# Patient Record
Sex: MALE | Race: WHITE | NOT HISPANIC OR LATINO | Employment: OTHER | URBAN - METROPOLITAN AREA
[De-identification: names, ages, dates, MRNs, and addresses within clinical notes are randomized per-mention and may not be internally consistent; named-entity substitution may affect disease eponyms.]

---

## 2017-02-15 ENCOUNTER — HISTORICAL (OUTPATIENT)
Dept: ADMINISTRATIVE | Facility: HOSPITAL | Age: 47
End: 2017-02-15

## 2017-02-15 LAB
ALBUMIN SERPL-MCNC: 4.1 G/DL (ref 3.1–4.7)
ALP SERPL-CCNC: 118 IU/L (ref 40–104)
ALT (SGPT): 42 IU/L (ref 3–33)
AST SERPL-CCNC: 28 IU/L (ref 10–40)
BASOPHILS NFR BLD: 0.1 K/UL (ref 0–0.2)
BASOPHILS NFR BLD: 0.5 %
BILIRUB SERPL-MCNC: 0.8 MG/DL (ref 0.3–1)
BUN SERPL-MCNC: 13 MG/DL (ref 8–20)
CALCIUM SERPL-MCNC: 9.1 MG/DL (ref 7.7–10.4)
CHLORIDE: 103 MMOL/L (ref 98–110)
CO2 SERPL-SCNC: 26.1 MMOL/L (ref 22.8–31.6)
CREATININE: 1.11 MG/DL (ref 0.6–1.4)
EOSINOPHIL NFR BLD: 0.2 K/UL (ref 0–0.7)
EOSINOPHIL NFR BLD: 1.3 %
ERYTHROCYTE [DISTWIDTH] IN BLOOD BY AUTOMATED COUNT: 13.4 % (ref 11.7–14.9)
GLUCOSE: 115 MG/DL (ref 70–99)
GRAN #: 8.1 K/UL (ref 1.4–6.5)
GRAN%: 71.1 %
HCT VFR BLD AUTO: 46 % (ref 39–55)
HGB BLD-MCNC: 15.9 G/DL (ref 14–16)
HIV 1/2 EIA AB SCREEN: NEGATIVE
IMMATURE GRANS (ABS): 0.1 K/UL (ref 0–1)
IMMATURE GRANULOCYTES: 0.4 %
LYMPH #: 2.4 K/UL (ref 1.2–3.4)
LYMPH%: 20.6 %
MCH RBC QN AUTO: 28.3 PG (ref 25–35)
MCHC RBC AUTO-ENTMCNC: 34.6 G/DL (ref 31–36)
MCV RBC AUTO: 81.9 FL (ref 80–100)
MONO #: 0.7 K/UL (ref 0.1–0.6)
MONO%: 6.1 %
NUCLEATED RBCS: 0 %
PLATELET # BLD AUTO: 246 K/UL (ref 140–440)
PMV BLD AUTO: 12.7 FL (ref 8.8–12.7)
POTASSIUM SERPL-SCNC: 3.1 MMOL/L (ref 3.5–5)
PROT SERPL-MCNC: 7.4 G/DL (ref 6–8.2)
RBC # BLD AUTO: 5.62 M/UL (ref 4.3–5.9)
SODIUM: 139 MMOL/L (ref 134–144)
WBC # BLD AUTO: 11.4 K/UL (ref 5–10)

## 2017-02-17 LAB
ANA SER-ACNC: NEGATIVE
RHEUMATOID FACT SERPL-ACNC: 9.2 IU/ML (ref 0–13.9)

## 2017-07-18 RX ORDER — CARBAMAZEPINE 200 MG/1
200 TABLET ORAL DAILY
COMMUNITY
End: 2019-03-07 | Stop reason: ALTCHOICE

## 2017-07-18 RX ORDER — OMEPRAZOLE 20 MG/1
20 CAPSULE, DELAYED RELEASE ORAL 2 TIMES DAILY
COMMUNITY

## 2017-07-18 RX ORDER — FUROSEMIDE 20 MG/1
TABLET ORAL
COMMUNITY
End: 2017-10-24 | Stop reason: DRUGHIGH

## 2017-07-18 RX ORDER — ATORVASTATIN CALCIUM 20 MG/1
20 TABLET, FILM COATED ORAL DAILY
COMMUNITY
End: 2018-05-31 | Stop reason: DRUGHIGH

## 2017-07-18 RX ORDER — METOPROLOL TARTRATE 50 MG/1
TABLET ORAL
Status: ON HOLD | COMMUNITY
End: 2017-11-03

## 2017-07-18 RX ORDER — CETIRIZINE HYDROCHLORIDE 10 MG/1
10 TABLET ORAL DAILY
COMMUNITY

## 2017-07-18 RX ORDER — LOSARTAN POTASSIUM 25 MG/1
25 TABLET ORAL DAILY
Status: ON HOLD | COMMUNITY
End: 2017-11-04 | Stop reason: HOSPADM

## 2017-07-18 RX ORDER — CITALOPRAM 40 MG/1
40 TABLET, FILM COATED ORAL DAILY
COMMUNITY
End: 2019-03-27

## 2017-07-19 ENCOUNTER — OFFICE VISIT (OUTPATIENT)
Dept: PULMONOLOGY | Facility: CLINIC | Age: 47
End: 2017-07-19
Payer: MEDICAID

## 2017-07-19 VITALS
SYSTOLIC BLOOD PRESSURE: 118 MMHG | HEART RATE: 98 BPM | WEIGHT: 315 LBS | HEIGHT: 68 IN | BODY MASS INDEX: 47.74 KG/M2 | OXYGEN SATURATION: 90 % | DIASTOLIC BLOOD PRESSURE: 68 MMHG

## 2017-07-19 DIAGNOSIS — J96.11 CHRONIC RESPIRATORY FAILURE WITH HYPOXIA: ICD-10-CM

## 2017-07-19 DIAGNOSIS — I27.82 OTHER CHRONIC PULMONARY EMBOLISM WITHOUT ACUTE COR PULMONALE: Primary | ICD-10-CM

## 2017-07-19 DIAGNOSIS — E66.2 OBESITY HYPOVENTILATION SYNDROME: ICD-10-CM

## 2017-07-19 PROBLEM — F31.10 BIPOLAR AFFECTIVE DISORDER, CURRENT EPISODE MANIC: Status: ACTIVE | Noted: 2017-07-19

## 2017-07-19 PROBLEM — I27.20 PULMONARY HYPERTENSION: Status: ACTIVE | Noted: 2017-07-19

## 2017-07-19 PROBLEM — H40.9 GLAUCOMA: Status: ACTIVE | Noted: 2017-07-19

## 2017-07-19 PROBLEM — I26.99 PULMONARY EMBOLISM: Status: ACTIVE | Noted: 2017-07-19

## 2017-07-19 PROBLEM — E78.5 HYPERLIPIDEMIA: Status: ACTIVE | Noted: 2017-07-19

## 2017-07-19 PROBLEM — I10 BENIGN ESSENTIAL HYPERTENSION: Status: ACTIVE | Noted: 2017-07-19

## 2017-07-19 PROBLEM — F41.9 ANXIETY: Status: ACTIVE | Noted: 2017-07-19

## 2017-07-19 PROBLEM — Z95.5 PRESENCE OF STENT IN CORONARY ARTERY: Status: ACTIVE | Noted: 2017-07-19

## 2017-07-19 PROBLEM — E66.01 MORBID OBESITY: Status: ACTIVE | Noted: 2017-07-19

## 2017-07-19 PROCEDURE — 99214 OFFICE O/P EST MOD 30 MIN: CPT | Mod: ,,, | Performed by: INTERNAL MEDICINE

## 2017-07-19 RX ORDER — ALBUTEROL SULFATE 90 UG/1
AEROSOL, METERED RESPIRATORY (INHALATION)
Refills: 6 | COMMUNITY
Start: 2017-06-05

## 2017-07-19 NOTE — PROGRESS NOTES
HPI:    Here for follow up, doing better overall, still with GOMEZ.  Here to requalify for O2 (O2 sat at rest 91 % on room air, with walking 88 %, increased to 94% on O2).  Had sleep study (+ mild CLAUDIA - RDI - 7, but didn't have much/any deep sleep).  No new problems.    Home Oxygen    Qualifier - O2 sat 88% on room air      Date - 7/18/2017                   + Exertion      O2 sat 94 on 2 LPM via nasal cannuli    DX - pulmonary embolism, obesity hypoventilation    Face to face visit with pt concerning the need for O2 therapy, all questions answered.  I stressed the need for compliance.  Pt to call with any questions.    NIPPV for Obesity Hypoventilation    Ordering nocturnal volume ventilator PRN use to reduce the risk of exacerbation.  Home BiPAP in sufficient due to the severity of the condition.  Obesity hypoventilation is the cause of the chronic respiratory failure.            Medications    Current Outpatient Prescriptions:     apixaban (ELIQUIS) 5 mg Tab, Take by mouth., Disp: , Rfl:     atorvastatin (LIPITOR) 20 MG tablet, Take by mouth., Disp: , Rfl:     carbamazepine (TEGRETOL) 200 mg tablet, Take by mouth., Disp: , Rfl:     cetirizine (ZYRTEC) 10 MG tablet, Take by mouth., Disp: , Rfl:     citalopram (CELEXA) 40 MG tablet, Take by mouth., Disp: , Rfl:     furosemide (LASIX) 20 MG tablet, Take by mouth., Disp: , Rfl:     losartan (COZAAR) 25 MG tablet, Take by mouth., Disp: , Rfl:     metoprolol tartrate (LOPRESSOR) 50 MG tablet, Take by mouth., Disp: , Rfl:     omeprazole (PRILOSEC) 20 MG capsule, Take by mouth., Disp: , Rfl:     VENTOLIN HFA 90 mcg/actuation inhaler, TAKE 1 PUFF EVERY 6 HOURS AS NEEDED, Disp: , Rfl: 6    Allergies  Review of patient's allergies indicates:   Allergen Reactions    Clopidogrel        Social History    History   Smoking Status    Never Smoker   Smokeless Tobacco    Never Used     ETOH - 0 drinks per week.  History   Drug Use No     Occupation - not working, worked  "at [employer]    Family History  No family history on file.    ROS  Review of Systems   Constitutional: Positive for malaise/fatigue. Negative for chills, diaphoresis, fever and weight loss.   HENT: Negative for congestion.    Eyes: Negative for pain.   Respiratory: Positive for shortness of breath. Negative for cough, hemoptysis, sputum production, wheezing and stridor.    Cardiovascular: Positive for leg swelling. Negative for chest pain, palpitations, orthopnea, claudication and PND.   Gastrointestinal: Negative for abdominal pain, constipation, diarrhea, heartburn, nausea and vomiting.   Genitourinary: Negative for dysuria, frequency and urgency.   Musculoskeletal: Negative for falls and myalgias.   Neurological: Negative for sensory change, focal weakness and weakness.   Psychiatric/Behavioral: Negative for depression. The patient is not nervous/anxious.        Physical Exam    Vitals:    07/19/17 1032   BP: 118/68   Pulse: 98   SpO2: (!) 90%   Weight: (!) 147.4 kg (325 lb)   Height: 5' 7.5" (1.715 m)       Physical Exam   Constitutional: He is oriented to person, place, and time. He appears well-developed and well-nourished. No distress.   HENT:   Head: Normocephalic and atraumatic.   Right Ear: External ear normal.   Left Ear: External ear normal.   Nose: Nose normal.   Mouth/Throat: Oropharynx is clear and moist.   Eyes: EOM are normal. Pupils are equal, round, and reactive to light.   Neck: Normal range of motion. Neck supple. No JVD present. No tracheal deviation present. No thyromegaly present.   Cardiovascular: Normal rate, regular rhythm, normal heart sounds and intact distal pulses.  Exam reveals no gallop and no friction rub.    No murmur heard.  Pulmonary/Chest: Effort normal and breath sounds normal. No stridor. No respiratory distress. He has no wheezes. He has no rales. He exhibits no tenderness.   Abdominal: Soft. Bowel sounds are normal. He exhibits no distension.   obese   Musculoskeletal: Normal " range of motion. He exhibits edema. He exhibits no tenderness.   Lymphadenopathy:     He has no cervical adenopathy.   Neurological: He is alert and oriented to person, place, and time. He has normal reflexes. No cranial nerve deficit.   Skin: He is not diaphoretic.   Psychiatric: He has a normal mood and affect. His behavior is normal.   Nursing note and vitals reviewed.      Lab        Xray  Imaging Results    None         Impression/Plan  Problem List Items Addressed This Visit        Pulmonary    Pulmonary embolism - Primary  - will check VQ to evaluate for CTEPH  - will call with results  - RTC 3 months    Chronic respiratory failure with hypoxia  - requalified for O2         Fluids/Electrolytes/Nutrition/GI    Obesity hypoventilation syndrome  - will check into qualifying for NIPPV  - may need additional studies      Other Visit Diagnoses    None.

## 2017-07-25 ENCOUNTER — TELEPHONE (OUTPATIENT)
Dept: PULMONOLOGY | Facility: CLINIC | Age: 47
End: 2017-07-25

## 2017-07-25 DIAGNOSIS — I27.82 OTHER CHRONIC PULMONARY EMBOLISM WITHOUT ACUTE COR PULMONALE: Primary | ICD-10-CM

## 2017-07-25 NOTE — TELEPHONE ENCOUNTER
Reviewed VQ - + persistent defects.    Tell pt to continue with his anticoagulation, the findings explain his symptoms and are consistent with unresolved clot as we discussed.  Will order CTA to evaluate.

## 2017-08-08 ENCOUNTER — TELEPHONE (OUTPATIENT)
Dept: PULMONOLOGY | Facility: CLINIC | Age: 47
End: 2017-08-08

## 2017-08-17 ENCOUNTER — OFFICE VISIT (OUTPATIENT)
Dept: PULMONOLOGY | Facility: CLINIC | Age: 47
End: 2017-08-17
Payer: MEDICAID

## 2017-08-17 VITALS
HEIGHT: 68 IN | BODY MASS INDEX: 47.74 KG/M2 | DIASTOLIC BLOOD PRESSURE: 80 MMHG | HEART RATE: 102 BPM | SYSTOLIC BLOOD PRESSURE: 122 MMHG | WEIGHT: 315 LBS | OXYGEN SATURATION: 90 %

## 2017-08-17 DIAGNOSIS — J96.11 CHRONIC RESPIRATORY FAILURE WITH HYPOXIA: ICD-10-CM

## 2017-08-17 DIAGNOSIS — I27.82 OTHER CHRONIC PULMONARY EMBOLISM WITHOUT ACUTE COR PULMONALE: ICD-10-CM

## 2017-08-17 DIAGNOSIS — E66.2 OBESITY HYPOVENTILATION SYNDROME: ICD-10-CM

## 2017-08-17 DIAGNOSIS — I27.20 PULMONARY HYPERTENSION: Primary | ICD-10-CM

## 2017-08-17 PROCEDURE — 99214 OFFICE O/P EST MOD 30 MIN: CPT | Mod: ,,, | Performed by: INTERNAL MEDICINE

## 2017-08-17 PROCEDURE — 3008F BODY MASS INDEX DOCD: CPT | Mod: ,,, | Performed by: INTERNAL MEDICINE

## 2017-08-17 NOTE — PROGRESS NOTES
HPI:    Here for follow up, SOB, GOMEZ seem to be a little worse, no new issues .  Had VQ and CT scan which both are consistent with unresolved clots.  He is to get NIPPV delivered today.  eviewed CT scan.  We will need to look into options for referral for evaluation for CTEPH and possible intervention.  D/W pt and sister.    Home Oxygen    Qualifier - O2 sat 88% on room air      Date - 7/18/2017                   + Exertion      O2 sat 94 on 2 LPM via nasal cannuli    DX - pulmonary embolism, obesity hypoventilation    Face to face visit with pt concerning the need for O2 therapy, all questions answered.  I stressed the need for compliance.  Pt to call with any questions.    NIPPV for Obesity Hypoventilation    Ordering nocturnal volume ventilator PRN use to reduce the risk of exacerbation.  Home BiPAP in sufficient due to the severity of the condition.  Obesity hypoventilation is the cause of the chronic respiratory failure.            Medications    Current Outpatient Prescriptions:     apixaban (ELIQUIS) 5 mg Tab, Take by mouth., Disp: , Rfl:     atorvastatin (LIPITOR) 20 MG tablet, Take by mouth., Disp: , Rfl:     carbamazepine (TEGRETOL) 200 mg tablet, Take by mouth., Disp: , Rfl:     cetirizine (ZYRTEC) 10 MG tablet, Take by mouth., Disp: , Rfl:     citalopram (CELEXA) 40 MG tablet, Take by mouth., Disp: , Rfl:     furosemide (LASIX) 20 MG tablet, Take by mouth., Disp: , Rfl:     losartan (COZAAR) 25 MG tablet, Take by mouth., Disp: , Rfl:     metoprolol tartrate (LOPRESSOR) 50 MG tablet, Take by mouth., Disp: , Rfl:     omeprazole (PRILOSEC) 20 MG capsule, Take by mouth., Disp: , Rfl:     VENTOLIN HFA 90 mcg/actuation inhaler, TAKE 1 PUFF EVERY 6 HOURS AS NEEDED, Disp: , Rfl: 6    Allergies  Review of patient's allergies indicates:   Allergen Reactions    Clopidogrel        Social History    History   Smoking Status    Never Smoker   Smokeless Tobacco    Never Used     ETOH - 0 drinks per  "week.  History   Drug Use No     Occupation - not working, worked at [employer]    Family History  No family history on file.    ROS  Review of Systems   Constitutional: Positive for malaise/fatigue. Negative for chills, diaphoresis, fever and weight loss.   HENT: Negative for congestion.    Eyes: Negative for pain.   Respiratory: Positive for shortness of breath. Negative for cough, hemoptysis, sputum production, wheezing and stridor.    Cardiovascular: Positive for leg swelling. Negative for chest pain, palpitations, orthopnea, claudication and PND.   Gastrointestinal: Negative for abdominal pain, constipation, diarrhea, heartburn, nausea and vomiting.   Genitourinary: Negative for dysuria, frequency and urgency.   Musculoskeletal: Negative for falls and myalgias.   Neurological: Negative for sensory change, focal weakness and weakness.   Psychiatric/Behavioral: Negative for depression. The patient is not nervous/anxious.        Physical Exam    Vitals:    08/17/17 1026   BP: 122/80   Pulse: 102   SpO2: (!) 90%   Weight: (!) 152.4 kg (336 lb)   Height: 5' 7.5" (1.715 m)       Physical Exam   Constitutional: He is oriented to person, place, and time. He appears well-developed and well-nourished. No distress.   HENT:   Head: Normocephalic and atraumatic.   Right Ear: External ear normal.   Left Ear: External ear normal.   Nose: Nose normal.   Mouth/Throat: Oropharynx is clear and moist.   Eyes: EOM are normal. Pupils are equal, round, and reactive to light.   Neck: Normal range of motion. Neck supple. No JVD present. No tracheal deviation present. No thyromegaly present.   Cardiovascular: Normal rate, regular rhythm, normal heart sounds and intact distal pulses.  Exam reveals no gallop and no friction rub.    No murmur heard.  Pulmonary/Chest: Effort normal and breath sounds normal. No stridor. No respiratory distress. He has no wheezes. He has no rales. He exhibits no tenderness.   Abdominal: Soft. Bowel sounds are " normal. He exhibits no distension.   obese   Musculoskeletal: Normal range of motion. He exhibits edema. He exhibits no tenderness.   Lymphadenopathy:     He has no cervical adenopathy.   Neurological: He is alert and oriented to person, place, and time. He has normal reflexes. No cranial nerve deficit.   Skin: He is not diaphoretic.   Psychiatric: He has a normal mood and affect. His behavior is normal.   Nursing note and vitals reviewed.      Lab        Xray  Imaging Results    None         Impression/Plan  Problem List Items Addressed This Visit        Pulmonary    Pulmonary embolism - Primary  - findings consistent with unresolved clots and probable CTEPH  - will look into referral for possible intervention  - will look into starting therapy for this (letairis or combination therapy)  - RTC 3 months    Chronic respiratory failure with hypoxia  - requalified for O2         Fluids/Electrolytes/Nutrition/GI    Obesity hypoventilation syndrome  - to get NIPPV        Other Visit Diagnoses    None.

## 2017-09-26 ENCOUNTER — TELEPHONE (OUTPATIENT)
Dept: PULMONOLOGY | Facility: CLINIC | Age: 47
End: 2017-09-26

## 2017-09-26 NOTE — TELEPHONE ENCOUNTER
Patient called prior to his follow up here 10/24/17, asking if  had been in touch with anyone re surgery/meds/etc.?

## 2017-10-11 DIAGNOSIS — I26.99 PULMONARY EMBOLISM, BILATERAL: Primary | ICD-10-CM

## 2017-10-24 ENCOUNTER — OFFICE VISIT (OUTPATIENT)
Dept: PULMONOLOGY | Facility: CLINIC | Age: 47
End: 2017-10-24
Payer: MEDICAID

## 2017-10-24 VITALS — HEART RATE: 106 BPM | SYSTOLIC BLOOD PRESSURE: 130 MMHG | DIASTOLIC BLOOD PRESSURE: 80 MMHG | OXYGEN SATURATION: 88 %

## 2017-10-24 DIAGNOSIS — I27.82 OTHER CHRONIC PULMONARY EMBOLISM WITHOUT ACUTE COR PULMONALE: ICD-10-CM

## 2017-10-24 DIAGNOSIS — E66.2 OBESITY HYPOVENTILATION SYNDROME: ICD-10-CM

## 2017-10-24 DIAGNOSIS — I27.20 PULMONARY HYPERTENSION: ICD-10-CM

## 2017-10-24 DIAGNOSIS — J96.11 CHRONIC RESPIRATORY FAILURE WITH HYPOXIA: Primary | ICD-10-CM

## 2017-10-24 PROCEDURE — 99214 OFFICE O/P EST MOD 30 MIN: CPT | Mod: ,,, | Performed by: INTERNAL MEDICINE

## 2017-10-24 RX ORDER — METOLAZONE 5 MG/1
5 TABLET ORAL DAILY
Qty: 30 TABLET | Refills: 11 | Status: ON HOLD | OUTPATIENT
Start: 2017-10-24 | End: 2017-11-04 | Stop reason: HOSPADM

## 2017-10-24 RX ORDER — FUROSEMIDE 80 MG/1
80 TABLET ORAL DAILY
Refills: 11 | Status: ON HOLD | COMMUNITY
Start: 2017-10-11 | End: 2017-11-04 | Stop reason: HOSPADM

## 2017-10-24 NOTE — PROGRESS NOTES
HPI:    Here for follow up, SOB, GOMEZ seem to be a little worse has required increased OI2 (5 LPM with exertion).  Has also noted increased edema which has not responded to diuretics.  He has appointment with Dr Jennings (Thoracic surgery at [facility]) for evaluation.  D/W pt and sister.  Will try to add zaroxylyn to see if we get a better diuresis.      Home Oxygen    Qualifier - O2 sat 88% on room air      Date - 7/18/2017                   + Exertion      O2 sat 94 on 2 LPM via nasal cannuli    DX - pulmonary embolism, obesity hypoventilation    Face to face visit with pt concerning the need for O2 therapy, all questions answered.  I stressed the need for compliance.  Pt to call with any questions.    NIPPV for Obesity Hypoventilation    Ordering nocturnal volume ventilator PRN use to reduce the risk of exacerbation.  Home BiPAP in sufficient due to the severity of the condition.  Obesity hypoventilation is the cause of the chronic respiratory failure.            Medications    Current Outpatient Prescriptions:     apixaban (ELIQUIS) 5 mg Tab, Take by mouth., Disp: , Rfl:     atorvastatin (LIPITOR) 20 MG tablet, Take by mouth., Disp: , Rfl:     carbamazepine (TEGRETOL) 200 mg tablet, Take by mouth., Disp: , Rfl:     cetirizine (ZYRTEC) 10 MG tablet, Take by mouth., Disp: , Rfl:     citalopram (CELEXA) 40 MG tablet, Take by mouth., Disp: , Rfl:     furosemide (LASIX) 80 MG tablet, Take 80 mg by mouth 2 (two) times daily., Disp: , Rfl: 11    losartan (COZAAR) 25 MG tablet, Take by mouth., Disp: , Rfl:     metoprolol tartrate (LOPRESSOR) 50 MG tablet, Take by mouth., Disp: , Rfl:     omeprazole (PRILOSEC) 20 MG capsule, Take by mouth., Disp: , Rfl:     VENTOLIN HFA 90 mcg/actuation inhaler, TAKE 1 PUFF EVERY 6 HOURS AS NEEDED, Disp: , Rfl: 6    Allergies  Review of patient's allergies indicates:   Allergen Reactions    Clopidogrel        Social History    History   Smoking Status    Never Smoker   Smokeless  Tobacco    Never Used     ETOH - 0 drinks per week.  History   Drug Use No     Occupation - not working, worked at [employer]    Family History  History reviewed. No pertinent family history.    ROS  Review of Systems   Constitutional: Positive for malaise/fatigue. Negative for chills, diaphoresis, fever and weight loss.   HENT: Negative for congestion.    Eyes: Negative for pain.   Respiratory: Positive for shortness of breath. Negative for cough, hemoptysis, sputum production, wheezing and stridor.    Cardiovascular: Positive for leg swelling. Negative for chest pain, palpitations, orthopnea, claudication and PND.   Gastrointestinal: Negative for abdominal pain, constipation, diarrhea, heartburn, nausea and vomiting.   Genitourinary: Negative for dysuria, frequency and urgency.   Musculoskeletal: Negative for falls and myalgias.   Neurological: Negative for sensory change, focal weakness and weakness.   Psychiatric/Behavioral: Negative for depression. The patient is not nervous/anxious.        Physical Exam    Vitals:    10/24/17 1320   BP: 130/80   Pulse: 106   SpO2: (!) 88%       Physical Exam   Constitutional: He is oriented to person, place, and time. He appears well-developed and well-nourished. No distress.   HENT:   Head: Normocephalic and atraumatic.   Nose: Nose normal.   Mouth/Throat: Oropharynx is clear and moist.   Eyes: EOM are normal. Pupils are equal, round, and reactive to light.   Neck: Normal range of motion. Neck supple. No JVD present. No tracheal deviation present. No thyromegaly present.   Cardiovascular: Normal rate, regular rhythm, normal heart sounds and intact distal pulses.  Exam reveals no gallop and no friction rub.    No murmur heard.  Pulmonary/Chest: Effort normal and breath sounds normal. No stridor. No respiratory distress. He has no wheezes. He has no rales. He exhibits no tenderness.   Abdominal: Soft. Bowel sounds are normal. He exhibits no distension.   obese   Musculoskeletal:  Normal range of motion. He exhibits edema. He exhibits no tenderness.   Lymphadenopathy:     He has no cervical adenopathy.   Neurological: He is alert and oriented to person, place, and time. He has normal reflexes. No cranial nerve deficit.   Skin: He is not diaphoretic.   Psychiatric: He has a normal mood and affect. His behavior is normal.   Nursing note and vitals reviewed.      Lab        Xray  Imaging Results    None         Impression/Plan  Problem List Items Addressed This Visit        Pulmonary    Pulmonary embolism - Primary  - findings consistent with unresolved clots and probable CTEPH  - to see Dr Jennings  - if surgery is not an option will look into [facility]    Chronic respiratory failure with hypoxia  - requalified for O2         Fluids/Electrolytes/Nutrition/GI    Obesity hypoventilation syndrome  - has NIPPV, having some trouble with tolerance and not wearing it regularly        Other Visit Diagnoses    None.

## 2017-11-01 ENCOUNTER — HOSPITAL ENCOUNTER (INPATIENT)
Facility: HOSPITAL | Age: 47
LOS: 3 days | Discharge: HOME | DRG: 641 | End: 2017-11-04
Attending: EMERGENCY MEDICINE | Admitting: EMERGENCY MEDICINE
Payer: MEDICAID

## 2017-11-01 ENCOUNTER — OFFICE VISIT (OUTPATIENT)
Dept: CARDIOTHORACIC SURGERY | Facility: CLINIC | Age: 47
DRG: 641 | End: 2017-11-01
Payer: MEDICAID

## 2017-11-01 ENCOUNTER — HOSPITAL ENCOUNTER (OUTPATIENT)
Dept: PULMONOLOGY | Facility: CLINIC | Age: 47
Discharge: HOME | DRG: 641 | End: 2017-11-01
Payer: MEDICAID

## 2017-11-01 VITALS
WEIGHT: 315 LBS | OXYGEN SATURATION: 84 % | DIASTOLIC BLOOD PRESSURE: 72 MMHG | SYSTOLIC BLOOD PRESSURE: 140 MMHG | HEART RATE: 72 BPM | BODY MASS INDEX: 47.74 KG/M2 | TEMPERATURE: 98 F | HEIGHT: 68 IN

## 2017-11-01 DIAGNOSIS — R55 SYNCOPE, UNSPECIFIED SYNCOPE TYPE: ICD-10-CM

## 2017-11-01 DIAGNOSIS — I27.82 OTHER CHRONIC PULMONARY EMBOLISM WITH ACUTE COR PULMONALE: Primary | ICD-10-CM

## 2017-11-01 DIAGNOSIS — I27.21 PULMONARY ARTERIAL HYPERTENSION: ICD-10-CM

## 2017-11-01 DIAGNOSIS — I36.9 NONRHEUMATIC TRICUSPID VALVE DISORDER: ICD-10-CM

## 2017-11-01 DIAGNOSIS — R06.02 SHORTNESS OF BREATH: Primary | ICD-10-CM

## 2017-11-01 DIAGNOSIS — R06.02 SHORTNESS OF BREATH: ICD-10-CM

## 2017-11-01 DIAGNOSIS — I26.09 OTHER CHRONIC PULMONARY EMBOLISM WITH ACUTE COR PULMONALE: Primary | ICD-10-CM

## 2017-11-01 DIAGNOSIS — E87.6 HYPOKALEMIA: Primary | ICD-10-CM

## 2017-11-01 LAB
ALBUMIN SERPL BCP-MCNC: 3.6 G/DL
ALLENS TEST: ABNORMAL
ALP SERPL-CCNC: 159 U/L
ALT SERPL W/O P-5'-P-CCNC: 23 U/L
ANION GAP SERPL CALC-SCNC: 13 MMOL/L
ANION GAP SERPL CALC-SCNC: 17 MMOL/L
AST SERPL-CCNC: 27 U/L
BASOPHILS # BLD AUTO: 0.07 K/UL
BASOPHILS NFR BLD: 0.5 %
BILIRUB SERPL-MCNC: 1.5 MG/DL
BNP SERPL-MCNC: 46 PG/ML
BUN SERPL-MCNC: 29 MG/DL
BUN SERPL-MCNC: 29 MG/DL
BUN SERPL-MCNC: 29 MG/DL (ref 6–30)
CALCIUM SERPL-MCNC: 9.4 MG/DL
CALCIUM SERPL-MCNC: 9.5 MG/DL
CHLORIDE SERPL-SCNC: 71 MMOL/L
CHLORIDE SERPL-SCNC: 71 MMOL/L
CHLORIDE SERPL-SCNC: 71 MMOL/L (ref 95–110)
CO2 SERPL-SCNC: 41 MMOL/L
CO2 SERPL-SCNC: 43 MMOL/L
CREAT SERPL-MCNC: 1.1 MG/DL (ref 0.5–1.4)
CREAT SERPL-MCNC: 1.2 MG/DL
CREAT SERPL-MCNC: 1.3 MG/DL
DIFFERENTIAL METHOD: ABNORMAL
EOSINOPHIL # BLD AUTO: 0.2 K/UL
EOSINOPHIL NFR BLD: 1 %
ERYTHROCYTE [DISTWIDTH] IN BLOOD BY AUTOMATED COUNT: 13.1 %
EST. GFR  (AFRICAN AMERICAN): >60 ML/MIN/1.73 M^2
EST. GFR  (AFRICAN AMERICAN): >60 ML/MIN/1.73 M^2
EST. GFR  (NON AFRICAN AMERICAN): >60 ML/MIN/1.73 M^2
EST. GFR  (NON AFRICAN AMERICAN): >60 ML/MIN/1.73 M^2
GLUCOSE SERPL-MCNC: 121 MG/DL
GLUCOSE SERPL-MCNC: 124 MG/DL (ref 70–110)
GLUCOSE SERPL-MCNC: 223 MG/DL
HCO3 UR-SCNC: 48.8 MMOL/L (ref 24–28)
HCT VFR BLD AUTO: 45.8 %
HCT VFR BLD CALC: 48 %PCV (ref 36–54)
HGB BLD-MCNC: 16.3 G/DL
IMM GRANULOCYTES # BLD AUTO: 0.07 K/UL
IMM GRANULOCYTES NFR BLD AUTO: 0.5 %
LYMPHOCYTES # BLD AUTO: 3 K/UL
LYMPHOCYTES NFR BLD: 20.1 %
MAGNESIUM SERPL-MCNC: 1.9 MG/DL
MCH RBC QN AUTO: 27.9 PG
MCHC RBC AUTO-ENTMCNC: 35.6 G/DL
MCV RBC AUTO: 78 FL
MONOCYTES # BLD AUTO: 1.4 K/UL
MONOCYTES NFR BLD: 9.6 %
NEUTROPHILS # BLD AUTO: 10.2 K/UL
NEUTROPHILS NFR BLD: 68.3 %
NRBC BLD-RTO: 0 /100 WBC
PCO2 BLDA: 50.7 MMHG (ref 35–45)
PH SMN: 7.59 [PH] (ref 7.35–7.45)
PHOSPHATE SERPL-MCNC: 3.5 MG/DL
PLATELET # BLD AUTO: 280 K/UL
PMV BLD AUTO: 11.5 FL
PO2 BLDA: 54 MMHG (ref 40–60)
POC BE: 27 MMOL/L
POC IONIZED CALCIUM: 0.93 MMOL/L (ref 1.06–1.42)
POC SATURATED O2: 91 % (ref 95–100)
POC TCO2 (MEASURED): 48 MMOL/L (ref 23–29)
POC TCO2: >50 MMOL/L (ref 24–29)
POTASSIUM BLD-SCNC: <2 MMOL/L (ref 3.5–5.1)
POTASSIUM SERPL-SCNC: 2.1 MMOL/L
POTASSIUM SERPL-SCNC: 2.1 MMOL/L
PRE FEV1 FVC: 66
PRE FEV1: 2.12
PRE FVC: 3.21
PREDICTED FEV1 FVC: 82
PREDICTED FEV1: 3.55
PREDICTED FVC: 4.33
PROT SERPL-MCNC: 7.7 G/DL
RBC # BLD AUTO: 5.85 M/UL
SAMPLE: ABNORMAL
SAMPLE: ABNORMAL
SITE: ABNORMAL
SODIUM BLD-SCNC: 126 MMOL/L (ref 136–145)
SODIUM SERPL-SCNC: 127 MMOL/L
SODIUM SERPL-SCNC: 129 MMOL/L
TROPONIN I SERPL DL<=0.01 NG/ML-MCNC: 0.01 NG/ML
TROPONIN I SERPL DL<=0.01 NG/ML-MCNC: 0.04 NG/ML
TSH SERPL DL<=0.005 MIU/L-ACNC: 3 UIU/ML
WBC # BLD AUTO: 14.85 K/UL

## 2017-11-01 PROCEDURE — 25000003 PHARM REV CODE 250: Performed by: STUDENT IN AN ORGANIZED HEALTH CARE EDUCATION/TRAINING PROGRAM

## 2017-11-01 PROCEDURE — 80048 BASIC METABOLIC PNL TOTAL CA: CPT

## 2017-11-01 PROCEDURE — 94010 BREATHING CAPACITY TEST: CPT | Mod: PBBFAC | Performed by: INTERNAL MEDICINE

## 2017-11-01 PROCEDURE — 82803 BLOOD GASES ANY COMBINATION: CPT | Mod: PBBFAC | Performed by: INTERNAL MEDICINE

## 2017-11-01 PROCEDURE — 96365 THER/PROPH/DIAG IV INF INIT: CPT

## 2017-11-01 PROCEDURE — 83735 ASSAY OF MAGNESIUM: CPT

## 2017-11-01 PROCEDURE — 36600 WITHDRAWAL OF ARTERIAL BLOOD: CPT | Mod: S$PBB,,, | Performed by: INTERNAL MEDICINE

## 2017-11-01 PROCEDURE — 63600175 PHARM REV CODE 636 W HCPCS: Performed by: PHYSICIAN ASSISTANT

## 2017-11-01 PROCEDURE — 94010 BREATHING CAPACITY TEST: CPT | Mod: 26,S$PBB,, | Performed by: INTERNAL MEDICINE

## 2017-11-01 PROCEDURE — 11000001 HC ACUTE MED/SURG PRIVATE ROOM

## 2017-11-01 PROCEDURE — 80053 COMPREHEN METABOLIC PANEL: CPT

## 2017-11-01 PROCEDURE — 25000003 PHARM REV CODE 250: Performed by: PHYSICIAN ASSISTANT

## 2017-11-01 PROCEDURE — 85025 COMPLETE CBC W/AUTO DIFF WBC: CPT

## 2017-11-01 PROCEDURE — 84443 ASSAY THYROID STIM HORMONE: CPT

## 2017-11-01 PROCEDURE — 36415 COLL VENOUS BLD VENIPUNCTURE: CPT

## 2017-11-01 PROCEDURE — 99223 1ST HOSP IP/OBS HIGH 75: CPT | Mod: ,,, | Performed by: HOSPITALIST

## 2017-11-01 PROCEDURE — 99285 EMERGENCY DEPT VISIT HI MDM: CPT | Mod: 25,27

## 2017-11-01 PROCEDURE — 84100 ASSAY OF PHOSPHORUS: CPT

## 2017-11-01 PROCEDURE — 83880 ASSAY OF NATRIURETIC PEPTIDE: CPT

## 2017-11-01 PROCEDURE — 96366 THER/PROPH/DIAG IV INF ADDON: CPT

## 2017-11-01 PROCEDURE — 93005 ELECTROCARDIOGRAM TRACING: CPT

## 2017-11-01 PROCEDURE — 99285 EMERGENCY DEPT VISIT HI MDM: CPT | Mod: ,,, | Performed by: PHYSICIAN ASSISTANT

## 2017-11-01 PROCEDURE — 99999 PR PBB SHADOW E&M-EST. PATIENT-LVL III: CPT | Mod: PBBFAC,,, | Performed by: THORACIC SURGERY (CARDIOTHORACIC VASCULAR SURGERY)

## 2017-11-01 PROCEDURE — 93010 ELECTROCARDIOGRAM REPORT: CPT | Mod: ,,, | Performed by: INTERNAL MEDICINE

## 2017-11-01 PROCEDURE — 99205 OFFICE O/P NEW HI 60 MIN: CPT | Mod: S$PBB,,, | Performed by: THORACIC SURGERY (CARDIOTHORACIC VASCULAR SURGERY)

## 2017-11-01 PROCEDURE — 99213 OFFICE O/P EST LOW 20 MIN: CPT | Mod: PBBFAC,25 | Performed by: THORACIC SURGERY (CARDIOTHORACIC VASCULAR SURGERY)

## 2017-11-01 PROCEDURE — 84484 ASSAY OF TROPONIN QUANT: CPT | Mod: 91

## 2017-11-01 PROCEDURE — 36600 WITHDRAWAL OF ARTERIAL BLOOD: CPT | Mod: PBBFAC | Performed by: INTERNAL MEDICINE

## 2017-11-01 PROCEDURE — 82803 BLOOD GASES ANY COMBINATION: CPT

## 2017-11-01 RX ORDER — GLUCAGON 1 MG
1 KIT INJECTION
Status: DISCONTINUED | OUTPATIENT
Start: 2017-11-01 | End: 2017-11-04 | Stop reason: HOSPADM

## 2017-11-01 RX ORDER — CITALOPRAM 40 MG/1
40 TABLET, FILM COATED ORAL DAILY
Status: DISCONTINUED | OUTPATIENT
Start: 2017-11-02 | End: 2017-11-04 | Stop reason: HOSPADM

## 2017-11-01 RX ORDER — POTASSIUM CHLORIDE 20 MEQ/1
60 TABLET, EXTENDED RELEASE ORAL ONCE
Status: COMPLETED | OUTPATIENT
Start: 2017-11-01 | End: 2017-11-01

## 2017-11-01 RX ORDER — ONDANSETRON 2 MG/ML
INJECTION INTRAMUSCULAR; INTRAVENOUS
Status: DISCONTINUED
Start: 2017-11-01 | End: 2017-11-01 | Stop reason: WASHOUT

## 2017-11-01 RX ORDER — IBUPROFEN 200 MG
16 TABLET ORAL
Status: DISCONTINUED | OUTPATIENT
Start: 2017-11-01 | End: 2017-11-04 | Stop reason: HOSPADM

## 2017-11-01 RX ORDER — POTASSIUM CHLORIDE 7.45 MG/ML
10 INJECTION INTRAVENOUS
Status: COMPLETED | OUTPATIENT
Start: 2017-11-01 | End: 2017-11-01

## 2017-11-01 RX ORDER — CARBAMAZEPINE 200 MG/1
200 TABLET ORAL DAILY
Status: DISCONTINUED | OUTPATIENT
Start: 2017-11-02 | End: 2017-11-04 | Stop reason: HOSPADM

## 2017-11-01 RX ORDER — METOPROLOL TARTRATE 50 MG/1
50 TABLET ORAL DAILY
Status: DISCONTINUED | OUTPATIENT
Start: 2017-11-02 | End: 2017-11-03

## 2017-11-01 RX ORDER — POTASSIUM CHLORIDE 750 MG/1
10 TABLET, EXTENDED RELEASE ORAL ONCE
Status: ON HOLD | COMMUNITY
End: 2017-11-03 | Stop reason: SDUPTHER

## 2017-11-01 RX ORDER — POTASSIUM CHLORIDE 750 MG/1
30 CAPSULE, EXTENDED RELEASE ORAL
Status: COMPLETED | OUTPATIENT
Start: 2017-11-01 | End: 2017-11-02

## 2017-11-01 RX ORDER — MORPHINE SULFATE 2 MG/ML
INJECTION, SOLUTION INTRAMUSCULAR; INTRAVENOUS
Status: DISPENSED
Start: 2017-11-01 | End: 2017-11-02

## 2017-11-01 RX ORDER — CLARITHROMYCIN 500 MG/1
500 TABLET, FILM COATED ORAL
Status: ON HOLD | COMMUNITY
End: 2017-11-02

## 2017-11-01 RX ORDER — IBUPROFEN 200 MG
24 TABLET ORAL
Status: DISCONTINUED | OUTPATIENT
Start: 2017-11-01 | End: 2017-11-04 | Stop reason: HOSPADM

## 2017-11-01 RX ADMIN — POTASSIUM CHLORIDE 30 MEQ: 750 CAPSULE, EXTENDED RELEASE ORAL at 05:11

## 2017-11-01 RX ADMIN — POTASSIUM CHLORIDE 30 MEQ: 750 CAPSULE, EXTENDED RELEASE ORAL at 10:11

## 2017-11-01 RX ADMIN — POTASSIUM BICARBONATE 50 MEQ: 25 TABLET, EFFERVESCENT ORAL at 02:11

## 2017-11-01 RX ADMIN — POTASSIUM CHLORIDE 10 MEQ: 10 INJECTION, SOLUTION INTRAVENOUS at 02:11

## 2017-11-01 RX ADMIN — POTASSIUM CHLORIDE 30 MEQ: 750 CAPSULE, EXTENDED RELEASE ORAL at 07:11

## 2017-11-01 RX ADMIN — POTASSIUM CHLORIDE 60 MEQ: 1500 TABLET, EXTENDED RELEASE ORAL at 09:11

## 2017-11-01 RX ADMIN — APIXABAN 5 MG: 5 TABLET, FILM COATED ORAL at 07:11

## 2017-11-01 NOTE — ED PROVIDER NOTES
"Encounter Date: 11/1/2017    SCRIBE #1 NOTE: I, Aminta Tran, am scribing for, and in the presence of,  Dr. Otero. I have scribed the following portions of the note - the APC attestation.       History     Chief Complaint   Patient presents with    Loss of Consciousness     states lost consciousness while getting a PFT 20-30 minute PTA to ER, denies injury- states "passed out" in wheelchair     48 y/o WM with PMHx of chronic respiratory failure on 5L oxygen at home, pulmonary HTN, HTN, PE on eliquis, MI with multiple cardiac stents presents to the ED c/o syncopal episode. He was seen in the CTS clinic earlier today and became lightheaded when transferring from the wheelchair to the [facility] in clinic. He went to have PFTs performed after his office visit and while forcibly exhaling, had a syncopal episode x 2. He was sent to the ED for further evaluation. He has been followed by pulmonology at [facility] and cardiology at the [facility] and is new to the [facility] system. He was supposed to have an Echo and VQ scan today but was sent to the ED. He is currently on lasix and metolazone. He reports fatigue, generalized weakness, and SOB at baseline. He denies fever, chills, chest pain, n/v, d/c, dysuria, LE edema. He denies tobacco, alcohol, or drug use.      The history is provided by the patient.     Review of patient's allergies indicates:   Allergen Reactions    Clopidogrel      Past Medical History:   Diagnosis Date    Acute pulmonary embolism     Chronic respiratory failure with hypoxia     Essential hypertension, malignant     Pulmonary artery hypertension      Past Surgical History:   Procedure Laterality Date    CARDIAC CATHETERIZATION      CATARACT EXTRACTION      MASS EXCISION      inner thigh    TONSILLECTOMY, ADENOIDECTOMY       No family history on file.  Social History   Substance Use Topics    Smoking status: Never Smoker    Smokeless tobacco: Never Used    Alcohol use " "No     Review of Systems   Constitutional: Positive for fatigue. Negative for chills, diaphoresis and fever.   HENT: Positive for congestion, rhinorrhea and sore throat.    Eyes: Negative for photophobia and visual disturbance.   Respiratory: Positive for shortness of breath (at baseline).    Cardiovascular: Negative for chest pain and leg swelling.   Gastrointestinal: Positive for abdominal pain ("upset"). Negative for anal bleeding, blood in stool, constipation, diarrhea, nausea and vomiting.   Genitourinary: Negative for dysuria and hematuria.   Musculoskeletal: Negative for back pain, neck pain and neck stiffness.   Skin: Negative for rash and wound.   Neurological: Positive for light-headedness. Negative for dizziness, syncope, weakness, numbness and headaches.   Psychiatric/Behavioral: Negative for confusion.       Physical Exam     Initial Vitals [11/01/17 1131]   BP Pulse Resp Temp SpO2   126/60 78 (!) 30 98.6 °F (37 °C) (!) 87 %      MAP       82         Physical Exam    Nursing note and vitals reviewed.  Constitutional: He appears well-developed and well-nourished. He is not diaphoretic. No distress.   HENT:   Head: Normocephalic and atraumatic.   Neck: Normal range of motion. Neck supple.   Cardiovascular: Normal rate, regular rhythm and normal heart sounds. Exam reveals no gallop and no friction rub.    No murmur heard.  No LE edema   Pulmonary/Chest: Breath sounds normal. He has no wheezes. He has no rhonchi. He has no rales.   Abdominal: Soft. Bowel sounds are normal. There is no tenderness. There is no rebound and no guarding.   Musculoskeletal: Normal range of motion.   Neurological: He is alert and oriented to person, place, and time.   Skin: Skin is warm and dry. No rash noted. No erythema.   Psychiatric: He has a normal mood and affect.         ED Course   Procedures  Labs Reviewed   CBC W/ AUTO DIFFERENTIAL - Abnormal; Notable for the following:        Result Value    WBC 14.85 (*)     MCV 78 (*) "     Gran # 10.2 (*)     Immature Grans (Abs) 0.07 (*)     Mono # 1.4 (*)     All other components within normal limits   TROPONIN I - Abnormal; Notable for the following:     Troponin I 0.039 (*)     All other components within normal limits   TSH   COMPREHENSIVE METABOLIC PANEL   B-TYPE NATRIURETIC PEPTIDE        Imaging Results          X-Ray Chest PA And Lateral (Final result)  Result time 11/01/17 12:45:20    Final result by Giovanni Pineda MD (11/01/17 12:45:20)                 Impression:      No definite acute abnormality. Question of cardiomegaly and mild edema, though appearance is at least in part artifactual related to body habitus and portable technique.      Electronically signed by: GIOVANNI PINEDA  Date:     11/01/17  Time:    12:45              Narrative:    HISTORY:  shortness of breath    TECHNIQUE: PA and lateral chest radiograph     COMPARISON: N/A      FINDINGS:  Support devices: Retained transvenous pacemaker tip overlies the region of the right ventricle.    Chest: Cardiac silhouette is mildly prominent though exaggerated by body habitus and postable technique.  Hazy opacification over the lung bases is likely artifactual related to body habitus and soft tissue attenuation of x-ray beam. Lungs are otherwise well aerated and clear.  No pleural effusion or pneumothorax.    Upper Abdomen: Normal.    Other: N/A.                                 Medical Decision Making:   History:   Old Medical Records: I decided to obtain old medical records.  Clinical Tests:   Lab Tests: Ordered and Reviewed  Radiological Study: Ordered and Reviewed  Medical Tests: Ordered and Reviewed       APC / Resident Notes:   48 y/o WM with PMHx of chronic respiratory failure on 5L oxygen at home, pulmonary HTN, HTN, PE on eliquis, MI with multiple cardiac stents presents to the ED c/o syncopal episode.  VSS. RRR. Lungs CTA. Abdomen soft and nontender. No LE edema. Currently on 5L oxygen via nasal cannula and O2 95%. Patient resting  comfortably and not in any respiratory distress. DDx includes but is not limited to vasovagal syncope, ACS, pneumonia, electrolyte abnormality. Will get labs and CXR.     CBC shows leukocytosis with WBC 14.85. CMP shows hypokalemia, with K 2.1, Cl 71, CO2 41. VBG ordered. Troponin elevated at 0.039. TSH WNL.     CXR with no definite acute abnormality. ?cardiomegaly and mild edema.     EKG shows prolong QT.    1:19 PM  Discussed with cardiology on call. They do not feel that they need to see the patient in the ED. No indication for bedside Echo. Recommend admission to internal medicine. Can consult if needed.     Given 10mEq IV potassium and 50mEq po potassium. Mag and Phos pending.       Scribe Attestation:   Scribe #1: I performed the above scribed service and the documentation accurately describes the services I performed. I attest to the accuracy of the note.    Attending Attestation:     Physician Attestation Statement for NP/PA:   I discussed this assessment and plan of this patient with the NP/PA, but I did not personally examine the patient. The face to face encounter was performed by the NP/PA.                  ED Course      Clinical Impression:   The primary encounter diagnosis was Hypokalemia. Diagnoses of Syncope, unspecified syncope type and Shortness of breath were also pertinent to this visit.    Disposition:   Disposition: Admitted  Condition: Jcarlos Rodriguez PA-C  11/01/17 6190

## 2017-11-01 NOTE — PROGRESS NOTES
Subjective:      Patient ID: Jaquan Hines Jr. is a 47 y.o. male.    Chief Complaint: pulmonary embolism      HPI:  Jaquan Hines Jr. is a 47 y.o. male who present with a known pulmonary embolism. He has chronic respiratory failure with hypoxia on 3L home O2 with obesity hypoventilation syndrome.  He arrives to clinic today via wheel chair on O2 via nasal cannula. He endorses SOB, lightheadedness and dizziness. He had a near syncopal episode moving from the wheel chair to the clinic room.     Review of patient's allergies indicates:   Allergen Reactions    Clopidogrel      Past Medical History:   Diagnosis Date    Acute pulmonary embolism     Chronic respiratory failure with hypoxia     Essential hypertension, malignant     Pulmonary artery hypertension      Past Surgical History:   Procedure Laterality Date    CARDIAC CATHETERIZATION      CATARACT EXTRACTION      MASS EXCISION      inner thigh    TONSILLECTOMY, ADENOIDECTOMY       Family History     None        Social History     Social History    Marital status: Single     Spouse name: N/A    Number of children: N/A    Years of education: N/A     Occupational History    Not on file.     Social History Main Topics    Smoking status: Never Smoker    Smokeless tobacco: Never Used    Alcohol use No    Drug use: No    Sexual activity: Not on file     Other Topics Concern    Not on file     Social History Narrative    No narrative on file       Current medications Reviewed    Review of Systems   Constitutional: Positive for activity change and fatigue. Negative for appetite change and fever.   HENT: Negative for congestion, dental problem, sneezing and sore throat.    Eyes: Negative for pain, discharge and visual disturbance.   Respiratory: Positive for shortness of breath. Negative for cough and wheezing.    Cardiovascular: Positive for leg swelling. Negative for chest pain and palpitations.   Gastrointestinal: Negative for abdominal distention,  abdominal pain, constipation, diarrhea, nausea and vomiting.   Endocrine: Negative for polydipsia, polyphagia and polyuria.   Genitourinary: Negative for dysuria, frequency and urgency.   Musculoskeletal: Negative for back pain and gait problem.   Skin: Negative for rash and wound.   Neurological: Negative for dizziness, seizures, syncope and headaches.   Hematological: Does not bruise/bleed easily.   Psychiatric/Behavioral: Negative for agitation and confusion. The patient is not nervous/anxious.      Objective:   Physical Exam   Constitutional: He is oriented to person, place, and time. He appears well-developed and well-nourished.   HENT:   Head: Normocephalic and atraumatic.   Eyes: Pupils are equal, round, and reactive to light.   Neck: Normal range of motion. Neck supple.   Cardiovascular: Normal rate, regular rhythm and normal heart sounds.    Pulmonary/Chest: Effort normal and breath sounds normal.   Abdominal: Soft. Bowel sounds are normal.   Musculoskeletal: Normal range of motion. He exhibits edema.   Neurological: He is alert and oriented to person, place, and time.   Skin: Skin is warm and dry.   Psychiatric: He has a normal mood and affect. His behavior is normal.     Assessment:   Jaquan Hines JrPhoebe is a 47 y.o. male who present with a known pulmonary embolism.   Plan:   VQ scan, PFTs, ECHO, LHC/RHC/PULM angiogram

## 2017-11-01 NOTE — MEDICAL/APP STUDENT
[facility]  History & Physical    Subjective:      Chief Complaint/Reason for Admission: Loss of consciousness    Jaquan Hines Jr. is a 47 y.o. male with history of chronic respiratory failure, PE/PAH on apixaban, and MI with 2 cardiac stents (2012) who was brought to ED after losing consciousness while undergoing PFTs at [facility] today. His sister states that the patient passed out while forcibly exhaling twice.  Each time he lost consciousness for 30-45s with no pre/postsyncopal symptoms. He also nearly passed out this morning when trying to stand up. He reports no prior history of syncopal episodes.    The patient reports increased LE/abdominal swelling and was seen by his doctor 1wk ago who started him on metolazone in addition to furosemide he was already taking. Swelling started to improve but the patient reports increased muscle fatigue/aches as well as an upset stomach with reduced BM frequency.  He also has a 3-4d history of ST/wet cough and is being treated with antibiotics for a respiratory infection. He currently does not report any SOB, chest pain or palpitations but does still have some abdominal discomfort and feels weak and fatigued.  He denies any visuosensory changes.    Patient denies any family history of coagulopathies or heart disease.  He has never smoked, does not drink, or use drugs.  He currently lives with his sister who helps care for him and states that he's usually very mobile.     Past Medical History:   Diagnosis Date    Acute pulmonary embolism     Chronic respiratory failure with hypoxia     Essential hypertension, malignant     Pulmonary artery hypertension      Past Surgical History:   Procedure Laterality Date    CARDIAC CATHETERIZATION      CATARACT EXTRACTION      MASS EXCISION      inner thigh    TONSILLECTOMY, ADENOIDECTOMY       No family history on file.  Social History   Substance Use Topics    Smoking status: Never Smoker    Smokeless  tobacco: Never Used    Alcohol use No         (Not in a hospital admission)  Review of patient's allergies indicates:   Allergen Reactions    Clopidogrel         Review of Systems   Constitutional: Positive for malaise/fatigue. Negative for chills, fever and weight loss.   HENT: Positive for congestion and sore throat.    Eyes: Positive for blurred vision. Negative for double vision.        R cataracts   Respiratory: Positive for cough. Negative for shortness of breath and wheezing.    Cardiovascular: Positive for leg swelling. Negative for chest pain and palpitations.   Gastrointestinal: Negative for abdominal pain, constipation, diarrhea, nausea and vomiting.   Genitourinary: Negative.    Musculoskeletal: Positive for myalgias.        Generalized muscle ache   Neurological: Positive for dizziness, loss of consciousness, weakness and headaches. Negative for sensory change and focal weakness.       Objective:      Vital Signs (Most Recent)  Temp: 97.7 °F (36.5 °C) (11/01/17 1447)  Pulse: 72 (11/01/17 1400)  Resp: (!) 9 (11/01/17 1400)  BP: (!) 128/57 (11/01/17 1352)  SpO2: 97 % (11/01/17 1400)    Vital Signs Range (Last 24H):  Temp:  [97.7 °F (36.5 °C)-98.7 °F (37.1 °C)]   Pulse:  [70-78]   Resp:  [9-30]   BP: (123-140)/(57-72)   SpO2:  [84 %-100 %]     Physical Exam   Constitutional: He is oriented to person, place, and time. He appears well-developed and well-nourished. No distress.   HENT:   Head: Normocephalic and atraumatic.   Eyes: EOM are normal. Pupils are equal, round, and reactive to light.   Neck: Normal range of motion.   Cardiovascular: Normal rate, regular rhythm, normal heart sounds and intact distal pulses.    Pulmonary/Chest: Effort normal. No respiratory distress. He has decreased breath sounds. He has no wheezes. He has no rales.   Abdominal: Soft. Bowel sounds are normal. He exhibits no distension and no mass. There is no tenderness. There is no guarding.   Musculoskeletal: Normal range of  "motion. He exhibits edema. He exhibits no tenderness.   1+ bilaterally below knees   Neurological: He is alert and oriented to person, place, and time. He displays normal reflexes. No cranial nerve deficit or sensory deficit. He exhibits normal muscle tone.   Skin: Skin is warm and dry. Capillary refill takes less than 2 seconds. He is not diaphoretic.   punctate excoriations on arms/legs, linear excoriation around umbilicus   Psychiatric: He has a normal mood and affect. His behavior is normal. Judgment and thought content normal.       Data Review:    CBC:   Lab Results   Component Value Date    WBC 14.85 (H) 11/01/2017    RBC 5.85 11/01/2017    HGB 16.3 11/01/2017    HCT 48 11/01/2017     11/01/2017     BMP:   Lab Results   Component Value Date     (H) 11/01/2017     (H) 02/15/2017     (L) 11/01/2017     02/15/2017    K 2.1 (LL) 11/01/2017    CL 71 (LL) 11/01/2017     02/15/2017    CO2 41 (HH) 11/01/2017    BUN 29 (H) 11/01/2017    CREATININE 1.3 11/01/2017    CREATININE 1.11 02/15/2017    CALCIUM 9.5 11/01/2017      ABGs:   Lab Results   Component Value Date    PH 7.591 (H) 11/01/2017    PO2 54 11/01/2017    PCO2 50.7 (H) 11/01/2017     Radiology review:    CXR: "No definite acute abnormality. Question of cardiomegaly and mild edema, though appearance is at least in part artifactual related to body habitus and portable technique."    ECG: "Sinus rhythm with occasional Premature ventricular complexes  Possible Left atrial enlargement  Prolonged QT  Abnormal ECG  No previous ECGs available"    Assessment:      Patient is a 48yo male patient, presenting due to LOC, found to be hypokalemic likely secondary to starting new diuretic within the past week.     Active Hospital Problems    Diagnosis  POA    Hypokalemia [E87.6]  Yes      Resolved Hospital Problems    Diagnosis Date Resolved POA   No resolved problems to display.       Plan:      Hypokalemia  - likely secondary to " starting second diuretic  - start replacing K  - monitor BMP for improvement    Syncopal episode  - causes: vasovagal, electrolyte disturbance  - get orthostatic BP  - correct electrolyte abnormalities and recheck BMP    HTN  - continue home losartan    Pulmonary embolism/PAH  - diagnosed Nov/Dec 2016  - continue apixaban    Chronic respiratory failure  - maintain O2 sats, continue 5L O2    Hyperlipidemia  - continue atorvastatin    Bipolar disorder  - continue carbamazepine and citalopram

## 2017-11-01 NOTE — LETTER
November 7, 2017      George Montaño MD  [address] [facility]  [id number]  [facility] [place] [address]           [facility]  [address]  [facility] [address]  Phone: [phone]          Patient: Jaquan Hines Jr.   MR Number: [id number]   YOB: 1970   Date of Visit: 11/1/2017       Dear Dr. George Montaño:    Thank you for referring Jaquan Hines to me for evaluation. Attached you will find relevant portions of my assessment and plan of care.    If you have questions, please do not hesitate to call me. I look forward to following Jaquan Hines along with you.    Sincerely,    Gabriel Jennings MD    Enclosure  CC:  No Recipients    If you would like to receive this communication electronically, please contact [email or web address] or [phone] to request more information on [facility] access.    For providers and/or their staff who would like to refer a patient to [facility], please contact us through our one-stop-shop provider referral line, [facility] , at [phone].    If you feel you have received this communication in error or would no longer like to receive these types of communications, please e-mail [email or web address]

## 2017-11-02 ENCOUNTER — INITIAL CONSULT (OUTPATIENT)
Dept: CARDIOLOGY | Facility: CLINIC | Age: 47
DRG: 641 | End: 2017-11-02
Payer: MEDICAID

## 2017-11-02 VITALS
TEMPERATURE: 99 F | RESPIRATION RATE: 20 BRPM | BODY MASS INDEX: 47.74 KG/M2 | SYSTOLIC BLOOD PRESSURE: 137 MMHG | HEART RATE: 95 BPM | HEIGHT: 68 IN | WEIGHT: 315 LBS | DIASTOLIC BLOOD PRESSURE: 67 MMHG

## 2017-11-02 DIAGNOSIS — J96.11 CHRONIC RESPIRATORY FAILURE WITH HYPOXIA: ICD-10-CM

## 2017-11-02 DIAGNOSIS — I27.20 PULMONARY HYPERTENSION: Primary | ICD-10-CM

## 2017-11-02 DIAGNOSIS — I25.118 CORONARY ARTERY DISEASE OF NATIVE ARTERY OF NATIVE HEART WITH STABLE ANGINA PECTORIS: ICD-10-CM

## 2017-11-02 DIAGNOSIS — I10 BENIGN ESSENTIAL HYPERTENSION: ICD-10-CM

## 2017-11-02 DIAGNOSIS — E78.5 HYPERLIPIDEMIA, UNSPECIFIED HYPERLIPIDEMIA TYPE: ICD-10-CM

## 2017-11-02 PROBLEM — E87.1 HYPONATREMIA: Status: ACTIVE | Noted: 2017-11-02

## 2017-11-02 PROBLEM — F31.9 BIPOLAR DISORDER: Status: ACTIVE | Noted: 2017-07-19

## 2017-11-02 LAB
ALBUMIN SERPL BCP-MCNC: 3.1 G/DL
ALP SERPL-CCNC: 149 U/L
ALT SERPL W/O P-5'-P-CCNC: 21 U/L
ANION GAP SERPL CALC-SCNC: 10 MMOL/L
ANION GAP SERPL CALC-SCNC: 12 MMOL/L
ANION GAP SERPL CALC-SCNC: 13 MMOL/L
ANION GAP SERPL CALC-SCNC: 15 MMOL/L
ANION GAP SERPL CALC-SCNC: 15 MMOL/L
AST SERPL-CCNC: 26 U/L
BASOPHILS # BLD AUTO: 0.05 K/UL
BASOPHILS NFR BLD: 0.5 %
BILIRUB SERPL-MCNC: 0.8 MG/DL
BUN SERPL-MCNC: 24 MG/DL
BUN SERPL-MCNC: 24 MG/DL
BUN SERPL-MCNC: 25 MG/DL
BUN SERPL-MCNC: 29 MG/DL
BUN SERPL-MCNC: 30 MG/DL
CALCIUM SERPL-MCNC: 9 MG/DL
CALCIUM SERPL-MCNC: 9.3 MG/DL
CALCIUM SERPL-MCNC: 9.3 MG/DL
CALCIUM SERPL-MCNC: 9.4 MG/DL
CALCIUM SERPL-MCNC: 9.5 MG/DL
CHLORIDE SERPL-SCNC: 73 MMOL/L
CHLORIDE SERPL-SCNC: 77 MMOL/L
CHLORIDE SERPL-SCNC: 80 MMOL/L
CHLORIDE SERPL-SCNC: 82 MMOL/L
CHLORIDE SERPL-SCNC: 83 MMOL/L
CO2 SERPL-SCNC: 31 MMOL/L
CO2 SERPL-SCNC: 36 MMOL/L
CO2 SERPL-SCNC: 37 MMOL/L
CO2 SERPL-SCNC: 38 MMOL/L
CO2 SERPL-SCNC: 40 MMOL/L
CREAT SERPL-MCNC: 1 MG/DL
CREAT SERPL-MCNC: 1.1 MG/DL
CREAT SERPL-MCNC: 1.2 MG/DL
DIFFERENTIAL METHOD: ABNORMAL
EOSINOPHIL # BLD AUTO: 0.3 K/UL
EOSINOPHIL NFR BLD: 2.4 %
ERYTHROCYTE [DISTWIDTH] IN BLOOD BY AUTOMATED COUNT: 13.1 %
EST. GFR  (AFRICAN AMERICAN): >60 ML/MIN/1.73 M^2
EST. GFR  (NON AFRICAN AMERICAN): >60 ML/MIN/1.73 M^2
GLUCOSE SERPL-MCNC: 124 MG/DL
GLUCOSE SERPL-MCNC: 130 MG/DL
GLUCOSE SERPL-MCNC: 135 MG/DL
GLUCOSE SERPL-MCNC: 150 MG/DL
GLUCOSE SERPL-MCNC: 151 MG/DL
HCT VFR BLD AUTO: 42 %
HGB BLD-MCNC: 14.9 G/DL
IMM GRANULOCYTES # BLD AUTO: 0.06 K/UL
IMM GRANULOCYTES NFR BLD AUTO: 0.5 %
LYMPHOCYTES # BLD AUTO: 2.4 K/UL
LYMPHOCYTES NFR BLD: 22.1 %
MAGNESIUM SERPL-MCNC: 2.1 MG/DL
MCH RBC QN AUTO: 27.5 PG
MCHC RBC AUTO-ENTMCNC: 35.5 G/DL
MCV RBC AUTO: 78 FL
MONOCYTES # BLD AUTO: 1.2 K/UL
MONOCYTES NFR BLD: 11.3 %
NEUTROPHILS # BLD AUTO: 6.9 K/UL
NEUTROPHILS NFR BLD: 63.2 %
NRBC BLD-RTO: 0 /100 WBC
PLATELET # BLD AUTO: 256 K/UL
PMV BLD AUTO: 11.7 FL
POTASSIUM SERPL-SCNC: 2.2 MMOL/L
POTASSIUM SERPL-SCNC: 2.4 MMOL/L
POTASSIUM SERPL-SCNC: 2.4 MMOL/L
POTASSIUM SERPL-SCNC: 2.5 MMOL/L
POTASSIUM SERPL-SCNC: 3 MMOL/L
PROT SERPL-MCNC: 6.7 G/DL
RBC # BLD AUTO: 5.42 M/UL
SODIUM SERPL-SCNC: 127 MMOL/L
SODIUM SERPL-SCNC: 128 MMOL/L
SODIUM SERPL-SCNC: 128 MMOL/L
SODIUM SERPL-SCNC: 129 MMOL/L
SODIUM SERPL-SCNC: 130 MMOL/L
WBC # BLD AUTO: 10.92 K/UL

## 2017-11-02 PROCEDURE — 63600175 PHARM REV CODE 636 W HCPCS: Performed by: STUDENT IN AN ORGANIZED HEALTH CARE EDUCATION/TRAINING PROGRAM

## 2017-11-02 PROCEDURE — 80048 BASIC METABOLIC PNL TOTAL CA: CPT | Mod: 91

## 2017-11-02 PROCEDURE — 25000003 PHARM REV CODE 250: Performed by: STUDENT IN AN ORGANIZED HEALTH CARE EDUCATION/TRAINING PROGRAM

## 2017-11-02 PROCEDURE — 99205 OFFICE O/P NEW HI 60 MIN: CPT | Mod: S$PBB,,, | Performed by: INTERNAL MEDICINE

## 2017-11-02 PROCEDURE — 99233 SBSQ HOSP IP/OBS HIGH 50: CPT | Mod: ,,, | Performed by: HOSPITALIST

## 2017-11-02 PROCEDURE — 99213 OFFICE O/P EST LOW 20 MIN: CPT | Mod: PBBFAC | Performed by: INTERNAL MEDICINE

## 2017-11-02 PROCEDURE — 99999 PR PBB SHADOW E&M-EST. PATIENT-LVL III: CPT | Mod: PBBFAC,,, | Performed by: INTERNAL MEDICINE

## 2017-11-02 PROCEDURE — 80048 BASIC METABOLIC PNL TOTAL CA: CPT

## 2017-11-02 PROCEDURE — 83735 ASSAY OF MAGNESIUM: CPT

## 2017-11-02 PROCEDURE — 11000001 HC ACUTE MED/SURG PRIVATE ROOM

## 2017-11-02 PROCEDURE — 80053 COMPREHEN METABOLIC PANEL: CPT

## 2017-11-02 PROCEDURE — 36415 COLL VENOUS BLD VENIPUNCTURE: CPT

## 2017-11-02 PROCEDURE — 25000242 PHARM REV CODE 250 ALT 637 W/ HCPCS: Performed by: STUDENT IN AN ORGANIZED HEALTH CARE EDUCATION/TRAINING PROGRAM

## 2017-11-02 PROCEDURE — 63600175 PHARM REV CODE 636 W HCPCS: Performed by: SURGERY

## 2017-11-02 PROCEDURE — 85025 COMPLETE CBC W/AUTO DIFF WBC: CPT

## 2017-11-02 RX ORDER — POTASSIUM CHLORIDE 750 MG/1
30 CAPSULE, EXTENDED RELEASE ORAL
Status: COMPLETED | OUTPATIENT
Start: 2017-11-02 | End: 2017-11-02

## 2017-11-02 RX ORDER — DIPHENHYDRAMINE HCL 25 MG
25 CAPSULE ORAL ONCE
Status: COMPLETED | OUTPATIENT
Start: 2017-11-02 | End: 2017-11-02

## 2017-11-02 RX ORDER — POTASSIUM CHLORIDE 20 MEQ/1
60 TABLET, EXTENDED RELEASE ORAL ONCE
Status: COMPLETED | OUTPATIENT
Start: 2017-11-02 | End: 2017-11-02

## 2017-11-02 RX ORDER — POTASSIUM CHLORIDE 7.45 MG/ML
10 INJECTION INTRAVENOUS
Status: COMPLETED | OUTPATIENT
Start: 2017-11-02 | End: 2017-11-02

## 2017-11-02 RX ORDER — POTASSIUM CHLORIDE 7.45 MG/ML
10 INJECTION INTRAVENOUS
Status: COMPLETED | OUTPATIENT
Start: 2017-11-02 | End: 2017-11-03

## 2017-11-02 RX ORDER — PANTOPRAZOLE SODIUM 40 MG/1
40 TABLET, DELAYED RELEASE ORAL DAILY
Status: DISCONTINUED | OUTPATIENT
Start: 2017-11-02 | End: 2017-11-04 | Stop reason: HOSPADM

## 2017-11-02 RX ORDER — FLUTICASONE PROPIONATE 50 MCG
2 SPRAY, SUSPENSION (ML) NASAL DAILY
Status: DISCONTINUED | OUTPATIENT
Start: 2017-11-02 | End: 2017-11-04 | Stop reason: HOSPADM

## 2017-11-02 RX ORDER — ATORVASTATIN CALCIUM 20 MG/1
20 TABLET, FILM COATED ORAL DAILY
Status: DISCONTINUED | OUTPATIENT
Start: 2017-11-02 | End: 2017-11-04 | Stop reason: HOSPADM

## 2017-11-02 RX ORDER — ALBUTEROL SULFATE 90 UG/1
1 AEROSOL, METERED RESPIRATORY (INHALATION) EVERY 6 HOURS PRN
Status: DISCONTINUED | OUTPATIENT
Start: 2017-11-02 | End: 2017-11-04 | Stop reason: HOSPADM

## 2017-11-02 RX ADMIN — POTASSIUM CHLORIDE 10 MEQ: 10 INJECTION, SOLUTION INTRAVENOUS at 05:11

## 2017-11-02 RX ADMIN — POTASSIUM CHLORIDE 30 MEQ: 750 CAPSULE, EXTENDED RELEASE ORAL at 08:11

## 2017-11-02 RX ADMIN — PANTOPRAZOLE SODIUM 40 MG: 40 TABLET, DELAYED RELEASE ORAL at 03:11

## 2017-11-02 RX ADMIN — ATORVASTATIN CALCIUM 20 MG: 20 TABLET, FILM COATED ORAL at 03:11

## 2017-11-02 RX ADMIN — POTASSIUM CHLORIDE 10 MEQ: 10 INJECTION, SOLUTION INTRAVENOUS at 11:11

## 2017-11-02 RX ADMIN — POTASSIUM CHLORIDE 10 MEQ: 10 INJECTION, SOLUTION INTRAVENOUS at 08:11

## 2017-11-02 RX ADMIN — APIXABAN 5 MG: 5 TABLET, FILM COATED ORAL at 08:11

## 2017-11-02 RX ADMIN — METOPROLOL TARTRATE 50 MG: 50 TABLET ORAL at 08:11

## 2017-11-02 RX ADMIN — FLUTICASONE PROPIONATE 2 SPRAY: 50 SPRAY, METERED NASAL at 11:11

## 2017-11-02 RX ADMIN — POTASSIUM CHLORIDE 10 MEQ: 10 INJECTION, SOLUTION INTRAVENOUS at 10:11

## 2017-11-02 RX ADMIN — POTASSIUM CHLORIDE 30 MEQ: 750 CAPSULE, EXTENDED RELEASE ORAL at 12:11

## 2017-11-02 RX ADMIN — POTASSIUM CHLORIDE: 2 INJECTION, SOLUTION, CONCENTRATE INTRAVENOUS at 11:11

## 2017-11-02 RX ADMIN — POTASSIUM CHLORIDE 60 MEQ: 1500 TABLET, EXTENDED RELEASE ORAL at 05:11

## 2017-11-02 RX ADMIN — POTASSIUM CHLORIDE 30 MEQ: 750 CAPSULE, EXTENDED RELEASE ORAL at 11:11

## 2017-11-02 RX ADMIN — POTASSIUM CHLORIDE 30 MEQ: 750 CAPSULE, EXTENDED RELEASE ORAL at 10:11

## 2017-11-02 RX ADMIN — POTASSIUM CHLORIDE 30 MEQ: 750 CAPSULE, EXTENDED RELEASE ORAL at 06:11

## 2017-11-02 RX ADMIN — CITALOPRAM HYDROBROMIDE 40 MG: 40 TABLET ORAL at 08:11

## 2017-11-02 RX ADMIN — DIPHENHYDRAMINE HYDROCHLORIDE 25 MG: 25 CAPSULE ORAL at 06:11

## 2017-11-02 RX ADMIN — CARBAMAZEPINE 200 MG: 200 TABLET ORAL at 08:11

## 2017-11-02 RX ADMIN — POTASSIUM CHLORIDE 30 MEQ: 750 CAPSULE, EXTENDED RELEASE ORAL at 01:11

## 2017-11-02 RX ADMIN — POTASSIUM CHLORIDE 10 MEQ: 10 INJECTION, SOLUTION INTRAVENOUS at 06:11

## 2017-11-02 RX ADMIN — POTASSIUM CHLORIDE 10 MEQ: 10 INJECTION, SOLUTION INTRAVENOUS at 03:11

## 2017-11-02 NOTE — ASSESSMENT & PLAN NOTE
Given history of PCI and need for cardiac surgery will proceed with diagnostic coronary angiogram.

## 2017-11-02 NOTE — ASSESSMENT & PLAN NOTE
Previously on Lasix 80 mg daily added metolazone in the last week, profuse diuresis likely causing hypokalemia, cramps.  - EKG shows some PVCs, QTC prolongation, pt c/o cramps, on telemetry  - Admission K+ 2.1, repleting IV + PO, will continue to monitor via telemetry and CMP  -- 11/02 1340: Pt has received 280 mEq total since admit, will reassess at 1400 BMP and adjust repletion.

## 2017-11-02 NOTE — PROGRESS NOTES
MD notified of patient's potassium 3.0.  Patient is asymptomatic and has no complaints at this time.  Patient also instructed to use urinal for accurate output collection.  No new orders given at this time.  Will continue to monitor.

## 2017-11-02 NOTE — ASSESSMENT & PLAN NOTE
Likely vasovagal given pt's syncope occurring with PFTs  - No CP, troponin without significant elevation  - No vagal symptoms (diaphoresis, nausea, sensation of warmth / other presyncopal symptoms)  - Will replete K+ and continue to rule out other causes of syncope; however pt denies any palpitations and EKG on admit was normal; tele continues to be normal.

## 2017-11-02 NOTE — H&P
"[facility]  History & Physical    Patient Name: Jaquan Hines Jr.  MRN: [id number]  Admission Date: 11/1/2017  Attending Physician: Yamileth Sandy MD   Primary Care Provider: Justin Vale MD    [facility] Medicine Team: [facility] HOSP MED 5 Olamide Sweeney MD     Patient information was obtained from patient, relative(s), past medical records and ER records.     Subjective:     Principal Problem:Syncope    Chief Complaint:   Chief Complaint   Patient presents with    Loss of Consciousness     states lost consciousness while getting a PFT 20-30 minute PTA to ER, denies injury- states "passed out" in wheelchair        HPI: 47M with hx MI 2012 s/p 2x FLORENTINO, 10/2016 dx of pHTN, 11/2016 PE (now on apixaban) presents to [facility] due to syncope during PFT (x2). Pt states that he was undergoing PFTs earlier today when he lost consciousness. States that he felt weak and "blacked out" for approx 30s or so. He says that he was asked to again exhale forcefully and again "blacked out" and lost consciousness for 30s. Denies palpitations, CP, n/v, diaphroesis, feeling warm, or vertigo. Pt says that he did not eat this AM but also says that he has previously gone to many tests NPO and never had a syncopal episode in the past.    Pt states that last week he had metolazone added to his home 80mg PO lasix which caused him to urinate profusely. Shortly afterward he began to experience cramps and weakness. He says he then developed a brief URI whose diagnosis he is unsure of but he was prescribed biaxin, which gave him some GI upset (pt describes it as discomfort but denies pain, denies diarrhea/constipation/blood in stool).    At the time of interview pt feels much better. Continues to deny CP/SOB/presyncopal symptoms    Review of Systems   Constitutional: Positive for fatigue. Negative for activity change, appetite change, chills, diaphoresis, fever and unexpected weight change.        +cramps "   Respiratory: Negative for apnea, cough, choking, chest tightness, shortness of breath, wheezing and stridor.    Cardiovascular: Negative for chest pain, palpitations and leg swelling.   Musculoskeletal: Positive for myalgias. Negative for arthralgias, back pain, gait problem, joint swelling, neck pain and neck stiffness.   Neurological: Positive for syncope and light-headedness. Negative for dizziness, tremors, seizures, facial asymmetry, speech difficulty, weakness, numbness and headaches.     Objective:     Vital Signs (Most Recent):  Temp: 98.3 °F (36.8 °C) (11/02/17 1150)  Pulse: 73 (11/02/17 1150)  Resp: 19 (11/02/17 1150)  BP: 127/65 (11/02/17 1150)  SpO2: (!) 94 % (11/02/17 1150) Vital Signs (24h Range):  Temp:  [96.7 °F (35.9 °C)-98.5 °F (36.9 °C)] 98.3 °F (36.8 °C)  Pulse:  [58-95] 73  Resp:  [9-20] 19  SpO2:  [88 %-98 %] 94 %  BP: (105-137)/(56-67) 127/65     Weight: (!) 148.6 kg (327 lb 8 oz)  Body mass index is 50.54 kg/m².    Physical Exam   Constitutional: He appears well-developed.   HENT:   Head: Normocephalic and atraumatic.   Right Ear: External ear normal.   Left Ear: External ear normal.   Nose: Nose normal.   Eyes: EOM are normal. Pupils are equal, round, and reactive to light.   Neck: No tracheal deviation present. No thyromegaly present.   Cardiovascular: Normal rate, S1 normal, S2 normal and normal heart sounds.    No murmur heard.  Pulmonary/Chest: Effort normal and breath sounds normal.   Abdominal: Soft. There is no tenderness. No hernia.   Musculoskeletal: He exhibits no edema.   Neurological: He displays normal reflexes. No cranial nerve deficit.   Skin: No rash noted.   Psychiatric: He has a normal mood and affect. Judgment normal.   Vitals reviewed.       Significant Labs:   CBC:     Recent Labs  Lab 11/01/17  1215 11/01/17  1408 11/02/17  0413   WBC 14.85*  --  10.92   HGB 16.3  --  14.9   HCT 45.8 48 42.0     --  256     CMP:   Recent Labs  Lab 11/01/17  1215  11/02/17  0022  11/02/17  0413 11/02/17  0844   *  < > 128* 129* 128*   K 2.1*  < > 2.2* 2.4* 2.4*   CL 71*  < > 73* 77* 80*   CO2 41*  < > 40* 37* 38*   *  < > 151* 150* 135*   BUN 29*  < > 30* 29* 25*   CREATININE 1.3  < > 1.2 1.1 1.0   CALCIUM 9.5  < > 9.4 9.3 9.3   PROT 7.7  --   --  6.7  --    ALBUMIN 3.6  --   --  3.1*  --    BILITOT 1.5*  --   --  0.8  --    ALKPHOS 159*  --   --  149*  --    AST 27  --   --  26  --    ALT 23  --   --  21  --    ANIONGAP 17*  < > 15 15 10   EGFRNONAA >60.0  < > >60.0 >60.0 >60.0   < > = values in this interval not displayed.  Troponin:     Recent Labs  Lab 11/01/17  1215 11/01/17  1713   TROPONINI 0.039* 0.015     Assessment/Plan:     * Syncope    Likely vasovagal given pt's syncope occurring with PFTs  - No CP, troponin without significant elevation  - No vagal symptoms (diaphoresis, nausea, sensation of warmth / other presyncopal symptoms)  - Will replete K+ and continue to rule out other causes of syncope; however pt denies any palpitations and EKG on admit was normal; tele continues to be normal.        Hypokalemia    Previously on Lasix 80 mg daily added metolazone in the last week, profuse diuresis likely causing hypokalemia, cramps.  - EKG shows some PVCs, QTC prolongation, pt c/o cramps  Admission K+ 2.1, repleting PO, will continue to monitor via telemetry and CMP          Pulmonary embolism    Continuing home apixaban 5 BID        Obesity hypoventilation syndrome    Discussed importance of compliance with BiPAP at home, pt states he doesn't wear it due to cumbersome nature of switching from NC to BiPAP. Advised that pHTN / CHF would worsen without compliance          VTE Risk Mitigation         Ordered     apixaban tablet 5 mg  2 times daily     Route:  Oral        11/01/17 1654     High Risk of VTE  Once      11/01/17 1514     Place DARSHAN hose  Until discontinued      11/01/17 1514     Place sequential compression device  Until discontinued      11/01/17 1514              Olamide Sweeney MD  Department of Hospital Medicine   [facility]

## 2017-11-02 NOTE — PROGRESS NOTES
This note is for encounter done yesterday 11.1.17 at the CT surgery clinic.     INTERVENTIONAL CARDIOLOGY CLINIC    REFERRING PHYSICIAN: Gabriel Jennings    CHIEF COMPLIANT:  Dyspnea    HISTORY OF PRESENT ILLNESS  Jaquan Hines Jr. is a 47 y.o. male referred by Dr. Jennings for evaluation of pulmonary hypertension and possible chronic thromboemboilic pulmonary hypertension.    The patient has a history of coronary artery disease with MI in 2012 s/p 2x FLORENTINO. Last October 10/2016 dx of pHTN, 11/2016 PE (now on apixaban) [place] [facility] [place] Jennings for possible pulmonary embolectomy. He reports NYHA class IV symptoms and is supplemental oxygen dependent with 3-5L/min.    He reports having his last coronary angiogram last November when he had stents placed. He denies any current chest pain.    PAST MEDICAL HISTORY  Past Medical History:   Diagnosis Date    Acute pulmonary embolism     Chronic respiratory failure with hypoxia     Essential hypertension, malignant     Pulmonary artery hypertension         PAST SURGICAL HISTORY  Past Surgical History:   Procedure Laterality Date    CARDIAC CATHETERIZATION      CATARACT EXTRACTION      MASS EXCISION      inner thigh    TONSILLECTOMY, ADENOIDECTOMY         MEDICATIONS  Current Facility-Administered Medications on File Prior to Visit   Medication Dose Route Frequency Provider Last Rate Last Dose    apixaban tablet 5 mg  5 mg Oral BID Olamide Sweeney MD   5 mg at 11/02/17 0857    carBAMazepine tablet 200 mg  200 mg Oral Daily Olamide Sweeney MD   200 mg at 11/02/17 0857    citalopram tablet 40 mg  40 mg Oral Daily Olamide Sweeney MD   40 mg at 11/02/17 0858    dextrose 50% injection 12.5 g  12.5 g Intravenous PRN Isabela Henriquez MD        dextrose 50% injection 25 g  25 g Intravenous PRN Isabela Henriquez MD        fluticasone 50 mcg/actuation nasal spray 2 spray  2 spray Each Nare Daily Olamide Sweeney MD   2 spray at 11/02/17 1104    glucagon (human recombinant)  injection 1 mg  1 mg Intramuscular PRN Isabela Henriquez MD        glucose chewable tablet 16 g  16 g Oral PRN Isabela Henriquez MD        glucose chewable tablet 24 g  24 g Oral PRN Isabela Henriquez MD        metoprolol tartrate (LOPRESSOR) tablet 50 mg  50 mg Oral Daily Olamide Sweeney MD   50 mg at 17 0858    [] morphine 2 mg/mL injection             [COMPLETED] potassium bicarbonate disintegrating tablet 50 mEq  50 mEq Oral Once Kristin Rodriguez PA-C   50 mEq at 17 1427    [COMPLETED] potassium chloride 10 mEq in 100 mL IVPB  10 mEq Intravenous ED 1 Time Kristin Rodriguez PA-C   Stopped at 17 1715    [COMPLETED] potassium chloride 10 mEq in 100 mL IVPB  10 mEq Intravenous Q1H Gabriel Bose  mL/hr at 17 0527 10 mEq at 17 0527    [COMPLETED] potassium chloride 10 mEq in 100 mL IVPB  10 mEq Intravenous Q1H Olamide Sweeney  mL/hr at 17 0858 10 mEq at 17 0858    [COMPLETED] potassium chloride CR capsule 30 mEq  30 mEq Oral Q2H Olamide Sweeney MD   30 mEq at 17 0051    potassium chloride CR capsule 30 mEq  30 mEq Oral Q2H Olamide Sweeney MD   30 mEq at 17 1104    [COMPLETED] potassium chloride SA CR tablet 60 mEq  60 mEq Oral Once Isabela Henriquez MD   60 mEq at 17 2150    sodium chloride 0.45% 1,000 mL with potassium chloride 30 mEq infusion   Intravenous Continuous Olamide Sweeney  mL/hr at 17 1104      [DISCONTINUED] ondansetron 4 mg/2 mL injection              Current Outpatient Prescriptions on File Prior to Visit   Medication Sig Dispense Refill    apixaban (ELIQUIS) 5 mg Tab Take by mouth.      atorvastatin (LIPITOR) 20 MG tablet Take by mouth.      carbamazepine (TEGRETOL) 200 mg tablet Take by mouth.      cetirizine (ZYRTEC) 10 MG tablet Take by mouth.      citalopram (CELEXA) 40 MG tablet Take by mouth.      clarithromycin (BIAXIN) 500 MG tablet Take 500 mg by mouth every 12 (twelve) hours.       furosemide (LASIX) 80 MG tablet Take 80 mg by mouth 2 (two) times daily.  11    losartan (COZAAR) 25 MG tablet Take by mouth.      metOLazone (ZAROXOLYN) 5 MG tablet Take 1 tablet (5 mg total) by mouth once daily. 30 tablet 11    metoprolol tartrate (LOPRESSOR) 50 MG tablet Take by mouth.      omeprazole (PRILOSEC) 20 MG capsule Take by mouth.      potassium chloride (KLOR-CON) 10 MEQ TbSR Take 10 mEq by mouth once.      VENTOLIN HFA 90 mcg/actuation inhaler TAKE 1 PUFF EVERY 6 HOURS AS NEEDED  6        SOCIAL HISTORY  TOBACCO: Denies  ETOH: Denies  ILLEGAL DRUGS: Denies      HPI    Review of Systems   Constitution: Negative for fever and weakness.   HENT: Negative for congestion and hoarse voice.    Eyes: Negative for blurred vision and double vision.   Cardiovascular: Positive for dyspnea on exertion. Negative for chest pain, claudication, cyanosis, irregular heartbeat, leg swelling, near-syncope, orthopnea, palpitations and paroxysmal nocturnal dyspnea.   Respiratory: Positive for shortness of breath. Negative for cough and hemoptysis.    Endocrine: Negative for cold intolerance and heat intolerance.   Hematologic/Lymphatic: Negative for bleeding problem. Does not bruise/bleed easily.   Skin: Negative for dry skin and nail changes.   Musculoskeletal: Negative for back pain and falls.   Gastrointestinal: Negative for abdominal pain and anorexia.   Neurological: Negative for brief paralysis and dizziness.        Objective:    Physical Exam   Constitutional: He is oriented to person, place, and time.   Eyes: Pupils are equal, round, and reactive to light.   Neck: No JVD present. No thyromegaly present.   Cardiovascular: Normal rate, regular rhythm, normal heart sounds and intact distal pulses.    Pulses:       Carotid pulses are 2+ on the right side, and 2+ on the left side.       Radial pulses are 2+ on the right side, and 2+ on the left side.        Femoral pulses are 2+ on the right side, and 2+ on the left  side.       Dorsalis pedis pulses are 2+ on the right side, and 2+ on the left side.        Posterior tibial pulses are 2+ on the right side, and 2+ on the left side.   Pulmonary/Chest: He is in respiratory distress. He has no wheezes. He exhibits no tenderness.   Abdominal: He exhibits no distension. There is no tenderness. There is no rebound.   Musculoskeletal: He exhibits no edema or tenderness.   Neurological: He is alert and oriented to person, place, and time.   Skin: Skin is warm and dry.   Psychiatric: He has a normal mood and affect.         Assessment:       1. Pulmonary hypertension    2. Chronic respiratory failure with hypoxia    3. Coronary artery disease of native artery of native heart with stable angina pectoris    4. Hyperlipidemia, unspecified hyperlipidemia type    5. Benign essential hypertension         Plan:           Coronary artery disease of native artery of native heart with stable angina pectoris  Given history of PCI and need for cardiac surgery will proceed with diagnostic coronary angiogram.    Pulmonary hypertension  Given history of PEs there is concern for chronic thromboembolic pulmonary hypertension. Will proceed with right heart cath and pulmonary angiogram to evaluate thrombus burden.     1. Left and right cardiac catheterization   2. Antiplatelets: None  3. Access: RCFA, RCFV  4. Catheters: Pigtail, JL4, JR4  5. The risks, benefits, and alternatives of vascular angiography were discussed with the patient. All questions were answered and informed consent was obtained. I had a detailed discussion with the patient regarding risk of stroke, MI, bleeding access site complications including limb loss, allergy, kidney failure including dialysis and death.  6. The patient understands the risks and benefits and wishes to go ahead with the procedure.  7. All patient's questions were answered    Hyperlipidemia  On lipitor    Benign essential hypertension  Controlled today          Tomy RUBY  MD Asher  Interventional Cardiology  Structural/Valvular heart disease  [phone]

## 2017-11-02 NOTE — PLAN OF CARE
Extended Emergency Contact Information  Primary Emergency Contact: Quyen Hines  Address: [address]           [address] [place]  Home Phone: [phone]  Mobile Phone: [phone]  Relation: Sister    Justin Vale MD  [address] / [facility] [address]    No future appointments.    Payor: MEDICAID / [facility]: [facility] / Product Type: Managed Medicaid /       [id number] [place], [place] - [address] [facility]  [address] [place] [address]  Phone: [phone] Fax: [phone]       11/02/17 1326   Discharge Assessment   Assessment Type Discharge Planning Assessment   Confirmed/corrected address and phone number on facesheet? Yes   Assessment information obtained from? Patient;Medical Record   Expected Length of Stay (days) 2   Communicated expected length of stay with patient/caregiver yes   Prior to hospitilization cognitive status: Alert/Oriented   Prior to hospitalization functional status: Independent   Current cognitive status: Alert/Oriented   Current Functional Status: Independent   Lives With sibling(s)   Able to Return to Prior Arrangements yes   Is patient able to care for self after discharge? Yes   Patient's perception of discharge disposition home or selfcare   Readmission Within The Last 30 Days no previous admission in last 30 days   Patient currently being followed by outpatient case management? No   Patient currently receives any other outside agency services? No   Equipment Currently Used at Home oxygen;BIPAP   Do you have any problems affording any of your prescribed medications? No   Is the patient taking medications as prescribed? yes   Does the patient have transportation home? Yes   Transportation Available family or friend will provide   Does the patient receive services at the Coumadin Clinic? No   Discharge Plan A Home   Discharge Plan B Home   Patient/Family In Agreement With Plan yes

## 2017-11-02 NOTE — PLAN OF CARE
Problem: Patient Care Overview  Goal: Plan of Care Review  Outcome: Ongoing (interventions implemented as appropriate)  Plan of care discussed with patient AAOX4 Vital signs stable afebrile on tele sr denies chest pain etc. Free from fall no syncope episodes. Critical labs values throughout the night k 2.1 chloride 73 co2 40 MD on call  Made aware potassium given all night not absorbing given ivpb this am patient aysmptomatic no chest pain this am will cont to monitor.needs echo and vq scan

## 2017-11-02 NOTE — ASSESSMENT & PLAN NOTE
Given history of PEs there is concern for chronic thromboembolic pulmonary hypertension. Will proceed with right heart cath and pulmonary angiogram to evaluate thrombus burden.     1. Left and right cardiac catheterization   2. Antiplatelets: None  3. Access: RCFA, RCFV  4. Catheters: Pigtail, JL4, JR4  5. The risks, benefits, and alternatives of vascular angiography were discussed with the patient. All questions were answered and informed consent was obtained. I had a detailed discussion with the patient regarding risk of stroke, MI, bleeding access site complications including limb loss, allergy, kidney failure including dialysis and death.  6. The patient understands the risks and benefits and wishes to go ahead with the procedure.  7. All patient's questions were answered

## 2017-11-02 NOTE — PROGRESS NOTES
"[facility] Hospital Medicine  Progress Note    Patient Name: Jaquan Hines Jr.  MRN: [id number]  Patient Class: IP- Inpatient   Admission Date: 11/1/2017  Length of Stay: 1 days  Attending Physician: Yamileth Sandy MD  Primary Care Provider: Justin Vale MD    [facility] Team: [facility] 5 Olamide Sweeney MD    Subjective:     Principal Problem:Syncope    HPI:  47M with hx MI 2012 s/p 2x FLORENTINO, 10/2016 dx of pHTN, 11/2016 PE (now on apixaban) [place] [facility] [place] during PFT (x2). Pt states that he was undergoing PFTs earlier today when he lost consciousness. States that he felt weak and "blacked out" for approx 30s or so. He says that he was asked to again exhale forcefully and again "blacked out" and lost consciousness for 30s. Denies palpitations, CP, n/v, diaphroesis, feeling warm, or vertigo. Pt says that he did not eat this AM but also says that he has previously gone to many tests NPO and never had a syncopal episode in the past.    Pt states that last week he had metolazone added to his home 80mg PO lasix which caused him to urinate profusely. Shortly afterward he began to experience cramps and weakness. He says he then developed a brief URI whose diagnosis he is unsure of but he was prescribed biaxin, which gave him some GI upset (pt describes it as discomfort but denies pain, denies diarrhea/constipation/blood in stool).    At the time of interview pt feels much better. Continues to deny CP/SOB/presyncopal symptoms    Hospital Course:  11/02: NAEON, feeling well, no recurrence of syncope. No palpitations, no n/v, no d/c/f/c. Continuing K+ repletion    Review of Systems   Constitutional: Positive for fatigue. Negative for activity change, appetite change, chills, diaphoresis, fever and unexpected weight change.        +cramps   Respiratory: Negative for apnea, cough, choking, chest tightness, shortness of breath, wheezing and stridor.    Cardiovascular: Negative " for chest pain, palpitations and leg swelling.   Musculoskeletal: Positive for myalgias. Negative for arthralgias, back pain, gait problem, joint swelling, neck pain and neck stiffness.   Neurological: Positive for syncope and light-headedness. Negative for dizziness, tremors, seizures, facial asymmetry, speech difficulty, weakness, numbness and headaches.     Objective:     Vital Signs (Most Recent):  Temp: 98.3 °F (36.8 °C) (11/02/17 1150)  Pulse: 73 (11/02/17 1150)  Resp: 19 (11/02/17 1150)  BP: 127/65 (11/02/17 1150)  SpO2: (!) 94 % (11/02/17 1150) Vital Signs (24h Range):  Temp:  [96.7 °F (35.9 °C)-98.5 °F (36.9 °C)] 98.3 °F (36.8 °C)  Pulse:  [58-95] 73  Resp:  [9-20] 19  SpO2:  [88 %-98 %] 94 %  BP: (105-137)/(56-67) 127/65     Weight: (!) 148.6 kg (327 lb 8 oz)  Body mass index is 50.54 kg/m².    Physical Exam   Constitutional: He appears well-developed.   HENT:   Head: Normocephalic and atraumatic.   Right Ear: External ear normal.   Left Ear: External ear normal.   Nose: Nose normal.   Eyes: EOM are normal. Pupils are equal, round, and reactive to light.   Neck: No tracheal deviation present. No thyromegaly present.   Cardiovascular: Normal rate, S1 normal, S2 normal and normal heart sounds.    No murmur heard.  Pulmonary/Chest: Effort normal and breath sounds normal.   Abdominal: Soft. There is no tenderness. No hernia.   Musculoskeletal: He exhibits no edema.   Neurological: He displays normal reflexes. No cranial nerve deficit.   Skin: No rash noted.   Psychiatric: He has a normal mood and affect. Judgment normal.   Vitals reviewed.       Significant Labs:   CBC:     Recent Labs  Lab 11/01/17  1215 11/01/17  1408 11/02/17  0413   WBC 14.85*  --  10.92   HGB 16.3  --  14.9   HCT 45.8 48 42.0     --  256     CMP:   Recent Labs  Lab 11/01/17  1215  11/02/17  0022 11/02/17  0413 11/02/17  0844   *  < > 128* 129* 128*   K 2.1*  < > 2.2* 2.4* 2.4*   CL 71*  < > 73* 77* 80*   CO2 41*  < > 40* 37*  38*   *  < > 151* 150* 135*   BUN 29*  < > 30* 29* 25*   CREATININE 1.3  < > 1.2 1.1 1.0   CALCIUM 9.5  < > 9.4 9.3 9.3   PROT 7.7  --   --  6.7  --    ALBUMIN 3.6  --   --  3.1*  --    BILITOT 1.5*  --   --  0.8  --    ALKPHOS 159*  --   --  149*  --    AST 27  --   --  26  --    ALT 23  --   --  21  --    ANIONGAP 17*  < > 15 15 10   EGFRNONAA >60.0  < > >60.0 >60.0 >60.0   < > = values in this interval not displayed.  Troponin:     Recent Labs  Lab 11/01/17  1215 11/01/17  1713   TROPONINI 0.039* 0.015     Assessment/Plan:      * Syncope    Likely vasovagal given pt's syncope occurring with PFTs  - No CP, troponin without significant elevation  - No vagal symptoms (diaphoresis, nausea, sensation of warmth / other presyncopal symptoms)  - Will replete K+ and continue to rule out other causes of syncope; however pt denies any palpitations and EKG on admit was normal; tele continues to be normal.        Hypokalemia    Previously on Lasix 80 mg daily added metolazone in the last week, profuse diuresis likely causing hypokalemia, cramps.  - EKG shows some PVCs, QTC prolongation, pt c/o cramps, on telemetry  - Admission K+ 2.1, repleting IV + PO, will continue to monitor via telemetry and CMP  -- 11/02 1340: Pt has received 280 mEq total since admit, will reassess at 1400 BMP and adjust repletion.          Pulmonary embolism    Continuing home apixaban 5 BID  - Followed by Structural/Valvular Heart Disease/Interventional Cardiology, who, according to notes, plan on RHC/LHC to investigate possibility of chronic PE causing pHTN as well as status of CAD given MI in 2012.        Obesity hypoventilation syndrome    Discussed importance of compliance with BiPAP at home, pt states he doesn't wear it due to cumbersome nature of switching from NC to BiPAP. Advised that pHTN / CHF would worsen without compliance        Benign essential hypertension    Continuing home lopressor 50, holding ARB in setting of volume  depletion / normotension        Pulmonary hypertension    Will have RHC / pulmonary angiography to investigate for presence of multiple chronic PEs as cause of pHTN  Pt says he has a CPAP or BiPAP (pt not sure which) machine which he doesn't use        Hyperlipidemia    Continuing home atorvastatin 20        CAD (coronary artery disease), native coronary artery    Continuing home atorvastatin 20, lopressor 50,           VTE Risk Mitigation         Ordered     apixaban tablet 5 mg  2 times daily     Route:  Oral        11/01/17 1655     High Risk of VTE  Once      11/01/17 1514     Place DARSHAN hose  Until discontinued      11/01/17 1514     Place sequential compression device  Until discontinued      11/01/17 1514              Olamide Sweeney MD  Department of Hospital Medicine   [facility]

## 2017-11-02 NOTE — ASSESSMENT & PLAN NOTE
Likely secondary to hypokalemia  - No CP, troponin without significant elevation  - No vagal symptoms (diaphoresis, nausea, sensation of warmth / other presyncopal symptoms)  - Will replete K+ and continue to rule out other causes of syncope

## 2017-11-02 NOTE — ASSESSMENT & PLAN NOTE
Will have RHC / pulmonary angiography to investigate for presence of multiple chronic PEs as cause of pHTN  Pt says he has a CPAP or BiPAP (pt not sure which) machine which he doesn't use

## 2017-11-02 NOTE — SUBJECTIVE & OBJECTIVE
Past Medical History:   Diagnosis Date    Acute pulmonary embolism     Chronic respiratory failure with hypoxia     Essential hypertension, malignant     Pulmonary artery hypertension        Past Surgical History:   Procedure Laterality Date    CARDIAC CATHETERIZATION      CATARACT EXTRACTION      MASS EXCISION      inner thigh    TONSILLECTOMY, ADENOIDECTOMY         Review of patient's allergies indicates:   Allergen Reactions    Clopidogrel        No current facility-administered medications on file prior to encounter.      Current Outpatient Prescriptions on File Prior to Encounter   Medication Sig    apixaban (ELIQUIS) 5 mg Tab Take by mouth.    atorvastatin (LIPITOR) 20 MG tablet Take by mouth.    carbamazepine (TEGRETOL) 200 mg tablet Take by mouth.    cetirizine (ZYRTEC) 10 MG tablet Take by mouth.    citalopram (CELEXA) 40 MG tablet Take by mouth.    furosemide (LASIX) 80 MG tablet Take 80 mg by mouth 2 (two) times daily.    losartan (COZAAR) 25 MG tablet Take by mouth.    metOLazone (ZAROXOLYN) 5 MG tablet Take 1 tablet (5 mg total) by mouth once daily.    metoprolol tartrate (LOPRESSOR) 50 MG tablet Take by mouth.    omeprazole (PRILOSEC) 20 MG capsule Take by mouth.    potassium chloride (KLOR-CON) 10 MEQ TbSR Take 10 mEq by mouth once.    VENTOLIN HFA 90 mcg/actuation inhaler TAKE 1 PUFF EVERY 6 HOURS AS NEEDED     Family History     None        Social History Main Topics    Smoking status: Never Smoker    Smokeless tobacco: Never Used    Alcohol use No    Drug use: No    Sexual activity: Not on file     Review of Systems   Constitutional: Positive for fatigue. Negative for activity change, appetite change, chills, diaphoresis, fever and unexpected weight change.        +cramps   Respiratory: Negative for apnea, cough, choking, chest tightness, shortness of breath, wheezing and stridor.    Cardiovascular: Negative for chest pain, palpitations and leg swelling.   Musculoskeletal:  Positive for myalgias. Negative for arthralgias, back pain, gait problem, joint swelling, neck pain and neck stiffness.   Neurological: Positive for syncope and light-headedness. Negative for dizziness, tremors, seizures, facial asymmetry, speech difficulty, weakness, numbness and headaches.     Objective:     Vital Signs (Most Recent):  Temp: 97.6 °F (36.4 °C) (11/01/17 1951)  Pulse: 93 (11/01/17 1951)  Resp: 18 (11/01/17 1951)  BP: 127/63 (11/01/17 1951)  SpO2: (!) 93 % (11/01/17 1951) Vital Signs (24h Range):  Temp:  [96.7 °F (35.9 °C)-98.7 °F (37.1 °C)] 97.6 °F (36.4 °C)  Pulse:  [58-93] 93  Resp:  [9-30] 18  SpO2:  [84 %-100 %] 93 %  BP: (111-140)/(57-72) 127/63     Weight: (!) 148.6 kg (327 lb 8 oz)  Body mass index is 50.54 kg/m².    Physical Exam   Constitutional: He appears well-developed.   HENT:   Head: Normocephalic and atraumatic.   Right Ear: External ear normal.   Left Ear: External ear normal.   Nose: Nose normal.   Eyes: EOM are normal. Pupils are equal, round, and reactive to light.   Neck: No tracheal deviation present. No thyromegaly present.   Cardiovascular: Normal rate, S1 normal, S2 normal and normal heart sounds.    No murmur heard.  Pulmonary/Chest: Effort normal and breath sounds normal.   Abdominal: Soft. There is no tenderness. No hernia.   Musculoskeletal: He exhibits no edema.   Neurological: He displays normal reflexes. No cranial nerve deficit.   Skin: No rash noted.   Psychiatric: He has a normal mood and affect. Judgment normal.   Vitals reviewed.       Significant Labs:   CBC:   Recent Labs  Lab 11/01/17  1215 11/01/17  1408   WBC 14.85*  --    HGB 16.3  --    HCT 45.8 48     --      CMP:   Recent Labs  Lab 11/01/17  1215 11/01/17  1942   * 127*   K 2.1* 2.1*   CL 71* 71*   CO2 41* 43*   * 223*   BUN 29* 29*   CREATININE 1.3 1.2   CALCIUM 9.5 9.4   PROT 7.7  --    ALBUMIN 3.6  --    BILITOT 1.5*  --    ALKPHOS 159*  --    AST 27  --    ALT 23  --    ANIONGAP  17* 13   EGFRNONAA >60.0 >60.0     Troponin:   Recent Labs  Lab 11/01/17  1215 11/01/17  1713   TROPONINI 0.039* 0.015

## 2017-11-02 NOTE — HOSPITAL COURSE
11/02: NAEON, feeling well, no recurrence of syncope. No palpitations, no n/v, no d/c/f/c. Continuing K+ repletion  11/03: NAEON, continues to feel well, no recurrence of syncope, syncopal symptoms. Some continuation of post-nasal drip 2' to seasonal allergies, continuing fluticasone. Otherwise continuing K+ repletion.  11/04: K improved, stable for d/c with cardiology and PCC f/u

## 2017-11-02 NOTE — PROGRESS NOTES
Patient transferred back to room from nuclear medicine.  Patient's skin appears red, flush, but not warm to touch.  Patient's VSS and has no complaints at this time.  MD notified, new orders were for Benadryl 25mg PO x1 now, and continue to monitor.  Will continue to monitor patient.

## 2017-11-02 NOTE — ASSESSMENT & PLAN NOTE
Previously on Lasix 80 mg daily added metolazone in the last week, profuse diuresis likely causing hypokalemia, cramps.  - EKG shows some PVCs, QTC prolongation, pt c/o cramps  Admission K+ 2.1, repleting PO, will continue to monitor via telemetry and CMP

## 2017-11-02 NOTE — PLAN OF CARE
Problem: Patient Care Overview  Goal: Plan of Care Review  Outcome: Ongoing (interventions implemented as appropriate)  POC reviewed with pt and family.  Pt verbalized understanding.  Questions and concerns addressed, and no acute events today.  Patient receiving oral potassium replacements throughout the day, and has been without syncopal episode during the shift.  Pt progressing toward goals, will continue to monitor.  See flowsheets for full assessment and VS info.

## 2017-11-02 NOTE — HPI
"47M with hx MI 2012 s/p 2x FLORENTINO, 10/2016 dx of pHTN, 11/2016 PE (now on apixaban) presents to [facility] due to syncope during PFT (x2). Pt states that he was undergoing PFTs earlier today when he lost consciousness. States that he felt weak and "blacked out" for approx 30s or so. He says that he was asked to again exhale forcefully and again "blacked out" and lost consciousness for 30s. Denies palpitations, CP, n/v, diaphroesis, feeling warm, or vertigo. Pt says that he did not eat this AM but also says that he has previously gone to many tests NPO and never had a syncopal episode in the past.    Pt states that last week he had metolazone added to his home 80mg PO lasix which caused him to urinate profusely. Shortly afterward he began to experience cramps and weakness. He says he then developed a brief URI whose diagnosis he is unsure of but he was prescribed biaxin, which gave him some GI upset (pt describes it as discomfort but denies pain, denies diarrhea/constipation/blood in stool).    At the time of interview pt feels much better. Continues to deny CP/SOB/presyncopal symptoms  "

## 2017-11-02 NOTE — SUBJECTIVE & OBJECTIVE
Review of Systems   Constitutional: Positive for fatigue. Negative for activity change, appetite change, chills, diaphoresis, fever and unexpected weight change.        +cramps   Respiratory: Negative for apnea, cough, choking, chest tightness, shortness of breath, wheezing and stridor.    Cardiovascular: Negative for chest pain, palpitations and leg swelling.   Musculoskeletal: Positive for myalgias. Negative for arthralgias, back pain, gait problem, joint swelling, neck pain and neck stiffness.   Neurological: Positive for syncope and light-headedness. Negative for dizziness, tremors, seizures, facial asymmetry, speech difficulty, weakness, numbness and headaches.     Objective:     Vital Signs (Most Recent):  Temp: 98.3 °F (36.8 °C) (11/02/17 1150)  Pulse: 73 (11/02/17 1150)  Resp: 19 (11/02/17 1150)  BP: 127/65 (11/02/17 1150)  SpO2: (!) 94 % (11/02/17 1150) Vital Signs (24h Range):  Temp:  [96.7 °F (35.9 °C)-98.5 °F (36.9 °C)] 98.3 °F (36.8 °C)  Pulse:  [58-95] 73  Resp:  [9-20] 19  SpO2:  [88 %-98 %] 94 %  BP: (105-137)/(56-67) 127/65     Weight: (!) 148.6 kg (327 lb 8 oz)  Body mass index is 50.54 kg/m².    Physical Exam   Constitutional: He appears well-developed.   HENT:   Head: Normocephalic and atraumatic.   Right Ear: External ear normal.   Left Ear: External ear normal.   Nose: Nose normal.   Eyes: EOM are normal. Pupils are equal, round, and reactive to light.   Neck: No tracheal deviation present. No thyromegaly present.   Cardiovascular: Normal rate, S1 normal, S2 normal and normal heart sounds.    No murmur heard.  Pulmonary/Chest: Effort normal and breath sounds normal.   Abdominal: Soft. There is no tenderness. No hernia.   Musculoskeletal: He exhibits no edema.   Neurological: He displays normal reflexes. No cranial nerve deficit.   Skin: No rash noted.   Psychiatric: He has a normal mood and affect. Judgment normal.   Vitals reviewed.       Significant Labs:   CBC:     Recent Labs  Lab  11/01/17  1215 11/01/17  1408 11/02/17  0413   WBC 14.85*  --  10.92   HGB 16.3  --  14.9   HCT 45.8 48 42.0     --  256     CMP:   Recent Labs  Lab 11/01/17  1215  11/02/17  0022 11/02/17  0413 11/02/17  0844   *  < > 128* 129* 128*   K 2.1*  < > 2.2* 2.4* 2.4*   CL 71*  < > 73* 77* 80*   CO2 41*  < > 40* 37* 38*   *  < > 151* 150* 135*   BUN 29*  < > 30* 29* 25*   CREATININE 1.3  < > 1.2 1.1 1.0   CALCIUM 9.5  < > 9.4 9.3 9.3   PROT 7.7  --   --  6.7  --    ALBUMIN 3.6  --   --  3.1*  --    BILITOT 1.5*  --   --  0.8  --    ALKPHOS 159*  --   --  149*  --    AST 27  --   --  26  --    ALT 23  --   --  21  --    ANIONGAP 17*  < > 15 15 10   EGFRNONAA >60.0  < > >60.0 >60.0 >60.0   < > = values in this interval not displayed.  Troponin:     Recent Labs  Lab 11/01/17 1215 11/01/17  1713   TROPONINI 0.039* 0.015

## 2017-11-02 NOTE — ASSESSMENT & PLAN NOTE
Continuing home apixaban 5 BID  - Followed by [facility]/Interventional Cardiology, who, according to notes, plan on RHC/LHC to investigate possibility of chronic PE causing pHTN as well as status of CAD given MI in 2012.

## 2017-11-02 NOTE — LETTER
November 2, 2017      Gabriel Jennings MD  [address]  [facility] [address]           [facility]  [address]  [facility] [address]  Phone: [phone]          Patient: Jaquan Hines Jr.   MR Number: [id number]   YOB: 1970   Date of Visit: 11/2/2017       Dear Dr. Gabriel Jennings:    Thank you for referring Jaquan Hines to me for evaluation. Attached you will find relevant portions of my assessment and plan of care.    If you have questions, please do not hesitate to call me. I look forward to following Jaquan Hines along with you.    Sincerely,    Tomy Tomas MD    Enclosure  CC:  No Recipients    If you would like to receive this communication electronically, please contact [email or web address] or [phone] to request more information on [facility] access.    For providers and/or their staff who would like to refer a patient to [facility], please contact us through our one-stop-shop provider referral line, [facility], at [phone].    If you feel you have received this communication in error or would no longer like to receive these types of communications, please e-mail [email or web address]

## 2017-11-02 NOTE — ASSESSMENT & PLAN NOTE
Discussed importance of compliance with BiPAP at home, pt states he doesn't wear it due to cumbersome nature of switching from NC to BiPAP. Advised that pHTN / CHF would worsen without compliance

## 2017-11-02 NOTE — MEDICAL/APP STUDENT
Jaquan Hines Jr. is a 47 y.o. male patient with history of chronic respiratory failure, PE/PAH on apixaban, and MI with 2 cardiac stents (2012) who was brought to ED after losing consciousness while undergoing PFTs at [facility] today. His sister states that the patient passed out while forcibly exhaling twice.  Each time he lost consciousness for 30-45s with no pre/postsyncopal symptoms. He also nearly passed out this morning when trying to stand up. He reports no prior history of syncopal episodes.     The patient reports increased LE/abdominal swelling and was seen by his doctor 1wk ago who started him on metolazone in addition to furosemide he was already taking. Swelling started to improve but the patient reports increased muscle fatigue/aches as well as an upset stomach with reduced BM frequency.  He also has a 3-4d history of ST/wet cough and is being treated with antibiotics for a respiratory infection. He currently does not report any SOB, chest pain or palpitations but does still have some abdominal discomfort and feels weak and fatigued.  He denies any visuosensory changes.     Patient denies any family history of coagulopathies or heart disease.  He has never smoked, does not drink, or use drugs.  He currently lives with his sister who helps care for him and states that he's usually very mobile.     Interval history:  NAEON, patient did well overnight, no worsening of symptoms. Denies chest pain, palpitations, additional syncopal episodes, visual changes, HA, dizziness, or weakness.  He was able to get out of bed himself to go to the bathroom several times overnight. Still feels dull generalized muscle aches/cramps.     Hospital course:  11/1- Potassium 2.2, IV potassium chloride started, home diuretics held  11/2- Potassium improving (2.4 from 2.2), continue correcting    Review of Systems   Constitutional: Positive for malaise/fatigue. Negative for chills and fever.   HENT: Positive for congestion and  "sore throat.    Eyes: Positive for blurred vision. Negative for double vision.        R cataracts   Respiratory: Positive for cough. Negative for hemoptysis, sputum production and shortness of breath.    Cardiovascular: Negative for chest pain, palpitations and leg swelling.   Gastrointestinal: Positive for abdominal pain. Negative for constipation, diarrhea, nausea and vomiting.        Vague dull GI discomfort   Genitourinary: Negative.    Musculoskeletal: Positive for myalgias.        Generalized muscle aches   Neurological: Negative for dizziness, tingling, sensory change, focal weakness, loss of consciousness and headaches.       Active Hospital Problems    Diagnosis  POA    *Syncope [R55]  Yes    Hypokalemia [E87.6]  Yes    Pulmonary embolism [I26.99]  Yes     on Eliquis      Obesity hypoventilation syndrome [E66.2]  Yes     d/w pt about the need to lose weightd/w pt about diet choices        Resolved Hospital Problems    Diagnosis Date Resolved POA   No resolved problems to display.     Temp: 98.2 °F (36.8 °C) (11/02/17 0701)  Pulse: 79 (11/02/17 0701)  Resp: 18 (11/02/17 0701)  BP: 137/67 (11/02/17 0701)  SpO2: 95 % (11/02/17 0701)  Weight: (!) 148.6 kg (327 lb 8 oz) (11/01/17 1753)  Height: 5' 7.5" (171.5 cm) (11/01/17 1753)    Physical Exam   Constitutional: He is oriented to person, place, and time. He appears well-developed and well-nourished. No distress.   HENT:   Head: Normocephalic and atraumatic.   Eyes: EOM are normal. Pupils are equal, round, and reactive to light.   Neck: Normal range of motion.   Cardiovascular: Normal rate, regular rhythm, normal heart sounds and intact distal pulses.    Pulmonary/Chest: Effort normal. No respiratory distress. He has decreased breath sounds. He has no wheezes. He has no rales.   Abdominal: Soft. Bowel sounds are normal. He exhibits no distension and no mass. There is no tenderness. There is no guarding.   Musculoskeletal: Normal range of motion. He exhibits " edema.   1+ below knees bilaterally   Neurological: He is alert and oriented to person, place, and time. No cranial nerve deficit or sensory deficit.   Skin: Skin is warm and dry. Capillary refill takes less than 2 seconds. He is not diaphoretic.   punctate excoriations on arms/legs, linear excoriation around umbilicus    Psychiatric: He has a normal mood and affect. His behavior is normal. Judgment and thought content normal.     Assessment:       Patient is a 48yo male patient, presenting due to LOC, found to be hypokalemic likely secondary to starting new diuretic within the past week.     Plan:       Hypokalemia  - likely secondary to starting second diuretic  - continue replacing K  - K increased (2.4 from 2.2)  - monitor BMP for improvement  - hold home diuretics for now     Syncopal episode  - possible causes: vasovagal, electrolyte disturbance  - get orthostatic BP  - correct electrolyte abnormalities and recheck BMP     HTN  - continue home losartan     Pulmonary embolism/PAH  - diagnosed Nov/Dec 2016  - continue apixaban     Chronic respiratory failure  - maintain O2 sats, continue 5L O2     Hyperlipidemia  - continue atorvastatin     Bipolar disorder  - continue carbamazepine and citalopram    Cordelia Lama  11/2/2017

## 2017-11-03 ENCOUNTER — DOCUMENTATION ONLY (OUTPATIENT)
Dept: CARDIOLOGY | Facility: CLINIC | Age: 47
End: 2017-11-03

## 2017-11-03 LAB
ANION GAP SERPL CALC-SCNC: 11 MMOL/L
ANION GAP SERPL CALC-SCNC: 11 MMOL/L
ANION GAP SERPL CALC-SCNC: 13 MMOL/L
ANION GAP SERPL CALC-SCNC: 8 MMOL/L
BASOPHILS # BLD AUTO: 0.09 K/UL
BASOPHILS NFR BLD: 0.9 %
BUN SERPL-MCNC: 17 MG/DL
BUN SERPL-MCNC: 17 MG/DL
BUN SERPL-MCNC: 19 MG/DL
BUN SERPL-MCNC: 22 MG/DL
CALCIUM SERPL-MCNC: 9 MG/DL
CALCIUM SERPL-MCNC: 9.1 MG/DL
CALCIUM SERPL-MCNC: 9.2 MG/DL
CALCIUM SERPL-MCNC: 9.3 MG/DL
CHLORIDE SERPL-SCNC: 86 MMOL/L
CHLORIDE SERPL-SCNC: 89 MMOL/L
CHLORIDE SERPL-SCNC: 89 MMOL/L
CHLORIDE SERPL-SCNC: 92 MMOL/L
CO2 SERPL-SCNC: 31 MMOL/L
CO2 SERPL-SCNC: 34 MMOL/L
CREAT SERPL-MCNC: 0.9 MG/DL
CREAT SERPL-MCNC: 0.9 MG/DL
CREAT SERPL-MCNC: 1 MG/DL
CREAT SERPL-MCNC: 1 MG/DL
DIASTOLIC DYSFUNCTION: YES
DIFFERENTIAL METHOD: ABNORMAL
EOSINOPHIL # BLD AUTO: 0.4 K/UL
EOSINOPHIL NFR BLD: 4.1 %
ERYTHROCYTE [DISTWIDTH] IN BLOOD BY AUTOMATED COUNT: 13.2 %
EST. GFR  (AFRICAN AMERICAN): >60 ML/MIN/1.73 M^2
EST. GFR  (NON AFRICAN AMERICAN): >60 ML/MIN/1.73 M^2
ESTIMATED PA SYSTOLIC PRESSURE: 78.9
GLOBAL PERICARDIAL EFFUSION: ABNORMAL
GLUCOSE SERPL-MCNC: 117 MG/DL
GLUCOSE SERPL-MCNC: 123 MG/DL
GLUCOSE SERPL-MCNC: 130 MG/DL
GLUCOSE SERPL-MCNC: 155 MG/DL
HCT VFR BLD AUTO: 42.9 %
HGB BLD-MCNC: 14.9 G/DL
IMM GRANULOCYTES # BLD AUTO: 0.07 K/UL
IMM GRANULOCYTES NFR BLD AUTO: 0.7 %
LYMPHOCYTES # BLD AUTO: 2.7 K/UL
LYMPHOCYTES NFR BLD: 26 %
MAGNESIUM SERPL-MCNC: 2.2 MG/DL
MCH RBC QN AUTO: 27.9 PG
MCHC RBC AUTO-ENTMCNC: 34.7 G/DL
MCV RBC AUTO: 80 FL
MONOCYTES # BLD AUTO: 0.9 K/UL
MONOCYTES NFR BLD: 9 %
NEUTROPHILS # BLD AUTO: 6.1 K/UL
NEUTROPHILS NFR BLD: 59.3 %
NRBC BLD-RTO: 0 /100 WBC
PLATELET # BLD AUTO: 250 K/UL
PMV BLD AUTO: 12 FL
POTASSIUM SERPL-SCNC: 2.9 MMOL/L
POTASSIUM SERPL-SCNC: 3.2 MMOL/L
POTASSIUM SERPL-SCNC: 3.2 MMOL/L
POTASSIUM SERPL-SCNC: 3.3 MMOL/L
RBC # BLD AUTO: 5.34 M/UL
RETIRED EF AND QEF - SEE NOTES: 65 (ref 55–65)
SODIUM SERPL-SCNC: 128 MMOL/L
SODIUM SERPL-SCNC: 130 MMOL/L
SODIUM SERPL-SCNC: 134 MMOL/L
SODIUM SERPL-SCNC: 134 MMOL/L
TRICUSPID VALVE REGURGITATION: ABNORMAL
WBC # BLD AUTO: 10.32 K/UL

## 2017-11-03 PROCEDURE — 25000003 PHARM REV CODE 250: Performed by: STUDENT IN AN ORGANIZED HEALTH CARE EDUCATION/TRAINING PROGRAM

## 2017-11-03 PROCEDURE — 11000001 HC ACUTE MED/SURG PRIVATE ROOM

## 2017-11-03 PROCEDURE — C8929 TTE W OR WO FOL WCON,DOPPLER: HCPCS

## 2017-11-03 PROCEDURE — 36415 COLL VENOUS BLD VENIPUNCTURE: CPT

## 2017-11-03 PROCEDURE — 85025 COMPLETE CBC W/AUTO DIFF WBC: CPT

## 2017-11-03 PROCEDURE — 80048 BASIC METABOLIC PNL TOTAL CA: CPT | Mod: 91

## 2017-11-03 PROCEDURE — 93306 TTE W/DOPPLER COMPLETE: CPT | Mod: 26,,, | Performed by: INTERNAL MEDICINE

## 2017-11-03 PROCEDURE — 83735 ASSAY OF MAGNESIUM: CPT

## 2017-11-03 PROCEDURE — 99233 SBSQ HOSP IP/OBS HIGH 50: CPT | Mod: ,,, | Performed by: HOSPITALIST

## 2017-11-03 PROCEDURE — 63600175 PHARM REV CODE 636 W HCPCS: Performed by: STUDENT IN AN ORGANIZED HEALTH CARE EDUCATION/TRAINING PROGRAM

## 2017-11-03 RX ORDER — POTASSIUM CHLORIDE 7.45 MG/ML
10 INJECTION INTRAVENOUS
Status: COMPLETED | OUTPATIENT
Start: 2017-11-03 | End: 2017-11-03

## 2017-11-03 RX ORDER — POTASSIUM CHLORIDE 750 MG/1
30 CAPSULE, EXTENDED RELEASE ORAL
Status: COMPLETED | OUTPATIENT
Start: 2017-11-03 | End: 2017-11-03

## 2017-11-03 RX ORDER — POTASSIUM CHLORIDE 7.45 MG/ML
10 INJECTION INTRAVENOUS
Status: COMPLETED | OUTPATIENT
Start: 2017-11-03 | End: 2017-11-04

## 2017-11-03 RX ORDER — POTASSIUM CHLORIDE 20 MEQ/15ML
60 SOLUTION ORAL ONCE
Status: COMPLETED | OUTPATIENT
Start: 2017-11-03 | End: 2017-11-03

## 2017-11-03 RX ORDER — CLARITHROMYCIN 500 MG/1
500 TABLET, FILM COATED ORAL 2 TIMES DAILY
Status: ON HOLD | COMMUNITY
End: 2017-11-04 | Stop reason: HOSPADM

## 2017-11-03 RX ORDER — METOPROLOL SUCCINATE 50 MG/1
50 TABLET, EXTENDED RELEASE ORAL DAILY
Status: DISCONTINUED | OUTPATIENT
Start: 2017-11-03 | End: 2017-11-04 | Stop reason: HOSPADM

## 2017-11-03 RX ORDER — POTASSIUM CHLORIDE 750 MG/1
30 CAPSULE, EXTENDED RELEASE ORAL
Status: COMPLETED | OUTPATIENT
Start: 2017-11-04 | End: 2017-11-04

## 2017-11-03 RX ORDER — METOPROLOL SUCCINATE 50 MG/1
50 TABLET, EXTENDED RELEASE ORAL DAILY
Status: ON HOLD | COMMUNITY
End: 2018-03-19 | Stop reason: HOSPADM

## 2017-11-03 RX ADMIN — APIXABAN 5 MG: 5 TABLET, FILM COATED ORAL at 08:11

## 2017-11-03 RX ADMIN — POTASSIUM CHLORIDE 10 MEQ: 10 INJECTION, SOLUTION INTRAVENOUS at 11:11

## 2017-11-03 RX ADMIN — CITALOPRAM HYDROBROMIDE 40 MG: 40 TABLET ORAL at 10:11

## 2017-11-03 RX ADMIN — POTASSIUM CHLORIDE 10 MEQ: 10 INJECTION, SOLUTION INTRAVENOUS at 02:11

## 2017-11-03 RX ADMIN — METOPROLOL SUCCINATE 50 MG: 50 TABLET, EXTENDED RELEASE ORAL at 04:11

## 2017-11-03 RX ADMIN — POTASSIUM CHLORIDE 10 MEQ: 10 INJECTION, SOLUTION INTRAVENOUS at 12:11

## 2017-11-03 RX ADMIN — ATORVASTATIN CALCIUM 20 MG: 20 TABLET, FILM COATED ORAL at 10:11

## 2017-11-03 RX ADMIN — POTASSIUM CHLORIDE 10 MEQ: 10 INJECTION, SOLUTION INTRAVENOUS at 10:11

## 2017-11-03 RX ADMIN — POTASSIUM CHLORIDE 30 MEQ: 750 CAPSULE, EXTENDED RELEASE ORAL at 11:11

## 2017-11-03 RX ADMIN — POTASSIUM CHLORIDE 30 MEQ: 750 CAPSULE, EXTENDED RELEASE ORAL at 02:11

## 2017-11-03 RX ADMIN — PANTOPRAZOLE SODIUM 40 MG: 40 TABLET, DELAYED RELEASE ORAL at 10:11

## 2017-11-03 RX ADMIN — CARBAMAZEPINE 200 MG: 200 TABLET ORAL at 10:11

## 2017-11-03 RX ADMIN — POTASSIUM CHLORIDE 10 MEQ: 7.46 INJECTION, SOLUTION INTRAVENOUS at 03:11

## 2017-11-03 RX ADMIN — POTASSIUM CHLORIDE 30 MEQ: 750 CAPSULE, EXTENDED RELEASE ORAL at 04:11

## 2017-11-03 RX ADMIN — POTASSIUM CHLORIDE 30 MEQ: 750 CAPSULE, EXTENDED RELEASE ORAL at 05:11

## 2017-11-03 RX ADMIN — METOPROLOL TARTRATE 50 MG: 50 TABLET ORAL at 10:11

## 2017-11-03 RX ADMIN — APIXABAN 5 MG: 5 TABLET, FILM COATED ORAL at 10:11

## 2017-11-03 RX ADMIN — POTASSIUM CHLORIDE: 2 INJECTION, SOLUTION, CONCENTRATE INTRAVENOUS at 12:11

## 2017-11-03 RX ADMIN — POTASSIUM CHLORIDE 60 MEQ: 20 SOLUTION ORAL at 10:11

## 2017-11-03 RX ADMIN — FLUTICASONE PROPIONATE 2 SPRAY: 50 SPRAY, METERED NASAL at 10:11

## 2017-11-03 NOTE — PLAN OF CARE
CM in to see pt AAO alone in room on oxygen via NC @ 5lpm in no apparent distress stating he uses oxygen at home on 5lpm as well as BiPAP.  Pt states he is presently staying with his sister and she will pick him up at time of discharge.  Pt presently on IV Potassium with fluids and K+ riders, monitored.  Pt aware of possible late discharge today pending normal potassium on afternoon lab draw.

## 2017-11-03 NOTE — PHYSICIAN QUERY
"PT Name: Jaquan Hines Jr.  MR #: [id number]    Physician Query Form - Heart  Condition Clarification     CDS/: Susanna Hubbard               Contact information:[email or web address]  This form is a permanent document in the medical record.     Query Date: November 3, 2017    By submitting this query, we are merely seeking further clarification of documentation. Please utilize your independent clinical judgment when addressing the question(s) below.    The medical record contains the following   Indicators     Supporting Clinical Findings Location in Medical Record   x BNP 46 Lab 11/1   x EF 60-65% 2D echo 11/3    Radiology findings     x Echo Results CONCLUSIONS     1 - Normal left ventricular systolic function (EF 60-65%).     2 - Impaired LV relaxation, elevated LAP (grade 2 diastolic dysfunction).     3 - Biatrial enlargement.     4 - Right ventricular enlargement with hypertrophy, with mildly depressed systolic function.     5 - Pulmonary hypertension. The estimated PA systolic pressure is 79 mmHg.     6 - Moderate tricuspid regurgitation.     7 - Small pericardial effusion.  2D echo 11/3    "Ascites" documented      "SOB" or "GOMEZ" documented     x "Hypoxia" documented Chronic respiratory failure with hypoxia  H&P/PN 11/1-2   x Heart Failure documented Obesity hypoventilation syndrome     Discussed importance of compliance with BiPAP at home, pt states he doesn't wear it due to cumbersome nature of switching from NC to BiPAP. Advised that pHTN / CHF would worsen without compliance      H&P/PN 11/1-2    "Edema" documented      Diuretics/Meds      Treatment:      Other:          Provider, please specify diagnosis or diagnoses associated with above clinical findings.                               [  ] Chronic Systolic Heart Failure (EF < 40)*  [X] Chronic Diastolic Heart Failure (EF > 40)*  [  ] Chronic Combined Systolic and Diastolic Heart Failure  [  ] Other Type of Heart Failure (please specify type): " _________________________  [  ] Heart Failure Ruled Out  [  ] Other (please specify): ___________________________________  [  ] Clinically Undetermined            *American Heart Association                                                                                                          Please document in your progress notes daily for the duration of treatment until resolved and include in your discharge summary.

## 2017-11-03 NOTE — PHARMACY MED REC
"Admission Medication Reconciliation - Pharmacy Consult Note    The home medication history was taken by Bronwyn Rocha, Pharmacy Tech.  Based on information gathered and subsequent review by the clinical pharmacist, the items below may need attention.     You may go to "Admission" then "Reconcile Home Medications" tabs to review and/or act upon these items. Based on information gathered and subsequent review by the clinical pharmacist, the items below may need attention.    Potentially problematic discrepancies with current MAR  o Patient is taking a DIFFERENT DRUG than that ordered upon admit  o Toprol XL 50 mg PO daily NOT Lopressor    Please address this information as you see fit.  Feel free to contact us if you have any questions or require assistance.    Maria Guadalupe Mandujano, PharmD  [id number]      Patient's prior to admission medication regimen was as follows:  Medication Sig    apixaban (ELIQUIS) 5 mg Tab Take 5 mg by mouth 2 (two) times daily.     atorvastatin (LIPITOR) 20 MG tablet Take 20 mg by mouth once daily.     carbamazepine (TEGRETOL) 200 mg tablet Take 200 mg by mouth once daily.     cetirizine (ZYRTEC) 10 MG tablet Take 10 mg by mouth once daily.     citalopram (CELEXA) 40 MG tablet Take 40 mg by mouth once daily.     clarithromycin (BIAXIN) 500 MG tablet Take 500 mg by mouth 2 (two) times daily. For 10 days    furosemide (LASIX) 80 MG tablet Take 80 mg by mouth once daily.     losartan (COZAAR) 25 MG tablet Take 25 mg by mouth once daily.     metOLazone (ZAROXOLYN) 5 MG tablet Take 1 tablet (5 mg total) by mouth once daily.    metoprolol succinate (TOPROL-XL) 50 MG 24 hr tablet Take 50 mg by mouth once daily.    omeprazole (PRILOSEC) 20 MG capsule Take 20 mg by mouth once daily.     POTASSIUM ORAL Take 2 tablets by mouth once daily    VENTOLIN HFA 90 mcg/actuation inhaler TAKE 1 PUFF EVERY 6 HOURS AS NEEDED FOR WHEEZING/SOB    [DISCONTINUED] clarithromycin (BIAXIN) 500 MG tablet Take 500 mg by " mouth every 12 (twelve) hours.         Please add appropriate    SmartPhrase below:

## 2017-11-03 NOTE — PLAN OF CARE
Problem: Patient Care Overview  Goal: Plan of Care Review  Outcome: Ongoing (interventions implemented as appropriate)  Reviewed plan of care with patient. Patient taken off IV K+ replacement and started on oral potassium replacement only, patient tolerating well. Has been well hydrated, stable on 5L O2 via Nc. Plan to check labs again this evening to evaluate oral potassium replacement. Patient understands and agrees to plan of care and has no questions or concerns at this time.

## 2017-11-03 NOTE — PROGRESS NOTES
Patient identified by 2 identifiers. Denies previous reactions to blood transfusions and allergies reviewed.  Procedure explained & consent obtained.  20 g IV in place to Lt AC, flushed w/ 10cc NS pre & post contrast administration.  3cc Optison administered, echo images obtained.  Pt tolerated procedure well.

## 2017-11-04 VITALS
HEART RATE: 82 BPM | WEIGHT: 315 LBS | RESPIRATION RATE: 18 BRPM | HEIGHT: 67 IN | TEMPERATURE: 98 F | OXYGEN SATURATION: 94 % | SYSTOLIC BLOOD PRESSURE: 105 MMHG | DIASTOLIC BLOOD PRESSURE: 59 MMHG | BODY MASS INDEX: 49.44 KG/M2

## 2017-11-04 LAB
ANION GAP SERPL CALC-SCNC: 10 MMOL/L
ANION GAP SERPL CALC-SCNC: 11 MMOL/L
ANION GAP SERPL CALC-SCNC: 7 MMOL/L
ANION GAP SERPL CALC-SCNC: 9 MMOL/L
BASOPHILS # BLD AUTO: 0.1 K/UL
BASOPHILS NFR BLD: 1.1 %
BUN SERPL-MCNC: 15 MG/DL
BUN SERPL-MCNC: 15 MG/DL
BUN SERPL-MCNC: 16 MG/DL
BUN SERPL-MCNC: 18 MG/DL
CALCIUM SERPL-MCNC: 8.4 MG/DL
CALCIUM SERPL-MCNC: 8.9 MG/DL
CALCIUM SERPL-MCNC: 9.2 MG/DL
CALCIUM SERPL-MCNC: 9.6 MG/DL
CHLORIDE SERPL-SCNC: 101 MMOL/L
CHLORIDE SERPL-SCNC: 94 MMOL/L
CHLORIDE SERPL-SCNC: 94 MMOL/L
CHLORIDE SERPL-SCNC: 99 MMOL/L
CO2 SERPL-SCNC: 25 MMOL/L
CO2 SERPL-SCNC: 25 MMOL/L
CO2 SERPL-SCNC: 26 MMOL/L
CO2 SERPL-SCNC: 26 MMOL/L
CREAT SERPL-MCNC: 1 MG/DL
CREAT SERPL-MCNC: 1.1 MG/DL
CREAT SERPL-MCNC: 1.1 MG/DL
CREAT SERPL-MCNC: 1.3 MG/DL
DIFFERENTIAL METHOD: ABNORMAL
EOSINOPHIL # BLD AUTO: 0.4 K/UL
EOSINOPHIL NFR BLD: 3.7 %
ERYTHROCYTE [DISTWIDTH] IN BLOOD BY AUTOMATED COUNT: 13.2 %
EST. GFR  (AFRICAN AMERICAN): >60 ML/MIN/1.73 M^2
EST. GFR  (NON AFRICAN AMERICAN): >60 ML/MIN/1.73 M^2
GLUCOSE SERPL-MCNC: 103 MG/DL
GLUCOSE SERPL-MCNC: 115 MG/DL
GLUCOSE SERPL-MCNC: 124 MG/DL
GLUCOSE SERPL-MCNC: 127 MG/DL
HCT VFR BLD AUTO: 39.8 %
HGB BLD-MCNC: 13.5 G/DL
IMM GRANULOCYTES # BLD AUTO: 0.09 K/UL
IMM GRANULOCYTES NFR BLD AUTO: 1 %
LYMPHOCYTES # BLD AUTO: 2.4 K/UL
LYMPHOCYTES NFR BLD: 25.5 %
MAGNESIUM SERPL-MCNC: 1.7 MG/DL
MCH RBC QN AUTO: 27.8 PG
MCHC RBC AUTO-ENTMCNC: 33.9 G/DL
MCV RBC AUTO: 82 FL
MONOCYTES # BLD AUTO: 0.8 K/UL
MONOCYTES NFR BLD: 8.1 %
NEUTROPHILS # BLD AUTO: 5.7 K/UL
NEUTROPHILS NFR BLD: 60.6 %
NRBC BLD-RTO: 0 /100 WBC
PLATELET # BLD AUTO: 232 K/UL
PMV BLD AUTO: 12.5 FL
POTASSIUM SERPL-SCNC: 3.6 MMOL/L
POTASSIUM SERPL-SCNC: 3.8 MMOL/L
POTASSIUM SERPL-SCNC: 4.2 MMOL/L
POTASSIUM SERPL-SCNC: 4.3 MMOL/L
RBC # BLD AUTO: 4.86 M/UL
SODIUM SERPL-SCNC: 129 MMOL/L
SODIUM SERPL-SCNC: 131 MMOL/L
SODIUM SERPL-SCNC: 133 MMOL/L
SODIUM SERPL-SCNC: 134 MMOL/L
WBC # BLD AUTO: 9.42 K/UL

## 2017-11-04 PROCEDURE — 80048 BASIC METABOLIC PNL TOTAL CA: CPT | Mod: 91

## 2017-11-04 PROCEDURE — 83735 ASSAY OF MAGNESIUM: CPT

## 2017-11-04 PROCEDURE — 63600175 PHARM REV CODE 636 W HCPCS: Performed by: STUDENT IN AN ORGANIZED HEALTH CARE EDUCATION/TRAINING PROGRAM

## 2017-11-04 PROCEDURE — 90670 PCV13 VACCINE IM: CPT | Performed by: HOSPITALIST

## 2017-11-04 PROCEDURE — 27000221 HC OXYGEN, UP TO 24 HOURS

## 2017-11-04 PROCEDURE — 36415 COLL VENOUS BLD VENIPUNCTURE: CPT

## 2017-11-04 PROCEDURE — 90686 IIV4 VACC NO PRSV 0.5 ML IM: CPT | Performed by: HOSPITALIST

## 2017-11-04 PROCEDURE — 25000003 PHARM REV CODE 250: Performed by: STUDENT IN AN ORGANIZED HEALTH CARE EDUCATION/TRAINING PROGRAM

## 2017-11-04 PROCEDURE — 3E0234Z INTRODUCTION OF SERUM, TOXOID AND VACCINE INTO MUSCLE, PERCUTANEOUS APPROACH: ICD-10-PCS | Performed by: HOSPITALIST

## 2017-11-04 PROCEDURE — 85025 COMPLETE CBC W/AUTO DIFF WBC: CPT

## 2017-11-04 PROCEDURE — 99239 HOSP IP/OBS DSCHRG MGMT >30: CPT | Mod: ,,, | Performed by: HOSPITALIST

## 2017-11-04 PROCEDURE — 90471 IMMUNIZATION ADMIN: CPT | Performed by: HOSPITALIST

## 2017-11-04 PROCEDURE — 90472 IMMUNIZATION ADMIN EACH ADD: CPT | Performed by: HOSPITALIST

## 2017-11-04 PROCEDURE — 63600175 PHARM REV CODE 636 W HCPCS: Performed by: HOSPITALIST

## 2017-11-04 RX ADMIN — FLUTICASONE PROPIONATE 2 SPRAY: 50 SPRAY, METERED NASAL at 08:11

## 2017-11-04 RX ADMIN — POTASSIUM CHLORIDE 10 MEQ: 10 INJECTION, SOLUTION INTRAVENOUS at 02:11

## 2017-11-04 RX ADMIN — POTASSIUM CHLORIDE 30 MEQ: 750 CAPSULE, EXTENDED RELEASE ORAL at 08:11

## 2017-11-04 RX ADMIN — INFLUENZA A VIRUS A/MICHIGAN/45/2015 X-275 (H1N1) ANTIGEN (FORMALDEHYDE INACTIVATED), INFLUENZA A VIRUS A/HONG KONG/4801/2014 X-263B (H3N2) ANTIGEN (FORMALDEHYDE INACTIVATED), INFLUENZA B VIRUS B/PHUKET/3073/2013 ANTIGEN (FORMALDEHYDE INACTIVATED), AND INFLUENZA B VIRUS B/BRISBANE/60/2008 ANTIGEN (FORMALDEHYDE INACTIVATED) 0.5 ML: 15; 15; 15; 15 INJECTION, SUSPENSION INTRAMUSCULAR at 05:11

## 2017-11-04 RX ADMIN — PANTOPRAZOLE SODIUM 40 MG: 40 TABLET, DELAYED RELEASE ORAL at 08:11

## 2017-11-04 RX ADMIN — PNEUMOCOCCAL 13-VALENT CONJUGATE VACCINE 0.5 ML: 2.2; 2.2; 2.2; 2.2; 2.2; 4.4; 2.2; 2.2; 2.2; 2.2; 2.2; 2.2; 2.2 INJECTION, SUSPENSION INTRAMUSCULAR at 05:11

## 2017-11-04 RX ADMIN — POTASSIUM CHLORIDE 10 MEQ: 10 INJECTION, SOLUTION INTRAVENOUS at 03:11

## 2017-11-04 RX ADMIN — POTASSIUM CHLORIDE 10 MEQ: 10 INJECTION, SOLUTION INTRAVENOUS at 12:11

## 2017-11-04 RX ADMIN — POTASSIUM CHLORIDE 30 MEQ: 750 CAPSULE, EXTENDED RELEASE ORAL at 05:11

## 2017-11-04 RX ADMIN — METOPROLOL SUCCINATE 50 MG: 50 TABLET, EXTENDED RELEASE ORAL at 08:11

## 2017-11-04 RX ADMIN — APIXABAN 5 MG: 5 TABLET, FILM COATED ORAL at 08:11

## 2017-11-04 RX ADMIN — POTASSIUM CHLORIDE 30 MEQ: 750 CAPSULE, EXTENDED RELEASE ORAL at 10:11

## 2017-11-04 RX ADMIN — CARBAMAZEPINE 200 MG: 200 TABLET ORAL at 08:11

## 2017-11-04 RX ADMIN — ATORVASTATIN CALCIUM 20 MG: 20 TABLET, FILM COATED ORAL at 08:11

## 2017-11-04 RX ADMIN — POTASSIUM CHLORIDE 30 MEQ: 750 CAPSULE, EXTENDED RELEASE ORAL at 12:11

## 2017-11-04 RX ADMIN — CITALOPRAM HYDROBROMIDE 40 MG: 40 TABLET ORAL at 08:11

## 2017-11-04 NOTE — PROGRESS NOTES
"[facility] Hospital Medicine  Progress Note    Patient Name: Jaquan Hines Jr.  MRN: [id number]  Patient Class: IP- Inpatient   Admission Date: 11/1/2017  Length of Stay: 2 days  Attending Physician: Yamileth Sandy MD  Primary Care Provider: Justin Vale MD    [facility] Medicine Team: [facility] 5 Olamide Sweeney MD    Subjective:     Principal Problem:Syncope    HPI:  47M with hx MI 2012 s/p 2x FLORENTINO, 10/2016 dx of pHTN, 11/2016 PE (now on apixaban) [place] [facility] [place] during [place] (x2). Pt states that he was undergoing PFTs earlier today when he lost consciousness. States that he felt weak and "blacked out" for approx 30s or so. He says that he was asked to again exhale forcefully and again "blacked out" and lost consciousness for 30s. Denies palpitations, CP, n/v, diaphroesis, feeling warm, or vertigo. Pt says that he did not eat this AM but also says that he has previously gone to many tests NPO and never had a syncopal episode in the past.    Pt states that last week he had metolazone added to his home 80mg PO lasix which caused him to urinate profusely. Shortly afterward he began to experience cramps and weakness. He says he then developed a brief URI whose diagnosis he is unsure of but he was prescribed biaxin, which gave him some GI upset (pt describes it as discomfort but denies pain, denies diarrhea/constipation/blood in stool).    At the time of interview pt feels much better. Continues to deny CP/SOB/presyncopal symptoms    Hospital Course:  11/02: NAEON, feeling well, no recurrence of syncope. No palpitations, no n/v, no d/c/f/c. Continuing K+ repletion  11/03: NAEON, continues to feel well, no recurrence of syncope, syncopal symptoms. Some continuation of post-nasal drip 2' to seasonal allergies, continuing fluticasone. Otherwise continuing K+ repletion.    Review of Systems   Constitutional: Positive for fatigue. Negative for activity change, appetite change, " chills, diaphoresis, fever and unexpected weight change.        +cramps   Respiratory: Negative for apnea, cough, choking, chest tightness, shortness of breath, wheezing and stridor.    Cardiovascular: Negative for chest pain, palpitations and leg swelling.   Musculoskeletal: Positive for myalgias. Negative for arthralgias, back pain, gait problem, joint swelling, neck pain and neck stiffness.   Neurological: Positive for syncope and light-headedness. Negative for dizziness, tremors, seizures, facial asymmetry, speech difficulty, weakness, numbness and headaches.     Objective:     Vital Signs (Most Recent):  Temp: 98.1 °F (36.7 °C) (11/03/17 2016)  Pulse: 79 (11/03/17 2016)  Resp: 18 (11/03/17 2016)  BP: 126/73 (11/03/17 2016)  SpO2: (!) 94 % (11/03/17 2016) Vital Signs (24h Range):  Temp:  [97.2 °F (36.2 °C)-98.2 °F (36.8 °C)] 98.1 °F (36.7 °C)  Pulse:  [74-99] 79  Resp:  [16-20] 18  SpO2:  [91 %-97 %] 94 %  BP: (113-133)/(55-77) 126/73     Weight: (!) 148.6 kg (327 lb 9.7 oz)  Body mass index is 51.31 kg/m².    Physical Exam   Constitutional: He appears well-developed.   HENT:   Head: Normocephalic and atraumatic.   Right Ear: External ear normal.   Left Ear: External ear normal.   Nose: Nose normal.   Eyes: EOM are normal. Pupils are equal, round, and reactive to light.   Neck: No tracheal deviation present. No thyromegaly present.   Cardiovascular: Normal rate, S1 normal, S2 normal and normal heart sounds.    No murmur heard.  Pulmonary/Chest: Effort normal and breath sounds normal.   Abdominal: Soft. There is no tenderness. No hernia.   Musculoskeletal: He exhibits no edema.   Neurological: He displays normal reflexes. No cranial nerve deficit.   Skin: No rash noted.   Psychiatric: He has a normal mood and affect. Judgment normal.   Vitals reviewed.       Significant Labs:   CBC:     Recent Labs  Lab 11/02/17  0413 11/03/17  0533   WBC 10.92 10.32   HGB 14.9 14.9   HCT 42.0 42.9    250     CMP:   Recent  Labs  Lab 11/02/17  0413  11/03/17  0533 11/03/17  1145 11/03/17  1832   *  < > 134* 134* 128*   K 2.4*  < > 3.2* 3.3* 3.2*   CL 77*  < > 89* 92* 89*   CO2 37*  < > 34* 31* 31*   *  < > 117* 155* 130*   BUN 29*  < > 19 17 17   CREATININE 1.1  < > 1.0 0.9 1.0   CALCIUM 9.3  < > 9.3 9.2 9.0   PROT 6.7  --   --   --   --    ALBUMIN 3.1*  --   --   --   --    BILITOT 0.8  --   --   --   --    ALKPHOS 149*  --   --   --   --    AST 26  --   --   --   --    ALT 21  --   --   --   --    ANIONGAP 15  < > 11 11 8   EGFRNONAA >60.0  < > >60.0 >60.0 >60.0   < > = values in this interval not displayed.  Troponin:   No results for input(s): TROPONINI in the last 48 hours.    Assessment/Plan:      * Syncope    Likely vasovagal given pt's syncope occurring with PFTs  - No CP, troponin without significant elevation  - No vagal symptoms (diaphoresis, nausea, sensation of warmth / other presyncopal symptoms)  - Will replete K+ and continue to rule out other causes of syncope; however pt denies any palpitations and EKG on admit was normal; tele continues to be normal.        Hypokalemia    Previously on Lasix 80 mg daily added metolazone in the last week, profuse diuresis likely causing hypokalemia, cramps.  - EKG shows some PVCs, QTC prolongation, pt c/o cramps, on telemetry  - Admission K+ 2.1, repleting IV + PO, will continue to monitor via telemetry and CMP  -- 11/03 2200: Pt has received 670 mEq total since admit, continuing with 10 mEq IV x5 + 30 mEq x4.          Pulmonary embolism    Continuing home apixaban 5 BID  - Followed by Structural/Valvular Heart Disease/Interventional Cardiology, who, according to notes, plan on RHC/LHC to investigate possibility of chronic PE causing pHTN as well as status of CAD given MI in 2012.        Obesity hypoventilation syndrome    Discussed importance of compliance with BiPAP at home, pt states he doesn't wear it due to cumbersome nature of switching from NC to BiPAP. Advised  that pHTN / CHF would worsen without compliance        Benign essential hypertension    Continuing home lopressor 50, holding ARB in setting of volume depletion / normotension        Pulmonary hypertension    Will have RHC / pulmonary angiography to investigate for presence of multiple chronic PEs as cause of pHTN  Pt says he has a CPAP or BiPAP (pt not sure which) machine which he doesn't use        Hyperlipidemia    Continuing home atorvastatin 20        CAD (coronary artery disease), native coronary artery    Continuing home atorvastatin 20, lopressor 50,         Hyponatremia    Improving with holding of diuretics / repletion of volume.  - Likely secondary to overdiuresis on home Lasix / metolazone. Will hold on DC with close cards f/u          VTE Risk Mitigation         Ordered     apixaban tablet 5 mg  2 times daily     Route:  Oral        11/01/17 1655     High Risk of VTE  Once      11/01/17 1514     Place DARSHAN hose  Until discontinued      11/01/17 1514     Place sequential compression device  Until discontinued      11/01/17 1514              Olamide Sweeney MD  Department of Hospital Medicine   [facility]

## 2017-11-04 NOTE — DISCHARGE SUMMARY
"DISCHARGE SUMMARY  Hospital Medicine    Team: [facility] HOSP MED 5    Patient Name: Jaquan Hines Jr.  YOB: 1970    Admit Date: 11/1/2017    Discharge Date: 11/04/2017     Discharge Attending Physician: Yamileth Sandy MD     Resident on Service: Isabela Henriquez DO    Chief Complaint: Syncope, Hypokalemia    Princilpal Diagnoses:  Active Hospital Problems    Diagnosis  POA    *Syncope [R55]  Yes    Hyponatremia [E87.1]  Yes    Hypokalemia [E87.6]  Yes    Pulmonary embolism [I26.99]  Yes     on Eliquis      Obesity hypoventilation syndrome [E66.2]  Yes     d/w pt about the need to lose weightd/w pt about diet choices      Chronic respiratory failure with hypoxia [J96.11]  Yes     wears O2 2LPMcontinue O2 24 hr/day,face to face visit regarding O2      Pulmonary hypertension [I27.20]  Yes     as above, needs further evaluationto have sleep study next week? duration of pulmonary hypertension, ? CTEPH      Benign essential hypertension [I10]  Yes    Hyperlipidemia [E78.5]  Yes    Anxiety [F41.9]  Yes    Bipolar disorder [F31.9]  Yes    Presence of stent in coronary artery [Z95.5]  Not Applicable    CAD (coronary artery disease), native coronary artery [I25.10]  Yes      Resolved Hospital Problems    Diagnosis Date Resolved POA   No resolved problems to display.       Discharged Condition: Admit problems have resolved      HOSPITAL COURSE:      Initial Presentation:    47M with hx MI 2012 s/p 2x FLORENTINO, 10/2016 dx of pHTN, 11/2016 PE (now on apixaban) presents to [facility] due to syncope during PFT (x2). Pt states that he was undergoing PFTs earlier today when he lost consciousness. States that he felt weak and "blacked out" for approx 30s or so. He says that he was asked to again exhale forcefully and again "blacked out" and lost consciousness for 30s. Denies palpitations, CP, n/v, diaphroesis, feeling warm, or vertigo. Pt says that he did not eat this AM but also says that he has previously gone to " many tests NPO and never had a syncopal episode in the past.     Pt states that last week he had metolazone added to his home 80mg PO lasix which caused him to urinate profusely. Shortly afterward he began to experience cramps and weakness. He says he then developed a brief URI whose diagnosis he is unsure of but he was prescribed biaxin, which gave him some GI upset (pt describes it as discomfort but denies pain, denies diarrhea/constipation/blood in stool).     At the time of interview pt feels much better. Continues to deny CP/SOB/presyncopal symptoms    Course of Principle Problem for Admission:    11/02: NAEON, feeling well, no recurrence of syncope. Likely 2/2 vasovagal from PFTs. No palpitations, no n/v, no d/c/f/c. Continuing K+ repletion  11/03: NAEON, continues to feel well, no recurrence of syncope, syncopal symptoms. Some continuation of post-nasal drip 2' to seasonal allergies, continuing fluticasone. Otherwise continuing K+ repletion.  11/04: K improved, stable for d/c with cardiology and PCC f/u    Medical Problems Addressed in the Hospital:       Syncope     Likely vasovagal given pt's syncope occurring with PFTs  - No CP, troponin without significant elevation  - No vagal symptoms (diaphoresis, nausea, sensation of warmth / other presyncopal symptoms)  - Will replete K+ and continue to rule out other causes of syncope; however pt denies any palpitations and EKG on admit was normal; tele continues to be normal.       Hyponatremia     Improving with holding of diuretics / repletion of volume.  - Likely secondary to overdiuresis on home Lasix / metolazone. Will hold on DC with close cards f/u       Hypokalemia     Previously on Lasix 80 mg daily added metolazone in the last week, profuse diuresis likely causing hypokalemia, cramps.  - EKG shows some PVCs, QTC prolongation, pt c/o cramps, on telemetry  - Admission K+ 2.1, repleting IV + PO, will continue to monitor via telemetry and CMP  -- 11/03 2200:  Pt has received 670 mEq total since admit, continuing with 10 mEq IV x5 + 30 mEq x4.  -- 11/04: K improved to 3.6, will receive PO 60 mEq and stable for d/c with cardiology and PCC f/u   -- BMP ORDERED to be done prior to PCC f/u. Continue holding diuretics until PCC f/u and re-address at that time.       CAD (coronary artery disease), native coronary artery     Continuing home atorvastatin 20, lopressor 50,        Hyperlipidemia     Continuing home atorvastatin 20       Obesity hypoventilation syndrome     Discussed importance of compliance with BiPAP at home, pt states he doesn't wear it due to cumbersome nature of switching from NC to BiPAP. Advised that pHTN / CHF would worsen without compliance       Pulmonary embolism     Continuing home apixaban 5 BID  - Followed by Structural/Valvular Heart Disease/Interventional Cardiology, who, according to notes, plan on RHC/LHC to investigate possibility of chronic PE causing pHTN as well as status of CAD given MI in 2012.       Pulmonary hypertension     Will have RHC / pulmonary angiography to investigate for presence of multiple chronic PEs as cause of pHTN  Pt says he has a CPAP or BiPAP (pt not sure which) machine which he doesn't use       Benign essential hypertension     Continuing home lopressor 50, holding ARB in setting of volume depletion / normotension  F/u with PCC and restart ARB if needed.          CONSULTS: n/a    Last CBC/BMP/HgbA1c (if applicable):  Recent Results (from the past 336 hour(s))   CBC with Automated Differential    Collection Time: 11/04/17  6:47 AM   Result Value Ref Range    WBC 9.42 3.90 - 12.70 K/uL    Hemoglobin 13.5 (L) 14.0 - 18.0 g/dL    Hematocrit 39.8 (L) 40.0 - 54.0 %    Platelets 232 150 - 350 K/uL   CBC with Automated Differential    Collection Time: 11/03/17  5:33 AM   Result Value Ref Range    WBC 10.32 3.90 - 12.70 K/uL    Hemoglobin 14.9 14.0 - 18.0 g/dL    Hematocrit 42.9 40.0 - 54.0 %    Platelets 250 150 - 350 K/uL    CBC with Automated Differential    Collection Time: 11/02/17  4:13 AM   Result Value Ref Range    WBC 10.92 3.90 - 12.70 K/uL    Hemoglobin 14.9 14.0 - 18.0 g/dL    Hematocrit 42.0 40.0 - 54.0 %    Platelets 256 150 - 350 K/uL     Recent Results (from the past 336 hour(s))   Basic metabolic panel    Collection Time: 11/04/17 12:21 PM   Result Value Ref Range    Sodium 134 (L) 136 - 145 mmol/L    Potassium 4.3 3.5 - 5.1 mmol/L    Chloride 99 95 - 110 mmol/L    CO2 26 23 - 29 mmol/L    BUN, Bld 15 6 - 20 mg/dL    Creatinine 1.1 0.5 - 1.4 mg/dL    Calcium 9.6 8.7 - 10.5 mg/dL    Anion Gap 9 8 - 16 mmol/L   Basic metabolic panel    Collection Time: 11/04/17  6:47 AM   Result Value Ref Range    Sodium 129 (L) 136 - 145 mmol/L    Potassium 3.6 3.5 - 5.1 mmol/L    Chloride 94 (L) 95 - 110 mmol/L    CO2 25 23 - 29 mmol/L    BUN, Bld 15 6 - 20 mg/dL    Creatinine 1.1 0.5 - 1.4 mg/dL    Calcium 8.4 (L) 8.7 - 10.5 mg/dL    Anion Gap 10 8 - 16 mmol/L   Basic metabolic panel    Collection Time: 11/03/17 11:45 PM   Result Value Ref Range    Sodium 131 (L) 136 - 145 mmol/L    Potassium 3.8 3.5 - 5.1 mmol/L    Chloride 94 (L) 95 - 110 mmol/L    CO2 26 23 - 29 mmol/L    BUN, Bld 18 6 - 20 mg/dL    Creatinine 1.3 0.5 - 1.4 mg/dL    Calcium 9.2 8.7 - 10.5 mg/dL    Anion Gap 11 8 - 16 mmol/L     No results found for: HGBA1C      Disposition:  Home         Future Scheduled Appointments:  Future Appointments  Date Time Provider Department Center   11/14/2017 4:00 PM Allen Conde MD [facility]     BMP ORDERED to be done prior to [facility] f/u.    Discharge Medication List:       Jaquan Hines Jr.   Home Medication Instructions ALIYA:[id number]    Printed on:11/04/17 1413   Medication Information                      apixaban (ELIQUIS) 5 mg Tab  Take 5 mg by mouth 2 (two) times daily.              atorvastatin (LIPITOR) 20 MG tablet  Take 20 mg by mouth once daily.              carbamazepine (TEGRETOL) 200 mg  tablet  Take 200 mg by mouth once daily.              cetirizine (ZYRTEC) 10 MG tablet  Take 10 mg by mouth once daily.              citalopram (CELEXA) 40 MG tablet  Take 40 mg by mouth once daily.              metoprolol succinate (TOPROL-XL) 50 MG 24 hr tablet  Take 50 mg by mouth once daily.             omeprazole (PRILOSEC) 20 MG capsule  Take 20 mg by mouth once daily.              VENTOLIN HFA 90 mcg/actuation inhaler  TAKE 1 PUFF EVERY 6 HOURS AS NEEDED FOR WHEEZING/SOB                 Patient Instructions:    Discharge Procedure Orders  Basic metabolic panel   Standing Status: Future  Standing Exp. Date: 01/03/19     Ambulatory Referral to Cardiology   Referral Priority: Routine Referral Type: Consultation   Referral Reason: Specialty Services Required    Requested Specialty: Cardiology    Number of Visits Requested: 1      Diet Cardiac     Activity as tolerated     Call MD for:  temperature >100.4     Call MD for:  persistent nausea and vomiting or diarrhea     Call MD for:  severe uncontrolled pain     Call MD for:  difficulty breathing or increased cough     Call MD for:  severe persistent headache     Call MD for:  persistent dizziness, light-headedness, or visual disturbances     Call MD for:  increased confusion or weakness           Signing Physician:  Isabela Henriquez MD

## 2017-11-04 NOTE — PROGRESS NOTES
"[facility] Hospital Medicine  Progress Note    Patient Name: Jaquan Hines Jr.  MRN: [id number]  Patient Class: IP- Inpatient   Admission Date: 11/1/2017  Length of Stay: 3 days  Attending Physician: Yamileth Sandy MD  Primary Care Provider: Justin Vale MD    [facility] Medicine Team: [facility]OSP MED 5 Isabela Henriquez MD    Subjective:     Principal Problem:Syncope    HPI:  47M with hx MI 2012 s/p 2x FLORENTINO, 10/2016 dx of pHTN, 11/2016 PE (now on apixaban) presents to [facility] due to syncope during PFT (x2). Pt states that he was undergoing PFTs earlier today when he lost consciousness. States that he felt weak and "blacked out" for approx 30s or so. He says that he was asked to again exhale forcefully and again "blacked out" and lost consciousness for 30s. Denies palpitations, CP, n/v, diaphroesis, feeling warm, or vertigo. Pt says that he did not eat this AM but also says that he has previously gone to many tests NPO and never had a syncopal episode in the past.    Pt states that last week he had metolazone added to his home 80mg PO lasix which caused him to urinate profusely. Shortly afterward he began to experience cramps and weakness. He says he then developed a brief URI whose diagnosis he is unsure of but he was prescribed biaxin, which gave him some GI upset (pt describes it as discomfort but denies pain, denies diarrhea/constipation/blood in stool).    At the time of interview pt feels much better. Continues to deny CP/SOB/presyncopal symptoms    Hospital Course:  11/02: NAEON, feeling well, no recurrence of syncope. No palpitations, no n/v, no d/c/f/c. Continuing K+ repletion  11/03: NAEON, continues to feel well, no recurrence of syncope, syncopal symptoms. Some continuation of post-nasal drip 2' to seasonal allergies, continuing fluticasone. Otherwise continuing K+ repletion.  11/04: K improved, stable for d/c with cardiology and PCC f/u    Review of Systems   Constitutional: " Negative for activity change, appetite change, chills, diaphoresis, fatigue, fever and unexpected weight change.        +cramps   Respiratory: Negative for apnea, cough, choking, chest tightness, shortness of breath, wheezing and stridor.    Cardiovascular: Negative for chest pain, palpitations and leg swelling.   Musculoskeletal: Negative for arthralgias, back pain, gait problem, joint swelling, myalgias, neck pain and neck stiffness.   Neurological: Negative for dizziness, tremors, seizures, syncope, facial asymmetry, speech difficulty, weakness, light-headedness, numbness and headaches.     Objective:     Vital Signs (Most Recent):  Temp: 96.9 °F (36.1 °C) (11/04/17 0815)  Pulse: 82 (11/04/17 1100)  Resp: 18 (11/04/17 0815)  BP: (!) 105/55 (11/04/17 0815)  SpO2: 96 % (11/04/17 0815) Vital Signs (24h Range):  Temp:  [96.9 °F (36.1 °C)-98.1 °F (36.7 °C)] 96.9 °F (36.1 °C)  Pulse:  [74-99] 82  Resp:  [18-19] 18  SpO2:  [94 %-96 %] 96 %  BP: (105-134)/(55-86) 105/55     Weight: (!) 148.6 kg (327 lb 9.7 oz)  Body mass index is 51.31 kg/m².    Physical Exam   Constitutional: He appears well-developed.   HENT:   Head: Normocephalic and atraumatic.   Right Ear: External ear normal.   Left Ear: External ear normal.   Nose: Nose normal.   Eyes: EOM are normal. Pupils are equal, round, and reactive to light.   Neck: No tracheal deviation present. No thyromegaly present.   Cardiovascular: Normal rate, S1 normal, S2 normal and normal heart sounds.    No murmur heard.  Pulmonary/Chest: Effort normal and breath sounds normal.   Abdominal: Soft. There is no tenderness. No hernia.   Musculoskeletal: He exhibits no edema.   Neurological: He displays normal reflexes. No cranial nerve deficit.   Skin: No rash noted.   Psychiatric: He has a normal mood and affect. Judgment normal.   Vitals reviewed.       Significant Labs:   CBC:     Recent Labs  Lab 11/03/17  0533 11/04/17  0647   WBC 10.32 9.42   HGB 14.9 13.5*   HCT 42.9 39.8*     232     CMP:     Recent Labs  Lab 11/03/17  1832 11/03/17  2345 11/04/17  0647   * 131* 129*   K 3.2* 3.8 3.6   CL 89* 94* 94*   CO2 31* 26 25   * 124* 115*   BUN 17 18 15   CREATININE 1.0 1.3 1.1   CALCIUM 9.0 9.2 8.4*   ANIONGAP 8 11 10   EGFRNONAA >60.0 >60.0 >60.0     Troponin:   No results for input(s): TROPONINI in the last 48 hours.    Assessment/Plan:      * Syncope    Likely vasovagal given pt's syncope occurring with PFTs  - No CP, troponin without significant elevation  - No vagal symptoms (diaphoresis, nausea, sensation of warmth / other presyncopal symptoms)  - Will replete K+ and continue to rule out other causes of syncope; however pt denies any palpitations and EKG on admit was normal; tele continues to be normal.        Hyponatremia    Improving with holding of diuretics / repletion of volume.  - Likely secondary to overdiuresis on home Lasix / metolazone. Will hold on DC with close cards f/u        Hypokalemia    Previously on Lasix 80 mg daily added metolazone in the last week, profuse diuresis likely causing hypokalemia, cramps.  - EKG shows some PVCs, QTC prolongation, pt c/o cramps, on telemetry  - Admission K+ 2.1, repleting IV + PO, will continue to monitor via telemetry and CMP  -- 11/03 2200: Pt has received 670 mEq total since admit, continuing with 10 mEq IV x5 + 30 mEq x4.  -- 11/04: K improved to 3.6, will receive PO 60 mEq and stable for d/c with cardiology and PCC f/u          CAD (coronary artery disease), native coronary artery    Continuing home atorvastatin 20, lopressor 50,         Hyperlipidemia    Continuing home atorvastatin 20        Obesity hypoventilation syndrome    Discussed importance of compliance with BiPAP at home, pt states he doesn't wear it due to cumbersome nature of switching from NC to BiPAP. Advised that pHTN / CHF would worsen without compliance        Pulmonary embolism    Continuing home apixaban 5 BID  - Followed by  Structural/Valvular Heart Disease/Interventional Cardiology, who, according to notes, plan on RHC/LHC to investigate possibility of chronic PE causing pHTN as well as status of CAD given MI in 2012.        Pulmonary hypertension    Will have RHC / pulmonary angiography to investigate for presence of multiple chronic PEs as cause of pHTN  Pt says he has a CPAP or BiPAP (pt not sure which) machine which he doesn't use        Benign essential hypertension    Continuing home lopressor 50, holding ARB in setting of volume depletion / normotension          VTE Risk Mitigation         Ordered     apixaban tablet 5 mg  2 times daily     Route:  Oral        11/01/17 1655     High Risk of VTE  Once      11/01/17 1514     Place DARSHAN hose  Until discontinued      11/01/17 1514     Place sequential compression device  Until discontinued      11/01/17 1514              Isabela Henriquez MD  Department of Hospital Medicine   [facility]

## 2017-11-04 NOTE — ASSESSMENT & PLAN NOTE
Previously on Lasix 80 mg daily added metolazone in the last week, profuse diuresis likely causing hypokalemia, cramps.  - EKG shows some PVCs, QTC prolongation, pt c/o cramps, on telemetry  - Admission K+ 2.1, repleting IV + PO, will continue to monitor via telemetry and CMP  -- 11/03 2200: Pt has received 670 mEq total since admit, continuing with 10 mEq IV x5 + 30 mEq x4.  -- 11/04: K improved to 3.6, will receive PO 60 mEq and stable for d/c with cardiology and PCC f/u

## 2017-11-04 NOTE — NURSING
D/c instruction given to sister and pt verbalized understanding d/c home awaiting hospital transport for w/c saline lock removed at left ac applied 2x2 gauze secured with tape no bleeding noted.

## 2017-11-04 NOTE — SUBJECTIVE & OBJECTIVE
Review of Systems   Constitutional: Positive for fatigue. Negative for activity change, appetite change, chills, diaphoresis, fever and unexpected weight change.        +cramps   Respiratory: Negative for apnea, cough, choking, chest tightness, shortness of breath, wheezing and stridor.    Cardiovascular: Negative for chest pain, palpitations and leg swelling.   Musculoskeletal: Positive for myalgias. Negative for arthralgias, back pain, gait problem, joint swelling, neck pain and neck stiffness.   Neurological: Positive for syncope and light-headedness. Negative for dizziness, tremors, seizures, facial asymmetry, speech difficulty, weakness, numbness and headaches.     Objective:     Vital Signs (Most Recent):  Temp: 98.1 °F (36.7 °C) (11/03/17 2016)  Pulse: 79 (11/03/17 2016)  Resp: 18 (11/03/17 2016)  BP: 126/73 (11/03/17 2016)  SpO2: (!) 94 % (11/03/17 2016) Vital Signs (24h Range):  Temp:  [97.2 °F (36.2 °C)-98.2 °F (36.8 °C)] 98.1 °F (36.7 °C)  Pulse:  [74-99] 79  Resp:  [16-20] 18  SpO2:  [91 %-97 %] 94 %  BP: (113-133)/(55-77) 126/73     Weight: (!) 148.6 kg (327 lb 9.7 oz)  Body mass index is 51.31 kg/m².    Physical Exam   Constitutional: He appears well-developed.   HENT:   Head: Normocephalic and atraumatic.   Right Ear: External ear normal.   Left Ear: External ear normal.   Nose: Nose normal.   Eyes: EOM are normal. Pupils are equal, round, and reactive to light.   Neck: No tracheal deviation present. No thyromegaly present.   Cardiovascular: Normal rate, S1 normal, S2 normal and normal heart sounds.    No murmur heard.  Pulmonary/Chest: Effort normal and breath sounds normal.   Abdominal: Soft. There is no tenderness. No hernia.   Musculoskeletal: He exhibits no edema.   Neurological: He displays normal reflexes. No cranial nerve deficit.   Skin: No rash noted.   Psychiatric: He has a normal mood and affect. Judgment normal.   Vitals reviewed.       Significant Labs:   CBC:     Recent Labs  Lab  11/02/17 0413 11/03/17  0533   WBC 10.92 10.32   HGB 14.9 14.9   HCT 42.0 42.9    250     CMP:   Recent Labs  Lab 11/02/17 0413 11/03/17  0533 11/03/17  1145 11/03/17  1832   *  < > 134* 134* 128*   K 2.4*  < > 3.2* 3.3* 3.2*   CL 77*  < > 89* 92* 89*   CO2 37*  < > 34* 31* 31*   *  < > 117* 155* 130*   BUN 29*  < > 19 17 17   CREATININE 1.1  < > 1.0 0.9 1.0   CALCIUM 9.3  < > 9.3 9.2 9.0   PROT 6.7  --   --   --   --    ALBUMIN 3.1*  --   --   --   --    BILITOT 0.8  --   --   --   --    ALKPHOS 149*  --   --   --   --    AST 26  --   --   --   --    ALT 21  --   --   --   --    ANIONGAP 15  < > 11 11 8   EGFRNONAA >60.0  < > >60.0 >60.0 >60.0   < > = values in this interval not displayed.  Troponin:   No results for input(s): TROPONINI in the last 48 hours.

## 2017-11-04 NOTE — SUBJECTIVE & OBJECTIVE
Review of Systems   Constitutional: Negative for activity change, appetite change, chills, diaphoresis, fatigue, fever and unexpected weight change.        +cramps   Respiratory: Negative for apnea, cough, choking, chest tightness, shortness of breath, wheezing and stridor.    Cardiovascular: Negative for chest pain, palpitations and leg swelling.   Musculoskeletal: Negative for arthralgias, back pain, gait problem, joint swelling, myalgias, neck pain and neck stiffness.   Neurological: Negative for dizziness, tremors, seizures, syncope, facial asymmetry, speech difficulty, weakness, light-headedness, numbness and headaches.     Objective:     Vital Signs (Most Recent):  Temp: 96.9 °F (36.1 °C) (11/04/17 0815)  Pulse: 82 (11/04/17 1100)  Resp: 18 (11/04/17 0815)  BP: (!) 105/55 (11/04/17 0815)  SpO2: 96 % (11/04/17 0815) Vital Signs (24h Range):  Temp:  [96.9 °F (36.1 °C)-98.1 °F (36.7 °C)] 96.9 °F (36.1 °C)  Pulse:  [74-99] 82  Resp:  [18-19] 18  SpO2:  [94 %-96 %] 96 %  BP: (105-134)/(55-86) 105/55     Weight: (!) 148.6 kg (327 lb 9.7 oz)  Body mass index is 51.31 kg/m².    Physical Exam   Constitutional: He appears well-developed.   HENT:   Head: Normocephalic and atraumatic.   Right Ear: External ear normal.   Left Ear: External ear normal.   Nose: Nose normal.   Eyes: EOM are normal. Pupils are equal, round, and reactive to light.   Neck: No tracheal deviation present. No thyromegaly present.   Cardiovascular: Normal rate, S1 normal, S2 normal and normal heart sounds.    No murmur heard.  Pulmonary/Chest: Effort normal and breath sounds normal.   Abdominal: Soft. There is no tenderness. No hernia.   Musculoskeletal: He exhibits no edema.   Neurological: He displays normal reflexes. No cranial nerve deficit.   Skin: No rash noted.   Psychiatric: He has a normal mood and affect. Judgment normal.   Vitals reviewed.       Significant Labs:   CBC:     Recent Labs  Lab 11/03/17  0533 11/04/17  0647   WBC 10.32 9.42    HGB 14.9 13.5*   HCT 42.9 39.8*    232     CMP:     Recent Labs  Lab 11/03/17  1832 11/03/17  2345 11/04/17  0647   * 131* 129*   K 3.2* 3.8 3.6   CL 89* 94* 94*   CO2 31* 26 25   * 124* 115*   BUN 17 18 15   CREATININE 1.0 1.3 1.1   CALCIUM 9.0 9.2 8.4*   ANIONGAP 8 11 10   EGFRNONAA >60.0 >60.0 >60.0     Troponin:   No results for input(s): TROPONINI in the last 48 hours.

## 2017-11-04 NOTE — ASSESSMENT & PLAN NOTE
Improving with holding of diuretics / repletion of volume.  - Likely secondary to overdiuresis on home Lasix / metolazone. Will hold on DC with close cards f/u

## 2017-11-04 NOTE — PLAN OF CARE
Problem: Patient Care Overview  Goal: Plan of Care Review  Outcome: Outcome(s) achieved Date Met: 11/04/17  A/o v/s stable see flow sheet ambulated to the room and bathroom tolerated well with o2 at 5LNC pt stated I used oxygen at home 5 LNC because of pulmonary htn. No injury  Free of falls K+ 4.3 , awaiting sister coming [facility] for discharge home.

## 2017-11-04 NOTE — ASSESSMENT & PLAN NOTE
Previously on Lasix 80 mg daily added metolazone in the last week, profuse diuresis likely causing hypokalemia, cramps.  - EKG shows some PVCs, QTC prolongation, pt c/o cramps, on telemetry  - Admission K+ 2.1, repleting IV + PO, will continue to monitor via telemetry and CMP  -- 11/03 2200: Pt has received 670 mEq total since admit, continuing with 10 mEq IV x5 + 30 mEq x4.

## 2017-11-04 NOTE — PLAN OF CARE
Problem: Patient Care Overview  Goal: Plan of Care Review  Outcome: Ongoing (interventions implemented as appropriate)  Pt AAOx4, very pleasant man. No acute events overnight. Pt remained on baseline home O2 @ 5L via NC, sats 93%-95% throughout shift. Pt only c/o GOMEZ, denied SOB while in bed. NSR on telemetry 70s-80s. Pt received 60mEq of oral K+ and a total of 5 K+ riders IV, pt tolerated well. BMP @ 2345 resulted after PO K+ and 1 K+ rider. Will administer last PO dose for PM shift @ 0600 and await next BMP result. WCTM.

## 2017-11-06 ENCOUNTER — PATIENT OUTREACH (OUTPATIENT)
Dept: ADMINISTRATIVE | Facility: CLINIC | Age: 47
End: 2017-11-06

## 2017-11-06 NOTE — PATIENT INSTRUCTIONS
Hypokalemia  Hypokalemia means a low level of potassium in the blood. This most often occurs in patients who take diuretics (water pills). It can also occur due to severe vomiting or diarrhea.  It is also seen in people who take laxatives for long periods of time.  A mild case usually causes no symptoms. It is only found with blood testing. More severe potassium loss causes generalized weakness, muscle or abdominal cramping, heart palpitations (rapid or irregular heartbeats) and low blood pressure.  Home care  · Take any potassium supplements prescribed.  · Eat foods rich in potassium. The highest amount is found in artichoke, baked potatoes, spinach, cantaloupe, honeydew melon, cod, halibut, salmon, and scallops. White, red, or roberto beans are also very good sources. A modest amount is found in orange juice, bananas, carrots, and tomato juice.  · If you take certain types of diuretics, you will also need to take potassium supplements. If take a diuretic, discuss potassium supplements with your doctor.  Follow-up care  Follow up with your healthcare provider for a repeat blood test within the next week or as advised by our staff.  When to seek medical advice  Call your healthcare provider if any of the following occur:  · Increased weakness, fatigue, muscle cramps  · Dizziness  Call 911  Call 911 if any of the following occur:  · Irregular heartbeat, extra beats or very fast heart rate  · Loss of consciousness  Date Last Reviewed: 7/26/2015  © 1129-0312 [employer]. [address] [place] [address], [place] [address]. All rights reserved. This information is not intended as a substitute for professional medical care. Always follow your healthcare professional's instructions.

## 2017-11-07 ENCOUNTER — EDUCATION (OUTPATIENT)
Dept: CARDIOLOGY | Facility: CLINIC | Age: 47
End: 2017-11-07

## 2017-11-07 NOTE — PROGRESS NOTES
OUTPATIENT CATHETERIZATION INSTRUCTIONS    You have been scheduled for a procedure in the catheterization lab on Monday, November 13, 2017.     Please report to the Cardiology Waiting Area on the Third floor of the hospital and check in at 12 PM.   You will then be taken to the SSCU (Short Stay Cardiac Unit) and prepared for your procedure. Please be aware that this is not the time of your procedure but the time you are to arrive. The procedures are scheduled on an hourly basis; however, emergency cases take precedence over all other cases.       You may not have anything to eat or drink after midnight the night before your test. You may take your regular morning medications with water. If there are any medications that you should not take you will be instructed to hold them that morning. If you are diabetic and on Metformin (Glucophage) do not take it the day before, the day of, and for 2 days after your procedure.      The procedure will take 1-2 hours to perform. After the procedure, you will return to SSCU on the third floor of the hospital. You will need to lie still (or keep your arm still) for the next 4 to 6 hours to minimize bleeding from the puncture site. Your family may remain in the room with you during this time.       You may be able to be discharged home that same afternoon if there is someone to drive you home and there were no complications. If you have one of the balloon, stent, or device procedures you may spend the night in the hospital. Your doctor will determine, based on your progress, the date and time of your discharge. The results of your procedure will be discussed with you before you are discharged. Any further testing or procedures will be scheduled for you either before you leave or you will be called with these appointments.       If you should have any questions, concerns, or need to change the date of your procedure, please call SEAN Plascencia @ [phone]    Special  Instructions:  Stop Eliquis 3 days before        THE ABOVE INSTRUCTIONS WERE GIVEN TO THE PATIENT VERBALLY AND THEY VERBALIZED UNDERSTANDING.  THEY DO NOT REQUIRE ANY SPECIAL NEEDS AND DO NOT HAVE ANY LEARNING BARRIERS.          Directions for Reporting to Cardiology Waiting Area in the Hospital  If you park in the Parking Garage:  Take elevators to the1st floor of the parking garage.  Continue past the gift shop, coffee shop, and piano.  Take a right and go to the gold elevators. (Elevator B)  Take the elevator to the 3rd floor.  Follow the arrow on the sign on the wall that says Cath Lab Registration/EP Lab Registration.  Follow the long hallway all the way around until you come to a big open area.  This is the registration area.  Check in at Reception Desk.    OR    If family is dropping you off:  Have them drop you off at the front of the Hospital under the green overhang.  Enter through the doors and take a right.  Take the E elevators to the 3rd floor Cardiology Waiting Area.  Check in at the Reception Desk in the waiting room.

## 2017-11-09 ENCOUNTER — PATIENT MESSAGE (OUTPATIENT)
Dept: PULMONOLOGY | Facility: CLINIC | Age: 47
End: 2017-11-09

## 2017-11-13 ENCOUNTER — HOSPITAL ENCOUNTER (OUTPATIENT)
Facility: HOSPITAL | Age: 47
Discharge: HOME | End: 2017-11-14
Attending: INTERNAL MEDICINE | Admitting: INTERNAL MEDICINE
Payer: MEDICAID

## 2017-11-13 DIAGNOSIS — I26.09 OTHER CHRONIC PULMONARY EMBOLISM WITH ACUTE COR PULMONALE: ICD-10-CM

## 2017-11-13 DIAGNOSIS — I27.20 PULMONARY HYPERTENSION: Primary | ICD-10-CM

## 2017-11-13 DIAGNOSIS — I27.82 OTHER CHRONIC PULMONARY EMBOLISM WITH ACUTE COR PULMONALE: ICD-10-CM

## 2017-11-13 LAB
ANION GAP SERPL CALC-SCNC: 12 MMOL/L
BASOPHILS # BLD AUTO: 0.11 K/UL
BASOPHILS NFR BLD: 1 %
BUN SERPL-MCNC: 14 MG/DL
CALCIUM SERPL-MCNC: 9.5 MG/DL
CHLORIDE SERPL-SCNC: 103 MMOL/L
CHOLEST SERPL-MCNC: 196 MG/DL
CHOLEST/HDLC SERPL: 4.8 {RATIO}
CO2 SERPL-SCNC: 26 MMOL/L
CREAT SERPL-MCNC: 1.4 MG/DL
DIFFERENTIAL METHOD: NORMAL
EOSINOPHIL # BLD AUTO: 0.2 K/UL
EOSINOPHIL NFR BLD: 1.7 %
ERYTHROCYTE [DISTWIDTH] IN BLOOD BY AUTOMATED COUNT: 13.5 %
EST. GFR  (AFRICAN AMERICAN): >60 ML/MIN/1.73 M^2
EST. GFR  (NON AFRICAN AMERICAN): 58.6 ML/MIN/1.73 M^2
GLUCOSE SERPL-MCNC: 97 MG/DL
HCT VFR BLD AUTO: 45.4 %
HDLC SERPL-MCNC: 41 MG/DL
HDLC SERPL: 20.9 %
HGB BLD-MCNC: 15.6 G/DL
IMM GRANULOCYTES # BLD AUTO: 0.04 K/UL
IMM GRANULOCYTES NFR BLD AUTO: 0.4 %
LDLC SERPL CALC-MCNC: 105.8 MG/DL
LYMPHOCYTES # BLD AUTO: 2.6 K/UL
LYMPHOCYTES NFR BLD: 24.4 %
MCH RBC QN AUTO: 28.3 PG
MCHC RBC AUTO-ENTMCNC: 34.4 G/DL
MCV RBC AUTO: 82 FL
MONOCYTES # BLD AUTO: 0.6 K/UL
MONOCYTES NFR BLD: 5.6 %
NEUTROPHILS # BLD AUTO: 7.2 K/UL
NEUTROPHILS NFR BLD: 66.9 %
NONHDLC SERPL-MCNC: 155 MG/DL
NRBC BLD-RTO: 0 /100 WBC
PLATELET # BLD AUTO: 336 K/UL
PMV BLD AUTO: 12.3 FL
POTASSIUM SERPL-SCNC: 3.7 MMOL/L
RBC # BLD AUTO: 5.52 M/UL
SODIUM SERPL-SCNC: 141 MMOL/L
TRIGL SERPL-MCNC: 246 MG/DL
WBC # BLD AUTO: 10.8 K/UL

## 2017-11-13 PROCEDURE — 85025 COMPLETE CBC W/AUTO DIFF WBC: CPT

## 2017-11-13 PROCEDURE — 80061 LIPID PANEL: CPT

## 2017-11-13 PROCEDURE — 36415 COLL VENOUS BLD VENIPUNCTURE: CPT

## 2017-11-13 PROCEDURE — G0378 HOSPITAL OBSERVATION PER HR: HCPCS

## 2017-11-13 PROCEDURE — 80048 BASIC METABOLIC PNL TOTAL CA: CPT

## 2017-11-13 PROCEDURE — 99220 PR INITIAL OBSERVATION CARE,LEVL III: CPT | Mod: ,,, | Performed by: INTERNAL MEDICINE

## 2017-11-13 PROCEDURE — 25000003 PHARM REV CODE 250: Performed by: INTERNAL MEDICINE

## 2017-11-13 PROCEDURE — 25000003 PHARM REV CODE 250

## 2017-11-13 RX ORDER — LANOLIN ALCOHOL/MO/W.PET/CERES
800 CREAM (GRAM) TOPICAL
Status: DISCONTINUED | OUTPATIENT
Start: 2017-11-13 | End: 2017-11-14 | Stop reason: HOSPADM

## 2017-11-13 RX ORDER — CARBAMAZEPINE 200 MG/1
200 TABLET ORAL DAILY
Status: DISCONTINUED | OUTPATIENT
Start: 2017-11-13 | End: 2017-11-13

## 2017-11-13 RX ORDER — POTASSIUM CHLORIDE 20 MEQ/15ML
40 SOLUTION ORAL
Status: DISCONTINUED | OUTPATIENT
Start: 2017-11-13 | End: 2017-11-14 | Stop reason: HOSPADM

## 2017-11-13 RX ORDER — CETIRIZINE HYDROCHLORIDE 10 MG/1
10 TABLET ORAL DAILY
Status: DISCONTINUED | OUTPATIENT
Start: 2017-11-13 | End: 2017-11-14 | Stop reason: HOSPADM

## 2017-11-13 RX ORDER — ATORVASTATIN CALCIUM 20 MG/1
20 TABLET, FILM COATED ORAL DAILY
Status: DISCONTINUED | OUTPATIENT
Start: 2017-11-13 | End: 2017-11-14 | Stop reason: HOSPADM

## 2017-11-13 RX ORDER — METOPROLOL SUCCINATE 50 MG/1
50 TABLET, EXTENDED RELEASE ORAL NIGHTLY
Status: DISCONTINUED | OUTPATIENT
Start: 2017-11-13 | End: 2017-11-14 | Stop reason: HOSPADM

## 2017-11-13 RX ORDER — PANTOPRAZOLE SODIUM 40 MG/1
40 TABLET, DELAYED RELEASE ORAL NIGHTLY
Status: DISCONTINUED | OUTPATIENT
Start: 2017-11-13 | End: 2017-11-14 | Stop reason: HOSPADM

## 2017-11-13 RX ORDER — ALBUTEROL SULFATE 90 UG/1
1 AEROSOL, METERED RESPIRATORY (INHALATION) EVERY 6 HOURS PRN
Status: DISCONTINUED | OUTPATIENT
Start: 2017-11-13 | End: 2017-11-14 | Stop reason: HOSPADM

## 2017-11-13 RX ORDER — POTASSIUM CHLORIDE 20 MEQ/15ML
60 SOLUTION ORAL
Status: DISCONTINUED | OUTPATIENT
Start: 2017-11-13 | End: 2017-11-14 | Stop reason: HOSPADM

## 2017-11-13 RX ORDER — SODIUM CHLORIDE 9 MG/ML
3 INJECTION, SOLUTION INTRAVENOUS CONTINUOUS
Status: DISCONTINUED | OUTPATIENT
Start: 2017-11-13 | End: 2017-11-13

## 2017-11-13 RX ORDER — ONDANSETRON 8 MG/1
8 TABLET, ORALLY DISINTEGRATING ORAL EVERY 8 HOURS PRN
Status: DISCONTINUED | OUTPATIENT
Start: 2017-11-13 | End: 2017-11-14 | Stop reason: HOSPADM

## 2017-11-13 RX ORDER — DIPHENHYDRAMINE HCL 50 MG
50 CAPSULE ORAL EVERY 6 HOURS
Status: DISPENSED | OUTPATIENT
Start: 2017-11-13 | End: 2017-11-14

## 2017-11-13 RX ORDER — DIPHENHYDRAMINE HCL 50 MG
CAPSULE ORAL
Status: COMPLETED
Start: 2017-11-13 | End: 2017-11-13

## 2017-11-13 RX ORDER — MORPHINE SULFATE 2 MG/ML
3 INJECTION, SOLUTION INTRAMUSCULAR; INTRAVENOUS EVERY 4 HOURS PRN
Status: DISCONTINUED | OUTPATIENT
Start: 2017-11-13 | End: 2017-11-13

## 2017-11-13 RX ORDER — CARBAMAZEPINE 200 MG/1
200 TABLET ORAL NIGHTLY
Status: DISCONTINUED | OUTPATIENT
Start: 2017-11-13 | End: 2017-11-14 | Stop reason: HOSPADM

## 2017-11-13 RX ORDER — SODIUM CHLORIDE 9 MG/ML
INJECTION, SOLUTION INTRAVENOUS CONTINUOUS
Status: DISCONTINUED | OUTPATIENT
Start: 2017-11-13 | End: 2017-11-14 | Stop reason: HOSPADM

## 2017-11-13 RX ORDER — FUROSEMIDE 80 MG/1
80 TABLET ORAL DAILY
Status: ON HOLD | COMMUNITY
End: 2018-03-19 | Stop reason: HOSPADM

## 2017-11-13 RX ORDER — PANTOPRAZOLE SODIUM 40 MG/1
40 TABLET, DELAYED RELEASE ORAL DAILY
Status: DISCONTINUED | OUTPATIENT
Start: 2017-11-13 | End: 2017-11-13

## 2017-11-13 RX ORDER — METOPROLOL SUCCINATE 50 MG/1
50 TABLET, EXTENDED RELEASE ORAL DAILY
Status: DISCONTINUED | OUTPATIENT
Start: 2017-11-13 | End: 2017-11-13

## 2017-11-13 RX ORDER — CITALOPRAM 20 MG/1
40 TABLET, FILM COATED ORAL DAILY
Status: DISCONTINUED | OUTPATIENT
Start: 2017-11-13 | End: 2017-11-14 | Stop reason: HOSPADM

## 2017-11-13 RX ORDER — POTASSIUM CHLORIDE 750 MG/1
10 TABLET, EXTENDED RELEASE ORAL ONCE
COMMUNITY
End: 2018-01-02 | Stop reason: DRUGHIGH

## 2017-11-13 RX ORDER — SODIUM CHLORIDE 0.9 % (FLUSH) 0.9 %
5 SYRINGE (ML) INJECTION
Status: DISCONTINUED | OUTPATIENT
Start: 2017-11-13 | End: 2017-11-14 | Stop reason: HOSPADM

## 2017-11-13 RX ADMIN — CARBAMAZEPINE 200 MG: 200 TABLET ORAL at 08:11

## 2017-11-13 RX ADMIN — SODIUM CHLORIDE 3 ML/KG/HR: 0.9 INJECTION, SOLUTION INTRAVENOUS at 01:11

## 2017-11-13 RX ADMIN — PANTOPRAZOLE SODIUM 40 MG: 40 TABLET, DELAYED RELEASE ORAL at 08:11

## 2017-11-13 RX ADMIN — Medication 50 MG: at 01:11

## 2017-11-13 RX ADMIN — DIPHENHYDRAMINE HYDROCHLORIDE 50 MG: 50 CAPSULE ORAL at 01:11

## 2017-11-13 RX ADMIN — METOPROLOL SUCCINATE 50 MG: 50 TABLET, EXTENDED RELEASE ORAL at 08:11

## 2017-11-13 NOTE — SUBJECTIVE & OBJECTIVE
Past Medical History:   Diagnosis Date    Acute pulmonary embolism     Anticoagulant long-term use     Chronic respiratory failure with hypoxia     Essential hypertension, malignant     Pulmonary artery hypertension        Past Surgical History:   Procedure Laterality Date    CARDIAC CATHETERIZATION      CATARACT EXTRACTION      MASS EXCISION      inner thigh    TONSILLECTOMY, ADENOIDECTOMY         Review of patient's allergies indicates:   Allergen Reactions    Clopidogrel        No current facility-administered medications on file prior to encounter.      Current Outpatient Prescriptions on File Prior to Encounter   Medication Sig    atorvastatin (LIPITOR) 20 MG tablet Take 20 mg by mouth once daily.     carbamazepine (TEGRETOL) 200 mg tablet Take 200 mg by mouth once daily.     cetirizine (ZYRTEC) 10 MG tablet Take 10 mg by mouth once daily.     citalopram (CELEXA) 40 MG tablet Take 40 mg by mouth once daily.     metoprolol succinate (TOPROL-XL) 50 MG 24 hr tablet Take 50 mg by mouth once daily.    omeprazole (PRILOSEC) 20 MG capsule Take 20 mg by mouth once daily.     VENTOLIN HFA 90 mcg/actuation inhaler TAKE 1 PUFF EVERY 6 HOURS AS NEEDED FOR WHEEZING/SOB    apixaban (ELIQUIS) 5 mg Tab Take 5 mg by mouth 2 (two) times daily.      Family History     None        Social History Main Topics    Smoking status: Never Smoker    Smokeless tobacco: Never Used    Alcohol use No    Drug use: No    Sexual activity: Not on file     Review of Systems   Constitution: Negative.   HENT: Negative.    Eyes: Negative.    Cardiovascular: Negative.    Respiratory: Positive for shortness of breath.         Uses 5L nc O2 at home   Endocrine: Negative.    Skin: Negative.    Musculoskeletal: Negative.    Gastrointestinal: Negative.    Genitourinary: Negative.    Neurological: Negative.      Objective:     Vital Signs (Most Recent):  Temp: 98.6 °F (37 °C) (11/13/17 1223)  Pulse: 83 (11/13/17 1223)  Resp: 18  (11/13/17 1223)  BP: 112/68 (11/13/17 1224)  SpO2: 95 % (11/13/17 1223) Vital Signs (24h Range):  Temp:  [98.6 °F (37 °C)] 98.6 °F (37 °C)  Pulse:  [83] 83  Resp:  [18] 18  SpO2:  [95 %] 95 %  BP: (107-112)/(61-68) 112/68     Weight: (!) 150.6 kg (332 lb)  Body mass index is 51.23 kg/m².    SpO2: 95 %  O2 Device (Oxygen Therapy): nasal cannula    No intake or output data in the 24 hours ending 11/13/17 1431    Lines/Drains/Airways     Peripheral Intravenous Line                 Peripheral IV - Single Lumen 11/13/17 1229 Left Antecubital less than 1 day         Peripheral IV - Single Lumen 11/13/17 1230 Left Hand less than 1 day                Physical Exam   Constitutional: He is oriented to person, place, and time. He appears well-developed and well-nourished.   Morbid obesity   HENT:   Head: Normocephalic and atraumatic.   Eyes: Conjunctivae and EOM are normal. Pupils are equal, round, and reactive to light.   Neck: Normal range of motion. Neck supple. No JVD present.   Cardiovascular: Normal rate, regular rhythm and normal heart sounds.  Exam reveals no gallop and no friction rub.    No murmur heard.  Pulses:       Radial pulses are 2+ on the right side, and 2+ on the left side.        Femoral pulses are 1+ on the right side, and 1+ on the left side.       Dorsalis pedis pulses are 1+ on the right side, and 1+ on the left side.        Posterior tibial pulses are 0 on the right side, and 0 on the left side.   Pulmonary/Chest: Effort normal and breath sounds normal. No respiratory distress. He has no wheezes. He has no rales. He exhibits no tenderness.   Abdominal: Soft. Bowel sounds are normal. He exhibits no distension. There is no tenderness.   Musculoskeletal: Normal range of motion. He exhibits edema. He exhibits no tenderness.   2+ non-pitting edema bilateral LE   Neurological: He is alert and oriented to person, place, and time.   Skin: Skin is warm and dry. No erythema. No pallor.       Significant Labs:    CMP   Recent Labs  Lab 11/13/17  1326      K 3.7      CO2 26   GLU 97   BUN 14   CREATININE 1.4   CALCIUM 9.5   ANIONGAP 12   ESTGFRAFRICA >60.0   EGFRNONAA 58.6*   , CBC   Recent Labs  Lab 11/13/17  1326   WBC 10.80   HGB 15.6   HCT 45.4      , INR No results for input(s): INR, PROTIME in the last 48 hours., Lipid Panel   Recent Labs  Lab 11/13/17  1326   CHOL 196   HDL 41   LDLCALC 105.8   TRIG 246*   CHOLHDL 20.9    and Troponin No results for input(s): TROPONINI in the last 48 hours.    Significant Imaging: Echocardiogram:   2D echo with color flow doppler:   Results for orders placed or performed during the hospital encounter of 11/01/17   2D echo with color flow doppler   Result Value Ref Range    EF 65 55 - 65    Diastolic Dysfunction Yes (A)     Est. PA Systolic Pressure 78.9 (A)     Pericardial Effusion SMALL (A)     Tricuspid Valve Regurgitation MODERATE (A)     and EKG: sinus with PVCs

## 2017-11-13 NOTE — H&P
[facility]  Cardiology  History and Physical     Patient Name: Jaquan Hines Jr.  MRN: [id number]  Admission Date: 11/13/2017  Code Status: Prior   Attending Provider: Tomy Tomas MD   Primary Care Physician: Justin Vale MD  Principal Problem:<principal problem not specified>    Patient information was obtained from patient and past medical records.     Subjective:     Chief Complaint:  CTEPH/coronary evaluation     HPI:  47M with hx MI 2012 s/p 2x FLORENTINO, 10/2016 dx of pHTN, 11/2016 PE (now on apixaban) presents to [facility] [place] due to syncope during PFT (x2).  Presenting for LHC/RHC/Pulmonary angiogram for evaluation of known PE with continued symptoms and history of prior MI.    Past Medical History:   Diagnosis Date    Acute pulmonary embolism     Anticoagulant long-term use     Chronic respiratory failure with hypoxia     Essential hypertension, malignant     Pulmonary artery hypertension        Past Surgical History:   Procedure Laterality Date    CARDIAC CATHETERIZATION      CATARACT EXTRACTION      MASS EXCISION      inner thigh    TONSILLECTOMY, ADENOIDECTOMY         Review of patient's allergies indicates:   Allergen Reactions    Clopidogrel        No current facility-administered medications on file prior to encounter.      Current Outpatient Prescriptions on File Prior to Encounter   Medication Sig    atorvastatin (LIPITOR) 20 MG tablet Take 20 mg by mouth once daily.     carbamazepine (TEGRETOL) 200 mg tablet Take 200 mg by mouth once daily.     cetirizine (ZYRTEC) 10 MG tablet Take 10 mg by mouth once daily.     citalopram (CELEXA) 40 MG tablet Take 40 mg by mouth once daily.     metoprolol succinate (TOPROL-XL) 50 MG 24 hr tablet Take 50 mg by mouth once daily.    omeprazole (PRILOSEC) 20 MG capsule Take 20 mg by mouth once daily.     VENTOLIN HFA 90 mcg/actuation inhaler TAKE 1 PUFF EVERY 6 HOURS AS NEEDED FOR WHEEZING/SOB    apixaban (ELIQUIS) 5 mg Tab Take 5  mg by mouth 2 (two) times daily.      Family History     None        Social History Main Topics    Smoking status: Never Smoker    Smokeless tobacco: Never Used    Alcohol use No    Drug use: No    Sexual activity: Not on file     Review of Systems   Constitution: Negative.   HENT: Negative.    Eyes: Negative.    Cardiovascular: Negative.    Respiratory: Positive for shortness of breath.         Uses 5L nc O2 at home   Endocrine: Negative.    Skin: Negative.    Musculoskeletal: Negative.    Gastrointestinal: Negative.    Genitourinary: Negative.    Neurological: Negative.      Objective:     Vital Signs (Most Recent):  Temp: 98.6 °F (37 °C) (11/13/17 1223)  Pulse: 83 (11/13/17 1223)  Resp: 18 (11/13/17 1223)  BP: 112/68 (11/13/17 1224)  SpO2: 95 % (11/13/17 1223) Vital Signs (24h Range):  Temp:  [98.6 °F (37 °C)] 98.6 °F (37 °C)  Pulse:  [83] 83  Resp:  [18] 18  SpO2:  [95 %] 95 %  BP: (107-112)/(61-68) 112/68     Weight: (!) 150.6 kg (332 lb)  Body mass index is 51.23 kg/m².    SpO2: 95 %  O2 Device (Oxygen Therapy): nasal cannula    No intake or output data in the 24 hours ending 11/13/17 1431    Lines/Drains/Airways     Peripheral Intravenous Line                 Peripheral IV - Single Lumen 11/13/17 1229 Left Antecubital less than 1 day         Peripheral IV - Single Lumen 11/13/17 1230 Left Hand less than 1 day                Physical Exam   Constitutional: He is oriented to person, place, and time. He appears well-developed and well-nourished.   Morbid obesity   HENT:   Head: Normocephalic and atraumatic.   Eyes: Conjunctivae and EOM are normal. Pupils are equal, round, and reactive to light.   Neck: Normal range of motion. Neck supple. No JVD present.   Cardiovascular: Normal rate, regular rhythm and normal heart sounds.  Exam reveals no gallop and no friction rub.    No murmur heard.  Pulses:       Radial pulses are 2+ on the right side, and 2+ on the left side.        Femoral pulses are 1+ on the right  side, and 1+ on the left side.       Dorsalis pedis pulses are 1+ on the right side, and 1+ on the left side.        Posterior tibial pulses are 0 on the right side, and 0 on the left side.   Pulmonary/Chest: Effort normal and breath sounds normal. No respiratory distress. He has no wheezes. He has no rales. He exhibits no tenderness.   Abdominal: Soft. Bowel sounds are normal. He exhibits no distension. There is no tenderness.   Musculoskeletal: Normal range of motion. He exhibits edema. He exhibits no tenderness.   2+ non-pitting edema bilateral LE   Neurological: He is alert and oriented to person, place, and time.   Skin: Skin is warm and dry. No erythema. No pallor.       Significant Labs:   CMP   Recent Labs  Lab 11/13/17  1326      K 3.7      CO2 26   GLU 97   BUN 14   CREATININE 1.4   CALCIUM 9.5   ANIONGAP 12   ESTGFRAFRICA >60.0   EGFRNONAA 58.6*   , CBC   Recent Labs  Lab 11/13/17  1326   WBC 10.80   HGB 15.6   HCT 45.4      , INR No results for input(s): INR, PROTIME in the last 48 hours., Lipid Panel   Recent Labs  Lab 11/13/17  1326   CHOL 196   HDL 41   LDLCALC 105.8   TRIG 246*   CHOLHDL 20.9    and Troponin No results for input(s): TROPONINI in the last 48 hours.    Significant Imaging: Echocardiogram:   2D echo with color flow doppler:   Results for orders placed or performed during the hospital encounter of 11/01/17   2D echo with color flow doppler   Result Value Ref Range    EF 65 55 - 65    Diastolic Dysfunction Yes (A)     Est. PA Systolic Pressure 78.9 (A)     Pericardial Effusion SMALL (A)     Tricuspid Valve Regurgitation MODERATE (A)     and EKG: sinus with PVCs    Assessment and Plan:     Pulmonary hypertension    Proceed with LHC/RHC/pulmonary angiogram  R groin access  7 Fr CFV  6Fr CFA  Consent obtained prior  All questions answered  Procedures and reasons for them explained with patient exhibiting understanding  Creatinine has increase from BL 1.0 to 1.4 today and  hence we will admit to obs overnight, hydrate with NS 75cc/hr, check creat in am, and proceed if creat improved  Hold lasix/metolazone  Compensated on exam  Driest weight 327, currently 332  Further recs per staff addendum               VTE Risk Mitigation     None          Brian Edouard MD  Cardiology   [facility]

## 2017-11-13 NOTE — ASSESSMENT & PLAN NOTE
Proceed with LHC/RHC/pulmonary angiogram  R groin access  7 Fr CFV  6Fr CFA  Consent obtained prior  All questions answered  Procedures and reasons for them explained with patient exhibiting understanding  Creatinine has increase from BL 1.0 to 1.4 today and hence we may need to reschedule  Will proceed if deemed appropriate  Further recs per staff addendum

## 2017-11-13 NOTE — HPI
47M with hx MI 2012 s/p 2x FLORENTINO, 10/2016 dx of pHTN, 11/2016 PE (now on apixaban) [place] [facility] due to syncope during PFT (x2).  Presenting for LHC/RHC/Pulmonary angiogram for evaluation of known PE with continued symptoms and history of prior MI.

## 2017-11-13 NOTE — ASSESSMENT & PLAN NOTE
Proceed with LHC/RHC/pulmonary angiogram  R groin access  7 Fr CFV  6Fr CFA  Consent obtained prior  All questions answered  Procedures and reasons for them explained with patient exhibiting understanding  Creatinine has increase from BL 1.0 to 1.4 today and hence we will hydrate him overnight with ns 75 cc/hr and proceed with procedure tomorrow if creat improves  Further recs per staff addendum

## 2017-11-14 VITALS
HEART RATE: 92 BPM | RESPIRATION RATE: 20 BRPM | DIASTOLIC BLOOD PRESSURE: 65 MMHG | TEMPERATURE: 97 F | OXYGEN SATURATION: 96 % | WEIGHT: 315 LBS | BODY MASS INDEX: 47.74 KG/M2 | SYSTOLIC BLOOD PRESSURE: 118 MMHG | HEIGHT: 68 IN

## 2017-11-14 PROBLEM — I26.99 PULMONARY EMBOLUS: Status: ACTIVE | Noted: 2017-11-07

## 2017-11-14 LAB
ABO + RH BLD: NORMAL
ANION GAP SERPL CALC-SCNC: 7 MMOL/L
BLD GP AB SCN CELLS X3 SERPL QL: NORMAL
BUN SERPL-MCNC: 14 MG/DL
CALCIUM SERPL-MCNC: 8.8 MG/DL
CHLORIDE SERPL-SCNC: 103 MMOL/L
CO2 SERPL-SCNC: 29 MMOL/L
CREAT SERPL-MCNC: 1.2 MG/DL
EST. GFR  (AFRICAN AMERICAN): >60 ML/MIN/1.73 M^2
EST. GFR  (NON AFRICAN AMERICAN): >60 ML/MIN/1.73 M^2
GLUCOSE SERPL-MCNC: 127 MG/DL
MAGNESIUM SERPL-MCNC: 1.8 MG/DL
POTASSIUM SERPL-SCNC: 3.5 MMOL/L
SODIUM SERPL-SCNC: 139 MMOL/L

## 2017-11-14 PROCEDURE — 25000003 PHARM REV CODE 250: Performed by: INTERNAL MEDICINE

## 2017-11-14 PROCEDURE — 93568 NJX CAR CTH NSLC P-ART ANGRP: CPT | Mod: ,,, | Performed by: INTERNAL MEDICINE

## 2017-11-14 PROCEDURE — 86900 BLOOD TYPING SEROLOGIC ABO: CPT

## 2017-11-14 PROCEDURE — G0378 HOSPITAL OBSERVATION PER HR: HCPCS

## 2017-11-14 PROCEDURE — 83735 ASSAY OF MAGNESIUM: CPT

## 2017-11-14 PROCEDURE — 86901 BLOOD TYPING SEROLOGIC RH(D): CPT

## 2017-11-14 PROCEDURE — C1894 INTRO/SHEATH, NON-LASER: HCPCS

## 2017-11-14 PROCEDURE — 27000221 HC OXYGEN, UP TO 24 HOURS

## 2017-11-14 PROCEDURE — 36415 COLL VENOUS BLD VENIPUNCTURE: CPT

## 2017-11-14 PROCEDURE — 99152 MOD SED SAME PHYS/QHP 5/>YRS: CPT | Mod: ,,, | Performed by: INTERNAL MEDICINE

## 2017-11-14 PROCEDURE — 63600175 PHARM REV CODE 636 W HCPCS

## 2017-11-14 PROCEDURE — 80048 BASIC METABOLIC PNL TOTAL CA: CPT

## 2017-11-14 PROCEDURE — 93460 R&L HRT ART/VENTRICLE ANGIO: CPT

## 2017-11-14 PROCEDURE — 25000003 PHARM REV CODE 250

## 2017-11-14 PROCEDURE — 93460 R&L HRT ART/VENTRICLE ANGIO: CPT | Mod: 26,,, | Performed by: INTERNAL MEDICINE

## 2017-11-14 PROCEDURE — 94761 N-INVAS EAR/PLS OXIMETRY MLT: CPT

## 2017-11-14 RX ORDER — DIPHENHYDRAMINE HCL 50 MG
50 CAPSULE ORAL ONCE
Status: DISCONTINUED | OUTPATIENT
Start: 2017-11-14 | End: 2017-11-14

## 2017-11-14 RX ORDER — SODIUM CHLORIDE 9 MG/ML
5 INJECTION, SOLUTION INTRAVENOUS CONTINUOUS
Status: DISCONTINUED | OUTPATIENT
Start: 2017-11-14 | End: 2017-11-14 | Stop reason: HOSPADM

## 2017-11-14 RX ORDER — DIPHENHYDRAMINE HCL 50 MG
50 CAPSULE ORAL ONCE
Status: COMPLETED | OUTPATIENT
Start: 2017-11-14 | End: 2017-11-14

## 2017-11-14 RX ADMIN — CETIRIZINE HYDROCHLORIDE 10 MG: 10 TABLET, FILM COATED ORAL at 09:11

## 2017-11-14 RX ADMIN — ATORVASTATIN CALCIUM 20 MG: 20 TABLET, FILM COATED ORAL at 09:11

## 2017-11-14 RX ADMIN — SODIUM CHLORIDE 5 ML/KG/HR: 0.9 INJECTION, SOLUTION INTRAVENOUS at 06:11

## 2017-11-14 RX ADMIN — CARBAMAZEPINE 200 MG: 200 TABLET ORAL at 08:11

## 2017-11-14 RX ADMIN — DIPHENHYDRAMINE HYDROCHLORIDE 50 MG: 50 CAPSULE ORAL at 02:11

## 2017-11-14 RX ADMIN — PANTOPRAZOLE SODIUM 40 MG: 40 TABLET, DELAYED RELEASE ORAL at 08:11

## 2017-11-14 RX ADMIN — CITALOPRAM HYDROBROMIDE 40 MG: 20 TABLET ORAL at 09:11

## 2017-11-14 NOTE — DISCHARGE SUMMARY
Discharge Summary  Interventional Cardiology  Admit Date: 11/13/2017    Discharge Date:  11/14/2017    Attending Physician: Tomy Tomas MD    Discharge Physician: Loco Go MD    Principal Diagnoses: Pulmonary hypertension     Indication for Admission: CATHETERIZATION-HEART (Left)    Discharged Condition: Good    Hospital Course:   Patient presented for outpatient LHC and RHC which went without complication. LHC showed non-obstructive disease and RHC showed severe PHTN. Pulmonary angiogram confirmed presence of thrombus. See full cath report for details. He remained asymptomatic and hemodynamically stable on day of discharge without any acute events. He was able to ambulate adequately without difficulty and did not have any symptoms prior to discharge.     Diet: Cardiac diet    Activity: Ad tito, wound care instructions provided    Disposition: Home or Self Care    Discharge Medications:      Medication List      CONTINUE taking these medications    atorvastatin 20 MG tablet  Commonly known as:  LIPITOR     carBAMazepine 200 mg tablet  Commonly known as:  TEGRETOL     cetirizine 10 MG tablet  Commonly known as:  ZYRTEC     citalopram 40 MG tablet  Commonly known as:  CELEXA     ELIQUIS 5 mg Tab  Generic drug:  apixaban     furosemide 80 MG tablet  Commonly known as:  LASIX     metoprolol succinate 50 MG 24 hr tablet  Commonly known as:  TOPROL-XL     potassium chloride 10 MEQ Tbsr  Commonly known as:  KLOR-CON     PriLOSEC 20 MG capsule  Generic drug:  omeprazole     VENTOLIN HFA 90 mcg/actuation inhaler  Generic drug:  albuterol

## 2017-11-14 NOTE — PLAN OF CARE
Problem: Patient Care Overview  Goal: Plan of Care Review  Outcome: Ongoing (interventions implemented as appropriate)  Plan of care reviewed with patient. Iv fluid infusion through the night. Am labs. Npo after midnight for heart cath.  Tele monitoring overnight. Will monitor.

## 2017-11-14 NOTE — PROCEDURES
"            Interventional Cardiology   Post Cath Note      Referring Physician: Tomy Tomas MD  Procedure: LHC,RHC  Indication: PHTN    SUBJECTIVE:     Patient tolerated the procedure well, no complications    Cath Results:   Access:  R Radial 5f, R CFV 7F    See full cath report for further details    Post Cath Exam:   BP (!) 111/57 (BP Location: Right arm, Patient Position: Lying)   Pulse 80   Temp 97.8 °F (36.6 °C) (Oral)   Resp 18   Ht 5' 7.5" (1.715 m)   Wt (!) 150.6 kg (332 lb)   SpO2 96%   BMI 51.23 kg/m²     No unusual pain, hematoma, thrill or bruit at vascular access site.     Assessment / Plan:     - Routine post cath protocol  - IVFs for CARMINE prevention  - D/C after 3 hours of IVF    Please page/call if any questions or concerns.    Loco Go M.D.  Interventional Cardiology Fellow  Pager: [phone]      "

## 2017-11-14 NOTE — NURSING
Pt is awake and alert.  Verbalized understanding that he is to remain NPO prior to cardiac cath procedure today.  No c/o pain or SOB.  Resp even and unlabored.  Will continue to monitor.

## 2017-11-14 NOTE — PLAN OF CARE
Problem: Patient Care Overview  Goal: Plan of Care Review  Outcome: Ongoing (interventions implemented as appropriate)  Received report from Mariann. Patient s/p R/LHC, AAOx3. VSS, no c/o pain or discomfort at this time, resp even and unlabored. Gauze/tegaderm dressing to R groin, vascband to R wrist is CDI. No active bleeding. No hematoma noted. Post procedure protocol reviewed with patient. Understanding verbalized. Nurse call bell within reach. Will continue to monitor per post procedure protocol.

## 2017-11-14 NOTE — INTERVAL H&P NOTE
The patient has been examined and the H&P has been reviewed:    I concur with the findings and no changes have occurred since H&P was written.    Anesthesia/Surgery risks, benefits and alternative options discussed and understood by patient/family.          Active Hospital Problems    Diagnosis  POA    Pulmonary hypertension [I27.20]  Yes     as above, needs further evaluationto have sleep study next week? duration of pulmonary hypertension, ? CTEPH        Resolved Hospital Problems    Diagnosis Date Resolved POA   No resolved problems to display.

## 2017-11-15 NOTE — NURSING
Discharge instructions and medlist given, verbalized understanding.  Off unit via wheelchair with personal portable oxygen and friends.  Denies need for escort.

## 2017-11-17 ENCOUNTER — PATIENT MESSAGE (OUTPATIENT)
Dept: PULMONOLOGY | Facility: CLINIC | Age: 47
End: 2017-11-17

## 2017-11-28 ENCOUNTER — TELEPHONE (OUTPATIENT)
Dept: PULMONOLOGY | Facility: CLINIC | Age: 47
End: 2017-11-28

## 2017-11-28 DIAGNOSIS — R06.02 SHORTNESS OF BREATH: Primary | ICD-10-CM

## 2017-12-15 NOTE — ED NOTES
Adolescent Privilege Time    Date: ___________12/15/2017________________    Time 12:30-1:00pm or __________________________    Skills Taught: ([place]) How to enjoy leisure activities    Other__________________________________________________________________      Behaviors Noted:([place])      Active     Introverted    Shy     Irritating  Rude       Spiteful    Interested    Apathetic       Impulsive  Bossy         Catty      Jolly    Impatient     Aggressive     Invasive    Opinionates   Careless   Argumentative    [place]       Inconsiderate  Distracted  Loud          Withdrawn  Took turns    Annoying      Reactive        Kind        Thoughtful  Lacks awareness of personal space    Explain: Patient interacted well with peers, engaged in conversation. Patient seemed calm, no signs of distress    LOC: The patient is awake, alert, and oriented to place, time, situation. Affect is appropriate.  Speech is appropriate and clear.     APPEARANCE: Patient resting comfortably in no acute distress.  Patient is clean and well groomed.    SKIN: The skin is warm and dry; color consistent with ethnicity.  Patient has normal skin turgor and moist mucus membranes.  Skin intact; no breakdown or bruising noted.     MUSCULOSKELETAL: Patient moving upper and lower extremities without difficulty.  Denies weakness.     RESPIRATORY: Airway is open and patent. Respirations spontaneous, even, easy, and non-labored.  Patient has a normal effort and rate.  No accessory muscle use noted. Denies cough.     CARDIAC: No peripheral edema noted. No complaints of chest pain.      ABDOMEN: Soft and non tender to palpation.  No distention noted.     NEUROLOGIC: Eyes open spontaneously.  Behavior appropriate to situation.  Follows commands; facial expression symmetrical.  Purposeful motor response noted; normal sensation in all extremities.

## 2018-01-02 ENCOUNTER — OFFICE VISIT (OUTPATIENT)
Dept: PULMONOLOGY | Facility: CLINIC | Age: 48
End: 2018-01-02
Payer: MEDICAID

## 2018-01-02 VITALS
WEIGHT: 315 LBS | DIASTOLIC BLOOD PRESSURE: 78 MMHG | HEIGHT: 68 IN | SYSTOLIC BLOOD PRESSURE: 140 MMHG | BODY MASS INDEX: 47.74 KG/M2 | OXYGEN SATURATION: 88 % | HEART RATE: 97 BPM

## 2018-01-02 DIAGNOSIS — I27.20 PULMONARY HYPERTENSION: Primary | ICD-10-CM

## 2018-01-02 DIAGNOSIS — R06.02 SHORTNESS OF BREATH: ICD-10-CM

## 2018-01-02 DIAGNOSIS — I26.99 OTHER PULMONARY EMBOLISM WITHOUT ACUTE COR PULMONALE, UNSPECIFIED CHRONICITY: ICD-10-CM

## 2018-01-02 DIAGNOSIS — I27.24 CTEPH (CHRONIC THROMBOEMBOLIC PULMONARY HYPERTENSION): ICD-10-CM

## 2018-01-02 DIAGNOSIS — E66.2 OBESITY HYPOVENTILATION SYNDROME: ICD-10-CM

## 2018-01-02 PROCEDURE — 99214 OFFICE O/P EST MOD 30 MIN: CPT | Mod: ,,, | Performed by: INTERNAL MEDICINE

## 2018-01-02 RX ORDER — POTASSIUM CHLORIDE 1500 MG/1
20 TABLET, EXTENDED RELEASE ORAL 2 TIMES DAILY
Refills: 6 | Status: ON HOLD | COMMUNITY
Start: 2017-12-18 | End: 2018-03-19 | Stop reason: HOSPADM

## 2018-01-02 NOTE — PROGRESS NOTES
HPI:    10/24/2017 - Here for follow up, SOB, GOMEZ seem to be a little worse has required increased OI2 (5 LPM with exertion).  Has also noted increased edema which has not responded to diuretics.  He has appointment with Dr Jennings (Thoracic surgery at [facility]) for evaluation.  D/W pt and sister.  Will try to add zaroxylyn to see if we get a better diuresis.    1/2/2018 - Here for follow up.  Has been evaluated and is being considered for pulmonary artery thromboembolectomy.  Had PFT which show no obstruction, mild air trapping and a minimal decrease in DLCO.  No new complaints or iussues.  Will send PFT to surgeon.  Told pt and sister to contact his office to make sure that follow up is arranged.      Home Oxygen    Qualifier - O2 sat 88% on room air      Date - 7/18/2017                   + Exertion      O2 sat 94 on 2 LPM via nasal cannuli    DX - pulmonary embolism, obesity hypoventilation    Face to face visit with pt concerning the need for O2 therapy, all questions answered.  I stressed the need for compliance.  Pt to call with any questions.    NIPPV for Obesity Hypoventilation    Ordering nocturnal volume ventilator PRN use to reduce the risk of exacerbation.  Home BiPAP in sufficient due to the severity of the condition.  Obesity hypoventilation is the cause of the chronic respiratory failure.            Medications    Current Outpatient Prescriptions:     apixaban (ELIQUIS) 5 mg Tab, Take 5 mg by mouth 2 (two) times daily. , Disp: , Rfl:     atorvastatin (LIPITOR) 20 MG tablet, Take 20 mg by mouth once daily. , Disp: , Rfl:     carbamazepine (TEGRETOL) 200 mg tablet, Take 200 mg by mouth once daily. , Disp: , Rfl:     cetirizine (ZYRTEC) 10 MG tablet, Take 10 mg by mouth once daily. , Disp: , Rfl:     citalopram (CELEXA) 40 MG tablet, Take 40 mg by mouth once daily. , Disp: , Rfl:     furosemide (LASIX) 80 MG tablet, Take 80 mg by mouth once daily., Disp: , Rfl:     metoprolol succinate (TOPROL-XL)  "50 MG 24 hr tablet, Take 50 mg by mouth once daily., Disp: , Rfl:     omeprazole (PRILOSEC) 20 MG capsule, Take 20 mg by mouth once daily. , Disp: , Rfl:     potassium chloride (K-TAB) 20 mEq, Take 20 mEq by mouth 2 (two) times daily., Disp: , Rfl: 6    VENTOLIN HFA 90 mcg/actuation inhaler, TAKE 1 PUFF EVERY 6 HOURS AS NEEDED FOR WHEEZING/SOB, Disp: , Rfl: 6    Allergies  Review of patient's allergies indicates:   Allergen Reactions    Clopidogrel        Social History    History   Smoking Status    Never Smoker   Smokeless Tobacco    Never Used     ETOH - 0 drinks per week.  History   Drug Use No     Occupation - not working, worked at [employer]    Family History  History reviewed. No pertinent family history.    ROS  Review of Systems   Constitutional: Positive for malaise/fatigue. Negative for chills, diaphoresis, fever and weight loss.   HENT: Negative for congestion.    Eyes: Negative for pain.   Respiratory: Positive for shortness of breath. Negative for cough, hemoptysis, sputum production, wheezing and stridor.    Cardiovascular: Positive for leg swelling. Negative for chest pain, palpitations, orthopnea, claudication and PND.   Gastrointestinal: Negative for abdominal pain, constipation, diarrhea, heartburn, nausea and vomiting.   Genitourinary: Negative for dysuria, frequency and urgency.   Musculoskeletal: Negative for falls and myalgias.   Neurological: Negative for sensory change, focal weakness and weakness.   Psychiatric/Behavioral: Negative for depression. The patient is not nervous/anxious.        Physical Exam    Vitals:    01/02/18 1321   BP: (!) 140/78   Pulse: 97   SpO2: (!) 88%   Weight: (!) 148.8 kg (328 lb)   Height: 5' 7.5" (1.715 m)       Physical Exam   Constitutional: He is oriented to person, place, and time. He appears well-developed and well-nourished. No distress.   HENT:   Head: Normocephalic and atraumatic.   Nose: Nose normal.   Mouth/Throat: Oropharynx is clear and moist.   Eyes: " EOM are normal. Pupils are equal, round, and reactive to light.   Neck: Normal range of motion. Neck supple. No JVD present. No tracheal deviation present. No thyromegaly present.   Cardiovascular: Normal rate, regular rhythm, normal heart sounds and intact distal pulses.  Exam reveals no gallop and no friction rub.    No murmur heard.  Pulmonary/Chest: Effort normal and breath sounds normal. No stridor. No respiratory distress. He has no wheezes. He has no rales. He exhibits no tenderness.   Abdominal: Soft. Bowel sounds are normal. He exhibits no distension.   obese   Musculoskeletal: Normal range of motion. He exhibits edema. He exhibits no tenderness.   Lymphadenopathy:     He has no cervical adenopathy.   Neurological: He is alert and oriented to person, place, and time. He has normal reflexes. No cranial nerve deficit.   Skin: He is not diaphoretic.   Psychiatric: He has a normal mood and affect. His behavior is normal.   Nursing note and vitals reviewed.      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

## 2018-02-14 ENCOUNTER — HOSPITAL ENCOUNTER (OUTPATIENT)
Dept: RADIOLOGY | Facility: HOSPITAL | Age: 48
Discharge: HOME | End: 2018-02-14
Attending: THORACIC SURGERY (CARDIOTHORACIC VASCULAR SURGERY)
Payer: MEDICAID

## 2018-02-14 ENCOUNTER — OFFICE VISIT (OUTPATIENT)
Dept: CARDIOTHORACIC SURGERY | Facility: CLINIC | Age: 48
End: 2018-02-14
Payer: MEDICAID

## 2018-02-14 VITALS
HEIGHT: 68 IN | BODY MASS INDEX: 47.74 KG/M2 | OXYGEN SATURATION: 94 % | HEART RATE: 94 BPM | SYSTOLIC BLOOD PRESSURE: 128 MMHG | DIASTOLIC BLOOD PRESSURE: 69 MMHG | WEIGHT: 315 LBS | TEMPERATURE: 98 F

## 2018-02-14 DIAGNOSIS — I26.09 OTHER CHRONIC PULMONARY EMBOLISM WITH ACUTE COR PULMONALE: Primary | ICD-10-CM

## 2018-02-14 DIAGNOSIS — I27.82 OTHER CHRONIC PULMONARY EMBOLISM WITH ACUTE COR PULMONALE: Primary | ICD-10-CM

## 2018-02-14 DIAGNOSIS — I27.24 CTEPH (CHRONIC THROMBOEMBOLIC PULMONARY HYPERTENSION): ICD-10-CM

## 2018-02-14 DIAGNOSIS — I26.09 OTHER CHRONIC PULMONARY EMBOLISM WITH ACUTE COR PULMONALE: ICD-10-CM

## 2018-02-14 DIAGNOSIS — I27.82 OTHER CHRONIC PULMONARY EMBOLISM WITH ACUTE COR PULMONALE: ICD-10-CM

## 2018-02-14 DIAGNOSIS — I27.20 PULMONARY HYPERTENSION: Primary | ICD-10-CM

## 2018-02-14 LAB
CREAT SERPL-MCNC: 1 MG/DL (ref 0.5–1.4)
SAMPLE: NORMAL

## 2018-02-14 PROCEDURE — 75572 CT HRT W/3D IMAGE: CPT | Mod: 26,,, | Performed by: RADIOLOGY

## 2018-02-14 PROCEDURE — 3008F BODY MASS INDEX DOCD: CPT | Mod: ,,, | Performed by: THORACIC SURGERY (CARDIOTHORACIC VASCULAR SURGERY)

## 2018-02-14 PROCEDURE — 99214 OFFICE O/P EST MOD 30 MIN: CPT | Mod: S$PBB,,, | Performed by: THORACIC SURGERY (CARDIOTHORACIC VASCULAR SURGERY)

## 2018-02-14 PROCEDURE — 71250 CT THORAX DX C-: CPT | Mod: TC

## 2018-02-14 PROCEDURE — 25500020 PHARM REV CODE 255: Performed by: THORACIC SURGERY (CARDIOTHORACIC VASCULAR SURGERY)

## 2018-02-14 PROCEDURE — 71250 CT THORAX DX C-: CPT | Mod: 26,59,, | Performed by: RADIOLOGY

## 2018-02-14 PROCEDURE — 75572 CT HRT W/3D IMAGE: CPT | Mod: TC

## 2018-02-14 PROCEDURE — 99999 PR PBB SHADOW E&M-EST. PATIENT-LVL III: CPT | Mod: PBBFAC,,, | Performed by: THORACIC SURGERY (CARDIOTHORACIC VASCULAR SURGERY)

## 2018-02-14 PROCEDURE — 99213 OFFICE O/P EST LOW 20 MIN: CPT | Mod: PBBFAC | Performed by: THORACIC SURGERY (CARDIOTHORACIC VASCULAR SURGERY)

## 2018-02-14 RX ORDER — LOSARTAN POTASSIUM 25 MG/1
25 TABLET ORAL DAILY
Refills: 2 | Status: ON HOLD | COMMUNITY
Start: 2018-01-31 | End: 2018-03-19 | Stop reason: HOSPADM

## 2018-02-14 RX ADMIN — IOHEXOL 100 ML: 350 INJECTION, SOLUTION INTRAVENOUS at 01:02

## 2018-02-14 NOTE — PROGRESS NOTES
Subjective:       Patient ID: Jaquan Hines Jr. is a 47 y.o. male.    Chief Complaint: Follow-up    Jaquan Hines Jr. is a 47 y.o. male who present with a known pulmonary embolism. He has chronic respiratory failure with hypoxia on 3L home O2 with obesity hypoventilation syndrome.  He arrives to clinic today via wheel chair on O2 via nasal cannula. He endorses SOB, lightheadedness and dizziness. He had a near syncopal episode previous clinic visit no changes in symtpoms.       Review of Systems   Constitutional: Positive for activity change and fatigue.   Respiratory: Positive for shortness of breath. Negative for cough.    Cardiovascular: Negative for chest pain, palpitations and leg swelling.   Musculoskeletal: Positive for gait problem.   Neurological: Positive for dizziness, weakness and light-headedness. Negative for syncope.       Objective:      Physical Exam   Constitutional: He is oriented to person, place, and time. He appears well-developed.   Obese  Oxygen dependent in wheelchair    HENT:   Head: Normocephalic.   Eyes: Pupils are equal, round, and reactive to light.   Cardiovascular: Normal rate, regular rhythm and normal heart sounds.    Pulmonary/Chest: Effort normal and breath sounds normal.   Abdominal: Soft.   Neurological: He is alert and oriented to person, place, and time.   Skin: Skin is warm and dry. Capillary refill takes 2 to 3 seconds.   Psychiatric: He has a normal mood and affect.         LHC/RHC Cath Summary/Post-Operative Diagnosis    Non-obstructive CAD.    Normal right and left Filling Pressures.    Severe Pulmonary Hypertension.    Chronic thromboembolic pulmonary hypertension.    Occlusive thrombus in right inferior lobar arteries and lingular arteries on the left  Assessment:   1. PE  2. PAH  Plan:   CT non contrasted of chest for evaluate for calcium, pulmonary CTA of chest with repeat BMP today to evaluate for surgical intervention.

## 2018-02-23 ENCOUNTER — PATIENT MESSAGE (OUTPATIENT)
Dept: CARDIOTHORACIC SURGERY | Facility: CLINIC | Age: 48
End: 2018-02-23

## 2018-02-26 DIAGNOSIS — I27.82 OTHER CHRONIC PULMONARY EMBOLISM WITHOUT ACUTE COR PULMONALE: Primary | ICD-10-CM

## 2018-03-07 ENCOUNTER — HOSPITAL ENCOUNTER (OUTPATIENT)
Dept: CARDIOLOGY | Facility: CLINIC | Age: 48
Discharge: HOME | End: 2018-03-07
Payer: MEDICAID

## 2018-03-07 ENCOUNTER — HOSPITAL ENCOUNTER (OUTPATIENT)
Dept: VASCULAR SURGERY | Facility: CLINIC | Age: 48
Discharge: HOME | End: 2018-03-07
Attending: THORACIC SURGERY (CARDIOTHORACIC VASCULAR SURGERY)
Payer: MEDICAID

## 2018-03-07 ENCOUNTER — HOSPITAL ENCOUNTER (OUTPATIENT)
Dept: CARDIOLOGY | Facility: CLINIC | Age: 48
Discharge: HOME | End: 2018-03-07
Attending: THORACIC SURGERY (CARDIOTHORACIC VASCULAR SURGERY)
Payer: MEDICAID

## 2018-03-07 ENCOUNTER — ANESTHESIA EVENT (OUTPATIENT)
Dept: SURGERY | Facility: HOSPITAL | Age: 48
DRG: 164 | End: 2018-03-07
Payer: MEDICAID

## 2018-03-07 ENCOUNTER — HOSPITAL ENCOUNTER (OUTPATIENT)
Dept: PULMONOLOGY | Facility: CLINIC | Age: 48
Discharge: HOME | End: 2018-03-07
Payer: MEDICAID

## 2018-03-07 ENCOUNTER — OFFICE VISIT (OUTPATIENT)
Dept: CARDIOTHORACIC SURGERY | Facility: CLINIC | Age: 48
End: 2018-03-07
Payer: MEDICAID

## 2018-03-07 ENCOUNTER — HOSPITAL ENCOUNTER (OUTPATIENT)
Dept: RADIOLOGY | Facility: HOSPITAL | Age: 48
Discharge: HOME | End: 2018-03-07
Attending: THORACIC SURGERY (CARDIOTHORACIC VASCULAR SURGERY)
Payer: MEDICAID

## 2018-03-07 ENCOUNTER — DOCUMENTATION ONLY (OUTPATIENT)
Dept: CARDIOLOGY | Facility: CLINIC | Age: 48
End: 2018-03-07

## 2018-03-07 ENCOUNTER — HOSPITAL ENCOUNTER (OUTPATIENT)
Dept: PREADMISSION TESTING | Facility: HOSPITAL | Age: 48
Discharge: HOME | End: 2018-03-07
Attending: ANESTHESIOLOGY
Payer: MEDICAID

## 2018-03-07 VITALS
HEART RATE: 74 BPM | RESPIRATION RATE: 18 BRPM | BODY MASS INDEX: 49.44 KG/M2 | TEMPERATURE: 98 F | DIASTOLIC BLOOD PRESSURE: 60 MMHG | HEIGHT: 67 IN | OXYGEN SATURATION: 89 % | WEIGHT: 315 LBS | SYSTOLIC BLOOD PRESSURE: 116 MMHG

## 2018-03-07 VITALS
BODY MASS INDEX: 49.44 KG/M2 | HEIGHT: 67 IN | HEART RATE: 76 BPM | DIASTOLIC BLOOD PRESSURE: 67 MMHG | WEIGHT: 315 LBS | OXYGEN SATURATION: 90 % | SYSTOLIC BLOOD PRESSURE: 116 MMHG | TEMPERATURE: 98 F

## 2018-03-07 DIAGNOSIS — I27.82 OTHER CHRONIC PULMONARY EMBOLISM WITHOUT ACUTE COR PULMONALE: ICD-10-CM

## 2018-03-07 DIAGNOSIS — J96.11 CHRONIC RESPIRATORY FAILURE WITH HYPOXIA: ICD-10-CM

## 2018-03-07 DIAGNOSIS — I27.20 PULMONARY HYPERTENSION: Primary | ICD-10-CM

## 2018-03-07 DIAGNOSIS — Z01.818 PRE-OP EVALUATION: ICD-10-CM

## 2018-03-07 DIAGNOSIS — I26.09 OTHER CHRONIC PULMONARY EMBOLISM WITH ACUTE COR PULMONALE: Primary | ICD-10-CM

## 2018-03-07 DIAGNOSIS — I27.82 OTHER CHRONIC PULMONARY EMBOLISM WITH ACUTE COR PULMONALE: Primary | ICD-10-CM

## 2018-03-07 DIAGNOSIS — R06.02 SHORTNESS OF BREATH: ICD-10-CM

## 2018-03-07 DIAGNOSIS — I25.10 CAD (CORONARY ARTERY DISEASE), NATIVE CORONARY ARTERY: ICD-10-CM

## 2018-03-07 LAB
PRE FEV1 FVC: 85
PRE FEV1: 2.66
PRE FVC: 3.13
PREDICTED FEV1 FVC: 82
PREDICTED FEV1: 3.38
PREDICTED FVC: 4.11

## 2018-03-07 PROCEDURE — 99999 PR PBB SHADOW E&M-EST. PATIENT-LVL III: CPT | Mod: PBBFAC,,, | Performed by: THORACIC SURGERY (CARDIOTHORACIC VASCULAR SURGERY)

## 2018-03-07 PROCEDURE — 71046 X-RAY EXAM CHEST 2 VIEWS: CPT | Mod: TC,FY

## 2018-03-07 PROCEDURE — 36600 WITHDRAWAL OF ARTERIAL BLOOD: CPT | Mod: S$PBB,,, | Performed by: INTERNAL MEDICINE

## 2018-03-07 PROCEDURE — 93010 ELECTROCARDIOGRAM REPORT: CPT | Mod: S$PBB,,, | Performed by: INTERNAL MEDICINE

## 2018-03-07 PROCEDURE — 99213 OFFICE O/P EST LOW 20 MIN: CPT | Mod: PBBFAC,25 | Performed by: THORACIC SURGERY (CARDIOTHORACIC VASCULAR SURGERY)

## 2018-03-07 PROCEDURE — 71046 X-RAY EXAM CHEST 2 VIEWS: CPT | Mod: 26,,, | Performed by: RADIOLOGY

## 2018-03-07 PROCEDURE — 94010 BREATHING CAPACITY TEST: CPT | Mod: 26,S$PBB,, | Performed by: INTERNAL MEDICINE

## 2018-03-07 PROCEDURE — 93306 TTE W/DOPPLER COMPLETE: CPT | Mod: 26,S$PBB,, | Performed by: INTERNAL MEDICINE

## 2018-03-07 PROCEDURE — 93880 EXTRACRANIAL BILAT STUDY: CPT | Mod: PBBFAC | Performed by: SURGERY

## 2018-03-07 PROCEDURE — 99499 UNLISTED E&M SERVICE: CPT | Mod: S$PBB,,, | Performed by: THORACIC SURGERY (CARDIOTHORACIC VASCULAR SURGERY)

## 2018-03-07 PROCEDURE — 82803 BLOOD GASES ANY COMBINATION: CPT | Mod: PBBFAC | Performed by: INTERNAL MEDICINE

## 2018-03-07 PROCEDURE — 93005 ELECTROCARDIOGRAM TRACING: CPT | Mod: PBBFAC | Performed by: INTERNAL MEDICINE

## 2018-03-07 PROCEDURE — C8929 TTE W OR WO FOL WCON,DOPPLER: HCPCS | Mod: PBBFAC | Performed by: INTERNAL MEDICINE

## 2018-03-07 PROCEDURE — 36600 WITHDRAWAL OF ARTERIAL BLOOD: CPT | Mod: PBBFAC | Performed by: INTERNAL MEDICINE

## 2018-03-07 PROCEDURE — 94010 BREATHING CAPACITY TEST: CPT | Mod: PBBFAC | Performed by: INTERNAL MEDICINE

## 2018-03-07 PROCEDURE — 93880 EXTRACRANIAL BILAT STUDY: CPT | Mod: 26,S$PBB,, | Performed by: SURGERY

## 2018-03-07 NOTE — PROGRESS NOTES
Critical ABG results phoned to [facility] with ' office, PO2 42.8, patient only performed two Spirometry test due to Syncope.  Patient sent to  office per Nemo Diaz.  Rose Marie Sood RN

## 2018-03-07 NOTE — PROGRESS NOTES
Patient identified by 2 identifiers. Denies previous reactions to blood transfusions and allergies reviewed.  Procedure explained & consent obtained.  22 g IV placed to Rt AC, flushed w/ 10cc NS pre & post contrast administration.  3cc Optison administered, echo images obtained.  Pt tolerated procedure well.  IV D/C'ed, preasure dsg applied.  Pt D/C'ed to home.

## 2018-03-07 NOTE — PROGRESS NOTES
Subjective:      Patient ID: Jaquan Hines Jr. is a 47 y.o. male.    Chief Complaint: Pre-op Exam      HPI:  Jaquan Hines Jr. is a 47 y.o. male who presents for his pre op appointment for his carotid endarterectomy procedure. He has a known pulmonary embolism, chronic respiratory failure with hypoxia on 5L home O2 with obesity hypoventilation syndrome.  He arrives to clinic today via wheel chair on O2 via nasal cannula. He endorses SOB, lightheadedness and dizziness. He had a near syncopal episode previous clinic visit no changes in symtpoms.     Review of patient's allergies indicates:   Allergen Reactions    Clopidogrel      Past Medical History:   Diagnosis Date    Acute pulmonary embolism     Anticoagulant long-term use     Chronic respiratory failure with hypoxia     Essential hypertension, malignant     Pulmonary artery hypertension      Past Surgical History:   Procedure Laterality Date    CARDIAC CATHETERIZATION      CATARACT EXTRACTION      MASS EXCISION      inner thigh    TONSILLECTOMY, ADENOIDECTOMY       Family History     None        Social History     Social History    Marital status: Single     Spouse name: N/A    Number of children: N/A    Years of education: N/A     Occupational History    Not on file.     Social History Main Topics    Smoking status: Never Smoker    Smokeless tobacco: Never Used    Alcohol use No    Drug use: No    Sexual activity: Not on file     Other Topics Concern    Not on file     Social History Narrative    No narrative on file       Current medications Reviewed    Review of Systems   Constitutional: Positive for activity change and fatigue. Negative for appetite change and fever.   HENT: Negative for congestion, dental problem, sneezing and sore throat.    Eyes: Negative for pain, discharge and visual disturbance.   Respiratory: Positive for shortness of breath. Negative for cough and wheezing.    Cardiovascular: Negative for chest pain,  palpitations and leg swelling.   Gastrointestinal: Negative for abdominal distention, abdominal pain, constipation, diarrhea, nausea and vomiting.   Endocrine: Negative for polydipsia, polyphagia and polyuria.   Genitourinary: Negative for dysuria, frequency and urgency.   Musculoskeletal: Positive for gait problem. Negative for back pain.   Skin: Negative for rash and wound.   Neurological: Positive for dizziness and light-headedness. Negative for seizures, syncope and headaches.   Hematological: Does not bruise/bleed easily.   Psychiatric/Behavioral: Negative for agitation and confusion. The patient is not nervous/anxious.      Objective:   Physical Exam   Constitutional: He is oriented to person, place, and time. He appears well-developed and well-nourished.   HENT:   Head: Normocephalic and atraumatic.   Eyes: Pupils are equal, round, and reactive to light.   Neck: Normal range of motion. Neck supple.   Cardiovascular: Normal rate, regular rhythm and normal heart sounds.    Pulmonary/Chest: Effort normal and breath sounds normal.   Home O2 use    Abdominal: Soft. Bowel sounds are normal.   Musculoskeletal: Normal range of motion.   Wheelchair dependent    Neurological: He is alert and oriented to person, place, and time.   Skin: Skin is warm and dry. Capillary refill takes more than 3 seconds.   Psychiatric: He has a normal mood and affect. His behavior is normal.     Diagnostic Results:    2D ECHO 3/7/18  1 - Normal left ventricular systolic function (EF 55-60%).     2 - No wall motion abnormalities.     3 - Biatrial enlargement.     4 - Impaired LV relaxation, elevated LAP (grade 2 diastolic dysfunction).     5 - Right ventricular enlargement with hypertrophy, with mildly depressed systolic function.     6 - Pulmonary hypertension. The estimated PA systolic pressure is 81 mmHg.     7 - Mild mitral regurgitation.     8 - Moderate tricuspid regurgitation.     9 - Trivial pericardial effusion.     [facility] 11/14/17  -  Left Main Coronary Artery:             The LM has luminal irregularities. There is RHONDA 3 flow.     - Left Anterior Descending Artery:             The mid LAD is patent. There is RHONDA 3 flow. The remaining portion of the vessel has luminal irregularities (10). There is occlusive thrombus in the lingular arteries with wedge shaped perfusino defect     - Left Circumflex Artery:             The LCX has luminal irregularities. There is RHONDA 3 flow.     - Right Coronary Artery:             The RCA has luminal irregularities. There is RHONDA 3 flow.     - Main Pulmonary Artery:             The Main Pulmonary Artery is normal. There is RHONDA 3 flow.     - Left Pulmonary Artery:             The Left Pulmonary Artery. The vessel is ectatic. There is RHONDA 3 flow. There is occlusive thrombus in the superior and inferior lingular artery     - Right Pulmonary Artery:             The Right Pulmonary Artery. The vessel is ectatic. There is RHONDA 3 flow. There is occlusive thrombus in the inferior lobar arteries    Carotid US:   RIGHT SIDE:   Normal- No evidence of plaque in the ICA  Antegrade flow in the right vertebral artery.   LEFT SIDE:  Normal- No evidence of plaque in the ICA  Antegrade flow in the left vertebral artery.    CT chest 2/14/18  Right interlobar pulmonary artery demonstrates a filling defect consistent with pulmonary thromboembolism; V/Q scan of 11/2/2017 revealed a mismatched defect in this region.  Decreased perfusion of the right middle and lower lobes limits enhancement of pulmonary arterial blood in these regions with consequent low attenuation of pulmonary venous return in the right inferior pulmonary vein.    V/Q scan of 11/2/2017 also revealed mismatched perfusion defect in the left lower and upper lobes.  We do not identify corresponding filling defect in the large pulmonary arteries in these regions but thromboembolism could have fractionated and moved distally making it inapparent on CT.  Because V/Q scan  is more sensitive to regional pulmonary underperfusion than is pulmonary CTA, clinical considerations will determine if repeat V/Q scan is warranted.     Mosaic attenuation of the pulmonary parenchyma is consistent with increased pulmonary vascular resistance.  Chronic thromboembolism could account for or contribute to elevated pulmonary vascular resistance.  Small airways disease inducing hypoxia and reflexive vasoconstriction can present in a similar fashion.    Small region of endomyocardial fat identified in the left ventricular apex and distal interventricular septum, indicates infarct in this region.    Assessment:     Plan:   I spoke with the patient and his wife in detail about the risks and benefits of the procedure. We have had multiple conversations about this and I have reviewed all of the studies. We will plan pulmonary endarterectomy for CTEPH with circulatory arrest.

## 2018-03-07 NOTE — ANESTHESIA PREPROCEDURE EVALUATION
Pre-operative evaluation for Procedure(s) (LRB):  ENDARTERECTOMY- PULMONARY (N/A)    Jaquan Hines Jr. is a 47 y.o. male with pmh of HTN, HLD, peripheral edema (poorly responsive to diuretics), obesity hypoventilation syndrome (severe), prolonged QT (QTc 610 on 3/7/18), CAD (MI 2012 s/p 2x FLORENTINO), Pulm HTN (81mm HG on TTE,) and pulmonary embolus (on chronic anticoagulation) who presents for the above procedure. Pt. Has chronic respiratory failure and is wheelchair bound on 5L O2 at home. Pt. Has SOB at rest with severe GOMEZ and near syncopal episodes with exertion. Liver and kidney function are normal.     LV has normal systolic function  RV is enlarged with only mildly depressed systolic function and moderate TR    Prev airway:   None on file    Patient Active Problem List   Diagnosis    Anxiety    Benign essential hypertension    Bipolar disorder    Pulmonary hypertension    Pulmonary embolism    Obesity hypoventilation syndrome    Hyperlipidemia    CAD (coronary artery disease), native coronary artery    Presence of stent in coronary artery    Glaucoma    Chronic respiratory failure with hypoxia    Shortness of breath    Hypokalemia    Syncope    Hyponatremia    Coronary artery disease of native artery of native heart with stable angina pectoris    Pulmonary embolus    CTEPH (chronic thromboembolic pulmonary hypertension)    Pre-op evaluation        No current facility-administered medications on file prior to encounter.      Current Outpatient Prescriptions on File Prior to Encounter   Medication Sig Dispense Refill    apixaban (ELIQUIS) 5 mg Tab Take 5 mg by mouth 2 (two) times daily.       atorvastatin (LIPITOR) 20 MG tablet Take 20 mg by mouth once daily.       carbamazepine (TEGRETOL) 200 mg tablet Take 200 mg by mouth once daily.       cetirizine (ZYRTEC) 10 MG tablet Take 10 mg by mouth once daily.       citalopram (CELEXA) 40 MG tablet Take 40 mg by mouth once daily.        furosemide (LASIX) 80 MG tablet Take 80 mg by mouth once daily.      losartan (COZAAR) 25 MG tablet Take 25 mg by mouth once daily.  2    metoprolol succinate (TOPROL-XL) 50 MG 24 hr tablet Take 50 mg by mouth once daily.      omeprazole (PRILOSEC) 20 MG capsule Take 20 mg by mouth once daily.       potassium chloride (K-TAB) 20 mEq Take 20 mEq by mouth 2 (two) times daily.  6    VENTOLIN HFA 90 mcg/actuation inhaler TAKE 1 PUFF EVERY 6 HOURS AS NEEDED FOR WHEEZING/SOB  6       Past Surgical History:   Procedure Laterality Date    CARDIAC CATHETERIZATION      CATARACT EXTRACTION      MASS EXCISION      inner thigh    TONSILLECTOMY, ADENOIDECTOMY       General Diagnostic  CT 2/2018:    Right interlobar pulmonary artery demonstrates a filling defect consistent with pulmonary thromboembolism; V/Q scan of 11/2/2017 revealed a mismatched defect in this region.  Decreased perfusion of the right middle and lower lobes limits enhancement of pulmonary arterial blood in these regions with consequent low attenuation of pulmonary venous return in the right inferior pulmonary vein.    V/Q scan of 11/2/2017 also revealed mismatched perfusion defect in the left lower and upper lobes.  We do not identify corresponding filling defect in the large pulmonary arteries in these regions but thromboembolism could have fractionated and moved distally making it inapparent on CT.  Because V/Q scan is more sensitive to regional pulmonary underperfusion than is pulmonary CTA, clinical considerations will determine if repeat V/Q scan is warranted.     Mosaic attenuation of the pulmonary parenchyma is consistent with increased pulmonary vascular resistance.  Chronic thromboembolism could account for or contribute to elevated pulmonary vascular resistance.  Small airways disease inducing hypoxia and reflexive vasoconstriction can present in a similar fashion.    Small region of endomyocardial fat identified in the left ventricular apex  and distal interventricular septum, indicates infarct in this region.      [facility]:  11/2017:  Diagnostic:          Patient has a right dominant coronary artery.        The coronary vessels have luminal irregularities.        - Left Main Coronary Artery:             The LM has luminal irregularities. There is RHONDA 3 flow.     - Left Anterior Descending Artery:             The mid LAD is patent. There is RHONDA 3 flow. The remaining portion of the vessel has luminal irregularities (10). There is occlusive thrombus in the lingular arteries with wedge shaped perfusino defect     - Left Circumflex Artery:             The LCX has luminal irregularities. There is RHONDA 3 flow.     - Right Coronary Artery:             The RCA has luminal irregularities. There is RHONDA 3 flow.     - Main Pulmonary Artery:             The Main Pulmonary Artery is normal. There is RHONDA 3 flow.     - Left Pulmonary Artery:             The Left Pulmonary Artery. The vessel is ectatic. There is RHONDA 3 flow. There is occlusive thrombus in the superior and inferior lingular artery     - Right Pulmonary Artery:             The Right Pulmonary Artery. The vessel is ectatic. There is RHONDA 3 flow. There is occlusive thrombus in the inferior lobar arteries    EKG:  Vent. Rate : 073 BPM     Atrial Rate : 073 BPM     P-R Int : 200 ms          QRS Dur : 080 ms      QT Int : 554 ms       P-R-T Axes : 051 047 076 degrees     QTc Int : 610 ms    Normal sinus rhythm  Possible Left atrial enlargement  Nonspecific ST and T wave abnormality  Poor R wave progression  Prolonged QT interval  Abnormal ECG  When compared with ECG of 01-NOV-2017 12:18,  Premature ventricular complexes are no longer Present  Confirmed by Toi Huerta MD ([id number]) on 3/7/2018 12:10:08 PM    2D Echo:  3/7/18:    CONCLUSIONS     1 - Normal left ventricular systolic function (EF 55-60%).     2 - No wall motion abnormalities.     3 - Biatrial enlargement.     4 - Impaired LV  relaxation, elevated LAP (grade 2 diastolic dysfunction).     5 - Right ventricular enlargement with hypertrophy, with mildly depressed systolic function.     6 - Pulmonary hypertension. The estimated PA systolic pressure is 81 mmHg.     7 - Mild mitral regurgitation.     8 - Moderate tricuspid regurgitation.     9 - Trivial pericardial effusion.     Anesthesia Evaluation    I have reviewed the Patient Summary Reports.     I have reviewed the Medications.     Review of Systems  Anesthesia Hx:  No problems with previous Anesthesia  History of prior surgery of interest to airway management or planning: Previous anesthesia: MAC  11/2017: Cardiac Cath with MAC.  Denies Family Hx of Anesthesia complications.   Denies Personal Hx of Anesthesia complications.   Social:  Denies Tobacco Use. Denies Alcohol Use.   EENT/Dental:   Eyes: Eye Disease: Glaucoma: (per problem list)   Denies Jaw Problems   Cardiovascular:   Exercise tolerance: poor Hypertension Past MI CAD  CABG/stent  NYHA Classification IV Grade 2 diastolic dysfunction Coronary Artery Disease: (non-obstructive) S/P Percutaneous Coronary Intervention (PCI)   S/P Drug Eluting Stent (FLORENTINO) (x 2), drug eluding stent placed 2012. Hx of Myocardial Infarction (2012 x 2 weeks ago)  Congestive Heart Failure (CHF)  Denies Deep Venous Thrombosis (DVT)  Hypertension, Essential Hypertension , Recent typical clinic B/P of 116/60 @ POC visit    Pulmonary:   Shortness of breath Sleep Apnea Home O2 @ 5LPM Pulmonary Symptoms:  are dependence on supplemental O2.  Dependence on O2 is 24/7 , at 3-5 liters.  Obesity hypoventilation syndrome Respiratory Failure, Chronic Pulmonary Embolism, Pulmonary Hypertension (chronic thromboembolic) Echocardiographic Estimated Pulmonary Artery Systolic Pressure > 60    Renal/:  Renal/ Normal   Denies Kidney Function/Disease    Hepatic/GI:  Hepatic/GI Normal  Esophageal / Stomach Disorders Gerd Controlled by chronic antireflux medication.  Denies  Liver Disease    Musculoskeletal:  Musculoskeletal General/Symptoms: Functional capacity is wheelchair dependant.    Neurological:  Neurology Normal  Neuro Symptoms of numbness, tingling Denies Seizure Disorder  Denies CVA - Cerebrovasular Accident  Denies TIA - Transient Ischemic Attack    Endocrine:  Denies Diabetes  Denies Thyroid Disease  Metabolic Disorders, Hyperlipoproteinemia  Psych:  Anxiety Disorder.  Depression and Manic Depressive (Bipolar) Disorder.          Physical Exam  General:  Morbid Obesity    Airway/Jaw/Neck:  Airway Findings: Mouth Opening: Normal Mallampati: III  Improves to II with phonation.  TM Distance: Normal, at least 6 cm  Jaw/Neck Findings:  Neck ROM: Normal ROM  Neck Findings:  Girth Increased      Dental:  Dental Findings: In tact   Chest/Lungs:  Chest/Lungs Findings: Decreased Breath Sounds Bilateral     Heart/Vascular:  Heart Findings: Rate: Normal  Rhythm: Regular Rhythm  Heart Murmur  Systolic  Systolic Heart Murmur Description: R Upper Sternal Border  Systolic Heart Murmur Grade: Grade II  Vascular Findings:  Edema  Edema Locations: BLE  Vascular Exam Findings: trace edema        Mental Status:  Mental Status Findings:  Cooperative, Alert and Oriented         Anesthesia Plan  Type of Anesthesia, risks & benefits discussed:  Anesthesia Type:  general  Patient's Preference:   Intra-op Monitoring Plan: standard ASA monitors, cardiac output, Win[place]rede-Phoebe, central line and arterial line  Intra-op Monitoring Plan Comments:   Post Op Pain Control Plan: multimodal analgesia, IV/PO Opioids PRN and per primary service following discharge from PACU  Post Op Pain Control Plan Comments:   Induction:   IV  Beta Blocker:  Patient is on a Beta-Blocker and has received one dose within the past 24 hours (No further documentation required).       Informed Consent: Patient understands risks and agrees with Anesthesia plan.  Questions answered. Anesthesia consent signed with patient.  ASA Score: 4      Day of Surgery Review of History & Physical:    H&P update referred to the surgeon.         Ready For Surgery From Anesthesia Perspective.     Ham Corrigan RN

## 2018-03-07 NOTE — PRE-PROCEDURE INSTRUCTIONS
PreOp Instructions given:  - Instructed patient to follow Cardiology directions for meds to take AM of surgery  - NPO guidelines  - Arrival place directions given; time to be given the day before procedure by the Surgeon's Office  - Bathing with antibacterial soap   - Don't wear any jewelry or bring any valuables AM of surgery  - No makeup or moisturizer to face  - No perfume/cologne, powder, lotions or aftershave    Pt. verbalized understanding.

## 2018-03-08 RX ORDER — LIDOCAINE HYDROCHLORIDE 10 MG/ML
1 INJECTION, SOLUTION EPIDURAL; INFILTRATION; INTRACAUDAL; PERINEURAL ONCE
Status: CANCELLED | OUTPATIENT
Start: 2018-03-08 | End: 2018-03-08

## 2018-03-08 RX ORDER — MUPIROCIN 20 MG/G
OINTMENT TOPICAL
Status: CANCELLED | OUTPATIENT
Start: 2018-03-08

## 2018-03-09 ENCOUNTER — PATIENT MESSAGE (OUTPATIENT)
Dept: PULMONOLOGY | Facility: CLINIC | Age: 48
End: 2018-03-09

## 2018-03-09 ENCOUNTER — DOCUMENTATION ONLY (OUTPATIENT)
Dept: CARDIOTHORACIC SURGERY | Facility: CLINIC | Age: 48
End: 2018-03-09

## 2018-03-09 NOTE — PROGRESS NOTES
"PREPARING FOR SURGERY  Your surgery has been scheduled for:   Day: Tuesday Date: 3/13/18  Arrival Time: 5am  Start Time: 7am  You should report to the second floor surgery center, located on the [facility] side of the second floor of the [facility]. The phone number is [phone].    PLEASE NOTE  · If you are allergic to any medications, please inform your doctor or the nurse responsible for your care.  · Tell the doctor if you take aspirin, products containing aspirin, herbal medications or blood thinners, such as Coumadin, Pradaxa, or Plavix.  · Notify your doctor if you are diabetic and provide information about the medications you take.  · Arrange for someone to drive you home following surgery. You will not be allowed to leave the surgical facility alone or drive yourself home following sedation and anesthesia.  · If you have not already done so, please bring a list of your medications with you the day of your surgery.    BEFORE SURGERY  Stop taking all herbal medications 14 days prior to surgery  Stop taking aspirin, products containing aspirin 0 days before surgery  Stop taking blood thinners 0 days before surgery  Refrain from drinking alcohol beverages for 24 hours before and after surgery  Stop or limit smoking 0 days before surgery  Other: ________________________________________________________  THE NIGHT BEFORE SURGERY  Eat a light supper on the night before your surgery, no greasy or fatty foods.  DO NOT EAT OR DRINK ANYTHING AFTER MIDNIGHT, INCLUDING GUM, HARD CANDY, MINTS, OR CHEWING TOBACCO  Take a complete shower. Wash your body from the neck down with Hibiclens (chlorhexidine gluconate) soap. Hibiclens soap may be purchased over the counter at the pharmacy. Keep the soap away from your eyes, ears, and mouth. After washing with Hibiclens, rinse thoroughly. You may also use any soap labeled "antibacterial". Shampoo your hair with your regular shampoo.    THE DAY OF SURGERY  Take " another shower with Hibiclens or any antibacterial soap, to reduce the change of infection.  Medications to take the morning of surgery: Metoprolol, Prilosec with a small sip of water. Do not take diuretics or fluid pills.  Diabetic medication instructions will be given by the preop center. They will call you before your surgery.  You may brush your teeth and rinse your mouth, but do not shallow any water.  Do not apply perfume, powder, body lotions or deodorant on the day of surgery.  Do not wear any makeup, including mascara and false eyelashes.  Nail polish should be removed.  Wear comfortable clothes, such as button front shirt and loose-fitting pants.  Leave all jewelry, including body piercings and valuables at home.  Hairpins and clasps must be removed before you enter the operating room.  You may wear glasses, dentures and hearing aids before and after surgery. They may need to be removed before going into the operating room. Contact lenses worn before surgery must be removed before entering the operating room. Please bring a case for your hearing aids, glasses and/or contacts.  Bring any devices you will need after surgery such as crutches or canes.  If you have sleep apnea, please bring your CPAP machine.  If you have an implantable device, such as a pacemaker or AICD, please bring the device information card, if you have one.    If you have any questions or concerns, please don't hesitate to call.    Sincerely,  Mandy Shankar RN  RN Clinician  Thoracic and Cardiovascular Surgery  [address] [place] [id number]  [phone] fax

## 2018-03-13 ENCOUNTER — ANESTHESIA (OUTPATIENT)
Dept: SURGERY | Facility: HOSPITAL | Age: 48
DRG: 164 | End: 2018-03-13
Payer: MEDICAID

## 2018-03-13 ENCOUNTER — HOSPITAL ENCOUNTER (INPATIENT)
Facility: HOSPITAL | Age: 48
LOS: 6 days | Discharge: HOME | DRG: 164 | End: 2018-03-19
Attending: THORACIC SURGERY (CARDIOTHORACIC VASCULAR SURGERY) | Admitting: THORACIC SURGERY (CARDIOTHORACIC VASCULAR SURGERY)
Payer: MEDICAID

## 2018-03-13 ENCOUNTER — SURGERY (OUTPATIENT)
Age: 48
End: 2018-03-13

## 2018-03-13 DIAGNOSIS — R73.9 STRESS HYPERGLYCEMIA: ICD-10-CM

## 2018-03-13 DIAGNOSIS — I25.10 CORONARY ARTERY DISEASE INVOLVING NATIVE CORONARY ARTERY OF NATIVE HEART WITHOUT ANGINA PECTORIS: ICD-10-CM

## 2018-03-13 DIAGNOSIS — E66.01 SEVERE OBESITY (BMI >= 40): ICD-10-CM

## 2018-03-13 DIAGNOSIS — I27.82 OTHER CHRONIC PULMONARY EMBOLISM WITH ACUTE COR PULMONALE: ICD-10-CM

## 2018-03-13 DIAGNOSIS — Z99.11 ENCOUNTER FOR WEANING FROM VENTILATOR: ICD-10-CM

## 2018-03-13 DIAGNOSIS — I27.24 CTEPH (CHRONIC THROMBOEMBOLIC PULMONARY HYPERTENSION): ICD-10-CM

## 2018-03-13 DIAGNOSIS — I26.99 OTHER PULMONARY EMBOLISM WITHOUT ACUTE COR PULMONALE, UNSPECIFIED CHRONICITY: ICD-10-CM

## 2018-03-13 DIAGNOSIS — I26.09 OTHER CHRONIC PULMONARY EMBOLISM WITH ACUTE COR PULMONALE: ICD-10-CM

## 2018-03-13 LAB
ALBUMIN SERPL BCP-MCNC: 3.2 G/DL
ALLENS TEST: ABNORMAL
ALP SERPL-CCNC: 125 U/L
ALT SERPL W/O P-5'-P-CCNC: 58 U/L
ANION GAP SERPL CALC-SCNC: 15 MMOL/L
ANION GAP SERPL CALC-SCNC: 21 MMOL/L
APTT BLDCRRT: 21.8 SEC
APTT BLDCRRT: 30.7 SEC
AST SERPL-CCNC: 39 U/L
BASOPHILS # BLD AUTO: 0.02 K/UL
BASOPHILS # BLD AUTO: 0.07 K/UL
BASOPHILS NFR BLD: 0.1 %
BASOPHILS NFR BLD: 0.7 %
BILIRUB SERPL-MCNC: 0.4 MG/DL
BLD PROD TYP BPU: NORMAL
BLOOD UNIT EXPIRATION DATE: NORMAL
BLOOD UNIT TYPE CODE: 5100
BLOOD UNIT TYPE CODE: 6200
BLOOD UNIT TYPE CODE: NORMAL
BLOOD UNIT TYPE: NORMAL
BUN SERPL-MCNC: 13 MG/DL
BUN SERPL-MCNC: 15 MG/DL
CALCIUM SERPL-MCNC: 8.9 MG/DL
CALCIUM SERPL-MCNC: 9.9 MG/DL
CHLORIDE SERPL-SCNC: 85 MMOL/L
CHLORIDE SERPL-SCNC: 95 MMOL/L
CO2 SERPL-SCNC: 24 MMOL/L
CO2 SERPL-SCNC: 35 MMOL/L
CODING SYSTEM: NORMAL
CREAT SERPL-MCNC: 1.3 MG/DL
CREAT SERPL-MCNC: 1.4 MG/DL
DELSYS: ABNORMAL
DIFFERENTIAL METHOD: ABNORMAL
DIFFERENTIAL METHOD: ABNORMAL
DISPENSE STATUS: NORMAL
EOSINOPHIL # BLD AUTO: 0 K/UL
EOSINOPHIL # BLD AUTO: 0.2 K/UL
EOSINOPHIL NFR BLD: 0 %
EOSINOPHIL NFR BLD: 1.7 %
ERYTHROCYTE [DISTWIDTH] IN BLOOD BY AUTOMATED COUNT: 14.5 %
ERYTHROCYTE [DISTWIDTH] IN BLOOD BY AUTOMATED COUNT: 14.6 %
ERYTHROCYTE [SEDIMENTATION RATE] IN BLOOD BY WESTERGREN METHOD: 18 MM/H
ERYTHROCYTE [SEDIMENTATION RATE] IN BLOOD BY WESTERGREN METHOD: 22 MM/H
ERYTHROCYTE [SEDIMENTATION RATE] IN BLOOD BY WESTERGREN METHOD: 24 MM/H
EST. GFR  (AFRICAN AMERICAN): >60 ML/MIN/1.73 M^2
EST. GFR  (AFRICAN AMERICAN): >60 ML/MIN/1.73 M^2
EST. GFR  (NON AFRICAN AMERICAN): 58.6 ML/MIN/1.73 M^2
EST. GFR  (NON AFRICAN AMERICAN): >60 ML/MIN/1.73 M^2
FIBRINOGEN PPP-MCNC: 332 MG/DL
FIO2: 100
FIO2: 55
FIO2: 65
FIO2: 70
FIO2: 80
FLOW: 60
GLUCOSE SERPL-MCNC: 124 MG/DL
GLUCOSE SERPL-MCNC: 250 MG/DL (ref 70–110)
GLUCOSE SERPL-MCNC: 272 MG/DL (ref 70–110)
GLUCOSE SERPL-MCNC: 274 MG/DL
GLUCOSE SERPL-MCNC: 274 MG/DL (ref 70–110)
GLUCOSE SERPL-MCNC: 278 MG/DL (ref 70–110)
GLUCOSE SERPL-MCNC: 316 MG/DL (ref 70–110)
GLUCOSE SERPL-MCNC: 345 MG/DL (ref 70–110)
GLUCOSE SERPL-MCNC: 370 MG/DL (ref 70–110)
HCO3 UR-SCNC: 23.9 MMOL/L (ref 24–28)
HCO3 UR-SCNC: 24.6 MMOL/L (ref 24–28)
HCO3 UR-SCNC: 26.3 MMOL/L (ref 24–28)
HCO3 UR-SCNC: 26.4 MMOL/L (ref 24–28)
HCO3 UR-SCNC: 28.2 MMOL/L (ref 24–28)
HCO3 UR-SCNC: 28.8 MMOL/L (ref 24–28)
HCO3 UR-SCNC: 35.5 MMOL/L (ref 24–28)
HCO3 UR-SCNC: 38.2 MMOL/L (ref 24–28)
HCO3 UR-SCNC: 38.2 MMOL/L (ref 24–28)
HCO3 UR-SCNC: 40.5 MMOL/L (ref 24–28)
HCT VFR BLD AUTO: 27.8 %
HCT VFR BLD AUTO: 37.1 %
HCT VFR BLD CALC: 26 %PCV (ref 36–54)
HCT VFR BLD CALC: 27 %PCV (ref 36–54)
HCT VFR BLD CALC: 27 %PCV (ref 36–54)
HCT VFR BLD CALC: 28 %PCV (ref 36–54)
HCT VFR BLD CALC: 32 %PCV (ref 36–54)
HCT VFR BLD CALC: 39 %PCV (ref 36–54)
HGB BLD-MCNC: 13.4 G/DL
HGB BLD-MCNC: 9.9 G/DL
IMM GRANULOCYTES # BLD AUTO: 0.04 K/UL
IMM GRANULOCYTES # BLD AUTO: 0.37 K/UL
IMM GRANULOCYTES NFR BLD AUTO: 0.4 %
IMM GRANULOCYTES NFR BLD AUTO: 1.8 %
INR PPP: 1.1
INR PPP: 1.2
LYMPHOCYTES # BLD AUTO: 0.7 K/UL
LYMPHOCYTES # BLD AUTO: 2.2 K/UL
LYMPHOCYTES NFR BLD: 24 %
LYMPHOCYTES NFR BLD: 3.6 %
MAGNESIUM SERPL-MCNC: 2.7 MG/DL
MCH RBC QN AUTO: 28.8 PG
MCH RBC QN AUTO: 29.7 PG
MCHC RBC AUTO-ENTMCNC: 35.6 G/DL
MCHC RBC AUTO-ENTMCNC: 36.1 G/DL
MCV RBC AUTO: 80 FL
MCV RBC AUTO: 84 FL
MODE: ABNORMAL
MONOCYTES # BLD AUTO: 0.6 K/UL
MONOCYTES # BLD AUTO: 0.7 K/UL
MONOCYTES NFR BLD: 3 %
MONOCYTES NFR BLD: 7.9 %
NEUTROPHILS # BLD AUTO: 18.9 K/UL
NEUTROPHILS # BLD AUTO: 6.1 K/UL
NEUTROPHILS NFR BLD: 65.3 %
NEUTROPHILS NFR BLD: 91.5 %
NRBC BLD-RTO: 0 /100 WBC
NRBC BLD-RTO: 0 /100 WBC
NUM UNITS TRANS FFP: NORMAL
PCO2 BLDA: 41 MMHG (ref 35–45)
PCO2 BLDA: 42 MMHG (ref 35–45)
PCO2 BLDA: 46.5 MMHG (ref 35–45)
PCO2 BLDA: 47.2 MMHG (ref 35–45)
PCO2 BLDA: 50.7 MMHG (ref 35–45)
PCO2 BLDA: 52.3 MMHG (ref 35–45)
PCO2 BLDA: 56.4 MMHG (ref 35–45)
PCO2 BLDA: 56.5 MMHG (ref 35–45)
PCO2 BLDA: 57.2 MMHG (ref 35–45)
PCO2 BLDA: 64.2 MMHG (ref 35–45)
PEEP: 10
PH SMN: 7.25 [PH] (ref 7.35–7.45)
PH SMN: 7.31 [PH] (ref 7.35–7.45)
PH SMN: 7.33 [PH] (ref 7.35–7.45)
PH SMN: 7.36 [PH] (ref 7.35–7.45)
PH SMN: 7.37 [PH] (ref 7.35–7.45)
PH SMN: 7.41 [PH] (ref 7.35–7.45)
PH SMN: 7.44 [PH] (ref 7.35–7.45)
PH SMN: 7.45 [PH] (ref 7.35–7.45)
PH SMN: 7.46 [PH] (ref 7.35–7.45)
PH SMN: 7.47 [PH] (ref 7.35–7.45)
PHOSPHATE SERPL-MCNC: 3.1 MG/DL
PIP: 30
PIP: 31
PLATELET # BLD AUTO: 286 K/UL
PLATELET # BLD AUTO: 304 K/UL
PMV BLD AUTO: 10.7 FL
PMV BLD AUTO: 11.3 FL
PO2 BLDA: 119 MMHG (ref 80–100)
PO2 BLDA: 349 MMHG (ref 80–100)
PO2 BLDA: 400 MMHG (ref 80–100)
PO2 BLDA: 417 MMHG (ref 80–100)
PO2 BLDA: 458 MMHG (ref 80–100)
PO2 BLDA: 48 MMHG (ref 80–100)
PO2 BLDA: 52 MMHG (ref 40–60)
PO2 BLDA: 63 MMHG (ref 80–100)
PO2 BLDA: 68 MMHG (ref 80–100)
PO2 BLDA: 92 MMHG (ref 80–100)
POC BE: -1 MMOL/L
POC BE: -1 MMOL/L
POC BE: 1 MMOL/L
POC BE: 1 MMOL/L
POC BE: 12 MMOL/L
POC BE: 14 MMOL/L
POC BE: 15 MMOL/L
POC BE: 17 MMOL/L
POC BE: 2 MMOL/L
POC BE: 3 MMOL/L
POC IONIZED CALCIUM: 0.88 MMOL/L (ref 1.06–1.42)
POC IONIZED CALCIUM: 0.89 MMOL/L (ref 1.06–1.42)
POC IONIZED CALCIUM: 0.97 MMOL/L (ref 1.06–1.42)
POC IONIZED CALCIUM: 0.98 MMOL/L (ref 1.06–1.42)
POC IONIZED CALCIUM: 1 MMOL/L (ref 1.06–1.42)
POC IONIZED CALCIUM: 1.16 MMOL/L (ref 1.06–1.42)
POC IONIZED CALCIUM: 1.19 MMOL/L (ref 1.06–1.42)
POC IONIZED CALCIUM: 1.29 MMOL/L (ref 1.06–1.42)
POC SATURATED O2: 100 % (ref 95–100)
POC SATURATED O2: 83 % (ref 95–100)
POC SATURATED O2: 83 % (ref 95–100)
POC SATURATED O2: 87 % (ref 95–100)
POC SATURATED O2: 94 % (ref 95–100)
POC SATURATED O2: 96 % (ref 95–100)
POC SATURATED O2: 99 % (ref 95–100)
POC TCO2: 25 MMOL/L (ref 23–27)
POC TCO2: 26 MMOL/L (ref 24–29)
POC TCO2: 28 MMOL/L (ref 23–27)
POC TCO2: 28 MMOL/L (ref 23–27)
POC TCO2: 30 MMOL/L (ref 23–27)
POC TCO2: 30 MMOL/L (ref 23–27)
POC TCO2: 37 MMOL/L (ref 23–27)
POC TCO2: 40 MMOL/L (ref 23–27)
POC TCO2: 40 MMOL/L (ref 23–27)
POC TCO2: 42 MMOL/L (ref 23–27)
POCT GLUCOSE: 237 MG/DL (ref 70–110)
POCT GLUCOSE: 245 MG/DL (ref 70–110)
POCT GLUCOSE: 246 MG/DL (ref 70–110)
POCT GLUCOSE: 265 MG/DL (ref 70–110)
POCT GLUCOSE: 272 MG/DL (ref 70–110)
POCT GLUCOSE: 279 MG/DL (ref 70–110)
POCT GLUCOSE: 284 MG/DL (ref 70–110)
POTASSIUM BLD-SCNC: 2.1 MMOL/L (ref 3.5–5.1)
POTASSIUM BLD-SCNC: 2.2 MMOL/L (ref 3.5–5.1)
POTASSIUM BLD-SCNC: 2.6 MMOL/L (ref 3.5–5.1)
POTASSIUM BLD-SCNC: 2.8 MMOL/L (ref 3.5–5.1)
POTASSIUM BLD-SCNC: 3.5 MMOL/L (ref 3.5–5.1)
POTASSIUM BLD-SCNC: <2 MMOL/L (ref 3.5–5.1)
POTASSIUM SERPL-SCNC: 2.1 MMOL/L
POTASSIUM SERPL-SCNC: 2.2 MMOL/L
POTASSIUM SERPL-SCNC: 2.2 MMOL/L
PROT SERPL-MCNC: 6.7 G/DL
PROTHROMBIN TIME: 11.2 SEC
PROTHROMBIN TIME: 12.1 SEC
PS: 10
RBC # BLD AUTO: 3.33 M/UL
RBC # BLD AUTO: 4.66 M/UL
SAMPLE: ABNORMAL
SITE: ABNORMAL
SODIUM BLD-SCNC: 129 MMOL/L (ref 136–145)
SODIUM BLD-SCNC: 129 MMOL/L (ref 136–145)
SODIUM BLD-SCNC: 130 MMOL/L (ref 136–145)
SODIUM BLD-SCNC: 131 MMOL/L (ref 136–145)
SODIUM BLD-SCNC: 132 MMOL/L (ref 136–145)
SODIUM BLD-SCNC: 138 MMOL/L (ref 136–145)
SODIUM SERPL-SCNC: 135 MMOL/L
SODIUM SERPL-SCNC: 140 MMOL/L
SP02: 88
SP02: 96
TRANS PLATPHERESIS VOL PATIENT: NORMAL ML
TRANS PLATPHERESIS VOL PATIENT: NORMAL ML
UNIT NUMBER: NORMAL
VT: 500
WBC # BLD AUTO: 20.65 K/UL
WBC # BLD AUTO: 9.35 K/UL

## 2018-03-13 PROCEDURE — 93005 ELECTROCARDIOGRAM TRACING: CPT

## 2018-03-13 PROCEDURE — 80053 COMPREHEN METABOLIC PANEL: CPT

## 2018-03-13 PROCEDURE — 85730 THROMBOPLASTIN TIME PARTIAL: CPT

## 2018-03-13 PROCEDURE — 85384 FIBRINOGEN ACTIVITY: CPT

## 2018-03-13 PROCEDURE — 25000003 PHARM REV CODE 250: Performed by: THORACIC SURGERY (CARDIOTHORACIC VASCULAR SURGERY)

## 2018-03-13 PROCEDURE — 36000707: Performed by: THORACIC SURGERY (CARDIOTHORACIC VASCULAR SURGERY)

## 2018-03-13 PROCEDURE — 25000003 PHARM REV CODE 250: Performed by: NURSE PRACTITIONER

## 2018-03-13 PROCEDURE — 27000175 HC ADULT BYPASS PUMP

## 2018-03-13 PROCEDURE — 80048 BASIC METABOLIC PNL TOTAL CA: CPT

## 2018-03-13 PROCEDURE — 36592 COLLECT BLOOD FROM PICC: CPT

## 2018-03-13 PROCEDURE — 27201037 HC PRESSURE MONITORING SET UP

## 2018-03-13 PROCEDURE — 25000003 PHARM REV CODE 250: Performed by: STUDENT IN AN ORGANIZED HEALTH CARE EDUCATION/TRAINING PROGRAM

## 2018-03-13 PROCEDURE — 27000221 HC OXYGEN, UP TO 24 HOURS

## 2018-03-13 PROCEDURE — 84100 ASSAY OF PHOSPHORUS: CPT

## 2018-03-13 PROCEDURE — 02CQ0ZZ EXTIRPATION OF MATTER FROM RIGHT PULMONARY ARTERY, OPEN APPROACH: ICD-10-PCS | Performed by: THORACIC SURGERY (CARDIOTHORACIC VASCULAR SURGERY)

## 2018-03-13 PROCEDURE — P9017 PLASMA 1 DONOR FRZ W/IN 8 HR: HCPCS

## 2018-03-13 PROCEDURE — 63600531 PHARM REV CODE 636 NO ALT 250 W HCPCS: Mod: JG | Performed by: STUDENT IN AN ORGANIZED HEALTH CARE EDUCATION/TRAINING PROGRAM

## 2018-03-13 PROCEDURE — 37799 UNLISTED PX VASCULAR SURGERY: CPT

## 2018-03-13 PROCEDURE — 37000008 HC ANESTHESIA 1ST 15 MINUTES: Performed by: THORACIC SURGERY (CARDIOTHORACIC VASCULAR SURGERY)

## 2018-03-13 PROCEDURE — 63600175 PHARM REV CODE 636 W HCPCS: Performed by: STUDENT IN AN ORGANIZED HEALTH CARE EDUCATION/TRAINING PROGRAM

## 2018-03-13 PROCEDURE — 63600175 PHARM REV CODE 636 W HCPCS: Performed by: THORACIC SURGERY (CARDIOTHORACIC VASCULAR SURGERY)

## 2018-03-13 PROCEDURE — P9035 PLATELET PHERES LEUKOREDUCED: HCPCS

## 2018-03-13 PROCEDURE — D9220A PRA ANESTHESIA: Mod: ,,, | Performed by: ANESTHESIOLOGY

## 2018-03-13 PROCEDURE — P9012 CRYOPRECIPITATE EACH UNIT: HCPCS

## 2018-03-13 PROCEDURE — 85014 HEMATOCRIT: CPT

## 2018-03-13 PROCEDURE — 27000191 HC C-V MONITORING

## 2018-03-13 PROCEDURE — 37000009 HC ANESTHESIA EA ADD 15 MINS: Performed by: THORACIC SURGERY (CARDIOTHORACIC VASCULAR SURGERY)

## 2018-03-13 PROCEDURE — 85025 COMPLETE CBC W/AUTO DIFF WBC: CPT | Mod: 91

## 2018-03-13 PROCEDURE — 85002 BLEEDING TIME TEST: CPT

## 2018-03-13 PROCEDURE — 82803 BLOOD GASES ANY COMBINATION: CPT

## 2018-03-13 PROCEDURE — 93312 ECHO TRANSESOPHAGEAL: CPT | Mod: 26,59,, | Performed by: ANESTHESIOLOGY

## 2018-03-13 PROCEDURE — 83735 ASSAY OF MAGNESIUM: CPT

## 2018-03-13 PROCEDURE — C1729 CATH, DRAINAGE: HCPCS | Performed by: THORACIC SURGERY (CARDIOTHORACIC VASCULAR SURGERY)

## 2018-03-13 PROCEDURE — 82330 ASSAY OF CALCIUM: CPT

## 2018-03-13 PROCEDURE — A4216 STERILE WATER/SALINE, 10 ML: HCPCS | Performed by: STUDENT IN AN ORGANIZED HEALTH CARE EDUCATION/TRAINING PROGRAM

## 2018-03-13 PROCEDURE — 33910 REMOVE LUNG ARTERY EMBOLI: CPT | Mod: RT,,, | Performed by: THORACIC SURGERY (CARDIOTHORACIC VASCULAR SURGERY)

## 2018-03-13 PROCEDURE — 36620 INSERTION CATHETER ARTERY: CPT | Mod: 59,,, | Performed by: ANESTHESIOLOGY

## 2018-03-13 PROCEDURE — 93503 INSERT/PLACE HEART CATHETER: CPT | Mod: 59,,, | Performed by: ANESTHESIOLOGY

## 2018-03-13 PROCEDURE — 94761 N-INVAS EAR/PLS OXIMETRY MLT: CPT

## 2018-03-13 PROCEDURE — 36556 INSERT NON-TUNNEL CV CATH: CPT | Mod: 59,,, | Performed by: ANESTHESIOLOGY

## 2018-03-13 PROCEDURE — 85730 THROMBOPLASTIN TIME PARTIAL: CPT | Mod: 91

## 2018-03-13 PROCEDURE — S0028 INJECTION, FAMOTIDINE, 20 MG: HCPCS | Performed by: THORACIC SURGERY (CARDIOTHORACIC VASCULAR SURGERY)

## 2018-03-13 PROCEDURE — 20000000 HC ICU ROOM

## 2018-03-13 PROCEDURE — 27200966 HC CLOSED SUCTION SYSTEM

## 2018-03-13 PROCEDURE — 85610 PROTHROMBIN TIME: CPT

## 2018-03-13 PROCEDURE — 27100088 HC CELL SAVER

## 2018-03-13 PROCEDURE — 02CR0ZZ EXTIRPATION OF MATTER FROM LEFT PULMONARY ARTERY, OPEN APPROACH: ICD-10-PCS | Performed by: THORACIC SURGERY (CARDIOTHORACIC VASCULAR SURGERY)

## 2018-03-13 PROCEDURE — 85520 HEPARIN ASSAY: CPT

## 2018-03-13 PROCEDURE — 36000706: Performed by: THORACIC SURGERY (CARDIOTHORACIC VASCULAR SURGERY)

## 2018-03-13 PROCEDURE — 84132 ASSAY OF SERUM POTASSIUM: CPT

## 2018-03-13 PROCEDURE — 94002 VENT MGMT INPAT INIT DAY: CPT

## 2018-03-13 PROCEDURE — 5A1221Z PERFORMANCE OF CARDIAC OUTPUT, CONTINUOUS: ICD-10-PCS | Performed by: THORACIC SURGERY (CARDIOTHORACIC VASCULAR SURGERY)

## 2018-03-13 PROCEDURE — 86920 COMPATIBILITY TEST SPIN: CPT

## 2018-03-13 PROCEDURE — 84295 ASSAY OF SERUM SODIUM: CPT

## 2018-03-13 PROCEDURE — 27201423 OPTIME MED/SURG SUP & DEVICES STERILE SUPPLY: Performed by: THORACIC SURGERY (CARDIOTHORACIC VASCULAR SURGERY)

## 2018-03-13 PROCEDURE — 85610 PROTHROMBIN TIME: CPT | Mod: 91

## 2018-03-13 PROCEDURE — 88304 TISSUE EXAM BY PATHOLOGIST: CPT | Mod: 26,,, | Performed by: PATHOLOGY

## 2018-03-13 PROCEDURE — 93010 ELECTROCARDIOGRAM REPORT: CPT | Mod: ,,, | Performed by: INTERNAL MEDICINE

## 2018-03-13 PROCEDURE — 88304 TISSUE EXAM BY PATHOLOGIST: CPT | Performed by: PATHOLOGY

## 2018-03-13 PROCEDURE — 27200953 HC CARDIOPLEGIA SYSTEM

## 2018-03-13 RX ORDER — POTASSIUM CHLORIDE 14.9 MG/ML
INJECTION INTRAVENOUS CONTINUOUS PRN
Status: DISCONTINUED | OUTPATIENT
Start: 2018-03-13 | End: 2018-03-13

## 2018-03-13 RX ORDER — FENTANYL CITRATE-0.9 % NACL/PF 10 MCG/ML
PLASTIC BAG, INJECTION (ML) INTRAVENOUS CONTINUOUS
Status: DISCONTINUED | OUTPATIENT
Start: 2018-03-14 | End: 2018-03-14

## 2018-03-13 RX ORDER — ETOMIDATE 2 MG/ML
INJECTION INTRAVENOUS
Status: DISCONTINUED | OUTPATIENT
Start: 2018-03-13 | End: 2018-03-13

## 2018-03-13 RX ORDER — PROPOFOL 10 MG/ML
VIAL (ML) INTRAVENOUS
Status: DISCONTINUED | OUTPATIENT
Start: 2018-03-13 | End: 2018-03-13

## 2018-03-13 RX ORDER — MIDAZOLAM HYDROCHLORIDE 1 MG/ML
INJECTION, SOLUTION INTRAMUSCULAR; INTRAVENOUS
Status: DISCONTINUED | OUTPATIENT
Start: 2018-03-13 | End: 2018-03-13

## 2018-03-13 RX ORDER — LACTULOSE 10 G/15ML
20 SOLUTION ORAL EVERY 6 HOURS PRN
Status: DISCONTINUED | OUTPATIENT
Start: 2018-03-13 | End: 2018-03-19

## 2018-03-13 RX ORDER — LIDOCAINE HCL/PF 100 MG/5ML
SYRINGE (ML) INTRAVENOUS
Status: DISCONTINUED | OUTPATIENT
Start: 2018-03-13 | End: 2018-03-13

## 2018-03-13 RX ORDER — INDOMETHACIN 25 MG/1
50 CAPSULE ORAL ONCE AS NEEDED
Status: COMPLETED | OUTPATIENT
Start: 2018-03-13 | End: 2018-03-13

## 2018-03-13 RX ORDER — CEFAZOLIN SODIUM 1 G/3ML
2 INJECTION, POWDER, FOR SOLUTION INTRAMUSCULAR; INTRAVENOUS
Status: DISCONTINUED | OUTPATIENT
Start: 2018-03-13 | End: 2018-03-13 | Stop reason: HOSPADM

## 2018-03-13 RX ORDER — FENTANYL CITRATE 50 UG/ML
25 INJECTION, SOLUTION INTRAMUSCULAR; INTRAVENOUS
Status: DISCONTINUED | OUTPATIENT
Start: 2018-03-13 | End: 2018-03-15

## 2018-03-13 RX ORDER — OXYCODONE HYDROCHLORIDE 5 MG/1
10 TABLET ORAL EVERY 4 HOURS PRN
Status: DISCONTINUED | OUTPATIENT
Start: 2018-03-13 | End: 2018-03-19

## 2018-03-13 RX ORDER — ASPIRIN 325 MG
325 TABLET ORAL DAILY
Status: DISCONTINUED | OUTPATIENT
Start: 2018-03-14 | End: 2018-03-19

## 2018-03-13 RX ORDER — ONDANSETRON 2 MG/ML
4 INJECTION INTRAMUSCULAR; INTRAVENOUS EVERY 6 HOURS PRN
Status: DISCONTINUED | OUTPATIENT
Start: 2018-03-13 | End: 2018-03-19

## 2018-03-13 RX ORDER — ALBUMIN HUMAN 50 G/1000ML
25 SOLUTION INTRAVENOUS ONCE AS NEEDED
Status: ACTIVE | OUTPATIENT
Start: 2018-03-13 | End: 2018-03-13

## 2018-03-13 RX ORDER — NOREPINEPHRINE BITARTRATE/D5W 4MG/250ML
0.02 PLASTIC BAG, INJECTION (ML) INTRAVENOUS CONTINUOUS
Status: DISCONTINUED | OUTPATIENT
Start: 2018-03-13 | End: 2018-03-14

## 2018-03-13 RX ORDER — FAMOTIDINE 10 MG/ML
20 INJECTION INTRAVENOUS 2 TIMES DAILY
Status: DISCONTINUED | OUTPATIENT
Start: 2018-03-13 | End: 2018-03-15

## 2018-03-13 RX ORDER — CEFAZOLIN SODIUM 1 G/3ML
2 INJECTION, POWDER, FOR SOLUTION INTRAMUSCULAR; INTRAVENOUS
Status: COMPLETED | OUTPATIENT
Start: 2018-03-13 | End: 2018-03-14

## 2018-03-13 RX ORDER — MUPIROCIN 20 MG/G
OINTMENT TOPICAL
Status: CANCELLED | OUTPATIENT
Start: 2018-03-13

## 2018-03-13 RX ORDER — CEFAZOLIN SODIUM 1 G/3ML
INJECTION, POWDER, FOR SOLUTION INTRAMUSCULAR; INTRAVENOUS
Status: DISCONTINUED | OUTPATIENT
Start: 2018-03-13 | End: 2018-03-13

## 2018-03-13 RX ORDER — NEOSTIGMINE METHYLSULFATE 1 MG/ML
INJECTION, SOLUTION INTRAVENOUS
Status: DISCONTINUED | OUTPATIENT
Start: 2018-03-13 | End: 2018-03-13

## 2018-03-13 RX ORDER — BACITRACIN 50000 [IU]/1
INJECTION, POWDER, FOR SOLUTION INTRAMUSCULAR
Status: DISCONTINUED | OUTPATIENT
Start: 2018-03-13 | End: 2018-03-13 | Stop reason: HOSPADM

## 2018-03-13 RX ORDER — OXYCODONE HYDROCHLORIDE 5 MG/1
5 TABLET ORAL EVERY 4 HOURS PRN
Status: DISCONTINUED | OUTPATIENT
Start: 2018-03-13 | End: 2018-03-19 | Stop reason: HOSPADM

## 2018-03-13 RX ORDER — GLYCOPYRROLATE 0.2 MG/ML
INJECTION INTRAMUSCULAR; INTRAVENOUS
Status: DISCONTINUED | OUTPATIENT
Start: 2018-03-13 | End: 2018-03-13

## 2018-03-13 RX ORDER — HEPARIN SODIUM 1000 [USP'U]/ML
INJECTION, SOLUTION INTRAVENOUS; SUBCUTANEOUS
Status: DISCONTINUED | OUTPATIENT
Start: 2018-03-13 | End: 2018-03-13

## 2018-03-13 RX ORDER — FENTANYL CITRATE 50 UG/ML
INJECTION, SOLUTION INTRAMUSCULAR; INTRAVENOUS
Status: DISCONTINUED | OUTPATIENT
Start: 2018-03-13 | End: 2018-03-13

## 2018-03-13 RX ORDER — MAGNESIUM SULFATE HEPTAHYDRATE 40 MG/ML
INJECTION, SOLUTION INTRAVENOUS
Status: DISCONTINUED | OUTPATIENT
Start: 2018-03-13 | End: 2018-03-13

## 2018-03-13 RX ORDER — ONDANSETRON 2 MG/ML
INJECTION INTRAMUSCULAR; INTRAVENOUS
Status: DISCONTINUED | OUTPATIENT
Start: 2018-03-13 | End: 2018-03-13

## 2018-03-13 RX ORDER — POTASSIUM CHLORIDE 29.8 MG/ML
40 INJECTION INTRAVENOUS EVERY 4 HOURS
Status: COMPLETED | OUTPATIENT
Start: 2018-03-13 | End: 2018-03-14

## 2018-03-13 RX ORDER — MUPIROCIN 20 MG/G
1 OINTMENT TOPICAL 2 TIMES DAILY
Status: DISCONTINUED | OUTPATIENT
Start: 2018-03-13 | End: 2018-03-18

## 2018-03-13 RX ORDER — ROCURONIUM BROMIDE 10 MG/ML
INJECTION, SOLUTION INTRAVENOUS
Status: DISCONTINUED | OUTPATIENT
Start: 2018-03-13 | End: 2018-03-13

## 2018-03-13 RX ORDER — TRANEXAMIC ACID 100 MG/ML
INJECTION, SOLUTION INTRAVENOUS CONTINUOUS PRN
Status: DISCONTINUED | OUTPATIENT
Start: 2018-03-13 | End: 2018-03-13

## 2018-03-13 RX ORDER — ACETAMINOPHEN 325 MG/1
650 TABLET ORAL EVERY 4 HOURS PRN
Status: DISCONTINUED | OUTPATIENT
Start: 2018-03-13 | End: 2018-03-19

## 2018-03-13 RX ORDER — MUPIROCIN 20 MG/G
OINTMENT TOPICAL
Status: DISCONTINUED | OUTPATIENT
Start: 2018-03-13 | End: 2018-03-13 | Stop reason: HOSPADM

## 2018-03-13 RX ORDER — PROPOFOL 10 MG/ML
5 INJECTION, EMULSION INTRAVENOUS CONTINUOUS
Status: DISCONTINUED | OUTPATIENT
Start: 2018-03-13 | End: 2018-03-14

## 2018-03-13 RX ORDER — PROTAMINE SULFATE 10 MG/ML
INJECTION, SOLUTION INTRAVENOUS
Status: DISCONTINUED | OUTPATIENT
Start: 2018-03-13 | End: 2018-03-13

## 2018-03-13 RX ORDER — METOCLOPRAMIDE HYDROCHLORIDE 5 MG/ML
5 INJECTION INTRAMUSCULAR; INTRAVENOUS EVERY 6 HOURS PRN
Status: DISCONTINUED | OUTPATIENT
Start: 2018-03-13 | End: 2018-03-19

## 2018-03-13 RX ORDER — SODIUM CHLORIDE 9 MG/ML
INJECTION, SOLUTION INTRAVENOUS CONTINUOUS PRN
Status: DISCONTINUED | OUTPATIENT
Start: 2018-03-13 | End: 2018-03-13

## 2018-03-13 RX ORDER — NICARDIPINE HYDROCHLORIDE 0.2 MG/ML
5 INJECTION INTRAVENOUS CONTINUOUS
Status: DISCONTINUED | OUTPATIENT
Start: 2018-03-13 | End: 2018-03-15

## 2018-03-13 RX ORDER — ASPIRIN 325 MG
325 TABLET ORAL ONCE
Status: COMPLETED | OUTPATIENT
Start: 2018-03-13 | End: 2018-03-13

## 2018-03-13 RX ORDER — TRANEXAMIC ACID 100 MG/ML
INJECTION, SOLUTION INTRAVENOUS
Status: DISCONTINUED | OUTPATIENT
Start: 2018-03-13 | End: 2018-03-13

## 2018-03-13 RX ORDER — LIDOCAINE HYDROCHLORIDE 10 MG/ML
1 INJECTION, SOLUTION EPIDURAL; INFILTRATION; INTRACAUDAL; PERINEURAL ONCE
Status: COMPLETED | OUTPATIENT
Start: 2018-03-13 | End: 2018-03-13

## 2018-03-13 RX ORDER — DOCUSATE SODIUM 100 MG/1
100 CAPSULE, LIQUID FILLED ORAL 2 TIMES DAILY
Status: DISCONTINUED | OUTPATIENT
Start: 2018-03-13 | End: 2018-03-19 | Stop reason: HOSPADM

## 2018-03-13 RX ADMIN — MIDAZOLAM HYDROCHLORIDE 2 MG: 1 INJECTION, SOLUTION INTRAMUSCULAR; INTRAVENOUS at 07:03

## 2018-03-13 RX ADMIN — MUPIROCIN 1 G: 20 OINTMENT TOPICAL at 09:03

## 2018-03-13 RX ADMIN — EPINEPHRINE 0.06 MCG/KG/MIN: 1 INJECTION INTRAMUSCULAR; INTRAVENOUS; SUBCUTANEOUS at 07:03

## 2018-03-13 RX ADMIN — CALCIUM CHLORIDE 500 MG: 100 INJECTION, SOLUTION INTRAVENOUS at 02:03

## 2018-03-13 RX ADMIN — SODIUM BICARBONATE 50 MEQ: 84 INJECTION, SOLUTION INTRAVENOUS at 05:03

## 2018-03-13 RX ADMIN — ROCURONIUM BROMIDE 100 MG: 10 INJECTION, SOLUTION INTRAVENOUS at 07:03

## 2018-03-13 RX ADMIN — ROCURONIUM BROMIDE 50 MG: 10 INJECTION, SOLUTION INTRAVENOUS at 12:03

## 2018-03-13 RX ADMIN — CALCIUM CHLORIDE 500 MG: 100 INJECTION, SOLUTION INTRAVENOUS at 08:03

## 2018-03-13 RX ADMIN — SODIUM CHLORIDE 6 UNITS/HR: 9 INJECTION, SOLUTION INTRAVENOUS at 10:03

## 2018-03-13 RX ADMIN — POTASSIUM CHLORIDE 40 MEQ: 400 INJECTION, SOLUTION INTRAVENOUS at 05:03

## 2018-03-13 RX ADMIN — LIDOCAINE HYDROCHLORIDE 100 MG: 20 INJECTION, SOLUTION INTRAVENOUS at 11:03

## 2018-03-13 RX ADMIN — SODIUM CHLORIDE, SODIUM GLUCONATE, SODIUM ACETATE, POTASSIUM CHLORIDE, MAGNESIUM CHLORIDE, SODIUM PHOSPHATE, DIBASIC, AND POTASSIUM PHOSPHATE: .53; .5; .37; .037; .03; .012; .00082 INJECTION, SOLUTION INTRAVENOUS at 02:03

## 2018-03-13 RX ADMIN — CEFAZOLIN 3 G: 330 INJECTION, POWDER, FOR SOLUTION INTRAMUSCULAR; INTRAVENOUS at 09:03

## 2018-03-13 RX ADMIN — SODIUM CHLORIDE 27 UNITS/HR: 9 INJECTION, SOLUTION INTRAVENOUS at 10:03

## 2018-03-13 RX ADMIN — FENTANYL CITRATE 250 MCG: 50 INJECTION, SOLUTION INTRAMUSCULAR; INTRAVENOUS at 07:03

## 2018-03-13 RX ADMIN — FAMOTIDINE 20 MG: 10 INJECTION INTRAVENOUS at 09:03

## 2018-03-13 RX ADMIN — PROPOFOL 5 MCG/KG/MIN: 10 INJECTION, EMULSION INTRAVENOUS at 05:03

## 2018-03-13 RX ADMIN — FENTANYL CITRATE 250 MCG: 50 INJECTION, SOLUTION INTRAMUSCULAR; INTRAVENOUS at 09:03

## 2018-03-13 RX ADMIN — LIDOCAINE HYDROCHLORIDE 10 MG: 10 INJECTION, SOLUTION EPIDURAL; INFILTRATION; INTRACAUDAL; PERINEURAL at 06:03

## 2018-03-13 RX ADMIN — SODIUM CHLORIDE, SODIUM GLUCONATE, SODIUM ACETATE, POTASSIUM CHLORIDE, MAGNESIUM CHLORIDE, SODIUM PHOSPHATE, DIBASIC, AND POTASSIUM PHOSPHATE: .53; .5; .37; .037; .03; .012; .00082 INJECTION, SOLUTION INTRAVENOUS at 03:03

## 2018-03-13 RX ADMIN — MAGNESIUM SULFATE IN WATER 2 G: 40 INJECTION, SOLUTION INTRAVENOUS at 03:03

## 2018-03-13 RX ADMIN — PROTAMINE SULFATE 5 MG: 10 INJECTION, SOLUTION INTRAVENOUS at 02:03

## 2018-03-13 RX ADMIN — POTASSIUM CHLORIDE: 14.9 INJECTION, SOLUTION INTRAVENOUS at 01:03

## 2018-03-13 RX ADMIN — POTASSIUM CHLORIDE: 14.9 INJECTION, SOLUTION INTRAVENOUS at 08:03

## 2018-03-13 RX ADMIN — CALCIUM CHLORIDE 500 MG: 100 INJECTION, SOLUTION INTRAVENOUS at 01:03

## 2018-03-13 RX ADMIN — SODIUM CHLORIDE, SODIUM GLUCONATE, SODIUM ACETATE, POTASSIUM CHLORIDE, MAGNESIUM CHLORIDE, SODIUM PHOSPHATE, DIBASIC, AND POTASSIUM PHOSPHATE: .53; .5; .37; .037; .03; .012; .00082 INJECTION, SOLUTION INTRAVENOUS at 07:03

## 2018-03-13 RX ADMIN — ROCURONIUM BROMIDE 50 MG: 10 INJECTION, SOLUTION INTRAVENOUS at 08:03

## 2018-03-13 RX ADMIN — TRANEXAMIC ACID 1500 MG: 100 INJECTION, SOLUTION INTRAVENOUS at 08:03

## 2018-03-13 RX ADMIN — NOREPINEPHRINE BITARTRATE 0.04 MCG/KG/MIN: 1 INJECTION, SOLUTION, CONCENTRATE INTRAVENOUS at 10:03

## 2018-03-13 RX ADMIN — VASOPRESSIN 0.04 UNITS/MIN: 20 INJECTION INTRAVENOUS at 07:03

## 2018-03-13 RX ADMIN — PROPOFOL 20 MG: 10 INJECTION, EMULSION INTRAVENOUS at 11:03

## 2018-03-13 RX ADMIN — POTASSIUM CHLORIDE: 14.9 INJECTION, SOLUTION INTRAVENOUS at 03:03

## 2018-03-13 RX ADMIN — DEXMEDETOMIDINE HYDROCHLORIDE 0.5 MCG/KG/HR: 100 INJECTION, SOLUTION, CONCENTRATE INTRAVENOUS at 03:03

## 2018-03-13 RX ADMIN — POTASSIUM CHLORIDE: 14.9 INJECTION, SOLUTION INTRAVENOUS at 09:03

## 2018-03-13 RX ADMIN — METHYLPREDNISOLONE SODIUM SUCCINATE 1000 MG: 40 INJECTION, POWDER, FOR SOLUTION INTRAMUSCULAR; INTRAVENOUS at 10:03

## 2018-03-13 RX ADMIN — CEFAZOLIN 2 G: 330 INJECTION, POWDER, FOR SOLUTION INTRAMUSCULAR; INTRAVENOUS at 11:03

## 2018-03-13 RX ADMIN — BACITRACIN 50000 UNITS: 50000 INJECTION, POWDER, LYOPHILIZED, FOR SOLUTION INTRAMUSCULAR at 03:03

## 2018-03-13 RX ADMIN — ROCURONIUM BROMIDE 50 MG: 10 INJECTION, SOLUTION INTRAVENOUS at 10:03

## 2018-03-13 RX ADMIN — FENTANYL CITRATE 50 MCG: 50 INJECTION, SOLUTION INTRAMUSCULAR; INTRAVENOUS at 03:03

## 2018-03-13 RX ADMIN — TRANEXAMIC ACID 1 MG/KG/HR: 100 INJECTION, SOLUTION INTRAVENOUS at 07:03

## 2018-03-13 RX ADMIN — ROCURONIUM BROMIDE 50 MG: 10 INJECTION, SOLUTION INTRAVENOUS at 09:03

## 2018-03-13 RX ADMIN — ETOMIDATE 10 MG: 2 INJECTION, SOLUTION INTRAVENOUS at 07:03

## 2018-03-13 RX ADMIN — HEPARIN SODIUM 40000 UNITS: 1000 INJECTION, SOLUTION INTRAVENOUS; SUBCUTANEOUS at 09:03

## 2018-03-13 RX ADMIN — SODIUM CHLORIDE: 0.9 INJECTION, SOLUTION INTRAVENOUS at 07:03

## 2018-03-13 RX ADMIN — POTASSIUM CHLORIDE: 14.9 INJECTION, SOLUTION INTRAVENOUS at 02:03

## 2018-03-13 RX ADMIN — CEFAZOLIN 2 G: 330 INJECTION, POWDER, FOR SOLUTION INTRAMUSCULAR; INTRAVENOUS at 05:03

## 2018-03-13 RX ADMIN — EPINEPHRINE 0.04 MCG/KG/MIN: 1 INJECTION INTRAMUSCULAR; INTRAVENOUS; SUBCUTANEOUS at 05:03

## 2018-03-13 RX ADMIN — NEOSTIGMINE METHYLSULFATE 5 MG: 1 INJECTION INTRAVENOUS at 05:03

## 2018-03-13 RX ADMIN — MIDAZOLAM HYDROCHLORIDE 2 MG: 1 INJECTION, SOLUTION INTRAMUSCULAR; INTRAVENOUS at 08:03

## 2018-03-13 RX ADMIN — FENTANYL CITRATE 100 MCG: 50 INJECTION, SOLUTION INTRAMUSCULAR; INTRAVENOUS at 12:03

## 2018-03-13 RX ADMIN — GELATIN ABSORBABLE SPONGE SIZE 100 1 APPLICATOR: MISC at 11:03

## 2018-03-13 RX ADMIN — HEPARIN SODIUM 10000 UNITS: 1000 INJECTION, SOLUTION INTRAVENOUS; SUBCUTANEOUS at 09:03

## 2018-03-13 RX ADMIN — CEFAZOLIN 3 G: 330 INJECTION, POWDER, FOR SOLUTION INTRAMUSCULAR; INTRAVENOUS at 01:03

## 2018-03-13 RX ADMIN — GLYCOPYRROLATE 0.6 MG: 0.2 INJECTION, SOLUTION INTRAMUSCULAR; INTRAVENOUS at 05:03

## 2018-03-13 RX ADMIN — ASPIRIN 325 MG ORAL TABLET 325 MG: 325 PILL ORAL at 05:03

## 2018-03-13 RX ADMIN — POTASSIUM CHLORIDE 40 MEQ: 400 INJECTION, SOLUTION INTRAVENOUS at 09:03

## 2018-03-13 RX ADMIN — ANTITHROMBIN III (HUMAN) 500 UNITS: KIT at 09:03

## 2018-03-13 RX ADMIN — SODIUM CHLORIDE 11 UNITS/HR: 9 INJECTION, SOLUTION INTRAVENOUS at 05:03

## 2018-03-13 NOTE — ANESTHESIA PROCEDURE NOTES
ASIA    Diagnosis: Pulmonary hypertension  Patient location during procedure: OR  Procedure start time: 3/13/2018 7:35 AM  Timeout: 3/13/2018 7:35 AM  Procedure end time: 3/13/2018 8:00 AM  Surgery related to: Pulmonary thromboendarterectomy  Exam type: Baseline  Staffing  Anesthesiologist: JORDANA HUNT  Performed: anesthesiologist   Preanesthetic Checklist  Completed: patient identified, surgical consent, pre-op evaluation, timeout performed, risks and benefits discussed, monitors and equipment checked, anesthesia consent given, oxygen available, suction available, hand hygiene performed and patient being monitored  Setup & Induction  Patient preparation: bite block inserted  Probe Insertion: easy  Exam: complete  Exam         LVAD:no  Estimated Ejection Fraction: > 55% normal            Right Heart  Right Ventricle: dilated  Right Ventricle Function: normal    Intra Atrial Septum  PFO: no shunt by color flow doppler          Right Ventricle  Size: dilated, Free Wall Thickness: RVH >/= 0.5cm    Aortic Valve:  Stenosis: none  Morphology: trileaflet  Regurgitation: no aortic valve regurgitation         Tricuspid Valve:  Morphology: normal  Regurgitation: severe    Pulmonic Valve:  Morphology:normal          Effusions  Effusions: none    Summary  Findings discussed with surgeon.    Other Findings

## 2018-03-13 NOTE — INTERVAL H&P NOTE
The patient has been examined and the H&P has been reviewed:    I concur with the findings and changes have been noted since the H&P was written: Plan for pulmonary endarterectomy, not carotid as stated in first line of HPI.  Also, patient is super morbidly obese with a BMI of 50.    Anesthesia/Surgery risks, benefits and alternative options discussed and understood by patient/family.    Active Hospital Problems    Diagnosis  POA    Pulmonary embolus [I26.99]  Yes     Added automatically from request for surgery [id number]        Resolved Hospital Problems    Diagnosis Date Resolved POA   No resolved problems to display.     Brandt Romero MD  Thoracic Surgery Resident, PGY6  Cardiothoracic Surgery  [facility]

## 2018-03-13 NOTE — ANESTHESIA PROCEDURE NOTES
Central Line    Diagnosis: PE  Patient location during procedure: done in OR  Procedure start time: 3/13/2018 8:01 AM  Timeout: 3/13/2018 8:00 AM  Procedure end time: 3/13/2018 8:15 AM  Staffing  Anesthesiologist: JORDANA HUNT  Resident/CRNA: ISSA MIR  Performed: resident/CRNA   Anesthesiologist was present at the time of the procedure.  Preanesthetic Checklist  Completed: patient identified, site marked, surgical consent, pre-op evaluation, timeout performed, IV checked, risks and benefits discussed, monitors and equipment checked and anesthesia consent given  Indication  Indication: hemodynamic monitoring, med administration, vascular access     Anesthesia   general anesthesia    Central Line  Skin Prep: skin prepped with ChloraPrep, skin prep agent completely dried prior to procedure  maximum sterile barriers used during central venous catheter insertion  hand hygiene performed prior to central venous catheter insertion  Location: right internal jugular,   Catheter type: introducer  Catheter Size: 9 Fr  Inserted Catheter Length: 11.5 cm  Ultrasound: vascular probe with ultrasound  Vessel Caliber: medium, patent, compressibility normal  Needle advanced into vessel with real time Ultrasound guidance.   Manometry: Venous cannualation confirmed by visual estimation of blood vessel pressure using manometry.  Insertion Attempts: 1   Securement:line sutured, chlorhexidine patch, sterile dressing applied and blood return through all ports     Post-Procedure  Adverse Events:none

## 2018-03-13 NOTE — ANESTHESIA PROCEDURE NOTES
[facility] [place] [facility]    Diagnosis: PE  Patient location during procedure: done in OR  Procedure start time: 3/13/2018 8:16 AM  Timeout: 3/13/2018 8:15 AM  Procedure end time: 3/13/2018 8:20 AM  Staffing  Anesthesiologist: JORDANA HUNT  Resident/CRNA: ISSA MIR  Performed: resident/CRNA   Anesthesiologist was present at the time of the procedure.  Preanesthetic Checklist  Completed: patient identified, site marked, surgical consent, pre-op evaluation, timeout performed, IV checked, risks and benefits discussed, monitors and equipment checked and anesthesia consent given  [place] Line  Skin Prep: chlorhexidine gluconate  Local Infiltration: none  Location: right,  internal jugular vein  Vessel Caliber: medium, patent, compressibility normal  Introducer: 9 Fr single lumen, manometry used.  Device: CCO/Oximetric Catheter  Catheter Size: 8 Fr  Catheter placement by yes. Heme positive aspiration all ports. PAC floated with balloon up not wedgedSterile sheath used  Locked at: 48 cm.Insertion Attempts: 1  Indication: intravenous therapy, hemodynamic monitoring  Ultrasound Guidance  Needle advanced into vessel with real time Ultrasound guidance.  Assessment  Central Line Bundle Protocol followed. Hand hygiene before procedure, surgical cap worn, surgical mask worn, sterile surgical gloves worn, large sterile drape used.  Verification: blood return  Dressing: secured with tape and tegaderm  Patient: Tolerated Well

## 2018-03-13 NOTE — H&P (VIEW-ONLY)
Subjective:      Patient ID: Jaquan Hines Jr. is a 47 y.o. male.    Chief Complaint: Pre-op Exam      HPI:  Jaquan Hines Jr. is a 47 y.o. male who presents for his pre op appointment for his carotid endarterectomy procedure. He has a known pulmonary embolism, chronic respiratory failure with hypoxia on 5L home O2 with obesity hypoventilation syndrome.  He arrives to clinic today via wheel chair on O2 via nasal cannula. He endorses SOB, lightheadedness and dizziness. He had a near syncopal episode previous clinic visit no changes in symtpoms.     Review of patient's allergies indicates:   Allergen Reactions    Clopidogrel      Past Medical History:   Diagnosis Date    Acute pulmonary embolism     Anticoagulant long-term use     Chronic respiratory failure with hypoxia     Essential hypertension, malignant     Pulmonary artery hypertension      Past Surgical History:   Procedure Laterality Date    CARDIAC CATHETERIZATION      CATARACT EXTRACTION      MASS EXCISION      inner thigh    TONSILLECTOMY, ADENOIDECTOMY       Family History     None        Social History     Social History    Marital status: Single     Spouse name: N/A    Number of children: N/A    Years of education: N/A     Occupational History    Not on file.     Social History Main Topics    Smoking status: Never Smoker    Smokeless tobacco: Never Used    Alcohol use No    Drug use: No    Sexual activity: Not on file     Other Topics Concern    Not on file     Social History Narrative    No narrative on file       Current medications Reviewed    Review of Systems   Constitutional: Positive for activity change and fatigue. Negative for appetite change and fever.   HENT: Negative for congestion, dental problem, sneezing and sore throat.    Eyes: Negative for pain, discharge and visual disturbance.   Respiratory: Positive for shortness of breath. Negative for cough and wheezing.    Cardiovascular: Negative for chest pain,  palpitations and leg swelling.   Gastrointestinal: Negative for abdominal distention, abdominal pain, constipation, diarrhea, nausea and vomiting.   Endocrine: Negative for polydipsia, polyphagia and polyuria.   Genitourinary: Negative for dysuria, frequency and urgency.   Musculoskeletal: Positive for gait problem. Negative for back pain.   Skin: Negative for rash and wound.   Neurological: Positive for dizziness and light-headedness. Negative for seizures, syncope and headaches.   Hematological: Does not bruise/bleed easily.   Psychiatric/Behavioral: Negative for agitation and confusion. The patient is not nervous/anxious.      Objective:   Physical Exam   Constitutional: He is oriented to person, place, and time. He appears well-developed and well-nourished.   HENT:   Head: Normocephalic and atraumatic.   Eyes: Pupils are equal, round, and reactive to light.   Neck: Normal range of motion. Neck supple.   Cardiovascular: Normal rate, regular rhythm and normal heart sounds.    Pulmonary/Chest: Effort normal and breath sounds normal.   Home O2 use    Abdominal: Soft. Bowel sounds are normal.   Musculoskeletal: Normal range of motion.   Wheelchair dependent    Neurological: He is alert and oriented to person, place, and time.   Skin: Skin is warm and dry. Capillary refill takes more than 3 seconds.   Psychiatric: He has a normal mood and affect. His behavior is normal.     Diagnostic Results:    2D ECHO 3/7/18  1 - Normal left ventricular systolic function (EF 55-60%).     2 - No wall motion abnormalities.     3 - Biatrial enlargement.     4 - Impaired LV relaxation, elevated LAP (grade 2 diastolic dysfunction).     5 - Right ventricular enlargement with hypertrophy, with mildly depressed systolic function.     6 - Pulmonary hypertension. The estimated PA systolic pressure is 81 mmHg.     7 - Mild mitral regurgitation.     8 - Moderate tricuspid regurgitation.     9 - Trivial pericardial effusion.     [facility] 11/14/17  -  Left Main Coronary Artery:             The LM has luminal irregularities. There is RHONDA 3 flow.     - Left Anterior Descending Artery:             The mid LAD is patent. There is RHONDA 3 flow. The remaining portion of the vessel has luminal irregularities (10). There is occlusive thrombus in the lingular arteries with wedge shaped perfusino defect     - Left Circumflex Artery:             The LCX has luminal irregularities. There is RHONDA 3 flow.     - Right Coronary Artery:             The RCA has luminal irregularities. There is RHONDA 3 flow.     - Main Pulmonary Artery:             The Main Pulmonary Artery is normal. There is RHONDA 3 flow.     - Left Pulmonary Artery:             The Left Pulmonary Artery. The vessel is ectatic. There is RHONDA 3 flow. There is occlusive thrombus in the superior and inferior lingular artery     - Right Pulmonary Artery:             The Right Pulmonary Artery. The vessel is ectatic. There is RHONDA 3 flow. There is occlusive thrombus in the inferior lobar arteries    Carotid US:   RIGHT SIDE:   Normal- No evidence of plaque in the ICA  Antegrade flow in the right vertebral artery.   LEFT SIDE:  Normal- No evidence of plaque in the ICA  Antegrade flow in the left vertebral artery.    CT chest 2/14/18  Right interlobar pulmonary artery demonstrates a filling defect consistent with pulmonary thromboembolism; V/Q scan of 11/2/2017 revealed a mismatched defect in this region.  Decreased perfusion of the right middle and lower lobes limits enhancement of pulmonary arterial blood in these regions with consequent low attenuation of pulmonary venous return in the right inferior pulmonary vein.    V/Q scan of 11/2/2017 also revealed mismatched perfusion defect in the left lower and upper lobes.  We do not identify corresponding filling defect in the large pulmonary arteries in these regions but thromboembolism could have fractionated and moved distally making it inapparent on CT.  Because V/Q scan  is more sensitive to regional pulmonary underperfusion than is pulmonary CTA, clinical considerations will determine if repeat V/Q scan is warranted.     Mosaic attenuation of the pulmonary parenchyma is consistent with increased pulmonary vascular resistance.  Chronic thromboembolism could account for or contribute to elevated pulmonary vascular resistance.  Small airways disease inducing hypoxia and reflexive vasoconstriction can present in a similar fashion.    Small region of endomyocardial fat identified in the left ventricular apex and distal interventricular septum, indicates infarct in this region.    Assessment:     Plan:   I spoke with the patient and his wife in detail about the risks and benefits of the procedure. We have had multiple conversations about this and I have reviewed all of the studies. We will plan pulmonary endarterectomy for CTEPH with circulatory arrest.

## 2018-03-13 NOTE — BRIEF OP NOTE
[facility]  Brief Operative Note    SUMMARY     Surgery Date: 3/13/2018     Surgeon(s) and Role:     * Gabriel Jennings MD - Primary     * Brandt Romero MD - Fellow    Assisting Surgeon: None    Pre-op Diagnosis:  Other chronic pulmonary embolism without acute cor pulmonale [I27.82]    Post-op Diagnosis:  Post-Op Diagnosis Codes:     * Other chronic pulmonary embolism without acute cor pulmonale [I27.82]    Procedure(s) (LRB):  ENDARTERECTOMY- PULMONARY (N/A)    Anesthesia: General    Description of Procedure: Bilateral pulmonary endarterectomy under moderate hypothermic circulatory arrest.    Description of the findings of the procedure: Two mediastinal Alli drains.    Estimated Blood Loss: 100mL         Specimens:   Specimen (12h ago through future)    Start     Ordered    03/13/18 1327  Specimen to Pathology - Surgery  Once     Comments:  1.  Right Pulmonary Embolus - Permanent2.  Left Pulmonary Embolus - Permanent      03/13/18 1327        Brandt Romero MD  Thoracic Surgery Resident, PGY6  Cardiothoracic Surgery  [facility]

## 2018-03-13 NOTE — ANESTHESIA PROCEDURE NOTES
Arterial    Diagnosis: PE    Patient location during procedure: done in OR  Procedure start time: 3/13/2018 7:16 AM  Timeout: 3/13/2018 7:15 AM  Procedure end time: 3/13/2018 7:20 AM  Staffing  Anesthesiologist: JORDANA HUNT  Resident/CRNA: ISSA MIR  Performed: resident/CRNA   Anesthesiologist was present at the time of the procedure.  Preanesthetic Checklist  Completed: patient identified, site marked, surgical consent, pre-op evaluation, timeout performed, IV checked, risks and benefits discussed, monitors and equipment checked and anesthesia consent givenArterial  Skin Prep: chlorhexidine gluconate  Local Infiltration: none  Orientation: left  Location: brachial  Catheter Size: 20 G  Catheter placement by Ultrasound guidance. Heme positive aspiration all ports.  Vessel Caliber: medium, patent, compressibility normal  Needle advanced into vessel with real time Ultrasound guidance.Insertion Attempts: 1  Assessment  Dressing: secured with tape and tegaderm  Patient: Tolerated well

## 2018-03-13 NOTE — TRANSFER OF CARE
"Anesthesia Transfer of Care Note    Patient: Jaquan Hines Jr.    Procedure(s) Performed: Procedure(s) (LRB):  ENDARTERECTOMY- PULMONARY (N/A)    Patient location: ICU    Anesthesia Type: general    Transport from OR: Transported from OR intubated on 100% O2 by AMBU with adequate controlled ventilation. Continuous ECG monitoring in transport. Continuous SpO2 monitoring in transport. Continuos invasive BP monitoring in transport    Post pain: adequate analgesia    Post assessment: no apparent anesthetic complications and tolerated procedure well    Post vital signs: stable    Level of consciousness: sedated    Nausea/Vomiting: no nausea/vomiting    Complications: none    Transfer of care protocol was followed      Last vitals:   Visit Vitals  /68   Pulse 88   Temp 36.8 °C (98.3 °F) (Oral)   Resp 16   Ht 5' 7" (1.702 m)   Wt (!) 147.4 kg (325 lb)   SpO2 (!) 92%   BMI 50.90 kg/m²     "

## 2018-03-13 NOTE — ANESTHESIA PROCEDURE NOTES
Central Line    Diagnosis: PE  Patient location during procedure: done in OR  Procedure start time: 3/13/2018 8:01 AM  Timeout: 3/13/2018 8:00 AM  Procedure end time: 3/13/2018 8:15 AM  Staffing  Anesthesiologist: JORDANA HUNT  Resident/CRNA: ISSA MIR  Performed: resident/CRNA   Anesthesiologist was present at the time of the procedure.  Preanesthetic Checklist  Completed: patient identified, site marked, surgical consent, pre-op evaluation, timeout performed, IV checked, risks and benefits discussed, monitors and equipment checked and anesthesia consent given  Indication  Indication: hemodynamic monitoring, vascular access, med administration     Anesthesia   general anesthesia    Central Line  Skin Prep: skin prepped with ChloraPrep, skin prep agent completely dried prior to procedure  maximum sterile barriers used during central venous catheter insertion  hand hygiene performed prior to central venous catheter insertion  Location: right internal jugular,   Catheter type: triple lumen  Catheter Size: 12 Fr  Inserted Catheter Length: 16 cm  Ultrasound: vascular probe with ultrasound  Vessel Caliber: medium, patent, compressibility normal  Needle advanced into vessel with real time Ultrasound guidance.   Manometry: Venous cannualation confirmed by visual estimation of blood vessel pressure using manometry.  Insertion Attempts: 1   Securement:line sutured, chlorhexidine patch, sterile dressing applied and blood return through all ports     Post-Procedure  Adverse Events:none

## 2018-03-14 PROBLEM — E66.01 SEVERE OBESITY (BMI >= 40): Status: ACTIVE | Noted: 2018-03-14

## 2018-03-14 PROBLEM — R73.9 STRESS HYPERGLYCEMIA: Status: ACTIVE | Noted: 2018-03-14

## 2018-03-14 LAB
ALLENS TEST: ABNORMAL
ALLENS TEST: ABNORMAL
ANION GAP SERPL CALC-SCNC: 16 MMOL/L
ANISOCYTOSIS BLD QL SMEAR: ABNORMAL
BASO STIPL BLD QL SMEAR: ABNORMAL
BASOPHILS # BLD AUTO: 0.02 K/UL
BASOPHILS NFR BLD: 0.1 %
BUN SERPL-MCNC: 16 MG/DL
BURR CELLS BLD QL SMEAR: ABNORMAL
CALCIUM SERPL-MCNC: 9.5 MG/DL
CHLORIDE SERPL-SCNC: 100 MMOL/L
CO2 SERPL-SCNC: 25 MMOL/L
CREAT SERPL-MCNC: 1.4 MG/DL
DELSYS: ABNORMAL
DELSYS: ABNORMAL
DIFFERENTIAL METHOD: ABNORMAL
EOSINOPHIL # BLD AUTO: 0 K/UL
EOSINOPHIL NFR BLD: 0 %
ERYTHROCYTE [DISTWIDTH] IN BLOOD BY AUTOMATED COUNT: 14.7 %
ERYTHROCYTE [SEDIMENTATION RATE] IN BLOOD BY WESTERGREN METHOD: 24 MM/H
ERYTHROCYTE [SEDIMENTATION RATE] IN BLOOD BY WESTERGREN METHOD: 24 MM/H
EST. GFR  (AFRICAN AMERICAN): >60 ML/MIN/1.73 M^2
EST. GFR  (NON AFRICAN AMERICAN): 58.6 ML/MIN/1.73 M^2
FIO2: 55
FIO2: 55
GIANT PLATELETS BLD QL SMEAR: PRESENT
GLUCOSE SERPL-MCNC: 158 MG/DL
GLUCOSE SERPL-MCNC: 166 MG/DL (ref 70–110)
GLUCOSE SERPL-MCNC: 270 MG/DL (ref 70–110)
GLUCOSE SERPL-MCNC: 280 MG/DL (ref 70–110)
GLUCOSE SERPL-MCNC: 282 MG/DL (ref 70–110)
GLUCOSE SERPL-MCNC: 305 MG/DL (ref 70–110)
HCO3 UR-SCNC: 25.2 MMOL/L (ref 24–28)
HCO3 UR-SCNC: 26 MMOL/L (ref 24–28)
HCO3 UR-SCNC: 26.8 MMOL/L (ref 24–28)
HCO3 UR-SCNC: 27.2 MMOL/L (ref 24–28)
HCO3 UR-SCNC: 28.5 MMOL/L (ref 24–28)
HCO3 UR-SCNC: 37.8 MMOL/L (ref 24–28)
HCO3 UR-SCNC: 40.2 MMOL/L (ref 24–28)
HCT VFR BLD AUTO: 27.3 %
HCT VFR BLD AUTO: 27.3 %
HCT VFR BLD CALC: 21 %PCV (ref 36–54)
HCT VFR BLD CALC: 26 %PCV (ref 36–54)
HCT VFR BLD CALC: 27 %PCV (ref 36–54)
HCT VFR BLD CALC: 39 %PCV (ref 36–54)
HCT VFR BLD CALC: 41 %PCV (ref 36–54)
HGB BLD-MCNC: 9.5 G/DL
HGB BLD-MCNC: 9.5 G/DL
HYPOCHROMIA BLD QL SMEAR: ABNORMAL
IMM GRANULOCYTES # BLD AUTO: 0.24 K/UL
IMM GRANULOCYTES NFR BLD AUTO: 1 %
LYMPHOCYTES # BLD AUTO: 0.7 K/UL
LYMPHOCYTES NFR BLD: 3 %
MAGNESIUM SERPL-MCNC: 2.1 MG/DL
MAGNESIUM SERPL-MCNC: 2.1 MG/DL
MCH RBC QN AUTO: 28.7 PG
MCHC RBC AUTO-ENTMCNC: 34.8 G/DL
MCV RBC AUTO: 83 FL
MODE: ABNORMAL
MODE: ABNORMAL
MONOCYTES # BLD AUTO: 1.2 K/UL
MONOCYTES NFR BLD: 5.1 %
NEUTROPHILS # BLD AUTO: 21.2 K/UL
NEUTROPHILS NFR BLD: 90.8 %
NRBC BLD-RTO: 0 /100 WBC
OVALOCYTES BLD QL SMEAR: ABNORMAL
PCO2 BLDA: 41.2 MMHG (ref 35–45)
PCO2 BLDA: 44.4 MMHG (ref 35–45)
PCO2 BLDA: 49.4 MMHG (ref 35–45)
PCO2 BLDA: 50.8 MMHG (ref 35–45)
PCO2 BLDA: 57.7 MMHG (ref 35–45)
PCO2 BLDA: 59.6 MMHG (ref 35–45)
PCO2 BLDA: 60.8 MMHG (ref 35–45)
PEEP: 8
PEEP: 8
PH SMN: 7.26 [PH] (ref 7.35–7.45)
PH SMN: 7.32 [PH] (ref 7.35–7.45)
PH SMN: 7.32 [PH] (ref 7.35–7.45)
PH SMN: 7.4 [PH] (ref 7.35–7.45)
PH SMN: 7.42 [PH] (ref 7.35–7.45)
PH SMN: 7.43 [PH] (ref 7.35–7.45)
PH SMN: 7.45 [PH] (ref 7.35–7.45)
PHOSPHATE SERPL-MCNC: 1.4 MG/DL
PHOSPHATE SERPL-MCNC: <1 MG/DL
PLATELET # BLD AUTO: 251 K/UL
PLATELET BLD QL SMEAR: ABNORMAL
PMV BLD AUTO: 11 FL
PO2 BLDA: 329 MMHG (ref 80–100)
PO2 BLDA: 33 MMHG (ref 80–100)
PO2 BLDA: 42 MMHG (ref 40–60)
PO2 BLDA: 48 MMHG (ref 80–100)
PO2 BLDA: 51 MMHG (ref 80–100)
PO2 BLDA: 69 MMHG (ref 80–100)
PO2 BLDA: 80 MMHG (ref 80–100)
POC BE: -1 MMOL/L
POC BE: 0 MMOL/L
POC BE: 0 MMOL/L
POC BE: 13 MMOL/L
POC BE: 16 MMOL/L
POC BE: 3 MMOL/L
POC BE: 4 MMOL/L
POC IONIZED CALCIUM: 0.75 MMOL/L (ref 1.06–1.42)
POC IONIZED CALCIUM: 1.04 MMOL/L (ref 1.06–1.42)
POC IONIZED CALCIUM: 1.14 MMOL/L (ref 1.06–1.42)
POC IONIZED CALCIUM: 1.17 MMOL/L (ref 1.06–1.42)
POC IONIZED CALCIUM: 1.24 MMOL/L (ref 1.06–1.42)
POC SATURATED O2: 100 % (ref 95–100)
POC SATURATED O2: 62 % (ref 95–100)
POC SATURATED O2: 78 % (ref 95–100)
POC SATURATED O2: 82 % (ref 95–100)
POC SATURATED O2: 84 % (ref 95–100)
POC SATURATED O2: 90 % (ref 95–100)
POC SATURATED O2: 96 % (ref 95–100)
POC TCO2: 27 MMOL/L (ref 23–27)
POC TCO2: 28 MMOL/L (ref 23–27)
POC TCO2: 28 MMOL/L (ref 23–27)
POC TCO2: 29 MMOL/L (ref 23–27)
POC TCO2: 30 MMOL/L (ref 24–29)
POC TCO2: 40 MMOL/L (ref 23–27)
POC TCO2: 42 MMOL/L (ref 23–27)
POCT GLUCOSE: 104 MG/DL (ref 70–110)
POCT GLUCOSE: 134 MG/DL (ref 70–110)
POCT GLUCOSE: 138 MG/DL (ref 70–110)
POCT GLUCOSE: 139 MG/DL (ref 70–110)
POCT GLUCOSE: 150 MG/DL (ref 70–110)
POCT GLUCOSE: 152 MG/DL (ref 70–110)
POCT GLUCOSE: 153 MG/DL (ref 70–110)
POCT GLUCOSE: 155 MG/DL (ref 70–110)
POCT GLUCOSE: 157 MG/DL (ref 70–110)
POCT GLUCOSE: 158 MG/DL (ref 70–110)
POCT GLUCOSE: 163 MG/DL (ref 70–110)
POCT GLUCOSE: 169 MG/DL (ref 70–110)
POCT GLUCOSE: 170 MG/DL (ref 70–110)
POCT GLUCOSE: 179 MG/DL (ref 70–110)
POCT GLUCOSE: 195 MG/DL (ref 70–110)
POCT GLUCOSE: 72 MG/DL (ref 70–110)
POIKILOCYTOSIS BLD QL SMEAR: SLIGHT
POLYCHROMASIA BLD QL SMEAR: ABNORMAL
POTASSIUM BLD-SCNC: 2 MMOL/L (ref 3.5–5.1)
POTASSIUM BLD-SCNC: 2.1 MMOL/L (ref 3.5–5.1)
POTASSIUM BLD-SCNC: 2.2 MMOL/L (ref 3.5–5.1)
POTASSIUM BLD-SCNC: <2 MMOL/L (ref 3.5–5.1)
POTASSIUM BLD-SCNC: <2 MMOL/L (ref 3.5–5.1)
POTASSIUM SERPL-SCNC: 2.3 MMOL/L
POTASSIUM SERPL-SCNC: 2.5 MMOL/L
POTASSIUM SERPL-SCNC: 2.6 MMOL/L
POTASSIUM SERPL-SCNC: 3 MMOL/L
POTASSIUM SERPL-SCNC: 3.3 MMOL/L
PS: 10
PS: 10
RBC # BLD AUTO: 3.31 M/UL
SAMPLE: ABNORMAL
SITE: ABNORMAL
SITE: ABNORMAL
SODIUM BLD-SCNC: 132 MMOL/L (ref 136–145)
SODIUM BLD-SCNC: 133 MMOL/L (ref 136–145)
SODIUM BLD-SCNC: 135 MMOL/L (ref 136–145)
SODIUM BLD-SCNC: 136 MMOL/L (ref 136–145)
SODIUM BLD-SCNC: 137 MMOL/L (ref 136–145)
SODIUM SERPL-SCNC: 141 MMOL/L
SP02: 96
SP02: 99
VT: 500
VT: 500
WBC # BLD AUTO: 23.29 K/UL

## 2018-03-14 PROCEDURE — 27200966 HC CLOSED SUCTION SYSTEM

## 2018-03-14 PROCEDURE — 85025 COMPLETE CBC W/AUTO DIFF WBC: CPT

## 2018-03-14 PROCEDURE — 97163 PT EVAL HIGH COMPLEX 45 MIN: CPT

## 2018-03-14 PROCEDURE — 99233 SBSQ HOSP IP/OBS HIGH 50: CPT | Mod: ,,, | Performed by: NURSE PRACTITIONER

## 2018-03-14 PROCEDURE — 25000003 PHARM REV CODE 250: Performed by: THORACIC SURGERY (CARDIOTHORACIC VASCULAR SURGERY)

## 2018-03-14 PROCEDURE — 94761 N-INVAS EAR/PLS OXIMETRY MLT: CPT

## 2018-03-14 PROCEDURE — 63600175 PHARM REV CODE 636 W HCPCS: Performed by: STUDENT IN AN ORGANIZED HEALTH CARE EDUCATION/TRAINING PROGRAM

## 2018-03-14 PROCEDURE — 84100 ASSAY OF PHOSPHORUS: CPT

## 2018-03-14 PROCEDURE — 94150 VITAL CAPACITY TEST: CPT

## 2018-03-14 PROCEDURE — 25000003 PHARM REV CODE 250: Performed by: STUDENT IN AN ORGANIZED HEALTH CARE EDUCATION/TRAINING PROGRAM

## 2018-03-14 PROCEDURE — 37799 UNLISTED PX VASCULAR SURGERY: CPT

## 2018-03-14 PROCEDURE — 82803 BLOOD GASES ANY COMBINATION: CPT

## 2018-03-14 PROCEDURE — 25000003 PHARM REV CODE 250

## 2018-03-14 PROCEDURE — S0028 INJECTION, FAMOTIDINE, 20 MG: HCPCS | Performed by: THORACIC SURGERY (CARDIOTHORACIC VASCULAR SURGERY)

## 2018-03-14 PROCEDURE — 94799 UNLISTED PULMONARY SVC/PX: CPT

## 2018-03-14 PROCEDURE — 27000221 HC OXYGEN, UP TO 24 HOURS

## 2018-03-14 PROCEDURE — 84132 ASSAY OF SERUM POTASSIUM: CPT | Mod: 91

## 2018-03-14 PROCEDURE — 83735 ASSAY OF MAGNESIUM: CPT | Mod: 91

## 2018-03-14 PROCEDURE — 99900026 HC AIRWAY MAINTENANCE (STAT)

## 2018-03-14 PROCEDURE — 99232 SBSQ HOSP IP/OBS MODERATE 35: CPT | Mod: ,,, | Performed by: SURGERY

## 2018-03-14 PROCEDURE — 80048 BASIC METABOLIC PNL TOTAL CA: CPT

## 2018-03-14 PROCEDURE — 94010 BREATHING CAPACITY TEST: CPT

## 2018-03-14 PROCEDURE — 63600175 PHARM REV CODE 636 W HCPCS: Performed by: THORACIC SURGERY (CARDIOTHORACIC VASCULAR SURGERY)

## 2018-03-14 PROCEDURE — 20000000 HC ICU ROOM

## 2018-03-14 PROCEDURE — 63600175 PHARM REV CODE 636 W HCPCS: Performed by: NURSE PRACTITIONER

## 2018-03-14 PROCEDURE — 97802 MEDICAL NUTRITION INDIV IN: CPT

## 2018-03-14 RX ORDER — METOPROLOL TARTRATE 25 MG/1
25 TABLET, FILM COATED ORAL 2 TIMES DAILY
Status: DISCONTINUED | OUTPATIENT
Start: 2018-03-14 | End: 2018-03-15

## 2018-03-14 RX ORDER — HEPARIN SODIUM 10000 [USP'U]/100ML
1500 INJECTION, SOLUTION INTRAVENOUS CONTINUOUS
Status: DISCONTINUED | OUTPATIENT
Start: 2018-03-14 | End: 2018-03-15

## 2018-03-14 RX ORDER — GLUCAGON 1 MG
1 KIT INJECTION
Status: DISCONTINUED | OUTPATIENT
Start: 2018-03-14 | End: 2018-03-19

## 2018-03-14 RX ORDER — DEXTROSE 50 % IN WATER (D50W) INTRAVENOUS SYRINGE
25
Status: DISCONTINUED | OUTPATIENT
Start: 2018-03-14 | End: 2018-03-14

## 2018-03-14 RX ORDER — POTASSIUM CHLORIDE 20 MEQ/15ML
60 SOLUTION ORAL ONCE
Status: COMPLETED | OUTPATIENT
Start: 2018-03-14 | End: 2018-03-14

## 2018-03-14 RX ORDER — FUROSEMIDE 10 MG/ML
40 INJECTION INTRAMUSCULAR; INTRAVENOUS 3 TIMES DAILY
Status: DISCONTINUED | OUTPATIENT
Start: 2018-03-14 | End: 2018-03-14

## 2018-03-14 RX ORDER — IBUPROFEN 200 MG
24 TABLET ORAL
Status: DISCONTINUED | OUTPATIENT
Start: 2018-03-14 | End: 2018-03-19

## 2018-03-14 RX ORDER — SODIUM,POTASSIUM PHOSPHATES 280-250MG
2 POWDER IN PACKET (EA) ORAL
Status: DISCONTINUED | OUTPATIENT
Start: 2018-03-14 | End: 2018-03-19

## 2018-03-14 RX ORDER — DEXTROSE 50 % IN WATER (D50W) INTRAVENOUS SYRINGE
12.5
Status: DISCONTINUED | OUTPATIENT
Start: 2018-03-14 | End: 2018-03-14

## 2018-03-14 RX ORDER — CITALOPRAM 20 MG/1
40 TABLET, FILM COATED ORAL DAILY
Status: DISCONTINUED | OUTPATIENT
Start: 2018-03-14 | End: 2018-03-19 | Stop reason: HOSPADM

## 2018-03-14 RX ORDER — POTASSIUM CHLORIDE 29.8 MG/ML
40 INJECTION INTRAVENOUS ONCE
Status: COMPLETED | OUTPATIENT
Start: 2018-03-14 | End: 2018-03-14

## 2018-03-14 RX ORDER — ATORVASTATIN CALCIUM 20 MG/1
20 TABLET, FILM COATED ORAL DAILY
Status: DISCONTINUED | OUTPATIENT
Start: 2018-03-14 | End: 2018-03-19 | Stop reason: HOSPADM

## 2018-03-14 RX ORDER — IBUPROFEN 200 MG
16 TABLET ORAL
Status: DISCONTINUED | OUTPATIENT
Start: 2018-03-14 | End: 2018-03-19

## 2018-03-14 RX ORDER — FUROSEMIDE 10 MG/ML
40 INJECTION INTRAMUSCULAR; INTRAVENOUS 3 TIMES DAILY
Status: DISCONTINUED | OUTPATIENT
Start: 2018-03-14 | End: 2018-03-15

## 2018-03-14 RX ORDER — INSULIN ASPART 100 [IU]/ML
1-10 INJECTION, SOLUTION INTRAVENOUS; SUBCUTANEOUS
Status: DISCONTINUED | OUTPATIENT
Start: 2018-03-14 | End: 2018-03-19

## 2018-03-14 RX ADMIN — POTASSIUM CHLORIDE 40 MEQ: 400 INJECTION, SOLUTION INTRAVENOUS at 05:03

## 2018-03-14 RX ADMIN — MUPIROCIN 1 G: 20 OINTMENT TOPICAL at 09:03

## 2018-03-14 RX ADMIN — DOCUSATE SODIUM 100 MG: 100 CAPSULE, LIQUID FILLED ORAL at 09:03

## 2018-03-14 RX ADMIN — POTASSIUM & SODIUM PHOSPHATES POWDER PACK 280-160-250 MG 2 PACKET: 280-160-250 PACK at 10:03

## 2018-03-14 RX ADMIN — PROPOFOL 35 MCG/KG/MIN: 10 INJECTION, EMULSION INTRAVENOUS at 12:03

## 2018-03-14 RX ADMIN — CITALOPRAM HYDROBROMIDE 40 MG: 20 TABLET ORAL at 01:03

## 2018-03-14 RX ADMIN — POTASSIUM & SODIUM PHOSPHATES POWDER PACK 280-160-250 MG 2 PACKET: 280-160-250 PACK at 09:03

## 2018-03-14 RX ADMIN — FAMOTIDINE 20 MG: 10 INJECTION INTRAVENOUS at 09:03

## 2018-03-14 RX ADMIN — SODIUM CHLORIDE 31 UNITS/HR: 9 INJECTION, SOLUTION INTRAVENOUS at 06:03

## 2018-03-14 RX ADMIN — PROPOFOL 35 MCG/KG/MIN: 10 INJECTION, EMULSION INTRAVENOUS at 02:03

## 2018-03-14 RX ADMIN — ASPIRIN 325 MG ORAL TABLET 325 MG: 325 PILL ORAL at 09:03

## 2018-03-14 RX ADMIN — FUROSEMIDE 40 MG: 10 INJECTION, SOLUTION INTRAMUSCULAR; INTRAVENOUS at 09:03

## 2018-03-14 RX ADMIN — POTASSIUM CHLORIDE 40 MEQ: 400 INJECTION, SOLUTION INTRAVENOUS at 10:03

## 2018-03-14 RX ADMIN — METOPROLOL TARTRATE 25 MG: 25 TABLET ORAL at 09:03

## 2018-03-14 RX ADMIN — EPINEPHRINE 0.02 MCG/KG/MIN: 1 INJECTION INTRAMUSCULAR; INTRAVENOUS; SUBCUTANEOUS at 10:03

## 2018-03-14 RX ADMIN — POTASSIUM & SODIUM PHOSPHATES POWDER PACK 280-160-250 MG 2 PACKET: 280-160-250 PACK at 04:03

## 2018-03-14 RX ADMIN — FUROSEMIDE 40 MG: 10 INJECTION, SOLUTION INTRAMUSCULAR; INTRAVENOUS at 01:03

## 2018-03-14 RX ADMIN — EPINEPHRINE 0.03 MCG/KG/MIN: 1 INJECTION INTRAMUSCULAR; INTRAVENOUS; SUBCUTANEOUS at 06:03

## 2018-03-14 RX ADMIN — POTASSIUM CHLORIDE 60 MEQ: 20 SOLUTION ORAL at 07:03

## 2018-03-14 RX ADMIN — ATORVASTATIN CALCIUM 20 MG: 20 TABLET, FILM COATED ORAL at 01:03

## 2018-03-14 RX ADMIN — INSULIN ASPART 1 UNITS: 100 INJECTION, SOLUTION INTRAVENOUS; SUBCUTANEOUS at 09:03

## 2018-03-14 RX ADMIN — HEPARIN SODIUM 1300 UNITS/HR: 10000 INJECTION, SOLUTION INTRAVENOUS at 04:03

## 2018-03-14 RX ADMIN — SODIUM PHOSPHATE, MONOBASIC, MONOHYDRATE 39.99 MMOL: 276; 142 INJECTION, SOLUTION INTRAVENOUS at 03:03

## 2018-03-14 RX ADMIN — Medication 2500 MCG: at 12:03

## 2018-03-14 RX ADMIN — CEFAZOLIN 2 G: 330 INJECTION, POWDER, FOR SOLUTION INTRAMUSCULAR; INTRAVENOUS at 10:03

## 2018-03-14 RX ADMIN — FUROSEMIDE 40 MG: 10 INJECTION, SOLUTION INTRAMUSCULAR; INTRAVENOUS at 06:03

## 2018-03-14 RX ADMIN — SODIUM CHLORIDE 22 UNITS/HR: 9 INJECTION, SOLUTION INTRAVENOUS at 02:03

## 2018-03-14 RX ADMIN — PROPOFOL 25 MCG/KG/MIN: 10 INJECTION, EMULSION INTRAVENOUS at 06:03

## 2018-03-14 RX ADMIN — CEFAZOLIN 2 G: 330 INJECTION, POWDER, FOR SOLUTION INTRAMUSCULAR; INTRAVENOUS at 05:03

## 2018-03-14 RX ADMIN — OXYCODONE HYDROCHLORIDE 5 MG: 5 TABLET ORAL at 09:03

## 2018-03-14 RX ADMIN — POTASSIUM CHLORIDE 40 MEQ: 400 INJECTION, SOLUTION INTRAVENOUS at 02:03

## 2018-03-14 NOTE — PLAN OF CARE
Problem: Patient Care Overview  Goal: Plan of Care Review  Outcome: Ongoing (interventions implemented as appropriate)  Pt awakens to voice, not following commands, withdrawing from pain and moving all extremities. Vent weaned down to 55% O2, 8 of PEEP, O2 sats 100%. Tmax 99.1. Epi @ 0.02 mcg/kg/min, propofol @ 25 mcg/kg/min, fentanyl @ 75 mcg/min, insulin @ 26 U/hr. Chest tube ~20-30 sanguinous output Q2H.  CVPs 16-17. UO currently 35-40 mL/hr. Electrolytes monitored and replaced. Pt and family updated on plan of care. See flow sheet for assessment and details.

## 2018-03-14 NOTE — PT/OT/SLP EVAL
Physical Therapy Evaluation    Patient Name:  Jaquan Hines Jr.   MRN:  [id number]  RN present   Recommendations:     Discharge Recommendations:   (TBD)   Discharge Equipment Recommendations: none   Barriers to discharge: TBD when family present    Assessment:     Jaquan Hines Jr. is a 47 y.o. male admitted with a medical diagnosis of pulmonary embolism.  He presents with the following impairments/functional limitations:  impaired endurance, impaired self care skills, impaired functional mobilty, impaired balance, impaired cognition, decreased safety awareness, impaired cardiopulmonary response to activity. Pt required total assist x2 for all functional mobility. Pt oriented x 4 but presented with confusion during conversation. Unsure if pt at cognitive baseline at this time.     Rehab Prognosis:  good; patient would benefit from acute skilled PT services to address these deficits and reach maximum level of function.      Recent Surgery: Procedure(s) (LRB):  ENDARTERECTOMY- PULMONARY (N/A) 1 Day Post-Op    Plan:     During this hospitalization, patient to be seen 3 x/week to address the above listed problems via gait training, therapeutic activities, therapeutic exercises, neuromuscular re-education  · Plan of Care Expires:  04/13/18   Plan of Care Reviewed with: patient    Subjective     Communicated with RN prior to session.  Patient found in bed upon PT entry to room, agreeable to evaluation.      Chief Complaint: none reported   Patient comments/goals: to get better and return home   Pain/Comfort:  · Pain Rating 1: 0/10  · Pain Rating Post-Intervention 1: 0/10    Patients cultural, spiritual, Amish conflicts given the current situation: none reported    Living Environment:  Pt lives with sister who is a nurse practitioner. Pt lives in [facility] with 3 TONJA to enter and B HR.   Prior to admission, patients level of function was WC bound. Assist for ADL's is unknown at this time.  Patient has the following  equipment: oxygen, wheelchair (5L O2).  DME owned (not currently used): none.  Upon discharge, patient will have assistance from sister (unknown if pt has 24 hour assistance).    Objective:     Patient found with: pulse ox (continuous), peripheral IV, blood pressure cuff, chest tube, central line, zuñiga catheter, arterial line     General Precautions: Standard, fall, sternal   Orthopedic Precautions:N/A   Braces: N/A     Exams:  · RLE ROM: WFL  · RLE Strength: WFL  · LLE ROM: WFL  · LLE Strength: WFL    Functional Mobility:  · Bed Mobility:     · Scooting: total assistance and of 2 persons  · Supine to Sit: total assistance and of 2 persons  · Sit to Supine: total assistance and of 2 persons  · Transfers:  Unable to perform  · Gait: unable to perform     AM-PAC 6 CLICK MOBILITY  Total Score:8       Therapeutic Activities and Exercises:  Educated pt on PT role/POC  Educated pt on sternal precautions     Pt returned to bed 2nd to safety     Patient left HOB elevated with all lines intact, call button in reach and RN notified.    GOALS:    Physical Therapy Goals        Problem: Physical Therapy Goal    Goal Priority Disciplines Outcome Goal Variances Interventions   Physical Therapy Goal     PT/OT, PT Ongoing (interventions implemented as appropriate)     Description:  Goals to be met by: 3/28/2018    Patient will increase functional independence with mobility by performin. Supine to sit with Maximum Assistance - not met  2. Sit to supine with Maximum Assistance - not met  3. Rolling to Left/Right with Maximum Assistance. - not met  4. Sit to stand transfer with Maximum Assistance using LRAD - not met  5. Bed to chair transfer with Maximum Assistance using LRAD - not met  6. Sitting at edge of bed x5 minutes with Maximum Assistance - not met  7. Lower extremity exercise program x15 reps per handout, with supervision - not met                      History:     Past Medical History:   Diagnosis Date    Acute  pulmonary embolism     Anticoagulant long-term use     Chronic respiratory failure with hypoxia     Essential hypertension, malignant     Heart attack     Pulmonary artery hypertension        Past Surgical History:   Procedure Laterality Date    CARDIAC CATHETERIZATION      CATARACT EXTRACTION      MASS EXCISION      inner thigh    TONSILLECTOMY, ADENOIDECTOMY         Time Tracking:     PT Received On: 03/14/18  PT Start Time: 1354     PT Stop Time: 1415  PT Total Time (min): 21 min     Billable Minutes: Evaluation 15      Aminta Correa PT, DPT  3/14/2018  [phone]

## 2018-03-14 NOTE — PLAN OF CARE
Problem: Patient Care Overview  Goal: Plan of Care Review  Recommendations  As medically able, ADAT to Regular with texture per SLP.   RD to monitor.    Goals: Patient to receive nutrition by RD follow-up  Nutrition Goal Status: new    Full assessment completed, see RD Note 3/14/18.

## 2018-03-14 NOTE — SUBJECTIVE & OBJECTIVE
Interval HPI:   On intensive insulin protocol overnight.   Insulin needs decreased overnight.   This AM BG in 130s; decreased gtt by 90% to 4 u/hr.   Insulin gtt stopped this AM with BG of 72.   Blood glucose levels at goal off of insulin gtt.   Pt extubated. Low grade temp 99.1-99.3       PMH, PSH, FH, SH updated and reviewed     ROS:  Unable to obtain due to: Altered mental status r/t medication from Gabriel Jennings MD note dated 3/7/18.    Review of Systems   Constitutional: Positive for activity change and fatigue. Negative for appetite change and fever.   HENT: Negative for congestion, dental problem, sneezing and sore throat.    Eyes: Negative for pain, discharge and visual disturbance.   Respiratory: Positive for shortness of breath. Negative for cough and wheezing.    Cardiovascular: Negative for chest pain, palpitations and leg swelling.   Gastrointestinal: Negative for abdominal distention, abdominal pain, constipation, diarrhea, nausea and vomiting.   Endocrine: Negative for polydipsia, polyphagia and polyuria.   Genitourinary: Negative for dysuria, frequency and urgency.   Musculoskeletal: Positive for gait problem. Negative for back pain.   Skin: Negative for rash and wound.   Neurological: Positive for dizziness and light-headedness. Negative for seizures, syncope and headaches.   Hematological: Does not bruise/bleed easily.   Psychiatric/Behavioral: Negative for agitation and confusion. The patient is not nervous/anxious.      Current Medications and/or Treatments Impacting Glycemic Control  Immunotherapy:    Immunosuppressants     None        Steroids:   Hormones     None        Pressors:    Autonomic Drugs     Start     Stop Route Frequency Ordered    03/13/18 5107  epinephrine 4 mg in sodium chloride 0.9% 250 mL infusion     Question Answer Comment   Titrate by: (in mcg/kg/min) 0.01    Titrate interval: (in minutes) 5    Titrate to maintain: (SBP or MAP or Cardiac Index) MAP    Greater than:  (in mmHg) 60    Cardiac index greater than: (in L/min) 2.2    Maximum dose: (in mcg/kg/min) 2        -- IV Continuous 03/13/18 1704        Hyperglycemia/Diabetes Medications:   Antihyperglycemics     Start     Stop Route Frequency Ordered    03/14/18 1006  insulin regular (Humulin R) 100 Units in sodium chloride 0.9% 100 mL infusion     Question:  Insulin Rate Adjustment (DO NOT MODIFY ANSWER)  Answer:  [email or web address] [email or web address]    -- IV Continuous 03/14/18 1007             PHYSICAL EXAMINATION:  Vitals:    03/14/18 1400   BP: 118/60   Pulse: 100   Resp: (!) 34   Temp:      Body mass index is 53.52 kg/m².    Physical Exam   Constitutional:  Well developed, well nourished, NAD.  ENT: External ears no masses with nose patent; normal hearing.   Neck:  Supple; trachea midline; no thyromegaly.   Cardiovascular: Normal heart sounds, no LE edema.     Lungs:  Normal effort; lungs anterior bilaterally clear to auscultation.  Abdomen:  Soft, no masses. Normal bowel sounds.   MS: No clubbing or cyanosis of nails noted; unable to assess gait.  Skin: No rashes, lesions, or ulcers; no nodules.  Psychiatric: Normal mood and affect.  Neurological: Cranial nerves II- XII are grossly intact. Mild confusion and forgetfullness r/t to medication.

## 2018-03-14 NOTE — CONSULTS
"  [facility]  Adult Nutrition  Consult Note    SUMMARY     Recommendations  Recommendation/Intervention:   As medically able, ADAT to Regular with texture per SLP.   RD to monitor.    Goals: Patient to receive nutrition by RD follow-up  Nutrition Goal Status: new  Communication of RD Recs:  (POC)    Reason for Assessment  Reason for Assessment: consult  Diagnosis: cardiac disease (s/p pulmonary endarterectomy)  Relevant Medical History: pulmonary embolis, HTN  Interdisciplinary Rounds: attended  General Information Comments: POD#1. Extubated this morning.  Nutrition Discharge Planning: Adequate nutrition via PO intake.    Nutrition/Diet History  Do you have any cultural, spiritual, Yazidi conflicts, given your current situation?: none  Factors Affecting Nutritional Intake: NPO    Anthropometrics  Temp: 99.3 °F (37.4 °C)  Height Method: Stated  Height: 5' 7" (170.2 cm)  Height (inches): 67 in  Weight Method: Bed Scale  Weight: (!) 155 kg (341 lb 11.4 oz)  Weight (lb): (!) 341.72 lb  Ideal Body Weight (IBW), Male: 148 lb  % Ideal Body Weight, Male (lb): 219.59 lb  BMI (Calculated): 51  BMI Grade: greater than 40 - morbid obesity    Lab/Procedures/Meds  Pertinent Labs Reviewed: reviewed  Pertinent Labs Comments: K 2.6, Glu 158, Phos 1.4  Pertinent Medications Reviewed: reviewed  Pertinent Medications Comments: docusate, famotidine, epinephrine, fentanyl, insulin drip, cardene, levophed, vasopressin    Physical Findings/Assessment  Overall Physical Appearance: obese  Tubes:  (chest tube)  Oral/Mouth Cavity: tooth/teeth missing  Skin: edema, incision(s)    Estimated/Assessed Needs  Weight Used For Calorie Calculations: (!) 155 kg (341 lb 11.4 oz)  Height: 5' 7" (170.2 cm)  Energy Calorie Requirements (kcal): 2374 kcal/day  Energy Need Method: [place]-St Jeor (no AF 2/2 obesity)  RMR ([place]-St. Jeor Equation): 2373.62  Protein Requirements: 155 g/day (1 g/kg)  Weight Used For Protein Calculations: " (!) 155 kg (341 lb 11.4 oz)  Fluid Requirements (mL): 1 mL/kcal or per MD  Fluid Need Method: RDA Method  RDA Method (mL): 2374     Nutrition Prescription Ordered  Current Diet Order: NPO    Evaluation of Received Nutrient/Fluid Intake  I/O: +6.2L since admit, good UOP  Comments: No BM recorded  % Intake of Estimated Energy Needs: 0 - 25 %  % Meal Intake: NPO    Nutrition Risk  Level of Risk/Frequency of Follow-up: high (2x/week)     Assessment and Plan  Pulmonary embolus    Contributing Nutrition Diagnosis  Inadequate energy intake    Related to (etiology):   Decreased ability to consume sufficient energy    Signs and Symptoms (as evidenced by):   NPO and no alternative means of nutrition at this time     Nutrition Diagnosis Status:   New          Monitor and Evaluation  Food and Nutrient Intake: energy intake, food and beverage intake  Food and Nutrient Adminstration: diet order  Physical Activity and Function: nutrition-related ADLs and IADLs  Anthropometric Measurements: weight, weight change  Biochemical Data, Medical Tests and Procedures: electrolyte and renal panel, gastrointestinal profile, inflammatory profile  Nutrition-Focused Physical Findings: overall appearance     Nutrition Follow-Up  RD Follow-up?: Yes

## 2018-03-14 NOTE — PLAN OF CARE
Problem: Spiritual Distress, Risk/Actual (Adult,Obstetrics,Pediatric)  Intervention: Facilitate Personal Exploration/Expression of Spirituality  F - Catrachita and Belief: Scientologist.    I - Importance: Yes. Family requested regular visits.     C - Community: Sister present. Pt's Temple is aware of pt's hospitalization.     A - Address in Care: Introduced and offered pastoral support with reflective listening and prayer upon request. No further spiritual needs expressed at this time. Pt and sister made aware of 's presence as needed.  will continue to follow as available.

## 2018-03-14 NOTE — CARE UPDATE
Change bg monitoring to ac/hs, moderate dose correction. D/c previous orders. Last bg 150 mg/dl. ** Please call Endocrine for any BG related issues **  Endocrinology  Angel Kan NP

## 2018-03-14 NOTE — SUBJECTIVE & OBJECTIVE
Interval HPI:   On intensive insulin protocol overnight.   Insulin needs decreased overnight.   This AM BG in 130s; decreased gtt by 90% to 4 u/hr.   Insulin gtt stopped this AM with BG of 72.   Blood glucose levels at goal off of insulin gtt.   Pt extubated. Low grade temp 99.1-99.3         PMH, PSH, FH, SH updated and reviewed       Review of Systems  Unable to obtain due to: Altered mental status r/t medication from Gabriel Jennings MD note dated 3/7/18.     Review of Systems   Constitutional: Positive for activity change and fatigue. Negative for appetite change and fever.   HENT: Negative for congestion, dental problem, sneezing and sore throat.    Eyes: Negative for pain, discharge and visual disturbance.   Respiratory: Positive for shortness of breath. Negative for cough and wheezing.    Cardiovascular: Negative for chest pain, palpitations and leg swelling.   Gastrointestinal: Negative for abdominal distention, abdominal pain, constipation, diarrhea, nausea and vomiting.   Endocrine: Negative for polydipsia, polyphagia and polyuria.   Genitourinary: Negative for dysuria, frequency and urgency.   Musculoskeletal: Positive for gait problem. Negative for back pain.   Skin: Negative for rash and wound.   Neurological: Positive for dizziness and light-headedness. Negative for seizures, syncope and headaches.   Hematological: Does not bruise/bleed easily.   Psychiatric/Behavioral: Negative for agitation and confusion. The patient is not nervous/anxious.      Current Medications and/or Treatments Impacting Glycemic Control  Immunotherapy:    Immunosuppressants     None        Steroids:   Hormones     None        Pressors:    Autonomic Drugs     Start     Stop Route Frequency Ordered    03/13/18 5502  epinephrine 4 mg in sodium chloride 0.9% 250 mL infusion     Question Answer Comment   Titrate by: (in mcg/kg/min) 0.01    Titrate interval: (in minutes) 5    Titrate to maintain: (SBP or MAP or Cardiac Index) MAP     Greater than: (in mmHg) 60    Cardiac index greater than: (in L/min) 2.2    Maximum dose: (in mcg/kg/min) 2        -- IV Continuous 03/13/18 1704        Hyperglycemia/Diabetes Medications:   Antihyperglycemics     Start     Stop Route Frequency Ordered    03/14/18 1751  insulin aspart U-100 pen 1-10 Units      -- SubQ Before meals & nightly PRN 03/14/18 1651           PHYSICAL EXAMINATION:  Vitals:    03/14/18 1400   BP: 118/60   Pulse: 100   Resp: (!) 34   Temp:      Body mass index is 53.52 kg/m².    Physical Exam     Constitutional:  Well developed, well nourished, NAD.  ENT: External ears no masses with nose patent; normal hearing.   Neck:  Supple; trachea midline; no thyromegaly.   Cardiovascular: Normal heart sounds, no LE edema.     Lungs:  Normal effort; lungs anterior bilaterally clear to auscultation.  Abdomen:  Soft, no masses. Normal bowel sounds.   MS: No clubbing or cyanosis of nails noted; unable to assess gait.  Skin: No rashes, lesions, or ulcers; no nodules.  Psychiatric: Normal mood and affect.  Neurological: Cranial nerves II- XII are grossly intact. Mild confusion and forgetfullness r/t to medication.

## 2018-03-14 NOTE — PROGRESS NOTES
Pt received from OR per anesthesia. Connected to ventilator and bedside tele monitor. All VSS. CTS @ bedside. All new orders received and carried out. See flowsheet for full assessment.

## 2018-03-14 NOTE — CONSULTS
[facility]  Endocrinology  Diabetes Consult Note    Consult Requested by: Gabriel Jennings MD   Reason for admit: <principal problem not specified>    HISTORY OF PRESENT ILLNESS:  Reason for Consult: Management of Hyperglycemia     Surgical Procedure and Date: pulmonary endarterectomy on 3/13    HPI:   Patient is a 47 y.o. male with a diagnosis of HTN, HLD, obesity hypoventilation syndrome, chronic respiratory failure (O2 dependent), prolonged QT, CAD, Pulm HTN and s/p pulmonary endarterectomy on 3/13. Endocrinology consulted for optimal management of blood glucose.     Interval HPI:   On intensive insulin protocol overnight.   Insulin needs decreased overnight.   This AM BG in 130s; decreased gtt by 90% to 4 u/hr.   Insulin gtt stopped this AM with BG of 72.   Blood glucose levels at goal off of insulin gtt.   Pt extubated. Low grade temp 99.1-99.3         PMH, PSH, FH, SH updated and reviewed       Review of Systems  Unable to obtain due to: Altered mental status r/t medication from Gabriel Jennings MD note dated 3/7/18.     Review of Systems   Constitutional: Positive for activity change and fatigue. Negative for appetite change and fever.   HENT: Negative for congestion, dental problem, sneezing and sore throat.    Eyes: Negative for pain, discharge and visual disturbance.   Respiratory: Positive for shortness of breath. Negative for cough and wheezing.    Cardiovascular: Negative for chest pain, palpitations and leg swelling.   Gastrointestinal: Negative for abdominal distention, abdominal pain, constipation, diarrhea, nausea and vomiting.   Endocrine: Negative for polydipsia, polyphagia and polyuria.   Genitourinary: Negative for dysuria, frequency and urgency.   Musculoskeletal: Positive for gait problem. Negative for back pain.   Skin: Negative for rash and wound.   Neurological: Positive for dizziness and light-headedness. Negative for seizures, syncope and headaches.    Hematological: Does not bruise/bleed easily.   Psychiatric/Behavioral: Negative for agitation and confusion. The patient is not nervous/anxious.      Current Medications and/or Treatments Impacting Glycemic Control  Immunotherapy:    Immunosuppressants     None        Steroids:   Hormones     None        Pressors:    Autonomic Drugs     Start     Stop Route Frequency Ordered    03/13/18 1715  epinephrine 4 mg in sodium chloride 0.9% 250 mL infusion     Question Answer Comment   Titrate by: (in mcg/kg/min) 0.01    Titrate interval: (in minutes) 5    Titrate to maintain: (SBP or MAP or Cardiac Index) MAP    Greater than: (in mmHg) 60    Cardiac index greater than: (in L/min) 2.2    Maximum dose: (in mcg/kg/min) 2        -- IV Continuous 03/13/18 1704        Hyperglycemia/Diabetes Medications:   Antihyperglycemics     Start     Stop Route Frequency Ordered    03/14/18 1751  insulin aspart U-100 pen 1-10 Units      -- SubQ Before meals & nightly PRN 03/14/18 1651           PHYSICAL EXAMINATION:  Vitals:    03/14/18 1400   BP: 118/60   Pulse: 100   Resp: (!) 34   Temp:      Body mass index is 53.52 kg/m².    Physical Exam     Constitutional:  Well developed, well nourished, NAD.  ENT: External ears no masses with nose patent; normal hearing.   Neck:  Supple; trachea midline; no thyromegaly.   Cardiovascular: Normal heart sounds, no LE edema.     Lungs:  Normal effort; lungs anterior bilaterally clear to auscultation.  Abdomen:  Soft, no masses. Normal bowel sounds.   MS: No clubbing or cyanosis of nails noted; unable to assess gait.  Skin: No rashes, lesions, or ulcers; no nodules.  Psychiatric: Normal mood and affect.  Neurological: Cranial nerves II- XII are grossly intact. Mild confusion and forgetfullness r/t to medication.          Labs Reviewed and Include     Recent Labs  Lab 03/14/18  0307  03/14/18  0930   *  --   --    CALCIUM 9.5  --   --      --   --    K 2.5*  < > 3.0*   CO2 25  --   --    CL  100  --   --    BUN 16  --   --    CREATININE 1.4  --   --    < > = values in this interval not displayed.  Lab Results   Component Value Date    WBC 23.29 (H) 03/14/2018    HGB 9.5 (L) 03/14/2018    HGB 9.5 (L) 03/14/2018    HCT 27.3 (L) 03/14/2018    HCT 27.3 (L) 03/14/2018    MCV 83 03/14/2018     03/14/2018     No results for input(s): TSH, FREET4 in the last 168 hours.  Lab Results   Component Value Date    HGBA1C 6.0 (H) 03/07/2018       Nutritional status:   Body mass index is 53.52 kg/m².  Lab Results   Component Value Date    ALBUMIN 3.2 (L) 03/13/2018    ALBUMIN 3.1 (L) 11/02/2017    ALBUMIN 3.6 11/01/2017     No results found for: PREALBUMIN    Estimated Creatinine Clearance: 91.8 mL/min (based on SCr of 1.4 mg/dL).    Accu-Checks  Recent Labs      03/14/18   0411  03/14/18   0502  03/14/18   0607  03/14/18   0739  03/14/18   0822  03/14/18   0922  03/14/18   1010  03/14/18   1115  03/14/18   1359  03/14/18   1616   POCTGLUCOSE  157*  169*  152*  104  72  139*  153*  163*  134*  150*        ASSESSMENT and PLAN    Stress hyperglycemia    BG at goal off of insulin gtt/ intensive protocol.   Resume intensive protocol if BG >180 x1.   BG monitoring Q2hrs.      Discharge plans:  TBD. Anticipate resolution of hyperglycemia.         Severe obesity (BMI >= 40)    May contribute to insulin resistance.         Pulmonary embolus      S/p pulmonary endarterectomy, f/u with cards        CAD (coronary artery disease), native coronary artery      Avoid hypoglycemia.            Plan discussed with patient,  RN at bedside.     ANGELO Miner, FNP  Endocrinology  [facility]

## 2018-03-14 NOTE — ASSESSMENT & PLAN NOTE
Plan:    Neuro:   - Weaning sedation with goal to extubate    Pulmonary:   - admitted to SICU following surgery intubated  - wean vent settings with goal to extubate on 3/14  Vent Mode: Spont/T  Oxygen Concentration (%):  [] 50  Resp Rate Total:  [12 br/min-29 br/min] 12 br/min  Vt Set:  [500 mL] 500 mL  PEEP/CPAP:  [5 cmH20-10 cmH20] 5 cmH20  Pressure Support:  [5 cmH20-10 cmH20] 5 cmH20  Mean Airway Pressure:  [3.6 utQ13-25 cmH20] 10 cmH20  Goal sats >85%    Cardiac:  Minimal pressor requirement with Epi  Titrate as tolerated    Renal:   UOP adequate, BUN/Cr stable  Lasix TID    Infectious Disease:   Post op prophylaxis per CTS    Hematology/Oncology:  H/h stable post-op, trend CBC     Endocrine:  Insulin infusion as per endocrinology    Fluids/Electrolytes/Nutrition/GI:   NPO for now  Replace lytes PRN  PPI for ulcer prophylaxis    Dispo:  Monitoring in ICU. CTS primary.

## 2018-03-14 NOTE — PROGRESS NOTES
[facility]  Cardiothoracic Surgery  Progress Note    Patient Name: Jaquan Hines Jr.  MRN: [id number]  Admission Date: 3/13/2018  Hospital Length of Stay: 1 days  Code Status: Prior   Attending Physician: Gabreil Jennings MD   Referring Provider: Gabriel Jennings MD  Principal Problem:<principal problem not specified>    Subjective:     Post-Op Info:  Procedure(s) (LRB):  ENDARTERECTOMY- PULMONARY (N/A)   1 Day Post-Op     Interval History:   Extubated this AM. Weaning drips. Replacing potassium.     Medications:  Continuous Infusions:   epinephrine 0.03 mcg/kg/min (03/14/18 0624)    fentanyl 7.5 mL/hr at 03/14/18 0600    insulin (HUMAN R) infusion (adults)      nicardipine      norepinephrine bitartrate-D5W Stopped (03/13/18 1715)    propofol 25 mcg/kg/min (03/14/18 0630)    vasopressin Stopped (03/13/18 1715)     Scheduled Meds:   aspirin  325 mg Oral Daily    docusate sodium  100 mg Oral BID    famotidine (PF)  20 mg Intravenous BID    furosemide  40 mg Intravenous TID    mupirocin  1 g Nasal BID    potassium, sodium phosphates  2 packet Oral QID (AC & HS)     PRN Meds:acetaminophen, dextrose 50 % in water (D50W), dextrose 50 % in water (D50W), dextrose 50%, dextrose 50%, fentaNYL, lactulose, metoclopramide HCl, ondansetron, oxyCODONE, oxyCODONE     Objective:     Vital Signs (Most Recent):  Temp: 99.3 °F (37.4 °C) (03/14/18 1100)  Pulse: 96 (03/14/18 1100)  Resp: 14 (03/14/18 0800)  BP: (!) 115/57 (03/14/18 1100)  SpO2: 95 % (03/14/18 1100) Vital Signs (24h Range):  Temp:  [97.9 °F (36.6 °C)-99.3 °F (37.4 °C)] 99.3 °F (37.4 °C)  Pulse:  [] 96  Resp:  [8-40] 14  SpO2:  [87 %-100 %] 95 %  BP: ()/(50-59) 115/57  Arterial Line BP: ()/() 95/49     Weight: (!) 155 kg (341 lb 11.4 oz)  Body mass index is 53.52 kg/m².    SpO2: 95 %  O2 Device (Oxygen Therapy): ventilator    Intake/Output - Last 3 Shifts       03/12 0700 - 03/13 0659 03/13 0700 - 03/14 0659  03/14 0700 - 03/15 0659    I.V. (mL/kg)  5857 (37.8)     Blood  2236     NG/GT   160    IV Piggyback  900     Total Intake(mL/kg)  8993 (58) 160 (1)    Urine (mL/kg/hr)  2415 (0.6) 120 (0.1)    Drains  100 (0)     Chest Tube  185 (0)     Total Output   2700 120    Net   +6293 +40                 Lines/Drains/Airways     Central Venous Catheter Line                 Introducer 03/13/18 0801 right internal jugular 1 day         Percutaneous Central Line Insertion/Assessment - triple lumen  03/13/18 0801 right internal jugular 1 day          Drain                 Urethral Catheter 03/13/18 0815 Non-latex;Straight-tip;Temperature probe 16 Fr. 1 day         NG/OG Tube 03/13/18 1500 less than 1 day         Y Chest Tube 1 and 2 03/13/18 1506 1 Right Pleural 19 Fr. 2 Left Pleural 19 Fr. less than 1 day          Arterial Line                 Arterial Line 03/13/18 0716 Left Other (Comment) 1 day          Line                 Pacer Wires 03/13/18 1441 less than 1 day          Peripheral Intravenous Line                 Peripheral IV - Single Lumen 03/13/18 0630 Right Forearm 1 day                Physical Exam   Constitutional: He is oriented to person, place, and time. No distress.   Obese   Cardiovascular: Normal rate and regular rhythm.    Pulmonary/Chest: Effort normal. No respiratory distress.   Chest tube with serosang output   Abdominal: Soft. He exhibits no distension.   Neurological: He is alert and oriented to person, place, and time.   Skin: Skin is warm and dry.   Psychiatric: He has a normal mood and affect. His behavior is normal.       Significant Labs:  CBC:   Recent Labs  Lab 03/14/18  0307   WBC 23.29*   RBC 3.31*   HGB 9.5*  9.5*   HCT 27.3*  27.3*      MCV 83   MCH 28.7   MCHC 34.8     CMP:   Recent Labs  Lab 03/13/18  0630  03/14/18  0307  03/14/18  0930   *  < > 158*  --   --    CALCIUM 8.9  < > 9.5  --   --    ALBUMIN 3.2*  --   --   --   --    PROT 6.7  --   --   --   --    *  < >  141  --   --    K 2.1*  < > 2.5*  < > 3.0*   CO2 35*  < > 25  --   --    CL 85*  < > 100  --   --    BUN 13  < > 16  --   --    CREATININE 1.3  < > 1.4  --   --    ALKPHOS 125  --   --   --   --    ALT 58*  --   --   --   --    AST 39  --   --   --   --    BILITOT 0.4  --   --   --   --    < > = values in this interval not displayed.  Coagulation:   Recent Labs  Lab 03/13/18  1706   INR 1.2   APTT 30.7       Significant Diagnostics:  CXR: I have reviewed all pertinent results/findings within the past 24 hours    Assessment/Plan:     Pulmonary embolus    Neuro:   - Pain mgmt: oxycodone PRN     Pulmonary:   - Extubated  - Wean O2 as tolerated  - Okay with sats >85%  - D/c alessia     Cardiac:   - Drips: epi 0.03  - ASA 325mg     Renal:    - UOP 30-40ml/hr  - BUN/Cr stable  - Lasix 40 TID       ID:   - Post op ancef     Hem/Onc:   - Monitor hgb; stable  - Start anticoagulation          Endocrine:    - Insulin gtt  - Endocrine following     Fluids/Electrolytes/Nutrition/GI:   - Replace electrolytes PRN per protocol  - Advance diet as tolerated     DISPO:  - Continue SICU care              Kirsty Duong MD  Cardiothoracic Surgery  [facility]

## 2018-03-14 NOTE — ASSESSMENT & PLAN NOTE
Contributing Nutrition Diagnosis  Inadequate energy intake    Related to (etiology):   Decreased ability to consume sufficient energy    Signs and Symptoms (as evidenced by):   NPO and no alternative means of nutrition at this time     Nutrition Diagnosis Status:   New

## 2018-03-14 NOTE — PLAN OF CARE
S?P Pulmonary endarterectomy on 3/13/18. CM visited with pt to discuss dc planning.pt's PCP is Dr. Justin Vale in [place], [place] and  is cardiologist in [place].  CM gave pt heart surgery pillow. No dc needs noted at this time.        03/14/18 1350   Discharge Assessment   Assessment Type Discharge Planning Assessment   Confirmed/corrected address and phone number on facesheet? Yes   Assessment information obtained from? Patient   Prior to hospitilization cognitive status: Alert/Oriented   Prior to hospitalization functional status: Independent   Current cognitive status: Alert/Oriented   Current Functional Status: Independent   Lives With other (see comments)  (sister)   Able to Return to Prior Arrangements yes   Is patient able to care for self after discharge? Yes   Who are your caregiver(s) and their phone number(s)? Quyen/ sister [phone]   Patient's perception of discharge disposition home or selfcare   Readmission Within The Last 30 Days no previous admission in last 30 days   Patient currently being followed by outpatient case management? No   Patient currently receives any other outside agency services? No   Equipment Currently Used at Home none   Do you have any problems affording any of your prescribed medications? No   Is the patient taking medications as prescribed? yes   Does the patient have transportation home? Yes   Transportation Available car;family or friend will provide   Discharge Plan A Home with family   Discharge Plan B Home with family   Patient/Family In Agreement With Plan yes

## 2018-03-14 NOTE — SUBJECTIVE & OBJECTIVE
Interval History:   Extubated this AM. Weaning drips. Replacing potassium.     Medications:  Continuous Infusions:   epinephrine 0.03 mcg/kg/min (03/14/18 0624)    fentanyl 7.5 mL/hr at 03/14/18 0600    insulin (HUMAN R) infusion (adults)      nicardipine      norepinephrine bitartrate-D5W Stopped (03/13/18 1715)    propofol 25 mcg/kg/min (03/14/18 0630)    vasopressin Stopped (03/13/18 1715)     Scheduled Meds:   aspirin  325 mg Oral Daily    docusate sodium  100 mg Oral BID    famotidine (PF)  20 mg Intravenous BID    furosemide  40 mg Intravenous TID    mupirocin  1 g Nasal BID    potassium, sodium phosphates  2 packet Oral QID (AC & HS)     PRN Meds:acetaminophen, dextrose 50 % in water (D50W), dextrose 50 % in water (D50W), dextrose 50%, dextrose 50%, fentaNYL, lactulose, metoclopramide HCl, ondansetron, oxyCODONE, oxyCODONE     Objective:     Vital Signs (Most Recent):  Temp: 99.3 °F (37.4 °C) (03/14/18 1100)  Pulse: 96 (03/14/18 1100)  Resp: 14 (03/14/18 0800)  BP: (!) 115/57 (03/14/18 1100)  SpO2: 95 % (03/14/18 1100) Vital Signs (24h Range):  Temp:  [97.9 °F (36.6 °C)-99.3 °F (37.4 °C)] 99.3 °F (37.4 °C)  Pulse:  [] 96  Resp:  [8-40] 14  SpO2:  [87 %-100 %] 95 %  BP: ()/(50-59) 115/57  Arterial Line BP: ()/() 95/49     Weight: (!) 155 kg (341 lb 11.4 oz)  Body mass index is 53.52 kg/m².    SpO2: 95 %  O2 Device (Oxygen Therapy): ventilator    Intake/Output - Last 3 Shifts       03/12 0700 - 03/13 0659 03/13 0700 - 03/14 0659 03/14 0700 - 03/15 0659    I.V. (mL/kg)  5857 (37.8)     Blood  2236     NG/GT   160    IV Piggyback  900     Total Intake(mL/kg)  8993 (58) 160 (1)    Urine (mL/kg/hr)  2415 (0.6) 120 (0.1)    Drains  100 (0)     Chest Tube  185 (0)     Total Output   2700 120    Net   +6293 +40                 Lines/Drains/Airways     Central Venous Catheter Line                 Introducer 03/13/18 0801 right internal jugular 1 day         Percutaneous Central Line  Insertion/Assessment - triple lumen  03/13/18 0801 right internal jugular 1 day          Drain                 Urethral Catheter 03/13/18 0815 Non-latex;Straight-tip;Temperature probe 16 Fr. 1 day         NG/OG Tube 03/13/18 1500 less than 1 day         Y Chest Tube 1 and 2 03/13/18 1506 1 Right Pleural 19 Fr. 2 Left Pleural 19 Fr. less than 1 day          Arterial Line                 Arterial Line 03/13/18 0716 Left Other (Comment) 1 day          Line                 Pacer Wires 03/13/18 1441 less than 1 day          Peripheral Intravenous Line                 Peripheral IV - Single Lumen 03/13/18 0630 Right Forearm 1 day                Physical Exam   Constitutional: He is oriented to person, place, and time. No distress.   Obese   Cardiovascular: Normal rate and regular rhythm.    Pulmonary/Chest: Effort normal. No respiratory distress.   Chest tube with serosang output   Abdominal: Soft. He exhibits no distension.   Neurological: He is alert and oriented to person, place, and time.   Skin: Skin is warm and dry.   Psychiatric: He has a normal mood and affect. His behavior is normal.       Significant Labs:  CBC:   Recent Labs  Lab 03/14/18  0307   WBC 23.29*   RBC 3.31*   HGB 9.5*  9.5*   HCT 27.3*  27.3*      MCV 83   MCH 28.7   MCHC 34.8     CMP:   Recent Labs  Lab 03/13/18  0630  03/14/18  0307  03/14/18  0930   *  < > 158*  --   --    CALCIUM 8.9  < > 9.5  --   --    ALBUMIN 3.2*  --   --   --   --    PROT 6.7  --   --   --   --    *  < > 141  --   --    K 2.1*  < > 2.5*  < > 3.0*   CO2 35*  < > 25  --   --    CL 85*  < > 100  --   --    BUN 13  < > 16  --   --    CREATININE 1.3  < > 1.4  --   --    ALKPHOS 125  --   --   --   --    ALT 58*  --   --   --   --    AST 39  --   --   --   --    BILITOT 0.4  --   --   --   --    < > = values in this interval not displayed.  Coagulation:   Recent Labs  Lab 03/13/18  1706   INR 1.2   APTT 30.7       Significant Diagnostics:  CXR: I have  reviewed all pertinent results/findings within the past 24 hours

## 2018-03-14 NOTE — SUBJECTIVE & OBJECTIVE
Follow-up For: Procedure(s) (LRB):  ENDARTERECTOMY- PULMONARY (N/A)    Post-Operative Day: 1 Day Post-Op     Past Medical History:   Diagnosis Date    Acute pulmonary embolism     Anticoagulant long-term use     Chronic respiratory failure with hypoxia     Essential hypertension, malignant     Heart attack     Pulmonary artery hypertension        Past Surgical History:   Procedure Laterality Date    CARDIAC CATHETERIZATION      CATARACT EXTRACTION      MASS EXCISION      inner thigh    TONSILLECTOMY, ADENOIDECTOMY         Review of patient's allergies indicates:   Allergen Reactions    Clopidogrel        Family History     None        Social History Main Topics    Smoking status: Never Smoker    Smokeless tobacco: Never Used    Alcohol use No    Drug use: No    Sexual activity: Not on file      Review of Systems   Unable to perform ROS: Intubated     Objective:     Vital Signs (Most Recent):  Temp: 99.1 °F (37.3 °C) (03/14/18 0700)  Pulse: 96 (03/14/18 1000)  Resp: 14 (03/14/18 0800)  BP: (!) 111/54 (03/14/18 1000)  SpO2: 96 % (03/14/18 1000) Vital Signs (24h Range):  Temp:  [97.9 °F (36.6 °C)-99.1 °F (37.3 °C)] 99.1 °F (37.3 °C)  Pulse:  [] 96  Resp:  [8-40] 14  SpO2:  [87 %-100 %] 96 %  BP: ()/(50-59) 111/54  Arterial Line BP: ()/() 109/55     Weight: (!) 155 kg (341 lb 11.4 oz)  Body mass index is 53.52 kg/m².      Intake/Output Summary (Last 24 hours) at 03/14/18 1057  Last data filed at 03/14/18 0900   Gross per 24 hour   Intake             7353 ml   Output             2570 ml   Net             4783 ml       Physical Exam   Constitutional: He appears well-developed and well-nourished.   HENT:   Head: Normocephalic and atraumatic.   Eyes: EOM are normal. Pupils are equal, round, and reactive to light.   Cardiovascular: Normal rate and regular rhythm.    Pulmonary/Chest: Breath sounds normal.   Mechanically ventilated   Abdominal: Soft. Bowel sounds are normal.    Neurological:   Sedated and intubated   Nursing note and vitals reviewed.      Vents:  Vent Mode: Spont/T (03/14/18 0912)  Ventilator Initiated: Yes (03/13/18 1713)  Set Rate: 24 bmp (03/14/18 0905)  Vt Set: 500 mL (03/14/18 0905)  Pressure Support: 5 cmH20 (03/14/18 0912)  PEEP/CPAP: 5 cmH20 (03/14/18 0912)  Oxygen Concentration (%): 50 (03/14/18 0905)  Peak Airway Pressure: 26 cmH2O (03/14/18 0905)  Plateau Pressure: 24 cmH20 (03/13/18 2105)  Total Ve: 11.7 mL (03/14/18 0912)  F/VT Ratio<105 (RSBI): (!) 15.97 (03/14/18 0501)    Lines/Drains/Airways     Central Venous Catheter Line                 Introducer 03/13/18 0801 right internal jugular 1 day         Percutaneous Central Line Insertion/Assessment - triple lumen  03/13/18 0801 right internal jugular 1 day         Pulmonary Artery Catheter Assessment  03/13/18 0816 1 day          Drain                 Urethral Catheter 03/13/18 0815 Non-latex;Straight-tip;Temperature probe 16 Fr. 1 day         NG/OG Tube 03/13/18 1500 less than 1 day         Y Chest Tube 1 and 2 03/13/18 1506 1 Right Pleural 19 Fr. 2 Left Pleural 19 Fr. less than 1 day          Airway                 Airway - Non-Surgical 03/13/18 0733 Endotracheal Tube 1 day          Arterial Line                 Arterial Line 03/13/18 0716 Left Other (Comment) 1 day          Line                 Pacer Wires 03/13/18 1441 less than 1 day          Peripheral Intravenous Line                 Peripheral IV - Single Lumen 03/13/18 0630 Right Forearm 1 day                Significant Labs:    CBC/Anemia Profile:    Recent Labs  Lab 03/13/18  0630  03/13/18  1706 03/13/18  1710 03/14/18  0307   WBC 9.35  --  20.65*  --  23.29*   HGB 13.4*  --  9.9*  --  9.5*  9.5*   HCT 37.1*  < > 27.8* 27* 27.3*  27.3*     --  304  --  251   MCV 80*  --  84  --  83   RDW 14.5  --  14.6*  --  14.7*   < > = values in this interval not displayed.     Chemistries:    Recent Labs  Lab 03/13/18  0630 03/13/18  2134  03/14/18  0207 03/14/18  0307 03/14/18  0609 03/14/18  0930   * 140  --  141  --   --    K 2.1* 2.2*  2.2* 2.3* 2.5* 2.6* 3.0*   CL 85* 95  --  100  --   --    CO2 35* 24  --  25  --   --    BUN 13 15  --  16  --   --    CREATININE 1.3 1.4  --  1.4  --   --    CALCIUM 8.9 9.9  --  9.5  --   --    ALBUMIN 3.2*  --   --   --   --   --    PROT 6.7  --   --   --   --   --    BILITOT 0.4  --   --   --   --   --    ALKPHOS 125  --   --   --   --   --    ALT 58*  --   --   --   --   --    AST 39  --   --   --   --   --    MG  --  2.7* 2.1  --  2.1  --    PHOS  --  3.1 <1.0*  --  1.4*  --        All pertinent labs within the past 24 hours have been reviewed.    Significant Imaging: I have reviewed all pertinent imaging results/findings within the past 24 hours.

## 2018-03-14 NOTE — ASSESSMENT & PLAN NOTE
Neuro:   - Pain mgmt: oxycodone PRN     Pulmonary:   - Extubated  - Wean O2 as tolerated  - Okay with sats >85%  - D/c alessia     Cardiac:   - Drips: epi 0.03  - ASA 325mg     Renal:    - UOP 30-40ml/hr  - BUN/Cr stable  - Lasix 40 TID       ID:   - Post op ancef     Hem/Onc:   - Monitor hgb; stable  - Start anticoagulation          Endocrine:    - Insulin gtt  - Endocrine following     Fluids/Electrolytes/Nutrition/GI:   - Replace electrolytes PRN per protocol  - Advance diet as tolerated     DISPO:  - Continue SICU care

## 2018-03-14 NOTE — PROGRESS NOTES
[facility]  Endocrinology  Progress Note    Admit Date: 3/13/2018     Reason for Consult: Management of Hyperglycemia     Surgical Procedure and Date: pulmonary endarterectomy on 3/13    HPI:   Patient is a 47 y.o. male with a diagnosis of HTN, HLD, obesity hypoventilation syndrome, chronic respiratory failure (O2 dependent), prolonged QT, CAD, Pulm HTN and s/p pulmonary endarterectomy on 3/13. Endocrinology consulted for optimal management of blood glucose.     Interval HPI:   On intensive insulin protocol overnight.   Insulin needs decreased overnight.   This AM BG in 130s; decreased gtt by 90% to 4 u/hr.   Insulin gtt stopped this AM with BG of 72.   Blood glucose levels at goal off of insulin gtt.   Pt extubated. Low grade temp 99.1-99.3       PMH, PSH, FH, SH updated and reviewed     ROS:  Unable to obtain due to: Altered mental status r/t medication from Gabriel Jennings MD note dated 3/7/18.    Review of Systems   Constitutional: Positive for activity change and fatigue. Negative for appetite change and fever.   HENT: Negative for congestion, dental problem, sneezing and sore throat.    Eyes: Negative for pain, discharge and visual disturbance.   Respiratory: Positive for shortness of breath. Negative for cough and wheezing.    Cardiovascular: Negative for chest pain, palpitations and leg swelling.   Gastrointestinal: Negative for abdominal distention, abdominal pain, constipation, diarrhea, nausea and vomiting.   Endocrine: Negative for polydipsia, polyphagia and polyuria.   Genitourinary: Negative for dysuria, frequency and urgency.   Musculoskeletal: Positive for gait problem. Negative for back pain.   Skin: Negative for rash and wound.   Neurological: Positive for dizziness and light-headedness. Negative for seizures, syncope and headaches.   Hematological: Does not bruise/bleed easily.   Psychiatric/Behavioral: Negative for agitation and confusion. The patient is not  nervous/anxious.      Current Medications and/or Treatments Impacting Glycemic Control  Immunotherapy:    Immunosuppressants     None        Steroids:   Hormones     None        Pressors:    Autonomic Drugs     Start     Stop Route Frequency Ordered    03/13/18 1715  epinephrine 4 mg in sodium chloride 0.9% 250 mL infusion     Question Answer Comment   Titrate by: (in mcg/kg/min) 0.01    Titrate interval: (in minutes) 5    Titrate to maintain: (SBP or MAP or Cardiac Index) MAP    Greater than: (in mmHg) 60    Cardiac index greater than: (in L/min) 2.2    Maximum dose: (in mcg/kg/min) 2        -- IV Continuous 03/13/18 1704        Hyperglycemia/Diabetes Medications:   Antihyperglycemics     Start     Stop Route Frequency Ordered    03/14/18 1006  insulin regular (Humulin R) 100 Units in sodium chloride 0.9% 100 mL infusion     Question:  Insulin Rate Adjustment (DO NOT MODIFY ANSWER)  Answer:  \\[email or web address]\epic\Images\[email or web address] [email or web address]    -- IV Continuous 03/14/18 1007             PHYSICAL EXAMINATION:  Vitals:    03/14/18 1400   BP: 118/60   Pulse: 100   Resp: (!) 34   Temp:      Body mass index is 53.52 kg/m².    Physical Exam   Constitutional:  Well developed, well nourished, NAD.  ENT: External ears no masses with nose patent; normal hearing.   Neck:  Supple; trachea midline; no thyromegaly.   Cardiovascular: Normal heart sounds, no LE edema.     Lungs:  Normal effort; lungs anterior bilaterally clear to auscultation.  Abdomen:  Soft, no masses. Normal bowel sounds.   MS: No clubbing or cyanosis of nails noted; unable to assess gait.  Skin: No rashes, lesions, or ulcers; no nodules.  Psychiatric: Normal mood and affect.  Neurological: Cranial nerves II- XII are grossly intact. Mild confusion and forgetfullness r/t to medication.       ASSESSMENT and PLAN    Stress hyperglycemia    BG at goal off of insulin gtt/ intensive protocol.   Resume intensive protocol if BG >180 x1.   BG  monitoring Q2hrs.      Discharge plans:  TBD. Anticipate resolution of hyperglycemia.         Severe obesity (BMI >= 40)    May contribute to insulin resistance.         CAD (coronary artery disease), native coronary artery      Avoid hypoglycemia.      Pulmonary embolus  S/p pulmonary endarterectomy          ANGELO Miner, FNP  Endocrinology  [facility]

## 2018-03-14 NOTE — HPI
Jaquan Hines Jr. is a 47 y.o. male with pmh of HTN, HLD, peripheral edema (poorly responsive to diuretics), obesity hypoventilation syndrome (severe), prolonged QT (QTc 610 on 3/7/18), CAD (MI 2012 s/p 2x FLORENTINO), Pulm HTN (81mm HG on TTE,) and pulmonary embolus (on chronic anticoagulation) who presented for pulmonary endarterectomy on 3/13. Pt. Has chronic respiratory failure and is wheelchair bound on 5L O2 at home. Pt. Has SOB at rest with severe GOMEZ and near syncopal episodes with exertion. Liver and kidney function are normal.      LV has normal systolic function  RV is enlarged with only mildly depressed systolic function and moderate TR    Patient arrived from OR intubated and sedated.

## 2018-03-14 NOTE — H&P
[facility]  Critical Care - Surgery  History & Physical    Patient Name: Jaquan Hines Jr.  MRN: [id number]  Admission Date: 3/13/2018  Code Status: Prior  Attending Physician: Gabriel Jennings MD   Primary Care Provider: Justin Vale MD   Principal Problem: <principal problem not specified>    Subjective:     HPI:  Jaquan Hines Jr. is a 47 y.o. male with pmh of HTN, HLD, peripheral edema (poorly responsive to diuretics), obesity hypoventilation syndrome (severe), prolonged QT (QTc 610 on 3/7/18), CAD (MI 2012 s/p 2x FLORENTINO), Pulm HTN (81mm HG on TTE,) and pulmonary embolus (on chronic anticoagulation) who presented for pulmonary endarterectomy on 3/13. Pt. Has chronic respiratory failure and is wheelchair bound on 5L O2 at home. Pt. Has SOB at rest with severe GOMEZ and near syncopal episodes with exertion. Liver and kidney function are normal.      LV has normal systolic function  RV is enlarged with only mildly depressed systolic function and moderate TR    Patient arrived from OR intubated and sedated.    Hospital/ICU Course:  No notes on file    Follow-up For: Procedure(s) (LRB):  ENDARTERECTOMY- PULMONARY (N/A)    Post-Operative Day: 1 Day Post-Op     Past Medical History:   Diagnosis Date    Acute pulmonary embolism     Anticoagulant long-term use     Chronic respiratory failure with hypoxia     Essential hypertension, malignant     Heart attack     Pulmonary artery hypertension        Past Surgical History:   Procedure Laterality Date    CARDIAC CATHETERIZATION      CATARACT EXTRACTION      MASS EXCISION      inner thigh    TONSILLECTOMY, ADENOIDECTOMY         Review of patient's allergies indicates:   Allergen Reactions    Clopidogrel        Family History     None        Social History Main Topics    Smoking status: Never Smoker    Smokeless tobacco: Never Used    Alcohol use No    Drug use: No    Sexual activity: Not on file      Review of Systems   Unable to  perform ROS: Intubated     Objective:     Vital Signs (Most Recent):  Temp: 99.1 °F (37.3 °C) (03/14/18 0700)  Pulse: 96 (03/14/18 1000)  Resp: 14 (03/14/18 0800)  BP: (!) 111/54 (03/14/18 1000)  SpO2: 96 % (03/14/18 1000) Vital Signs (24h Range):  Temp:  [97.9 °F (36.6 °C)-99.1 °F (37.3 °C)] 99.1 °F (37.3 °C)  Pulse:  [] 96  Resp:  [8-40] 14  SpO2:  [87 %-100 %] 96 %  BP: ()/(50-59) 111/54  Arterial Line BP: ()/() 109/55     Weight: (!) 155 kg (341 lb 11.4 oz)  Body mass index is 53.52 kg/m².      Intake/Output Summary (Last 24 hours) at 03/14/18 1057  Last data filed at 03/14/18 0900   Gross per 24 hour   Intake             7353 ml   Output             2570 ml   Net             4783 ml       Physical Exam   Constitutional: He appears well-developed and well-nourished.   HENT:   Head: Normocephalic and atraumatic.   Eyes: EOM are normal. Pupils are equal, round, and reactive to light.   Cardiovascular: Normal rate and regular rhythm.    Pulmonary/Chest: Breath sounds normal.   Mechanically ventilated   Abdominal: Soft. Bowel sounds are normal.   Neurological:   Sedated and intubated   Nursing note and vitals reviewed.      Vents:  Vent Mode: Spont/T (03/14/18 0912)  Ventilator Initiated: Yes (03/13/18 1713)  Set Rate: 24 bmp (03/14/18 0905)  Vt Set: 500 mL (03/14/18 0905)  Pressure Support: 5 cmH20 (03/14/18 0912)  PEEP/CPAP: 5 cmH20 (03/14/18 0912)  Oxygen Concentration (%): 50 (03/14/18 0905)  Peak Airway Pressure: 26 cmH2O (03/14/18 0905)  Plateau Pressure: 24 cmH20 (03/13/18 2105)  Total Ve: 11.7 mL (03/14/18 0912)  F/VT Ratio<105 (RSBI): (!) 15.97 (03/14/18 0501)    Lines/Drains/Airways     Central Venous Catheter Line                 Introducer 03/13/18 0801 right internal jugular 1 day         Percutaneous Central Line Insertion/Assessment - triple lumen  03/13/18 0801 right internal jugular 1 day         Pulmonary Artery Catheter Assessment  03/13/18 0816 1 day          Drain                  Urethral Catheter 03/13/18 0815 Non-latex;Straight-tip;Temperature probe 16 Fr. 1 day         NG/OG Tube 03/13/18 1500 less than 1 day         Y Chest Tube 1 and 2 03/13/18 1506 1 Right Pleural 19 Fr. 2 Left Pleural 19 Fr. less than 1 day          Airway                 Airway - Non-Surgical 03/13/18 0733 Endotracheal Tube 1 day          Arterial Line                 Arterial Line 03/13/18 0716 Left Other (Comment) 1 day          Line                 Pacer Wires 03/13/18 1441 less than 1 day          Peripheral Intravenous Line                 Peripheral IV - Single Lumen 03/13/18 0630 Right Forearm 1 day                Significant Labs:    CBC/Anemia Profile:    Recent Labs  Lab 03/13/18  0630  03/13/18  1706 03/13/18  1710 03/14/18  0307   WBC 9.35  --  20.65*  --  23.29*   HGB 13.4*  --  9.9*  --  9.5*  9.5*   HCT 37.1*  < > 27.8* 27* 27.3*  27.3*     --  304  --  251   MCV 80*  --  84  --  83   RDW 14.5  --  14.6*  --  14.7*   < > = values in this interval not displayed.     Chemistries:    Recent Labs  Lab 03/13/18  0630 03/13/18  1706 03/14/18  0207 03/14/18  0307 03/14/18  0609 03/14/18  0930   * 140  --  141  --   --    K 2.1* 2.2*  2.2* 2.3* 2.5* 2.6* 3.0*   CL 85* 95  --  100  --   --    CO2 35* 24  --  25  --   --    BUN 13 15  --  16  --   --    CREATININE 1.3 1.4  --  1.4  --   --    CALCIUM 8.9 9.9  --  9.5  --   --    ALBUMIN 3.2*  --   --   --   --   --    PROT 6.7  --   --   --   --   --    BILITOT 0.4  --   --   --   --   --    ALKPHOS 125  --   --   --   --   --    ALT 58*  --   --   --   --   --    AST 39  --   --   --   --   --    MG  --  2.7* 2.1  --  2.1  --    PHOS  --  3.1 <1.0*  --  1.4*  --        All pertinent labs within the past 24 hours have been reviewed.    Significant Imaging: I have reviewed all pertinent imaging results/findings within the past 24 hours.    Assessment/Plan:     Pulmonary embolus    Plan:    Neuro:   - Weaning sedation with goal to  extubate    Pulmonary:   - admitted to SICU following surgery intubated  - wean vent settings with goal to extubate on 3/14  Vent Mode: Spont/T  Oxygen Concentration (%):  [] 50  Resp Rate Total:  [12 br/min-29 br/min] 12 br/min  Vt Set:  [500 mL] 500 mL  PEEP/CPAP:  [5 cmH20-10 cmH20] 5 cmH20  Pressure Support:  [5 cmH20-10 cmH20] 5 cmH20  Mean Airway Pressure:  [3.6 wvX97-20 cmH20] 10 cmH20  Goal sats >85%    Cardiac:  Minimal pressor requirement with Epi  Titrate as tolerated    Renal:   UOP adequate, BUN/Cr stable  Lasix TID    Infectious Disease:   Post op prophylaxis per CTS    Hematology/Oncology:  H/h stable post-op, trend CBC     Endocrine:  Insulin infusion as per endocrinology    Fluids/Electrolytes/Nutrition/GI:   NPO for now  Replace lytes PRN  PPI for ulcer prophylaxis    Dispo:  Monitoring in ICU. CTS primary.             Critical care was time spent personally by me on the following activities: development of treatment plan with patient or surrogate and bedside caregivers, discussions with consultants, evaluation of patient's response to treatment, examination of patient, ordering and performing treatments and interventions, ordering and review of laboratory studies, ordering and review of radiographic studies, pulse oximetry, re-evaluation of patient's condition.  This critical care time did not overlap with that of any other provider or involve time for any procedures.     Donavan Valentine MD  Critical Care - Surgery  [facility]

## 2018-03-14 NOTE — ASSESSMENT & PLAN NOTE
BG at goal off of insulin gtt/ intensive protocol.   Resume intensive protocol if BG >180 x1.   BG monitoring Q2hrs.      Discharge plans:  TBD. Anticipate resolution of hyperglycemia.

## 2018-03-14 NOTE — PLAN OF CARE
Problem: Physical Therapy Goal  Goal: Physical Therapy Goal  Goals to be met by: 3/28/2018    Patient will increase functional independence with mobility by performin. Supine to sit with Maximum Assistance - not met  2. Sit to supine with Maximum Assistance - not met  3. Rolling to Left/Right with Maximum Assistance. - not met  4. Sit to stand transfer with Maximum Assistance using LRAD - not met  5. Bed to chair transfer with Maximum Assistance using LRAD - not met  6. Sitting at edge of bed x5 minutes with Maximum Assistance - not met  7. Lower extremity exercise program x15 reps per handout, with supervision - not met    Outcome: Ongoing (interventions implemented as appropriate)  Treatment completed and goals appropriate

## 2018-03-14 NOTE — HPI
Reason for Consult: Management of Hyperglycemia     Surgical Procedure and Date: pulmonary endarterectomy on 3/13    HPI:   Patient is a 47 y.o. male with a diagnosis of HTN, HLD, obesity hypoventilation syndrome, chronic respiratory failure (O2 dependent), prolonged QT, CAD, Pulm HTN and s/p pulmonary endarterectomy on 3/13. Endocrinology consulted for optimal management of blood glucose.

## 2018-03-15 DIAGNOSIS — Z86.711 HX OF PULMONARY EMBOLUS: Primary | ICD-10-CM

## 2018-03-15 PROBLEM — E87.1 HYPONATREMIA: Status: RESOLVED | Noted: 2017-11-02 | Resolved: 2018-03-15

## 2018-03-15 PROBLEM — E87.6 HYPOKALEMIA: Status: RESOLVED | Noted: 2017-11-01 | Resolved: 2018-03-15

## 2018-03-15 PROBLEM — I26.99 PULMONARY EMBOLUS: Status: ACTIVE | Noted: 2018-03-15

## 2018-03-15 PROBLEM — R55 SYNCOPE: Status: RESOLVED | Noted: 2017-11-01 | Resolved: 2018-03-15

## 2018-03-15 LAB
ANION GAP SERPL CALC-SCNC: 11 MMOL/L
ANION GAP SERPL CALC-SCNC: 12 MMOL/L
ANISOCYTOSIS BLD QL SMEAR: SLIGHT
APTT BLDCRRT: 26.1 SEC
APTT BLDCRRT: 29.3 SEC
APTT BLDCRRT: 37.3 SEC
BASO STIPL BLD QL SMEAR: ABNORMAL
BASOPHILS # BLD AUTO: 0.04 K/UL
BASOPHILS NFR BLD: 0.2 %
BUN SERPL-MCNC: 16 MG/DL
BUN SERPL-MCNC: 16 MG/DL
CALCIUM SERPL-MCNC: 8.3 MG/DL
CALCIUM SERPL-MCNC: 8.5 MG/DL
CHLORIDE SERPL-SCNC: 103 MMOL/L
CHLORIDE SERPL-SCNC: 105 MMOL/L
CO2 SERPL-SCNC: 28 MMOL/L
CO2 SERPL-SCNC: 29 MMOL/L
CREAT SERPL-MCNC: 1.1 MG/DL
CREAT SERPL-MCNC: 1.1 MG/DL
DIFFERENTIAL METHOD: ABNORMAL
EOSINOPHIL # BLD AUTO: 0 K/UL
EOSINOPHIL NFR BLD: 0 %
ERYTHROCYTE [DISTWIDTH] IN BLOOD BY AUTOMATED COUNT: 15.9 %
EST. GFR  (AFRICAN AMERICAN): >60 ML/MIN/1.73 M^2
EST. GFR  (AFRICAN AMERICAN): >60 ML/MIN/1.73 M^2
EST. GFR  (NON AFRICAN AMERICAN): >60 ML/MIN/1.73 M^2
EST. GFR  (NON AFRICAN AMERICAN): >60 ML/MIN/1.73 M^2
GIANT PLATELETS BLD QL SMEAR: PRESENT
GLUCOSE SERPL-MCNC: 148 MG/DL
GLUCOSE SERPL-MCNC: 148 MG/DL
HCT VFR BLD AUTO: 25.2 %
HGB BLD-MCNC: 8.7 G/DL
IMM GRANULOCYTES # BLD AUTO: 0.79 K/UL
IMM GRANULOCYTES NFR BLD AUTO: 3 %
LYMPHOCYTES # BLD AUTO: 2.1 K/UL
LYMPHOCYTES NFR BLD: 8.1 %
MAGNESIUM SERPL-MCNC: 1.5 MG/DL
MAGNESIUM SERPL-MCNC: 1.7 MG/DL
MAGNESIUM SERPL-MCNC: 1.8 MG/DL
MAGNESIUM SERPL-MCNC: 1.8 MG/DL
MCH RBC QN AUTO: 29.6 PG
MCHC RBC AUTO-ENTMCNC: 34.5 G/DL
MCV RBC AUTO: 86 FL
MONOCYTES # BLD AUTO: 1.5 K/UL
MONOCYTES NFR BLD: 5.7 %
NEUTROPHILS # BLD AUTO: 22 K/UL
NEUTROPHILS NFR BLD: 83 %
NRBC BLD-RTO: 0 /100 WBC
OVALOCYTES BLD QL SMEAR: ABNORMAL
PHOSPHATE SERPL-MCNC: 2.9 MG/DL
PHOSPHATE SERPL-MCNC: 3.5 MG/DL
PLATELET # BLD AUTO: 195 K/UL
PLATELET BLD QL SMEAR: ABNORMAL
PMV BLD AUTO: 11.6 FL
POCT GLUCOSE: 116 MG/DL (ref 70–110)
POCT GLUCOSE: 125 MG/DL (ref 70–110)
POCT GLUCOSE: 144 MG/DL (ref 70–110)
POCT GLUCOSE: 216 MG/DL (ref 70–110)
POIKILOCYTOSIS BLD QL SMEAR: SLIGHT
POLYCHROMASIA BLD QL SMEAR: ABNORMAL
POTASSIUM SERPL-SCNC: 3.3 MMOL/L
POTASSIUM SERPL-SCNC: 3.4 MMOL/L
POTASSIUM SERPL-SCNC: 3.7 MMOL/L
POTASSIUM SERPL-SCNC: 4 MMOL/L
RBC # BLD AUTO: 2.94 M/UL
SODIUM SERPL-SCNC: 144 MMOL/L
SODIUM SERPL-SCNC: 144 MMOL/L
WBC # BLD AUTO: 26.44 K/UL

## 2018-03-15 PROCEDURE — 25000003 PHARM REV CODE 250: Performed by: THORACIC SURGERY (CARDIOTHORACIC VASCULAR SURGERY)

## 2018-03-15 PROCEDURE — 83735 ASSAY OF MAGNESIUM: CPT

## 2018-03-15 PROCEDURE — 94761 N-INVAS EAR/PLS OXIMETRY MLT: CPT

## 2018-03-15 PROCEDURE — 80048 BASIC METABOLIC PNL TOTAL CA: CPT

## 2018-03-15 PROCEDURE — 84132 ASSAY OF SERUM POTASSIUM: CPT | Mod: 91

## 2018-03-15 PROCEDURE — 99900035 HC TECH TIME PER 15 MIN (STAT)

## 2018-03-15 PROCEDURE — 20600001 HC STEP DOWN PRIVATE ROOM

## 2018-03-15 PROCEDURE — 85730 THROMBOPLASTIN TIME PARTIAL: CPT | Mod: 91

## 2018-03-15 PROCEDURE — 63600175 PHARM REV CODE 636 W HCPCS: Performed by: NURSE PRACTITIONER

## 2018-03-15 PROCEDURE — S0028 INJECTION, FAMOTIDINE, 20 MG: HCPCS | Performed by: THORACIC SURGERY (CARDIOTHORACIC VASCULAR SURGERY)

## 2018-03-15 PROCEDURE — 63600175 PHARM REV CODE 636 W HCPCS: Performed by: STUDENT IN AN ORGANIZED HEALTH CARE EDUCATION/TRAINING PROGRAM

## 2018-03-15 PROCEDURE — 99233 SBSQ HOSP IP/OBS HIGH 50: CPT | Mod: ,,, | Performed by: SURGERY

## 2018-03-15 PROCEDURE — 85025 COMPLETE CBC W/AUTO DIFF WBC: CPT

## 2018-03-15 PROCEDURE — 84100 ASSAY OF PHOSPHORUS: CPT | Mod: 91

## 2018-03-15 PROCEDURE — 25000003 PHARM REV CODE 250: Performed by: STUDENT IN AN ORGANIZED HEALTH CARE EDUCATION/TRAINING PROGRAM

## 2018-03-15 PROCEDURE — 25000003 PHARM REV CODE 250: Performed by: NURSE PRACTITIONER

## 2018-03-15 PROCEDURE — 94799 UNLISTED PULMONARY SVC/PX: CPT

## 2018-03-15 PROCEDURE — 85730 THROMBOPLASTIN TIME PARTIAL: CPT

## 2018-03-15 RX ORDER — HYDRALAZINE HYDROCHLORIDE 20 MG/ML
10 INJECTION INTRAMUSCULAR; INTRAVENOUS ONCE
Status: COMPLETED | OUTPATIENT
Start: 2018-03-15 | End: 2018-03-15

## 2018-03-15 RX ORDER — POTASSIUM CHLORIDE 20 MEQ/15ML
60 SOLUTION ORAL ONCE
Status: COMPLETED | OUTPATIENT
Start: 2018-03-15 | End: 2018-03-15

## 2018-03-15 RX ORDER — LANOLIN ALCOHOL/MO/W.PET/CERES
400 CREAM (GRAM) TOPICAL ONCE
Status: COMPLETED | OUTPATIENT
Start: 2018-03-15 | End: 2018-03-15

## 2018-03-15 RX ORDER — POTASSIUM CHLORIDE 20 MEQ/15ML
40 SOLUTION ORAL ONCE
Status: COMPLETED | OUTPATIENT
Start: 2018-03-15 | End: 2018-03-15

## 2018-03-15 RX ORDER — POTASSIUM CHLORIDE 14.9 MG/ML
40 INJECTION INTRAVENOUS
Status: COMPLETED | OUTPATIENT
Start: 2018-03-15 | End: 2018-03-15

## 2018-03-15 RX ORDER — FUROSEMIDE 10 MG/ML
40 INJECTION INTRAMUSCULAR; INTRAVENOUS ONCE
Status: COMPLETED | OUTPATIENT
Start: 2018-03-15 | End: 2018-03-15

## 2018-03-15 RX ORDER — CALCIUM CARBONATE 200(500)MG
500 TABLET,CHEWABLE ORAL DAILY PRN
Status: DISCONTINUED | OUTPATIENT
Start: 2018-03-15 | End: 2018-03-19

## 2018-03-15 RX ORDER — FAMOTIDINE 20 MG/1
20 TABLET, FILM COATED ORAL 2 TIMES DAILY
Status: DISCONTINUED | OUTPATIENT
Start: 2018-03-15 | End: 2018-03-19 | Stop reason: HOSPADM

## 2018-03-15 RX ORDER — HEPARIN SODIUM 10000 [USP'U]/100ML
2500 INJECTION, SOLUTION INTRAVENOUS CONTINUOUS
Status: DISCONTINUED | OUTPATIENT
Start: 2018-03-15 | End: 2018-03-18

## 2018-03-15 RX ORDER — LANOLIN ALCOHOL/MO/W.PET/CERES
800 CREAM (GRAM) TOPICAL 3 TIMES DAILY
Status: DISCONTINUED | OUTPATIENT
Start: 2018-03-15 | End: 2018-03-19

## 2018-03-15 RX ORDER — FUROSEMIDE 10 MG/ML
40 INJECTION INTRAMUSCULAR; INTRAVENOUS 3 TIMES DAILY
Status: DISCONTINUED | OUTPATIENT
Start: 2018-03-15 | End: 2018-03-15

## 2018-03-15 RX ADMIN — MUPIROCIN 1 G: 20 OINTMENT TOPICAL at 09:03

## 2018-03-15 RX ADMIN — Medication 400 MG: at 01:03

## 2018-03-15 RX ADMIN — POTASSIUM CHLORIDE 60 MEQ: 20 SOLUTION ORAL at 02:03

## 2018-03-15 RX ADMIN — SODIUM CHLORIDE: 9 INJECTION, SOLUTION INTRAVENOUS at 09:03

## 2018-03-15 RX ADMIN — HEPARIN SODIUM 2000 UNITS/HR: 10000 INJECTION, SOLUTION INTRAVENOUS at 08:03

## 2018-03-15 RX ADMIN — ASPIRIN 325 MG ORAL TABLET 325 MG: 325 PILL ORAL at 08:03

## 2018-03-15 RX ADMIN — POTASSIUM & SODIUM PHOSPHATES POWDER PACK 280-160-250 MG 2 PACKET: 280-160-250 PACK at 05:03

## 2018-03-15 RX ADMIN — MAGNESIUM OXIDE TAB 400 MG (241.3 MG ELEMENTAL MG) 400 MG: 400 (241.3 MG) TAB at 05:03

## 2018-03-15 RX ADMIN — MAGNESIUM OXIDE TAB 400 MG (241.3 MG ELEMENTAL MG) 400 MG: 400 (241.3 MG) TAB at 02:03

## 2018-03-15 RX ADMIN — POTASSIUM CHLORIDE 40 MEQ: 200 INJECTION, SOLUTION INTRAVENOUS at 06:03

## 2018-03-15 RX ADMIN — POTASSIUM CHLORIDE 20 MEQ: 200 INJECTION, SOLUTION INTRAVENOUS at 07:03

## 2018-03-15 RX ADMIN — CITALOPRAM HYDROBROMIDE 40 MG: 20 TABLET ORAL at 08:03

## 2018-03-15 RX ADMIN — POTASSIUM CHLORIDE 40 MEQ: 20 SOLUTION ORAL at 05:03

## 2018-03-15 RX ADMIN — MUPIROCIN 1 G: 20 OINTMENT TOPICAL at 08:03

## 2018-03-15 RX ADMIN — OXYCODONE HYDROCHLORIDE 5 MG: 5 TABLET ORAL at 08:03

## 2018-03-15 RX ADMIN — ATORVASTATIN CALCIUM 20 MG: 20 TABLET, FILM COATED ORAL at 08:03

## 2018-03-15 RX ADMIN — HEPARIN SODIUM 2200 UNITS/HR: 10000 INJECTION, SOLUTION INTRAVENOUS at 10:03

## 2018-03-15 RX ADMIN — HEPARIN SODIUM 2000 UNITS/HR: 10000 INJECTION, SOLUTION INTRAVENOUS at 10:03

## 2018-03-15 RX ADMIN — MAGNESIUM OXIDE TAB 400 MG (241.3 MG ELEMENTAL MG) 800 MG: 400 (241.3 MG) TAB at 09:03

## 2018-03-15 RX ADMIN — FAMOTIDINE 20 MG: 20 TABLET, FILM COATED ORAL at 09:03

## 2018-03-15 RX ADMIN — POTASSIUM & SODIUM PHOSPHATES POWDER PACK 280-160-250 MG 2 PACKET: 280-160-250 PACK at 10:03

## 2018-03-15 RX ADMIN — POTASSIUM & SODIUM PHOSPHATES POWDER PACK 280-160-250 MG 2 PACKET: 280-160-250 PACK at 09:03

## 2018-03-15 RX ADMIN — FUROSEMIDE 40 MG: 10 INJECTION, SOLUTION INTRAMUSCULAR; INTRAVENOUS at 04:03

## 2018-03-15 RX ADMIN — HYDRALAZINE HYDROCHLORIDE 10 MG: 20 INJECTION INTRAMUSCULAR; INTRAVENOUS at 06:03

## 2018-03-15 RX ADMIN — FAMOTIDINE 20 MG: 10 INJECTION INTRAVENOUS at 08:03

## 2018-03-15 NOTE — ASSESSMENT & PLAN NOTE
Neuro:   - Pain mgmt: oxycodone PRN     Pulmonary:   - Extubated  - Wean O2 as tolerated  - Okay with sats >85%     Cardiac:   - Drips: epi 0.01  - ASA 325mg  - Metoprolol when off epi     Renal:    - UOP responsive to lasix, net even  - BUN/Cr stable  - Lasix gtt      ID:   - Post op ancef complete     Hem/Onc:   - Monitor hgb; stable  - Heparin gtt, PTT goal 60-80     Endocrine:    - Insulin sub q     Fluids/Electrolytes/Nutrition/GI:   - Replace electrolytes PRN per protocol  - Advance diet as tolerated     DISPO:  - Continue SICU care

## 2018-03-15 NOTE — SUBJECTIVE & OBJECTIVE
Follow-up For: Procedure(s) (LRB):  ENDARTERECTOMY- PULMONARY (N/A)    Post-Operative Day: 2 Days Post-Op    Objective:     Vital Signs (Most Recent):  Temp: 98.6 °F (37 °C) (03/15/18 0300)  Pulse: 101 (03/15/18 0715)  Resp: (!) 32 (03/15/18 0715)  BP: (!) 155/61 (03/15/18 0700)  SpO2: (!) 88 % (03/15/18 0715) Vital Signs (24h Range):  Temp:  [98.6 °F (37 °C)-99.3 °F (37.4 °C)] 98.6 °F (37 °C)  Pulse:  [] 101  Resp:  [11-36] 32  SpO2:  [85 %-96 %] 88 %  BP: ()/(52-74) 155/61  Arterial Line BP: ()/(49-67) 111/53     Weight: (!) 155.2 kg (342 lb 2.5 oz)  Body mass index is 53.59 kg/m².      Intake/Output Summary (Last 24 hours) at 03/15/18 0720  Last data filed at 03/15/18 0600   Gross per 24 hour   Intake             2850 ml   Output             2635 ml   Net              215 ml       Physical Exam   Constitutional: He is oriented to person, place, and time. He appears well-developed and well-nourished. No distress.   HENT:   Head: Normocephalic and atraumatic.   Eyes: Pupils are equal, round, and reactive to light.   Cardiovascular: Normal rate and regular rhythm.    Pulmonary/Chest: Effort normal. No respiratory distress.   Abdominal: Soft. Bowel sounds are normal.   Neurological: He is alert and oriented to person, place, and time.   Skin: He is not diaphoretic.   Psychiatric: He has a normal mood and affect. His behavior is normal.   Nursing note and vitals reviewed.      Vents:  Vent Mode: Spont/T (03/14/18 0912)  Ventilator Initiated: Yes (03/13/18 1713)  Set Rate: 24 bmp (03/14/18 0905)  Vt Set: 500 mL (03/14/18 0905)  Pressure Support: 5 cmH20 (03/14/18 0912)  PEEP/CPAP: 5 cmH20 (03/14/18 0912)  Oxygen Concentration (%): 50 (03/14/18 0905)  Peak Airway Pressure: 26 cmH2O (03/14/18 0905)  Plateau Pressure: 24 cmH20 (03/13/18 2105)  Total Ve: 11.7 mL (03/14/18 0912)  F/VT Ratio<105 (RSBI): (!) 15.97 (03/14/18 0501)    Lines/Drains/Airways     Central Venous Catheter Line                  Introducer 03/13/18 0801 right internal jugular 1 day         Percutaneous Central Line Insertion/Assessment - triple lumen  03/13/18 0801 right internal jugular 1 day          Drain                 Urethral Catheter 03/13/18 0815 Non-latex;Straight-tip;Temperature probe 16 Fr. 1 day         Y Chest Tube 1 and 2 03/13/18 1506 1 Right Pleural 19 Fr. 2 Left Pleural 19 Fr. 1 day          Arterial Line                 Arterial Line 03/13/18 0716 Left Other (Comment) 2 days          Line                 Pacer Wires 03/13/18 1441 1 day                Significant Labs:    CBC/Anemia Profile:    Recent Labs  Lab 03/13/18  1706 03/13/18  1710 03/14/18  0307 03/15/18  0322   WBC 20.65*  --  23.29* 26.44*   HGB 9.9*  --  9.5*  9.5* 8.7*   HCT 27.8* 27* 27.3*  27.3* 25.2*     --  251 195   MCV 84  --  83 86   RDW 14.6*  --  14.7* 15.9*        Chemistries:    Recent Labs  Lab 03/14/18  0307 03/14/18  0609  03/14/18  1618 03/15/18  0006 03/15/18  0322     --   --   --  144 144   K 2.5* 2.6*  < > 3.3* 3.4* 3.7  3.7  3.7     --   --   --  105 103   CO2 25  --   --   --  28 29   BUN 16  --   --   --  16 16   CREATININE 1.4  --   --   --  1.1 1.1   CALCIUM 9.5  --   --   --  8.3* 8.5*   MG  --  2.1  --   --  1.8 1.7   PHOS  --  1.4*  --   --  2.9 3.5   < > = values in this interval not displayed.    All pertinent labs within the past 24 hours have been reviewed.    Significant Imaging:  I have reviewed all pertinent imaging results/findings within the past 24 hours.

## 2018-03-15 NOTE — CARE UPDATE
Will sign off today. No insulin needs > 12 hours. ** Please call Endocrine for any BG related issues **  Angel Kan, SANTI  [phone]

## 2018-03-15 NOTE — PLAN OF CARE
Problem: Patient Care Overview  Goal: Individualization & Mutuality  Outcome: Ongoing (interventions implemented as appropriate)  Pt orientedx4, on 8L NC, O2 sats maintained >85%. Afebrile, VSS. Epi currently @ 0.01 mcg/kg/min, heparin @ 1500 U/hr. Ptts drawn Q6H. Electrolytes monitored and replaced. 40 mg lasix given for decreased UO. Pt tolerating clear liquid diet well. Pt updated on plan of care throughout shift. See flow sheet for assessment and details.

## 2018-03-15 NOTE — ASSESSMENT & PLAN NOTE
Plan:     Neuro:   - off sedation, doing well     Pulmonary:   - extubated on 3/14 to NC  Goal sats >85%  - continues to sat appropriately     Cardiac:  Minimal pressor support with Epi  Titrate as tolerated     Renal:   UOP adequate, BUN/Cr stable  Lasix TID     Infectious Disease:   Post op prophylaxis per CTS     Hematology/Oncology:  H/h stable post-op, trend CBC      Endocrine:  Insulin infusion as per endocrinology     Fluids/Electrolytes/Nutrition/GI:   NPO for now  Replace lytes PRN  PPI for ulcer prophylaxis     Dispo:  Monitoring in ICU. CTS primary.

## 2018-03-15 NOTE — PLAN OF CARE
Problem: Patient Care Overview  Goal: Plan of Care Review  Dx: Pulm endarectomy  Hx: Pulmonary embolism, chronic respiratory failure with hypoxia on 5L home O2, obesity hypoventilation syndrome     3/13/18: presented to hospital for carotid endarterectomy procedure w/ SOB, dizziness, lightheaded, pulm endarectomy, admit to SICU, intubated overnight     Dr. Jennings at the bedside to discuss the plan of care in regards to extubation, continuation of hemodynamic monitoring, anticoagulation therapy, and advancement of diet.  All questions and concerns were answered and addressed.  Both demonstrated understanding.

## 2018-03-15 NOTE — OP NOTE
DATE OF PROCEDURE:  03/13/2018.    PREOPERATIVE DIAGNOSES:  1.  Chronic thromboembolic pulmonary hypertension.  2.  Hypercoagulable state.  3.  Morbid obesity with a BMI of 52.  4.  Respiratory failure, chronic.    POSTOPERATIVE DIAGNOSES:  1.  Chronic thromboembolic pulmonary hypertension.  2.  Hypercoagulable state.  3.  Morbid obesity with a BMI of 52.  4.  Respiratory failure, chronic.    PROCEDURE:  Pulmonary endarterectomy with circulatory arrest, bilateral.    SURGEON:  Gabriel Jennings M.D.    ASSISTANTS:  HANK Vargas and Brandt Romero, PGY-6.    ANESTHESIA:  General anesthesia.    INDICATIONS FOR PROCEDURE:  This is a 47-year-old gentleman who was seen and   evaluated for chronic thromboembolic pulmonary hypertension due to chronic   clots.  He was hypercoagulable.  He was chronically hypoxic on supplemental   oxygen with pO2 that ranged from 40 to 60 when measured.  He was severely   dyspneic and symptomatic.  He underwent a thorough and thoughtful evaluation   including right heart catheterization, CT scan, pulmonary angiogram, PFTs and   V/Q scan, which all demonstrated appearance of chronic clot.  I explained to him   the risks and benefits of the procedure including the risks attended to   circulatory arrest.  He understood these and was able to give informed consent.    His wife is a nurse practitioner and was well informed.    OPERATIVE FINDINGS:  Bilateral pulmonary emboli, which were meticulously removed   clearing all segmental and subsegmental branches of the pulmonary arterial   system on both sides.    OPERATIVE TIMES:  Cardiopulmonary bypass time 265 minutes.  Clamp time 90   minutes.    CIRCULATORY ARREST TIME.  1.  22 minutes.  2.  15 minutes.  3.  25 minutes.    TEMPERATURE:  25 degrees Celsius.    BLOOD LOSS:  100 mL.    PROCEDURE IN DETAIL:  The patient was taken to the Operating Room electively and   placed supine upon the table.  General anesthesia was administered.   Monitoring   lines were placed.  Transesophageal echocardiography was performed during this   procedure.    The patient was prepped and draped sterilely.  Median sternotomy was performed   through a midline incision.  The patient was systemically heparinized.    Pericardial well was created.  Cannulation was performed in the ascending aorta,   the right atrium and antegrade cardioplegia catheter and vent was placed in the   ascending aorta.  We placed the patient on cardiopulmonary bypass and began   cooling.  Circumferential dissection of the superior vena cava to facilitate   exposure was performed.    Once we reached our target temperature, crossclamp was applied and one dose of   cardioplegia was delivered to arrest the heart.  We then opened the right   pulmonary artery and began circulatory arrest.  Due to the patient's size, it   took several minutes to clear all the operative blood from the field and we   began our dissection, chronic laminated thrombus was immediately seen in the   RPA.  This was carefully dissected from the pulmonary arterial wall and we began   our meticulous dissection of the pulmonary arterial tree using a suction   dissector.  With a clear field, we were able to clear the segment and   subsegmental branches to the medial lobe and inferior lobe.  The upper lobe was   relatively free.  We removed the entire specimen in one piece.  We then   reinstituted cardiopulmonary bypass for 10 minutes after a first period of   approximately 20 minutes.  We then initiated another period of circulatory   arrest and completed our assessment and dissection of the chronic thrombus on   the right side.    Having achieved a satisfactory endarterectomy on the right side, we began   reperfusing the patient.  After another 10 minutes, we performed another period   of circulatory arrest on the left side, opening the left pulmonary artery down   towards the hilum, but not entering the pleural space.  We again  encountered   thromboembolic disease less on this side.  There were webs surrounding the major   branches and we were able to find our endarterectomy plane proximally and   carried this down, most of the thrombotic burden was in the lower lobe and   lingular branches.  We followed these down in the segmental and subsegmental   branches and removed satisfactory specimen from the side.  The arteriotomies   were closed with a running 4-0 Prolene suture.  We reinstituted cardiopulmonary   bypass and rewarmed the patient.    After rewarming, we had excellent cardiac function and  easily from   cardiopulmonary bypass with improved pulmonary function, pulmonary pressures   have been systemic and there were now half systemic giving us a satisfactory   hemodynamic result.  We also had had a pO2 of less than 60 on 100% FIO2 and we   intubated the patient.  This improved slightly when we opened the chest;   however, when we came off cardiopulmonary bypass, our pO2 was 339 on 80% FIO2   demonstrating a significant improvement in oxygenation.  Right ventricular   function remained good.  Protamine was administered.  Cannulas were removed.    Excellent hemostasis was achieved.  Drains were placed in the mediastinum and   the pacing wires were placed.  Sternum was reapproximated with interrupted   stainless steel wires.  Skin and fascia were closed in layers and the patient   was taken back to the ICU in stable condition.      ANTHONY  dd: 03/15/2018 00:32:48 (CDT)  td: 03/15/2018 02:16:04 (CDT)  Doc ID   #[id number]  Job ID #[id number]    CC:

## 2018-03-15 NOTE — SUBJECTIVE & OBJECTIVE
Interval History:   NAEON. Some confusion, appropriate but not oriented to time. Tolerating regular diet.     Medications:  Continuous Infusions:   epinephrine 0.01 mcg/kg/min (03/15/18 0800)    custom IV infusion builder (for pharmacist use only)      heparin (porcine) in 5 % dex 1,500 Units/hr (03/15/18 0800)    nicardipine       Scheduled Meds:   aspirin  325 mg Oral Daily    atorvastatin  20 mg Oral Daily    citalopram  40 mg Oral Daily    docusate sodium  100 mg Oral BID    famotidine (PF)  20 mg Intravenous BID    furosemide  40 mg Intravenous TID    metoprolol tartrate  25 mg Oral BID    mupirocin  1 g Nasal BID    potassium, sodium phosphates  2 packet Oral QID (AC & HS)     PRN Meds:acetaminophen, dextrose 50%, dextrose 50%, fentaNYL, glucagon (human recombinant), glucose, glucose, insulin aspart U-100, lactulose, metoclopramide HCl, ondansetron, oxyCODONE, oxyCODONE     Objective:     Vital Signs (Most Recent):  Temp: 98.6 °F (37 °C) (03/15/18 0700)  Pulse: 97 (03/15/18 0800)  Resp: (!) 32 (03/15/18 0800)  BP: (!) 144/60 (03/15/18 0800)  SpO2: (!) 89 % (03/15/18 0800) Vital Signs (24h Range):  Temp:  [98.6 °F (37 °C)-99.3 °F (37.4 °C)] 98.6 °F (37 °C)  Pulse:  [] 97  Resp:  [14-36] 32  SpO2:  [85 %-96 %] 89 %  BP: (100-159)/(52-74) 144/60  Arterial Line BP: ()/(49-67) 105/50     Weight: (!) 155.2 kg (342 lb 2.5 oz)  Body mass index is 53.59 kg/m².    SpO2: (!) 89 %  O2 Device (Oxygen Therapy): High Flow nasal Cannula    Intake/Output - Last 3 Shifts       03/13 0700 - 03/14 0659 03/14 0700 - 03/15 0659 03/15 0700 - 03/16 0659    P.O.  1340     I.V. (mL/kg) 5857 (37.8) 1450 (9.3)     Blood 2236      NG/GT  160     IV Piggyback 900      Total Intake(mL/kg) 8993 (58) 2950 (19)     Urine (mL/kg/hr) 2415 (0.6) 2370 (0.6) 60 (0.3)    Drains 100 (0)      Chest Tube 185 (0) 310 (0.1) 50 (0.3)    Total Output 2700 2680 110    Net +6293 +270 -110                 Lines/Drains/Airways      Central Venous Catheter Line                 Introducer 03/13/18 0801 right internal jugular 2 days         Percutaneous Central Line Insertion/Assessment - triple lumen  03/13/18 0801 right internal jugular 2 days          Drain                 Urethral Catheter 03/13/18 0815 Non-latex;Straight-tip;Temperature probe 16 Fr. 1 day         Y Chest Tube 1 and 2 03/13/18 1506 1 Right Pleural 19 Fr. 2 Left Pleural 19 Fr. 1 day          Arterial Line                 Arterial Line 03/13/18 0716 Left Other (Comment) 2 days          Line                 Pacer Wires 03/13/18 1441 1 day                Physical Exam   Constitutional: He is oriented to person, place, and time. No distress.   Obese   Cardiovascular: Normal rate and regular rhythm.    Pulmonary/Chest: Effort normal. No respiratory distress.   Abdominal: Soft. He exhibits no distension.   Neurological: He is alert and oriented to person, place, and time.   Skin: Skin is warm and dry.   Psychiatric: He has a normal mood and affect. His behavior is normal.       Significant Labs:  BMP:   Recent Labs  Lab 03/15/18  0322   *      K 3.7  3.7  3.7      CO2 29   BUN 16   CREATININE 1.1   CALCIUM 8.5*   MG 1.7     CBC:   Recent Labs  Lab 03/15/18  0322   WBC 26.44*   RBC 2.94*   HGB 8.7*   HCT 25.2*      MCV 86   MCH 29.6   MCHC 34.5     Coagulation:   Recent Labs  Lab 03/13/18  1706  03/15/18  0624   INR 1.2  --   --    APTT 30.7  < > 29.3   < > = values in this interval not displayed.    Significant Diagnostics:  CXR: I have reviewed all pertinent results/findings within the past 24 hours

## 2018-03-15 NOTE — ANESTHESIA POSTPROCEDURE EVALUATION
"Anesthesia Post Evaluation    Patient: Jaquan Hines Jr.    Procedure(s) Performed: Procedure(s) (LRB):  ENDARTERECTOMY- PULMONARY (N/A)    Final Anesthesia Type: general  Patient location during evaluation: ICU  Patient participation: No - Unable to Participate, Sedation  Level of consciousness: sedated  Post-procedure vital signs: reviewed and stable  Pain management: adequate  Airway patency: patent  PONV status at discharge: No PONV  Anesthetic complications: no      Cardiovascular status: hemodynamically stable  Respiratory status: ETT  Hydration status: euvolemic  Follow-up not needed.        Visit Vitals  /61 (BP Location: Right arm, Patient Position: Lying)   Pulse 96   Temp 37 °C (98.6 °F) (Oral)   Resp (!) 26   Ht 5' 7" (1.702 m)   Wt (!) 155.2 kg (342 lb 2.5 oz)   SpO2 (!) 85%   BMI 53.59 kg/m²       Pain/Robson Score: Pain Assessment Performed: Yes (3/15/2018  3:00 AM)  Presence of Pain: denies (3/15/2018  3:00 AM)  Pain Rating Prior to Med Admin: 2 (3/14/2018  9:16 PM)      "

## 2018-03-16 LAB
ANION GAP SERPL CALC-SCNC: 10 MMOL/L
ANION GAP SERPL CALC-SCNC: 11 MMOL/L
APTT BLDCRRT: 32.2 SEC
APTT BLDCRRT: 34.9 SEC
APTT BLDCRRT: 34.9 SEC
APTT BLDCRRT: 40.8 SEC
APTT BLDCRRT: 41.8 SEC
BASOPHILS # BLD AUTO: 0.04 K/UL
BASOPHILS # BLD AUTO: 0.04 K/UL
BASOPHILS NFR BLD: 0.2 %
BASOPHILS NFR BLD: 0.3 %
BUN SERPL-MCNC: 15 MG/DL
BUN SERPL-MCNC: 16 MG/DL
CALCIUM SERPL-MCNC: 8.3 MG/DL
CALCIUM SERPL-MCNC: 9.3 MG/DL
CHLORIDE SERPL-SCNC: 98 MMOL/L
CHLORIDE SERPL-SCNC: 98 MMOL/L
CO2 SERPL-SCNC: 32 MMOL/L
CO2 SERPL-SCNC: 33 MMOL/L
CREAT SERPL-MCNC: 1 MG/DL
CREAT SERPL-MCNC: 1.2 MG/DL
DIFFERENTIAL METHOD: ABNORMAL
DIFFERENTIAL METHOD: ABNORMAL
EOSINOPHIL # BLD AUTO: 0.1 K/UL
EOSINOPHIL # BLD AUTO: 0.1 K/UL
EOSINOPHIL NFR BLD: 0.5 %
EOSINOPHIL NFR BLD: 0.7 %
ERYTHROCYTE [DISTWIDTH] IN BLOOD BY AUTOMATED COUNT: 15.7 %
ERYTHROCYTE [DISTWIDTH] IN BLOOD BY AUTOMATED COUNT: 15.9 %
EST. GFR  (AFRICAN AMERICAN): >60 ML/MIN/1.73 M^2
EST. GFR  (AFRICAN AMERICAN): >60 ML/MIN/1.73 M^2
EST. GFR  (NON AFRICAN AMERICAN): >60 ML/MIN/1.73 M^2
EST. GFR  (NON AFRICAN AMERICAN): >60 ML/MIN/1.73 M^2
GLUCOSE SERPL-MCNC: 120 MG/DL
GLUCOSE SERPL-MCNC: 125 MG/DL
HCT VFR BLD AUTO: 28 %
HCT VFR BLD AUTO: 28.7 %
HGB BLD-MCNC: 9.2 G/DL
HGB BLD-MCNC: 9.6 G/DL
IMM GRANULOCYTES # BLD AUTO: 0.21 K/UL
IMM GRANULOCYTES # BLD AUTO: 0.21 K/UL
IMM GRANULOCYTES NFR BLD AUTO: 1.3 %
IMM GRANULOCYTES NFR BLD AUTO: 1.4 %
INR PPP: 1.4
LYMPHOCYTES # BLD AUTO: 2.5 K/UL
LYMPHOCYTES # BLD AUTO: 2.8 K/UL
LYMPHOCYTES NFR BLD: 16.3 %
LYMPHOCYTES NFR BLD: 16.7 %
MAGNESIUM SERPL-MCNC: 1.5 MG/DL
MAGNESIUM SERPL-MCNC: 1.5 MG/DL
MAGNESIUM SERPL-MCNC: 1.6 MG/DL
MAGNESIUM SERPL-MCNC: 1.6 MG/DL
MAGNESIUM SERPL-MCNC: 1.7 MG/DL
MCH RBC QN AUTO: 29.5 PG
MCH RBC QN AUTO: 29.7 PG
MCHC RBC AUTO-ENTMCNC: 32.9 G/DL
MCHC RBC AUTO-ENTMCNC: 33.4 G/DL
MCV RBC AUTO: 89 FL
MCV RBC AUTO: 90 FL
MONOCYTES # BLD AUTO: 0.7 K/UL
MONOCYTES # BLD AUTO: 0.7 K/UL
MONOCYTES NFR BLD: 4 %
MONOCYTES NFR BLD: 4.5 %
NEUTROPHILS # BLD AUTO: 11.8 K/UL
NEUTROPHILS # BLD AUTO: 12.8 K/UL
NEUTROPHILS NFR BLD: 76.8 %
NEUTROPHILS NFR BLD: 77.3 %
NRBC BLD-RTO: 1 /100 WBC
NRBC BLD-RTO: 1 /100 WBC
PLATELET # BLD AUTO: 196 K/UL
PLATELET # BLD AUTO: 200 K/UL
PMV BLD AUTO: 11.9 FL
PMV BLD AUTO: 12.2 FL
POCT GLUCOSE: 110 MG/DL (ref 70–110)
POCT GLUCOSE: 110 MG/DL (ref 70–110)
POCT GLUCOSE: 130 MG/DL (ref 70–110)
POTASSIUM SERPL-SCNC: 3 MMOL/L
POTASSIUM SERPL-SCNC: 3 MMOL/L
POTASSIUM SERPL-SCNC: 3.1 MMOL/L
POTASSIUM SERPL-SCNC: 3.1 MMOL/L
POTASSIUM SERPL-SCNC: 3.3 MMOL/L
POTASSIUM SERPL-SCNC: 3.6 MMOL/L
POTASSIUM SERPL-SCNC: 3.6 MMOL/L
PROTHROMBIN TIME: 14.2 SEC
RBC # BLD AUTO: 3.12 M/UL
RBC # BLD AUTO: 3.23 M/UL
SODIUM SERPL-SCNC: 141 MMOL/L
SODIUM SERPL-SCNC: 141 MMOL/L
WBC # BLD AUTO: 15.36 K/UL
WBC # BLD AUTO: 16.57 K/UL

## 2018-03-16 PROCEDURE — 83735 ASSAY OF MAGNESIUM: CPT | Mod: 91

## 2018-03-16 PROCEDURE — 94761 N-INVAS EAR/PLS OXIMETRY MLT: CPT

## 2018-03-16 PROCEDURE — 25000003 PHARM REV CODE 250: Performed by: THORACIC SURGERY (CARDIOTHORACIC VASCULAR SURGERY)

## 2018-03-16 PROCEDURE — 84132 ASSAY OF SERUM POTASSIUM: CPT | Mod: 91

## 2018-03-16 PROCEDURE — 83735 ASSAY OF MAGNESIUM: CPT

## 2018-03-16 PROCEDURE — 25000003 PHARM REV CODE 250: Performed by: STUDENT IN AN ORGANIZED HEALTH CARE EDUCATION/TRAINING PROGRAM

## 2018-03-16 PROCEDURE — 63600175 PHARM REV CODE 636 W HCPCS: Performed by: STUDENT IN AN ORGANIZED HEALTH CARE EDUCATION/TRAINING PROGRAM

## 2018-03-16 PROCEDURE — 20600001 HC STEP DOWN PRIVATE ROOM

## 2018-03-16 PROCEDURE — 85610 PROTHROMBIN TIME: CPT

## 2018-03-16 PROCEDURE — 85730 THROMBOPLASTIN TIME PARTIAL: CPT

## 2018-03-16 PROCEDURE — 85025 COMPLETE CBC W/AUTO DIFF WBC: CPT | Mod: 91

## 2018-03-16 PROCEDURE — 27000221 HC OXYGEN, UP TO 24 HOURS

## 2018-03-16 PROCEDURE — 84132 ASSAY OF SERUM POTASSIUM: CPT

## 2018-03-16 PROCEDURE — 80048 BASIC METABOLIC PNL TOTAL CA: CPT | Mod: 91

## 2018-03-16 PROCEDURE — 80048 BASIC METABOLIC PNL TOTAL CA: CPT

## 2018-03-16 PROCEDURE — 85730 THROMBOPLASTIN TIME PARTIAL: CPT | Mod: 91

## 2018-03-16 PROCEDURE — 25000003 PHARM REV CODE 250: Performed by: NURSE PRACTITIONER

## 2018-03-16 PROCEDURE — 36415 COLL VENOUS BLD VENIPUNCTURE: CPT

## 2018-03-16 RX ORDER — POTASSIUM CHLORIDE 20 MEQ/1
60 TABLET, EXTENDED RELEASE ORAL ONCE
Status: COMPLETED | OUTPATIENT
Start: 2018-03-16 | End: 2018-03-16

## 2018-03-16 RX ORDER — LANOLIN ALCOHOL/MO/W.PET/CERES
400 CREAM (GRAM) TOPICAL ONCE
Status: COMPLETED | OUTPATIENT
Start: 2018-03-16 | End: 2018-03-16

## 2018-03-16 RX ORDER — METOPROLOL TARTRATE 25 MG/1
25 TABLET, FILM COATED ORAL 2 TIMES DAILY
Status: DISCONTINUED | OUTPATIENT
Start: 2018-03-16 | End: 2018-03-19 | Stop reason: HOSPADM

## 2018-03-16 RX ORDER — POTASSIUM CHLORIDE 14.9 MG/ML
40 INJECTION INTRAVENOUS
Status: DISCONTINUED | OUTPATIENT
Start: 2018-03-16 | End: 2018-03-16

## 2018-03-16 RX ORDER — CARBAMAZEPINE 200 MG/1
200 TABLET ORAL DAILY
Status: DISCONTINUED | OUTPATIENT
Start: 2018-03-16 | End: 2018-03-19 | Stop reason: HOSPADM

## 2018-03-16 RX ORDER — POTASSIUM CHLORIDE 14.9 MG/ML
20 INJECTION INTRAVENOUS
Status: DISPENSED | OUTPATIENT
Start: 2018-03-16 | End: 2018-03-16

## 2018-03-16 RX ORDER — ALBUTEROL SULFATE 90 UG/1
1 AEROSOL, METERED RESPIRATORY (INHALATION) EVERY 6 HOURS PRN
Status: DISCONTINUED | OUTPATIENT
Start: 2018-03-16 | End: 2018-03-19

## 2018-03-16 RX ORDER — POTASSIUM CHLORIDE 14.9 MG/ML
20 INJECTION INTRAVENOUS
Status: COMPLETED | OUTPATIENT
Start: 2018-03-16 | End: 2018-03-16

## 2018-03-16 RX ORDER — POTASSIUM CHLORIDE 750 MG/1
50 CAPSULE, EXTENDED RELEASE ORAL ONCE
Status: COMPLETED | OUTPATIENT
Start: 2018-03-16 | End: 2018-03-16

## 2018-03-16 RX ORDER — CALCIUM CARBONATE 200(500)MG
1000 TABLET,CHEWABLE ORAL ONCE
Status: COMPLETED | OUTPATIENT
Start: 2018-03-16 | End: 2018-03-16

## 2018-03-16 RX ORDER — POTASSIUM CHLORIDE 750 MG/1
10 CAPSULE, EXTENDED RELEASE ORAL ONCE
Status: COMPLETED | OUTPATIENT
Start: 2018-03-16 | End: 2018-03-16

## 2018-03-16 RX ORDER — WARFARIN SODIUM 5 MG/1
5 TABLET ORAL DAILY
Status: DISCONTINUED | OUTPATIENT
Start: 2018-03-16 | End: 2018-03-18

## 2018-03-16 RX ADMIN — DOCUSATE SODIUM 100 MG: 100 CAPSULE, LIQUID FILLED ORAL at 10:03

## 2018-03-16 RX ADMIN — MAGNESIUM OXIDE TAB 400 MG (241.3 MG ELEMENTAL MG) 800 MG: 400 (241.3 MG) TAB at 10:03

## 2018-03-16 RX ADMIN — POTASSIUM CHLORIDE 20 MEQ: 200 INJECTION, SOLUTION INTRAVENOUS at 10:03

## 2018-03-16 RX ADMIN — SODIUM CHLORIDE: 9 INJECTION, SOLUTION INTRAVENOUS at 06:03

## 2018-03-16 RX ADMIN — WARFARIN SODIUM 5 MG: 5 TABLET ORAL at 05:03

## 2018-03-16 RX ADMIN — FAMOTIDINE 20 MG: 20 TABLET, FILM COATED ORAL at 09:03

## 2018-03-16 RX ADMIN — POTASSIUM CHLORIDE 10 MEQ: 750 CAPSULE, EXTENDED RELEASE ORAL at 10:03

## 2018-03-16 RX ADMIN — MUPIROCIN 1 G: 20 OINTMENT TOPICAL at 09:03

## 2018-03-16 RX ADMIN — POTASSIUM CHLORIDE 60 MEQ: 1500 TABLET, EXTENDED RELEASE ORAL at 10:03

## 2018-03-16 RX ADMIN — HEPARIN SODIUM 2500 UNITS/HR: 10000 INJECTION, SOLUTION INTRAVENOUS at 10:03

## 2018-03-16 RX ADMIN — POTASSIUM & SODIUM PHOSPHATES POWDER PACK 280-160-250 MG 2 PACKET: 280-160-250 PACK at 05:03

## 2018-03-16 RX ADMIN — POTASSIUM CHLORIDE 50 MEQ: 750 CAPSULE, EXTENDED RELEASE ORAL at 09:03

## 2018-03-16 RX ADMIN — MUPIROCIN 1 G: 20 OINTMENT TOPICAL at 10:03

## 2018-03-16 RX ADMIN — ATORVASTATIN CALCIUM 20 MG: 20 TABLET, FILM COATED ORAL at 10:03

## 2018-03-16 RX ADMIN — POTASSIUM & SODIUM PHOSPHATES POWDER PACK 280-160-250 MG 2 PACKET: 280-160-250 PACK at 09:03

## 2018-03-16 RX ADMIN — CALCIUM CARBONATE (ANTACID) CHEW TAB 500 MG 1000 MG: 500 CHEW TAB at 12:03

## 2018-03-16 RX ADMIN — CITALOPRAM HYDROBROMIDE 40 MG: 20 TABLET ORAL at 10:03

## 2018-03-16 RX ADMIN — POTASSIUM CHLORIDE 20 MEQ: 200 INJECTION, SOLUTION INTRAVENOUS at 04:03

## 2018-03-16 RX ADMIN — METOPROLOL TARTRATE 25 MG: 25 TABLET ORAL at 12:03

## 2018-03-16 RX ADMIN — POTASSIUM & SODIUM PHOSPHATES POWDER PACK 280-160-250 MG 2 PACKET: 280-160-250 PACK at 12:03

## 2018-03-16 RX ADMIN — MAGNESIUM OXIDE TAB 400 MG (241.3 MG ELEMENTAL MG) 800 MG: 400 (241.3 MG) TAB at 02:03

## 2018-03-16 RX ADMIN — CARBAMAZEPINE 200 MG: 200 TABLET ORAL at 12:03

## 2018-03-16 RX ADMIN — MAGNESIUM OXIDE TAB 400 MG (241.3 MG ELEMENTAL MG) 400 MG: 400 (241.3 MG) TAB at 02:03

## 2018-03-16 RX ADMIN — MAGNESIUM OXIDE TAB 400 MG (241.3 MG ELEMENTAL MG) 800 MG: 400 (241.3 MG) TAB at 09:03

## 2018-03-16 RX ADMIN — MAGNESIUM OXIDE TAB 400 MG (241.3 MG ELEMENTAL MG) 400 MG: 400 (241.3 MG) TAB at 10:03

## 2018-03-16 RX ADMIN — METOPROLOL TARTRATE 25 MG: 25 TABLET ORAL at 09:03

## 2018-03-16 RX ADMIN — FAMOTIDINE 20 MG: 20 TABLET, FILM COATED ORAL at 10:03

## 2018-03-16 RX ADMIN — ASPIRIN 325 MG ORAL TABLET 325 MG: 325 PILL ORAL at 10:03

## 2018-03-16 RX ADMIN — POTASSIUM CHLORIDE 20 MEQ: 200 INJECTION, SOLUTION INTRAVENOUS at 02:03

## 2018-03-16 RX ADMIN — POTASSIUM & SODIUM PHOSPHATES POWDER PACK 280-160-250 MG 2 PACKET: 280-160-250 PACK at 07:03

## 2018-03-16 RX ADMIN — SODIUM CHLORIDE: 9 INJECTION, SOLUTION INTRAVENOUS at 12:03

## 2018-03-16 NOTE — ASSESSMENT & PLAN NOTE
Neuro:   - Pain mgmt: oxycodone PRN     Pulmonary:   - Wean O2 as tolerated  - Okay with sats >85%     Cardiac:   - ASA 325mg  - Metoprolol      Renal:    - UOP 5.6L  - BUN/Cr stable  - Lasix gtt      ID:   - Post op ancef complete     Hem/Onc:   - Monitor hgb; stable  - Heparin gtt, PTT goal 60-80  - Coumadin 4 today     Endocrine:    - Insulin sub q     Fluids/Electrolytes/Nutrition/GI:   - Replace electrolytes PRN per protocol  - Cardiac diet, 1500ml fluid restriction

## 2018-03-16 NOTE — PLAN OF CARE
Problem: Patient Care Overview  Goal: Plan of Care Review  Dx: Pulm endarectomy  Hx: Pulmonary embolism, chronic respiratory failure with hypoxia on 5L home O2, obesity hypoventilation syndrome     3/13/18: presented to hospital for carotid endarterectomy procedure w/ SOB, dizziness, lightheaded, pulm endarectomy, admit to SICU, intubated overnight     Outcome: Ongoing (interventions implemented as appropriate)  Plan of care reviewed with patient/family; all questions and concerns addressed; no distress at present.

## 2018-03-16 NOTE — PLAN OF CARE
Problem: Patient Care Overview  Goal: Plan of Care Review    Recommendations     Recommendation/Intervention:   1. Continue current regular diet. Pt with good intake at this time.   2. RD following.     Goals: Patient to receive nutrition by RD follow-up  Nutrition Goal Status: goal met

## 2018-03-16 NOTE — PROGRESS NOTES
" [facility]  Adult Nutrition  Progress Note    SUMMARY       Recommendations    Recommendation/Intervention:   1. Continue current regular diet. Pt with good intake at this time.   2. RD following.    Goals: Patient to receive nutrition by RD follow-up  Nutrition Goal Status: goal met  Communication of RD Recs:  (POC)    Reason for Assessment    Reason for Assessment: RD follow-up  Diagnosis: cardiac disease (s/p pulmonary endarterectomy)  Relevant Medical History: pulmonary embolis, HTN  Interdisciplinary Rounds: did not attend    General Information Comments: Pt reports good appetite at this time, ~75% of meals. Reports some intentional weight PTA.    Nutrition Discharge Planning: Adequate nutrition via PO intake.    Nutrition/Diet History    Do you have any cultural, spiritual, Jainism conflicts, given your current situation?: none reported  Factors Affecting Nutritional Intake: None identified at this time    Anthropometrics    Temp: 97.5 °F (36.4 °C)  Height Method: Stated  Height: 5' 7" (170.2 cm)  Height (inches): 67 in  Weight Method: Bed Scale  Weight: (!) 146.2 kg (322 lb 5 oz)  Weight (lb): (!) 322.32 lb  Ideal Body Weight (IBW), Male: 148 lb  % Ideal Body Weight, Male (lb): 217.78 lb  BMI (Calculated): 50.6  BMI Grade: greater than 40 - morbid obesity  Usual Body Weight (UBW), kg: (!) 152.3 kg  % Usual Body Weight: 96.2  % Weight Change From Usual Weight: -4.01 %    Lab/Procedures/Meds    Pertinent Labs Reviewed: reviewed  Pertinent Labs Comments: K 3.1, Glu 125, Mg 1.5  Pertinent Medications Reviewed: reviewed  Pertinent Medications Comments: statin, docusate, famotidine, Mg, KCl, K-Na-phosphates, lasix, heparin    Physical Findings/Assessment    Overall Physical Appearance: obese  Tubes:  (chest tube)  Oral/Mouth Cavity: tooth/teeth missing  Skin: edema, incision(s)    Estimated/Assessed Needs    Weight Used For Calorie Calculations: (!) 155 kg (341 lb 11.4 oz)  Height: 5' 7" " (170.2 cm)  Energy Calorie Requirements (kcal): 2374 kcal/day  Energy Need Method: Hart-St Jeor (no AF 2/2 obesity)  RMR (Hart-St. Jeor Equation): 2373.62  Protein Requirements: 155 g/day (1 g/kg)  Weight Used For Protein Calculations: (!) 155 kg (341 lb 11.4 oz)  Fluid Requirements (mL): 1 mL/kcal or per MD  Fluid Need Method: RDA Method  RDA Method (mL): 2374     Nutrition Prescription Ordered    Current Diet Order: Regular  Nutrition Order Comments: 1500ml FR    Evaluation of Received Nutrient/Fluid Intake    Oral Fluid (mL): 1540  I/O: -3.2L  Comments: LBM 3/15  % Intake of Estimated Energy Needs: 75 - 100 %  % Meal Intake: 75 - 100 %    Nutrition Risk    Level of Risk/Frequency of Follow-up:  (F/u 1x weekly)     Assessment and Plan    Pulmonary embolus    Contributing Nutrition Diagnosis  Inadequate energy intake     Related to (etiology):   Decreased ability to consume sufficient energy     Signs and Symptoms (as evidenced by):   NPO and no alternative means of nutrition at this time      Nutrition Diagnosis Status:   Resolved             Monitor and Evaluation    Food and Nutrient Intake: energy intake, food and beverage intake  Food and Nutrient Adminstration: diet order  Physical Activity and Function: nutrition-related ADLs and IADLs  Anthropometric Measurements: weight, weight change  Biochemical Data, Medical Tests and Procedures: electrolyte and renal panel, gastrointestinal profile, inflammatory profile  Nutrition-Focused Physical Findings: overall appearance     Nutrition Follow-Up    RD Follow-up?: Yes

## 2018-03-16 NOTE — SUBJECTIVE & OBJECTIVE
Interval History:   NAEON. Up in chair this AM.    Medications:  Continuous Infusions:   custom IV infusion builder (for pharmacist use only) 15 mL/hr at 03/16/18 0054    heparin (porcine) in 5 % dex 2,500 Units/hr (03/16/18 1030)     Scheduled Meds:   aspirin  325 mg Oral Daily    atorvastatin  20 mg Oral Daily    calcium carbonate  1,000 mg Oral Once    carBAMazepine  200 mg Oral Daily    citalopram  40 mg Oral Daily    docusate sodium  100 mg Oral BID    famotidine  20 mg Oral BID    magnesium oxide  800 mg Oral TID    mupirocin  1 g Nasal BID    potassium chloride  20 mEq Intravenous Q2H    potassium, sodium phosphates  2 packet Oral QID (AC & HS)     PRN Meds:acetaminophen, albuterol, calcium carbonate, dextrose 50%, dextrose 50%, glucagon (human recombinant), glucose, glucose, insulin aspart U-100, lactulose, metoclopramide HCl, ondansetron, oxyCODONE, oxyCODONE     Objective:     Vital Signs (Most Recent):  Temp: 98.7 °F (37.1 °C) (03/16/18 0959)  Pulse: 89 (03/16/18 1000)  Resp: 20 (03/16/18 0959)  BP: (!) 107/51 (03/16/18 1000)  SpO2: 95 % (03/16/18 1000) Vital Signs (24h Range):  Temp:  [98.5 °F (36.9 °C)-99.5 °F (37.5 °C)] 98.7 °F (37.1 °C)  Pulse:  [80-92] 89  Resp:  [15-37] 20  SpO2:  [89 %-97 %] 95 %  BP: ()/(51-59) 107/51  Arterial Line BP: ()/(48-53) 99/48     Weight: (!) 146.2 kg (322 lb 5 oz)  Body mass index is 50.48 kg/m².    SpO2: 95 %  O2 Device (Oxygen Therapy): High Flow nasal Cannula    Intake/Output - Last 3 Shifts       03/14 0700 - 03/15 0659 03/15 0700 - 03/16 0659 03/16 0700 - 03/17 0659    P.O. 1340 1540     I.V. (mL/kg) 1450 (9.3) 892.4 (5.7)     Blood       NG/      IV Piggyback  200     Total Intake(mL/kg) 2950 (19) 2632.4 (17)     Urine (mL/kg/hr) 2370 (0.6) 5675 (1.5)     Drains       Stool  0 (0)     Chest Tube 310 (0.1) 190 (0.1)     Total Output 2680 5865      Net +270 -3232.6             Stool Occurrence  2 x           Lines/Drains/Airways      Central Venous Catheter Line                 Introducer 03/13/18 0801 right internal jugular 3 days         Percutaneous Central Line Insertion/Assessment - triple lumen  03/13/18 0801 right internal jugular 3 days          Drain                 Urethral Catheter 03/13/18 0815 Non-latex;Straight-tip;Temperature probe 16 Fr. 3 days         Y Chest Tube 1 and 2 03/13/18 1506 1 Right Pleural 19 Fr. 2 Left Pleural 19 Fr. 2 days          Line                 Pacer Wires 03/13/18 1441 2 days                Physical Exam   Constitutional: He is oriented to person, place, and time. He appears well-developed and well-nourished. No distress.   Cardiovascular: Normal rate and normal heart sounds.    Pulmonary/Chest: Effort normal. No respiratory distress. He has no wheezes.   Abdominal: Soft. He exhibits no distension.   Neurological: He is alert and oriented to person, place, and time.   Skin: Skin is warm and dry.   Psychiatric: He has a normal mood and affect. His behavior is normal.       Significant Labs:  CBC:   Recent Labs  Lab 03/16/18  0600   WBC 16.57*   RBC 3.12*   HGB 9.2*   HCT 28.0*      MCV 90   MCH 29.5   MCHC 32.9     CMP:   Recent Labs  Lab 03/16/18  0600   *   CALCIUM 8.3*      K 3.1*  3.1*   CO2 32*   CL 98   BUN 15   CREATININE 1.0     Coagulation:   Recent Labs  Lab 03/16/18  0600   APTT 41.8*       Significant Diagnostics:  CXR: I have reviewed all pertinent results/findings within the past 24 hours

## 2018-03-16 NOTE — NURSING TRANSFER
Nursing Transfer Note      3/15/2018     Transfer To: 305A from 6080    Transfer via bed    Transfer with  to O2    Transported by RN and PCT     Medicines sent: lasix and heparin infusing    Chart send with patient: Yes    Notified: patient will notify sister.    Patient reassessed at: full assessment performed today at 1905.  Upon arrival to floor: cardiac monitor applied, patient oriented to room, call bell in reach and bed in lowest position. Report called to SEAN Hall - updates given at bedside.

## 2018-03-16 NOTE — PROGRESS NOTES
[facility]  Cardiothoracic Surgery  Progress Note    Patient Name: Jaquan Hines Jr.  MRN: [id number]  Admission Date: 3/13/2018  Hospital Length of Stay: 3 days  Code Status: Prior   Attending Physician: Gabriel Jennings MD   Referring Provider: Gabriel Jennings MD  Principal Problem:CTEPH (chronic thromboembolic pulmonary hypertension)    Subjective:     Post-Op Info:  Procedure(s) (LRB):  ENDARTERECTOMY- PULMONARY (N/A)   3 Days Post-Op     Interval History:   NAEON. Up in chair this AM.    Medications:  Continuous Infusions:   custom IV infusion builder (for pharmacist use only) 15 mL/hr at 03/16/18 0054    heparin (porcine) in 5 % dex 2,500 Units/hr (03/16/18 1030)     Scheduled Meds:   aspirin  325 mg Oral Daily    atorvastatin  20 mg Oral Daily    calcium carbonate  1,000 mg Oral Once    carBAMazepine  200 mg Oral Daily    citalopram  40 mg Oral Daily    docusate sodium  100 mg Oral BID    famotidine  20 mg Oral BID    magnesium oxide  800 mg Oral TID    mupirocin  1 g Nasal BID    potassium chloride  20 mEq Intravenous Q2H    potassium, sodium phosphates  2 packet Oral QID (AC & HS)     PRN Meds:acetaminophen, albuterol, calcium carbonate, dextrose 50%, dextrose 50%, glucagon (human recombinant), glucose, glucose, insulin aspart U-100, lactulose, metoclopramide HCl, ondansetron, oxyCODONE, oxyCODONE     Objective:     Vital Signs (Most Recent):  Temp: 98.7 °F (37.1 °C) (03/16/18 0959)  Pulse: 89 (03/16/18 1000)  Resp: 20 (03/16/18 0959)  BP: (!) 107/51 (03/16/18 1000)  SpO2: 95 % (03/16/18 1000) Vital Signs (24h Range):  Temp:  [98.5 °F (36.9 °C)-99.5 °F (37.5 °C)] 98.7 °F (37.1 °C)  Pulse:  [80-92] 89  Resp:  [15-37] 20  SpO2:  [89 %-97 %] 95 %  BP: ()/(51-59) 107/51  Arterial Line BP: ()/(48-53) 99/48     Weight: (!) 146.2 kg (322 lb 5 oz)  Body mass index is 50.48 kg/m².    SpO2: 95 %  O2 Device (Oxygen Therapy): High Flow nasal Cannula    Intake/Output  - Last 3 Shifts       03/14 0700 - 03/15 0659 03/15 0700 - 03/16 0659 03/16 0700 - 03/17 0659    P.O. 1340 1540     I.V. (mL/kg) 1450 (9.3) 892.4 (5.7)     Blood       NG/      IV Piggyback  200     Total Intake(mL/kg) 2950 (19) 2632.4 (17)     Urine (mL/kg/hr) 2370 (0.6) 5675 (1.5)     Drains       Stool  0 (0)     Chest Tube 310 (0.1) 190 (0.1)     Total Output 2680 5865      Net +270 -3232.6             Stool Occurrence  2 x           Lines/Drains/Airways     Central Venous Catheter Line                 Introducer 03/13/18 0801 right internal jugular 3 days         Percutaneous Central Line Insertion/Assessment - triple lumen  03/13/18 0801 right internal jugular 3 days          Drain                 Urethral Catheter 03/13/18 0815 Non-latex;Straight-tip;Temperature probe 16 Fr. 3 days         Y Chest Tube 1 and 2 03/13/18 1506 1 Right Pleural 19 Fr. 2 Left Pleural 19 Fr. 2 days          Line                 Pacer Wires 03/13/18 1441 2 days                Physical Exam   Constitutional: He is oriented to person, place, and time. He appears well-developed and well-nourished. No distress.   Cardiovascular: Normal rate and normal heart sounds.    Pulmonary/Chest: Effort normal. No respiratory distress. He has no wheezes.   Abdominal: Soft. He exhibits no distension.   Neurological: He is alert and oriented to person, place, and time.   Skin: Skin is warm and dry.   Psychiatric: He has a normal mood and affect. His behavior is normal.       Significant Labs:  CBC:   Recent Labs  Lab 03/16/18  0600   WBC 16.57*   RBC 3.12*   HGB 9.2*   HCT 28.0*      MCV 90   MCH 29.5   MCHC 32.9     CMP:   Recent Labs  Lab 03/16/18  0600   *   CALCIUM 8.3*      K 3.1*  3.1*   CO2 32*   CL 98   BUN 15   CREATININE 1.0     Coagulation:   Recent Labs  Lab 03/16/18  0600   APTT 41.8*       Significant Diagnostics:  CXR: I have reviewed all pertinent results/findings within the past 24 hours    Assessment/Plan:      * CTEPH (chronic thromboembolic pulmonary hypertension)    Neuro:   - Pain mgmt: oxycodone PRN     Pulmonary:   - Wean O2 as tolerated  - Okay with sats >85%     Cardiac:   - ASA 325mg  - Metoprolol      Renal:    - UOP 5.6L  - BUN/Cr stable  - Lasix gtt      ID:   - Post op ancef complete     Hem/Onc:   - Monitor hgb; stable  - Heparin gtt, PTT goal 60-80  - Coumadin 4 today     Endocrine:    - Insulin sub q     Fluids/Electrolytes/Nutrition/GI:   - Replace electrolytes PRN per protocol  - Cardiac diet, 1500ml fluid restriction            Kirsty Duong MD  Cardiothoracic Surgery  [facility]

## 2018-03-17 LAB
ANION GAP SERPL CALC-SCNC: 11 MMOL/L
APTT BLDCRRT: 34.4 SEC
APTT BLDCRRT: 35.6 SEC
APTT BLDCRRT: 45.8 SEC
BASOPHILS # BLD AUTO: 0.04 K/UL
BASOPHILS NFR BLD: 0.3 %
BLD PROD TYP BPU: NORMAL
BLOOD UNIT EXPIRATION DATE: NORMAL
BLOOD UNIT TYPE CODE: 6200
BLOOD UNIT TYPE: NORMAL
BUN SERPL-MCNC: 21 MG/DL
CALCIUM SERPL-MCNC: 9 MG/DL
CHLORIDE SERPL-SCNC: 97 MMOL/L
CO2 SERPL-SCNC: 32 MMOL/L
CODING SYSTEM: NORMAL
CREAT SERPL-MCNC: 1.2 MG/DL
DIFFERENTIAL METHOD: ABNORMAL
DISPENSE STATUS: NORMAL
EOSINOPHIL # BLD AUTO: 0.3 K/UL
EOSINOPHIL NFR BLD: 1.8 %
ERYTHROCYTE [DISTWIDTH] IN BLOOD BY AUTOMATED COUNT: 15.7 %
EST. GFR  (AFRICAN AMERICAN): >60 ML/MIN/1.73 M^2
EST. GFR  (NON AFRICAN AMERICAN): >60 ML/MIN/1.73 M^2
GLUCOSE SERPL-MCNC: 116 MG/DL
HCT VFR BLD AUTO: 29.4 %
HGB BLD-MCNC: 9.7 G/DL
IMM GRANULOCYTES # BLD AUTO: 0.23 K/UL
IMM GRANULOCYTES NFR BLD AUTO: 1.6 %
INR PPP: 1.4
LYMPHOCYTES # BLD AUTO: 2.8 K/UL
LYMPHOCYTES NFR BLD: 19.5 %
MAGNESIUM SERPL-MCNC: 1.9 MG/DL
MAGNESIUM SERPL-MCNC: 2 MG/DL
MAGNESIUM SERPL-MCNC: 2 MG/DL
MCH RBC QN AUTO: 29 PG
MCHC RBC AUTO-ENTMCNC: 33 G/DL
MCV RBC AUTO: 88 FL
MONOCYTES # BLD AUTO: 0.7 K/UL
MONOCYTES NFR BLD: 5.2 %
NEUTROPHILS # BLD AUTO: 10.2 K/UL
NEUTROPHILS NFR BLD: 71.6 %
NRBC BLD-RTO: 1 /100 WBC
PLATELET # BLD AUTO: 213 K/UL
PMV BLD AUTO: 11.6 FL
POCT GLUCOSE: 90 MG/DL (ref 70–110)
POTASSIUM SERPL-SCNC: 3.2 MMOL/L
POTASSIUM SERPL-SCNC: 3.2 MMOL/L
POTASSIUM SERPL-SCNC: 3.4 MMOL/L
POTASSIUM SERPL-SCNC: 3.4 MMOL/L
PROTHROMBIN TIME: 14.1 SEC
RBC # BLD AUTO: 3.35 M/UL
SODIUM SERPL-SCNC: 140 MMOL/L
TRANS ERYTHROCYTES VOL PATIENT: NORMAL ML
WBC # BLD AUTO: 14.24 K/UL

## 2018-03-17 PROCEDURE — 20600001 HC STEP DOWN PRIVATE ROOM

## 2018-03-17 PROCEDURE — 94660 CPAP INITIATION&MGMT: CPT

## 2018-03-17 PROCEDURE — 63600175 PHARM REV CODE 636 W HCPCS: Performed by: STUDENT IN AN ORGANIZED HEALTH CARE EDUCATION/TRAINING PROGRAM

## 2018-03-17 PROCEDURE — 84132 ASSAY OF SERUM POTASSIUM: CPT | Mod: 91

## 2018-03-17 PROCEDURE — 80048 BASIC METABOLIC PNL TOTAL CA: CPT

## 2018-03-17 PROCEDURE — 85730 THROMBOPLASTIN TIME PARTIAL: CPT

## 2018-03-17 PROCEDURE — 85610 PROTHROMBIN TIME: CPT

## 2018-03-17 PROCEDURE — 25000003 PHARM REV CODE 250: Performed by: THORACIC SURGERY (CARDIOTHORACIC VASCULAR SURGERY)

## 2018-03-17 PROCEDURE — 25000003 PHARM REV CODE 250: Performed by: STUDENT IN AN ORGANIZED HEALTH CARE EDUCATION/TRAINING PROGRAM

## 2018-03-17 PROCEDURE — 83735 ASSAY OF MAGNESIUM: CPT | Mod: 91

## 2018-03-17 PROCEDURE — 36415 COLL VENOUS BLD VENIPUNCTURE: CPT

## 2018-03-17 PROCEDURE — 25000003 PHARM REV CODE 250: Performed by: NURSE PRACTITIONER

## 2018-03-17 PROCEDURE — 99900035 HC TECH TIME PER 15 MIN (STAT)

## 2018-03-17 PROCEDURE — 85025 COMPLETE CBC W/AUTO DIFF WBC: CPT

## 2018-03-17 PROCEDURE — 27000190 HC CPAP FULL FACE MASK W/VALVE

## 2018-03-17 RX ORDER — FUROSEMIDE 10 MG/ML
40 INJECTION INTRAMUSCULAR; INTRAVENOUS 2 TIMES DAILY
Status: DISCONTINUED | OUTPATIENT
Start: 2018-03-17 | End: 2018-03-19

## 2018-03-17 RX ORDER — POTASSIUM CHLORIDE 20 MEQ/1
40 TABLET, EXTENDED RELEASE ORAL EVERY 4 HOURS
Status: COMPLETED | OUTPATIENT
Start: 2018-03-17 | End: 2018-03-17

## 2018-03-17 RX ADMIN — METOPROLOL TARTRATE 25 MG: 25 TABLET ORAL at 08:03

## 2018-03-17 RX ADMIN — HEPARIN SODIUM 2500 UNITS/HR: 10000 INJECTION, SOLUTION INTRAVENOUS at 11:03

## 2018-03-17 RX ADMIN — METOPROLOL TARTRATE 25 MG: 25 TABLET ORAL at 09:03

## 2018-03-17 RX ADMIN — WARFARIN SODIUM 5 MG: 5 TABLET ORAL at 04:03

## 2018-03-17 RX ADMIN — POTASSIUM CHLORIDE 40 MEQ: 1500 TABLET, EXTENDED RELEASE ORAL at 04:03

## 2018-03-17 RX ADMIN — FAMOTIDINE 20 MG: 20 TABLET, FILM COATED ORAL at 08:03

## 2018-03-17 RX ADMIN — CARBAMAZEPINE 200 MG: 200 TABLET ORAL at 08:03

## 2018-03-17 RX ADMIN — FUROSEMIDE 40 MG: 10 INJECTION, SOLUTION INTRAMUSCULAR; INTRAVENOUS at 05:03

## 2018-03-17 RX ADMIN — MAGNESIUM OXIDE TAB 400 MG (241.3 MG ELEMENTAL MG) 800 MG: 400 (241.3 MG) TAB at 08:03

## 2018-03-17 RX ADMIN — HEPARIN SODIUM 2500 UNITS/HR: 10000 INJECTION, SOLUTION INTRAVENOUS at 10:03

## 2018-03-17 RX ADMIN — ASPIRIN 325 MG ORAL TABLET 325 MG: 325 PILL ORAL at 08:03

## 2018-03-17 RX ADMIN — POTASSIUM CHLORIDE 40 MEQ: 1500 TABLET, EXTENDED RELEASE ORAL at 05:03

## 2018-03-17 RX ADMIN — POTASSIUM & SODIUM PHOSPHATES POWDER PACK 280-160-250 MG 2 PACKET: 280-160-250 PACK at 09:03

## 2018-03-17 RX ADMIN — MUPIROCIN 1 G: 20 OINTMENT TOPICAL at 09:03

## 2018-03-17 RX ADMIN — CITALOPRAM HYDROBROMIDE 40 MG: 20 TABLET ORAL at 08:03

## 2018-03-17 RX ADMIN — FAMOTIDINE 20 MG: 20 TABLET, FILM COATED ORAL at 09:03

## 2018-03-17 RX ADMIN — DOCUSATE SODIUM 100 MG: 100 CAPSULE, LIQUID FILLED ORAL at 08:03

## 2018-03-17 RX ADMIN — POTASSIUM & SODIUM PHOSPHATES POWDER PACK 280-160-250 MG 2 PACKET: 280-160-250 PACK at 12:03

## 2018-03-17 RX ADMIN — MAGNESIUM OXIDE TAB 400 MG (241.3 MG ELEMENTAL MG) 800 MG: 400 (241.3 MG) TAB at 09:03

## 2018-03-17 RX ADMIN — POTASSIUM & SODIUM PHOSPHATES POWDER PACK 280-160-250 MG 2 PACKET: 280-160-250 PACK at 06:03

## 2018-03-17 RX ADMIN — POTASSIUM & SODIUM PHOSPHATES POWDER PACK 280-160-250 MG 2 PACKET: 280-160-250 PACK at 04:03

## 2018-03-17 RX ADMIN — MAGNESIUM OXIDE TAB 400 MG (241.3 MG ELEMENTAL MG) 800 MG: 400 (241.3 MG) TAB at 04:03

## 2018-03-17 RX ADMIN — ATORVASTATIN CALCIUM 20 MG: 20 TABLET, FILM COATED ORAL at 08:03

## 2018-03-17 NOTE — ASSESSMENT & PLAN NOTE
S/p pulmonary endarterectomy POD #4  - Oxycodone PRN pain  - Cardiac diet  - Wean O2 as able - down to 2L this AM. Keep sats >85%.  -  mg, metoprolol 25 mg BID  - Good UOP. Remove Enciso.  - Heparin gtt, PTT goal 60-80  - Coumadin 5 mg today, INR 1.4

## 2018-03-17 NOTE — PROGRESS NOTES
[facility]  Cardiothoracic Surgery  Progress Note    Patient Name: Jaquan Hines Jr.  MRN: [id number]  Admission Date: 3/13/2018  Hospital Length of Stay: 4 days  Code Status: Prior   Attending Physician: Gabriel Jennings MD   Referring Provider: Gabriel Jennings MD  Principal Problem:CTEPH (chronic thromboembolic pulmonary hypertension)    Subjective:     Post-Op Info:  Procedure(s) (LRB):  ENDARTERECTOMY- PULMONARY (N/A)   4 Days Post-Op     Interval History: No acute events.    Medications:  Continuous Infusions:   heparin (porcine) in 5 % dex 2,500 Units/hr (03/17/18 1123)     Scheduled Meds:   aspirin  325 mg Oral Daily    atorvastatin  20 mg Oral Daily    carBAMazepine  200 mg Oral Daily    citalopram  40 mg Oral Daily    docusate sodium  100 mg Oral BID    famotidine  20 mg Oral BID    furosemide  40 mg Intravenous BID    magnesium oxide  800 mg Oral TID    metoprolol tartrate  25 mg Oral BID    mupirocin  1 g Nasal BID    potassium chloride  40 mEq Oral Q4H    potassium, sodium phosphates  2 packet Oral QID (AC & HS)    warfarin  5 mg Oral Daily     PRN Meds:acetaminophen, albuterol, calcium carbonate, dextrose 50%, dextrose 50%, glucagon (human recombinant), glucose, glucose, insulin aspart U-100, lactulose, metoclopramide HCl, ondansetron, oxyCODONE, oxyCODONE     Objective:     Vital Signs (Most Recent):  Temp: 98.7 °F (37.1 °C) (03/17/18 0827)  Pulse: 97 (03/17/18 1500)  Resp: 18 (03/17/18 1100)  BP: (!) 104/51 (03/17/18 1100)  SpO2: 97 % (03/17/18 1100) Vital Signs (24h Range):  Temp:  [97 °F (36.1 °C)-100.1 °F (37.8 °C)] 98.7 °F (37.1 °C)  Pulse:  [] 97  Resp:  [18-19] 18  SpO2:  [91 %-99 %] 97 %  BP: ()/(50-59) 104/51     Weight: (!) 142.2 kg (313 lb 7.9 oz)  Body mass index is 49.1 kg/m².    SpO2: 97 %  O2 Device (Oxygen Therapy): nasal cannula    Intake/Output - Last 3 Shifts       03/15 0700 - 03/16 0659 03/16 0700 - 03/17 0659 03/17 0700 - 03/18  0659    P.O. 1540 1860 600    I.V. (mL/kg) 892.4 (5.7) 1160 (7.9)     NG/GT       IV Piggyback 200      Total Intake(mL/kg) 2632.4 (17) 3020 (20.7) 600 (4.2)    Urine (mL/kg/hr) 5675 (1.5) 6700 (1.9) 1175 (1)    Stool 0 (0) 1 (0) 4 (0)    Chest Tube 190 (0.1) 160 (0)     Total Output 5865 6861 1179    Net -3232.6 -3841 -579           Stool Occurrence 2 x 1 x           Lines/Drains/Airways     Central Venous Catheter Line                 Introducer 03/13/18 0801 right internal jugular 4 days         Percutaneous Central Line Insertion/Assessment - triple lumen  03/13/18 0801 right internal jugular 4 days          Drain                 Y Chest Tube 1 and 2 03/13/18 1506 1 Right Pleural 19 Fr. 2 Left Pleural 19 Fr. 4 days          Line                 Pacer Wires 03/13/18 1441 4 days          Peripheral Intravenous Line                 Peripheral IV - Single Lumen 03/16/18 1500 Right Hand 1 day         Peripheral IV - Single Lumen 03/16/18 1530 Right Forearm 1 day                Physical Exam   Constitutional: He is oriented to person, place, and time. He appears well-developed and well-nourished. No distress.   Cardiovascular: Normal rate and normal heart sounds.    Pulmonary/Chest: Effort normal. No respiratory distress. He has no wheezes.   Abdominal: Soft. He exhibits no distension.   Neurological: He is alert and oriented to person, place, and time.   Skin: Skin is warm and dry.   Psychiatric: He has a normal mood and affect. His behavior is normal.       Significant Labs:  BMP:   Recent Labs  Lab 03/17/18  0408 03/17/18  1222   *  --      --    K 3.4*  3.4* 3.2*   CL 97  --    CO2 32*  --    BUN 21*  --    CREATININE 1.2  --    CALCIUM 9.0  --    MG 2.0 2.0     CBC:   Recent Labs  Lab 03/17/18 0408   WBC 14.24*   RBC 3.35*   HGB 9.7*   HCT 29.4*      MCV 88   MCH 29.0   MCHC 33.0     Coagulation:   Recent Labs  Lab 03/17/18  0408 03/17/18  1222   INR 1.4*  --    APTT 45.8* 34.4*        Significant Diagnostics:  I have reviewed all pertinent imaging results/findings within the past 24 hours.    Assessment/Plan:     * CTEPH (chronic thromboembolic pulmonary hypertension)    S/p pulmonary endarterectomy POD #4  - Oxycodone PRN pain  - Cardiac diet  - Wean O2 as able - down to 2L this AM. Keep sats >85%.  -  mg, metoprolol 25 mg BID  - Good UOP. Remove Enciso.  - Heparin gtt, PTT goal 60-80  - Coumadin 5 mg today, INR 1.4            Jasmyne Vang MD  Cardiothoracic Surgery  [facility]

## 2018-03-17 NOTE — PROGRESS NOTES
Enciso catheter d/eros as ordered per Dr. Vang. No changes to heparin gtt-PTT-34.4 and notified of K-3.2.

## 2018-03-17 NOTE — NURSING
Dr Shah notified of PTT = 32.2 - no change in Heparin rate.  K=3.3 Magnesium = 1.7   Meds given as ordered at 2200.   Next labs to be drawn at 0200

## 2018-03-17 NOTE — SUBJECTIVE & OBJECTIVE
Interval History: No acute events.    Medications:  Continuous Infusions:   heparin (porcine) in 5 % dex 2,500 Units/hr (03/17/18 1123)     Scheduled Meds:   aspirin  325 mg Oral Daily    atorvastatin  20 mg Oral Daily    carBAMazepine  200 mg Oral Daily    citalopram  40 mg Oral Daily    docusate sodium  100 mg Oral BID    famotidine  20 mg Oral BID    furosemide  40 mg Intravenous BID    magnesium oxide  800 mg Oral TID    metoprolol tartrate  25 mg Oral BID    mupirocin  1 g Nasal BID    potassium chloride  40 mEq Oral Q4H    potassium, sodium phosphates  2 packet Oral QID (AC & HS)    warfarin  5 mg Oral Daily     PRN Meds:acetaminophen, albuterol, calcium carbonate, dextrose 50%, dextrose 50%, glucagon (human recombinant), glucose, glucose, insulin aspart U-100, lactulose, metoclopramide HCl, ondansetron, oxyCODONE, oxyCODONE     Objective:     Vital Signs (Most Recent):  Temp: 98.7 °F (37.1 °C) (03/17/18 0827)  Pulse: 97 (03/17/18 1500)  Resp: 18 (03/17/18 1100)  BP: (!) 104/51 (03/17/18 1100)  SpO2: 97 % (03/17/18 1100) Vital Signs (24h Range):  Temp:  [97 °F (36.1 °C)-100.1 °F (37.8 °C)] 98.7 °F (37.1 °C)  Pulse:  [] 97  Resp:  [18-19] 18  SpO2:  [91 %-99 %] 97 %  BP: ()/(50-59) 104/51     Weight: (!) 142.2 kg (313 lb 7.9 oz)  Body mass index is 49.1 kg/m².    SpO2: 97 %  O2 Device (Oxygen Therapy): nasal cannula    Intake/Output - Last 3 Shifts       03/15 0700 - 03/16 0659 03/16 0700 - 03/17 0659 03/17 0700 - 03/18 0659    P.O. 1540 1860 600    I.V. (mL/kg) 892.4 (5.7) 1160 (7.9)     NG/GT       IV Piggyback 200      Total Intake(mL/kg) 2632.4 (17) 3020 (20.7) 600 (4.2)    Urine (mL/kg/hr) 5675 (1.5) 6700 (1.9) 1175 (1)    Stool 0 (0) 1 (0) 4 (0)    Chest Tube 190 (0.1) 160 (0)     Total Output 5840 2107 4568    Net -3232.6 -3841 -579           Stool Occurrence 2 x 1 x           Lines/Drains/Airways     Central Venous Catheter Line                 Introducer 03/13/18 0801 right  internal jugular 4 days         Percutaneous Central Line Insertion/Assessment - triple lumen  03/13/18 0801 right internal jugular 4 days          Drain                 Y Chest Tube 1 and 2 03/13/18 1506 1 Right Pleural 19 Fr. 2 Left Pleural 19 Fr. 4 days          Line                 Pacer Wires 03/13/18 1441 4 days          Peripheral Intravenous Line                 Peripheral IV - Single Lumen 03/16/18 1500 Right Hand 1 day         Peripheral IV - Single Lumen 03/16/18 1530 Right Forearm 1 day                Physical Exam   Constitutional: He is oriented to person, place, and time. He appears well-developed and well-nourished. No distress.   Cardiovascular: Normal rate and normal heart sounds.    Pulmonary/Chest: Effort normal. No respiratory distress. He has no wheezes.   Abdominal: Soft. He exhibits no distension.   Neurological: He is alert and oriented to person, place, and time.   Skin: Skin is warm and dry.   Psychiatric: He has a normal mood and affect. His behavior is normal.       Significant Labs:  BMP:   Recent Labs  Lab 03/17/18  0408 03/17/18  1222   *  --      --    K 3.4*  3.4* 3.2*   CL 97  --    CO2 32*  --    BUN 21*  --    CREATININE 1.2  --    CALCIUM 9.0  --    MG 2.0 2.0     CBC:   Recent Labs  Lab 03/17/18  0408   WBC 14.24*   RBC 3.35*   HGB 9.7*   HCT 29.4*      MCV 88   MCH 29.0   MCHC 33.0     Coagulation:   Recent Labs  Lab 03/17/18  0408 03/17/18  1222   INR 1.4*  --    APTT 45.8* 34.4*       Significant Diagnostics:  I have reviewed all pertinent imaging results/findings within the past 24 hours.

## 2018-03-17 NOTE — PLAN OF CARE
Problem: Patient Care Overview  Goal: Plan of Care Review  Dx: Pulm endarectomy  Hx: Pulmonary embolism, chronic respiratory failure with hypoxia on 5L home O2, obesity hypoventilation syndrome     3/13/18: presented to hospital for carotid endarterectomy procedure w/ SOB, dizziness, lightheaded, pulm endarectomy, admit to SICU, intubated overnight     Outcome: Revised  Plan of care discussed with patient. Patient is free of fall/trauma/injury. Denies CP, SOB, or pain/discomfort. All questions addressed. Central line d/eros. Enciso d/eros and pt did post void. Lasix iv d/eros and now on 40mg iv bidWill continue to monitor

## 2018-03-17 NOTE — PROGRESS NOTES
Rt IJ tlc d/eros intact and petroleum/gauze dresssing applied after hemostasis achieved. Lucas well. Unsuccessful attempt at starting new iv but pt does have 2 working 20g piv's. Lasix gtt d/eros as ordered and will get 40mg ivp bid

## 2018-03-18 LAB
ANION GAP SERPL CALC-SCNC: 10 MMOL/L
APTT BLDCRRT: 22.4 SEC
APTT BLDCRRT: 22.9 SEC
APTT BLDCRRT: 39 SEC
APTT BLDCRRT: 42.4 SEC
BASOPHILS # BLD AUTO: 0.07 K/UL
BASOPHILS NFR BLD: 0.6 %
BUN SERPL-MCNC: 24 MG/DL
CALCIUM SERPL-MCNC: 9.1 MG/DL
CHLORIDE SERPL-SCNC: 98 MMOL/L
CO2 SERPL-SCNC: 30 MMOL/L
CREAT SERPL-MCNC: 0.9 MG/DL
DIFFERENTIAL METHOD: ABNORMAL
EOSINOPHIL # BLD AUTO: 0.4 K/UL
EOSINOPHIL NFR BLD: 3 %
ERYTHROCYTE [DISTWIDTH] IN BLOOD BY AUTOMATED COUNT: 15.5 %
EST. GFR  (AFRICAN AMERICAN): >60 ML/MIN/1.73 M^2
EST. GFR  (NON AFRICAN AMERICAN): >60 ML/MIN/1.73 M^2
GLUCOSE SERPL-MCNC: 96 MG/DL
HCT VFR BLD AUTO: 30.6 %
HGB BLD-MCNC: 10 G/DL
IMM GRANULOCYTES # BLD AUTO: 0.3 K/UL
IMM GRANULOCYTES NFR BLD AUTO: 2.4 %
INR PPP: 2.1
LYMPHOCYTES # BLD AUTO: 2.9 K/UL
LYMPHOCYTES NFR BLD: 23.3 %
MAGNESIUM SERPL-MCNC: 1.9 MG/DL
MAGNESIUM SERPL-MCNC: 2 MG/DL
MAGNESIUM SERPL-MCNC: 2 MG/DL
MAGNESIUM SERPL-MCNC: 2.1 MG/DL
MCH RBC QN AUTO: 28.8 PG
MCHC RBC AUTO-ENTMCNC: 32.7 G/DL
MCV RBC AUTO: 88 FL
MONOCYTES # BLD AUTO: 0.9 K/UL
MONOCYTES NFR BLD: 7.4 %
NEUTROPHILS # BLD AUTO: 7.9 K/UL
NEUTROPHILS NFR BLD: 63.3 %
NRBC BLD-RTO: 1 /100 WBC
PLATELET # BLD AUTO: 232 K/UL
PMV BLD AUTO: 12 FL
POCT GLUCOSE: 104 MG/DL (ref 70–110)
POTASSIUM SERPL-SCNC: 3.5 MMOL/L
POTASSIUM SERPL-SCNC: 3.6 MMOL/L
POTASSIUM SERPL-SCNC: 3.6 MMOL/L
POTASSIUM SERPL-SCNC: 4 MMOL/L
POTASSIUM SERPL-SCNC: 4.2 MMOL/L
PROTHROMBIN TIME: 20.4 SEC
RBC # BLD AUTO: 3.47 M/UL
SODIUM SERPL-SCNC: 138 MMOL/L
WBC # BLD AUTO: 12.42 K/UL

## 2018-03-18 PROCEDURE — 85610 PROTHROMBIN TIME: CPT

## 2018-03-18 PROCEDURE — 84132 ASSAY OF SERUM POTASSIUM: CPT

## 2018-03-18 PROCEDURE — 25000003 PHARM REV CODE 250: Performed by: NURSE PRACTITIONER

## 2018-03-18 PROCEDURE — 80048 BASIC METABOLIC PNL TOTAL CA: CPT

## 2018-03-18 PROCEDURE — 25000003 PHARM REV CODE 250: Performed by: THORACIC SURGERY (CARDIOTHORACIC VASCULAR SURGERY)

## 2018-03-18 PROCEDURE — 25000003 PHARM REV CODE 250: Performed by: STUDENT IN AN ORGANIZED HEALTH CARE EDUCATION/TRAINING PROGRAM

## 2018-03-18 PROCEDURE — 36415 COLL VENOUS BLD VENIPUNCTURE: CPT

## 2018-03-18 PROCEDURE — 94660 CPAP INITIATION&MGMT: CPT

## 2018-03-18 PROCEDURE — 20600001 HC STEP DOWN PRIVATE ROOM

## 2018-03-18 PROCEDURE — 94761 N-INVAS EAR/PLS OXIMETRY MLT: CPT

## 2018-03-18 PROCEDURE — 99900035 HC TECH TIME PER 15 MIN (STAT)

## 2018-03-18 PROCEDURE — 83735 ASSAY OF MAGNESIUM: CPT | Mod: 91

## 2018-03-18 PROCEDURE — 85025 COMPLETE CBC W/AUTO DIFF WBC: CPT

## 2018-03-18 PROCEDURE — 63600175 PHARM REV CODE 636 W HCPCS: Performed by: STUDENT IN AN ORGANIZED HEALTH CARE EDUCATION/TRAINING PROGRAM

## 2018-03-18 PROCEDURE — 85730 THROMBOPLASTIN TIME PARTIAL: CPT

## 2018-03-18 RX ORDER — POTASSIUM CHLORIDE 20 MEQ/1
40 TABLET, EXTENDED RELEASE ORAL ONCE
Status: COMPLETED | OUTPATIENT
Start: 2018-03-18 | End: 2018-03-18

## 2018-03-18 RX ORDER — WARFARIN 3 MG/1
3 TABLET ORAL DAILY
Status: DISCONTINUED | OUTPATIENT
Start: 2018-03-18 | End: 2018-03-19 | Stop reason: HOSPADM

## 2018-03-18 RX ADMIN — POTASSIUM & SODIUM PHOSPHATES POWDER PACK 280-160-250 MG 2 PACKET: 280-160-250 PACK at 12:03

## 2018-03-18 RX ADMIN — POTASSIUM & SODIUM PHOSPHATES POWDER PACK 280-160-250 MG 2 PACKET: 280-160-250 PACK at 09:03

## 2018-03-18 RX ADMIN — POTASSIUM & SODIUM PHOSPHATES POWDER PACK 280-160-250 MG 2 PACKET: 280-160-250 PACK at 04:03

## 2018-03-18 RX ADMIN — POTASSIUM & SODIUM PHOSPHATES POWDER PACK 280-160-250 MG 2 PACKET: 280-160-250 PACK at 07:03

## 2018-03-18 RX ADMIN — FUROSEMIDE 40 MG: 10 INJECTION, SOLUTION INTRAMUSCULAR; INTRAVENOUS at 08:03

## 2018-03-18 RX ADMIN — FAMOTIDINE 20 MG: 20 TABLET, FILM COATED ORAL at 09:03

## 2018-03-18 RX ADMIN — CITALOPRAM HYDROBROMIDE 40 MG: 20 TABLET ORAL at 08:03

## 2018-03-18 RX ADMIN — FUROSEMIDE 40 MG: 10 INJECTION, SOLUTION INTRAMUSCULAR; INTRAVENOUS at 06:03

## 2018-03-18 RX ADMIN — FAMOTIDINE 20 MG: 20 TABLET, FILM COATED ORAL at 08:03

## 2018-03-18 RX ADMIN — WARFARIN SODIUM 3 MG: 3 TABLET ORAL at 04:03

## 2018-03-18 RX ADMIN — METOPROLOL TARTRATE 25 MG: 25 TABLET ORAL at 09:03

## 2018-03-18 RX ADMIN — MUPIROCIN 1 G: 20 OINTMENT TOPICAL at 08:03

## 2018-03-18 RX ADMIN — MAGNESIUM OXIDE TAB 400 MG (241.3 MG ELEMENTAL MG) 800 MG: 400 (241.3 MG) TAB at 09:03

## 2018-03-18 RX ADMIN — POTASSIUM CHLORIDE 40 MEQ: 1500 TABLET, EXTENDED RELEASE ORAL at 01:03

## 2018-03-18 RX ADMIN — MAGNESIUM OXIDE TAB 400 MG (241.3 MG ELEMENTAL MG) 800 MG: 400 (241.3 MG) TAB at 08:03

## 2018-03-18 RX ADMIN — MAGNESIUM OXIDE TAB 400 MG (241.3 MG ELEMENTAL MG) 800 MG: 400 (241.3 MG) TAB at 02:03

## 2018-03-18 RX ADMIN — ASPIRIN 325 MG ORAL TABLET 325 MG: 325 PILL ORAL at 08:03

## 2018-03-18 RX ADMIN — METOPROLOL TARTRATE 25 MG: 25 TABLET ORAL at 08:03

## 2018-03-18 RX ADMIN — DOCUSATE SODIUM 100 MG: 100 CAPSULE, LIQUID FILLED ORAL at 08:03

## 2018-03-18 RX ADMIN — CARBAMAZEPINE 200 MG: 200 TABLET ORAL at 08:03

## 2018-03-18 RX ADMIN — ATORVASTATIN CALCIUM 20 MG: 20 TABLET, FILM COATED ORAL at 08:03

## 2018-03-18 NOTE — PROGRESS NOTES
[facility]  Cardiothoracic Surgery  Progress Note    Patient Name: Jaquan Hines Jr.  MRN: [id number]  Admission Date: 3/13/2018  Hospital Length of Stay: 5 days  Code Status: Prior   Attending Physician: Gabriel Jennings MD   Referring Provider: Gabriel Jennings MD  Principal Problem:CTEPH (chronic thromboembolic pulmonary hypertension)    Subjective:     Post-Op Info:  Procedure(s) (LRB):  ENDARTERECTOMY- PULMONARY (N/A)   5 Days Post-Op     Interval History: No acute events. Still on minimal nasal cannula O2 during the day.     Medications:  Continuous Infusions:  Scheduled Meds:   aspirin  325 mg Oral Daily    atorvastatin  20 mg Oral Daily    carBAMazepine  200 mg Oral Daily    citalopram  40 mg Oral Daily    docusate sodium  100 mg Oral BID    famotidine  20 mg Oral BID    furosemide  40 mg Intravenous BID    magnesium oxide  800 mg Oral TID    metoprolol tartrate  25 mg Oral BID    potassium, sodium phosphates  2 packet Oral QID (AC & HS)    warfarin  3 mg Oral Daily     PRN Meds:acetaminophen, albuterol, calcium carbonate, dextrose 50%, dextrose 50%, glucagon (human recombinant), glucose, glucose, insulin aspart U-100, lactulose, metoclopramide HCl, ondansetron, oxyCODONE, oxyCODONE     Objective:     Vital Signs (Most Recent):  Temp: 98.6 °F (37 °C) (03/18/18 0839)  Pulse: 98 (03/18/18 0839)  Resp: 18 (03/18/18 0839)  BP: (!) 123/58 (03/18/18 0839)  SpO2: (!) 94 % (03/18/18 0839) Vital Signs (24h Range):  Temp:  [97.5 °F (36.4 °C)-98.6 °F (37 °C)] 98.6 °F (37 °C)  Pulse:  [] 98  Resp:  [17-22] 18  SpO2:  [94 %-100 %] 94 %  BP: (102-124)/(57-63) 123/58     Weight: (!) 142 kg (313 lb 0.9 oz)  Body mass index is 49.03 kg/m².    SpO2: (!) 94 %  O2 Device (Oxygen Therapy): room air    Intake/Output - Last 3 Shifts       03/16 0700 - 03/17 0659 03/17 0700 - 03/18 0659 03/18 0700 - 03/19 0659    P.O. 1860 1040     I.V. (mL/kg) 1160 (7.9) 600 (4.2)     IV Piggyback        Total Intake(mL/kg) 3020 (20.7) 1640 (11.5)     Urine (mL/kg/hr) 6700 (1.9) 1175 (0.3) 650 (0.9)    Stool 1 (0) 4 (0)     Chest Tube 160 (0) 120 (0)     Total Output 6861 1299 650    Net -3841 +341 -650           Urine Occurrence  1 x     Stool Occurrence 1 x            Lines/Drains/Airways     Central Venous Catheter Line                 Introducer 03/13/18 0801 right internal jugular 5 days          Drain                 Y Chest Tube 1 and 2 03/13/18 1506 1 Right Pleural 19 Fr. 2 Left Pleural 19 Fr. 4 days          Line                 Pacer Wires 03/13/18 1441 4 days          Peripheral Intravenous Line                 Peripheral IV - Single Lumen 03/16/18 1500 Right Hand 1 day         Peripheral IV - Single Lumen 03/16/18 1530 Right Forearm 1 day                Physical Exam   Constitutional: He is oriented to person, place, and time. He appears well-developed and well-nourished. No distress.   Cardiovascular: Normal rate and normal heart sounds.    Pulmonary/Chest: Effort normal. No respiratory distress. He has no wheezes.   Chest tube output serosang   Abdominal: Soft. He exhibits no distension.   Neurological: He is alert and oriented to person, place, and time.   Skin: Skin is warm and dry.   Psychiatric: He has a normal mood and affect. His behavior is normal.       Significant Labs:  BMP:   Recent Labs  Lab 03/18/18  0401   GLU 96      K 3.6  3.6   CL 98   CO2 30*   BUN 24*   CREATININE 0.9   CALCIUM 9.1   MG 2.1     CBC:   Recent Labs  Lab 03/18/18  0401   WBC 12.42   RBC 3.47*   HGB 10.0*   HCT 30.6*      MCV 88   MCH 28.8   MCHC 32.7     Coagulation:   Recent Labs  Lab 03/18/18  0401   INR 2.1*   APTT 42.4*       Significant Diagnostics:  I have reviewed all pertinent imaging results/findings within the past 24 hours.    Assessment/Plan:     * CTEPH (chronic thromboembolic pulmonary hypertension)    S/p pulmonary endarterectomy POD #5  - Oxycodone PRN pain  - Cardiac diet  - Wean O2 as  able - down to 2L this AM. Keep sats >85%.  -  mg, metoprolol 25 mg BID  - Good UOP  - Heparin gtt, PTT goal 60-80  - Coumadin 3 mg today  - Will remove pleural chest tubes            Jasmyne Vang MD  Cardiothoracic Surgery  [facility]

## 2018-03-18 NOTE — ASSESSMENT & PLAN NOTE
S/p pulmonary endarterectomy POD #5  - Oxycodone PRN pain  - Cardiac diet  - Wean O2 as able - down to 2L this AM. Keep sats >85%.  -  mg, metoprolol 25 mg BID  - Good UOP  - Heparin gtt, PTT goal 60-80  - Coumadin 3 mg today  - Will remove pleural chest tubes

## 2018-03-18 NOTE — SUBJECTIVE & OBJECTIVE
Interval History: No acute events. Still on minimal nasal cannula O2 during the day.     Medications:  Continuous Infusions:  Scheduled Meds:   aspirin  325 mg Oral Daily    atorvastatin  20 mg Oral Daily    carBAMazepine  200 mg Oral Daily    citalopram  40 mg Oral Daily    docusate sodium  100 mg Oral BID    famotidine  20 mg Oral BID    furosemide  40 mg Intravenous BID    magnesium oxide  800 mg Oral TID    metoprolol tartrate  25 mg Oral BID    potassium, sodium phosphates  2 packet Oral QID (AC & HS)    warfarin  3 mg Oral Daily     PRN Meds:acetaminophen, albuterol, calcium carbonate, dextrose 50%, dextrose 50%, glucagon (human recombinant), glucose, glucose, insulin aspart U-100, lactulose, metoclopramide HCl, ondansetron, oxyCODONE, oxyCODONE     Objective:     Vital Signs (Most Recent):  Temp: 98.6 °F (37 °C) (03/18/18 0839)  Pulse: 98 (03/18/18 0839)  Resp: 18 (03/18/18 0839)  BP: (!) 123/58 (03/18/18 0839)  SpO2: (!) 94 % (03/18/18 0839) Vital Signs (24h Range):  Temp:  [97.5 °F (36.4 °C)-98.6 °F (37 °C)] 98.6 °F (37 °C)  Pulse:  [] 98  Resp:  [17-22] 18  SpO2:  [94 %-100 %] 94 %  BP: (102-124)/(57-63) 123/58     Weight: (!) 142 kg (313 lb 0.9 oz)  Body mass index is 49.03 kg/m².    SpO2: (!) 94 %  O2 Device (Oxygen Therapy): room air    Intake/Output - Last 3 Shifts       03/16 0700 - 03/17 0659 03/17 0700 - 03/18 0659 03/18 0700 - 03/19 0659    P.O. 1860 1040     I.V. (mL/kg) 1160 (7.9) 600 (4.2)     IV Piggyback       Total Intake(mL/kg) 3020 (20.7) 1640 (11.5)     Urine (mL/kg/hr) 6700 (1.9) 1175 (0.3) 650 (0.9)    Stool 1 (0) 4 (0)     Chest Tube 160 (0) 120 (0)     Total Output 6861 1299 650    Net -3841 +341 -650           Urine Occurrence  1 x     Stool Occurrence 1 x            Lines/Drains/Airways     Central Venous Catheter Line                 Introducer 03/13/18 0801 right internal jugular 5 days          Drain                 Y Chest Tube 1 and 2 03/13/18 1506 1 Right  Pleural 19 Fr. 2 Left Pleural 19 Fr. 4 days          Line                 Pacer Wires 03/13/18 1441 4 days          Peripheral Intravenous Line                 Peripheral IV - Single Lumen 03/16/18 1500 Right Hand 1 day         Peripheral IV - Single Lumen 03/16/18 1530 Right Forearm 1 day                Physical Exam   Constitutional: He is oriented to person, place, and time. He appears well-developed and well-nourished. No distress.   Cardiovascular: Normal rate and normal heart sounds.    Pulmonary/Chest: Effort normal. No respiratory distress. He has no wheezes.   Chest tube output serosang   Abdominal: Soft. He exhibits no distension.   Neurological: He is alert and oriented to person, place, and time.   Skin: Skin is warm and dry.   Psychiatric: He has a normal mood and affect. His behavior is normal.       Significant Labs:  BMP:   Recent Labs  Lab 03/18/18  0401   GLU 96      K 3.6  3.6   CL 98   CO2 30*   BUN 24*   CREATININE 0.9   CALCIUM 9.1   MG 2.1     CBC:   Recent Labs  Lab 03/18/18  0401   WBC 12.42   RBC 3.47*   HGB 10.0*   HCT 30.6*      MCV 88   MCH 28.8   MCHC 32.7     Coagulation:   Recent Labs  Lab 03/18/18  0401   INR 2.1*   APTT 42.4*       Significant Diagnostics:  I have reviewed all pertinent imaging results/findings within the past 24 hours.

## 2018-03-19 VITALS
RESPIRATION RATE: 18 BRPM | HEIGHT: 67 IN | BODY MASS INDEX: 49.13 KG/M2 | SYSTOLIC BLOOD PRESSURE: 112 MMHG | HEART RATE: 91 BPM | TEMPERATURE: 98 F | OXYGEN SATURATION: 97 % | WEIGHT: 313.06 LBS | DIASTOLIC BLOOD PRESSURE: 59 MMHG

## 2018-03-19 LAB
ANION GAP SERPL CALC-SCNC: 13 MMOL/L
BASOPHILS # BLD AUTO: 0.1 K/UL
BASOPHILS NFR BLD: 0.8 %
BUN SERPL-MCNC: 21 MG/DL
CALCIUM SERPL-MCNC: 8.8 MG/DL
CHLORIDE SERPL-SCNC: 99 MMOL/L
CO2 SERPL-SCNC: 23 MMOL/L
CREAT SERPL-MCNC: 0.9 MG/DL
DIFFERENTIAL METHOD: ABNORMAL
EOSINOPHIL # BLD AUTO: 0.3 K/UL
EOSINOPHIL NFR BLD: 2.5 %
ERYTHROCYTE [DISTWIDTH] IN BLOOD BY AUTOMATED COUNT: 15.8 %
EST. GFR  (AFRICAN AMERICAN): >60 ML/MIN/1.73 M^2
EST. GFR  (NON AFRICAN AMERICAN): >60 ML/MIN/1.73 M^2
GLUCOSE SERPL-MCNC: 93 MG/DL
HCT VFR BLD AUTO: 30.2 %
HGB BLD-MCNC: 9.9 G/DL
IMM GRANULOCYTES # BLD AUTO: 0.5 K/UL
IMM GRANULOCYTES NFR BLD AUTO: 4.1 %
INR PPP: 2.7
LYMPHOCYTES # BLD AUTO: 2.2 K/UL
LYMPHOCYTES NFR BLD: 17.9 %
MAGNESIUM SERPL-MCNC: 2.2 MG/DL
MCH RBC QN AUTO: 29.3 PG
MCHC RBC AUTO-ENTMCNC: 32.8 G/DL
MCV RBC AUTO: 89 FL
MONOCYTES # BLD AUTO: 1.1 K/UL
MONOCYTES NFR BLD: 8.7 %
NEUTROPHILS # BLD AUTO: 8.1 K/UL
NEUTROPHILS NFR BLD: 66 %
NRBC BLD-RTO: 1 /100 WBC
PLATELET # BLD AUTO: 227 K/UL
PMV BLD AUTO: 12.1 FL
POTASSIUM SERPL-SCNC: 3.5 MMOL/L
POTASSIUM SERPL-SCNC: 3.5 MMOL/L
PROTHROMBIN TIME: 25.8 SEC
RBC # BLD AUTO: 3.38 M/UL
SODIUM SERPL-SCNC: 135 MMOL/L
WBC # BLD AUTO: 12.33 K/UL

## 2018-03-19 PROCEDURE — 25000003 PHARM REV CODE 250: Performed by: NURSE PRACTITIONER

## 2018-03-19 PROCEDURE — 83735 ASSAY OF MAGNESIUM: CPT

## 2018-03-19 PROCEDURE — 36415 COLL VENOUS BLD VENIPUNCTURE: CPT

## 2018-03-19 PROCEDURE — 63600175 PHARM REV CODE 636 W HCPCS: Performed by: STUDENT IN AN ORGANIZED HEALTH CARE EDUCATION/TRAINING PROGRAM

## 2018-03-19 PROCEDURE — 25000003 PHARM REV CODE 250: Performed by: THORACIC SURGERY (CARDIOTHORACIC VASCULAR SURGERY)

## 2018-03-19 PROCEDURE — 94761 N-INVAS EAR/PLS OXIMETRY MLT: CPT

## 2018-03-19 PROCEDURE — 85610 PROTHROMBIN TIME: CPT

## 2018-03-19 PROCEDURE — 85025 COMPLETE CBC W/AUTO DIFF WBC: CPT

## 2018-03-19 PROCEDURE — 80048 BASIC METABOLIC PNL TOTAL CA: CPT

## 2018-03-19 PROCEDURE — 99900035 HC TECH TIME PER 15 MIN (STAT)

## 2018-03-19 PROCEDURE — 25000003 PHARM REV CODE 250: Performed by: STUDENT IN AN ORGANIZED HEALTH CARE EDUCATION/TRAINING PROGRAM

## 2018-03-19 RX ORDER — POTASSIUM CHLORIDE 20 MEQ/1
40 TABLET, EXTENDED RELEASE ORAL ONCE
Status: COMPLETED | OUTPATIENT
Start: 2018-03-19 | End: 2018-03-19

## 2018-03-19 RX ORDER — ASPIRIN 81 MG/1
81 TABLET ORAL DAILY
Refills: 0 | COMMUNITY
Start: 2018-03-20 | End: 2022-08-04

## 2018-03-19 RX ORDER — POTASSIUM CHLORIDE 20 MEQ/1
20 TABLET, EXTENDED RELEASE ORAL 2 TIMES DAILY
Qty: 60 TABLET | Refills: 0 | Status: SHIPPED | OUTPATIENT
Start: 2018-03-19 | End: 2018-04-15 | Stop reason: SDUPTHER

## 2018-03-19 RX ORDER — METOPROLOL TARTRATE 25 MG/1
25 TABLET, FILM COATED ORAL 2 TIMES DAILY
Qty: 60 TABLET | Refills: 11 | Status: ON HOLD | OUTPATIENT
Start: 2018-03-19 | End: 2019-03-28

## 2018-03-19 RX ORDER — FUROSEMIDE 20 MG/1
20 TABLET ORAL 2 TIMES DAILY
Qty: 60 TABLET | Refills: 0 | Status: SHIPPED | OUTPATIENT
Start: 2018-03-19 | End: 2018-04-15 | Stop reason: SDUPTHER

## 2018-03-19 RX ORDER — WARFARIN 3 MG/1
3 TABLET ORAL DAILY
Qty: 30 TABLET | Refills: 11 | Status: SHIPPED | OUTPATIENT
Start: 2018-03-19 | End: 2018-04-11 | Stop reason: ALTCHOICE

## 2018-03-19 RX ORDER — ASPIRIN 81 MG/1
81 TABLET ORAL DAILY
Status: DISCONTINUED | OUTPATIENT
Start: 2018-03-19 | End: 2018-03-19 | Stop reason: HOSPADM

## 2018-03-19 RX ORDER — OXYCODONE HYDROCHLORIDE 5 MG/1
5 TABLET ORAL EVERY 4 HOURS PRN
Qty: 60 TABLET | Refills: 0 | Status: SHIPPED | OUTPATIENT
Start: 2018-03-19 | End: 2018-04-11 | Stop reason: ALTCHOICE

## 2018-03-19 RX ADMIN — FAMOTIDINE 20 MG: 20 TABLET, FILM COATED ORAL at 08:03

## 2018-03-19 RX ADMIN — FUROSEMIDE 40 MG: 10 INJECTION, SOLUTION INTRAMUSCULAR; INTRAVENOUS at 08:03

## 2018-03-19 RX ADMIN — POTASSIUM & SODIUM PHOSPHATES POWDER PACK 280-160-250 MG 2 PACKET: 280-160-250 PACK at 06:03

## 2018-03-19 RX ADMIN — ATORVASTATIN CALCIUM 20 MG: 20 TABLET, FILM COATED ORAL at 08:03

## 2018-03-19 RX ADMIN — CARBAMAZEPINE 200 MG: 200 TABLET ORAL at 08:03

## 2018-03-19 RX ADMIN — ASPIRIN 81 MG: 81 TABLET, COATED ORAL at 09:03

## 2018-03-19 RX ADMIN — METOPROLOL TARTRATE 25 MG: 25 TABLET ORAL at 08:03

## 2018-03-19 RX ADMIN — MAGNESIUM OXIDE TAB 400 MG (241.3 MG ELEMENTAL MG) 800 MG: 400 (241.3 MG) TAB at 08:03

## 2018-03-19 RX ADMIN — CITALOPRAM HYDROBROMIDE 40 MG: 20 TABLET ORAL at 08:03

## 2018-03-19 RX ADMIN — POTASSIUM CHLORIDE 40 MEQ: 1500 TABLET, EXTENDED RELEASE ORAL at 11:03

## 2018-03-19 NOTE — PT/OT/SLP PROGRESS
Physical Therapy      Patient Name:  Jaquan Hines .   MRN:  [id number]    Patient not seen today secondary to pt discharged today.     Allyn Quiles, PT   3/19/18

## 2018-03-19 NOTE — CONSULTS
[facility]  Adult Nutrition  Education Note    Diet Education: Coumadin and vitamin K  Time Spent: 15 min  Learners: Pt      Nutrition Education provided with handouts: Vitamin K and Medications      Comments: Consult received for coumadin education. Provided and explained handout detailing foods high in vitamin K and interaction with coumadin. Emphasized consistent intake of vitamin K. Pt voiced understanding.

## 2018-03-19 NOTE — DISCHARGE SUMMARY
[facility]  Cardiothoracic Surgery  Discharge Summary      Patient Name: Jaquan Hines Jr.  MRN: [id number]  Admission Date: 3/13/2018  Hospital Length of Stay: 6 days  Discharge Date and Time:  03/19/2018 1:16 PM  Attending Physician: No att. providers found   Discharging Provider: Maria Guadalupe Duncan NP  Primary Care Provider: Justin Vale MD    HPI:   Jaquan Hines Jr. is a 47 y.o. male who presents for carotid endarterectomy procedure. He has a known pulmonary embolism, chronic respiratory failure with hypoxia on 5L home O2 with obesity hypoventilation syndrome.  He arrives to clinic today via wheel chair on O2 via nasal cannula. He endorses SOB, lightheadedness and dizziness. He had a near syncopal episode previous clinic visit no changes in symtpoms.     Procedure(s) (LRB):  ENDARTERECTOMY- PULMONARY (N/A)      Indwelling Lines/Drains at time of discharge:   Lines/Drains/Airways     Central Venous Catheter Line                 Introducer 03/13/18 0801 right internal jugular 6 days              Hospital Course: On 3/13/18, the patient was taken to the Operating Room for the above stated procedure. Please see the previously dictated operative report for complete details. Postoperatively, the patient was taken from the  Operating Room to the ICU where the vital signs were monitored and pain was kept under control. The patient was weaned from the drips and extubated in the ICU per protocol. Once hemodynamically stable, the patient was transferred to the Cardiac Step-Down floor for continued strengthening and ambulation. On postoperative day 6, the patient was ready for discharge to home. At the time of discharge, the patient was ambulating unassisted. Pain was well controlled with oral analgesics and the patient was tolerating the diet.     MOBILITY AND ACTIVITY: As tolerated. Patient may shower. No heavy lifting of greater than 5 pounds and no driving.     DIET: An 1800-calorie ADA with a  1500 mL fluid restriction.     WOUND CARE INSTRUCTIONS: Check for redness, swelling and drainage around the  incision or wound. Patient is to call for any obvious bleeding, drainage, pus from the wound, unusual problems or difficulties or temperature of greater than 101   degrees.     FOLLOWUP: Follow up with Dr. Jennings in approximately 3 weeks. Prior to this  appointment, the patient will have a chest x-ray and EKG.     Patient not placed on Ace-Inhibitor at the time of discharge due to potential for hypotension     DISCHARGE CONDITION: At the time of discharge, the patient was in sinus rhythm and afebrile with stable vital signs.      Consults         Status Ordering Provider     Consult to Endocrinology  Once     Provider:  (Not yet assigned)    Completed ALISSA AGUIRRE     Inpatient consult to Heart Transplant  Once     Provider:  (Not yet assigned)    KWADWO Crowder     Inpatient consult to Registered Dietitian/Nutritionist  Once     Provider:  (Not yet assigned)    Completed ALISSA AGUIRRE     Nutrition Services Referral  Once     Provider:  (Not yet assigned)    Completed LUPE JEAN-BAPTISTE          Significant Diagnostic Studies: Labs:   CMP   Recent Labs  Lab 03/18/18  0401 03/18/18  1145 03/18/18  1639 03/19/18  0453     --   --  135*   K 3.6  3.6 4.0 4.2 3.5  3.5   CL 98  --   --  99   CO2 30*  --   --  23   GLU 96  --   --  93   BUN 24*  --   --  21*   CREATININE 0.9  --   --  0.9   CALCIUM 9.1  --   --  8.8   ANIONGAP 10  --   --  13   ESTGFRAFRICA >60.0  --   --  >60.0   EGFRNONAA >60.0  --   --  >60.0    and CBC   Recent Labs  Lab 03/18/18  0401 03/19/18  0453   WBC 12.42 12.33   HGB 10.0* 9.9*   HCT 30.6* 30.2*    227       Pending Diagnostic Studies:     None        Final Active Diagnoses:    Diagnosis Date Noted POA    PRINCIPAL PROBLEM:  CTEPH (chronic thromboembolic pulmonary hypertension) [I27.24] 01/02/2018 Yes    Pulmonary embolus [I26.99]  03/15/2018 Yes    Stress hyperglycemia [R73.9] 03/14/2018 Unknown    Severe obesity (BMI >= 40) [E66.01] 03/14/2018 Unknown    Chronic respiratory failure with hypoxia [J96.11] 07/19/2017 Yes    Anxiety [F41.9] 07/19/2017 Yes    Benign essential hypertension [I10] 07/19/2017 Yes    Bipolar disorder [F31.9] 07/19/2017 Yes    Obesity hypoventilation syndrome [E66.2] 07/19/2017 Yes    Hyperlipidemia [E78.5] 07/19/2017 Yes    Presence of stent in coronary artery [Z95.5] 07/19/2017 Not Applicable    Glaucoma [H40.9] 07/19/2017 Yes    CAD (coronary artery disease), native coronary artery [I25.10] 12/19/2016 Yes      Problems Resolved During this Admission:    Diagnosis Date Noted Date Resolved POA    Encounter for weaning from ventilator [Z99.11]  03/19/2018 Not Applicable      Discharged Condition: good    Disposition: Home or Self Care, instructed pt to have INR check on 3/21    Follow Up:  Follow-up Information     Gabriel Jennings MD In 3 weeks.    Specialties:  Cardiothoracic Surgery, Transplant  Contact information:  [address] [facility] [id number] [phone]                 Patient Instructions:   No discharge procedures on file.  Medications:  Reconciled Home Medications:   Discharge Medication List as of 3/19/2018 10:59 AM      START taking these medications    Details   aspirin (ECOTRIN) 81 MG EC tablet Take 1 tablet (81 mg total) by mouth once daily., Starting Tue 3/20/2018, Until Wed 3/20/2019, OTC      metoprolol tartrate (LOPRESSOR) 25 MG tablet Take 1 tablet (25 mg total) by mouth 2 (two) times daily., Starting Mon 3/19/2018, Until Tue 3/19/2019, Normal      oxyCODONE (ROXICODONE) 5 MG immediate release tablet Take 1 tablet (5 mg total) by mouth every 4 (four) hours as needed., Starting Mon 3/19/2018, Print      potassium chloride SA (K-DUR,KLOR-CON) 20 MEQ tablet Take 1 tablet (20 mEq total) by mouth 2 (two) times daily. Take one tablet by mouth twice daily for 7 days then decrease to  once daily, Starting Mon 3/19/2018, Normal      warfarin (COUMADIN) 3 MG tablet Take 1 tablet (3 mg total) by mouth Daily., Starting Mon 3/19/2018, Until Tue 3/19/2019, Normal         CONTINUE these medications which have CHANGED    Details   furosemide (LASIX) 20 MG tablet Take 1 tablet (20 mg total) by mouth 2 (two) times daily. Take one tablet by mouth twice daily for 7 days then decrease to once daily, Starting Mon 3/19/2018, Until Tue 3/19/2019, Normal         CONTINUE these medications which have NOT CHANGED    Details   carbamazepine (TEGRETOL) 200 mg tablet Take 200 mg by mouth once daily. , Historical Med      cetirizine (ZYRTEC) 10 MG tablet Take 10 mg by mouth once daily. , Historical Med      citalopram (CELEXA) 40 MG tablet Take 40 mg by mouth once daily. , Historical Med      omeprazole (PRILOSEC) 20 MG capsule Take 20 mg by mouth once daily. , Historical Med      VENTOLIN HFA 90 mcg/actuation inhaler TAKE 1 PUFF EVERY 6 HOURS AS NEEDED FOR WHEEZING/SOB, Historical Med      atorvastatin (LIPITOR) 20 MG tablet Take 20 mg by mouth once daily. , Historical Med         STOP taking these medications       losartan (COZAAR) 25 MG tablet Comments:   Reason for Stopping:         metoprolol succinate (TOPROL-XL) 50 MG 24 hr tablet Comments:   Reason for Stopping:         potassium chloride (K-TAB) 20 mEq Comments:   Reason for Stopping:         apixaban (ELIQUIS) 5 mg Tab Comments:   Reason for Stopping:             Time spent on the discharge of patient: 35 minutes    Maria Guadalupe Duncan NP  Cardiothoracic Surgery  [facility]

## 2018-03-19 NOTE — HOSPITAL COURSE
On 3/13/18, the patient was taken to the Operating Room for the above stated procedure. Please see the previously dictated operative report for complete details. Postoperatively, the patient was taken from the  Operating Room to the ICU where the vital signs were monitored and pain was kept under control. The patient was weaned from the drips and extubated in the ICU per protocol. Once hemodynamically stable, the patient was transferred to the Cardiac Step-Down floor for continued strengthening and ambulation. On postoperative day 6, the patient was ready for discharge to home. At the time of discharge, the patient was ambulating unassisted. Pain was well controlled with oral analgesics and the patient was tolerating the diet.     MOBILITY AND ACTIVITY: As tolerated. Patient may shower. No heavy lifting of greater than 5 pounds and no driving.     DIET: An 1800-calorie ADA with a 1500 mL fluid restriction.     WOUND CARE INSTRUCTIONS: Check for redness, swelling and drainage around the  incision or wound. Patient is to call for any obvious bleeding, drainage, pus from the wound, unusual problems or difficulties or temperature of greater than 101   degrees.     FOLLOWUP: Follow up with Dr. Jennings in approximately 3 weeks. Prior to this  appointment, the patient will have a chest x-ray and EKG.     Patient not placed on Ace-Inhibitor at the time of discharge due to potential for hypotension     DISCHARGE CONDITION: At the time of discharge, the patient was in sinus rhythm and afebrile with stable vital signs.

## 2018-03-19 NOTE — PLAN OF CARE
Problem: Patient Care Overview  Goal: Plan of Care Review  Dx: Pulm endarectomy  Hx: Pulmonary embolism, chronic respiratory failure with hypoxia on 5L home O2, obesity hypoventilation syndrome     3/13/18: presented to hospital for carotid endarterectomy procedure w/ SOB, dizziness, lightheaded, pulm endarectomy, admit to SICU, intubated overnight     Outcome: Ongoing (interventions implemented as appropriate)  Discussed Plan of Care with patient. VS stable. Ambulates on own without difficulty. Fall precautions in place. Sternal precautions reviewed. Pain denied. AVS reviewed with patient and sister. Follow-up appointments discussed, as well as important phone numbers. Home medication dose, frequency, indication, and side effects discussed. Pain prescription given to patient. PIVs and telemetry removed per order. Noted which medications were given and which still need to be taken this evening.  All questions and concerns were addressed. Will continue to monitor patient.

## 2018-03-19 NOTE — HPI
Jaquan Hines  is a 47 y.o. male who presents for carotid endarterectomy procedure. He has a known pulmonary embolism, chronic respiratory failure with hypoxia on 5L home O2 with obesity hypoventilation syndrome.  He arrives to clinic today via wheel chair on O2 via nasal cannula. He endorses SOB, lightheadedness and dizziness. He had a near syncopal episode previous clinic visit no changes in symtpoms.

## 2018-03-19 NOTE — PLAN OF CARE
CM spoke with pt to inform him he will need PT/INR draw on Wednesday. CM can call to make appt at Dr. Guerra's office. CM called MD office and spoke with Aruna. She stated he doesn't need appt and can just walk in. CM will fax H&P, op note, and dc summary to [phone]. CM informed pt and sister and they will get blood draw on Wednesday, March 21, 2018.        03/19/18 1139   Final Note   Assessment Type Final Discharge Note   Discharge Disposition Home   Hospital Follow Up  Appt(s) scheduled? Yes   Discharge plans and expectations educations in teach back method with documentation complete? Yes   Right Care Referral Info   Post Acute Recommendation No Care

## 2018-03-21 ENCOUNTER — PATIENT OUTREACH (OUTPATIENT)
Dept: ADMINISTRATIVE | Facility: CLINIC | Age: 48
End: 2018-03-21

## 2018-03-21 NOTE — PATIENT INSTRUCTIONS
Discharge Instructions for Carotid Endarterectomy   Your doctor performed surgery called a carotid endarterectomy. This is the most common way to restore normal blood flow through the vessels that carry blood to your brain. These vessels are called the carotid arteries. During the surgery, a surgeon made a small incisionin the side of your neck, just below your jaw. The artery was opened and the blockage was cleared. This procedure was done to reduce your risk of a stroke, which can occur when the carotid arteries are severely blocked or narrowed.     Ask a friend or family member to help with chores, especially those that involve lifting.   Home care  · Spend your first few days after surgery relaxing at home. It's OK to do quiet activities such as reading or watching TV.  · Take your medications exactly as instructed. Dont skip doses.  · Dont drive until your doctor says its OK. This will most likely take 1 to 2 weeks.  · Keep the wound dry until your doctor says it's OK to shower. Don't scrub your incision.  · Avoid strenuous activity for 7 to 10 days after your surgery.  · Dont lift anything heavier than 10 pounds for 2 to 3 weeks after your surgery.  · Ask your doctor when you can expect to return to work.  · Shave carefully around your incision. You may want to use an electric razor.  · Gradually increase your activity. It may take some time for you to return to your normal activities.  · Check your incision every day for signs of infection (redness, swelling, drainage, or warmth).  · Dont be alarmed if you have some loss of feeling along your jaw line, the incision line, and earlobe. This is a result of the incision and usually goes away after 6 to 12 months.  Long-term changes at home  · Eat a healthy, low-fat, low cholesterol, and low calorie diet. Ask your doctor for menus and other diet information.  · Maintain your ideal body weight.  · After you have recovered from surgery, try to exercise more,  especially walking. Ask your doctor for guidance.  · If you smoke ask your doctor for help quitting.   Follow-up care  Make a follow-up appointment with your doctor as directed.     When to call your doctor  Call your doctor immediately if you have any of the following (or go to the emergency room if your doctor's office is closed):  · Neck swelling  · Headache, particularly if it does not go away after a couple of hours  · Redness, pain, swelling, or drainage from your incision  · Fever above 100°F (37.7°C)  · Numbness or weakness in your face, arms, or legs  · Sudden changes in your vision  · Loss of vision in 1 eye  · Trouble speaking  · Trouble breathing  · Trouble swallowing   Date Last Reviewed: 6/13/2015  © 5343-6522 [employer]. [address] [place] [address], [place] [address]. All rights reserved. This information is not intended as a substitute for professional medical care. Always follow your healthcare professional's instructions.

## 2018-03-21 NOTE — PT/OT/SLP DISCHARGE
Physical Therapy Discharge Summary    Name: Jaquan Hines Jr.  MRN: [id number]   Principal Problem: CTEPH (chronic thromboembolic pulmonary hypertension)     Patient Discharged from acute Physical Therapy on 3/19/2018.  Please refer to prior PT noted date on 3/14/2018 for functional status.     Assessment:     Patient was discharged unexpectedly.  Information required to complete an accurate discharge summary is unknown.  Refer to therapy initial evaluation and last progress note for initial and most recent functional status and goal achievement.  Recommendations made may be found in medical record.    Objective:     GOALS:    Physical Therapy Goals     Not on file          Multidisciplinary Problems (Resolved)        Problem: Physical Therapy Goal    Goal Priority Disciplines Outcome Goal Variances Interventions   Physical Therapy Goal   (Resolved)     PT/OT, PT Outcome(s) achieved     Description:  Goals to be met by: 3/28/2018    Patient will increase functional independence with mobility by performin. Supine to sit with Maximum Assistance - not met  2. Sit to supine with Maximum Assistance - not met  3. Rolling to Left/Right with Maximum Assistance. - not met  4. Sit to stand transfer with Maximum Assistance using LRAD - not met  5. Bed to chair transfer with Maximum Assistance using LRAD - not met  6. Sitting at edge of bed x5 minutes with Maximum Assistance - not met  7. Lower extremity exercise program x15 reps per handout, with supervision - not met                      Reasons for Discontinuation of Therapy Services  Transfer to alternate level of care.      Plan:     Patient Discharged to: Home no PT services needed.    Aminta Correa, PT, DPT  3/21/2018  [phone]

## 2018-03-22 DIAGNOSIS — Z51.81 MONITORING FOR ANTICOAGULANT USE: Primary | ICD-10-CM

## 2018-03-22 DIAGNOSIS — Z79.01 MONITORING FOR ANTICOAGULANT USE: Primary | ICD-10-CM

## 2018-03-23 ENCOUNTER — TELEPHONE (OUTPATIENT)
Dept: PULMONOLOGY | Facility: CLINIC | Age: 48
End: 2018-03-23

## 2018-03-23 NOTE — TELEPHONE ENCOUNTER
Patient called to let us know he had his surgery, had them send records to Dr. Guerra (cardiology) but was not sure they sent to you, so just wanted to update and I explained to him that you do have access to chart via EPIC, to review records, but anything else you need I can request. States he is feeling well.

## 2018-04-09 ENCOUNTER — OFFICE VISIT (OUTPATIENT)
Dept: PULMONOLOGY | Facility: CLINIC | Age: 48
End: 2018-04-09
Payer: MEDICAID

## 2018-04-09 VITALS
DIASTOLIC BLOOD PRESSURE: 70 MMHG | WEIGHT: 315 LBS | SYSTOLIC BLOOD PRESSURE: 110 MMHG | HEART RATE: 96 BPM | HEIGHT: 67 IN | BODY MASS INDEX: 49.44 KG/M2 | OXYGEN SATURATION: 91 %

## 2018-04-09 DIAGNOSIS — I27.24 CTEPH (CHRONIC THROMBOEMBOLIC PULMONARY HYPERTENSION): ICD-10-CM

## 2018-04-09 DIAGNOSIS — J96.11 CHRONIC RESPIRATORY FAILURE WITH HYPOXIA: Primary | ICD-10-CM

## 2018-04-09 DIAGNOSIS — E66.2 OBESITY HYPOVENTILATION SYNDROME: ICD-10-CM

## 2018-04-09 PROCEDURE — 99214 OFFICE O/P EST MOD 30 MIN: CPT | Mod: ,,, | Performed by: INTERNAL MEDICINE

## 2018-04-11 ENCOUNTER — HOSPITAL ENCOUNTER (OUTPATIENT)
Dept: CARDIOLOGY | Facility: CLINIC | Age: 48
Discharge: HOME | End: 2018-04-11
Payer: MEDICAID

## 2018-04-11 ENCOUNTER — HOSPITAL ENCOUNTER (OUTPATIENT)
Dept: RADIOLOGY | Facility: HOSPITAL | Age: 48
Discharge: HOME | End: 2018-04-11
Attending: THORACIC SURGERY (CARDIOTHORACIC VASCULAR SURGERY)
Payer: MEDICAID

## 2018-04-11 ENCOUNTER — OFFICE VISIT (OUTPATIENT)
Dept: CARDIOTHORACIC SURGERY | Facility: CLINIC | Age: 48
End: 2018-04-11
Payer: MEDICAID

## 2018-04-11 VITALS
DIASTOLIC BLOOD PRESSURE: 77 MMHG | TEMPERATURE: 98 F | SYSTOLIC BLOOD PRESSURE: 141 MMHG | WEIGHT: 315 LBS | OXYGEN SATURATION: 93 % | HEART RATE: 97 BPM | HEIGHT: 67 IN | BODY MASS INDEX: 49.44 KG/M2

## 2018-04-11 DIAGNOSIS — I27.9 CHRONIC PULMONARY HEART DISEASE: ICD-10-CM

## 2018-04-11 DIAGNOSIS — Z79.899 POLYPHARMACY: Primary | ICD-10-CM

## 2018-04-11 DIAGNOSIS — Z86.711 HX OF PULMONARY EMBOLUS: ICD-10-CM

## 2018-04-11 DIAGNOSIS — Z86.711 HX OF PULMONARY EMBOLUS: Primary | ICD-10-CM

## 2018-04-11 DIAGNOSIS — R06.82 TACHYPNEA: ICD-10-CM

## 2018-04-11 PROCEDURE — 71046 X-RAY EXAM CHEST 2 VIEWS: CPT | Mod: TC,FY

## 2018-04-11 PROCEDURE — 93010 ELECTROCARDIOGRAM REPORT: CPT | Mod: S$PBB,,, | Performed by: INTERNAL MEDICINE

## 2018-04-11 PROCEDURE — 99024 POSTOP FOLLOW-UP VISIT: CPT | Mod: ,,, | Performed by: THORACIC SURGERY (CARDIOTHORACIC VASCULAR SURGERY)

## 2018-04-11 PROCEDURE — 99213 OFFICE O/P EST LOW 20 MIN: CPT | Mod: PBBFAC,25 | Performed by: THORACIC SURGERY (CARDIOTHORACIC VASCULAR SURGERY)

## 2018-04-11 PROCEDURE — 99999 PR PBB SHADOW E&M-EST. PATIENT-LVL III: CPT | Mod: PBBFAC,,, | Performed by: THORACIC SURGERY (CARDIOTHORACIC VASCULAR SURGERY)

## 2018-04-11 PROCEDURE — 71046 X-RAY EXAM CHEST 2 VIEWS: CPT | Mod: 26,,, | Performed by: RADIOLOGY

## 2018-04-11 PROCEDURE — 93005 ELECTROCARDIOGRAM TRACING: CPT | Mod: PBBFAC | Performed by: INTERNAL MEDICINE

## 2018-04-11 NOTE — PROGRESS NOTES
Patient seen and examined. Patient is progressively increasing activity. No significant complaints. No longer requiring home O2.      Sternum: stable, incision CDI  Chest xray: Acceptable post op chest  EKG: NSR     Assessment:   Pulmonary endarterectomy with circulatory arrest, bilateral.     Plan:  Can begin driving as long as he has power steering  We will refer to cardiology to assume care- Dr. Hinojosa   Continue lasix and  Potassium  DC Coumadin and prescribed Eliquis       No scheduled appointment, RTC prn

## 2018-04-13 ENCOUNTER — TELEPHONE (OUTPATIENT)
Dept: TRANSPLANT | Facility: CLINIC | Age: 48
End: 2018-04-13

## 2018-04-16 DIAGNOSIS — I27.9 CHRONIC PULMONARY HEART DISEASE: Primary | ICD-10-CM

## 2018-04-16 RX ORDER — POTASSIUM CHLORIDE 1500 MG/1
TABLET, EXTENDED RELEASE ORAL
Qty: 60 TABLET | Refills: 0 | Status: SHIPPED | OUTPATIENT
Start: 2018-04-16 | End: 2019-01-11

## 2018-04-16 RX ORDER — FUROSEMIDE 20 MG/1
TABLET ORAL
Qty: 60 TABLET | Refills: 0 | Status: SHIPPED | OUTPATIENT
Start: 2018-04-16 | End: 2018-05-31 | Stop reason: DRUGHIGH

## 2018-04-26 ENCOUNTER — RESEARCH ENCOUNTER (OUTPATIENT)
Dept: RESEARCH | Facility: HOSPITAL | Age: 48
End: 2018-04-26

## 2018-04-26 NOTE — PROGRESS NOTES
Telephone call to Phoebe Donny cell phone number listed.  The patient's sister answered and stated he was doing okay and will follow-up with Dr. Hinojosa next month.

## 2018-05-29 RX ORDER — FUROSEMIDE 20 MG/1
TABLET ORAL
Qty: 60 TABLET | Refills: 0 | OUTPATIENT
Start: 2018-05-29

## 2018-05-29 RX ORDER — POTASSIUM CHLORIDE 1500 MG/1
TABLET, EXTENDED RELEASE ORAL
Qty: 60 TABLET | Refills: 0 | OUTPATIENT
Start: 2018-05-29

## 2018-05-31 ENCOUNTER — HOSPITAL ENCOUNTER (OUTPATIENT)
Dept: CARDIOLOGY | Facility: CLINIC | Age: 48
Discharge: HOME | End: 2018-05-31
Attending: INTERNAL MEDICINE
Payer: MEDICAID

## 2018-05-31 ENCOUNTER — HOSPITAL ENCOUNTER (OUTPATIENT)
Dept: PULMONOLOGY | Facility: CLINIC | Age: 48
Discharge: HOME | End: 2018-05-31
Payer: MEDICAID

## 2018-05-31 ENCOUNTER — INITIAL CONSULT (OUTPATIENT)
Dept: TRANSPLANT | Facility: CLINIC | Age: 48
End: 2018-05-31
Payer: MEDICAID

## 2018-05-31 ENCOUNTER — DOCUMENTATION ONLY (OUTPATIENT)
Dept: CARDIOLOGY | Facility: CLINIC | Age: 48
End: 2018-05-31

## 2018-05-31 VITALS
BODY MASS INDEX: 49.44 KG/M2 | DIASTOLIC BLOOD PRESSURE: 56 MMHG | SYSTOLIC BLOOD PRESSURE: 96 MMHG | WEIGHT: 315 LBS | HEART RATE: 90 BPM | HEIGHT: 67 IN | HEIGHT: 68 IN | WEIGHT: 315 LBS | BODY MASS INDEX: 47.74 KG/M2

## 2018-05-31 DIAGNOSIS — Z95.5 PRESENCE OF STENT IN CORONARY ARTERY: ICD-10-CM

## 2018-05-31 DIAGNOSIS — I27.9 CHRONIC PULMONARY HEART DISEASE: ICD-10-CM

## 2018-05-31 DIAGNOSIS — E66.01 SEVERE OBESITY (BMI >= 40): ICD-10-CM

## 2018-05-31 DIAGNOSIS — I27.82 OTHER CHRONIC PULMONARY EMBOLISM WITHOUT ACUTE COR PULMONALE: ICD-10-CM

## 2018-05-31 DIAGNOSIS — E66.2 OBESITY HYPOVENTILATION SYNDROME: ICD-10-CM

## 2018-05-31 DIAGNOSIS — R73.9 STRESS HYPERGLYCEMIA: ICD-10-CM

## 2018-05-31 DIAGNOSIS — I25.10 CORONARY ARTERY DISEASE INVOLVING NATIVE CORONARY ARTERY OF NATIVE HEART WITHOUT ANGINA PECTORIS: ICD-10-CM

## 2018-05-31 DIAGNOSIS — J96.11 CHRONIC RESPIRATORY FAILURE WITH HYPOXIA: ICD-10-CM

## 2018-05-31 DIAGNOSIS — I27.24 CTEPH (CHRONIC THROMBOEMBOLIC PULMONARY HYPERTENSION): Primary | ICD-10-CM

## 2018-05-31 DIAGNOSIS — I10 BENIGN ESSENTIAL HYPERTENSION: ICD-10-CM

## 2018-05-31 DIAGNOSIS — I50.32 CHRONIC DIASTOLIC HEART FAILURE: ICD-10-CM

## 2018-05-31 LAB
DIASTOLIC DYSFUNCTION: NO
ESTIMATED PA SYSTOLIC PRESSURE: 38.19
ESTIMATED PA SYSTOLIC PRESSURE: 80.79
GLOBAL PERICARDIAL EFFUSION: ABNORMAL
GLOBAL PERICARDIAL EFFUSION: ABNORMAL
MITRAL VALVE MOBILITY: NORMAL
MITRAL VALVE REGURGITATION: ABNORMAL
MITRAL VALVE REGURGITATION: ABNORMAL
RETIRED EF AND QEF - SEE NOTES: 55 (ref 55–65)
RETIRED EF AND QEF - SEE NOTES: 55 (ref 55–65)
TRICUSPID VALVE REGURGITATION: ABNORMAL
TRICUSPID VALVE REGURGITATION: ABNORMAL

## 2018-05-31 PROCEDURE — 99999 PR PBB SHADOW E&M-EST. PATIENT-LVL IV: CPT | Mod: PBBFAC,,, | Performed by: INTERNAL MEDICINE

## 2018-05-31 PROCEDURE — 94618 PULMONARY STRESS TESTING: CPT | Mod: PBBFAC | Performed by: INTERNAL MEDICINE

## 2018-05-31 PROCEDURE — 99214 OFFICE O/P EST MOD 30 MIN: CPT | Mod: PBBFAC,25 | Performed by: INTERNAL MEDICINE

## 2018-05-31 PROCEDURE — 94618 PULMONARY STRESS TESTING: CPT | Mod: 26,S$PBB,, | Performed by: INTERNAL MEDICINE

## 2018-05-31 PROCEDURE — C8929 TTE W OR WO FOL WCON,DOPPLER: HCPCS | Mod: PBBFAC | Performed by: INTERNAL MEDICINE

## 2018-05-31 PROCEDURE — 99205 OFFICE O/P NEW HI 60 MIN: CPT | Mod: S$PBB,,, | Performed by: INTERNAL MEDICINE

## 2018-05-31 PROCEDURE — 93306 TTE W/DOPPLER COMPLETE: CPT | Mod: 26,S$PBB,, | Performed by: INTERNAL MEDICINE

## 2018-05-31 RX ORDER — FUROSEMIDE 80 MG/1
80 TABLET ORAL DAILY
Refills: 11 | Status: ON HOLD | COMMUNITY
Start: 2018-05-18 | End: 2019-03-28

## 2018-05-31 RX ORDER — SPIRONOLACTONE 25 MG/1
25 TABLET ORAL DAILY
Qty: 30 TABLET | Refills: 11 | Status: SHIPPED | OUTPATIENT
Start: 2018-05-31 | End: 2019-06-24 | Stop reason: SDUPTHER

## 2018-05-31 RX ORDER — ATORVASTATIN CALCIUM 40 MG/1
40 TABLET, FILM COATED ORAL DAILY
Refills: 11 | COMMUNITY
Start: 2018-05-18 | End: 2020-01-24 | Stop reason: SDUPTHER

## 2018-05-31 NOTE — PATIENT INSTRUCTIONS
Continue lasix 40mg daily    Start spironolactone 25mg once a  Day with your am lasix    Labs in 1 week locally to check potassium and kidneys    Check your weights every morning after getting out of bed and urinating. If your weight goes up 3# overnight or 5# in one week take an extra lasix, if you do this a couple days in a row and it doesn't come off, [facility] [phone])    You have diastolic dysfunction or a stiff heart, that does not relax well- this makes you prone to retaining fluid and shortness of breath as the heart that can not fill allows fluid to back up in the lungs    This is worsened by high blood pressure, diabetes, untreated sleep apnea-   The pressure is higher than normal in the lungs but it is secondary to the stiff heart    We treat the stiff heart with bp management, weight loss, CPAP for sleep apnea- we use low sodium diet and fluid pills to manage the fluid          Low-Salt Diet  This diet removes foods that are high in salt. It also limits the amount of salt you use when cooking. It is most often used for people with high blood pressure, edema (fluid retention), and kidney, liver, or heart disease.  Table salt contains the mineral sodium. Your body needs sodium to work normally. But too much sodium can make your health problems worse. Your healthcare provider is recommending a low-salt (also called low-sodium) diet for you. Your total daily allowance of salt is 1,500 to 2,300 milligrams (mg). It is less than 1 teaspoon of table salt. This means you can have only about 500 to 700 mg of sodium at each meal. People with certain health problems should limit salt intake to the lower end of the recommended range.    When you cook, dont add much salt. If you can cook without using salt, even better. Dont add salt to your food at the table.  When shopping, read food labels. Salt is often called sodium on the label. Choose foods that are salt-free, low salt, or very low salt.  Note that foods with reduced salt may not lower your salt intake enough.    Beans, potatoes, and pasta  Ok: Dry beans, split peas, lentils, potatoes, rice, macaroni, pasta, spaghetti without added salt  Avoid: Potato chips, tortilla chips, and similar products  Breads and cereals  Ok: Low-sodium breads, rolls, cereals, and cakes; low-salt crackers, matzo crackers  Avoid: Salted crackers, pretzels, popcorn, Albanian toast, pancakes, muffins  Dairy  Ok: Milk, chocolate milk, hot chocolate mix, low-salt cheeses, and yogurt  Avoid: Processed cheese and cheese spreads; Roquefort, Camembert, and cottage cheese; buttermilk, instant breakfast drink  Desserts  Ok: Ice cream, frozen yogurt, juice bars, gelatin, cookies and pies, sugar, honey, jelly, hard candy  Avoid: Most pies, cakes and cookies prepared or processed with salt; instant pudding  Drinks  Ok: Tea, coffee, fizzy (carbonated) drinks, juices  Avoid: Flavored coffees, electrolyte replacement drinks, sports drinks  Meats  Ok: All fresh meat, fish, poultry, low-salt tuna, eggs, egg substitute  Avoid: Smoked, pickled, brine-cured, or salted meats and fish. This includes malloy, chipped beef, corned beef, hot dogs, deli meats, ham, kosher meats, salt pork, sausage, canned tuna, salted codfish, smoked salmon, herring, sardines, or anchovies.  Seasonings and spices  Ok: Most seasonings are okay. Good substitutes for salt include: fresh herb blends, hot sauce, lemon, garlic, campos, vinegar, dry mustard, parsley, cilantro, horseradish, tomato paste, regular margarine, mayonnaise, unsalted butter, cream cheese, vegetable oil, cream, low-salt salad dressing and gravy.  Avoid: Regular ketchup, relishes, pickles, soy sauce, teriyaki sauce, Worcestershire sauce, BBQ sauce, tartar sauce, meat tenderizer, chili sauce, regular gravy, regular salad dressing, salted butter  Soups  Ok: Low-salt soups and broths made with allowed foods  Avoid: Bouillon cubes, soups with smoked or salted  meats, regular soup and broth  Vegetables  Ok: Most vegetables are okay; also low-salt tomato and vegetable juices  Avoid: Sauerkraut and other brine-soaked vegetables; pickles and other pickled vegetables; tomato juice, olives  Date Last Reviewed: 8/1/2016  © 6153-6590 [employer]. [address] [place] [address]. All rights reserved. This information is not intended as a substitute for professional medical care. Always follow your healthcare professional's instructions.

## 2018-05-31 NOTE — PROGRESS NOTES
Subjective:    Patient ID:  Jaquan Hines Jr. is a 47 y.o. male who presents for evaluation of Pulmonary Hypertension.    HPI  48 yo man with HTN, morbid obesity with OHS and chronic hypoxic resp failure, HTN, referred by Dr Jennings for management of PH- pt underwent PTE  on 3/13/18. Postoperatively, the patient was taken from the  Operating Room to the ICU where the vital signs were monitored and pain was kept under control. The patient was weaned from the drips and extubated in the ICU per protocol. Once hemodynamically stable, the patient was transferred to the Cardiac Step-Down floor for continued strengthening and ambulation. On postoperative day 6, the patient was ready for discharge to home. At the time of discharge, the patient was ambulating unassisted. Pain was well controlled with oral analgesics and the patient was tolerating the diet.  Initial hemodynamics in ICU  PA 56/16 (28) CVP 17 PCW 21  Co 8.5/Ci 3.4 SVO2 77%   Prior to PAC removal PA 44/22    Pt says since dc home he's been recovering- still uses his O2 at night but not during the day. bleeds the O2 through his CPAP (trilogy 100). Did not stop during the walk but he WAS winded and the nurse suggested that he slow down. Because he did slow down was able to complete the walk.   Still does get winded at times, has really been sedentary since he's been home- mostly walks to and from the [place]. Does not shower and dress daily, when he does its not completely without issue he says. Does not get CP with the SOB but he does get LH.    His siter brings him food, cooks with [employer]- still has a lot of issues with fluid retention and has a lot of bloating- his lasix is adjustable- he increases when needed- 40mg once a day is his usual dose- can take 80mg (40mg bid) prn says day before yest his hands were so bloated he couldn't close his hands- wt fluctuates 10# within a week    Pulm: Dr Vasques    Card Dr Miguel Ángel Greene      Six Minute Walk Test:   305  m  (   m in    )                                              O2 sat  98 ->90  %                                                           HR 93  -> 125                                                                 BP  124 / 67  ->134 /72                                                         Ammon   0  -> 4    Echo        Results for orders placed or performed during the hospital encounter of 03/07/18   2D echo with color flow doppler   Result Value Ref Range    EF 55 55 - 65    Mitral Valve Regurgitation MILD     Diastolic Dysfunction Yes (A)     Est. PA Systolic Pressure 80.79 (A)     Pericardial Effusion TRIVIAL     Mitral Valve Mobility NORMAL     Tricuspid Valve Regurgitation MODERATE (A)    left atrial volume index is severely enlarged, measuring 217.27 cc/m2.   right ventricle is enlarged measuring 4.1 cm at the base in the apical right ventricle-focused view. RV free wall thickness is increased measuring 0.7 cm in Subcostal view, consistent with RVH. Global right ventricular systolic   function appears mildly depressed. There is abnormal septal motion. Tricuspid annular plane systolic excursion (TAPSE) is 2.1 cm. Tissue Doppler-derived tricuspid annular peak systolic velocity (S prime) is 13.0 cm/s. The estimated PA systolic pressure   is 81 mmHg.     EKG   /    /      R/LHC  12 /16    Pressures in mmHg, right heart pressures, pulmonary capillary wedge pressure mean of 28, pulmonary artery 70-80/29-35, mean of 45-50, RV   average of 75/25.  RA mean of 18.  Average cardiac output was 4.3 liters per minute.  Left heart pressures,   central aortic pressure 90/60, left ventricle 97/15-20.  No gradient upon pullback.    Left Coronary Artery:  Left main coronary artery was of medium caliber, free of disease.  It bifurcated into left anterior descending and   circumflex arteries.  In the proximal portion of the left anterior descending artery, there was a stent that had a   20% concentric narrowing with no what  appear to be flow limiting abnormalities.  The continuation of the vessel was normal and free of   disease.  There was a large diagonal branch that arose from the proximal portion of the left anterior descending artery   that was normal and free of disease.    Circumflex System:  The circumflex system was of medium caliber with high obtuse marginal branch arising from it and a second longer obtuse marginal   branch, both of which were normal and free of disease.    Right Coronary Artery:  The right coronary artery had some mild diffuse plaquing throughout its course with no evidence of significant obstructive   disease.  It did give rise to posterior descending and posterolateral branch.    Left Ventriculogram:  The left ventricle demonstrated normal left ventricular function with EF estimated at approximately 60%; however,   there was much ventricular ectopy during the examination and I could not tell if there was any significant mitral regurgitation.      CXR  3 /   18  Cardiomegaly.    CT Chest   8 / 17Suboptimal exam due to timing of the contrast bolus and motion. Apparent Low-density intraluminal filling defect in the proximal right lower lobar artery, best seen series 300 image 29. Apparent intraluminal filling defect in the distal left lower lobar   artery. Apparent small filling defects within segmental branches in the left upper lobe.     There is no evidence of pleural effusion or pneumothorax.  There is a transvenous wire with tip along the interventricular septum. Normal size heart. Reflux of contrast into the hepatic veins. Coronary artery disease. Small pericardial effusion. There   is no evidence of lymphadenopathy.  No acute osseous abnormality. Intramuscular lipoma within the left subscapularis    PFTs    11 /17  FVC    3.2  L  74  % pred  FEV1 2.12L   60  % pred  FEV1/FVC  66    %  Patient passed out during test X 2.    V/Q Scan  11/17  Two large mismatched perfusion defects in the left and right  "lower lobes of the lung respectively, and one moderate mismatch perfusion defects in the left upper lobe.    Review of Systems   Constitution: Negative for chills, fever, malaise/fatigue and weight gain.   HENT: Negative.    Eyes: Negative.    Cardiovascular: Negative for chest pain, dyspnea on exertion, leg swelling, near-syncope, orthopnea, palpitations, paroxysmal nocturnal dyspnea and syncope.   Respiratory: Negative for cough and shortness of breath.    Endocrine: Negative.    Skin: Negative.    Musculoskeletal: Negative.    Gastrointestinal: Negative for bloating, abdominal pain and change in bowel habit.   Neurological: Negative for dizziness and light-headedness.   Psychiatric/Behavioral: Negative for depression.        Objective:  BP (!) 96/56 (BP Location: Left arm, Patient Position: Sitting, BP Method: Large (Automatic))   Pulse 90   Ht 5' 7.5" (1.715 m)   Wt (!) 148.9 kg (328 lb 4.2 oz)   BMI 50.65 kg/m²       Physical Exam   Constitutional: He is oriented to person, place, and time. He appears well-developed and well-nourished.   Obese NAD   HENT:   Head: Normocephalic and atraumatic.   Eyes: Right eye exhibits no discharge. Left eye exhibits no discharge.   Neck: Neck supple. JVD present. No thyromegaly present.   Cardiovascular: Normal rate and regular rhythm.  Exam reveals no gallop and no friction rub.    No murmur heard.       Pulmonary/Chest: Effort normal and breath sounds normal. No respiratory distress. He has no wheezes. He has no rales.   Abdominal: Soft. Bowel sounds are normal. He exhibits distension. There is no tenderness.   Musculoskeletal: Normal range of motion. He exhibits no edema or tenderness.   Neurological: He is alert and oriented to person, place, and time. No cranial nerve deficit. Coordination normal.   Skin: Skin is warm and dry. No rash noted.   Psychiatric: He has a normal mood and affect. Judgment and thought content normal.           Chemistry        Component Value " Date/Time     05/31/2018 0814     02/15/2017 1615    K 3.1 (L) 05/31/2018 0814    CL 93 (L) 05/31/2018 0814     02/15/2017 1615    CO2 31 (H) 05/31/2018 0814    BUN 13 05/31/2018 0814    CREATININE 1.2 05/31/2018 0814    CREATININE 1.11 02/15/2017 1615    GLU 98 05/31/2018 0814     (H) 02/15/2017 1615        Component Value Date/Time    CALCIUM 10.2 05/31/2018 0814    ALKPHOS 170 (H) 05/31/2018 0814    AST 28 05/31/2018 0814    ALT 29 05/31/2018 0814    BILITOT 0.3 05/31/2018 0814    ESTGFRAFRICA >60.0 05/31/2018 0814    EGFRNONAA >60.0 05/31/2018 0814            Magnesium   Date Value Ref Range Status   05/31/2018 1.9 1.6 - 2.6 mg/dL Final       Lab Results   Component Value Date    WBC 16.54 (H) 05/31/2018    HGB 15.1 05/31/2018    HCT 47.8 05/31/2018    MCV 79 (L) 05/31/2018     (H) 05/31/2018       Lab Results   Component Value Date    INR 1.1 05/31/2018    INR 2.7 (H) 03/19/2018    INR 2.1 (H) 03/18/2018       BNP   Date Value Ref Range Status   05/31/2018 86 0 - 99 pg/mL Final     Comment:     Values of less than 100 pg/ml are consistent with non-CHF populations.   11/01/2017 46 0 - 99 pg/mL Final     Comment:     Values of less than 100 pg/ml are consistent with non-CHF populations.       No results found for: LDH          Assessment:       1. CTEPH (chronic thromboembolic pulmonary hypertension)- s/p PTE with dramatic improvement in PAP. That sais, pt remains FC III with volume overload on exam though BNP normal (less reliable in morbid obesity)    2. Chronic respiratory failure with hypoxia    3. Coronary artery disease involving native coronary artery of native heart without angina pectoris    4. Benign essential hypertension    5. Obesity hypoventilation syndrome    6. Presence of stent in coronary artery    7. Severe obesity (BMI >= 40)  Body mass index is 50.65 kg/m².     8. Other chronic pulmonary embolism without acute cor pulmonale    9. Stress hyperglycemia    10.  Chronic diastolic heart failure      WHO Group:4  Functional Class 3     Plan:       Continue lasix 40mg daily  Start spironolactone 25mg once a  Day given benefit in diastolic HF  BMP in 1 week locally to check potassium and kidneys  Refer to cardiopulm rehab (given Rx for this)    Refer to bariatric sgy (ok with Dr Jennings)    Keep salt intake to under 2000 mg sodium, fluids to under 2 L (64 oz)    Check weights every morning after getting out of bed and urinating. If weight goes up 3# overnight or 5# in one week he should take an extra lasix, if he does this a couple days in a row and it doesn't come off, he should call us     Will f/u on todays pending echo-     F/u 3-4 mo with walk and labs

## 2018-06-01 NOTE — PROCEDURES
Jaquan Hines Jr. is a 47 y.o.  male patient, who presents for a 6 minute walk test ordered by Edna Hinojosa MD.  The diagnosis is Pulmonary Hypertension.  The patient's BMI is 51.8 kg/m2.  Predicted distance (lower limit of normal) is 356.34 meters.      Test Results:    The test was completed without stopping.  The total time walked was 360 seconds.  During walking, the patient reported:  Dyspnea, Lightheadedness.  The patient used no assistive devices during testing.     05/31/2018---------Distance: 304.8 meters (1000 feet)     O2 Sat % Supplemental Oxygen Heart Rate Blood Pressure Ammon Scale   Pre-exercise  (Resting) 98 % Room Air 93 bpm 124/67 mmHg 0   During Exercise 90 % Room Air 125 bpm 134/72 mmHg 4   Post-exercise  (Recovery) 96 % Room Air  108 bpm       Recovery Time:  86 seconds    Performing nurse/tech:  Bessy NI      PREVIOUS STUDY:   The patient has not had a previous study.      CLINICAL INTERPRETATION:  Six minute walk distance is 304.8 meters (1000 feet) with somewhat heavy dyspnea.  During exercise, there was significant desaturation while breathing room air.  Blood pressure remained stable and Heart rate increased significantly with walking.  The patient reported non-pulmonary symptoms during exercise.  No previous study performed.  Based upon age and body mass index, exercise capacity is less than predicted.

## 2018-06-07 ENCOUNTER — TELEPHONE (OUTPATIENT)
Dept: TRANSPLANT | Facility: CLINIC | Age: 48
End: 2018-06-07

## 2018-06-07 NOTE — TELEPHONE ENCOUNTER
Spoke patient's sister (Quyen) by phone. Patient lives with his sister and she assists him with his health care needs. Discussed initiation of Adempas for Pulmonary Hypertension including pathophysiology of disease, mechanism of action of drug, dosing and role of specialty pharmacy.  Sent Adempas Medication Guide to Quyen, along with the medication web address.  Possible side effects of medication also discussed, including low BP, indigestion, HA. Pt was urged to contact office (pt has direct phone number) should he  have any SEs that are concerning to her whatsoever.   Explained that a nurse will contact them to set up a visit to her home and patient should not initiate Adempas without the nurse visit. Mr. Hines will start at 1 mg PO TID and titrate every two weeks as tolerated for goal dose of 2.5mg PO TID.   Noted that the referral/insurance process could take a week or longer. Reinforced need for Quyen or patient to answer/return phone calls from  and Specialty Pharmacy.  Quyen verbalized understanding of all. All questions/concerns answered/addressed to patient satisfaction. Referral for Adempas sent to [facility] at [facility].     Also, set up lab draw for next week. Due to insurance issues patient has not started Aldactone. He will start today and will have labs done next Thursday or Friday at [facility] in [place], [place].

## 2018-06-08 ENCOUNTER — TELEPHONE (OUTPATIENT)
Dept: TRANSPLANT | Facility: CLINIC | Age: 48
End: 2018-06-08

## 2018-06-11 ENCOUNTER — TELEPHONE (OUTPATIENT)
Dept: TRANSPLANT | Facility: CLINIC | Age: 48
End: 2018-06-11

## 2018-06-15 ENCOUNTER — TELEPHONE (OUTPATIENT)
Dept: TRANSPLANT | Facility: CLINIC | Age: 48
End: 2018-06-15

## 2018-06-20 ENCOUNTER — PATIENT MESSAGE (OUTPATIENT)
Dept: TRANSPLANT | Facility: CLINIC | Age: 48
End: 2018-06-20

## 2018-06-27 ENCOUNTER — TELEPHONE (OUTPATIENT)
Dept: TRANSPLANT | Facility: CLINIC | Age: 48
End: 2018-06-27

## 2018-07-13 ENCOUNTER — TELEPHONE (OUTPATIENT)
Dept: TRANSPLANT | Facility: CLINIC | Age: 48
End: 2018-07-13

## 2018-07-13 NOTE — TELEPHONE ENCOUNTER
"See Nurse note below. Per Dr. Hinojosa, patient will stay at the 1 mg dose and reassess in 2 weeks.    "I met with Mr. Jaquan Hines on Friday 7-13-18 for a Adempas titration assessment. He reports having dizziness with activity but he feels that its not any worse since prior to starting Adempas. He goes on to state that his sister has told him that he appears dizzy more frequently and that she is a more reliable  of information. His sister in a nurse practitioner and Mr. Hines states that she would like him to stay at 1 mg for a longer time period. Mr. Hines did not report any dizziness while I was present and no other side effects reported at this time.   Vital signs were stable with /68 (sitting), pulse 94 and respirations 18. /64 while standing. O2 sat was 96% on room air.   He has 11 - Adempas 1 mg pills remaining so will need a shipment to arrive by tomorrow.   Please let me know if we will stay at Adempas 1 mg or titrate to Adempas 1.5 mg dose."    "

## 2018-07-19 ENCOUNTER — TELEPHONE (OUTPATIENT)
Dept: TRANSPLANT | Facility: CLINIC | Age: 48
End: 2018-07-19

## 2018-07-22 ENCOUNTER — PATIENT MESSAGE (OUTPATIENT)
Dept: TRANSPLANT | Facility: CLINIC | Age: 48
End: 2018-07-22

## 2018-07-23 ENCOUNTER — PATIENT MESSAGE (OUTPATIENT)
Dept: TRANSPLANT | Facility: CLINIC | Age: 48
End: 2018-07-23

## 2018-08-08 ENCOUNTER — TELEPHONE (OUTPATIENT)
Dept: TRANSPLANT | Facility: CLINIC | Age: 48
End: 2018-08-08

## 2018-08-08 NOTE — TELEPHONE ENCOUNTER
"Adempas Titration to increase from 1.5 mg to 2 mg.    "Mr. Jaquan Hines was seen on Wednesday 8-8-18 for a Adempas titration assessment. Mr. Hines was sitting up in his bed and does not c/o any side effects at this time. He states he does not notice any difference in his respiratory status since starting Adempas.   2+ edema was observed to BLE which is no change from previous visits. His lung sounds are clear and O2 sat was 95% on room air.  Vital signs were sable with /80, pulse 89 and respirations 18.   He is currently on Adempas 1.5 mg and tolerating dose well."    "

## 2018-10-19 ENCOUNTER — OFFICE VISIT (OUTPATIENT)
Dept: TRANSPLANT | Facility: CLINIC | Age: 48
End: 2018-10-19
Payer: MEDICAID

## 2018-10-19 ENCOUNTER — IMMUNIZATION (OUTPATIENT)
Dept: PHARMACY | Facility: CLINIC | Age: 48
End: 2018-10-19
Payer: MEDICAID

## 2018-10-19 ENCOUNTER — HOSPITAL ENCOUNTER (OUTPATIENT)
Dept: PULMONOLOGY | Facility: CLINIC | Age: 48
Discharge: HOME | End: 2018-10-19
Payer: MEDICAID

## 2018-10-19 VITALS — BODY MASS INDEX: 47.74 KG/M2 | WEIGHT: 315 LBS | HEIGHT: 68 IN

## 2018-10-19 VITALS
HEART RATE: 100 BPM | OXYGEN SATURATION: 95 % | WEIGHT: 315 LBS | DIASTOLIC BLOOD PRESSURE: 55 MMHG | BODY MASS INDEX: 47.74 KG/M2 | SYSTOLIC BLOOD PRESSURE: 102 MMHG | HEIGHT: 68 IN

## 2018-10-19 DIAGNOSIS — J96.11 CHRONIC RESPIRATORY FAILURE WITH HYPOXIA: ICD-10-CM

## 2018-10-19 DIAGNOSIS — I27.24 CTEPH (CHRONIC THROMBOEMBOLIC PULMONARY HYPERTENSION): Primary | ICD-10-CM

## 2018-10-19 DIAGNOSIS — E66.2 OBESITY HYPOVENTILATION SYNDROME: ICD-10-CM

## 2018-10-19 DIAGNOSIS — I25.10 CORONARY ARTERY DISEASE INVOLVING NATIVE CORONARY ARTERY OF NATIVE HEART WITHOUT ANGINA PECTORIS: ICD-10-CM

## 2018-10-19 DIAGNOSIS — I27.24 CTEPH (CHRONIC THROMBOEMBOLIC PULMONARY HYPERTENSION): ICD-10-CM

## 2018-10-19 DIAGNOSIS — I10 BENIGN ESSENTIAL HYPERTENSION: ICD-10-CM

## 2018-10-19 DIAGNOSIS — E66.01 SEVERE OBESITY (BMI >= 40): ICD-10-CM

## 2018-10-19 DIAGNOSIS — I50.32 CHRONIC DIASTOLIC HEART FAILURE: ICD-10-CM

## 2018-10-19 DIAGNOSIS — E78.2 MIXED HYPERLIPIDEMIA: ICD-10-CM

## 2018-10-19 PROCEDURE — 94618 PULMONARY STRESS TESTING: CPT | Mod: PBBFAC | Performed by: INTERNAL MEDICINE

## 2018-10-19 PROCEDURE — 94618 PULMONARY STRESS TESTING: CPT | Mod: 26,S$PBB,, | Performed by: INTERNAL MEDICINE

## 2018-10-19 PROCEDURE — 99213 OFFICE O/P EST LOW 20 MIN: CPT | Mod: PBBFAC,25 | Performed by: INTERNAL MEDICINE

## 2018-10-19 PROCEDURE — 99214 OFFICE O/P EST MOD 30 MIN: CPT | Mod: S$PBB,,, | Performed by: INTERNAL MEDICINE

## 2018-10-19 PROCEDURE — 99999 PR PBB SHADOW E&M-EST. PATIENT-LVL III: CPT | Mod: PBBFAC,,, | Performed by: INTERNAL MEDICINE

## 2018-10-19 NOTE — PATIENT INSTRUCTIONS
We will apply to start uptravi, expect a phone call from specialty pharmacy to get you started    Keep salt intake to under 2000 mg sodium, fluids to under 2 L (64 oz)    Check your weights every morning after getting out of bed and urinating. If your weight goes up 3# overnight or 5# in one week take an extra lasix and call us if the fluid doesn't come off (with extra potassium).    Flu shot

## 2018-10-19 NOTE — PROGRESS NOTES
Subjective:    Patient ID:  Jaquan Hines Jr. is a 48 y.o. male who presents for follow-up of Pulmonary Hypertension.    HPI  48 yo man with HTN, morbid obesity with OHS and chronic hypoxic resp failure, HTN, referred by Dr Jennings for management of PH- pt underwent PTE  on 3/13/18. Postoperatively, the patient was taken from the  Operating Room to the ICU where the vital signs were monitored and pain was kept under control. The patient was weaned from the drips and extubated in the ICU per protocol. Once hemodynamically stable, the patient was transferred to the Cardiac Step-Down floor for continued strengthening and ambulation. On postoperative day 6, the patient was ready for discharge to home. At the time of discharge, the patient was ambulating unassisted. Pain was well controlled with oral analgesics and the patient was tolerating the diet.  Initial hemodynamics in ICU  PA 56/16 (28) CVP 17 PCW 21  Co 8.5/Ci 3.4 SVO2 77%   Prior to PAC removal PA 44/22- now here for PH f/u    Since last visit pt was admitted in [facility] for hypokalemia (1.8)- was discovered during prop for cataract sgy- his lasix was reduced from 80mg daily to 80mg alternating with 40mg daily. During his walk today, says it was unusually warm so he was sweating a good bit, In terms of  his breathing- his ADLs are exertion for him- going from sitting to standing makes him dizzy- does his best to avoid bending over. No CP- has been having pretty significant diarrhea related to the adempas-  Has not tried fiber  Has not noticed much in terms of swelling- has a little bit but its much better than it used to be. He does think the adempas has helped his breathing, once he had been on 2.5mg for a period of time felt somewhat improved, though he sister does not feel he can do anymore without getting winded- he says he doesn't push himself- at times will feel SOB sitting still- no presyncope.    Pulm: Dr Caprice Greene      Six  Minute Walk Test:   305  m ( 305  m in    )                                              O2 sat  95 ->90  %                                                             -> 143                                                                 BP  142/ 70  ->170/79                                                         Ammon   1  -> 7-8    Echo        Results for orders placed or performed during the hospital encounter of 05/31/18   2D echo with color flow doppler   Result Value Ref Range    Calculated EF 55 55 - 65    Mitral Valve Regurgitation MILD     Diastolic Dysfunction No     Est. PA Systolic Pressure 38.19     Pericardial Effusion SMALL (A)     Tricuspid Valve Regurgitation MILD    left atrial volume index is severely enlarged, measuring 217.27 cc/m2.   right ventricle is enlarged measuring 4.1 cm at the base in the apical right ventricle-focused view. RV free wall thickness is increased measuring 0.7 cm in Subcostal view, consistent with RVH. Global right ventricular systolic   function appears mildly depressed. There is abnormal septal motion. Tricuspid annular plane systolic excursion (TAPSE) is 2.1 cm. Tissue Doppler-derived tricuspid annular peak systolic velocity (S prime) is 13.0 cm/s. The estimated PA systolic pressure   is 81 mmHg.     EKG   /    /      R/LHC  12 /16    Pressures in mmHg, right heart pressures, pulmonary capillary wedge pressure mean of 28, pulmonary artery 70-80/29-35, mean of 45-50, RV   average of 75/25.  RA mean of 18.  Average cardiac output was 4.3 liters per minute.  Left heart pressures,   central aortic pressure 90/60, left ventricle 97/15-20.  No gradient upon pullback.    Left Coronary Artery:  Left main coronary artery was of medium caliber, free of disease.  It bifurcated into left anterior descending and   circumflex arteries.  In the proximal portion of the left anterior descending artery, there was a stent that had a   20% concentric narrowing with no what appear to  be flow limiting abnormalities.  The continuation of the vessel was normal and free of   disease.  There was a large diagonal branch that arose from the proximal portion of the left anterior descending artery   that was normal and free of disease.    Circumflex System:  The circumflex system was of medium caliber with high obtuse marginal branch arising from it and a second longer obtuse marginal   branch, both of which were normal and free of disease.    Right Coronary Artery:  The right coronary artery had some mild diffuse plaquing throughout its course with no evidence of significant obstructive   disease.  It did give rise to posterior descending and posterolateral branch.    Left Ventriculogram:  The left ventricle demonstrated normal left ventricular function with EF estimated at approximately 60%; however,   there was much ventricular ectopy during the examination and I could not tell if there was any significant mitral regurgitation.      CXR  3 /   18  Cardiomegaly.    CT Chest   8 / 17Suboptimal exam due to timing of the contrast bolus and motion. Apparent Low-density intraluminal filling defect in the proximal right lower lobar artery, best seen series 300 image 29. Apparent intraluminal filling defect in the distal left lower lobar   artery. Apparent small filling defects within segmental branches in the left upper lobe.     There is no evidence of pleural effusion or pneumothorax.  There is a transvenous wire with tip along the interventricular septum. Normal size heart. Reflux of contrast into the hepatic veins. Coronary artery disease. Small pericardial effusion. There   is no evidence of lymphadenopathy.  No acute osseous abnormality. Intramuscular lipoma within the left subscapularis    PFTs    11 /17  FVC    3.2  L  74  % pred  FEV1 2.12L   60  % pred  FEV1/FVC  66    %  Patient passed out during test X 2.    V/Q Scan  11/17  Two large mismatched perfusion defects in the left and right lower  "lobes of the lung respectively, and one moderate mismatch perfusion defects in the left upper lobe.    Review of Systems   Constitution: Negative for chills, fever, malaise/fatigue and weight gain.   HENT: Negative.    Eyes: Negative.    Cardiovascular: Negative for chest pain, dyspnea on exertion, leg swelling, near-syncope, orthopnea, palpitations, paroxysmal nocturnal dyspnea and syncope.   Respiratory: Negative for cough and shortness of breath.    Endocrine: Negative.    Skin: Negative.    Musculoskeletal: Negative.    Gastrointestinal: Negative for bloating, abdominal pain and change in bowel habit.   Neurological: Negative for dizziness and light-headedness.   Psychiatric/Behavioral: Negative for depression.        Objective:  BP (!) 102/55   Pulse 100   Ht 5' 7.5" (1.715 m)   Wt (!) 152.3 kg (335 lb 12.2 oz)   SpO2 95%   BMI 51.81 kg/m²       Physical Exam   Constitutional: He is oriented to person, place, and time. He appears well-developed and well-nourished.   Obese NAD   HENT:   Head: Normocephalic and atraumatic.   Eyes: Right eye exhibits no discharge. Left eye exhibits no discharge.   Neck: Neck supple. No JVD present. No thyromegaly present.   Cardiovascular: Normal rate and regular rhythm. Exam reveals no gallop and no friction rub.   No murmur heard.       Pulmonary/Chest: Effort normal and breath sounds normal. No respiratory distress. He has no wheezes. He has no rales.   Abdominal: Soft. Bowel sounds are normal. He exhibits distension. There is no tenderness.   Musculoskeletal: Normal range of motion. He exhibits no edema or tenderness.   1+ pitting edema retirement to knees   Neurological: He is alert and oriented to person, place, and time. No cranial nerve deficit. Coordination normal.   Skin: Skin is warm and dry. No rash noted.   Psychiatric: He has a normal mood and affect. Judgment and thought content normal.           Chemistry        Component Value Date/Time     10/19/2018 1203 "     10/19/2018 1203     02/15/2017 1615    K 3.3 (L) 10/19/2018 1203    K 3.3 (L) 10/19/2018 1203     10/19/2018 1203     10/19/2018 1203     02/15/2017 1615    CO2 26 10/19/2018 1203    CO2 26 10/19/2018 1203    BUN 11 10/19/2018 1203    BUN 11 10/19/2018 1203    CREATININE 1.0 10/19/2018 1203    CREATININE 1.0 10/19/2018 1203    CREATININE 1.11 02/15/2017 1615    GLU 95 10/19/2018 1203    GLU 95 10/19/2018 1203     (H) 02/15/2017 1615        Component Value Date/Time    CALCIUM 9.5 10/19/2018 1203    CALCIUM 9.5 10/19/2018 1203    ALKPHOS 158 (H) 10/19/2018 1203    AST 11 10/19/2018 1203    ALT 15 10/19/2018 1203    BILITOT 0.5 10/19/2018 1203    ESTGFRAFRICA >60.0 10/19/2018 1203    ESTGFRAFRICA >60.0 10/19/2018 1203    EGFRNONAA >60.0 10/19/2018 1203    EGFRNONAA >60.0 10/19/2018 1203            Magnesium   Date Value Ref Range Status   05/31/2018 1.9 1.6 - 2.6 mg/dL Final       Lab Results   Component Value Date    WBC 12.68 10/19/2018    HGB 12.9 (L) 10/19/2018    HCT 42.5 10/19/2018    MCV 75 (L) 10/19/2018     (H) 10/19/2018       Lab Results   Component Value Date    INR 1.1 05/31/2018    INR 2.7 (H) 03/19/2018    INR 2.1 (H) 03/18/2018       BNP   Date Value Ref Range Status   10/19/2018 107 (H) 0 - 99 pg/mL Final     Comment:     Values of less than 100 pg/ml are consistent with non-CHF populations.   05/31/2018 86 0 - 99 pg/mL Final     Comment:     Values of less than 100 pg/ml are consistent with non-CHF populations.   11/01/2017 46 0 - 99 pg/mL Final     Comment:     Values of less than 100 pg/ml are consistent with non-CHF populations.       No results found for: LDH          Assessment:       1. CTEPH (chronic thromboembolic pulmonary hypertension)- s/p PTE with dramatic improvement in PAP. That said, pt remains FC III with volume overload on mild exam and mildly increased BNP normal (less reliable in morbid obesity)    2. Chronic respiratory failure with  hypoxia    3. Coronary artery disease involving native coronary artery of native heart without angina pectoris    4. Benign essential hypertension    5. Obesity hypoventilation syndrome    6. Presence of stent in coronary artery    7. Severe obesity (BMI >= 40)  Body mass index is 51.81 kg/m².     8. Other chronic pulmonary embolism without acute cor pulmonale    9. Stress hyperglycemia    10. Chronic diastolic heart failure      WHO Group:4  Functional Class 3     Plan:       Continue lasix 40mg alternating with 80mg daily and  spironolactone 25mg once a  Day     Apply for uptravi, and once on salty dose will add opsumit    Once pt on triple therapy will repeat echo and RHC, and if hemodynamics acceptable refer pt for bariatric surgery    Would marjorie to refer him to pulm rehab once his sx improve as well    Keep salt intake to under 2000 mg sodium, fluids to under 2 L (64 oz)    Check weights every morning after getting out of bed and urinating. If weight goes up 3# overnight or 5# in one week he should take an extra lasix, if he does this a couple days in a row and it doesn't come off, he should call us     F/u 3-4 mo with walk and labs

## 2018-10-20 NOTE — PROCEDURES
Jaquan Hines Jr. is a 48 y.o.  male patient, who presents for a 6 minute walk test ordered by Edna Hinojosa MD.  The diagnosis is Pulmonary Hypertension.  The patient's BMI is 51.8 kg/m2.  Predicted distance (lower limit of normal) is 349.4 meters.      Test Results:    The test was completed without stopping.  The total time walked was 360 seconds.  During walking, the patient reported:  Dyspnea, Dizziness.  The patient used no assistive devices during testing.     10/19/2018---------Distance: 304.8 meters (1000 feet)     O2 Sat % Supplemental Oxygen Heart Rate Blood Pressure Ammon Scale   Pre-exercise  (Resting) 95 % Room Air 108 bpm 142/70 mmHg 1   During Exercise 90 % Room Air 143 bpm 170/79 mmHg 7-8   Post-exercise  (Recovery) 96 % Room Air  113 bpm 153/65 mmHg      Recovery Time:  304 seconds    Performing nurse/tech:  DANAE Barrientos RRT      PREVIOUS STUDY:   05/31/2018---------Distance: 304.8 meters (1000 feet)       O2 Sat % Supplemental Oxygen Heart Rate Blood Pressure Ammon Scale   Pre-exercise  (Resting) 98 % Room Air 93 bpm 124/67 mmHg 0   During Exercise 90 % Room Air 125 bpm 134/72 mmHg 4   Post-exercise  (Recovery) 96 % Room Air  108 bpm             CLINICAL INTERPRETATION:  Six minute walk distance is 304.8 meters (1000 feet) with very heavy dyspnea.  During exercise, there was significant desaturation while breathing room air.  Both blood pressure and heart rate increased significantly with walking.  Tachycardia was present prior to exercise.  This may represent a hypertensive and a tachycardic response to exercise.  The patient reported non-pulmonary symptoms during exercise.  Since the previous study in May 2018, exercise capacity is unchanged.  Based upon age and body mass index, exercise capacity is less than predicted.

## 2018-10-22 ENCOUNTER — TELEPHONE (OUTPATIENT)
Dept: TRANSPLANT | Facility: CLINIC | Age: 48
End: 2018-10-22

## 2018-10-22 NOTE — TELEPHONE ENCOUNTER
Spoke w/patient and patient's sister in clinic regarding initiation of Selexipag. Patient already established with [facility]. Noted dosing schedule with titration and maintenance dose.    Patient provided with Selexipag Medication guide. Discussed possible side effects of medication, most commonly HA and indigestion. Advised patient to take medication with a full meal when possible. Pt was urged to contact office (pt has direct phone number) should he have any SEs that are concerning to her whatsoever.     Explained that Specialty Pharmacy RN will make initial home visit when patient first starting the medication. The RN will provide education on medication and dosing. The pharmacy will then do phone follow-up with patient upon each titration.  Pt understands that a nurse will contact him for a visit to his home and he  should not initiate Selixipag without the nurse visit.     Pt and sister verbalized understanding of all. All questions/concerns answered/addressed to patient satisfaction. Referral for Uptravi sent to [facility].

## 2018-11-05 NOTE — NURSING
"Uptravi initiation Specialty Pharmacy visit - "Mr. Jaquan Hines was seen on Monday 11-5-18 for Uptravi start and education.   He was instructed on Uptravi use, side effects, dosing schedule, titration, compliance, pill strengths and color differences corresponding to dose. He was instructed to notify specialty pharmacy and MD office to report any side effects or if 3 days of med is missed. Pharmacy support 24/7 was discussed and PAH care line # was provided. First dose of Uptravi 200 mcg was taken without reports of any side effects.   30 min VS: /82, pulse 86, resp 18, O2 sat 95%, temp 98.8.   45 min VS: /80, pulse 86, resp 18, O2 sat 94%, temp 98.8   60 min VS: /76, pulse 87, resp 18, O2 sat 95%, temp 98.6   Please let me know if anything else is needed.   Thanks,"    "

## 2018-11-27 ENCOUNTER — TELEPHONE (OUTPATIENT)
Dept: TRANSPLANT | Facility: CLINIC | Age: 48
End: 2018-11-27

## 2018-11-27 NOTE — TELEPHONE ENCOUNTER
Approval notification received, good through 12/27/18 and sent to Niurka at [facility].

## 2018-12-14 ENCOUNTER — TELEPHONE (OUTPATIENT)
Dept: TRANSPLANT | Facility: CLINIC | Age: 48
End: 2018-12-14

## 2018-12-26 ENCOUNTER — TELEPHONE (OUTPATIENT)
Dept: TRANSPLANT | Facility: CLINIC | Age: 48
End: 2018-12-26

## 2018-12-26 NOTE — TELEPHONE ENCOUNTER
""Mr. Jaquan Hines was seen on Monday 12-24-18 for an Adempas 5 month follow up visit. He reports feeling much better at the moment and states that when he started Uptravi 1400 mcg po BID he really noticed an improvement. Uptravi 1600 mcg was just started today and he remains on Adempas 2.5 mg po TID. He is able to perform more household tasks and ambulate within his home without SOB.   Mr. Hines does report diarrhea but states it was present prior to starting Adempas and Uptravi. He went to MD who advised him to start taking a fiber supplement and he reports that this has helped bulk up his stool but that he still has diarrhea. Mr. Hines has a follow up appointment with Dr. Hinojosa on 1-11-19. O2 sat 93% on room air. Teaching reinforced to contact [facility] and specialty pharmacy to report if 3 days medication is missed or side effects. Instructed to contact pharmacy when 7 days medication remain to schedule shipment. He verbalized understanding of all teaching.   /70, pulse 89 and respirations 18. Lung sounds were clear and trace edema was observed to BLE. He continues to use a Trilogy device nightly for CLAUDIA and supplemental oxygen at 3 liters with sleep and prn. Mr. Hines reports no changes in medication"    "

## 2019-01-03 ENCOUNTER — TELEPHONE (OUTPATIENT)
Dept: TRANSPLANT | Facility: CLINIC | Age: 49
End: 2019-01-03

## 2019-01-11 ENCOUNTER — OFFICE VISIT (OUTPATIENT)
Dept: TRANSPLANT | Facility: CLINIC | Age: 49
End: 2019-01-11
Payer: MEDICAID

## 2019-01-11 ENCOUNTER — HOSPITAL ENCOUNTER (OUTPATIENT)
Dept: CARDIOLOGY | Facility: CLINIC | Age: 49
Discharge: HOME | End: 2019-01-11
Attending: INTERNAL MEDICINE
Payer: MEDICAID

## 2019-01-11 ENCOUNTER — HOSPITAL ENCOUNTER (OUTPATIENT)
Dept: PULMONOLOGY | Facility: CLINIC | Age: 49
Discharge: HOME | End: 2019-01-11
Payer: MEDICAID

## 2019-01-11 VITALS
HEIGHT: 68 IN | BODY MASS INDEX: 47.74 KG/M2 | WEIGHT: 315 LBS | BODY MASS INDEX: 47.74 KG/M2 | WEIGHT: 315 LBS | SYSTOLIC BLOOD PRESSURE: 102 MMHG | HEIGHT: 68 IN | HEART RATE: 70 BPM | DIASTOLIC BLOOD PRESSURE: 55 MMHG

## 2019-01-11 VITALS
BODY MASS INDEX: 47.74 KG/M2 | WEIGHT: 315 LBS | SYSTOLIC BLOOD PRESSURE: 119 MMHG | DIASTOLIC BLOOD PRESSURE: 56 MMHG | HEART RATE: 95 BPM | OXYGEN SATURATION: 94 % | HEIGHT: 68 IN

## 2019-01-11 DIAGNOSIS — I10 BENIGN ESSENTIAL HYPERTENSION: ICD-10-CM

## 2019-01-11 DIAGNOSIS — E66.01 SEVERE OBESITY (BMI >= 40): ICD-10-CM

## 2019-01-11 DIAGNOSIS — E66.2 OBESITY HYPOVENTILATION SYNDROME: ICD-10-CM

## 2019-01-11 DIAGNOSIS — I27.24 CTEPH (CHRONIC THROMBOEMBOLIC PULMONARY HYPERTENSION): Primary | ICD-10-CM

## 2019-01-11 DIAGNOSIS — I25.10 CORONARY ARTERY DISEASE INVOLVING NATIVE CORONARY ARTERY OF NATIVE HEART WITHOUT ANGINA PECTORIS: ICD-10-CM

## 2019-01-11 DIAGNOSIS — E78.2 MIXED HYPERLIPIDEMIA: Primary | ICD-10-CM

## 2019-01-11 DIAGNOSIS — F31.9 BIPOLAR AFFECTIVE DISORDER, REMISSION STATUS UNSPECIFIED: ICD-10-CM

## 2019-01-11 DIAGNOSIS — E78.2 MIXED HYPERLIPIDEMIA: ICD-10-CM

## 2019-01-11 DIAGNOSIS — I27.24 CTEPH (CHRONIC THROMBOEMBOLIC PULMONARY HYPERTENSION): ICD-10-CM

## 2019-01-11 DIAGNOSIS — J96.11 CHRONIC RESPIRATORY FAILURE WITH HYPOXIA: ICD-10-CM

## 2019-01-11 LAB
ASCENDING AORTA: 3.12 CM
AV INDEX (PROSTH): 1
AV MEAN GRADIENT: 2.68 MMHG
AV PEAK GRADIENT: 4.84 MMHG
AV VALVE AREA: 4.3 CM2
BSA FOR ECHO PROCEDURE: 2.69 M2
CV ECHO LV RWT: 0.32 CM
DOP CALC AO PEAK VEL: 1.1 M/S
DOP CALC AO VTI: 19.07 CM
DOP CALC LVOT AREA: 4.3 CM2
DOP CALC LVOT DIAMETER: 2.34 CM
DOP CALC LVOT STROKE VOLUME: 82.01 CM3
DOP CALCLVOT PEAK VEL VTI: 19.08 CM
E WAVE DECELERATION TIME: 206.49 MSEC
E/A RATIO: 1.36
E/E' RATIO: 8.48
ECHO LV POSTERIOR WALL: 0.91 CM (ref 0.6–1.1)
FRACTIONAL SHORTENING: 37 % (ref 28–44)
INTERVENTRICULAR SEPTUM: 0.98 CM (ref 0.6–1.1)
IVRT: 0.03 MSEC
LA MAJOR: 5.4 CM
LA MINOR: 5.5 CM
LA WIDTH: 5.19 CM
LEFT ATRIUM SIZE: 4.31 CM
LEFT ATRIUM VOLUME INDEX: 40.9 ML/M2
LEFT ATRIUM VOLUME: 103.62 CM3
LEFT INTERNAL DIMENSION IN SYSTOLE: 3.6 CM (ref 2.1–4)
LEFT VENTRICLE DIASTOLIC VOLUME INDEX: 63.52 ML/M2
LEFT VENTRICLE DIASTOLIC VOLUME: 160.8 ML
LEFT VENTRICLE MASS INDEX: 83.3 G/M2
LEFT VENTRICLE SYSTOLIC VOLUME INDEX: 41.8 ML/M2
LEFT VENTRICLE SYSTOLIC VOLUME: 105.88 ML
LEFT VENTRICULAR INTERNAL DIMENSION IN DIASTOLE: 5.71 CM (ref 3.5–6)
LEFT VENTRICULAR MASS: 210.94 G
LV LATERAL E/E' RATIO: 7.07
LV SEPTAL E/E' RATIO: 10.6
MV PEAK A VEL: 0.78 M/S
MV PEAK E VEL: 1.06 M/S
PULM VEIN S/D RATIO: 0.63
PV PEAK D VEL: 0.71 M/S
PV PEAK S VEL: 0.45 M/S
RA MAJOR: 5.78 CM
RA PRESSURE: 3 MMHG
RA WIDTH: 4.6 CM
RIGHT VENTRICULAR END-DIASTOLIC DIMENSION: 3.81 CM
RV TISSUE DOPPLER FREE WALL SYSTOLIC VELOCITY 1 (APICAL 4 CHAMBER VIEW): 9.14 M/S
SINUS: 3.2 CM
STJ: 2.96 CM
TDI LATERAL: 0.15
TDI SEPTAL: 0.1
TDI: 0.13
TRICUSPID ANNULAR PLANE SYSTOLIC EXCURSION: 2.06 CM

## 2019-01-11 PROCEDURE — 94618 PULMONARY STRESS TESTING: ICD-10-PCS | Mod: 26,S$PBB,, | Performed by: INTERNAL MEDICINE

## 2019-01-11 PROCEDURE — C8929 TTE W OR WO FOL WCON,DOPPLER: HCPCS | Mod: PBBFAC | Performed by: INTERNAL MEDICINE

## 2019-01-11 PROCEDURE — 99999 PR PBB SHADOW E&M-EST. PATIENT-LVL IV: CPT | Mod: PBBFAC,,, | Performed by: INTERNAL MEDICINE

## 2019-01-11 PROCEDURE — 93306 TRANSTHORACIC ECHO (TTE) COMPLETE: ICD-10-PCS | Mod: 26,S$PBB,, | Performed by: INTERNAL MEDICINE

## 2019-01-11 PROCEDURE — 93306 TTE W/DOPPLER COMPLETE: CPT | Mod: 26,S$PBB,, | Performed by: INTERNAL MEDICINE

## 2019-01-11 PROCEDURE — 99214 OFFICE O/P EST MOD 30 MIN: CPT | Mod: S$PBB,,, | Performed by: INTERNAL MEDICINE

## 2019-01-11 PROCEDURE — 99214 PR OFFICE/OUTPT VISIT, EST, LEVL IV, 30-39 MIN: ICD-10-PCS | Mod: S$PBB,,, | Performed by: INTERNAL MEDICINE

## 2019-01-11 PROCEDURE — 99214 OFFICE O/P EST MOD 30 MIN: CPT | Mod: PBBFAC,25 | Performed by: INTERNAL MEDICINE

## 2019-01-11 PROCEDURE — 99999 PR PBB SHADOW E&M-EST. PATIENT-LVL IV: ICD-10-PCS | Mod: PBBFAC,,, | Performed by: INTERNAL MEDICINE

## 2019-01-11 PROCEDURE — 94618 PULMONARY STRESS TESTING: CPT | Mod: PBBFAC | Performed by: INTERNAL MEDICINE

## 2019-01-11 PROCEDURE — 94618 PULMONARY STRESS TESTING: CPT | Mod: 26,S$PBB,, | Performed by: INTERNAL MEDICINE

## 2019-01-11 RX ORDER — POTASSIUM CHLORIDE 20 MEQ/1
20 TABLET, EXTENDED RELEASE ORAL 2 TIMES DAILY
Qty: 60 TABLET | Refills: 11 | Status: SHIPPED | OUTPATIENT
Start: 2019-01-11 | End: 2020-01-19

## 2019-01-11 NOTE — PATIENT INSTRUCTIONS
Add metamucil to try to firm up your bowels    imodium as needed is fine     Continue current therapy.    Keep salt intake to under 2000 mg sodium, fluids to under 2 L (64 oz)     Check your weights every morning after getting out of bed and urinating. If your weight goes up 3# overnight or 5# in one week take 80 mg lasix  For a couple days in a row and call us if the fluid doesn't come off.     Call us if you find yourself getting more short of breath, have more swelling or unexpected weight changes, fatigue or chest pain    Call [facility] to enroll in Pulmonary rehab    Go to our website ([email or web address]) and look at the bariatrics website- the process to undergo bariatrics is listed there- it starts with an orientation session

## 2019-01-11 NOTE — PROGRESS NOTES
Administered optison contrast to assist with Echo. Allergies verified and consent signed. NAD noted. IV flushed and d/c'd.

## 2019-01-11 NOTE — PROGRESS NOTES
Subjective:    Patient ID:  Jaquan Hines Jr. is a 48 y.o. male who presents for follow-up of Pulmonary Hypertension.    HPI  49 yo man with HTN, morbid obesity with OHS and chronic hypoxic resp failure, HTN, referred by Dr Jennings for management of PH- pt underwent PTE  on 3/13/18. Postoperatively, the patient was taken from the  Operating Room to the ICU where the vital signs were monitored and pain was kept under control. The patient was weaned from the drips and extubated in the ICU per protocol. Once hemodynamically stable, the patient was transferred to the Cardiac Step-Down floor for continued strengthening and ambulation. On postoperative day 6, the patient was ready for discharge to home. At the time of discharge, the patient was ambulating unassisted. Pain was well controlled with oral analgesics and the patient was tolerating the diet.  Initial hemodynamics in ICU  PA 56/16 (28) CVP 17 PCW 21  Co 8.5/Ci 3.4 SVO2 77%   Prior to PAC removal PA 44/22- now here for PH f/u    Since last visit pt says he has been feeling much better since the second week of Dec after increasing selexipag to 1400mcg. Now at 1600 mcg bid- stool is loose but not liquid- occasionally will take imodium which helps.   His family says he's improved in terms of his SOB- today putting on his shoes was the first time he was seen to get OOB. Pushing himself more as he is able to do more  Lasix is 80mg in am alternating with 40mg the next day, with spironolactone 25mg daily. Wt has been stable for the last 2 visits- no Cp or swelling.     Has some malissa today- plans to set up an apt with his psychiatrist next week.     Pulm: Dr Vasques    Card Dr Miguel Ángel Greene    TTE today  · Normal left ventricular systolic function. The estimated ejection fraction is 55%  · Normal right ventricular systolic function.  · Normal LV diastolic function.  · Trace tricuspid regurgitation. Max velocity visualized 2 m/s; however, TR jets are poor.  · Normal  central venous pressure (3 mm Hg).  · Mild left atrial enlargement.  · Technically challenging study  ·   Six Minute Walk Test:   335  m ( 305  m in    )                                              O2 sat  98 ->89  %                                                           HR 92  -> 135                                                                 BP  130/ 59  ->159/65                                                         Ammon   3  -> 9    Echo        Results for orders placed or performed during the hospital encounter of 05/31/18   2D echo with color flow doppler   Result Value Ref Range    QEF 55 55 - 65    Mitral Valve Regurgitation MILD     Diastolic Dysfunction No     Est. PA Systolic Pressure 38.19     Pericardial Effusion SMALL (A)     Tricuspid Valve Regurgitation MILD    left atrial volume index is severely enlarged, measuring 217.27 cc/m2.   right ventricle is enlarged measuring 4.1 cm at the base in the apical right ventricle-focused view. RV free wall thickness is increased measuring 0.7 cm in Subcostal view, consistent with RVH. Global right ventricular systolic   function appears mildly depressed. There is abnormal septal motion. Tricuspid annular plane systolic excursion (TAPSE) is 2.1 cm. Tissue Doppler-derived tricuspid annular peak systolic velocity (S prime) is 13.0 cm/s. The estimated PA systolic pressure   is 81 mmHg.     EKG   /    /      R/LHC  12 /16    Pressures in mmHg, right heart pressures, pulmonary capillary wedge pressure mean of 28, pulmonary artery 70-80/29-35, mean of 45-50, RV   average of 75/25.  RA mean of 18.  Average cardiac output was 4.3 liters per minute.  Left heart pressures,   central aortic pressure 90/60, left ventricle 97/15-20.  No gradient upon pullback.    Left Coronary Artery:  Left main coronary artery was of medium caliber, free of disease.  It bifurcated into left anterior descending and   circumflex arteries.  In the proximal portion of the left anterior  descending artery, there was a stent that had a   20% concentric narrowing with no what appear to be flow limiting abnormalities.  The continuation of the vessel was normal and free of   disease.  There was a large diagonal branch that arose from the proximal portion of the left anterior descending artery   that was normal and free of disease.    Circumflex System:  The circumflex system was of medium caliber with high obtuse marginal branch arising from it and a second longer obtuse marginal   branch, both of which were normal and free of disease.    Right Coronary Artery:  The right coronary artery had some mild diffuse plaquing throughout its course with no evidence of significant obstructive   disease.  It did give rise to posterior descending and posterolateral branch.    Left Ventriculogram:  The left ventricle demonstrated normal left ventricular function with EF estimated at approximately 60%; however,   there was much ventricular ectopy during the examination and I could not tell if there was any significant mitral regurgitation.      CXR  3 /   18  Cardiomegaly.    CT Chest   8 / 17Suboptimal exam due to timing of the contrast bolus and motion. Apparent Low-density intraluminal filling defect in the proximal right lower lobar artery, best seen series 300 image 29. Apparent intraluminal filling defect in the distal left lower lobar   artery. Apparent small filling defects within segmental branches in the left upper lobe.     There is no evidence of pleural effusion or pneumothorax.  There is a transvenous wire with tip along the interventricular septum. Normal size heart. Reflux of contrast into the hepatic veins. Coronary artery disease. Small pericardial effusion. There   is no evidence of lymphadenopathy.  No acute osseous abnormality. Intramuscular lipoma within the left subscapularis    PFTs    11 /17  FVC    3.2  L  74  % pred  FEV1 2.12L   60  % pred  FEV1/FVC  66    %  Patient passed out during  "test X 2.    V/Q Scan  11/17  Two large mismatched perfusion defects in the left and right lower lobes of the lung respectively, and one moderate mismatch perfusion defects in the left upper lobe.    Review of Systems   Constitution: Negative for chills, fever, malaise/fatigue and weight gain.   HENT: Negative.    Eyes: Negative.    Cardiovascular: Negative for chest pain, dyspnea on exertion, leg swelling, near-syncope, orthopnea, palpitations, paroxysmal nocturnal dyspnea and syncope.   Respiratory: Negative for cough and shortness of breath.    Endocrine: Negative.    Skin: Negative.    Musculoskeletal: Negative.    Gastrointestinal: Negative for bloating, abdominal pain and change in bowel habit.   Neurological: Negative for dizziness and light-headedness.   Psychiatric/Behavioral: Negative for depression.        Objective:  BP (!) 119/56 (BP Location: Left arm, Patient Position: Sitting, BP Method: Large (Automatic))   Pulse 95   Ht 5' 7.5" (1.715 m)   Wt (!) 152.1 kg (335 lb 5.1 oz)   SpO2 (!) 94%   BMI 51.74 kg/m²       Physical Exam   Constitutional: He is oriented to person, place, and time. He appears well-developed and well-nourished.   Obese NAD   HENT:   Head: Normocephalic and atraumatic.   Eyes: Right eye exhibits no discharge. Left eye exhibits no discharge.   Neck: Neck supple. No JVD present. No thyromegaly present.   Cardiovascular: Normal rate and regular rhythm. Exam reveals no gallop and no friction rub.   No murmur heard.       Pulmonary/Chest: Effort normal and breath sounds normal. No respiratory distress. He has no wheezes. He has no rales.   Abdominal: Soft. Bowel sounds are normal. He exhibits distension. There is no tenderness.   Musculoskeletal: Normal range of motion. He exhibits no edema or tenderness.   1+ pitting edema jail to knees   Neurological: He is alert and oriented to person, place, and time. No cranial nerve deficit. Coordination normal.   Skin: Skin is warm and " dry. No rash noted.   Psychiatric: He has a normal mood and affect. Judgment and thought content normal.           Chemistry        Component Value Date/Time     01/11/2019 1319     02/15/2017 1615    K 3.5 01/11/2019 1319     01/11/2019 1319     02/15/2017 1615    CO2 24 01/11/2019 1319    BUN 9 01/11/2019 1319    CREATININE 0.9 01/11/2019 1319    CREATININE 1.11 02/15/2017 1615    GLU 95 01/11/2019 1319     (H) 02/15/2017 1615        Component Value Date/Time    CALCIUM 9.2 01/11/2019 1319    ALKPHOS 170 (H) 01/11/2019 1319    AST 19 01/11/2019 1319    ALT 30 01/11/2019 1319    BILITOT 0.4 01/11/2019 1319    ESTGFRAFRICA >60.0 01/11/2019 1319    EGFRNONAA >60.0 01/11/2019 1319            Magnesium   Date Value Ref Range Status   01/11/2019 2.0 1.6 - 2.6 mg/dL Final       Lab Results   Component Value Date    WBC 10.49 01/11/2019    HGB 13.0 (L) 01/11/2019    HCT 41.4 01/11/2019    MCV 77 (L) 01/11/2019     01/11/2019       Lab Results   Component Value Date    INR 1.1 05/31/2018    INR 2.7 (H) 03/19/2018    INR 2.1 (H) 03/18/2018       BNP   Date Value Ref Range Status   01/11/2019 95 0 - 99 pg/mL Final     Comment:     Values of less than 100 pg/ml are consistent with non-CHF populations.   10/19/2018 107 (H) 0 - 99 pg/mL Final     Comment:     Values of less than 100 pg/ml are consistent with non-CHF populations.   05/31/2018 86 0 - 99 pg/mL Final     Comment:     Values of less than 100 pg/ml are consistent with non-CHF populations.       No results found for: LDH          Assessment:       1. CTEPH (chronic thromboembolic pulmonary hypertension)- s/p PTE with dramatic improvement in PAP. FC subjectively better, small improvement on 6mw today, and echo with improved RV size and fxn though unable to get good TR jet to est pressures   2. Chronic respiratory failure with hypoxia    3. Coronary artery disease involving native coronary artery of native heart without angina  pectoris    4. Benign essential hypertension    5. Obesity hypoventilation syndrome    6. Presence of stent in coronary artery    7. Severe obesity (BMI >= 40)  Body mass index is 51.74 kg/m².     8. Other chronic pulmonary embolism without acute cor pulmonale    9. Stress hyperglycemia    10. Chronic diastolic heart failure      WHO Group:4  Functional Class 3     Plan:     asked him to add metamucil to bulk up his BM, imodium prn is fine    Continue lasix 40mg alternating with 80mg daily and  spironolactone 25mg once a  Day     Cont uptravi and adempas    Given his echo today I think it would be acceptable to refer pt for bariatric surgery if he wants to go- asked him to check online for our orientation sessions    Would like to refer him to pulm rehab- given a Rx today to take to [place]    Keep salt intake to under 2000 mg sodium, fluids to under 2 L (64 oz)    Check weights every morning after getting out of bed and urinating. If weight goes up 3# overnight or 5# in one week he should take an extra lasix, if he does this a couple days in a row and it doesn't come off, he should call us     F/u 4 mo with walk and labs

## 2019-01-12 NOTE — PROCEDURES
Jaquan Hines Jr. is a 48 y.o.  male patient, who presents for a 6 minute walk test ordered by Edna Hinojosa MD.  The diagnosis is Pulmonary Hypertension; CTEPH.  The patient's BMI is 51.9 kg/m2.  Predicted distance (lower limit of normal) is 348.84 meters.      Test Results:    The test was completed without stopping.  The total time walked was 360 seconds.  During walking, the patient reported:  Dyspnea.  The patient used no assistive devices during testing.     01/11/2019---------Distance: 335.28 meters (1100 feet)     O2 Sat % Supplemental Oxygen Heart Rate Blood Pressure Ammon Scale   Pre-exercise  (Resting) 98 % Room Air 92 bpm 130/59 mmHg 3   During Exercise 89 % Room Air 135 bpm 159/65 mmHg 9   Post-exercise  (Recovery) 94 % Room Air  123 bpm       Recovery Time:  47 seconds    Performing nurse/tech:  Tamia NI      PREVIOUS STUDY:   10/19/2018---------Distance: 304.8 meters (1000 feet)       O2 Sat % Supplemental Oxygen Heart Rate Blood Pressure Ammon Scale   Pre-exercise  (Resting) 95 % Room Air 108 bpm 142/70 mmHg 1   During Exercise 90 % Room Air 143 bpm 170/79 mmHg 7-8   Post-exercise  (Recovery) 96 % Room Air  113 bpm 153/65 mmHg           CLINICAL INTERPRETATION:  Six minute walk distance is 335.28 meters (1100 feet) with very, very heavy dyspnea.  During exercise, there was significant desaturation while breathing room air.  Both blood pressure and heart rate increased significantly with walking.  This may represent a tachycardic response to exercise.  The patient did not report non-pulmonary symptoms during exercise.  Since the previous study in October 2018, exercise capacity may be somewhat improved.  Based upon age and body mass index, exercise capacity is less than predicted.

## 2019-01-28 ENCOUNTER — TELEPHONE (OUTPATIENT)
Dept: TRANSPLANT | Facility: CLINIC | Age: 49
End: 2019-01-28

## 2019-01-28 NOTE — TELEPHONE ENCOUNTER
"After speaking with Mr. Hines' insurance, he is not covered for Pulmonary Rehab. Mr. Hines asked if he could join a gym. Encouraged him to exercised. Advised him to "listen to his body" as to when he might need to rest and increase exercise only as tolerated. Mr. Hines states that his insurance does cover a gym membership.   Asked him to call with any questions or concerns.    "

## 2019-03-07 ENCOUNTER — OFFICE VISIT (OUTPATIENT)
Dept: PULMONOLOGY | Facility: CLINIC | Age: 49
End: 2019-03-07
Payer: MEDICAID

## 2019-03-07 VITALS
HEIGHT: 68 IN | OXYGEN SATURATION: 97 % | BODY MASS INDEX: 47.74 KG/M2 | HEART RATE: 97 BPM | DIASTOLIC BLOOD PRESSURE: 64 MMHG | WEIGHT: 315 LBS | SYSTOLIC BLOOD PRESSURE: 110 MMHG

## 2019-03-07 DIAGNOSIS — E66.2 OBESITY HYPOVENTILATION SYNDROME: ICD-10-CM

## 2019-03-07 DIAGNOSIS — J96.11 CHRONIC RESPIRATORY FAILURE WITH HYPOXIA: ICD-10-CM

## 2019-03-07 DIAGNOSIS — I27.24 CTEPH (CHRONIC THROMBOEMBOLIC PULMONARY HYPERTENSION): Primary | ICD-10-CM

## 2019-03-07 DIAGNOSIS — I27.21 PULMONARY ARTERY HYPERTENSION: ICD-10-CM

## 2019-03-07 PROCEDURE — 99214 OFFICE O/P EST MOD 30 MIN: CPT | Mod: ,,, | Performed by: INTERNAL MEDICINE

## 2019-03-07 PROCEDURE — 99214 PR OFFICE/OUTPT VISIT, EST, LEVL IV, 30-39 MIN: ICD-10-PCS | Mod: ,,, | Performed by: INTERNAL MEDICINE

## 2019-03-07 RX ORDER — LOSARTAN POTASSIUM 25 MG/1
25 TABLET ORAL
Status: ON HOLD | COMMUNITY
End: 2019-03-28

## 2019-03-07 RX ORDER — OXCARBAZEPINE 300 MG/1
300 TABLET, FILM COATED ORAL 2 TIMES DAILY
Refills: 1 | COMMUNITY
Start: 2019-03-05 | End: 2019-04-23

## 2019-03-07 NOTE — PROGRESS NOTES
"HPI:    10/24/2017 - Here for follow up, SOB, GOMEZ seem to be a little worse has required increased OI2 (5 LPM with exertion).  Has also noted increased edema which has not responded to diuretics.  He has appointment with Dr Jennings (Thoracic surgery at [facility]) for evaluation.  D/W pt and sister.  Will try to add zaroxylyn to see if we get a better diuresis.    1/2/2018 - Here for follow up.  Has been evaluated and is being considered for pulmonary artery thromboembolectomy.  Had PFT which show no obstruction, mild air trapping and a minimal decrease in DLCO.  No new complaints or iussues.  Will send PFT to surgeon.  Told pt and sister to contact his office to make sure that follow up is arranged.    4/9/2018 - Here for follow up, had thrombectomy by Dr Jennings (hospitalized 3/13 - 3/19), since being DC is doing well, now using O2 with his Trilogy machine, sats ranging low - mid 90's.  To see surgery soon and "get a battery of tests".  No new issues reported.  Feels less SOB but still with some chest soreness.  Has lost some weight as well.      3/7/2019 - Here for follow up, has been doing well.  Now getting around without oxygen.  Has had some issues with his TRILOGY machine (we have discussed this and he will contact [facility]),  Also is requesting portable O2 concentrator (will need to requalify).  Overall doing well, had 6 minute walk test about 2 months ago sat to 89% did not have O2 put on - need to repeat.    Home Oxygen    Qualifier - O2 sat 88% on room air      Date - 7/18/2017                   + Exertion      O2 sat 94 on 2 LPM via nasal cannuli    DX - pulmonary embolism, obesity hypoventilation    Face to face visit with pt concerning the need for O2 therapy, all questions answered.  I stressed the need for compliance.  Pt to call with any questions.    NIPPV for Obesity Hypoventilation    Ordering nocturnal volume ventilator PRN use to reduce the risk of exacerbation.  Home BiPAP in sufficient due to the " severity of the condition.  Obesity hypoventilation is the cause of the chronic respiratory failure.            Medications    Current Outpatient Medications:     apixaban 5 mg Tab, Take 1 tablet (5 mg total) by mouth 2 (two) times daily., Disp: 60 tablet, Rfl: 11    aspirin (ECOTRIN) 81 MG EC tablet, Take 1 tablet (81 mg total) by mouth once daily., Disp: , Rfl: 0    atorvastatin (LIPITOR) 40 MG tablet, Take 40 mg by mouth once daily., Disp: , Rfl: 11    cetirizine (ZYRTEC) 10 MG tablet, Take 10 mg by mouth once daily. , Disp: , Rfl:     citalopram (CELEXA) 40 MG tablet, Take 40 mg by mouth once daily. , Disp: , Rfl:     furosemide (LASIX) 80 MG tablet, Take 80 mg by mouth once daily. Patient states he takes 80 mg every other day and 40 mg and all other days., Disp: , Rfl: 11    losartan (COZAAR) 25 MG tablet, Take 25 mg by mouth., Disp: , Rfl:     metoprolol tartrate (LOPRESSOR) 25 MG tablet, Take 1 tablet (25 mg total) by mouth 2 (two) times daily. (Patient taking differently: Take 25 mg by mouth once daily. ), Disp: 60 tablet, Rfl: 11    omeprazole (PRILOSEC) 20 MG capsule, Take 20 mg by mouth once daily. , Disp: , Rfl:     OXcarbazepine (TRILEPTAL) 300 MG Tab, Take 300 mg by mouth 2 (two) times daily., Disp: , Rfl: 1    potassium chloride SA (KLOR-CON M20) 20 MEQ tablet, Take 1 tablet (20 mEq total) by mouth 2 (two) times daily., Disp: 60 tablet, Rfl: 11    riociguat (ADEMPAS) 2.5 mg tablet, Take 2.5 mg by mouth 3 (three) times daily. Take 1 tablet, by mouth, three times daily., Disp: , Rfl:     selexipag 1,600 mcg Tab, Take 1,600 mcg by mouth 2 (two) times daily., Disp: , Rfl:     spironolactone (ALDACTONE) 25 MG tablet, Take 1 tablet (25 mg total) by mouth once daily., Disp: 30 tablet, Rfl: 11    VENTOLIN HFA 90 mcg/actuation inhaler, TAKE 1 PUFF EVERY 6 HOURS AS NEEDED FOR WHEEZING/SOB, Disp: , Rfl: 6    Allergies  Review of patient's allergies indicates:   Allergen Reactions    Clopidogrel   "      Social History    Social History     Tobacco Use   Smoking Status Never Smoker   Smokeless Tobacco Never Used     ETOH - 0 drinks per week.  Social History     Substance and Sexual Activity   Drug Use No     Occupation - not working, worked at [employer]    Family History  No family history on file.    ROS  Review of Systems   Constitutional: Positive for malaise/fatigue. Negative for chills, diaphoresis, fever and weight loss.   HENT: Negative for congestion.    Eyes: Negative for pain.   Respiratory: Positive for shortness of breath. Negative for cough, hemoptysis, sputum production, wheezing and stridor.    Cardiovascular: Positive for leg swelling. Negative for chest pain, palpitations, orthopnea, claudication and PND.   Gastrointestinal: Negative for abdominal pain, constipation, diarrhea, heartburn, nausea and vomiting.   Genitourinary: Negative for dysuria, frequency and urgency.   Musculoskeletal: Negative for falls and myalgias.   Neurological: Negative for sensory change, focal weakness and weakness.   Psychiatric/Behavioral: Negative for depression. The patient is not nervous/anxious.        Physical Exam    Vitals:    03/07/19 1009   BP: 110/64   Pulse: 97   SpO2: 97%   Weight: (!) 155.1 kg (342 lb)   Height: 5' 7.5" (1.715 m)       Physical Exam   Constitutional: He is oriented to person, place, and time. He appears well-developed and well-nourished. No distress.   HENT:   Head: Normocephalic and atraumatic.   Nose: Nose normal.   Mouth/Throat: Oropharynx is clear and moist.   Eyes: EOM are normal. Pupils are equal, round, and reactive to light.   Neck: Normal range of motion. Neck supple. No JVD present. No tracheal deviation present. No thyromegaly present.   Cardiovascular: Normal rate, regular rhythm, normal heart sounds and intact distal pulses. Exam reveals no gallop and no friction rub.   No murmur heard.  Pulmonary/Chest: Effort normal and breath sounds normal. No stridor. No respiratory " distress. He has no wheezes. He has no rales. He exhibits no tenderness.   Abdominal: Soft. Bowel sounds are normal. He exhibits no distension.   obese   Musculoskeletal: Normal range of motion. He exhibits edema. He exhibits no tenderness.   Lymphadenopathy:     He has no cervical adenopathy.   Neurological: He is alert and oriented to person, place, and time. He has normal reflexes. No cranial nerve deficit.   Skin: He is not diaphoretic.   Psychiatric: He has a normal mood and affect. His behavior is normal.   Nursing note and vitals reviewed.      Lab        Xray      Impression/Plan  Problem List Items Addressed This Visit        Pulmonary    Pulmonary embolism - Primary  - findings consistent with unresolved clots and CTEPH - studies reviewed  - s/p surgery per Dr Jennings  - RTC 3 months      Chronic respiratory failure with hypoxia  - on O2  - oxygenation is better  - will recheck 6 minute walk    Pulmonary hypertension  - on ADEMPAS and UPTRAVI (Dr Hinojosa)    Edema  - continue with diuretics and follow   - may need increased doses         Fluids/Electrolytes/Nutrition/GI    Obesity hypoventilation syndrome  - has NIPPV, doing better with NIPPV  - to call [facility] to get checked  - working on weight loss        Other Visit Diagnoses    None.       George Montaño MD

## 2019-03-14 RX ORDER — METOPROLOL TARTRATE 25 MG/1
25 TABLET, FILM COATED ORAL 2 TIMES DAILY
Qty: 60 TABLET | Refills: 11 | OUTPATIENT
Start: 2019-03-14 | End: 2020-03-13

## 2019-03-26 ENCOUNTER — PATIENT MESSAGE (OUTPATIENT)
Dept: TRANSPLANT | Facility: CLINIC | Age: 49
End: 2019-03-26

## 2019-03-26 ENCOUNTER — PATIENT MESSAGE (OUTPATIENT)
Dept: PULMONOLOGY | Facility: CLINIC | Age: 49
End: 2019-03-26

## 2019-03-27 PROBLEM — I26.99 PULMONARY EMBOLISM WITHOUT ACUTE COR PULMONALE: Status: ACTIVE | Noted: 2019-03-27

## 2019-03-27 PROBLEM — R07.9 CHEST PAIN: Status: ACTIVE | Noted: 2019-03-27

## 2019-03-28 ENCOUNTER — TELEPHONE (OUTPATIENT)
Dept: TRANSPLANT | Facility: CLINIC | Age: 49
End: 2019-03-28

## 2019-03-28 NOTE — TELEPHONE ENCOUNTER
"Patient called to report that he had been hospitalized last week with symptoms of a "heart attack" He had CP, flushing and headache. It was determined that his potassium was very low and he had bronchitis. They ran some tests and he was discharged. Yesterday he felt flushed and had CP so he went back to the ED. [notes are in Epic from this hospital stay].  Patient reports that he last took his Adempas and Uptravi yesterday afternoon. He does not have his medication with him and will not be able to get it until tomorrow. He also notes that he had been retaining fluid last week. His cardiologist told him he could take an extra lasix. He is unclear if he did and how many but his sister told him he took one. He says he weighed 348.2 one night and then "all the fluid came off at once" and he was down to 328.2 the next day. He thinks this is why his potassium and sodium are low.  Discussed the importance of notifying our office if he gains 3# overnight or greater than 5# in one week. Explained that we would check his labs before increasing his diuretic in order to make sure his kidney function was okay as well as his electrolyte levels.   Patient verbalized understanding. We will set up a follow up in clinic with Dr. Hinojosa when he is discharged.  "

## 2019-04-23 ENCOUNTER — HOSPITAL ENCOUNTER (OUTPATIENT)
Dept: PULMONOLOGY | Facility: CLINIC | Age: 49
Discharge: HOME | End: 2019-04-23
Payer: MEDICAID

## 2019-04-23 ENCOUNTER — OFFICE VISIT (OUTPATIENT)
Dept: TRANSPLANT | Facility: CLINIC | Age: 49
End: 2019-04-23
Payer: MEDICAID

## 2019-04-23 VITALS
WEIGHT: 315 LBS | HEART RATE: 88 BPM | HEIGHT: 68 IN | SYSTOLIC BLOOD PRESSURE: 130 MMHG | DIASTOLIC BLOOD PRESSURE: 70 MMHG | BODY MASS INDEX: 47.74 KG/M2 | OXYGEN SATURATION: 93 %

## 2019-04-23 VITALS — HEIGHT: 67 IN | WEIGHT: 315 LBS | BODY MASS INDEX: 49.44 KG/M2

## 2019-04-23 DIAGNOSIS — E66.2 OBESITY HYPOVENTILATION SYNDROME: ICD-10-CM

## 2019-04-23 DIAGNOSIS — I27.82 OTHER CHRONIC PULMONARY EMBOLISM WITHOUT ACUTE COR PULMONALE: ICD-10-CM

## 2019-04-23 DIAGNOSIS — I27.24 CTEPH (CHRONIC THROMBOEMBOLIC PULMONARY HYPERTENSION): Primary | ICD-10-CM

## 2019-04-23 DIAGNOSIS — I25.10 CORONARY ARTERY DISEASE INVOLVING NATIVE CORONARY ARTERY OF NATIVE HEART WITHOUT ANGINA PECTORIS: ICD-10-CM

## 2019-04-23 DIAGNOSIS — I50.32 CHRONIC DIASTOLIC HEART FAILURE: ICD-10-CM

## 2019-04-23 DIAGNOSIS — E78.2 MIXED HYPERLIPIDEMIA: ICD-10-CM

## 2019-04-23 DIAGNOSIS — E66.01 SEVERE OBESITY (BMI >= 40): ICD-10-CM

## 2019-04-23 DIAGNOSIS — Z95.5 PRESENCE OF STENT IN CORONARY ARTERY: ICD-10-CM

## 2019-04-23 DIAGNOSIS — J96.11 CHRONIC RESPIRATORY FAILURE WITH HYPOXIA: ICD-10-CM

## 2019-04-23 DIAGNOSIS — I10 BENIGN ESSENTIAL HYPERTENSION: ICD-10-CM

## 2019-04-23 DIAGNOSIS — I27.24 CTEPH (CHRONIC THROMBOEMBOLIC PULMONARY HYPERTENSION): ICD-10-CM

## 2019-04-23 PROCEDURE — 99214 OFFICE O/P EST MOD 30 MIN: CPT | Mod: S$PBB,,, | Performed by: INTERNAL MEDICINE

## 2019-04-23 PROCEDURE — 94618 PULMONARY STRESS TESTING: CPT | Mod: PBBFAC | Performed by: INTERNAL MEDICINE

## 2019-04-23 PROCEDURE — 94618 PULMONARY STRESS TESTING: ICD-10-PCS | Mod: 26,S$PBB,, | Performed by: INTERNAL MEDICINE

## 2019-04-23 PROCEDURE — 99999 PR PBB SHADOW E&M-EST. PATIENT-LVL IV: CPT | Mod: PBBFAC,,, | Performed by: INTERNAL MEDICINE

## 2019-04-23 PROCEDURE — 99214 OFFICE O/P EST MOD 30 MIN: CPT | Mod: PBBFAC,25 | Performed by: INTERNAL MEDICINE

## 2019-04-23 PROCEDURE — 99999 PR PBB SHADOW E&M-EST. PATIENT-LVL IV: ICD-10-PCS | Mod: PBBFAC,,, | Performed by: INTERNAL MEDICINE

## 2019-04-23 PROCEDURE — 99214 PR OFFICE/OUTPT VISIT, EST, LEVL IV, 30-39 MIN: ICD-10-PCS | Mod: S$PBB,,, | Performed by: INTERNAL MEDICINE

## 2019-04-23 PROCEDURE — 94618 PULMONARY STRESS TESTING: CPT | Mod: 26,S$PBB,, | Performed by: INTERNAL MEDICINE

## 2019-04-23 RX ORDER — BUMETANIDE 2 MG/1
2 TABLET ORAL 2 TIMES DAILY
Qty: 60 TABLET | Refills: 11 | Status: SHIPPED | OUTPATIENT
Start: 2019-04-23 | End: 2020-01-24 | Stop reason: SDUPTHER

## 2019-04-23 RX ORDER — CARBAMAZEPINE 200 MG/1
TABLET ORAL
Refills: 11 | COMMUNITY
Start: 2019-03-25 | End: 2020-01-24 | Stop reason: DRUGHIGH

## 2019-04-23 NOTE — PROCEDURES
Jaquan Hines Jr. is a 48 y.o.  male patient, who presents for a 6 minute walk test ordered by Edna Hinojosa MD.  The diagnosis is Pulmonary Hypertension; CTEPH.  The patient's BMI is 50.3 kg/m2.  Predicted distance (lower limit of normal) is 357.82 meters.      Test Results:    The test was completed without stopping.  The total time walked was 360 seconds.  During walking, the patient reported:  Dyspnea.  The patient used supplemental oxygen during testing.     04/23/2019---------Distance: 365.76 meters (1200 feet)     O2 Sat % Supplemental Oxygen Heart Rate Blood Pressure Ammon Scale   Pre-exercise  (Resting) 96 % 3 L/M 94 bpm 132/72 mmHg 3   During Exercise 88 % 3 L/M 127 bpm 168/77 mmHg 10   Post-exercise  (Recovery) 97 % 3 L/M  110 bpm 165/75 mmHg      Recovery Time:  100 seconds    Performing nurse/tech:  Regi BLOOD      PREVIOUS STUDY:   01/11/2019---------Distance: 335.28 meters (1100 feet)       O2 Sat % Supplemental Oxygen Heart Rate Blood Pressure Ammon Scale   Pre-exercise  (Resting) 98 % Room Air 92 bpm 130/59 mmHg 3   During Exercise 89 % Room Air 135 bpm 159/65 mmHg 9   Post-exercise  (Recovery) 94 % Room Air  123 bpm             CLINICAL INTERPRETATION:  Six minute walk distance is 365.76 meters (1200 feet) with maximal severe dyspnea.  During exercise, there was significant desaturation while breathing supplemental oxygen.  Both blood pressure and heart rate increased significantly with walking.  The patient did not report non-pulmonary symptoms during exercise.  Since the previous study in January 2019, exercise capacity is unchanged.  Based upon age and body mass index, exercise capacity is normal.

## 2019-04-23 NOTE — PROGRESS NOTES
Subjective:    Patient ID:  Jaquan Hines Jr. is a 48 y.o. male who presents for follow-up of Pulmonary Hypertension.    HPI  47 yo man with HTN, morbid obesity with OHS and chronic hypoxic resp failure, HTN, referred by Dr Jennings for management of PH- pt underwent PTE  on 3/13/18. Postoperatively, the patient was taken from the  Operating Room to the ICU where the vital signs were monitored and pain was kept under control. The patient was weaned from the drips and extubated in the ICU per protocol. Once hemodynamically stable, the patient was transferred to the Cardiac Step-Down floor for continued strengthening and ambulation. On postoperative day 6, the patient was ready for discharge to home. At the time of discharge, the patient was ambulating unassisted. Pain was well controlled with oral analgesics and the patient was tolerating the diet.  Initial hemodynamics in ICU  PA 56/16 (28) CVP 17 PCW 21  Co 8.5/Ci 3.4 SVO2 77%   Prior to PAC removal PA 44/22- now here for PH f/u (on adempas and uptravi)    Since last visit, pt has had 2 ER visits, one required inpt stay- he says it was for hypoNa hypoK, bronchitis- says he presented like he was having a heart attack- K was 2.3 the firs time, and then 3- was exhausted he said, and had been recently started on new psych meds. Has been having a lot of trouble with fluid retention- extra 15-20# (335->350#)  Says a lot of the fluid came off at once (in 24hr)-   Reports he bought a hot tub and stayed in it to try to pull fluid off (was able to take 8# off this way)  His cardiologist told him he could take an extra 80mg lasix when needed but that was not pulling the fluid off (but when it comes off, overflows).   The swelling has mostly been in his legs the last few months and he is more SOB when he has it. He has been pushing himself in terms of his FC to exhaustion, but cant walk from the car somewhere and back to the car without getting winded. Has stared using th  scooter at [facility]       Lasix is 80mg in am  with spironolactone 25mg daily. Wt has been stable for the last 2 visits- no Cp or swelling.         Pulm: Dr Vasques    Card Dr Miguel Ángel Greene    TTE 1/11/19  · Normal left ventricular systolic function. The estimated ejection fraction is 55%  · Normal right ventricular systolic function.  · Normal LV diastolic function.  · Trace tricuspid regurgitation. Max velocity visualized 2 m/s; however, TR jets are poor.  · Normal central venous pressure (3 mm Hg).  · Mild left atrial enlargement.  · Technically challenging study  ·   Six Minute Walk Test:   366m (335 m, 1/19,  305  m    )                                              O2 sat  96 ->88  %    On 3L pull tank                                                       HR 94  -> 127                                                                 BP  132/ 72  ->168/77                                                         Ammon   3  -> 10    Echo        Results for orders placed or performed during the hospital encounter of 05/31/18   2D echo with color flow doppler   Result Value Ref Range    QEF 55 55 - 65    Mitral Valve Regurgitation MILD     Diastolic Dysfunction No     Est. PA Systolic Pressure 38.19     Pericardial Effusion SMALL (A)     Tricuspid Valve Regurgitation MILD    left atrial volume index is severely enlarged, measuring 217.27 cc/m2.   right ventricle is enlarged measuring 4.1 cm at the base in the apical right ventricle-focused view. RV free wall thickness is increased measuring 0.7 cm in Subcostal view, consistent with RVH. Global right ventricular systolic   function appears mildly depressed. There is abnormal septal motion. Tricuspid annular plane systolic excursion (TAPSE) is 2.1 cm. Tissue Doppler-derived tricuspid annular peak systolic velocity (S prime) is 13.0 cm/s. The estimated PA systolic pressure   is 81 mmHg.     EKG   /    /      R/LHC  12 /16    Pressures in mmHg, right heart pressures,  pulmonary capillary wedge pressure mean of 28, pulmonary artery 70-80/29-35, mean of 45-50, RV   average of 75/25.  RA mean of 18.  Average cardiac output was 4.3 liters per minute.  Left heart pressures,   central aortic pressure 90/60, left ventricle 97/15-20.  No gradient upon pullback.    Left Coronary Artery:  Left main coronary artery was of medium caliber, free of disease.  It bifurcated into left anterior descending and   circumflex arteries.  In the proximal portion of the left anterior descending artery, there was a stent that had a   20% concentric narrowing with no what appear to be flow limiting abnormalities.  The continuation of the vessel was normal and free of   disease.  There was a large diagonal branch that arose from the proximal portion of the left anterior descending artery   that was normal and free of disease.    Circumflex System:  The circumflex system was of medium caliber with high obtuse marginal branch arising from it and a second longer obtuse marginal   branch, both of which were normal and free of disease.    Right Coronary Artery:  The right coronary artery had some mild diffuse plaquing throughout its course with no evidence of significant obstructive   disease.  It did give rise to posterior descending and posterolateral branch.    Left Ventriculogram:  The left ventricle demonstrated normal left ventricular function with EF estimated at approximately 60%; however,   there was much ventricular ectopy during the examination and I could not tell if there was any significant mitral regurgitation.      CXR  3 /   18  Cardiomegaly.    CT Chest   8 / 17Suboptimal exam due to timing of the contrast bolus and motion. Apparent Low-density intraluminal filling defect in the proximal right lower lobar artery, best seen series 300 image 29. Apparent intraluminal filling defect in the distal left lower lobar   artery. Apparent small filling defects within segmental branches in the left upper  "lobe.     There is no evidence of pleural effusion or pneumothorax.  There is a transvenous wire with tip along the interventricular septum. Normal size heart. Reflux of contrast into the hepatic veins. Coronary artery disease. Small pericardial effusion. There   is no evidence of lymphadenopathy.  No acute osseous abnormality. Intramuscular lipoma within the left subscapularis    PFTs    11 /17  FVC    3.2  L  74  % pred  FEV1 2.12L   60  % pred  FEV1/FVC  66    %  Patient passed out during test X 2.    V/Q Scan  11/17  Two large mismatched perfusion defects in the left and right lower lobes of the lung respectively, and one moderate mismatch perfusion defects in the left upper lobe.    Review of Systems   Constitution: Positive for weight gain. Negative for chills, fever and malaise/fatigue.   HENT: Negative.    Eyes: Negative.    Cardiovascular: Positive for dyspnea on exertion and leg swelling. Negative for chest pain, near-syncope, orthopnea, palpitations, paroxysmal nocturnal dyspnea and syncope.   Respiratory: Negative for cough and shortness of breath.    Endocrine: Negative.    Skin: Negative.    Musculoskeletal: Negative.    Gastrointestinal: Negative for bloating, abdominal pain and change in bowel habit.   Neurological: Negative for dizziness and light-headedness.   Psychiatric/Behavioral: Negative for depression.        Objective:  /70 (BP Location: Left arm, Patient Position: Sitting, BP Method: Large (Automatic))   Pulse 88   Ht 5' 7.5" (1.715 m)   Wt (!) 148.4 kg (327 lb 2.6 oz)   SpO2 (!) 93% Comment: 3L  BMI 50.48 kg/m²       Physical Exam   Constitutional: He is oriented to person, place, and time. He appears well-developed and well-nourished.   Obese NAD   HENT:   Head: Normocephalic and atraumatic.   Eyes: Right eye exhibits no discharge. Left eye exhibits no discharge.   Neck: Neck supple. No JVD present. No thyromegaly present.   Cardiovascular: Normal rate and regular rhythm. Exam " reveals no gallop and no friction rub.   No murmur heard.       Pulmonary/Chest: Effort normal and breath sounds normal. No respiratory distress. He has no wheezes. He has no rales.   Abdominal: Soft. Bowel sounds are normal. He exhibits distension. There is no tenderness.   Musculoskeletal: Normal range of motion. He exhibits no edema or tenderness.   1+ pitting edema FPC to knees   Neurological: He is alert and oriented to person, place, and time. No cranial nerve deficit. Coordination normal.   Skin: Skin is warm and dry. No rash noted.   Psychiatric: He has a normal mood and affect. Judgment and thought content normal.           Chemistry        Component Value Date/Time     04/23/2019 1150     02/15/2017 1615    K 3.6 04/23/2019 1150     04/23/2019 1150     02/15/2017 1615    CO2 23 04/23/2019 1150    BUN 13 04/23/2019 1150    CREATININE 1.1 04/23/2019 1150    CREATININE 1.11 02/15/2017 1615    GLU 98 04/23/2019 1150     (H) 02/15/2017 1615        Component Value Date/Time    CALCIUM 9.6 04/23/2019 1150    ALKPHOS 151 (H) 04/23/2019 1150    AST 16 04/23/2019 1150    ALT 25 04/23/2019 1150    BILITOT 0.5 04/23/2019 1150    ESTGFRAFRICA >60.0 04/23/2019 1150    EGFRNONAA >60.0 04/23/2019 1150            Magnesium   Date Value Ref Range Status   01/11/2019 2.0 1.6 - 2.6 mg/dL Final       Lab Results   Component Value Date    WBC 15.83 (H) 04/23/2019    HGB 13.1 (L) 04/23/2019    HCT 42.8 04/23/2019    MCV 78 (L) 04/23/2019     (H) 04/23/2019       Lab Results   Component Value Date    INR 1.1 05/31/2018    INR 2.7 (H) 03/19/2018    INR 2.1 (H) 03/18/2018       BNP   Date Value Ref Range Status   04/23/2019 42 0 - 99 pg/mL Final     Comment:     Values of less than 100 pg/ml are consistent with non-CHF populations.   01/11/2019 95 0 - 99 pg/mL Final     Comment:     Values of less than 100 pg/ml are consistent with non-CHF populations.   10/19/2018 107 (H) 0 - 99 pg/mL  Final     Comment:     Values of less than 100 pg/ml are consistent with non-CHF populations.       No results found for: LDH          Assessment:       1. CTEPH (chronic thromboembolic pulmonary hypertension)- s/p PTE with dramatic improvement in PAP. FC subjectively better, small improvement on 6mw again today, and echo with improved RV size and fxn though unable to get good TR jet to est pressures   2. Chronic respiratory failure with hypoxia    3. Coronary artery disease involving native coronary artery of native heart without angina pectoris    4. Benign essential hypertension    5. Obesity hypoventilation syndrome    6. Presence of stent in coronary artery    7. Severe obesity (BMI >= 40)  Body mass index is 50.48 kg/m².     8. Other chronic pulmonary embolism without acute cor pulmonale    9. Stress hyperglycemia    10. Chronic diastolic heart failure      WHO Group:4  Functional Class 3     Plan:       Needs a letter of medical necessity for a smaller portable continuous flow O2 concentrator which will work with his [employer] machine (he is specifically asking for a [employer] portable Concentrator)  He has a small portable tank with him today but today his tank ran out before he even made it to his apt (~1 hr)  A pulse concentrator will not deliver enough flow to keep his sats in the 90;s (see 6mw today which was done on our 3L pull tank)     Handicap tag form filled out today- want him to walk on his good days, and use it on his not so good days    Change lasix to bumex to see if it  Makes him diurese a little better since I suspect he does still have a good bit of bowel wall edema- will start 2mg daily with extra 2mg (with extra KCL) for wt gain 3# overnight or 5# in 1 wk    Continue  spironolactone 25mg once a  Day     Cont uptravi and adempas    Cont O2 with ANY exertion       Keep salt intake to under 2000 mg sodium, fluids to under 2 L (64 oz)    Check weights every morning after getting out of bed  and urinating. If weight goes up 3# overnight or 5# in one week he should take an extra bumex as above, if he does this a couple days in a row and it doesn't come off, he should call us     F/u 4 mo with walk and labs arabella juarez, due for [facility] before end of year

## 2019-04-23 NOTE — PATIENT INSTRUCTIONS
Lets see if bumex works better for you than lasix-    Check your weights every morning after getting out of bed and urinating. If your weight goes up 3# overnight or 5# in one week take bumex twice that day, most days I hope once will be enough    Take an extra potassium every time you take an extra bumex    Keep salt intake to under 2000 mg sodium, fluids to under 2 L (64 oz)    Keep your oxygen on with any exertion    Call us if you find yourself getting more short of breath, have more swelling or unexpected weight changes    PH on Facebook: A couple of our patients created a group on Facebook for people living in [place] with PH, it's called [place] [facility].    Check it out!      PH Support group:    June 19, 2019  11:00 AM - 1:00 PM  [employer]  [address]  Topic: Nutrition, Nutrition Bingo and Meal of Fortune Game.  Meeting sponsor is [employer]    [facility] to [facility] Pich[facility] [phone] or [email or web address] by 6/17/19

## 2019-04-24 ENCOUNTER — PATIENT MESSAGE (OUTPATIENT)
Dept: PULMONOLOGY | Facility: CLINIC | Age: 49
End: 2019-04-24

## 2019-06-19 ENCOUNTER — TELEPHONE (OUTPATIENT)
Dept: TRANSPLANT | Facility: CLINIC | Age: 49
End: 2019-06-19

## 2019-06-19 ENCOUNTER — PATIENT MESSAGE (OUTPATIENT)
Dept: TRANSPLANT | Facility: CLINIC | Age: 49
End: 2019-06-19

## 2019-06-20 NOTE — TELEPHONE ENCOUNTER
Uptravi and Adempas approved, good through 6/20/20. Notification sent to Niurka at [facility].

## 2019-06-25 RX ORDER — SPIRONOLACTONE 25 MG/1
TABLET ORAL
Qty: 30 TABLET | Refills: 10 | Status: SHIPPED | OUTPATIENT
Start: 2019-06-25 | End: 2020-01-24 | Stop reason: SDUPTHER

## 2019-07-12 ENCOUNTER — TELEPHONE (OUTPATIENT)
Dept: TRANSPLANT | Facility: CLINIC | Age: 49
End: 2019-07-12

## 2019-07-12 NOTE — TELEPHONE ENCOUNTER
Mr. Jaquan Hines was seen on Friday 7-12-19 for an Adempas 1 year follow up visit. Mr. Hines was sitting up in bed without c/o discomfort. He states that since being on Adempas 2.5 mg he can now get up and do more and it has allowed me to get out of the house. He says that a 6MWD was done in April and that Ive done the best I ever have but does not recall the exact distance walked. Mr. Hines is feeling so well that he even planned a cruise in December. O2 sat 91% on room air.    /64, pulse 81 and respirations 18. Lung sounds were clear and no edema was observed.

## 2019-10-01 ENCOUNTER — TELEPHONE (OUTPATIENT)
Dept: TRANSPLANT | Facility: CLINIC | Age: 49
End: 2019-10-01

## 2019-11-20 RX ORDER — SELEXIPAG 1600 UG/1
TABLET, COATED ORAL
Qty: 60 TABLET | Refills: 10 | Status: ON HOLD | OUTPATIENT
Start: 2019-11-20 | End: 2020-03-20 | Stop reason: HOSPADM

## 2020-01-19 RX ORDER — POTASSIUM CHLORIDE 1500 MG/1
TABLET, EXTENDED RELEASE ORAL
Qty: 60 TABLET | Refills: 11 | Status: SHIPPED | OUTPATIENT
Start: 2020-01-19 | End: 2021-01-04 | Stop reason: SDUPTHER

## 2020-01-24 ENCOUNTER — HOSPITAL ENCOUNTER (OUTPATIENT)
Dept: PULMONOLOGY | Facility: CLINIC | Age: 50
Discharge: HOME | End: 2020-01-24
Payer: MEDICAID

## 2020-01-24 ENCOUNTER — OFFICE VISIT (OUTPATIENT)
Dept: TRANSPLANT | Facility: CLINIC | Age: 50
End: 2020-01-24
Payer: MEDICAID

## 2020-01-24 ENCOUNTER — HOSPITAL ENCOUNTER (OUTPATIENT)
Dept: CARDIOLOGY | Facility: CLINIC | Age: 50
Discharge: HOME | End: 2020-01-24
Attending: INTERNAL MEDICINE
Payer: MEDICAID

## 2020-01-24 VITALS
HEIGHT: 67 IN | DIASTOLIC BLOOD PRESSURE: 70 MMHG | WEIGHT: 315 LBS | BODY MASS INDEX: 49.44 KG/M2 | HEART RATE: 80 BPM | SYSTOLIC BLOOD PRESSURE: 130 MMHG

## 2020-01-24 VITALS
HEART RATE: 84 BPM | WEIGHT: 315 LBS | BODY MASS INDEX: 47.74 KG/M2 | HEIGHT: 68 IN | SYSTOLIC BLOOD PRESSURE: 136 MMHG | DIASTOLIC BLOOD PRESSURE: 62 MMHG | WEIGHT: 315 LBS | HEIGHT: 68 IN | BODY MASS INDEX: 47.74 KG/M2

## 2020-01-24 DIAGNOSIS — I27.24 CTEPH (CHRONIC THROMBOEMBOLIC PULMONARY HYPERTENSION): ICD-10-CM

## 2020-01-24 DIAGNOSIS — I10 BENIGN ESSENTIAL HYPERTENSION: ICD-10-CM

## 2020-01-24 DIAGNOSIS — I27.82 OTHER CHRONIC PULMONARY EMBOLISM WITHOUT ACUTE COR PULMONALE: ICD-10-CM

## 2020-01-24 DIAGNOSIS — E66.01 SEVERE OBESITY (BMI >= 40): ICD-10-CM

## 2020-01-24 DIAGNOSIS — E66.2 OBESITY HYPOVENTILATION SYNDROME: ICD-10-CM

## 2020-01-24 DIAGNOSIS — I50.32 CHRONIC DIASTOLIC HEART FAILURE: ICD-10-CM

## 2020-01-24 DIAGNOSIS — I27.21 PULMONARY ARTERY HYPERTENSION: Primary | ICD-10-CM

## 2020-01-24 DIAGNOSIS — I25.10 CORONARY ARTERY DISEASE INVOLVING NATIVE CORONARY ARTERY OF NATIVE HEART WITHOUT ANGINA PECTORIS: ICD-10-CM

## 2020-01-24 DIAGNOSIS — J96.11 CHRONIC RESPIRATORY FAILURE WITH HYPOXIA: ICD-10-CM

## 2020-01-24 LAB
ASCENDING AORTA: 3.41 CM
AV INDEX (PROSTH): 0.88
AV MEAN GRADIENT: 5 MMHG
AV PEAK GRADIENT: 9 MMHG
AV VALVE AREA: 3.42 CM2
AV VELOCITY RATIO: 0.79
BSA FOR ECHO PROCEDURE: 2.62 M2
CV ECHO LV RWT: 0.39 CM
DOP CALC AO PEAK VEL: 1.54 M/S
DOP CALC AO VTI: 26.5 CM
DOP CALC LVOT AREA: 3.9 CM2
DOP CALC LVOT DIAMETER: 2.22 CM
DOP CALC LVOT PEAK VEL: 1.21 M/S
DOP CALC LVOT STROKE VOLUME: 90.57 CM3
DOP CALC MV VTI: 107 CM
DOP CALCLVOT PEAK VEL VTI: 23.41 CM
E WAVE DECELERATION TIME: 192.11 MSEC
E/A RATIO: 1.35
E/E' RATIO: 6.6 M/S
ECHO LV POSTERIOR WALL: 1.09 CM (ref 0.6–1.1)
FRACTIONAL SHORTENING: 28 % (ref 28–44)
INTERVENTRICULAR SEPTUM: 0.86 CM (ref 0.6–1.1)
IVRT: 0.05 MSEC
LA MAJOR: 6.82 CM
LA MINOR: 6.71 CM
LA WIDTH: 4.47 CM
LEFT ATRIUM SIZE: 4.41 CM
LEFT ATRIUM VOLUME INDEX: 45.9 ML/M2
LEFT ATRIUM VOLUME: 113.35 CM3
LEFT INTERNAL DIMENSION IN SYSTOLE: 4.02 CM (ref 2.1–4)
LEFT VENTRICLE DIASTOLIC VOLUME INDEX: 62.22 ML/M2
LEFT VENTRICLE DIASTOLIC VOLUME: 153.64 ML
LEFT VENTRICLE MASS INDEX: 86 G/M2
LEFT VENTRICLE SYSTOLIC VOLUME INDEX: 28.7 ML/M2
LEFT VENTRICLE SYSTOLIC VOLUME: 70.89 ML
LEFT VENTRICULAR INTERNAL DIMENSION IN DIASTOLE: 5.6 CM (ref 3.5–6)
LEFT VENTRICULAR MASS: 212.55 G
LV LATERAL E/E' RATIO: 5.5 M/S
LV SEPTAL E/E' RATIO: 8.25 M/S
MV MEAN GRADIENT: 35 MMHG
MV PEAK A VEL: 0.49 M/S
MV PEAK E VEL: 0.66 M/S
MV PEAK GRADIENT: 54 MMHG
MV VALVE AREA BY CONTINUITY EQUATION: 0.85 CM2
PISA TR MAX VEL: 2.45 M/S
PULM VEIN S/D RATIO: 0.77
PV PEAK D VEL: 0.52 M/S
PV PEAK S VEL: 0.4 M/S
RA MAJOR: 6.69 CM
RA PRESSURE: 3 MMHG
RA WIDTH: 5.45 CM
RIGHT VENTRICULAR END-DIASTOLIC DIMENSION: 3.41 CM
RV TISSUE DOPPLER FREE WALL SYSTOLIC VELOCITY 1 (APICAL 4 CHAMBER VIEW): 9.11 CM/S
SINUS: 3.42 CM
STJ: 3.19 CM
TDI LATERAL: 0.12 M/S
TDI SEPTAL: 0.08 M/S
TDI: 0.1 M/S
TR MAX PG: 24 MMHG
TRICUSPID ANNULAR PLANE SYSTOLIC EXCURSION: 1.73 CM
TV REST PULMONARY ARTERY PRESSURE: 27 MMHG

## 2020-01-24 PROCEDURE — 93306 TTE W/DOPPLER COMPLETE: CPT | Mod: 26,S$PBB,, | Performed by: INTERNAL MEDICINE

## 2020-01-24 PROCEDURE — 99999 PR PBB SHADOW E&M-EST. PATIENT-LVL III: CPT | Mod: PBBFAC,,, | Performed by: INTERNAL MEDICINE

## 2020-01-24 PROCEDURE — 94618 PULMONARY STRESS TESTING: CPT | Mod: PBBFAC | Performed by: INTERNAL MEDICINE

## 2020-01-24 PROCEDURE — 99999 PR PBB SHADOW E&M-EST. PATIENT-LVL III: ICD-10-PCS | Mod: PBBFAC,,, | Performed by: INTERNAL MEDICINE

## 2020-01-24 PROCEDURE — C8929 TTE W OR WO FOL WCON,DOPPLER: HCPCS | Mod: PBBFAC | Performed by: INTERNAL MEDICINE

## 2020-01-24 PROCEDURE — 94618 PULMONARY STRESS TESTING: CPT | Mod: 26,S$PBB,, | Performed by: INTERNAL MEDICINE

## 2020-01-24 PROCEDURE — 93306 TRANSTHORACIC ECHO (TTE) COMPLETE (CUPID ONLY): ICD-10-PCS | Mod: 26,S$PBB,, | Performed by: INTERNAL MEDICINE

## 2020-01-24 PROCEDURE — 99214 PR OFFICE/OUTPT VISIT, EST, LEVL IV, 30-39 MIN: ICD-10-PCS | Mod: S$PBB,,, | Performed by: INTERNAL MEDICINE

## 2020-01-24 PROCEDURE — 99213 OFFICE O/P EST LOW 20 MIN: CPT | Mod: PBBFAC,25 | Performed by: INTERNAL MEDICINE

## 2020-01-24 PROCEDURE — 99214 OFFICE O/P EST MOD 30 MIN: CPT | Mod: S$PBB,,, | Performed by: INTERNAL MEDICINE

## 2020-01-24 PROCEDURE — 94618 PULMONARY STRESS TESTING: ICD-10-PCS | Mod: 26,S$PBB,, | Performed by: INTERNAL MEDICINE

## 2020-01-24 RX ORDER — ATORVASTATIN CALCIUM 40 MG/1
40 TABLET, FILM COATED ORAL DAILY
Qty: 30 TABLET | Refills: 11 | Status: SHIPPED | OUTPATIENT
Start: 2020-01-24 | End: 2021-01-28 | Stop reason: SDUPTHER

## 2020-01-24 RX ORDER — SPIRONOLACTONE 25 MG/1
25 TABLET ORAL DAILY
Qty: 30 TABLET | Refills: 10 | Status: SHIPPED | OUTPATIENT
Start: 2020-01-24 | End: 2021-01-28 | Stop reason: SDUPTHER

## 2020-01-24 RX ORDER — BUMETANIDE 2 MG/1
2 TABLET ORAL 2 TIMES DAILY
Qty: 60 TABLET | Refills: 11 | Status: SHIPPED | OUTPATIENT
Start: 2020-01-24 | End: 2021-01-28 | Stop reason: SDUPTHER

## 2020-01-24 RX ORDER — CITALOPRAM 40 MG/1
40 TABLET, FILM COATED ORAL DAILY
Status: ON HOLD | COMMUNITY
Start: 2020-01-19 | End: 2020-03-20 | Stop reason: HOSPADM

## 2020-01-24 RX ORDER — CARBAMAZEPINE 200 MG/1
100 TABLET ORAL NIGHTLY
COMMUNITY
End: 2020-02-20

## 2020-01-24 RX ADMIN — HUMAN ALBUMIN MICROSPHERES AND PERFLUTREN 0.11 MG: 10; .22 INJECTION, SOLUTION INTRAVENOUS at 08:01

## 2020-01-24 NOTE — PROGRESS NOTES
Subjective:    Patient ID:  Jaquan Hines Jr. is a 49 y.o. male who presents for follow-up of Pulmonary Hypertension.    HPI  50 yo man with HTN, morbid obesity with OHS and chronic hypoxic resp failure, HTN, referred by Dr Jennings for management of PH- pt underwent PTE  on 3/13/18. Postoperatively, the patient was taken from the  Operating Room to the ICU where the vital signs were monitored and pain was kept under control. The patient was weaned from the drips and extubated in the ICU per protocol. Once hemodynamically stable, the patient was transferred to the Cardiac Step-Down floor for continued strengthening and ambulation. On postoperative day 6, the patient was ready for discharge to home. At the time of discharge, the patient was ambulating unassisted. Pain was well controlled with oral analgesics and the patient was tolerating the diet.  Initial hemodynamics in ICU  PA 56/16 (28) CVP 17 PCW 21  Co 8.5/Ci 3.4 SVO2 77%   Prior to PAC removal PA 44/22- now here for PH f/u (on adempas and uptravi, 2.5mg and 1600mg respectively)    Since last visit, pt has been feeling the same- felt like he had done better on the wakl- expected it to be harder than it was, was less SOB today. Most of the time, his breathing is good with his ADLs. Thinks his medicine is working  bumex is 2mg bid  with spironolactone 25mg daily. Wt has gone up since last visit- no Cp or swelling.  Has intermittent LH if he stands up, walks and laughs at same time.  No syncope.         Pulm: Dr Vasques    Card Dr Miguel Ángel Greene    TTE today  · Low normal left ventricular systolic function. The estimated ejection fraction is 55%.  · Septal wall has abnormal motion.  · Local segmental wall motion abnormalities.  · Normal LV diastolic function.  · Low normal right ventricular systolic function.  RV 3.4cm TAPSE 1.7 cm  · Moderate biatrial enlargement.  · Mild tricuspid regurgitation.  · Mild mitral regurgitation.  · The estimated PA systolic pressure  is 27 mmHg.  · Normal central venous pressure (3 mmHg).  · The mean diastolic gradient across the mitral valve is 35 mmHg at a heart rate of bpm  TTE 1/11/19  · Normal left ventricular systolic function. The estimated ejection fraction is 55%  · Normal right ventricular systolic function.  · Normal LV diastolic function.  · Trace tricuspid regurgitation. Max velocity visualized 2 m/s; however, TR jets are poor.  · Normal central venous pressure (3 mm Hg).  · Mild left atrial enlargement.  · Technically challenging study  ·   Six Minute Walk Test:   341m (366m 4/19, 335 m, 1/19,  305  m    )                                              O2 sat  95 ->89  %    On 3L pull tank                                                       HR 84  -> 130                                                                 BP  139/66  ->149/68                                                         Ammon   1 -> 7-8    Echo        Results for orders placed or performed during the hospital encounter of 05/31/18   2D echo with color flow doppler   Result Value Ref Range    QEF 55 55 - 65    Mitral Valve Regurgitation MILD     Diastolic Dysfunction No     Est. PA Systolic Pressure 38.19     Pericardial Effusion SMALL (A)     Tricuspid Valve Regurgitation MILD    left atrial volume index is severely enlarged, measuring 217.27 cc/m2.   right ventricle is enlarged measuring 4.1 cm at the base in the apical right ventricle-focused view. RV free wall thickness is increased measuring 0.7 cm in Subcostal view, consistent with RVH. Global right ventricular systolic   function appears mildly depressed. There is abnormal septal motion. Tricuspid annular plane systolic excursion (TAPSE) is 2.1 cm. Tissue Doppler-derived tricuspid annular peak systolic velocity (S prime) is 13.0 cm/s. The estimated PA systolic pressure   is 81 mmHg.     EKG   /    /      R/LHC  12 /16    Pressures in mmHg, right heart pressures, pulmonary capillary wedge pressure mean of  28, pulmonary artery 70-80/29-35, mean of 45-50, RV   average of 75/25.  RA mean of 18.  Average cardiac output was 4.3 liters per minute.  Left heart pressures,   central aortic pressure 90/60, left ventricle 97/15-20.  No gradient upon pullback.    Left Coronary Artery:  Left main coronary artery was of medium caliber, free of disease.  It bifurcated into left anterior descending and   circumflex arteries.  In the proximal portion of the left anterior descending artery, there was a stent that had a   20% concentric narrowing with no what appear to be flow limiting abnormalities.  The continuation of the vessel was normal and free of   disease.  There was a large diagonal branch that arose from the proximal portion of the left anterior descending artery   that was normal and free of disease.    Circumflex System:  The circumflex system was of medium caliber with high obtuse marginal branch arising from it and a second longer obtuse marginal   branch, both of which were normal and free of disease.    Right Coronary Artery:  The right coronary artery had some mild diffuse plaquing throughout its course with no evidence of significant obstructive   disease.  It did give rise to posterior descending and posterolateral branch.    Left Ventriculogram:  The left ventricle demonstrated normal left ventricular function with EF estimated at approximately 60%; however,   there was much ventricular ectopy during the examination and I could not tell if there was any significant mitral regurgitation.      CXR  3 /   18  Cardiomegaly.    CT Chest   8 / 17Suboptimal exam due to timing of the contrast bolus and motion. Apparent Low-density intraluminal filling defect in the proximal right lower lobar artery, best seen series 300 image 29. Apparent intraluminal filling defect in the distal left lower lobar   artery. Apparent small filling defects within segmental branches in the left upper lobe.     There is no evidence of pleural  "effusion or pneumothorax.  There is a transvenous wire with tip along the interventricular septum. Normal size heart. Reflux of contrast into the hepatic veins. Coronary artery disease. Small pericardial effusion. There   is no evidence of lymphadenopathy.  No acute osseous abnormality. Intramuscular lipoma within the left subscapularis    PFTs    11 /17  FVC    3.2  L  74  % pred  FEV1 2.12L   60  % pred  FEV1/FVC  66    %  Patient passed out during test X 2.    V/Q Scan  11/17  Two large mismatched perfusion defects in the left and right lower lobes of the lung respectively, and one moderate mismatch perfusion defects in the left upper lobe.    Review of Systems   Constitution: Positive for weight gain. Negative for chills, fever and malaise/fatigue.   HENT: Negative.    Eyes: Negative.    Cardiovascular: Positive for dyspnea on exertion and leg swelling. Negative for chest pain, near-syncope, orthopnea, palpitations, paroxysmal nocturnal dyspnea and syncope.   Respiratory: Negative for cough and shortness of breath.    Endocrine: Negative.    Skin: Negative.    Musculoskeletal: Negative.    Gastrointestinal: Negative for bloating, abdominal pain and change in bowel habit.   Neurological: Negative for dizziness and light-headedness.   Psychiatric/Behavioral: Negative for depression.        Objective:  /62 (BP Location: Right arm, Patient Position: Sitting, BP Method: Large (Automatic))   Pulse 84   Ht 5' 8" (1.727 m)   Wt (!) 152.4 kg (335 lb 15.7 oz)   BMI 51.09 kg/m²       Physical Exam   Constitutional: He is oriented to person, place, and time. He appears well-developed and well-nourished.   Obese NAD   HENT:   Head: Normocephalic and atraumatic.   Eyes: Right eye exhibits no discharge. Left eye exhibits no discharge.   Neck: Neck supple. No JVD present. No thyromegaly present.   Cardiovascular: Normal rate and regular rhythm. Exam reveals no gallop and no friction rub.   No murmur heard.     "   Pulmonary/Chest: Effort normal and breath sounds normal. No respiratory distress. He has no wheezes. He has no rales.   Abdominal: Soft. Bowel sounds are normal. He exhibits distension. There is no tenderness.   Musculoskeletal: Normal range of motion. He exhibits no edema or tenderness.   1+ pitting edema prison to knees   Neurological: He is alert and oriented to person, place, and time. No cranial nerve deficit. Coordination normal.   Skin: Skin is warm and dry. No rash noted.   Psychiatric: He has a normal mood and affect. Judgment and thought content normal.           Chemistry        Component Value Date/Time     01/24/2020 0802     02/15/2017 1615    K 3.3 (L) 01/24/2020 0802    CL 97 01/24/2020 0802     02/15/2017 1615    CO2 30 (H) 01/24/2020 0802    BUN 8 01/24/2020 0802    CREATININE 1.0 01/24/2020 0802    CREATININE 1.11 02/15/2017 1615     01/24/2020 0802     (H) 02/15/2017 1615        Component Value Date/Time    CALCIUM 9.1 01/24/2020 0802    ALKPHOS 184 (H) 01/24/2020 0802    AST 18 01/24/2020 0802    ALT 27 01/24/2020 0802    BILITOT 0.3 01/24/2020 0802    ESTGFRAFRICA >60.0 01/24/2020 0802    EGFRNONAA >60.0 01/24/2020 0802            Magnesium   Date Value Ref Range Status   01/11/2019 2.0 1.6 - 2.6 mg/dL Final       Lab Results   Component Value Date    WBC 12.21 01/24/2020    HGB 12.9 (L) 01/24/2020    HCT 42.9 01/24/2020    MCV 77 (L) 01/24/2020     (H) 01/24/2020       Lab Results   Component Value Date    INR 1.1 05/31/2018    INR 2.7 (H) 03/19/2018    INR 2.1 (H) 03/18/2018       BNP   Date Value Ref Range Status   01/24/2020 62 0 - 99 pg/mL Final     Comment:     Values of less than 100 pg/ml are consistent with non-CHF populations.   04/23/2019 42 0 - 99 pg/mL Final     Comment:     Values of less than 100 pg/ml are consistent with non-CHF populations.   01/11/2019 95 0 - 99 pg/mL Final     Comment:     Values of less than 100 pg/ml are consistent  with non-CHF populations.       No results found for: LDH          Assessment:       1. CTEPH (chronic thromboembolic pulmonary hypertension)- s/p PTE with dramatic improvement in PAP. FC subjectively better, small drop on 6mw today, but echo with improved RV size and fxn and low PAP   2. Chronic respiratory failure with hypoxia    3. Coronary artery disease involving native coronary artery of native heart without angina pectoris    4. Benign essential hypertension    5. Obesity hypoventilation syndrome    6. Presence of stent in coronary artery    7. Severe obesity (BMI >= 40)  Body mass index is 51.09 kg/m².     8. Other chronic pulmonary embolism without acute cor pulmonale    9. Stress hyperglycemia    10. Chronic diastolic heart failure      WHO Group:4  Functional Class 2-3     Plan:         Will add opsumit and cont adempas and uptravi at full dose     Cont bumex 2mg twice daily , Continue  spironolactone 25mg once a  Day     Cont O2 with ANY exertion       Keep salt intake to under 2000 mg sodium, fluids to under 2 L (64 oz)    Check weights every morning after getting out of bed and urinating. If weight goes up 3# overnight or 5# in one week he should take an extra bumex as above, if he does this a couple days in a row and it doesn't come off, he should call us     F/u 4 mo with walk and labs  And echo

## 2020-01-24 NOTE — NURSING
Patient identified via spelling of name and date of birth. Patient denies blood transfusion reaction.   22 gauge saline lock started in right ac under aseptic technique. Optison 3ml IVP administered by Russell Glez sonographer used as contrast for 2 d imaging. Saline lock d/eros and pressure dressing applied. Tolerated procedure well.

## 2020-01-24 NOTE — PATIENT INSTRUCTIONS
Start opsumit one tablet once a day- let us know if you start retaining fluid and we will check your blood counts in about a month    Keep salt intake to under 2000 mg sodium, fluids to under 2 L (64 oz)    Check your weights every morning after getting out of bed and urinating. If your weight goes up 3# overnight or 5# in one week call us    PH on Facebook: A couple of our patients created a group on Facebook for people living in [place] with PH, it's called [place] [facility].    Check it out!      PH support group    February 6 at [place]   6:30pm  Christiana Trejo, PH Nurse Coordinator [place]  Topic: Medication Adherence and Goals, Traveling with PH    RSVP [place] [phone] or email [email or web address]      [facility] Hypertension  Sunday March 29  9 am at the [facility]  [place] at [email or web address]

## 2020-01-27 ENCOUNTER — TELEPHONE (OUTPATIENT)
Dept: TRANSPLANT | Facility: CLINIC | Age: 50
End: 2020-01-27

## 2020-01-27 NOTE — TELEPHONE ENCOUNTER
Per Dr Hinojosa, effect of Opsumit may be diminished by Tegretol, but will not do harm to patient. Ok to fill. [employer] notified of same.

## 2020-01-27 NOTE — TELEPHONE ENCOUNTER
----- Message from RAMIN Potter, RN sent at 2020  4:14 PM CST -----  Regarding: opsumit-tegretol interaction  Hi-  [facility] pharmacy called about opsumit and tegretol interaction. I looked it up, and here's what I found:    MACITENTAN  -- CARBAMAZEPINE     Major    Fair data Concurrent use of MACITENTAN and STRONG CY INDUCERS may result in reduced macitentan exposure.     ------------------    Please let me know what to tell the pharmacy.

## 2020-01-30 NOTE — PROGRESS NOTES
[facility]  Cardiothoracic Surgery  Progress Note    Patient Name: Jaquan Hines Jr.  MRN: [id number]  Admission Date: 3/13/2018  Hospital Length of Stay: 2 days  Code Status: Prior   Attending Physician: Gabriel Jennings MD   Referring Provider: Gabriel Jennings MD  Principal Problem:CTEPH (chronic thromboembolic pulmonary hypertension)    Subjective:     Post-Op Info:  Procedure(s) (LRB):  ENDARTERECTOMY- PULMONARY (N/A)   2 Days Post-Op     Interval History:   NAEON. Some confusion, appropriate but not oriented to time. Tolerating regular diet.     Medications:  Continuous Infusions:   epinephrine 0.01 mcg/kg/min (03/15/18 0800)    custom IV infusion builder (for pharmacist use only)      heparin (porcine) in 5 % dex 1,500 Units/hr (03/15/18 0800)    nicardipine       Scheduled Meds:   aspirin  325 mg Oral Daily    atorvastatin  20 mg Oral Daily    citalopram  40 mg Oral Daily    docusate sodium  100 mg Oral BID    famotidine (PF)  20 mg Intravenous BID    furosemide  40 mg Intravenous TID    metoprolol tartrate  25 mg Oral BID    mupirocin  1 g Nasal BID    potassium, sodium phosphates  2 packet Oral QID (AC & HS)     PRN Meds:acetaminophen, dextrose 50%, dextrose 50%, fentaNYL, glucagon (human recombinant), glucose, glucose, insulin aspart U-100, lactulose, metoclopramide HCl, ondansetron, oxyCODONE, oxyCODONE     Objective:     Vital Signs (Most Recent):  Temp: 98.6 °F (37 °C) (03/15/18 0700)  Pulse: 97 (03/15/18 0800)  Resp: (!) 32 (03/15/18 0800)  BP: (!) 144/60 (03/15/18 0800)  SpO2: (!) 89 % (03/15/18 0800) Vital Signs (24h Range):  Temp:  [98.6 °F (37 °C)-99.3 °F (37.4 °C)] 98.6 °F (37 °C)  Pulse:  [] 97  Resp:  [14-36] 32  SpO2:  [85 %-96 %] 89 %  BP: (100-159)/(52-74) 144/60  Arterial Line BP: ()/(49-67) 105/50     Weight: (!) 155.2 kg (342 lb 2.5 oz)  Body mass index is 53.59 kg/m².    SpO2: (!) 89 %  O2 Device (Oxygen Therapy): High Flow nasal  Cannula    Intake/Output - Last 3 Shifts       03/13 0700 - 03/14 0659 03/14 0700 - 03/15 0659 03/15 0700 - 03/16 0659    P.O.  1340     I.V. (mL/kg) 5857 (37.8) 1450 (9.3)     Blood 2236      NG/GT  160     IV Piggyback 900      Total Intake(mL/kg) 8993 (58) 2950 (19)     Urine (mL/kg/hr) 2415 (0.6) 2370 (0.6) 60 (0.3)    Drains 100 (0)      Chest Tube 185 (0) 310 (0.1) 50 (0.3)    Total Output 2700 2680 110    Net +6293 +270 -110                 Lines/Drains/Airways     Central Venous Catheter Line                 Introducer 03/13/18 0801 right internal jugular 2 days         Percutaneous Central Line Insertion/Assessment - triple lumen  03/13/18 0801 right internal jugular 2 days          Drain                 Urethral Catheter 03/13/18 0815 Non-latex;Straight-tip;Temperature probe 16 Fr. 1 day         Y Chest Tube 1 and 2 03/13/18 1506 1 Right Pleural 19 Fr. 2 Left Pleural 19 Fr. 1 day          Arterial Line                 Arterial Line 03/13/18 0716 Left Other (Comment) 2 days          Line                 Pacer Wires 03/13/18 1441 1 day                Physical Exam   Constitutional: He is oriented to person, place, and time. No distress.   Obese   Cardiovascular: Normal rate and regular rhythm.    Pulmonary/Chest: Effort normal. No respiratory distress.   Abdominal: Soft. He exhibits no distension.   Neurological: He is alert and oriented to person, place, and time.   Skin: Skin is warm and dry.   Psychiatric: He has a normal mood and affect. His behavior is normal.       Significant Labs:  BMP:   Recent Labs  Lab 03/15/18  0322   *      K 3.7  3.7  3.7      CO2 29   BUN 16   CREATININE 1.1   CALCIUM 8.5*   MG 1.7     CBC:   Recent Labs  Lab 03/15/18  0322   WBC 26.44*   RBC 2.94*   HGB 8.7*   HCT 25.2*      MCV 86   MCH 29.6   MCHC 34.5     Coagulation:   Recent Labs  Lab 03/13/18  1706  03/15/18  0624   INR 1.2  --   --    APTT 30.7  < > 29.3   < > = values in this interval not  displayed.    Significant Diagnostics:  CXR: I have reviewed all pertinent results/findings within the past 24 hours    Assessment/Plan:     * CTEPH (chronic thromboembolic pulmonary hypertension)    Neuro:   - Pain mgmt: oxycodone PRN     Pulmonary:   - Extubated  - Wean O2 as tolerated  - Okay with sats >85%     Cardiac:   - Drips: epi 0.01  - ASA 325mg  - Metoprolol when off epi     Renal:    - UOP responsive to lasix, net even  - BUN/Cr stable  - Lasix gtt      ID:   - Post op ancef complete     Hem/Onc:   - Monitor hgb; stable  - Heparin gtt, PTT goal 60-80     Endocrine:    - Insulin sub q     Fluids/Electrolytes/Nutrition/GI:   - Replace electrolytes PRN per protocol  - Advance diet as tolerated     DISPO:  - Continue SICU care            Kirsty Duong MD  Cardiothoracic Surgery  [facility]   oral

## 2020-02-18 ENCOUNTER — TELEPHONE (OUTPATIENT)
Dept: TRANSPLANT | Facility: CLINIC | Age: 50
End: 2020-02-18

## 2020-02-20 ENCOUNTER — TELEPHONE (OUTPATIENT)
Dept: TRANSPLANT | Facility: CLINIC | Age: 50
End: 2020-02-20

## 2020-02-20 ENCOUNTER — PATIENT MESSAGE (OUTPATIENT)
Dept: TRANSPLANT | Facility: CLINIC | Age: 50
End: 2020-02-20

## 2020-02-20 NOTE — TELEPHONE ENCOUNTER
"Returned call to pt, who reports he stopped tegretol and started new psych med, which will not decrease blood levels of opsumit. Pt reports the change has been a good one, thus far. Notes he was having "major depressive episodes" which have since resolved since stopping tegretol. Pt denies any issues w/Opsumit. Pt also requests handicap hang tag (he can't find the application he was previously given.) All questions/concerns addressed to pt satisfaction. Will update Dr Hinojosa on all.   "

## 2020-03-03 ENCOUNTER — PATIENT MESSAGE (OUTPATIENT)
Dept: TRANSPLANT | Facility: CLINIC | Age: 50
End: 2020-03-03

## 2020-03-09 ENCOUNTER — TELEPHONE (OUTPATIENT)
Dept: TRANSPLANT | Facility: CLINIC | Age: 50
End: 2020-03-09

## 2020-03-09 NOTE — TELEPHONE ENCOUNTER
"Mr. Hines called to report that he was admitted to [facility] for pneumonia. He reports that he has all of his PH meds with him and that he is taking them. He does state that they are not giving him his diuretic and feels like he needs it. Mr. Hines is experiencing pain with coughing and says he is "not moving" anything out of his chest.   Advised Mr. Hines that the hospital can transfer him to [facility] but it would be their decision or he could ask his doctor for transfer. PH coordinator volunteered to call his nurse and patient was appreciative.  Placed call to bedside nurse, Viji. Advised her that patient has severe PAH and we are available if they feel he needs to be transferred to a higher level of care/PH center. Noted that patient's doctor can reach out with any questions. Gave phone number.  "

## 2020-03-12 ENCOUNTER — HOSPITAL ENCOUNTER (INPATIENT)
Facility: HOSPITAL | Age: 50
LOS: 8 days | Discharge: HOME | DRG: 207 | End: 2020-03-20
Attending: INTERNAL MEDICINE | Admitting: INTERNAL MEDICINE
Payer: MEDICAID

## 2020-03-12 ENCOUNTER — TELEPHONE (OUTPATIENT)
Dept: TRANSPLANT | Facility: CLINIC | Age: 50
End: 2020-03-12

## 2020-03-12 ENCOUNTER — PATIENT MESSAGE (OUTPATIENT)
Dept: TRANSPLANT | Facility: CLINIC | Age: 50
End: 2020-03-12

## 2020-03-12 DIAGNOSIS — R94.31 QT PROLONGATION: ICD-10-CM

## 2020-03-12 DIAGNOSIS — J18.9 PNEUMONIA: ICD-10-CM

## 2020-03-12 DIAGNOSIS — I27.24 CTEPH (CHRONIC THROMBOEMBOLIC PULMONARY HYPERTENSION): Primary | ICD-10-CM

## 2020-03-12 DIAGNOSIS — Z92.89 HISTORY OF EKG: ICD-10-CM

## 2020-03-12 DIAGNOSIS — I27.20 PULMONARY HYPERTENSION: ICD-10-CM

## 2020-03-12 DIAGNOSIS — J96.01 ACUTE HYPOXEMIC RESPIRATORY FAILURE: ICD-10-CM

## 2020-03-12 PROBLEM — G93.41 ENCEPHALOPATHY, METABOLIC: Status: ACTIVE | Noted: 2020-03-12

## 2020-03-12 LAB
ALBUMIN SERPL BCP-MCNC: 2.4 G/DL (ref 3.5–5.2)
ALLENS TEST: ABNORMAL
ALLENS TEST: ABNORMAL
ALP SERPL-CCNC: 130 U/L (ref 55–135)
ALT SERPL W/O P-5'-P-CCNC: 60 U/L (ref 10–44)
AMORPH CRY UR QL COMP ASSIST: ABNORMAL
ANION GAP SERPL CALC-SCNC: 6 MMOL/L (ref 8–16)
AST SERPL-CCNC: 54 U/L (ref 10–40)
BACTERIA #/AREA URNS AUTO: ABNORMAL /HPF
BASOPHILS # BLD AUTO: 0.03 K/UL (ref 0–0.2)
BASOPHILS NFR BLD: 0.2 % (ref 0–1.9)
BILIRUB SERPL-MCNC: 0.6 MG/DL (ref 0.1–1)
BILIRUB UR QL STRIP: NEGATIVE
BNP SERPL-MCNC: 69 PG/ML (ref 0–99)
BUN SERPL-MCNC: 21 MG/DL (ref 6–20)
CALCIUM SERPL-MCNC: 7.9 MG/DL (ref 8.7–10.5)
CHLORIDE SERPL-SCNC: 94 MMOL/L (ref 95–110)
CLARITY UR REFRACT.AUTO: ABNORMAL
CO2 SERPL-SCNC: 31 MMOL/L (ref 23–29)
COLOR UR AUTO: ABNORMAL
CREAT SERPL-MCNC: 0.8 MG/DL (ref 0.5–1.4)
DELSYS: ABNORMAL
DELSYS: ABNORMAL
DIFFERENTIAL METHOD: ABNORMAL
EOSINOPHIL # BLD AUTO: 0.1 K/UL (ref 0–0.5)
EOSINOPHIL NFR BLD: 0.9 % (ref 0–8)
ERYTHROCYTE [DISTWIDTH] IN BLOOD BY AUTOMATED COUNT: 15.6 % (ref 11.5–14.5)
ERYTHROCYTE [SEDIMENTATION RATE] IN BLOOD BY WESTERGREN METHOD: 18 MM/H
ERYTHROCYTE [SEDIMENTATION RATE] IN BLOOD BY WESTERGREN METHOD: 18 MM/H
EST. GFR  (AFRICAN AMERICAN): >60 ML/MIN/1.73 M^2
EST. GFR  (NON AFRICAN AMERICAN): >60 ML/MIN/1.73 M^2
FIO2: 100
FIO2: 60
GLUCOSE SERPL-MCNC: 117 MG/DL (ref 70–110)
GLUCOSE UR QL STRIP: NEGATIVE
HCO3 UR-SCNC: 33 MMOL/L (ref 24–28)
HCO3 UR-SCNC: 34.8 MMOL/L (ref 24–28)
HCT VFR BLD AUTO: 27.2 % (ref 40–54)
HGB BLD-MCNC: 7.9 G/DL (ref 14–18)
HGB UR QL STRIP: ABNORMAL
HYALINE CASTS UR QL AUTO: 0 /LPF
IMM GRANULOCYTES # BLD AUTO: 0.64 K/UL (ref 0–0.04)
IMM GRANULOCYTES NFR BLD AUTO: 4.3 % (ref 0–0.5)
KETONES UR QL STRIP: NEGATIVE
LEUKOCYTE ESTERASE UR QL STRIP: NEGATIVE
LYMPHOCYTES # BLD AUTO: 1.1 K/UL (ref 1–4.8)
LYMPHOCYTES NFR BLD: 7.6 % (ref 18–48)
MAGNESIUM SERPL-MCNC: 2.3 MG/DL (ref 1.6–2.6)
MCH RBC QN AUTO: 22.3 PG (ref 27–31)
MCHC RBC AUTO-ENTMCNC: 29 G/DL (ref 32–36)
MCV RBC AUTO: 77 FL (ref 82–98)
MICROSCOPIC COMMENT: ABNORMAL
MODE: ABNORMAL
MODE: ABNORMAL
MONOCYTES # BLD AUTO: 0.9 K/UL (ref 0.3–1)
MONOCYTES NFR BLD: 6.1 % (ref 4–15)
NEUTROPHILS # BLD AUTO: 12 K/UL (ref 1.8–7.7)
NEUTROPHILS NFR BLD: 80.9 % (ref 38–73)
NITRITE UR QL STRIP: NEGATIVE
NRBC BLD-RTO: 0 /100 WBC
PCO2 BLDA: 46.1 MMHG (ref 35–45)
PCO2 BLDA: 51.3 MMHG (ref 35–45)
PEEP: 8
PEEP: 8
PH SMN: 7.42 [PH] (ref 7.35–7.45)
PH SMN: 7.49 [PH] (ref 7.35–7.45)
PH UR STRIP: 5 [PH] (ref 5–8)
PHOSPHATE SERPL-MCNC: 3.8 MG/DL (ref 2.7–4.5)
PLATELET # BLD AUTO: 293 K/UL (ref 150–350)
PMV BLD AUTO: 11.7 FL (ref 9.2–12.9)
PO2 BLDA: 280 MMHG (ref 80–100)
PO2 BLDA: 33 MMHG (ref 40–60)
POC BE: 11 MMOL/L
POC BE: 8 MMOL/L
POC SATURATED O2: 100 % (ref 95–100)
POC SATURATED O2: 63 % (ref 95–100)
POC TCO2: 35 MMOL/L (ref 24–29)
POC TCO2: 36 MMOL/L (ref 23–27)
POCT GLUCOSE: 135 MG/DL (ref 70–110)
POTASSIUM SERPL-SCNC: 4 MMOL/L (ref 3.5–5.1)
PROCALCITONIN SERPL IA-MCNC: 0.11 NG/ML
PROT SERPL-MCNC: 5.8 G/DL (ref 6–8.4)
PROT UR QL STRIP: ABNORMAL
RBC # BLD AUTO: 3.55 M/UL (ref 4.6–6.2)
RBC #/AREA URNS AUTO: >100 /HPF (ref 0–4)
SAMPLE: ABNORMAL
SAMPLE: ABNORMAL
SITE: ABNORMAL
SITE: ABNORMAL
SODIUM SERPL-SCNC: 131 MMOL/L (ref 136–145)
SP GR UR STRIP: >=1.03 (ref 1–1.03)
URN SPEC COLLECT METH UR: ABNORMAL
VANCOMYCIN SERPL-MCNC: <1.1 UG/ML
VT: 450
VT: 450
WBC # BLD AUTO: 14.8 K/UL (ref 3.9–12.7)
WBC #/AREA URNS AUTO: 0 /HPF (ref 0–5)

## 2020-03-12 PROCEDURE — 36556 INSERT NON-TUNNEL CV CATH: CPT | Mod: ,,, | Performed by: GENERAL ACUTE CARE HOSPITAL

## 2020-03-12 PROCEDURE — 63600175 PHARM REV CODE 636 W HCPCS: Performed by: GENERAL ACUTE CARE HOSPITAL

## 2020-03-12 PROCEDURE — 63600175 PHARM REV CODE 636 W HCPCS: Performed by: INTERNAL MEDICINE

## 2020-03-12 PROCEDURE — 99292 CRITICAL CARE ADDL 30 MIN: CPT | Mod: 25,,, | Performed by: GENERAL ACUTE CARE HOSPITAL

## 2020-03-12 PROCEDURE — 99291 PR CRITICAL CARE, E/M 30-74 MINUTES: ICD-10-PCS | Mod: 25,,, | Performed by: GENERAL ACUTE CARE HOSPITAL

## 2020-03-12 PROCEDURE — 87798 DETECT AGENT NOS DNA AMP: CPT

## 2020-03-12 PROCEDURE — 94002 VENT MGMT INPAT INIT DAY: CPT

## 2020-03-12 PROCEDURE — 82803 BLOOD GASES ANY COMBINATION: CPT

## 2020-03-12 PROCEDURE — 94640 AIRWAY INHALATION TREATMENT: CPT

## 2020-03-12 PROCEDURE — 99900035 HC TECH TIME PER 15 MIN (STAT)

## 2020-03-12 PROCEDURE — 87205 SMEAR GRAM STAIN: CPT

## 2020-03-12 PROCEDURE — 27200966 HC CLOSED SUCTION SYSTEM

## 2020-03-12 PROCEDURE — 25000242 PHARM REV CODE 250 ALT 637 W/ HCPCS: Performed by: GENERAL ACUTE CARE HOSPITAL

## 2020-03-12 PROCEDURE — 27000221 HC OXYGEN, UP TO 24 HOURS

## 2020-03-12 PROCEDURE — 36415 COLL VENOUS BLD VENIPUNCTURE: CPT

## 2020-03-12 PROCEDURE — 84145 PROCALCITONIN (PCT): CPT

## 2020-03-12 PROCEDURE — 63600175 PHARM REV CODE 636 W HCPCS

## 2020-03-12 PROCEDURE — 93010 ELECTROCARDIOGRAM REPORT: CPT | Mod: ,,, | Performed by: INTERNAL MEDICINE

## 2020-03-12 PROCEDURE — 36556 PR INSERT NON-TUNNEL CV CATH 5+ YRS OLD: ICD-10-PCS | Mod: ,,, | Performed by: GENERAL ACUTE CARE HOSPITAL

## 2020-03-12 PROCEDURE — 81001 URINALYSIS AUTO W/SCOPE: CPT

## 2020-03-12 PROCEDURE — 99291 CRITICAL CARE FIRST HOUR: CPT | Mod: 25,,, | Performed by: GENERAL ACUTE CARE HOSPITAL

## 2020-03-12 PROCEDURE — 36600 WITHDRAWAL OF ARTERIAL BLOOD: CPT

## 2020-03-12 PROCEDURE — 87040 BLOOD CULTURE FOR BACTERIA: CPT

## 2020-03-12 PROCEDURE — 80202 ASSAY OF VANCOMYCIN: CPT

## 2020-03-12 PROCEDURE — 83880 ASSAY OF NATRIURETIC PEPTIDE: CPT

## 2020-03-12 PROCEDURE — 25000003 PHARM REV CODE 250: Performed by: GENERAL ACUTE CARE HOSPITAL

## 2020-03-12 PROCEDURE — 20000000 HC ICU ROOM

## 2020-03-12 PROCEDURE — 93010 EKG 12-LEAD: ICD-10-PCS | Mod: ,,, | Performed by: INTERNAL MEDICINE

## 2020-03-12 PROCEDURE — 83735 ASSAY OF MAGNESIUM: CPT

## 2020-03-12 PROCEDURE — 99900026 HC AIRWAY MAINTENANCE (STAT)

## 2020-03-12 PROCEDURE — 99292 PR CRITICAL CARE, ADDL 30 MIN: ICD-10-PCS | Mod: 25,,, | Performed by: GENERAL ACUTE CARE HOSPITAL

## 2020-03-12 PROCEDURE — 93041 RHYTHM ECG TRACING: CPT

## 2020-03-12 PROCEDURE — 80053 COMPREHEN METABOLIC PANEL: CPT

## 2020-03-12 PROCEDURE — 87070 CULTURE OTHR SPECIMN AEROBIC: CPT

## 2020-03-12 PROCEDURE — 93005 ELECTROCARDIOGRAM TRACING: CPT

## 2020-03-12 PROCEDURE — 84100 ASSAY OF PHOSPHORUS: CPT

## 2020-03-12 PROCEDURE — 85025 COMPLETE CBC W/AUTO DIFF WBC: CPT

## 2020-03-12 PROCEDURE — 94761 N-INVAS EAR/PLS OXIMETRY MLT: CPT

## 2020-03-12 RX ORDER — FUROSEMIDE 10 MG/ML
60 INJECTION INTRAMUSCULAR; INTRAVENOUS EVERY 8 HOURS
Status: DISCONTINUED | OUTPATIENT
Start: 2020-03-12 | End: 2020-03-17

## 2020-03-12 RX ORDER — IPRATROPIUM BROMIDE AND ALBUTEROL SULFATE 2.5; .5 MG/3ML; MG/3ML
3 SOLUTION RESPIRATORY (INHALATION) EVERY 4 HOURS
Status: DISCONTINUED | OUTPATIENT
Start: 2020-03-12 | End: 2020-03-15

## 2020-03-12 RX ORDER — HEPARIN SODIUM 5000 [USP'U]/ML
7500 INJECTION, SOLUTION INTRAVENOUS; SUBCUTANEOUS EVERY 8 HOURS
Status: DISCONTINUED | OUTPATIENT
Start: 2020-03-12 | End: 2020-03-12

## 2020-03-12 RX ORDER — PROPOFOL 10 MG/ML
INJECTION, EMULSION INTRAVENOUS
Status: COMPLETED
Start: 2020-03-12 | End: 2020-03-12

## 2020-03-12 RX ORDER — LABETALOL HCL 20 MG/4 ML
10 SYRINGE (ML) INTRAVENOUS EVERY 30 MIN PRN
Status: DISCONTINUED | OUTPATIENT
Start: 2020-03-12 | End: 2020-03-20 | Stop reason: HOSPADM

## 2020-03-12 RX ORDER — CHLORHEXIDINE GLUCONATE ORAL RINSE 1.2 MG/ML
15 SOLUTION DENTAL 2 TIMES DAILY
Status: DISCONTINUED | OUTPATIENT
Start: 2020-03-12 | End: 2020-03-13

## 2020-03-12 RX ORDER — HEPARIN SODIUM 5000 [USP'U]/ML
5000 INJECTION, SOLUTION INTRAVENOUS; SUBCUTANEOUS EVERY 8 HOURS
Status: DISCONTINUED | OUTPATIENT
Start: 2020-03-12 | End: 2020-03-12

## 2020-03-12 RX ORDER — PROPOFOL 10 MG/ML
5 INJECTION, EMULSION INTRAVENOUS CONTINUOUS
Status: DISCONTINUED | OUTPATIENT
Start: 2020-03-12 | End: 2020-03-17

## 2020-03-12 RX ORDER — FAMOTIDINE 20 MG/1
20 TABLET, FILM COATED ORAL EVERY 12 HOURS
Status: DISCONTINUED | OUTPATIENT
Start: 2020-03-12 | End: 2020-03-17

## 2020-03-12 RX ADMIN — PROPOFOL 5 MCG/KG/MIN: 10 INJECTION, EMULSION INTRAVENOUS at 11:03

## 2020-03-12 RX ADMIN — PIPERACILLIN AND TAZOBACTAM 4.5 G: 4; .5 INJECTION, POWDER, LYOPHILIZED, FOR SOLUTION INTRAVENOUS; PARENTERAL at 09:03

## 2020-03-12 RX ADMIN — PIPERACILLIN AND TAZOBACTAM 4.5 G: 4; .5 INJECTION, POWDER, LYOPHILIZED, FOR SOLUTION INTRAVENOUS; PARENTERAL at 01:03

## 2020-03-12 RX ADMIN — PROPOFOL: 10 INJECTION, EMULSION INTRAVENOUS at 11:03

## 2020-03-12 RX ADMIN — FUROSEMIDE 60 MG: 10 INJECTION, SOLUTION INTRAMUSCULAR; INTRAVENOUS at 09:03

## 2020-03-12 RX ADMIN — APIXABAN 5 MG: 5 TABLET, FILM COATED ORAL at 09:03

## 2020-03-12 RX ADMIN — CHLORHEXIDINE GLUCONATE 0.12% ORAL RINSE 15 ML: 1.2 LIQUID ORAL at 01:03

## 2020-03-12 RX ADMIN — PROPOFOL 5 MCG/KG/MIN: 10 INJECTION, EMULSION INTRAVENOUS at 10:03

## 2020-03-12 RX ADMIN — HEPARIN SODIUM 7500 UNITS: 5000 INJECTION, SOLUTION INTRAVENOUS; SUBCUTANEOUS at 01:03

## 2020-03-12 RX ADMIN — VANCOMYCIN HYDROCHLORIDE 2500 MG: 1 INJECTION, POWDER, LYOPHILIZED, FOR SOLUTION INTRAVENOUS at 05:03

## 2020-03-12 RX ADMIN — CHLORHEXIDINE GLUCONATE 0.12% ORAL RINSE 15 ML: 1.2 LIQUID ORAL at 09:03

## 2020-03-12 RX ADMIN — IPRATROPIUM BROMIDE AND ALBUTEROL SULFATE 3 ML: .5; 3 SOLUTION RESPIRATORY (INHALATION) at 07:03

## 2020-03-12 RX ADMIN — FAMOTIDINE 20 MG: 20 TABLET, FILM COATED ORAL at 09:03

## 2020-03-12 RX ADMIN — IPRATROPIUM BROMIDE AND ALBUTEROL SULFATE 3 ML: .5; 3 SOLUTION RESPIRATORY (INHALATION) at 11:03

## 2020-03-12 RX ADMIN — FUROSEMIDE 60 MG: 10 INJECTION, SOLUTION INTRAMUSCULAR; INTRAVENOUS at 02:03

## 2020-03-12 RX ADMIN — IPRATROPIUM BROMIDE AND ALBUTEROL SULFATE 3 ML: .5; 3 SOLUTION RESPIRATORY (INHALATION) at 04:03

## 2020-03-12 RX ADMIN — FAMOTIDINE 20 MG: 20 TABLET, FILM COATED ORAL at 01:03

## 2020-03-12 NOTE — ASSESSMENT & PLAN NOTE
--Uncertain etiology.   --Prodrome (fever, cough, pharyngitis), negative influenza, and negative cultures consistent with viral pneumonia.  --F/u with HTS regarding continuation of pHTN medications.   --Pan culture including respiratory viral PCR. Message left with Office of Public health to refer patient for testing for COVID-19. No patient information was taken by [facility].  --Isolation.   --Duonebs q4h.  --Furosemide 60mg q8h. Diurese as tolerated.   --Recheck BNP.

## 2020-03-12 NOTE — H&P
[facility]  Critical Care Medicine  History and Physical    Patient Name: Jaquan Hines Jr.  MRN: [id number]  Admission Date: 3/12/2020  Hospital Length of Stay: 0 days  Code Status: Full Code  Attending Provider: Galdino Calles MD  Primary Care Provider: Allen Hubbard MD   Principal Problem: Acute hypoxemic respiratory failure    Subjective:     HPI:  Mr. Hines is a 48 y/o male with a history significant for pulmonary embolus with resulting pulmonary hypertension (improved s/p pulmonary thrombectomy March 2018) who was in his usual state of health (full ADLs and IADLs no longer requiring supplemental home O2) until February 27th, 2020 when he began having pharyngitis. He began taking amoxicillin, which he had at home without relief. He eventually went to an urgent care where he was given IM ceftriaxone and steroids. He also started using home bronchodilators and oxygen which he typically does not need. He presented to [facility] on 03/08/20 with worsening dyspnea and was admitted with a presumptive diagnosis on pneumonia (records not available) and was discharged on 03/10/20. Immediately after leaving the hospital he began having worsening dyspnea so his sister brought him back to the ED and he was admitted for hypoxemic respiratory failure. He was started on bipap, which he failed so he was intubated. He was treated with pip/tazo + vancomycin and diuretics.     Tmax at the OSH on presentation was 99.5F. Microlab reports blood and urine cultures are NGTD (drawn 3/10/20). An influenza test at the OSH was also negative.     Hospital/ICU Course:  No notes on file    Past Medical History:   Diagnosis Date    Acute pulmonary embolism     Anticoagulant long-term use     Bipolar 1 disorder     diagnosed at 16    Chronic respiratory failure with hypoxia     Essential hypertension, malignant     Heart attack     Pulmonary artery hypertension        Past  Surgical History:   Procedure Laterality Date    CARDIAC CATHETERIZATION      CATARACT EXTRACTION      MASS EXCISION      inner thigh, lipoma    TONSILLECTOMY, ADENOIDECTOMY         Review of patient's allergies indicates:   Allergen Reactions    Clopidogrel      Other reaction(s): Other (See Comments)  Pt reports it does not work        Family History     None        Tobacco Use    Smoking status: Never Smoker    Smokeless tobacco: Never Used   Substance and Sexual Activity    Alcohol use: No    Drug use: No    Sexual activity: Not on file      Review of Systems   Unable to perform ROS: Intubated     Objective:     Vital Signs (Most Recent):  Temp: 98.7 °F (37.1 °C) (03/12/20 1330)  Pulse: 78 (03/12/20 1400)  Resp: (!) 21 (03/12/20 1400)  BP: (!) 114/58 (03/12/20 1400)  SpO2: 100 % (03/12/20 1400) Vital Signs (24h Range):  Temp:  [97.8 °F (36.6 °C)-98.7 °F (37.1 °C)] 98.7 °F (37.1 °C)  Pulse:  [76-89] 78  Resp:  [18-29] 21  SpO2:  [98 %-100 %] 100 %  BP: (100-127)/(37-76) 114/58   Weight: (!) 175.6 kg (387 lb 2 oz)  Body mass index is 58.86 kg/m².      Intake/Output Summary (Last 24 hours) at 3/12/2020 1415  Last data filed at 3/12/2020 1300  Gross per 24 hour   Intake --   Output 395 ml   Net -395 ml       Physical Exam   Constitutional: He appears well-developed and well-nourished. He is intubated.   HENT:   Head: Normocephalic and atraumatic.   Mouth/Throat: Oropharynx is clear and moist.   Eyes: Pupils are equal, round, and reactive to light. Conjunctivae are normal. Right eye exhibits no discharge. Left eye exhibits no discharge. No scleral icterus.   Neck: Trachea normal, normal range of motion and full passive range of motion without pain. Neck supple. No JVD present. No tracheal deviation present. No thyromegaly present.   Cardiovascular: Normal rate, regular rhythm, S1 normal, S2 normal, normal heart sounds and intact distal pulses. Exam reveals no gallop and no friction rub.   No murmur  heard.  Pulmonary/Chest: He is intubated. No respiratory distress. He has wheezes. He has no rales. He exhibits no tenderness.   Abdominal: Soft. Bowel sounds are normal. He exhibits no distension and no mass. There is no tenderness. There is no rebound and no guarding.   Musculoskeletal: Normal range of motion. He exhibits no tenderness or deformity.   Lymphadenopathy:     He has no cervical adenopathy.   Neurological: No cranial nerve deficit. Coordination normal.   Skin: Skin is warm and dry. No abrasion and no bruising noted.   Vitals reviewed.      Vents:  Vent Mode: A/C (03/12/20 1322)  Ventilator Initiated: Yes (03/12/20 1117)  Set Rate: 18 BPM (03/12/20 1322)  Vt Set: 450 mL (03/12/20 1322)  PEEP/CPAP: 8 cmH20 (03/12/20 1322)  Oxygen Concentration (%): 100 (03/12/20 1400)  Peak Airway Pressure: 30 cmH2O (03/12/20 1322)  Plateau Pressure: 0 cmH20 (03/12/20 1322)  Total Ve: 9.88 mL (03/12/20 1322)  F/VT Ratio<105 (RSBI): (!) 43.57 (03/12/20 1322)  Lines/Drains/Airways     Central Venous Catheter Line                 Introducer 03/13/18 0801 right internal jugular 730 days          Drain                 NG/OG Tube 03/09/20 Center mouth 3 days         Urethral Catheter 03/09/20 3 days          Airway                 Airway - Non-Surgical Endotracheal Tube -- days          Peripheral Intravenous Line                 Peripheral IV - Single Lumen 03/09/20 18 G Left Antecubital 3 days         Peripheral IV - Single Lumen 03/09/20 20 G Left Hand 3 days         Peripheral IV - Single Lumen 03/09/20 20 G Right Antecubital 3 days              Significant Labs:    CBC/Anemia Profile:  Recent Labs   Lab 03/12/20  1319   WBC 14.80*   HGB 7.9*   HCT 27.2*      MCV 77*   RDW 15.6*        Chemistries:  Recent Labs   Lab 03/12/20  1126   *   K 4.0   CL 94*   CO2 31*   BUN 21*   CREATININE 0.8   CALCIUM 7.9*   ALBUMIN 2.4*   PROT 5.8*   BILITOT 0.6   ALKPHOS 130   ALT 60*   AST 54*   MG 2.3   PHOS 3.8      Significant Imaging: I have reviewed and interpreted all pertinent imaging results/findings within the past 24 hours.      ABG  Recent Labs   Lab 03/12/20  1405   PH 7.486*   PO2 280*   PCO2 46.1*   HCO3 34.8*   BE 11     Assessment/Plan:     Neuro  Encephalopathy, metabolic  --Likely 2/2 sedation.   --Continue propofol for now. Wean sedation as tolerated.     Pulmonary  * Acute hypoxemic respiratory failure  --Uncertain etiology.   --Prodrome (fever, cough, pharyngitis), negative influenza, and negative cultures consistent with viral pneumonia.  --F/u with [facility] regarding continuation of pHTN medications.   --Pan culture including respiratory viral PCR. Message left with [facility] of Public [facility] to refer patient for testing for COVID-19. No patient information was taken by [facility].  --Isolation.   --Duonebs q4h.  --Furosemide 60mg q8h. Diurese as tolerated.   --Recheck BNP.     Pulmonary hypertension  --Follow up with HTS regarding pHTN medications.       Pneumonia  --See above.       Critical Care Time: 85 minutes  Critical secondary to Patient has a condition that poses threat to life and bodily function: Severe Respiratory Distress      Critical care was time spent personally by me on the following activities: development of treatment plan with patient or surrogate and bedside caregivers, discussions with consultants, evaluation of patient's response to treatment, examination of patient, ordering and performing treatments and interventions, ordering and review of laboratory studies, ordering and review of radiographic studies, pulse oximetry, re-evaluation of patient's condition. This critical care time did not overlap with that of any other provider or involve time for any procedures.     Larry Rosado PA-C  Critical Care Medicine  [facility]

## 2020-03-12 NOTE — EICU
Rounding (Video Assessment):  Yes    Intervention Initiated From:  Bedside    Lewis Communicated with Bedside Nurse regarding:  Documentation    Comments: skin assessment findings relayed to eicu by SEAN Taylor.  Skin tear to L pubic area.

## 2020-03-12 NOTE — SUBJECTIVE & OBJECTIVE
Past Medical History:   Diagnosis Date    Acute pulmonary embolism     Anticoagulant long-term use     Bipolar 1 disorder     diagnosed at 16    Chronic respiratory failure with hypoxia     Essential hypertension, malignant     Heart attack     Pulmonary artery hypertension        Past Surgical History:   Procedure Laterality Date    CARDIAC CATHETERIZATION      CATARACT EXTRACTION      MASS EXCISION      inner thigh, lipoma    TONSILLECTOMY, ADENOIDECTOMY         Review of patient's allergies indicates:   Allergen Reactions    Clopidogrel      Other reaction(s): Other (See Comments)  Pt reports it does not work        Family History     None        Tobacco Use    Smoking status: Never Smoker    Smokeless tobacco: Never Used   Substance and Sexual Activity    Alcohol use: No    Drug use: No    Sexual activity: Not on file      Review of Systems   Unable to perform ROS: Intubated     Objective:     Vital Signs (Most Recent):  Temp: 98.7 °F (37.1 °C) (03/12/20 1330)  Pulse: 78 (03/12/20 1400)  Resp: (!) 21 (03/12/20 1400)  BP: (!) 114/58 (03/12/20 1400)  SpO2: 100 % (03/12/20 1400) Vital Signs (24h Range):  Temp:  [97.8 °F (36.6 °C)-98.7 °F (37.1 °C)] 98.7 °F (37.1 °C)  Pulse:  [76-89] 78  Resp:  [18-29] 21  SpO2:  [98 %-100 %] 100 %  BP: (100-127)/(37-76) 114/58   Weight: (!) 175.6 kg (387 lb 2 oz)  Body mass index is 58.86 kg/m².      Intake/Output Summary (Last 24 hours) at 3/12/2020 1415  Last data filed at 3/12/2020 1300  Gross per 24 hour   Intake --   Output 395 ml   Net -395 ml       Physical Exam   Constitutional: He appears well-developed and well-nourished. He is intubated.   HENT:   Head: Normocephalic and atraumatic.   Mouth/Throat: Oropharynx is clear and moist.   Eyes: Pupils are equal, round, and reactive to light. Conjunctivae are normal. Right eye exhibits no discharge. Left eye exhibits no discharge. No scleral icterus.   Neck: Trachea normal, normal range of motion and full passive  range of motion without pain. Neck supple. No JVD present. No tracheal deviation present. No thyromegaly present.   Cardiovascular: Normal rate, regular rhythm, S1 normal, S2 normal, normal heart sounds and intact distal pulses. Exam reveals no gallop and no friction rub.   No murmur heard.  Pulmonary/Chest: He is intubated. No respiratory distress. He has wheezes. He has no rales. He exhibits no tenderness.   Abdominal: Soft. Bowel sounds are normal. He exhibits no distension and no mass. There is no tenderness. There is no rebound and no guarding.   Musculoskeletal: Normal range of motion. He exhibits no tenderness or deformity.   Lymphadenopathy:     He has no cervical adenopathy.   Neurological: No cranial nerve deficit. Coordination normal.   Skin: Skin is warm and dry. No abrasion and no bruising noted.   Vitals reviewed.      Vents:  Vent Mode: A/C (03/12/20 1322)  Ventilator Initiated: Yes (03/12/20 1117)  Set Rate: 18 BPM (03/12/20 1322)  Vt Set: 450 mL (03/12/20 1322)  PEEP/CPAP: 8 cmH20 (03/12/20 1322)  Oxygen Concentration (%): 100 (03/12/20 1400)  Peak Airway Pressure: 30 cmH2O (03/12/20 1322)  Plateau Pressure: 0 cmH20 (03/12/20 1322)  Total Ve: 9.88 mL (03/12/20 1322)  F/VT Ratio<105 (RSBI): (!) 43.57 (03/12/20 1322)  Lines/Drains/Airways     Central Venous Catheter Line                 Introducer 03/13/18 0801 right internal jugular 730 days          Drain                 NG/OG Tube 03/09/20 Center mouth 3 days         Urethral Catheter 03/09/20 3 days          Airway                 Airway - Non-Surgical Endotracheal Tube -- days          Peripheral Intravenous Line                 Peripheral IV - Single Lumen 03/09/20 18 G Left Antecubital 3 days         Peripheral IV - Single Lumen 03/09/20 20 G Left Hand 3 days         Peripheral IV - Single Lumen 03/09/20 20 G Right Antecubital 3 days              Significant Labs:    CBC/Anemia Profile:  Recent Labs   Lab 03/12/20  1319   WBC 14.80*   HGB 7.9*    HCT 27.2*      MCV 77*   RDW 15.6*        Chemistries:  Recent Labs   Lab 03/12/20  1126   *   K 4.0   CL 94*   CO2 31*   BUN 21*   CREATININE 0.8   CALCIUM 7.9*   ALBUMIN 2.4*   PROT 5.8*   BILITOT 0.6   ALKPHOS 130   ALT 60*   AST 54*   MG 2.3   PHOS 3.8     Significant Imaging: I have reviewed and interpreted all pertinent imaging results/findings within the past 24 hours.

## 2020-03-12 NOTE — EICU
Rounding (Video Assessment):  Yes    Intervention Initiated From:  COR / EICU    Comments: video rounds completed.  Patient mechanically ventilated.  Tf @ 10 cc/hr, propofol @ 30 mcg/kg/min, vancomycin ivpb infusing.  VS:  82 SR, 103/55, 20, 97%

## 2020-03-12 NOTE — PROGRESS NOTES
Pharmacokinetic Initial Assessment: IV Vancomycin    Assessment/Plan:    Initiate IV vancomycin with loading dose of 2500 mg once followed by a maintenance dose of vancomycin 1750 mg IV every 12 hours    Draw vancomycin trough level 30 min prior to fourth dose on 3/14 0430. Desired empiric serum trough concentration is 15 to 20 mcg/mL.     Pharmacy will continue to follow and monitor vancomycin.      Please contact pharmacy at extension [phone] with any questions regarding this assessment.     Thank you for the consult,   Griselda Kasper, PharmD, BCCCP             Patient brief summary:  Jaquan Hines Jr. is a 49 y.o. male initiated on antimicrobial therapy with IV vancomycin for treatment of suspected lower respiratory infection    Drug Allergies:   Review of patient's allergies indicates:   Allergen Reactions    Clopidogrel      Other reaction(s): Other (See Comments)  Pt reports it does not work        Actual Body Weight:   175.6 kg     Renal Function:   Estimated Creatinine Clearance: 172 mL/min (based on SCr of 0.8 mg/dL).    Dialysis Method (if applicable):  N/A    CBC (last 72 hours):  Recent Labs   Lab Result Units 03/12/20  1319   WBC K/uL 14.80*   Hemoglobin g/dL 7.9*   Hematocrit % 27.2*   Platelets K/uL 293   Gran% % 80.9*   Lymph% % 7.6*   Mono% % 6.1   Eosinophil% % 0.9   Basophil% % 0.2   Differential Method  Automated       Metabolic Panel (last 72 hours):  Recent Labs   Lab Result Units 03/12/20  1126 03/12/20  1150   Sodium mmol/L 131*  --    Potassium mmol/L 4.0  --    Chloride mmol/L 94*  --    CO2 mmol/L 31*  --    Glucose mg/dL 117*  --    Glucose, UA   --  Negative   BUN, Bld mg/dL 21*  --    Creatinine mg/dL 0.8  --    Albumin g/dL 2.4*  --    Total Bilirubin mg/dL 0.6  --    Alkaline Phosphatase U/L 130  --    AST U/L 54*  --    ALT U/L 60*  --    Magnesium mg/dL 2.3  --    Phosphorus mg/dL 3.8  --        Drug levels (last 3 results):  Recent Labs   Lab Result Units 03/12/20  1126    Vancomycin, Random ug/mL <1.1       Microbiologic Results:  Microbiology Results (last 7 days)     Procedure Component Value Units Date/Time    Respiratory Infection Panel (PCR), Nasopharyngeal [[id number]] Collected:  03/12/20 1446    Order Status:  Sent Specimen:  Nasopharyngeal Swab Updated:  03/12/20 1558    Culture, Respiratory with Gram Stain [[id number]] Collected:  03/12/20 1153    Order Status:  Sent Specimen:  Respiratory from Endotracheal Aspirate Updated:  03/12/20 1337    Blood culture [[id number]] Collected:  03/12/20 1140    Order Status:  Sent Specimen:  Blood from Peripheral, Forearm, Left Updated:  03/12/20 1210    Blood culture [[id number]] Collected:  03/12/20 1140    Order Status:  Sent Specimen:  Blood from Peripheral, Forearm, Right Updated:  03/12/20 1209

## 2020-03-12 NOTE — PROCEDURES
"Jaquan Hines Jr. is a 49 y.o. male patient.    Temp: 98.7 °F (37.1 °C) (03/12/20 1330)  Pulse: 84 (03/12/20 1545)  Resp: 18 (03/12/20 1545)  BP: (!) 114/58 (03/12/20 1400)  SpO2: 97 % (03/12/20 1545)  Weight: (!) 175.6 kg (387 lb 2 oz) (03/12/20 1119)       Central Line  Date/Time: 3/12/2020 4:09 PM  Location procedure was performed: [facility] SURGICAL ICU (SICU)  Performed by: Larry Rosado PA-C  Pre-operative Diagnosis: respiratory failure  Post-operative diagnosis: respiratory failure  Consent Done: Yes  Site marked: the operative site was marked  Time out: Immediately prior to procedure a "time out" was called to verify the correct patient, procedure, equipment, support staff and site/side marked as required.  Indications: hemodynamic monitoring  Anesthesia: local infiltration    Anesthesia:  Local anesthesia used: yes  Local Anesthetic: lidocaine 1% without epinephrine  Anesthetic total: 3 mL  Preparation: skin prepped with ChloraPrep  Skin prep agent dried: skin prep agent completely dried prior to procedure  Sterile barriers: all five maximum sterile barriers used - cap, mask, sterile gown, sterile gloves, and large sterile sheet  Hand hygiene: hand hygiene performed prior to central venous catheter insertion  Location details: right internal jugular  Catheter type: triple lumen  Catheter size: 7 Fr  Catheter Length: 16cm    Ultrasound guidance: yes  Vessel Caliber: medium, patent, compressibility normal  Needle advanced into vessel with real time Ultrasound guidance.  Guidewire confirmed in vessel.  Sterile sheath used.  Manometry: Yes  Number of attempts: 1  Assessment: placement verified by x-ray and no pneumothorax on x-ray  Complications: none  Estimated blood loss (mL): 2  Specimens: No  Implants: No  Post-procedure: line sutured,  chlorhexidine patch,  sterile dressing applied and blood return through all ports  Complications: No  Comments: AMNA Rosado PA-C  Critical Care " Medicine  [phone]            Larry Rosado  3/12/2020

## 2020-03-12 NOTE — CARE UPDATE
Patient arrived by EMS on AMBU ventilation.  Patient intubated with size 7.5 ETT placed 26 @ lip.  Patient ETT is intact, patent, and secured with commercial tube olguin.  Placed patient on MV set on documented settings. Will continue to monitor.

## 2020-03-12 NOTE — NURSING
Patient follows commands. VSS throughout shift. Vent settings AC 60% FiO2, 8 of PEEP. Propofol @ 30 mcg/kg/min. 60 mg of lasix given, UO adequate throughout shift. CVP 8. RIJ  placed this shift. Tube feeds @ 10 cc/hr, tolerating well. Skin tear to left pubic region, dsg CDI. Blood cultures, urine cultures, and viral cultures sent to lab. On airborne, contact, and droplet isolation until COVID-19 ruled out. Heart tx consulted for pulmonary hypertension. Plan of care reviewed with patient.

## 2020-03-12 NOTE — HPI
Mr. Hines is a 48 y/o male with a history significant for pulmonary embolus with resulting pulmonary hypertension (improved s/p pulmonary thrombectomy March 2018) who was in his usual state of health (full ADLs and IADLs no longer requiring supplemental home O2) until February 27th, 2020 when he began having pharyngitis. He began taking amoxicillin, which he had at home without relief. He eventually went to an urgent care where he was given IM ceftriaxone and steroids. He also started using home bronchodilators and oxygen which he typically does not need. He presented to [facility] on 03/08/20 with worsening dyspnea and was admitted with a presumptive diagnosis on pneumonia (records not available) and was discharged on 03/10/20. Immediately after leaving the hospital he began having worsening dyspnea so his sister brought him back to the ED and he was admitted for hypoxemic respiratory failure. He was started on bipap, which he failed so he was intubated. He was treated with pip/tazo + vancomycin and diuretics.     Tmax at the OSH on presentation was 99.5F. Microlab reports blood and urine cultures are NGTD (drawn 3/10/20). An influenza test at the OSH was also negative.

## 2020-03-12 NOTE — NURSING
Pt admitted to SICU @ 1120, brought by EMS with ambu bag. VSS. Pt put on vent, 100% FiO2 5 of PEEP. Pt follows commands. Propofol gtt started, infusing @ 5 mcg/kg/min. Labs sent, cultures drawn. Chest xray obtained. Will continue to monitor.

## 2020-03-12 NOTE — TELEPHONE ENCOUNTER
Received message from patient's sister that they and [facility] have been trying to transfer patient to [facility] main. Patient is currently intubated.  PH Coordinator contacted transfer center and patient assigned bed [id number]. Contacted Dr. Mccain with [facility] to alert her that patient is on his way. She will coordinate with accepting team.

## 2020-03-13 PROBLEM — R31.29 OTHER MICROSCOPIC HEMATURIA: Status: ACTIVE | Noted: 2020-03-13

## 2020-03-13 PROBLEM — R79.89 ELEVATED LFTS: Status: ACTIVE | Noted: 2020-03-13

## 2020-03-13 LAB
ADENOVIRUS: NOT DETECTED
ALBUMIN SERPL BCP-MCNC: 2.2 G/DL (ref 3.5–5.2)
ALLENS TEST: ABNORMAL
ALLENS TEST: ABNORMAL
ALP SERPL-CCNC: 118 U/L (ref 55–135)
ALT SERPL W/O P-5'-P-CCNC: 52 U/L (ref 10–44)
ANION GAP SERPL CALC-SCNC: 7 MMOL/L (ref 8–16)
ANION GAP SERPL CALC-SCNC: 9 MMOL/L (ref 8–16)
ANISOCYTOSIS BLD QL SMEAR: SLIGHT
AST SERPL-CCNC: 42 U/L (ref 10–40)
BASOPHILS NFR BLD: 0 % (ref 0–1.9)
BILIRUB SERPL-MCNC: 0.5 MG/DL (ref 0.1–1)
BORDETELLA PARAPERTUSSIS (IS1001): NOT DETECTED
BORDETELLA PERTUSSIS (PTXP): NOT DETECTED
BSA FOR ECHO PROCEDURE: 2.84 M2
BUN SERPL-MCNC: 14 MG/DL (ref 6–20)
BUN SERPL-MCNC: 16 MG/DL (ref 6–20)
CALCIUM SERPL-MCNC: 7.8 MG/DL (ref 8.7–10.5)
CALCIUM SERPL-MCNC: 8 MG/DL (ref 8.7–10.5)
CHLAMYDIA PNEUMONIAE: NOT DETECTED
CHLORIDE SERPL-SCNC: 94 MMOL/L (ref 95–110)
CHLORIDE SERPL-SCNC: 94 MMOL/L (ref 95–110)
CO2 SERPL-SCNC: 33 MMOL/L (ref 23–29)
CO2 SERPL-SCNC: 37 MMOL/L (ref 23–29)
CORONAVIRUS 229E, COMMON COLD VIRUS: NOT DETECTED
CORONAVIRUS HKU1, COMMON COLD VIRUS: NOT DETECTED
CORONAVIRUS NL63, COMMON COLD VIRUS: NOT DETECTED
CORONAVIRUS OC43, COMMON COLD VIRUS: NOT DETECTED
CREAT SERPL-MCNC: 0.8 MG/DL (ref 0.5–1.4)
CREAT SERPL-MCNC: 0.8 MG/DL (ref 0.5–1.4)
CV ECHO LV RWT: 0.23 CM
DELSYS: ABNORMAL
DIFFERENTIAL METHOD: ABNORMAL
ECHO LV POSTERIOR WALL: 0.6 CM (ref 0.6–1.1)
EOSINOPHIL NFR BLD: 1 % (ref 0–8)
ERYTHROCYTE [DISTWIDTH] IN BLOOD BY AUTOMATED COUNT: 15.8 % (ref 11.5–14.5)
ERYTHROCYTE [SEDIMENTATION RATE] IN BLOOD BY WESTERGREN METHOD: 18 MM/H
EST. GFR  (AFRICAN AMERICAN): >60 ML/MIN/1.73 M^2
EST. GFR  (AFRICAN AMERICAN): >60 ML/MIN/1.73 M^2
EST. GFR  (NON AFRICAN AMERICAN): >60 ML/MIN/1.73 M^2
EST. GFR  (NON AFRICAN AMERICAN): >60 ML/MIN/1.73 M^2
FIO2: 60
FLUBV RNA NPH QL NAA+NON-PROBE: NOT DETECTED
FRACTIONAL SHORTENING: 11 % (ref 28–44)
GLUCOSE SERPL-MCNC: 118 MG/DL (ref 70–110)
GLUCOSE SERPL-MCNC: 99 MG/DL (ref 70–110)
HCO3 UR-SCNC: 35.6 MMOL/L (ref 24–28)
HCO3 UR-SCNC: 37.1 MMOL/L (ref 24–28)
HCT VFR BLD AUTO: 26.8 % (ref 40–54)
HGB BLD-MCNC: 7.7 G/DL (ref 14–18)
HPIV1 RNA NPH QL NAA+NON-PROBE: NOT DETECTED
HPIV2 RNA NPH QL NAA+NON-PROBE: NOT DETECTED
HPIV3 RNA NPH QL NAA+NON-PROBE: NOT DETECTED
HPIV4 RNA NPH QL NAA+NON-PROBE: NOT DETECTED
HUMAN METAPNEUMOVIRUS: DETECTED
IMM GRANULOCYTES # BLD AUTO: ABNORMAL K/UL (ref 0–0.04)
IMM GRANULOCYTES NFR BLD AUTO: ABNORMAL % (ref 0–0.5)
INFLUENZA A (SUBTYPES H1,H1-2009,H3): NOT DETECTED
INTERVENTRICULAR SEPTUM: 1 CM (ref 0.6–1.1)
LEFT INTERNAL DIMENSION IN SYSTOLE: 4.7 CM (ref 2.1–4)
LEFT VENTRICLE MASS INDEX: 57 G/M2
LEFT VENTRICULAR INTERNAL DIMENSION IN DIASTOLE: 5.3 CM (ref 3.5–6)
LEFT VENTRICULAR MASS: 150.05 G
LYMPHOCYTES NFR BLD: 5 % (ref 18–48)
MAGNESIUM SERPL-MCNC: 2.1 MG/DL (ref 1.6–2.6)
MCH RBC QN AUTO: 22.1 PG (ref 27–31)
MCHC RBC AUTO-ENTMCNC: 28.7 G/DL (ref 32–36)
MCV RBC AUTO: 77 FL (ref 82–98)
METAMYELOCYTES NFR BLD MANUAL: 1 %
MIN VOL: 9
MODE: ABNORMAL
MONOCYTES NFR BLD: 4 % (ref 4–15)
MYCOPLASMA PNEUMONIAE: NOT DETECTED
MYELOCYTES NFR BLD MANUAL: 2 %
NEUTROPHILS NFR BLD: 87 % (ref 38–73)
NRBC BLD-RTO: 1 /100 WBC
OVALOCYTES BLD QL SMEAR: ABNORMAL
PCO2 BLDA: 47.1 MMHG (ref 35–45)
PCO2 BLDA: 48.8 MMHG (ref 35–45)
PEEP: 8
PH SMN: 7.47 [PH] (ref 7.35–7.45)
PH SMN: 7.5 [PH] (ref 7.35–7.45)
PHOSPHATE SERPL-MCNC: 3.6 MG/DL (ref 2.7–4.5)
PIP: 30
PISA TR MAX VEL: 2.92 M/S
PLATELET # BLD AUTO: 315 K/UL (ref 150–350)
PLATELET BLD QL SMEAR: ABNORMAL
PMV BLD AUTO: 11.4 FL (ref 9.2–12.9)
PO2 BLDA: 109 MMHG (ref 80–100)
PO2 BLDA: 81 MMHG (ref 80–100)
POC BE: 12 MMOL/L
POC BE: 14 MMOL/L
POC SATURATED O2: 97 % (ref 95–100)
POC SATURATED O2: 98 % (ref 95–100)
POC TCO2: 37 MMOL/L (ref 23–27)
POC TCO2: 38 MMOL/L (ref 23–27)
POCT GLUCOSE: 107 MG/DL (ref 70–110)
POIKILOCYTOSIS BLD QL SMEAR: SLIGHT
POLYCHROMASIA BLD QL SMEAR: ABNORMAL
POTASSIUM SERPL-SCNC: 3.2 MMOL/L (ref 3.5–5.1)
POTASSIUM SERPL-SCNC: 3.3 MMOL/L (ref 3.5–5.1)
PROT SERPL-MCNC: 5.6 G/DL (ref 6–8.4)
RA PRESSURE: 15 MMHG
RBC # BLD AUTO: 3.48 M/UL (ref 4.6–6.2)
RESPIRATORY INFECTION PANEL SOURCE: ABNORMAL
RSV RNA NPH QL NAA+NON-PROBE: NOT DETECTED
RV+EV RNA NPH QL NAA+NON-PROBE: NOT DETECTED
SAMPLE: ABNORMAL
SAMPLE: ABNORMAL
SITE: ABNORMAL
SITE: ABNORMAL
SODIUM SERPL-SCNC: 136 MMOL/L (ref 136–145)
SODIUM SERPL-SCNC: 138 MMOL/L (ref 136–145)
SP02: 98
TR MAX PG: 34 MMHG
TV REST PULMONARY ARTERY PRESSURE: 49 MMHG
VT: 450
WBC # BLD AUTO: 11.5 K/UL (ref 3.9–12.7)

## 2020-03-13 PROCEDURE — 94761 N-INVAS EAR/PLS OXIMETRY MLT: CPT

## 2020-03-13 PROCEDURE — 99900026 HC AIRWAY MAINTENANCE (STAT)

## 2020-03-13 PROCEDURE — 63600175 PHARM REV CODE 636 W HCPCS: Performed by: GENERAL ACUTE CARE HOSPITAL

## 2020-03-13 PROCEDURE — 63600175 PHARM REV CODE 636 W HCPCS: Performed by: INTERNAL MEDICINE

## 2020-03-13 PROCEDURE — 80053 COMPREHEN METABOLIC PANEL: CPT

## 2020-03-13 PROCEDURE — 27000221 HC OXYGEN, UP TO 24 HOURS

## 2020-03-13 PROCEDURE — 36600 WITHDRAWAL OF ARTERIAL BLOOD: CPT

## 2020-03-13 PROCEDURE — 99900035 HC TECH TIME PER 15 MIN (STAT)

## 2020-03-13 PROCEDURE — 25000242 PHARM REV CODE 250 ALT 637 W/ HCPCS: Performed by: GENERAL ACUTE CARE HOSPITAL

## 2020-03-13 PROCEDURE — 99291 PR CRITICAL CARE, E/M 30-74 MINUTES: ICD-10-PCS | Mod: ,,, | Performed by: INTERNAL MEDICINE

## 2020-03-13 PROCEDURE — 25000003 PHARM REV CODE 250: Performed by: GENERAL ACUTE CARE HOSPITAL

## 2020-03-13 PROCEDURE — 25000003 PHARM REV CODE 250: Performed by: STUDENT IN AN ORGANIZED HEALTH CARE EDUCATION/TRAINING PROGRAM

## 2020-03-13 PROCEDURE — 82803 BLOOD GASES ANY COMBINATION: CPT

## 2020-03-13 PROCEDURE — 82800 BLOOD PH: CPT

## 2020-03-13 PROCEDURE — 83735 ASSAY OF MAGNESIUM: CPT

## 2020-03-13 PROCEDURE — 85027 COMPLETE CBC AUTOMATED: CPT

## 2020-03-13 PROCEDURE — 94003 VENT MGMT INPAT SUBQ DAY: CPT

## 2020-03-13 PROCEDURE — 80048 BASIC METABOLIC PNL TOTAL CA: CPT

## 2020-03-13 PROCEDURE — 99291 PR CRITICAL CARE, E/M 30-74 MINUTES: ICD-10-PCS | Mod: ,,, | Performed by: NURSE PRACTITIONER

## 2020-03-13 PROCEDURE — 99291 CRITICAL CARE FIRST HOUR: CPT | Mod: ,,, | Performed by: NURSE PRACTITIONER

## 2020-03-13 PROCEDURE — 27200966 HC CLOSED SUCTION SYSTEM

## 2020-03-13 PROCEDURE — 20000000 HC ICU ROOM

## 2020-03-13 PROCEDURE — 94640 AIRWAY INHALATION TREATMENT: CPT

## 2020-03-13 PROCEDURE — 25000003 PHARM REV CODE 250: Performed by: NURSE PRACTITIONER

## 2020-03-13 PROCEDURE — 84100 ASSAY OF PHOSPHORUS: CPT

## 2020-03-13 PROCEDURE — 85007 BL SMEAR W/DIFF WBC COUNT: CPT

## 2020-03-13 PROCEDURE — 99291 CRITICAL CARE FIRST HOUR: CPT | Mod: ,,, | Performed by: INTERNAL MEDICINE

## 2020-03-13 PROCEDURE — 63600367 HC NITRIC OXIDE PER HOUR

## 2020-03-13 RX ORDER — VANCOMYCIN 1.75 GRAM/500 ML IN 0.9 % SODIUM CHLORIDE INTRAVENOUS
1750
Status: DISCONTINUED | OUTPATIENT
Start: 2020-03-13 | End: 2020-03-15

## 2020-03-13 RX ORDER — CHLORHEXIDINE GLUCONATE ORAL RINSE 1.2 MG/ML
15 SOLUTION DENTAL 2 TIMES DAILY
Status: DISCONTINUED | OUTPATIENT
Start: 2020-03-13 | End: 2020-03-17

## 2020-03-13 RX ORDER — FENTANYL CITRATE/PF 250MCG/5ML
100 SYRINGE (ML) INTRAVENOUS
Status: DISCONTINUED | OUTPATIENT
Start: 2020-03-13 | End: 2020-03-13

## 2020-03-13 RX ORDER — POTASSIUM CHLORIDE 20 MEQ/15ML
40 SOLUTION ORAL
Status: COMPLETED | OUTPATIENT
Start: 2020-03-13 | End: 2020-03-14

## 2020-03-13 RX ORDER — POTASSIUM CHLORIDE 20 MEQ/15ML
40 SOLUTION ORAL ONCE
Status: COMPLETED | OUTPATIENT
Start: 2020-03-13 | End: 2020-03-13

## 2020-03-13 RX ORDER — POTASSIUM CHLORIDE 20 MEQ/1
40 TABLET, EXTENDED RELEASE ORAL ONCE
Status: COMPLETED | OUTPATIENT
Start: 2020-03-13 | End: 2020-03-13

## 2020-03-13 RX ADMIN — FUROSEMIDE 60 MG: 10 INJECTION, SOLUTION INTRAMUSCULAR; INTRAVENOUS at 09:03

## 2020-03-13 RX ADMIN — POTASSIUM CHLORIDE 40 MEQ: 20 TABLET, EXTENDED RELEASE ORAL at 06:03

## 2020-03-13 RX ADMIN — PIPERACILLIN AND TAZOBACTAM 4.5 G: 4; .5 INJECTION, POWDER, LYOPHILIZED, FOR SOLUTION INTRAVENOUS; PARENTERAL at 05:03

## 2020-03-13 RX ADMIN — IPRATROPIUM BROMIDE AND ALBUTEROL SULFATE 3 ML: .5; 3 SOLUTION RESPIRATORY (INHALATION) at 03:03

## 2020-03-13 RX ADMIN — CHLORHEXIDINE GLUCONATE 0.12% ORAL RINSE 15 ML: 1.2 LIQUID ORAL at 09:03

## 2020-03-13 RX ADMIN — IPRATROPIUM BROMIDE AND ALBUTEROL SULFATE 3 ML: .5; 3 SOLUTION RESPIRATORY (INHALATION) at 07:03

## 2020-03-13 RX ADMIN — PROPOFOL 5 MCG/KG/MIN: 10 INJECTION, EMULSION INTRAVENOUS at 01:03

## 2020-03-13 RX ADMIN — APIXABAN 5 MG: 5 TABLET, FILM COATED ORAL at 09:03

## 2020-03-13 RX ADMIN — IPRATROPIUM BROMIDE AND ALBUTEROL SULFATE 3 ML: .5; 3 SOLUTION RESPIRATORY (INHALATION) at 08:03

## 2020-03-13 RX ADMIN — PROPOFOL 30 MCG/KG/MIN: 10 INJECTION, EMULSION INTRAVENOUS at 05:03

## 2020-03-13 RX ADMIN — VANCOMYCIN HYDROCHLORIDE 1750 MG: 10 INJECTION, POWDER, LYOPHILIZED, FOR SOLUTION INTRAVENOUS at 04:03

## 2020-03-13 RX ADMIN — RIOCIGUAT 2.5 MG: 2.5 TABLET, FILM COATED ORAL at 02:03

## 2020-03-13 RX ADMIN — PROPOFOL 40 MCG/KG/MIN: 10 INJECTION, EMULSION INTRAVENOUS at 07:03

## 2020-03-13 RX ADMIN — RIOCIGUAT 2.5 MG: 2.5 TABLET, FILM COATED ORAL at 09:03

## 2020-03-13 RX ADMIN — PROPOFOL 50 MCG/KG/MIN: 10 INJECTION, EMULSION INTRAVENOUS at 07:03

## 2020-03-13 RX ADMIN — PROPOFOL 40 MCG/KG/MIN: 10 INJECTION, EMULSION INTRAVENOUS at 12:03

## 2020-03-13 RX ADMIN — PIPERACILLIN AND TAZOBACTAM 4.5 G: 4; .5 INJECTION, POWDER, LYOPHILIZED, FOR SOLUTION INTRAVENOUS; PARENTERAL at 09:03

## 2020-03-13 RX ADMIN — PIPERACILLIN AND TAZOBACTAM 4.5 G: 4; .5 INJECTION, POWDER, LYOPHILIZED, FOR SOLUTION INTRAVENOUS; PARENTERAL at 12:03

## 2020-03-13 RX ADMIN — FUROSEMIDE 60 MG: 10 INJECTION, SOLUTION INTRAMUSCULAR; INTRAVENOUS at 05:03

## 2020-03-13 RX ADMIN — FUROSEMIDE 60 MG: 10 INJECTION, SOLUTION INTRAMUSCULAR; INTRAVENOUS at 02:03

## 2020-03-13 RX ADMIN — FAMOTIDINE 20 MG: 20 TABLET, FILM COATED ORAL at 09:03

## 2020-03-13 RX ADMIN — POTASSIUM CHLORIDE 40 MEQ: 20 SOLUTION ORAL at 11:03

## 2020-03-13 RX ADMIN — PROPOFOL 40 MCG/KG/MIN: 10 INJECTION, EMULSION INTRAVENOUS at 09:03

## 2020-03-13 RX ADMIN — POTASSIUM CHLORIDE 40 MEQ: 20 SOLUTION ORAL at 09:03

## 2020-03-13 RX ADMIN — IPRATROPIUM BROMIDE AND ALBUTEROL SULFATE 3 ML: .5; 3 SOLUTION RESPIRATORY (INHALATION) at 11:03

## 2020-03-13 RX ADMIN — POTASSIUM CHLORIDE 40 MEQ: 20 SOLUTION ORAL at 12:03

## 2020-03-13 RX ADMIN — PROPOFOL 45 MCG/KG/MIN: 10 INJECTION, EMULSION INTRAVENOUS at 06:03

## 2020-03-13 NOTE — ASSESSMENT & PLAN NOTE
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 Consulted regarding recommendations for pulm htn medication management. Likely has not had proper medications in over 72hrs. Echo done and rough estimate of PA pressure 45, RV and LV function look fairly normal as well.     Recs:  - Agree with continuation of lasix  - rec close monitoring of cvp (check at least once daily) CVP ~11 during our evaluation and rec goal of ~10  - Agree with resuming Adempas at 2.5mg TID but hold on resuming any further home PH medications at this time  - Will plan to resume Macitentan and Uptravi once patient extubated and more stable from respiratory standpoint  - Ok to start inhaled nitric oxide at 10ppm while intubated  - strict ins/outs  - supportive care per primary

## 2020-03-13 NOTE — ASSESSMENT & PLAN NOTE
Transferred from OSH 2/2 respiratory failure with known history of CTEPH. Currently under sepsis work-up and being treated for presumed PNA. Respiratory viral panel negative, flu negative. Afebrile. Elevated wbc on arrival and trending down.    Recs:  - See pulm htn

## 2020-03-13 NOTE — EICU
Rounding (Video Assessment):  Yes    Intervention Initiated From:  COR / EICU      Comments: video rounds completed.  Pt mechanically ventilated.  Propofol @ 45 mcg/kg/min

## 2020-03-13 NOTE — PROGRESS NOTES
[facility]  Critical Care Medicine  Progress Note    Patient Name: Jaquan Hines Jr.  MRN: [id number]  Admission Date: 3/12/2020  Hospital Length of Stay: 1 days  Code Status: Full Code  Attending Provider: Nilay Arvizu*  Primary Care Provider: Allen Hubbard MD   Principal Problem: Acute hypoxemic respiratory failure    Subjective:     HPI:  Mr. Hines is a 48 y/o male with a history significant for pulmonary embolus with resulting pulmonary hypertension (improved s/p pulmonary thrombectomy March 2018) who was in his usual state of health (full ADLs and IADLs no longer requiring supplemental home O2) until February 27th, 2020 when he began having pharyngitis. He began taking amoxicillin, which he had at home without relief. He eventually went to an urgent care where he was given IM ceftriaxone and steroids. He also started using home bronchodilators and oxygen which he typically does not need. He presented to [facility] on 03/08/20 with worsening dyspnea and was admitted with a presumptive diagnosis on pneumonia (records not available) and was discharged on 03/10/20. Immediately after leaving the hospital he began having worsening dyspnea so his sister brought him back to the ED and he was admitted for hypoxemic respiratory failure. He was started on bipap, which he failed so he was intubated. He was treated with pip/tazo + vancomycin and diuretics.     Tmax at the OSH on presentation was 99.5F. Microlab reports blood and urine cultures are NGTD (drawn 3/10/20). An influenza test at the OSH was also negative.     Hospital/ICU Course:  Admitted to Critical Care Medicine as a transfer from [facility] for acute hypoxemic respiratory failure. Negative flu and no growth reported from OSH. Due to concerns for COVID 19, appropriate testing was sent off; awaiting results. Started on aggressive diuresis and broad spectrum  antibiotics. HTS consulted, 1 out of 3 home pulmonary HTN meds restarted as the other 2 are unable to be crushed and administered through the OG tube.     Interval History/Significant Events: No acute events overnight. Weaning FiO2 as tolerated.     Review of Systems   Unable to perform ROS: Intubated     Objective:     Vital Signs (Most Recent):  Temp: 98 °F (36.7 °C) (03/13/20 1100)  Pulse: 78 (03/13/20 1145)  Resp: 19 (03/13/20 1145)  BP: (!) 102/56 (03/13/20 1130)  SpO2: (!) 91 % (03/13/20 1145) Vital Signs (24h Range):  Temp:  [98 °F (36.7 °C)-99.1 °F (37.3 °C)] 98 °F (36.7 °C)  Pulse:  [71-87] 78  Resp:  [13-39] 19  SpO2:  [91 %-100 %] 91 %  BP: ()/(50-61) 102/56   Weight: (!) 168.2 kg (370 lb 13 oz)  Body mass index is 56.38 kg/m².      Intake/Output Summary (Last 24 hours) at 3/13/2020 1157  Last data filed at 3/13/2020 1100  Gross per 24 hour   Intake 1495.75 ml   Output 5780 ml   Net -4284.25 ml       Physical Exam   Constitutional: He appears well-developed. He appears ill. He is sedated and intubated.   Morbid obesity    HENT:   Head: Normocephalic and atraumatic.   Mouth/Throat: Oropharynx is clear and moist.   Eyes: Pupils are equal, round, and reactive to light. Conjunctivae are normal. No scleral icterus.   Neck: Trachea normal, normal range of motion and full passive range of motion without pain. Neck supple. No tracheal deviation present.   Cardiovascular: Normal rate, regular rhythm, S1 normal, S2 normal, normal heart sounds and intact distal pulses.   No murmur heard.  Pulmonary/Chest: He is intubated. No respiratory distress. He has wheezes. He has rhonchi (R>L). He has no rales. He exhibits no tenderness.   Abdominal: Soft. Bowel sounds are normal. He exhibits no distension. There is no tenderness. There is no guarding.   Genitourinary:   Genitourinary Comments: Enciso in place   Musculoskeletal: Normal range of motion. He exhibits no tenderness or deformity.   Neurological: No cranial  nerve deficit. Coordination normal.   Skin: Skin is warm. No abrasion and no bruising noted. He is diaphoretic.   Vitals reviewed.      Vents:  Vent Mode: A/C (03/13/20 1122)  Ventilator Initiated: Yes (03/12/20 1117)  Set Rate: 18 BPM (03/13/20 1122)  Vt Set: 450 mL (03/13/20 1122)  PEEP/CPAP: 8 cmH20 (03/13/20 1122)  Oxygen Concentration (%): 50 (03/13/20 1145)  Peak Airway Pressure: 22 cmH2O (03/13/20 1122)  Plateau Pressure: 19 cmH20 (03/13/20 1122)  Total Ve: 15.9 mL (03/13/20 1122)  F/VT Ratio<105 (RSBI): (!) 46.91 (03/13/20 1122)  Lines/Drains/Airways     Central Venous Catheter Line                 Introducer 03/13/18 0801 right internal jugular 731 days          Drain                 NG/OG Tube 03/10/20 Center mouth 3 days         Urethral Catheter 03/10/20 3 days          Airway                 Airway - Non-Surgical Endotracheal Tube -- days          Peripheral Intravenous Line                 Peripheral IV - Single Lumen 03/10/20 18 G Left;Proximal Antecubital 3 days         Peripheral IV - Single Lumen 03/10/20 20 G Left Hand 3 days         Peripheral IV - Single Lumen 03/10/20 20 G Right;Proximal Antecubital 3 days              Significant Labs:    CBC/Anemia Profile:  Recent Labs   Lab 03/12/20  1319 03/13/20  0310   WBC 14.80* 11.50   HGB 7.9* 7.7*   HCT 27.2* 26.8*    315   MCV 77* 77*   RDW 15.6* 15.8*        Chemistries:  Recent Labs   Lab 03/12/20  1126 03/13/20  0310   * 136   K 4.0 3.3*   CL 94* 94*   CO2 31* 33*   BUN 21* 16   CREATININE 0.8 0.8   CALCIUM 7.9* 7.8*   ALBUMIN 2.4* 2.2*   PROT 5.8* 5.6*   BILITOT 0.6 0.5   ALKPHOS 130 118   ALT 60* 52*   AST 54* 42*   MG 2.3 2.1   PHOS 3.8 3.6       All pertinent labs within the past 24 hours have been reviewed.    Significant Imaging:  I have reviewed all pertinent imaging results/findings within the past 24 hours.      ABG  Recent Labs   Lab 03/13/20  0355   PH 7.470*   PO2 109*   PCO2 48.8*   HCO3 35.6*   BE 12      Assessment/Plan:     Neuro  Encephalopathy, metabolic  --Likely 2/2 sedation.   --Continue propofol for now. Wean sedation as tolerated.     Pulmonary  * Acute hypoxemic respiratory failure  --Uncertain etiology.   --Prodrome (fever, cough, pharyngitis), negative influenza, and negative cultures consistent with viral pneumonia.  --Appreciate HTS input regarding continuation of pHTN medications.   --Sputum and blood cultures with no growth. Respiratory viral PCR in process. COVID-19 testing sent, awaiting results.  --Maintain Isolation.   --Duonebs q4h.  --Furosemide 60mg q8h. Diurese as tolerated.   --Recheck BNP.   --Wean vent as tolerated.    Pulmonary hypertension  --Appreciate HTS input  --Adempas restarted as it is the only crushable pulm HTN med he is on. Restart the other 2 when able to swallow.   --TTE with Normal EF, PA 49   --nitric oxide      Pneumonia  --See above.     Renal/  Other microscopic hematuria  Occult blood 3+, RBC >100 on UA. Urine did not reflex to culture. Making good urine with diuresis.   --watch for now  --consider renal US if concern for stones/hydronephrosis     GI  Elevated LFTs  Slight elevation of LFTs on admit. Down trending.  --Monitor daily     Plan discussed with Dr. Arvizu.     Critical Care Time: 70 minutes  Critical secondary to Patient has a condition that poses threat to life and bodily function: Acute hypoxic respiratory failure on mechanical ventilation        Critical care was time spent personally by me on the following activities: development of treatment plan with patient or surrogate and bedside caregivers, discussions with consultants, evaluation of patient's response to treatment, examination of patient, ordering and performing treatments and interventions, ordering and review of laboratory studies, ordering and review of radiographic studies, pulse oximetry, re-evaluation of patient's condition. This critical care time did not overlap with that of any  other provider or involve time for any procedures.     Hien Chan NP  Critical Care Medicine  [facility]

## 2020-03-13 NOTE — ASSESSMENT & PLAN NOTE
Occult blood 3+, RBC >100 on UA. Urine did not reflex to culture. Making good urine with diuresis.   --watch for now  --consider renal US if concern for stones/hydronephrosis

## 2020-03-13 NOTE — EICU
Rounding (Video Assessment):  Yes    Intervention Initiated From:  COR / EICU    Lewis Communicated with Bedside Nurse regarding:  Other    Nurse Notified:  Yes    Doctor Notified:  No    Comments:  Called bedside RN about IV pump beeping and to inform her propofol almost out

## 2020-03-13 NOTE — PROGRESS NOTES
Pt O2 sats decreased to 88% on vent settings AC 50% FiO2 and 8 PEEP. RT and NP called to bedside. Pt pulled up in bed, HOB increased to 75, and vent settings increased to 60% FiO2 and 10 PEEP. O2 sats currently 94%. VSS. Will continue to monitor.

## 2020-03-13 NOTE — PLAN OF CARE
Patient follows commands. VSS throughout shift. Vent settings AC 60% FiO2, 8 of PEEP. Propofol @ 30 mcg/kg/min. 60 mg of lasix given, UO adequate throughout shift. CVP 10-13. RIJ  placed this shift. Tube feeds @ 10 cc/hr, tolerating well. Skin tear to left pubic region, dsg CDI. On airborne, contact, and droplet isolation until COVID-19 ruled out. Heart tx consulted for pulmonary hypertension. Plan of care reviewed with patient.

## 2020-03-13 NOTE — ASSESSMENT & PLAN NOTE
--Appreciate HTS input  --Adempas restarted as it is the only crushable pulm HTN med he is on. Restart the other 2 when able to swallow.   --TTE with Normal EF, PA 49   --nitric oxide

## 2020-03-13 NOTE — PROGRESS NOTES
Wound care consult for wound present on admission.    PMH: pulm HTN    Vishal 12   Vasopressors n/a  On isolation  EHOB      Patient seen in the SICU. He is intubated. Skin to coccyx and heels are intact.   Small healing area noted to the pubis of unknown origin. Wound approx 1 x 0.5 cm. No drainage, no erythema no purulence.           Recommend  q2hour turns  Heels offloaded  EHOB overlay  May leave wound open to air.    Wound care to follow PRN.  Jennifer Azul RN CN CN   [phone]

## 2020-03-13 NOTE — ASSESSMENT & PLAN NOTE
--Uncertain etiology.   --Prodrome (fever, cough, pharyngitis), negative influenza, and negative cultures consistent with viral pneumonia.  --Appreciate HTS input regarding continuation of pHTN medications.   --Sputum and blood cultures with no growth. Respiratory viral PCR in process. COVID-19 testing sent, awaiting results.  --Maintain Isolation.   --Duonebs q4h.  --Furosemide 60mg q8h. Diurese as tolerated.   --Recheck BNP.   --Wean vent as tolerated.

## 2020-03-13 NOTE — HPI
50 yo man with HTN, morbid obesity with OHS and chronic hypoxic resp failure, HTN, referred by Dr Jennings for management of PH- pt underwent PTE on 3/13/18. Last seen by Dr. Hinojosa in clinic on 1/24/2020 and was doing well then on Uptravi, Adempas, and Macitentan without need for home oxygen. Per records, patient was diagnosed with pharyngitis on 2/27/2020 and treated with amoxicillin without improvement. Subsequently went to urgent care due to lack of improvement and was prescribed oxygen, bronchodilators. Presented to [facility] on 3/08/2020, treated for PNA and discharged on 3/10. Returned immediately due to lack of relief in symptoms and worsening dyspnea leading to intubation for acute hypoxic respiratory failure. Then transferred to [facility] [place] [place] for higher level of care. Based on records, does not appear to be any documentation that PH meds were continued during this admission there. Supposedly negative for flu and negative blood cultures from there as well.     Since arrival he has remained intubated, afebrile but did have increased wbc. Full sepsis work-up underway and being treated for presumptive pneumonia. Of note, patient being tested for COVID-19 also.     TTE 3/13/2020   · Limited Echo.  · Normal left ventricular systolic function. The estimated ejection fraction is 65%.  · Indeterminate left ventricular diastolic function.  · No wall motion abnormalities.  · Normal right ventricular systolic function.  · The estimated PA systolic pressure is 49 mmHg. Pulmonary hypertension present.  · Elevated central venous pressure (15 mmHg).

## 2020-03-13 NOTE — PT/OT/SLP PROGRESS
Occupational Therapy      Patient Name:  Jaquan Hines Jr.   MRN:  [id number]    Patient not seen today secondary to intubated/sedated and not medically appropriate for skilled OT services  . Will follow-up 63/16/20.    SERGO Oliva  3/13/2020

## 2020-03-13 NOTE — PT/OT/SLP PROGRESS
Physical Therapy      Patient Name:  Jaquan Hines Jr.   MRN:  [id number]    Patient not seen today secondary to (pt on hold 2nd to being intubated and sedated.). Will follow-up at a later date..    Sheryl Hillman, PT   3/13/2020

## 2020-03-13 NOTE — PLAN OF CARE
Recommendations    1. Recommend modifying TF to Peptamen Intense VHP at 50mL/hr.    -Will provide 1200kcal (1834kcal with propofol), 110g protein, and 1008mL fluid. Will monitor.   Goals: Pt to meet % EEN and EPN by RD follow-up.

## 2020-03-13 NOTE — PLAN OF CARE
SW is following this Pt for DC planning needs. There are no identified needs at this time.    SW will continue to coordinate with patient, family, team and insurance to complete patient's discharge plan.    Omaira Coronado LMSW   - Case Management

## 2020-03-13 NOTE — PLAN OF CARE
Pt follows all commands, moves all extremities. Pt remains on ventilator, AC 60% FiO2 and 10 PEEP, O2 sats 93%. HR 70s NSR. MAP >65. Last flat CVP 11. Propofol gtt @ 45 mcg/kg/min for RASS of -2. Tube feeds at @ 30 cc/h, goal of 50 cc/h, minimal residuals. UOP  cc/h per zara. HTS consulted for pHTN. 10 ppm of nitric oxide started today. Echo done at bedside. Pt remains on airborne, contact, and droplet isolation until COVID-19 ruled out, awaiting results. Dustin, NP updated throughout shift. Plan of care reviewed with pt. VSS at this time. Will continue to monitor. See flowsheet for full assessment details.     Skin note: Bed plugged in, waffle mattress inflated. Pt turned Q2h and lateral rotation initiated on bed. Pillows underneath arms to protect elbows. Heel foams and heel offloading boots in place. Sacral foam in place. Skin tear on pubis open to air per wound care recommendation, CDI. No new breakdown noted.

## 2020-03-13 NOTE — EICU
Rounding (Video Assessment):  Yes    Intervention Initiated From:  COR / EICU    Lewis Communicated with Bedside Nurse regarding:  Other      Comments: EICU video rounds done at 2037 (mistakenly put wrong time of 2120 on note), spoke with bedside RN about EICU helping with rounds to try & minimize her going into room

## 2020-03-13 NOTE — PROGRESS NOTES
"[facility]  Adult Nutrition  Progress Note    SUMMARY       Recommendations    1. Recommend modifying TF to Peptamen Intense VHP at 50mL/hr.    -Will provide 1200kcal (1834kcal with propofol), 110g protein, and 1008mL fluid. Will monitor.   Goals: Pt to meet % EEN and EPN by RD follow-up.   Nutrition Goal Status: new  Communication of RD Recs: other (comment)(POC)    Reason for Assessment    Reason For Assessment: new tube feeding  Diagnosis: other (see comments)(resp failure)  Relevant Medical History: pulmonary hypertension  General Information Comments: Remote access 2/2 to possible COVID19. Pending cultures for confirmation. At this time, pt intubated. Chart review with wt gain. Due to contact/airborne precautions, NFPE unable to be assessed at this time. Pt tolerating increasing TF at 20mL/hr.   Nutrition Discharge Planning: Unclear at this time.     Nutrition/Diet History    Factors Affecting Nutritional Intake: NPO, on mechanical ventilation    Anthropometrics    Temp: 98 °F (36.7 °C)  Height: 5' 8" (172.7 cm)  Height (inches): 68 in  Weight Method: Bed Scale  Weight: (!) 168.2 kg (370 lb 13 oz)  Weight (lb): (!) 370.82 lb  Ideal Body Weight (IBW), Male: 154 lb  BMI (Calculated): 56.4  BMI Grade: greater than 40 - morbid obesity     Lab/Procedures/Meds    Pertinent Labs Reviewed: reviewed  Pertinent Labs Comments: K 3.3, Ca 7.8  Pertinent Medications Reviewed: reviewed  Pertinent Medications Comments: famotidine, lasix, propofol    Estimated/Assessed Needs    Weight Used For Calorie Calculations: (!) 168.2 kg (370 lb 13 oz)  Energy Calorie Requirements (kcal): 7023-9627  Energy Need Method: Kcal/kg(11-14)  Protein Requirements: 105-140g (1.5-2g/kg)  Weight Used For Protein Calculations: 70 kg (154 lb 5.2 oz)  Fluid Requirements (mL): Per MD  RDA Method (mL): 1850     Nutrition Prescription Ordered    Current Diet Order: NPO  Current Nutrition Support Formula Ordered: Impact Peptide " 1.5  Current Nutrition Support Rate Ordered: 30 (ml)  Current Nutrition Support Frequency Ordered: mL/hr    Evaluation of Received Nutrient/Fluid Intake    Enteral Calories (kcal): 1080  Enteral Protein (gm): 68  Enteral (Free Water) Fluid (mL): 554  Other Calories (kcal): 834  Total Calories (kcal): 1914  % Kcal Needs: 100  % Protein Needs: 65  I/O: -4.2L since admit  Energy Calories Required: meeting needs  Protein Required: not meeting needs  Fluid Required: other (see comments)(Per MD)  Tolerance: tolerating  % Intake of Estimated Energy Needs: 50 - 75 %  % Meal Intake: NPO    Nutrition Risk    Level of Risk/Frequency of Follow-up: (2X/week)     Assessment and Plan    Nutrition Problem  Inadequate energy intake    Related to (etiology):   Decreased ability to consume adequate energy    Signs and Symptoms (as evidenced by):   NPO status, TF not meeting estimated needs    Interventions(treatment strategy):  Collaboration of nutrition care with other providers    Nutrition Diagnosis Status:   New    Monitor and Evaluation    Food and Nutrient Intake: energy intake, enteral nutrition intake  Food and Nutrient Adminstration: enteral and parenteral nutrition administration  Anthropometric Measurements: weight, weight change  Biochemical Data, Medical Tests and Procedures: other (specify)(All labs)  Nutrition-Focused Physical Findings: overall appearance     Nutrition Follow-Up    RD Follow-up?: Yes

## 2020-03-13 NOTE — PROGRESS NOTES
Notified MD of pt's BP trending down. No new orders at this time. Will continue to monitor closely.

## 2020-03-13 NOTE — SUBJECTIVE & OBJECTIVE
Past Medical History:   Diagnosis Date    Acute pulmonary embolism     Anticoagulant long-term use     Bipolar 1 disorder     diagnosed at 16    Chronic respiratory failure with hypoxia     Essential hypertension, malignant     Heart attack     Pulmonary artery hypertension        Past Surgical History:   Procedure Laterality Date    CARDIAC CATHETERIZATION      CATARACT EXTRACTION      MASS EXCISION      inner thigh, lipoma    TONSILLECTOMY, ADENOIDECTOMY         Review of patient's allergies indicates:   Allergen Reactions    Clopidogrel      Other reaction(s): Other (See Comments)  Pt reports it does not work        Current Facility-Administered Medications   Medication    albuterol-ipratropium 2.5 mg-0.5 mg/3 mL nebulizer solution 3 mL    apixaban tablet 5 mg    chlorhexidine 0.12 % solution 15 mL    famotidine tablet 20 mg    furosemide injection 60 mg    labetalol 20 mg/4 mL (5 mg/mL) IV syring    piperacillin-tazobactam 4.5 g in sodium chloride 0.9% 100 mL IVPB (ready to mix system)    propofol (DIPRIVAN) 10 mg/mL infusion    vancomycin 1.75 g in 5 % dextrose 500 mL IVPB     Family History     None        Tobacco Use    Smoking status: Never Smoker    Smokeless tobacco: Never Used   Substance and Sexual Activity    Alcohol use: No    Drug use: No    Sexual activity: Not on file     Review of Systems   Unable to perform ROS: Intubated     Objective:     Vital Signs (Most Recent):  Temp: 98.5 °F (36.9 °C) (03/13/20 0700)  Pulse: 74 (03/13/20 0930)  Resp: 18 (03/13/20 0930)  BP: (!) 103/56 (03/13/20 0930)  SpO2: (!) 94 % (03/13/20 0930) Vital Signs (24h Range):  Temp:  [98.5 °F (36.9 °C)-99.1 °F (37.3 °C)] 98.5 °F (36.9 °C)  Pulse:  [73-89] 74  Resp:  [13-39] 18  SpO2:  [93 %-100 %] 94 %  BP: ()/(50-76) 103/56     Patient Vitals for the past 72 hrs (Last 3 readings):   Weight   03/13/20 0604 (!) 168.2 kg (370 lb 13 oz)   03/12/20 1119 (!) 175.6 kg (387 lb 2 oz)     Body mass index  is 56.38 kg/m².      Intake/Output Summary (Last 24 hours) at 3/13/2020 0940  Last data filed at 3/13/2020 0900  Gross per 24 hour   Intake 1455.75 ml   Output 5975 ml   Net -4519.25 ml       Physical Exam  *Physical exam performed by attending due to special isolation precautions*  Vital signs reviewed  Constitutional: Intubated and sedated.  HENT:   Head: Normocephalic and atraumatic.   Eyes: EOM are normal.   Neck: No JVD present.   Cardiovascular: Normal rate, regular rhythm.  Pulmonary/Chest: Effort normal   Musculoskeletal: There is no edema present   Skin: Skin is warm and dry.       Significant Labs:  CBC:  Recent Labs   Lab 03/12/20  1319 03/13/20  0310   WBC 14.80* 11.50   RBC 3.55* 3.48*   HGB 7.9* 7.7*   HCT 27.2* 26.8*    315   MCV 77* 77*   MCH 22.3* 22.1*   MCHC 29.0* 28.7*     BNP:  Recent Labs   Lab 03/12/20  1319   BNP 69     CMP:  Recent Labs   Lab 03/12/20  1126 03/13/20  0310   * 99   CALCIUM 7.9* 7.8*   ALBUMIN 2.4* 2.2*   PROT 5.8* 5.6*   * 136   K 4.0 3.3*   CO2 31* 33*   CL 94* 94*   BUN 21* 16   CREATININE 0.8 0.8   ALKPHOS 130 118   ALT 60* 52*   AST 54* 42*   BILITOT 0.6 0.5      Coagulation:   No results for input(s): PT, INR, APTT in the last 168 hours.  LDH:  No results for input(s): LDH in the last 72 hours.  Microbiology:  Microbiology Results (last 7 days)     Procedure Component Value Units Date/Time    Culture, Respiratory with Gram Stain [[id number]] Collected:  03/12/20 1153    Order Status:  Completed Specimen:  Respiratory from Endotracheal Aspirate Updated:  03/13/20 0900     Respiratory Culture No Growth     Gram Stain (Respiratory) <10 epithelial cells per low power field.     Gram Stain (Respiratory) Moderate WBC's     Gram Stain (Respiratory) No organisms seen    Narrative:       Mini-BAL. Before antibiotics.    Blood culture [[id number]] Collected:  03/12/20 1140    Order Status:  Completed Specimen:  Blood from Peripheral, Forearm, Left Updated:   03/12/20 1915     Blood Culture, Routine No Growth to date    Narrative:       Blood cultures from 2 different sites. 4 bottles total.  Please draw before starting antibiotics.    Blood culture [[id number]] Collected:  03/12/20 1140    Order Status:  Completed Specimen:  Blood from Peripheral, Forearm, Right Updated:  03/12/20 1915     Blood Culture, Routine No Growth to date    Narrative:       Blood cultures x 2 different sites. 4 bottles total. Please  draw cultures before administering antibiotics.    Respiratory Infection Panel (PCR), Nasopharyngeal [[id number]] Collected:  03/12/20 1446    Order Status:  Sent Specimen:  Nasopharyngeal Swab Updated:  03/12/20 4001          I have reviewed all pertinent labs within the past 24 hours.    Diagnostic Results:  I have reviewed and interpreted all pertinent imaging results/findings within the past 24 hours.

## 2020-03-13 NOTE — CONSULTS
[facility]  Heart Transplant  Consult Note    Patient Name: Jaquan Hines Jr.  MRN: [id number]  Admission Date: 3/12/2020  Hospital Length of Stay: 1 days  Attending Physician: Nilay Arvizu*  Primary Care Provider: Allen Hubbard MD   Principal Problem:Acute hypoxemic respiratory failure    Inpatient consult to Heart Transplant  Consult performed by: Lisa Bridges MD  Consult ordered by: Larry Rosado PA-C        Subjective:     History of Present Illness:  48 yo man with HTN, morbid obesity with OHS and chronic hypoxic resp failure, HTN, referred by Dr Jennings for management of PH- pt underwent PTE on 3/13/18. Last seen by Dr. Hinojosa in clinic on 1/24/2020 and was doing well then on Uptravi, Adempas, and Macitentan without need for home oxygen. Per records, patient was diagnosed with pharyngitis on 2/27/2020 and treated with amoxicillin without improvement. Subsequently went to urgent care due to lack of improvement and was prescribed oxygen, bronchodilators. Presented to [facility] on 3/08/2020, treated for PNA and discharged on 3/10. Returned immediately due to lack of relief in symptoms and worsening dyspnea leading to intubation for acute hypoxic respiratory failure. Then transferred to [facility] for higher level of care. Based on records, does not appear to be any documentation that PH meds were continued during this admission there. Supposedly negative for flu and negative blood cultures from there as well.     Since arrival he has remained intubated, afebrile but did have increased wbc. Full sepsis work-up underway and being treated for presumptive pneumonia. Of note, patient being tested for COVID-19 also.     TTE 3/13/2020   · Limited Echo.  · Normal left ventricular systolic function. The estimated ejection fraction is 65%.  · Indeterminate left ventricular diastolic function.  · No wall motion abnormalities.  · Normal right ventricular systolic  function.  · The estimated PA systolic pressure is 49 mmHg. Pulmonary hypertension present.  · Elevated central venous pressure (15 mmHg).           Past Medical History:   Diagnosis Date    Acute pulmonary embolism     Anticoagulant long-term use     Bipolar 1 disorder     diagnosed at 16    Chronic respiratory failure with hypoxia     Essential hypertension, malignant     Heart attack     Pulmonary artery hypertension        Past Surgical History:   Procedure Laterality Date    CARDIAC CATHETERIZATION      CATARACT EXTRACTION      MASS EXCISION      inner thigh, lipoma    TONSILLECTOMY, ADENOIDECTOMY         Review of patient's allergies indicates:   Allergen Reactions    Clopidogrel      Other reaction(s): Other (See Comments)  Pt reports it does not work        Current Facility-Administered Medications   Medication    albuterol-ipratropium 2.5 mg-0.5 mg/3 mL nebulizer solution 3 mL    apixaban tablet 5 mg    chlorhexidine 0.12 % solution 15 mL    famotidine tablet 20 mg    furosemide injection 60 mg    labetalol 20 mg/4 mL (5 mg/mL) IV syring    piperacillin-tazobactam 4.5 g in sodium chloride 0.9% 100 mL IVPB (ready to mix system)    propofol (DIPRIVAN) 10 mg/mL infusion    vancomycin 1.75 g in 5 % dextrose 500 mL IVPB     Family History     None        Tobacco Use    Smoking status: Never Smoker    Smokeless tobacco: Never Used   Substance and Sexual Activity    Alcohol use: No    Drug use: No    Sexual activity: Not on file     Review of Systems   Unable to perform ROS: Intubated     Objective:     Vital Signs (Most Recent):  Temp: 98.5 °F (36.9 °C) (03/13/20 0700)  Pulse: 74 (03/13/20 0930)  Resp: 18 (03/13/20 0930)  BP: (!) 103/56 (03/13/20 0930)  SpO2: (!) 94 % (03/13/20 0930) Vital Signs (24h Range):  Temp:  [98.5 °F (36.9 °C)-99.1 °F (37.3 °C)] 98.5 °F (36.9 °C)  Pulse:  [73-89] 74  Resp:  [13-39] 18  SpO2:  [93 %-100 %] 94 %  BP: ()/(50-76) 103/56     Patient Vitals for  the past 72 hrs (Last 3 readings):   Weight   03/13/20 0604 (!) 168.2 kg (370 lb 13 oz)   03/12/20 1119 (!) 175.6 kg (387 lb 2 oz)     Body mass index is 56.38 kg/m².      Intake/Output Summary (Last 24 hours) at 3/13/2020 0940  Last data filed at 3/13/2020 0900  Gross per 24 hour   Intake 1455.75 ml   Output 5975 ml   Net -4519.25 ml       Physical Exam  *Physical exam performed by attending due to special isolation precautions*  Vital signs reviewed  Constitutional: Intubated and sedated.  HENT:   Head: Normocephalic and atraumatic.   Eyes: EOM are normal.   Neck: No JVD present.   Cardiovascular: Normal rate, regular rhythm.  Pulmonary/Chest: Effort normal   Musculoskeletal: There is no edema present   Skin: Skin is warm and dry.       Significant Labs:  CBC:  Recent Labs   Lab 03/12/20  1319 03/13/20  0310   WBC 14.80* 11.50   RBC 3.55* 3.48*   HGB 7.9* 7.7*   HCT 27.2* 26.8*    315   MCV 77* 77*   MCH 22.3* 22.1*   MCHC 29.0* 28.7*     BNP:  Recent Labs   Lab 03/12/20  1319   BNP 69     CMP:  Recent Labs   Lab 03/12/20  1126 03/13/20  0310   * 99   CALCIUM 7.9* 7.8*   ALBUMIN 2.4* 2.2*   PROT 5.8* 5.6*   * 136   K 4.0 3.3*   CO2 31* 33*   CL 94* 94*   BUN 21* 16   CREATININE 0.8 0.8   ALKPHOS 130 118   ALT 60* 52*   AST 54* 42*   BILITOT 0.6 0.5      Coagulation:   No results for input(s): PT, INR, APTT in the last 168 hours.  LDH:  No results for input(s): LDH in the last 72 hours.  Microbiology:  Microbiology Results (last 7 days)     Procedure Component Value Units Date/Time    Culture, Respiratory with Gram Stain [[id number]] Collected:  03/12/20 1153    Order Status:  Completed Specimen:  Respiratory from Endotracheal Aspirate Updated:  03/13/20 0900     Respiratory Culture No Growth     Gram Stain (Respiratory) <10 epithelial cells per low power field.     Gram Stain (Respiratory) Moderate WBC's     Gram Stain (Respiratory) No organisms seen    Narrative:       Mini-BAL. Before  antibiotics.    Blood culture [[id number]] Collected:  03/12/20 1140    Order Status:  Completed Specimen:  Blood from Peripheral, Forearm, Left Updated:  03/12/20 1915     Blood Culture, Routine No Growth to date    Narrative:       Blood cultures from 2 different sites. 4 bottles total.  Please draw before starting antibiotics.    Blood culture [[id number]] Collected:  03/12/20 1140    Order Status:  Completed Specimen:  Blood from Peripheral, Forearm, Right Updated:  03/12/20 1915     Blood Culture, Routine No Growth to date    Narrative:       Blood cultures x 2 different sites. 4 bottles total. Please  draw cultures before administering antibiotics.    Respiratory Infection Panel (PCR), Nasopharyngeal [[id number]] Collected:  03/12/20 1446    Order Status:  Sent Specimen:  Nasopharyngeal Swab Updated:  03/12/20 6195          I have reviewed all pertinent labs within the past 24 hours.    Diagnostic Results:  I have reviewed and interpreted all pertinent imaging results/findings within the past 24 hours.    Assessment/Plan:     * Acute hypoxemic respiratory failure  Transferred from OSH 2/2 respiratory failure with known history of CTEPH. Currently under sepsis work-up and being treated for presumed PNA. Respiratory viral panel negative, flu negative. Afebrile. Elevated wbc on arrival and trending down.    Recs:  - See pulm htn    Pulmonary hypertension  50 yo M pmhx hx of CTEPH s/p PTE on 3/18/18 transferred to [facility] for higher level of care. Consulted regarding recommendations for pulm htn medication management. Likely has not had proper medications in over 72hrs. Echo done and rough estimate of PA pressure 45, RV and LV function look fairly normal as well.     Recs:  - Agree with continuation of lasix  - rec close monitoring of cvp (check at least once daily) CVP ~11 during our evaluation and rec goal of ~10  - Agree with resuming Adempas at 2.5mg TID but hold on resuming any further home PH medications at this  time  - Will plan to resume Macitentan and Uptravi once patient extubated and more stable from respiratory standpoint  - Ok to start inhaled nitric oxide at 10ppm while intubated  - strict ins/outs  - supportive care per primary        Thank you for your consult. I will follow-up with patient. Please contact us if you have any additional questions.    Lisa Bridges MD  Heart Transplant  [facility]

## 2020-03-13 NOTE — HOSPITAL COURSE
Admitted to Critical Care Medicine as a transfer from [facility] for acute hypoxemic respiratory failure. Negative flu and no growth reported from OSH. Due to concerns for COVID 19, appropriate testing was sent off; awaiting results. Started on aggressive diuresis and broad spectrum antibiotics. HTS consulted, 1 out of 3 home pulmonary HTN meds restarted as the other 2 are unable to be crushed and administered through the OG tube. HTS started Nitric as complementary treatment for pulmonary HTN, RIP positive for metapneumonvirus. COVID negative

## 2020-03-13 NOTE — PROGRESS NOTES
[facility]  Heart Transplant  Progress Note    Patient Name: Jaquan Hines Jr.  MRN: [id number]  Admission Date: 3/12/2020  Hospital Length of Stay: 1 days  Attending Physician: Nilay Arvizu*  Primary Care Provider: Allen Hubbard MD  Principal Problem:Acute hypoxemic respiratory failure    Subjective:     Past Medical History:   Diagnosis Date    Acute pulmonary embolism     Anticoagulant long-term use     Bipolar 1 disorder     diagnosed at 16    Chronic respiratory failure with hypoxia     Essential hypertension, malignant     Heart attack     Pulmonary artery hypertension        Past Surgical History:   Procedure Laterality Date    CARDIAC CATHETERIZATION      CATARACT EXTRACTION      MASS EXCISION      inner thigh, lipoma    TONSILLECTOMY, ADENOIDECTOMY         Review of patient's allergies indicates:   Allergen Reactions    Clopidogrel      Other reaction(s): Other (See Comments)  Pt reports it does not work        Current Facility-Administered Medications   Medication    albuterol-ipratropium 2.5 mg-0.5 mg/3 mL nebulizer solution 3 mL    apixaban tablet 5 mg    chlorhexidine 0.12 % solution 15 mL    famotidine tablet 20 mg    furosemide injection 60 mg    labetalol 20 mg/4 mL (5 mg/mL) IV syring    piperacillin-tazobactam 4.5 g in sodium chloride 0.9% 100 mL IVPB (ready to mix system)    propofol (DIPRIVAN) 10 mg/mL infusion    vancomycin 1.75 g in 5 % dextrose 500 mL IVPB     Family History     None        Tobacco Use    Smoking status: Never Smoker    Smokeless tobacco: Never Used   Substance and Sexual Activity    Alcohol use: No    Drug use: No    Sexual activity: Not on file     Review of Systems   Unable to perform ROS: Intubated     Objective:     Vital Signs (Most Recent):  Temp: 98.5 °F (36.9 °C) (03/13/20 0700)  Pulse: 74 (03/13/20 0930)  Resp: 18 (03/13/20 0930)  BP: (!) 103/56 (03/13/20 0930)  SpO2: (!) 94 % (03/13/20 0930) Vital Signs (24h  Range):  Temp:  [98.5 °F (36.9 °C)-99.1 °F (37.3 °C)] 98.5 °F (36.9 °C)  Pulse:  [73-89] 74  Resp:  [13-39] 18  SpO2:  [93 %-100 %] 94 %  BP: ()/(50-76) 103/56     Patient Vitals for the past 72 hrs (Last 3 readings):   Weight   03/13/20 0604 (!) 168.2 kg (370 lb 13 oz)   03/12/20 1119 (!) 175.6 kg (387 lb 2 oz)     Body mass index is 56.38 kg/m².      Intake/Output Summary (Last 24 hours) at 3/13/2020 0940  Last data filed at 3/13/2020 0900  Gross per 24 hour   Intake 1455.75 ml   Output 5975 ml   Net -4519.25 ml       Physical Exam  *Physical exam performed by attending due to special isolation precautions*  Vital signs reviewed  Constitutional: Intubated and sedated.  HENT:   Head: Normocephalic and atraumatic.   Eyes: EOM are normal.   Neck: No JVD present.   Cardiovascular: Normal rate, regular rhythm.  Pulmonary/Chest: Effort normal   Musculoskeletal: There is no edema present   Skin: Skin is warm and dry.       Significant Labs:  CBC:  Recent Labs   Lab 03/12/20  1319 03/13/20  0310   WBC 14.80* 11.50   RBC 3.55* 3.48*   HGB 7.9* 7.7*   HCT 27.2* 26.8*    315   MCV 77* 77*   MCH 22.3* 22.1*   MCHC 29.0* 28.7*     BNP:  Recent Labs   Lab 03/12/20  1319   BNP 69     CMP:  Recent Labs   Lab 03/12/20  1126 03/13/20  0310   * 99   CALCIUM 7.9* 7.8*   ALBUMIN 2.4* 2.2*   PROT 5.8* 5.6*   * 136   K 4.0 3.3*   CO2 31* 33*   CL 94* 94*   BUN 21* 16   CREATININE 0.8 0.8   ALKPHOS 130 118   ALT 60* 52*   AST 54* 42*   BILITOT 0.6 0.5      Coagulation:   No results for input(s): PT, INR, APTT in the last 168 hours.  LDH:  No results for input(s): LDH in the last 72 hours.  Microbiology:  Microbiology Results (last 7 days)     Procedure Component Value Units Date/Time    Culture, Respiratory with Gram Stain [[id number]] Collected:  03/12/20 1153    Order Status:  Completed Specimen:  Respiratory from Endotracheal Aspirate Updated:  03/13/20 0900     Respiratory Culture No Growth     Gram Stain  (Respiratory) <10 epithelial cells per low power field.     Gram Stain (Respiratory) Moderate WBC's     Gram Stain (Respiratory) No organisms seen    Narrative:       Mini-BAL. Before antibiotics.    Blood culture [[id number]] Collected:  03/12/20 1140    Order Status:  Completed Specimen:  Blood from Peripheral, Forearm, Left Updated:  03/12/20 1915     Blood Culture, Routine No Growth to date    Narrative:       Blood cultures from 2 different sites. 4 bottles total.  Please draw before starting antibiotics.    Blood culture [[id number]] Collected:  03/12/20 1140    Order Status:  Completed Specimen:  Blood from Peripheral, Forearm, Right Updated:  03/12/20 1915     Blood Culture, Routine No Growth to date    Narrative:       Blood cultures x 2 different sites. 4 bottles total. Please  draw cultures before administering antibiotics.    Respiratory Infection Panel (PCR), Nasopharyngeal [[id number]] Collected:  03/12/20 1446    Order Status:  Sent Specimen:  Nasopharyngeal Swab Updated:  03/12/20 7918          I have reviewed all pertinent labs within the past 24 hours.    Diagnostic Results:  I have reviewed and interpreted all pertinent imaging results/findings within the past 24 hours.    Assessment and Plan:     50 yo man with HTN, morbid obesity with OHS and chronic hypoxic resp failure, HTN, referred by Dr Jennings for management of PH- pt underwent PTE on 3/13/18. Last seen by Dr. Hinojosa in clinic on 1/24/2020 and was doing well then on Uptravi, Adempas, and Macitentan without need for home oxygen. Per records, patient was diagnosed with pharyngitis on 2/27/2020 and treated with amoxicillin without improvement. Subsequently went to urgent care due to lack of improvement and was prescribed oxygen, bronchodilators. Presented to [facility] on 3/08/2020, treated for PNA and discharged on 3/10. Returned immediately due to lack of relief in symptoms and worsening dyspnea leading to intubation  for acute hypoxic respiratory failure. Then [place] [facility] [place] of [place]. Based on records, does not appear to be any documentation that PH meds were continued during this admission there. Supposedly negative for flu and negative blood cultures from there as well.     Since arrival he has remained intubated, afebrile but did have increased wbc. Full sepsis work-up underway and being treated for presumptive pneumonia. Of note, patient being tested for COVID-19 also.     TTE 3/13/2020   · Limited Echo.  · Normal left ventricular systolic function. The estimated ejection fraction is 65%.  · Indeterminate left ventricular diastolic function.  · No wall motion abnormalities.  · Normal right ventricular systolic function.  · The estimated PA systolic pressure is 49 mmHg. Pulmonary hypertension present.  · Elevated central venous pressure (15 mmHg).           * Acute hypoxemic respiratory failure  Transferred from OSH 2/2 respiratory failure with known history of CTEPH. Currently under sepsis work-up and being treated for presumed PNA. Respiratory viral panel negative, flu negative. Afebrile. Elevated wbc on arrival and trending down.    Recs:  - See pulm htn    Pulmonary hypertension  50 yo M pmhx hx of CTEPH s/p PTE on 3/18/18 transferred [place] [facility] [place] of [place]. Consulted regarding recommendations for pulm htn medication management. Likely has not had proper medications in over 72hrs. Echo done and rough estimate of PA pressure 45, RV and LV function look fairly normal as well.     Recs:  - Agree with continuation of lasix  - rec close monitoring of cvp (check at least once daily) CVP ~11 during our evaluation and rec goal of ~10  - Agree with resuming Adempas at 2.5mg TID but hold on resuming any further home PH medications at this time  - Will plan to resume Macitentan and Uptravi once patient extubated and more stable from respiratory standpoint  - Ok to start inhaled nitric oxide at 10ppm  while intubated  - strict ins/outs  - supportive care per primary        Lisa Bridges MD  Heart Transplant  [facility]

## 2020-03-14 LAB
ALBUMIN SERPL BCP-MCNC: 2.3 G/DL (ref 3.5–5.2)
ALLENS TEST: ABNORMAL
ALP SERPL-CCNC: 110 U/L (ref 55–135)
ALT SERPL W/O P-5'-P-CCNC: 44 U/L (ref 10–44)
ANION GAP SERPL CALC-SCNC: 11 MMOL/L (ref 8–16)
ANION GAP SERPL CALC-SCNC: 8 MMOL/L (ref 8–16)
ANISOCYTOSIS BLD QL SMEAR: SLIGHT
AST SERPL-CCNC: 32 U/L (ref 10–40)
BACTERIA SPEC AEROBE CULT: NO GROWTH
BASOPHILS NFR BLD: 0 % (ref 0–1.9)
BILIRUB SERPL-MCNC: 0.7 MG/DL (ref 0.1–1)
BUN SERPL-MCNC: 17 MG/DL (ref 6–20)
BUN SERPL-MCNC: 19 MG/DL (ref 6–20)
CALCIUM SERPL-MCNC: 8.3 MG/DL (ref 8.7–10.5)
CALCIUM SERPL-MCNC: 8.3 MG/DL (ref 8.7–10.5)
CHLORIDE SERPL-SCNC: 94 MMOL/L (ref 95–110)
CHLORIDE SERPL-SCNC: 95 MMOL/L (ref 95–110)
CO2 SERPL-SCNC: 36 MMOL/L (ref 23–29)
CO2 SERPL-SCNC: 36 MMOL/L (ref 23–29)
CREAT SERPL-MCNC: 0.9 MG/DL (ref 0.5–1.4)
CREAT SERPL-MCNC: 1 MG/DL (ref 0.5–1.4)
DIFFERENTIAL METHOD: ABNORMAL
EOSINOPHIL NFR BLD: 2 % (ref 0–8)
ERYTHROCYTE [DISTWIDTH] IN BLOOD BY AUTOMATED COUNT: 15.8 % (ref 11.5–14.5)
EST. GFR  (AFRICAN AMERICAN): >60 ML/MIN/1.73 M^2
EST. GFR  (AFRICAN AMERICAN): >60 ML/MIN/1.73 M^2
EST. GFR  (NON AFRICAN AMERICAN): >60 ML/MIN/1.73 M^2
EST. GFR  (NON AFRICAN AMERICAN): >60 ML/MIN/1.73 M^2
GLUCOSE SERPL-MCNC: 110 MG/DL (ref 70–110)
GLUCOSE SERPL-MCNC: 114 MG/DL (ref 70–110)
GRAM STN SPEC: NORMAL
HCO3 UR-SCNC: 36.1 MMOL/L (ref 24–28)
HCT VFR BLD AUTO: 28.2 % (ref 40–54)
HGB BLD-MCNC: 8.2 G/DL (ref 14–18)
HYPOCHROMIA BLD QL SMEAR: ABNORMAL
IMM GRANULOCYTES # BLD AUTO: ABNORMAL K/UL (ref 0–0.04)
IMM GRANULOCYTES NFR BLD AUTO: ABNORMAL % (ref 0–0.5)
LYMPHOCYTES NFR BLD: 11 % (ref 18–48)
MAGNESIUM SERPL-MCNC: 2.1 MG/DL (ref 1.6–2.6)
MAGNESIUM SERPL-MCNC: 2.1 MG/DL (ref 1.6–2.6)
MCH RBC QN AUTO: 22.5 PG (ref 27–31)
MCHC RBC AUTO-ENTMCNC: 29.1 G/DL (ref 32–36)
MCV RBC AUTO: 78 FL (ref 82–98)
METAMYELOCYTES NFR BLD MANUAL: 1 %
METHEMOGLOBIN: 0.8 % (ref 0–3)
MONOCYTES NFR BLD: 4 % (ref 4–15)
MYELOCYTES NFR BLD MANUAL: 2 %
NEUTROPHILS NFR BLD: 77 % (ref 38–73)
NEUTS BAND NFR BLD MANUAL: 3 %
NRBC BLD-RTO: 2 /100 WBC
OVALOCYTES BLD QL SMEAR: ABNORMAL
PCO2 BLDA: 45.6 MMHG (ref 35–45)
PH SMN: 7.51 [PH] (ref 7.35–7.45)
PHOSPHATE SERPL-MCNC: 3.4 MG/DL (ref 2.7–4.5)
PHOSPHATE SERPL-MCNC: 3.4 MG/DL (ref 2.7–4.5)
PLATELET # BLD AUTO: 366 K/UL (ref 150–350)
PLATELET BLD QL SMEAR: ABNORMAL
PMV BLD AUTO: 11.1 FL (ref 9.2–12.9)
PO2 BLDA: 67 MMHG (ref 80–100)
POC BE: 13 MMOL/L
POC SATURATED O2: 95 % (ref 95–100)
POC TCO2: 37 MMOL/L (ref 23–27)
POIKILOCYTOSIS BLD QL SMEAR: SLIGHT
POLYCHROMASIA BLD QL SMEAR: ABNORMAL
POTASSIUM SERPL-SCNC: 3.3 MMOL/L (ref 3.5–5.1)
POTASSIUM SERPL-SCNC: 4.3 MMOL/L (ref 3.5–5.1)
PROT SERPL-MCNC: 6.2 G/DL (ref 6–8.4)
RBC # BLD AUTO: 3.64 M/UL (ref 4.6–6.2)
SAMPLE: ABNORMAL
SITE: ABNORMAL
SODIUM SERPL-SCNC: 139 MMOL/L (ref 136–145)
SODIUM SERPL-SCNC: 141 MMOL/L (ref 136–145)
VANCOMYCIN TROUGH SERPL-MCNC: 13.9 UG/ML (ref 10–22)
WBC # BLD AUTO: 13.5 K/UL (ref 3.9–12.7)

## 2020-03-14 PROCEDURE — 27200966 HC CLOSED SUCTION SYSTEM

## 2020-03-14 PROCEDURE — 84100 ASSAY OF PHOSPHORUS: CPT | Mod: 91

## 2020-03-14 PROCEDURE — 25000003 PHARM REV CODE 250: Performed by: NURSE PRACTITIONER

## 2020-03-14 PROCEDURE — 99291 CRITICAL CARE FIRST HOUR: CPT | Mod: ,,, | Performed by: INTERNAL MEDICINE

## 2020-03-14 PROCEDURE — 85007 BL SMEAR W/DIFF WBC COUNT: CPT

## 2020-03-14 PROCEDURE — 25000003 PHARM REV CODE 250: Performed by: GENERAL ACUTE CARE HOSPITAL

## 2020-03-14 PROCEDURE — 25000003 PHARM REV CODE 250: Performed by: STUDENT IN AN ORGANIZED HEALTH CARE EDUCATION/TRAINING PROGRAM

## 2020-03-14 PROCEDURE — 82803 BLOOD GASES ANY COMBINATION: CPT

## 2020-03-14 PROCEDURE — 99291 PR CRITICAL CARE, E/M 30-74 MINUTES: ICD-10-PCS | Mod: ,,, | Performed by: INTERNAL MEDICINE

## 2020-03-14 PROCEDURE — 99900026 HC AIRWAY MAINTENANCE (STAT)

## 2020-03-14 PROCEDURE — 94640 AIRWAY INHALATION TREATMENT: CPT

## 2020-03-14 PROCEDURE — 99291 CRITICAL CARE FIRST HOUR: CPT | Mod: ,,, | Performed by: NURSE PRACTITIONER

## 2020-03-14 PROCEDURE — 84100 ASSAY OF PHOSPHORUS: CPT

## 2020-03-14 PROCEDURE — 80048 BASIC METABOLIC PNL TOTAL CA: CPT

## 2020-03-14 PROCEDURE — 25000242 PHARM REV CODE 250 ALT 637 W/ HCPCS: Performed by: GENERAL ACUTE CARE HOSPITAL

## 2020-03-14 PROCEDURE — 80202 ASSAY OF VANCOMYCIN: CPT

## 2020-03-14 PROCEDURE — 83050 HGB METHEMOGLOBIN QUAN: CPT

## 2020-03-14 PROCEDURE — 36600 WITHDRAWAL OF ARTERIAL BLOOD: CPT

## 2020-03-14 PROCEDURE — 83735 ASSAY OF MAGNESIUM: CPT | Mod: 91

## 2020-03-14 PROCEDURE — 20000000 HC ICU ROOM

## 2020-03-14 PROCEDURE — 63600175 PHARM REV CODE 636 W HCPCS: Performed by: INTERNAL MEDICINE

## 2020-03-14 PROCEDURE — 99900035 HC TECH TIME PER 15 MIN (STAT)

## 2020-03-14 PROCEDURE — 94761 N-INVAS EAR/PLS OXIMETRY MLT: CPT

## 2020-03-14 PROCEDURE — 63600367 HC NITRIC OXIDE PER HOUR

## 2020-03-14 PROCEDURE — 85027 COMPLETE CBC AUTOMATED: CPT

## 2020-03-14 PROCEDURE — 83735 ASSAY OF MAGNESIUM: CPT

## 2020-03-14 PROCEDURE — 63600175 PHARM REV CODE 636 W HCPCS: Performed by: GENERAL ACUTE CARE HOSPITAL

## 2020-03-14 PROCEDURE — 80053 COMPREHEN METABOLIC PANEL: CPT

## 2020-03-14 PROCEDURE — 94003 VENT MGMT INPAT SUBQ DAY: CPT

## 2020-03-14 PROCEDURE — 99291 PR CRITICAL CARE, E/M 30-74 MINUTES: ICD-10-PCS | Mod: ,,, | Performed by: NURSE PRACTITIONER

## 2020-03-14 RX ORDER — POTASSIUM CHLORIDE 20 MEQ/15ML
40 SOLUTION ORAL
Status: COMPLETED | OUTPATIENT
Start: 2020-03-14 | End: 2020-03-15

## 2020-03-14 RX ADMIN — PROPOFOL 40 MCG/KG/MIN: 10 INJECTION, EMULSION INTRAVENOUS at 02:03

## 2020-03-14 RX ADMIN — PIPERACILLIN AND TAZOBACTAM 4.5 G: 4; .5 INJECTION, POWDER, LYOPHILIZED, FOR SOLUTION INTRAVENOUS; PARENTERAL at 08:03

## 2020-03-14 RX ADMIN — APIXABAN 5 MG: 5 TABLET, FILM COATED ORAL at 08:03

## 2020-03-14 RX ADMIN — FAMOTIDINE 20 MG: 20 TABLET, FILM COATED ORAL at 08:03

## 2020-03-14 RX ADMIN — RIOCIGUAT 2.5 MG: 2.5 TABLET, FILM COATED ORAL at 08:03

## 2020-03-14 RX ADMIN — IPRATROPIUM BROMIDE AND ALBUTEROL SULFATE 3 ML: .5; 3 SOLUTION RESPIRATORY (INHALATION) at 01:03

## 2020-03-14 RX ADMIN — PROPOFOL 45 MCG/KG/MIN: 10 INJECTION, EMULSION INTRAVENOUS at 05:03

## 2020-03-14 RX ADMIN — IPRATROPIUM BROMIDE AND ALBUTEROL SULFATE 3 ML: .5; 3 SOLUTION RESPIRATORY (INHALATION) at 07:03

## 2020-03-14 RX ADMIN — PIPERACILLIN AND TAZOBACTAM 4.5 G: 4; .5 INJECTION, POWDER, LYOPHILIZED, FOR SOLUTION INTRAVENOUS; PARENTERAL at 04:03

## 2020-03-14 RX ADMIN — FUROSEMIDE 60 MG: 10 INJECTION, SOLUTION INTRAMUSCULAR; INTRAVENOUS at 04:03

## 2020-03-14 RX ADMIN — FUROSEMIDE 60 MG: 10 INJECTION, SOLUTION INTRAMUSCULAR; INTRAVENOUS at 06:03

## 2020-03-14 RX ADMIN — PROPOFOL 40 MCG/KG/MIN: 10 INJECTION, EMULSION INTRAVENOUS at 12:03

## 2020-03-14 RX ADMIN — POTASSIUM CHLORIDE 40 MEQ: 20 SOLUTION ORAL at 02:03

## 2020-03-14 RX ADMIN — VANCOMYCIN HYDROCHLORIDE 1750 MG: 10 INJECTION, POWDER, LYOPHILIZED, FOR SOLUTION INTRAVENOUS at 04:03

## 2020-03-14 RX ADMIN — POTASSIUM CHLORIDE 40 MEQ: 20 SOLUTION ORAL at 10:03

## 2020-03-14 RX ADMIN — IPRATROPIUM BROMIDE AND ALBUTEROL SULFATE 3 ML: .5; 3 SOLUTION RESPIRATORY (INHALATION) at 03:03

## 2020-03-14 RX ADMIN — RIOCIGUAT 2.5 MG: 2.5 TABLET, FILM COATED ORAL at 03:03

## 2020-03-14 RX ADMIN — PIPERACILLIN AND TAZOBACTAM 4.5 G: 4; .5 INJECTION, POWDER, LYOPHILIZED, FOR SOLUTION INTRAVENOUS; PARENTERAL at 12:03

## 2020-03-14 RX ADMIN — PROPOFOL 45 MCG/KG/MIN: 10 INJECTION, EMULSION INTRAVENOUS at 11:03

## 2020-03-14 RX ADMIN — IPRATROPIUM BROMIDE AND ALBUTEROL SULFATE 3 ML: .5; 3 SOLUTION RESPIRATORY (INHALATION) at 11:03

## 2020-03-14 RX ADMIN — IPRATROPIUM BROMIDE AND ALBUTEROL SULFATE 3 ML: .5; 3 SOLUTION RESPIRATORY (INHALATION) at 12:03

## 2020-03-14 RX ADMIN — PROPOFOL 45 MCG/KG/MIN: 10 INJECTION, EMULSION INTRAVENOUS at 04:03

## 2020-03-14 RX ADMIN — CHLORHEXIDINE GLUCONATE 0.12% ORAL RINSE 15 ML: 1.2 LIQUID ORAL at 08:03

## 2020-03-14 RX ADMIN — PROPOFOL 45 MCG/KG/MIN: 10 INJECTION, EMULSION INTRAVENOUS at 09:03

## 2020-03-14 RX ADMIN — VANCOMYCIN HYDROCHLORIDE 1750 MG: 10 INJECTION, POWDER, LYOPHILIZED, FOR SOLUTION INTRAVENOUS at 05:03

## 2020-03-14 RX ADMIN — PROPOFOL 50 MCG/KG/MIN: 10 INJECTION, EMULSION INTRAVENOUS at 03:03

## 2020-03-14 RX ADMIN — PROPOFOL 45 MCG/KG/MIN: 10 INJECTION, EMULSION INTRAVENOUS at 08:03

## 2020-03-14 RX ADMIN — FUROSEMIDE 60 MG: 10 INJECTION, SOLUTION INTRAMUSCULAR; INTRAVENOUS at 10:03

## 2020-03-14 RX ADMIN — PROPOFOL 45 MCG/KG/MIN: 10 INJECTION, EMULSION INTRAVENOUS at 06:03

## 2020-03-14 RX ADMIN — IPRATROPIUM BROMIDE AND ALBUTEROL SULFATE 3 ML: .5; 3 SOLUTION RESPIRATORY (INHALATION) at 09:03

## 2020-03-14 NOTE — EICU
Rounding (Video Assessment):  Yes    Intervention Initiated From:  [facility] / EICU    Lewis Communicated with Bedside Nurse regarding:  Respiratory    Comments: eICU rounding complete. Pt remains sedated on vent. Spoke to bedside RN regarding respiratory status.

## 2020-03-14 NOTE — PROGRESS NOTES
[facility]  Heart Transplant  Progress Note    Patient Name: Jaquan Hines Jr.  MRN: [id number]  Admission Date: 3/12/2020  Hospital Length of Stay: 2 days  Attending Physician: Nilay Arvizu*  Primary Care Provider: Allen Hubbard MD  Principal Problem:Acute hypoxemic respiratory failure    Subjective:     Interval History: No acute events overnight. Remains intubated. CVP 14 today. Diuresing with Lasix 60mg IVP TID. RVP positive for Human Metapneumovirus. ECHO showed PA 44mmhg.     Continuous Infusions:   nitric oxide gas      propofoL 45 mcg/kg/min (03/14/20 0700)     Scheduled Meds:   albuterol-ipratropium  3 mL Nebulization Q4H    apixaban  5 mg Oral BID    chlorhexidine  15 mL Mouth/Throat BID    famotidine  20 mg Per NG tube Q12H    furosemide (LASIX) IV  60 mg Intravenous Q8H    piperacillin-tazobactam 4.5 g in sodium chloride 0.9% 100 mL IVPB (ready to mix system)  4.5 g Intravenous Q8H    riociguat  2.5 mg Per OG tube TID    vancomycin (VANCOCIN) IVPB  1,750 mg Intravenous Q12H     PRN Meds:labetaloL    Review of patient's allergies indicates:   Allergen Reactions    Clopidogrel      Other reaction(s): Other (See Comments)  Pt reports it does not work      Objective:     Vital Signs (Most Recent):  Temp: 99 °F (37.2 °C) (03/14/20 0700)  Pulse: 85 (03/14/20 0800)  Resp: 17 (03/14/20 0800)  BP: (!) 96/51 (03/14/20 0730)  SpO2: 98 % (03/14/20 0800) Vital Signs (24h Range):  Temp:  [98 °F (36.7 °C)-99 °F (37.2 °C)] 99 °F (37.2 °C)  Pulse:  [] 85  Resp:  [14-37] 17  SpO2:  [86 %-100 %] 98 %  BP: ()/(46-59) 96/51     Patient Vitals for the past 72 hrs (Last 3 readings):   Weight   03/14/20 0700 (!) 164.4 kg (362 lb 7 oz)   03/13/20 1515 (!) 167.8 kg (370 lb)   03/13/20 0604 (!) 168.2 kg (370 lb 13 oz)     Body mass index is 55.11 kg/m².      Intake/Output Summary (Last 24 hours) at 3/14/2020 0800  Last data filed at 3/14/2020 0700  Gross per 24 hour   Intake  3306 ml   Output 4095 ml   Net -789 ml       Hemodynamic Parameters:       Telemetry: Reviewed    Physical Exam   Constitutional: He appears well-developed and well-nourished.   HENT:   Head: Normocephalic and atraumatic.   ETT   Eyes: Pupils are equal, round, and reactive to light. EOM are normal.   Neck: Normal range of motion. Neck supple. No JVD present.   IJ   Cardiovascular: Normal rate, regular rhythm, normal heart sounds and intact distal pulses. Exam reveals no gallop and no friction rub.   No murmur heard.  Pulmonary/Chest: No respiratory distress. He has no wheezes. He has rales. He exhibits no tenderness.   Mechanical breath sounds   Abdominal: Soft. Bowel sounds are normal. He exhibits no distension and no mass. There is no tenderness.   Musculoskeletal: Normal range of motion. He exhibits no edema.   Neurological:   Intubated, sedated   Skin: Skin is warm and dry.   Vitals reviewed.      Significant Labs:  CBC:  Recent Labs   Lab 03/12/20  1319 03/13/20  0310 03/14/20  0438   WBC 14.80* 11.50 13.50*   RBC 3.55* 3.48* 3.64*   HGB 7.9* 7.7* 8.2*   HCT 27.2* 26.8* 28.2*    315 366*   MCV 77* 77* 78*   MCH 22.3* 22.1* 22.5*   MCHC 29.0* 28.7* 29.1*     BNP:  Recent Labs   Lab 03/12/20  1319   BNP 69     CMP:  Recent Labs   Lab 03/12/20  1126 03/13/20  0310 03/13/20  1832 03/14/20  0438   * 99 118* 114*   CALCIUM 7.9* 7.8* 8.0* 8.3*   ALBUMIN 2.4* 2.2*  --  2.3*   PROT 5.8* 5.6*  --  6.2   * 136 138 139   K 4.0 3.3* 3.2* 4.3   CO2 31* 33* 37* 36*   CL 94* 94* 94* 95   BUN 21* 16 14 17   CREATININE 0.8 0.8 0.8 1.0   ALKPHOS 130 118  --  110   ALT 60* 52*  --  44   AST 54* 42*  --  32   BILITOT 0.6 0.5  --  0.7      Coagulation:   No results for input(s): PT, INR, APTT in the last 168 hours.  LDH:  No results for input(s): LDH in the last 72 hours.  Microbiology:  Microbiology Results (last 7 days)     Procedure Component Value Units Date/Time    Respiratory Infection Panel (PCR),  Nasopharyngeal [[id number]]  (Abnormal) Collected:  03/12/20 1446    Order Status:  Completed Specimen:  Nasopharyngeal Swab Updated:  03/13/20 1916     Respiratory Infection Panel Source NP Swab     Adenovirus Not Detected     Coronavirus 229E, Common Cold Virus Not Detected     Coronavirus HKU1, Common Cold Virus Not Detected     Coronavirus NL63, Common Cold Virus Not Detected     Coronavirus OC43, Common Cold Virus Not Detected     Comment: The Coronavirus strains detected in this test cause the common cold.  These strains are not the COVID-19 (novel Coronavirus)strain   associated with the respiratory disease outbreak.          Human Metapneumovirus Detected     Human Rhinovirus/Enterovirus Not Detected     Influenza A (subtypes H1, H1-2009,H3) Not Detected     Influenza B Not Detected     Parainfluenza Virus 1 Not Detected     Parainfluenza Virus 2 Not Detected     Parainfluenza Virus 3 Not Detected     Parainfluenza Virus 4 Not Detected     Respiratory Syncytial Virus Not Detected     Bordetella Parapertussis (OP8064) Not Detected     Bordetella pertussis (ptxP) Not Detected     Chlamydia pneumoniae Not Detected     Mycoplasma pneumoniae Not Detected     Comment: Respiratory Infection Panel testing performed by Multiplex PCR.       Narrative:       For all other respiratory sources order [id number] Respiratory  Viral Panel by PCR (RVPCR)    Blood culture [[id number]] Collected:  03/12/20 1140    Order Status:  Completed Specimen:  Blood from Peripheral, Forearm, Left Updated:  03/13/20 1412     Blood Culture, Routine No Growth to date      No Growth to date    Narrative:       Blood cultures from 2 different sites. 4 bottles total.  Please draw before starting antibiotics.    Blood culture [[id number]] Collected:  03/12/20 1140    Order Status:  Completed Specimen:  Blood from Peripheral, Forearm, Right Updated:  03/13/20 1412     Blood Culture, Routine No Growth to date      No Growth to date    Narrative:        Blood cultures x 2 different sites. 4 bottles total. Please  draw cultures before administering antibiotics.    Culture, Respiratory with Gram Stain [[id number]] Collected:  03/12/20 1153    Order Status:  Completed Specimen:  Respiratory from Endotracheal Aspirate Updated:  03/13/20 0900     Respiratory Culture No Growth     Gram Stain (Respiratory) <10 epithelial cells per low power field.     Gram Stain (Respiratory) Moderate WBC's     Gram Stain (Respiratory) No organisms seen    Narrative:       Mini-BAL. Before antibiotics.          I have reviewed all pertinent labs within the past 24 hours.    Estimated Creatinine Clearance: 132 mL/min (based on SCr of 1 mg/dL).    Diagnostic Results:  I have reviewed and interpreted all pertinent imaging results/findings within the past 24 hours.    Assessment and Plan:     48 yo man with HTN, morbid obesity with OHS and chronic hypoxic resp failure, HTN, referred by Dr Jennings for management of PH- pt underwent PTE on 3/13/18. Last seen by Dr. Hinojosa in clinic on 1/24/2020 and was doing well then on Uptravi, Adempas, and Macitentan without need for home oxygen. Per records, patient was diagnosed with pharyngitis on 2/27/2020 and treated with amoxicillin without improvement. Subsequently went to urgent care due to lack of improvement and was prescribed oxygen, bronchodilators. Presented to [facility] on 3/08/2020, treated for PNA and discharged on 3/10. Returned immediately due to lack of relief in symptoms and worsening dyspnea leading to intubation for acute hypoxic respiratory failure. Then transferred to [facility] for higher level of care. Based on records, does not appear to be any documentation that PH meds were continued during this admission there. Supposedly negative for flu and negative blood cultures from there as well.     Since arrival he has remained intubated, afebrile but did have increased wbc. Full sepsis work-up underway and being  treated for presumptive pneumonia. Of note, patient being tested for COVID-19 also.     TTE 3/13/2020   · Limited Echo.  · Normal left ventricular systolic function. The estimated ejection fraction is 65%.  · Indeterminate left ventricular diastolic function.  · No wall motion abnormalities.  · Normal right ventricular systolic function.  · The estimated PA systolic pressure is 49 mmHg. Pulmonary hypertension present.  · Elevated central venous pressure (15 mmHg).           * Acute hypoxemic respiratory failure  Transferred from OSH 2/2 respiratory failure with known history of CTEPH. Currently under sepsis work-up and being treated for presumed PNA.     Respiratory viral panel positive for Human Metapneumovirus.    Pulmonary hypertension  50 yo M pmhx hx of CTEPH s/p PTE on 3/18/18 transferred to [facility] for higher level of care. Consulted regarding recommendations for pulm htn medication management. Echo done and PA pressure 45, RV and LV function look normal    Recs:  - Agree with continuation of lasix  - rec close monitoring of cvp, rec goal of ~10  - Agree with resuming Adempas at 2.5mg TID but hold on resuming any further home PH medications at this time  - Will plan to resume Macitentan and Uptravi once patient extubated and more stable from respiratory standpoint  - Ok to start inhaled nitric oxide at 10ppm while intubated  - strict ins/outs    CTEPH (chronic thromboembolic pulmonary hypertension)  S/p PTE 3/13/18      Pérez Dean MD  Heart Transplant  [facility]

## 2020-03-14 NOTE — PROGRESS NOTES
[facility]  Critical Care Medicine  Progress Note    Patient Name: Jaquan Hines Jr.  MRN: [id number]  Admission Date: 3/12/2020  Hospital Length of Stay: 2 days  Code Status: Full Code  Attending Provider: Nilay Arvizu*  Primary Care Provider: Allen Hubbard MD   Principal Problem: Acute hypoxemic respiratory failure    Subjective:     HPI:  Mr. Hines is a 50 y/o male with a history significant for pulmonary embolus with resulting pulmonary hypertension (improved s/p pulmonary thrombectomy March 2018) who was in his usual state of health (full ADLs and IADLs no longer requiring supplemental home O2) until February 27th, 2020 when he began having pharyngitis. He began taking amoxicillin, which he had at home without relief. He eventually went to an urgent care where he was given IM ceftriaxone and steroids. He also started using home bronchodilators and oxygen which he typically does not need. He presented to [facility] on 03/08/20 with worsening dyspnea and was admitted with a presumptive diagnosis on pneumonia (records not available) and was discharged on 03/10/20. Immediately after leaving the hospital he began having worsening dyspnea so his sister brought him back to the ED and he was admitted for hypoxemic respiratory failure. He was started on bipap, which he failed so he was intubated. He was treated with pip/tazo + vancomycin and diuretics.     Tmax at the OSH on presentation was 99.5F. Microlab reports blood and urine cultures are NGTD (drawn 3/10/20). An influenza test at the OSH was also negative.     Hospital/ICU Course:  Admitted to Critical Care Medicine as a transfer from [facility] for acute hypoxemic respiratory failure. Negative flu and no growth reported from OSH. Due to concerns for COVID 19, appropriate testing was sent off; awaiting results. Started on aggressive diuresis and broad spectrum  antibiotics. HTS consulted, 1 out of 3 home pulmonary HTN meds restarted as the other 2 are unable to be crushed and administered through the OG tube. HTS started Nitric as complementary treatment for pulmonary HTN, RIP positive for metapneumonvirus.     Interval History/Significant Events: metapneumovirus positive, still awaiting COVID19 results (sent 3/12), continue nitric oxide per HTS. Wean vent settings as tolerated    Review of Systems   Unable to perform ROS: Intubated     Objective:     Vital Signs (Most Recent):  Temp: 99 °F (37.2 °C) (03/14/20 0700)  Pulse: 85 (03/14/20 0830)  Resp: 18 (03/14/20 0830)  BP: (!) 92/53 (03/14/20 0830)  SpO2: 96 % (03/14/20 0830) Vital Signs (24h Range):  Temp:  [98 °F (36.7 °C)-99 °F (37.2 °C)] 99 °F (37.2 °C)  Pulse:  [] 85  Resp:  [14-37] 18  SpO2:  [86 %-100 %] 96 %  BP: ()/(46-59) 92/53   Weight: (!) 164.4 kg (362 lb 7 oz)  Body mass index is 55.11 kg/m².      Intake/Output Summary (Last 24 hours) at 3/14/2020 0851  Last data filed at 3/14/2020 0800  Gross per 24 hour   Intake 3356 ml   Output 4675 ml   Net -1319 ml       Physical Exam   Constitutional: Vital signs are normal. He appears listless. He has a sickly appearance. No distress. He is sedated, intubated and restrained.   Morbid obesity   HENT:   Head: Normocephalic and atraumatic.   Eyes: Pupils are equal, round, and reactive to light. Conjunctivae are normal. Right eye exhibits no exudate. Left eye exhibits no exudate. Right conjunctiva has no hemorrhage. Left conjunctiva has no hemorrhage. No scleral icterus. Right eye exhibits no nystagmus. Left eye exhibits no nystagmus.   Neck: Trachea normal. No neck rigidity. No tracheal deviation present.   Cardiovascular: Normal rate, regular rhythm and normal heart sounds. PMI is not displaced. Exam reveals no gallop and no friction rub.   No murmur heard.  Pulses:       Radial pulses are 2+ on the right side, and 2+ on the left side.   2+ pitting pedal  edema and bilateral lower ankles   Pulmonary/Chest: Effort normal and breath sounds normal. No accessory muscle usage. He is intubated. No respiratory distress. He has no wheezes. He has no rhonchi. He has no rales.   Abdominal: Soft. Normal appearance and bowel sounds are normal. There is no tenderness.   Musculoskeletal: Normal range of motion.   Neurological: He appears listless. No cranial nerve deficit or sensory deficit. GCS eye subscore is 2. GCS verbal subscore is 1. GCS motor subscore is 3.   Sedated, not withdrawing from pain, cough, gag reflexes intact, no focal deficits to note.      Skin: Skin is warm and dry. Capillary refill takes 2 to 3 seconds. He is not diaphoretic. No cyanosis. Nails show no clubbing.   Nursing note and vitals reviewed.      Vents:  Vent Mode: A/C (03/14/20 0351)  Ventilator Initiated: Yes (03/12/20 1117)  Set Rate: 14 BPM (03/14/20 0351)  Vt Set: 420 mL (03/14/20 0351)  PEEP/CPAP: 10 cmH20 (03/14/20 0351)  Oxygen Concentration (%): 50 (03/14/20 0830)  Peak Airway Pressure: 48 cmH2O (03/14/20 0351)  Plateau Pressure: 15 cmH20 (03/14/20 0351)  Total Ve: 0.25 mL (03/14/20 0351)  F/VT Ratio<105 (RSBI): 61800 (03/14/20 0351)  Lines/Drains/Airways     Central Venous Catheter Line                 Introducer 03/13/18 0801 right internal jugular 732 days    Percutaneous Central Line Insertion/Assessment - Triple Lumen  03/12/20 1600 right internal jugular 1 day          Drain                 NG/OG Tube 03/10/20 Center mouth 4 days         Urethral Catheter 03/10/20 4 days          Airway                 Airway - Non-Surgical Endotracheal Tube -- days          Peripheral Intravenous Line                 Peripheral IV - Single Lumen 03/10/20 18 G Left;Proximal Antecubital 4 days         Peripheral IV - Single Lumen 03/10/20 20 G Right;Proximal Antecubital 4 days              Significant Labs:    CBC/Anemia Profile:  Recent Labs   Lab 03/12/20  1319 03/13/20  0310 03/14/20  0438   WBC 14.80*  11.50 13.50*   HGB 7.9* 7.7* 8.2*   HCT 27.2* 26.8* 28.2*    315 366*   MCV 77* 77* 78*   RDW 15.6* 15.8* 15.8*        Chemistries:  Recent Labs   Lab 03/12/20  1126 03/13/20  0310 03/13/20  1832 03/14/20  0438   * 136 138 139   K 4.0 3.3* 3.2* 4.3   CL 94* 94* 94* 95   CO2 31* 33* 37* 36*   BUN 21* 16 14 17   CREATININE 0.8 0.8 0.8 1.0   CALCIUM 7.9* 7.8* 8.0* 8.3*   ALBUMIN 2.4* 2.2*  --  2.3*   PROT 5.8* 5.6*  --  6.2   BILITOT 0.6 0.5  --  0.7   ALKPHOS 130 118  --  110   ALT 60* 52*  --  44   AST 54* 42*  --  32   MG 2.3 2.1  --  2.1   PHOS 3.8 3.6  --  3.4       All pertinent labs within the past 24 hours have been reviewed.    Significant Imaging:  I have reviewed all pertinent imaging results/findings within the past 24 hours.      ABG  Recent Labs   Lab 03/14/20  0351   PH 7.507*   PO2 67*   PCO2 45.6*   HCO3 36.1*   BE 13     Assessment/Plan:     Neuro  Encephalopathy, metabolic  --Likely 2/2 sedation.   --Continue propofol for now. Wean sedation as tolerated.     Pulmonary  * Acute hypoxemic respiratory failure  Secondary to metapneumovirus in setting of severe pHTN    --COVID-19 pending 3/12  --Prodrome (fever, cough, pharyngitis),   --negative influenza, and negative sputum/blood cultures  --Continue Nitric oxide for pHTN mgmt per HTS recs  --Maintain Isolation.   --Duonebs q4h.  --Furosemide 60mg q8h.   --Wean vent as tolerated    Pulmonary hypertension  Appreciate HTS input    --Adempas restarted as it is the only crushable pulm HTN med he is on. Restart the other 2 when able to swallow.   --TTE with Normal EF, PA 49   --nitric oxide mgmt per HTS    Pneumonia  --See above.     Renal/  Other microscopic hematuria  Occult blood 3+, RBC >100 on UA. Urine did not reflex to culture. Making good urine with diuresis.     --watch for now  --consider renal US if concern for stones/hydronephrosis     GI  Elevated LFTs  Slight elevation of LFTs on admit. Down trending.    --Monitor daily            Critical Care Time: 55 minutes  Critical secondary to Patient has a condition that poses threat to life and bodily function: acute respiratory failure      Critical care was time spent personally by me on the following activities: development of treatment plan with patient or surrogate and bedside caregivers, discussions with consultants, evaluation of patient's response to treatment, examination of patient, ordering and performing treatments and interventions, ordering and review of laboratory studies, ordering and review of radiographic studies, pulse oximetry, re-evaluation of patient's condition. This critical care time did not overlap with that of any other provider or involve time for any procedures.     Aleksander Roman NP  Critical Care Medicine  [facility]

## 2020-03-14 NOTE — EICU
Rounding (Video Assessment):  Yes    Intervention Initiated From:  [facility] / EICU    Lewis Communicated with Bedside Nurse regarding:  Other    Comments:  Pt appears asleep, no distress noted, oral ETT to vent, propofol gtt infusing. Spoke with bedside RN about my availability to assist her with rounding on pt if necessary

## 2020-03-14 NOTE — PLAN OF CARE
Pt follows all commands, moves all extremities. Tmax 99.2 F. Pt remains on ventilator, AC 50% FiO2 and 10 PEEP, rate of 14, with 10 ppm of nitric oxide, O2 sats >88%. HR 80-90s NSR. MAP >65. Last flat CVP 11. Propofol gtt @ 50 mcg/kg/min for RASS of -2. Tube feeds at @ goal of 50 cc/h, tolerating well with minimal residuals. UOP  cc/h per zuñiga. Pt remains on airborne, contact, and droplet isolation until COVID-19 ruled out, awaiting results. CCS updated throughout shift. Plan of care reviewed with pt and sister over the phone. VSS at this time. Will continue to monitor. See flowsheet for full assessment details.      Skin note: Bed plugged in, waffle mattress inflated. Pt turned Q2h and lateral rotation initiated on bed. Pillows underneath arms to protect elbows. Heel foams and heel offloading boots in place. Sacral foam in place. Area underneath soft restraints with no breakdown. Skin tear on pubis open to air per wound care recommendation, CDI. No new breakdown noted.

## 2020-03-14 NOTE — SUBJECTIVE & OBJECTIVE
Interval History/Significant Events: metapneumovirus positive, still awaiting COVID19 results (sent 3/12), continue nitric oxide per HTS. Wean vent settings as tolerated    Review of Systems   Unable to perform ROS: Intubated     Objective:     Vital Signs (Most Recent):  Temp: 99 °F (37.2 °C) (03/14/20 0700)  Pulse: 85 (03/14/20 0830)  Resp: 18 (03/14/20 0830)  BP: (!) 92/53 (03/14/20 0830)  SpO2: 96 % (03/14/20 0830) Vital Signs (24h Range):  Temp:  [98 °F (36.7 °C)-99 °F (37.2 °C)] 99 °F (37.2 °C)  Pulse:  [] 85  Resp:  [14-37] 18  SpO2:  [86 %-100 %] 96 %  BP: ()/(46-59) 92/53   Weight: (!) 164.4 kg (362 lb 7 oz)  Body mass index is 55.11 kg/m².      Intake/Output Summary (Last 24 hours) at 3/14/2020 0851  Last data filed at 3/14/2020 0800  Gross per 24 hour   Intake 3356 ml   Output 4675 ml   Net -1319 ml       Physical Exam   Constitutional: Vital signs are normal. He appears listless. He has a sickly appearance. No distress. He is sedated, intubated and restrained.   Morbid obesity   HENT:   Head: Normocephalic and atraumatic.   Eyes: Pupils are equal, round, and reactive to light. Conjunctivae are normal. Right eye exhibits no exudate. Left eye exhibits no exudate. Right conjunctiva has no hemorrhage. Left conjunctiva has no hemorrhage. No scleral icterus. Right eye exhibits no nystagmus. Left eye exhibits no nystagmus.   Neck: Trachea normal. No neck rigidity. No tracheal deviation present.   Cardiovascular: Normal rate, regular rhythm and normal heart sounds. PMI is not displaced. Exam reveals no gallop and no friction rub.   No murmur heard.  Pulses:       Radial pulses are 2+ on the right side, and 2+ on the left side.   2+ pitting pedal edema and bilateral lower ankles   Pulmonary/Chest: Effort normal and breath sounds normal. No accessory muscle usage. He is intubated. No respiratory distress. He has no wheezes. He has no rhonchi. He has no rales.   Abdominal: Soft. Normal appearance and  bowel sounds are normal. There is no tenderness.   Musculoskeletal: Normal range of motion.   Neurological: He appears listless. No cranial nerve deficit or sensory deficit. GCS eye subscore is 2. GCS verbal subscore is 1. GCS motor subscore is 3.   Sedated, not withdrawing from pain, cough, gag reflexes intact, no focal deficits to note.      Skin: Skin is warm and dry. Capillary refill takes 2 to 3 seconds. He is not diaphoretic. No cyanosis. Nails show no clubbing.   Nursing note and vitals reviewed.      Vents:  Vent Mode: A/C (03/14/20 0351)  Ventilator Initiated: Yes (03/12/20 1117)  Set Rate: 14 BPM (03/14/20 0351)  Vt Set: 420 mL (03/14/20 0351)  PEEP/CPAP: 10 cmH20 (03/14/20 0351)  Oxygen Concentration (%): 50 (03/14/20 0830)  Peak Airway Pressure: 48 cmH2O (03/14/20 0351)  Plateau Pressure: 15 cmH20 (03/14/20 0351)  Total Ve: 0.25 mL (03/14/20 0351)  F/VT Ratio<105 (RSBI): 43188 (03/14/20 0351)  Lines/Drains/Airways     Central Venous Catheter Line                 Introducer 03/13/18 0801 right internal jugular 732 days    Percutaneous Central Line Insertion/Assessment - Triple Lumen  03/12/20 1600 right internal jugular 1 day          Drain                 NG/OG Tube 03/10/20 Center mouth 4 days         Urethral Catheter 03/10/20 4 days          Airway                 Airway - Non-Surgical Endotracheal Tube -- days          Peripheral Intravenous Line                 Peripheral IV - Single Lumen 03/10/20 18 G Left;Proximal Antecubital 4 days         Peripheral IV - Single Lumen 03/10/20 20 G Right;Proximal Antecubital 4 days              Significant Labs:    CBC/Anemia Profile:  Recent Labs   Lab 03/12/20  1319 03/13/20  0310 03/14/20  0438   WBC 14.80* 11.50 13.50*   HGB 7.9* 7.7* 8.2*   HCT 27.2* 26.8* 28.2*    315 366*   MCV 77* 77* 78*   RDW 15.6* 15.8* 15.8*        Chemistries:  Recent Labs   Lab 03/12/20  1126 03/13/20  0310 03/13/20  1832 03/14/20  0438   * 136 138 139   K 4.0 3.3*  3.2* 4.3   CL 94* 94* 94* 95   CO2 31* 33* 37* 36*   BUN 21* 16 14 17   CREATININE 0.8 0.8 0.8 1.0   CALCIUM 7.9* 7.8* 8.0* 8.3*   ALBUMIN 2.4* 2.2*  --  2.3*   PROT 5.8* 5.6*  --  6.2   BILITOT 0.6 0.5  --  0.7   ALKPHOS 130 118  --  110   ALT 60* 52*  --  44   AST 54* 42*  --  32   MG 2.3 2.1  --  2.1   PHOS 3.8 3.6  --  3.4       All pertinent labs within the past 24 hours have been reviewed.    Significant Imaging:  I have reviewed all pertinent imaging results/findings within the past 24 hours.

## 2020-03-14 NOTE — ASSESSMENT & PLAN NOTE
Secondary to metapneumovirus in setting of severe pHTN    --COVID-19 pending 3/12  --Prodrome (fever, cough, pharyngitis),   --negative influenza, and negative sputum/blood cultures  --Continue Nitric oxide for pHTN mgmt per HTS recs  --Maintain Isolation.   --Duonebs q4h.  --Furosemide 60mg q8h.   --Wean vent as tolerated

## 2020-03-14 NOTE — ASSESSMENT & PLAN NOTE
Transferred from OSH 2/2 respiratory failure with known history of CTEPH. Currently under sepsis work-up and being treated for presumed PNA.     Respiratory viral panel positive for Human Metapneumovirus.

## 2020-03-14 NOTE — EICU
Rounding (Video Assessment):  Yes    Intervention Initiated From:  [facility] / EICU      Comments: eICU rounding complete. RN at bedside. Pt intubated and sedated at this time.

## 2020-03-14 NOTE — ASSESSMENT & PLAN NOTE
48 yo M pmhx hx of CTEPH s/p PTE on 3/18/18 transferred to [facility] [place] for higher level of care. Consulted regarding recommendations for pulm htn medication management. Echo done and PA pressure 45, RV and LV function look normal    Recs:  - Agree with continuation of lasix  - rec close monitoring of cvp, rec goal of ~10  - Agree with resuming Adempas at 2.5mg TID but hold on resuming any further home PH medications at this time  - Will plan to resume Macitentan and Uptravi once patient extubated and more stable from respiratory standpoint  - Ok to start inhaled nitric oxide at 10ppm while intubated  - strict ins/outs

## 2020-03-14 NOTE — PLAN OF CARE
Pt remained intubated overnight. AC, 60%, 10-peep, rate-14.  Nitric @10 ppm.   O2: > 90%.   HR: 70's-90's, NSR.   MAP: > 65.  CVP: 10-8-14.  Afebrile.    Gtts: Propofol.  UOP:  cc/hr.  No BM this shift.   TF increased to goal of 50 cc/hr with minimal residuals.   Pt follows commands and moves all extremities during sedation vacations. Pt remains on airborne, droplet, and contact precautions until COVID-19 is ruled out, results are pending.      Skin assessment:   Skin tear to super-pubic area, site is dry and open to air per wound care recommendations. Pt on inflated waffle mattress. Heel and sacrum foams intact. Heel off loading boots in use. Wrists under soft restraints free of breakdown. Pt turned Q2hrs with pillows and wedge. Pt free of addition skin breakdown at this time.

## 2020-03-14 NOTE — PROGRESS NOTES
Orders received for RT to decrease FiO2 to 40% from 50% on ventilator. O2 saturation decreased to 87%. Increased FiO2 back to 50%. O2 saturation now at 90%. CCS aware. VSS. WCTM.

## 2020-03-14 NOTE — PROGRESS NOTES
Dr. Marinelli at bedside. Orders to increase PEEP from 8 to 10. Pt's vent settings now at 50% FiO2 and 10 PEEP, rate 14. O2 saturation at 90%. VSS. WCTM.

## 2020-03-14 NOTE — SUBJECTIVE & OBJECTIVE
Interval History: No acute events overnight. Remains intubated. CVP 14 today. Diuresing with Lasix 60mg IVP TID. RVP positive for Human Metapneumovirus. ECHO showed PA 44mmhg.     Continuous Infusions:   nitric oxide gas      propofoL 45 mcg/kg/min (03/14/20 0700)     Scheduled Meds:   albuterol-ipratropium  3 mL Nebulization Q4H    apixaban  5 mg Oral BID    chlorhexidine  15 mL Mouth/Throat BID    famotidine  20 mg Per NG tube Q12H    furosemide (LASIX) IV  60 mg Intravenous Q8H    piperacillin-tazobactam 4.5 g in sodium chloride 0.9% 100 mL IVPB (ready to mix system)  4.5 g Intravenous Q8H    riociguat  2.5 mg Per OG tube TID    vancomycin (VANCOCIN) IVPB  1,750 mg Intravenous Q12H     PRN Meds:labetaloL    Review of patient's allergies indicates:   Allergen Reactions    Clopidogrel      Other reaction(s): Other (See Comments)  Pt reports it does not work      Objective:     Vital Signs (Most Recent):  Temp: 99 °F (37.2 °C) (03/14/20 0700)  Pulse: 85 (03/14/20 0800)  Resp: 17 (03/14/20 0800)  BP: (!) 96/51 (03/14/20 0730)  SpO2: 98 % (03/14/20 0800) Vital Signs (24h Range):  Temp:  [98 °F (36.7 °C)-99 °F (37.2 °C)] 99 °F (37.2 °C)  Pulse:  [] 85  Resp:  [14-37] 17  SpO2:  [86 %-100 %] 98 %  BP: ()/(46-59) 96/51     Patient Vitals for the past 72 hrs (Last 3 readings):   Weight   03/14/20 0700 (!) 164.4 kg (362 lb 7 oz)   03/13/20 1515 (!) 167.8 kg (370 lb)   03/13/20 0604 (!) 168.2 kg (370 lb 13 oz)     Body mass index is 55.11 kg/m².      Intake/Output Summary (Last 24 hours) at 3/14/2020 0800  Last data filed at 3/14/2020 0700  Gross per 24 hour   Intake 3306 ml   Output 4095 ml   Net -789 ml       Hemodynamic Parameters:       Telemetry: Reviewed    Physical Exam   Constitutional: He appears well-developed and well-nourished.   HENT:   Head: Normocephalic and atraumatic.   ETT   Eyes: Pupils are equal, round, and reactive to light. EOM are normal.   Neck: Normal range of motion. Neck  supple. No JVD present.   IJ   Cardiovascular: Normal rate, regular rhythm, normal heart sounds and intact distal pulses. Exam reveals no gallop and no friction rub.   No murmur heard.  Pulmonary/Chest: No respiratory distress. He has no wheezes. He has rales. He exhibits no tenderness.   Mechanical breath sounds   Abdominal: Soft. Bowel sounds are normal. He exhibits no distension and no mass. There is no tenderness.   Musculoskeletal: Normal range of motion. He exhibits no edema.   Neurological:   Intubated, sedated   Skin: Skin is warm and dry.   Vitals reviewed.      Significant Labs:  CBC:  Recent Labs   Lab 03/12/20  1319 03/13/20  0310 03/14/20  0438   WBC 14.80* 11.50 13.50*   RBC 3.55* 3.48* 3.64*   HGB 7.9* 7.7* 8.2*   HCT 27.2* 26.8* 28.2*    315 366*   MCV 77* 77* 78*   MCH 22.3* 22.1* 22.5*   MCHC 29.0* 28.7* 29.1*     BNP:  Recent Labs   Lab 03/12/20  1319   BNP 69     CMP:  Recent Labs   Lab 03/12/20  1126 03/13/20  0310 03/13/20  1832 03/14/20  0438   * 99 118* 114*   CALCIUM 7.9* 7.8* 8.0* 8.3*   ALBUMIN 2.4* 2.2*  --  2.3*   PROT 5.8* 5.6*  --  6.2   * 136 138 139   K 4.0 3.3* 3.2* 4.3   CO2 31* 33* 37* 36*   CL 94* 94* 94* 95   BUN 21* 16 14 17   CREATININE 0.8 0.8 0.8 1.0   ALKPHOS 130 118  --  110   ALT 60* 52*  --  44   AST 54* 42*  --  32   BILITOT 0.6 0.5  --  0.7      Coagulation:   No results for input(s): PT, INR, APTT in the last 168 hours.  LDH:  No results for input(s): LDH in the last 72 hours.  Microbiology:  Microbiology Results (last 7 days)     Procedure Component Value Units Date/Time    Respiratory Infection Panel (PCR), Nasopharyngeal [[id number]]  (Abnormal) Collected:  03/12/20 1446    Order Status:  Completed Specimen:  Nasopharyngeal Swab Updated:  03/13/20 1916     Respiratory Infection Panel Source NP Swab     Adenovirus Not Detected     Coronavirus 229E, Common Cold Virus Not Detected     Coronavirus HKU1, Common Cold Virus Not Detected     Coronavirus  NL63, Common Cold Virus Not Detected     Coronavirus OC43, Common Cold Virus Not Detected     Comment: The Coronavirus strains detected in this test cause the common cold.  These strains are not the COVID-19 (novel Coronavirus)strain   associated with the respiratory disease outbreak.          Human Metapneumovirus Detected     Human Rhinovirus/Enterovirus Not Detected     Influenza A (subtypes H1, H1-2009,H3) Not Detected     Influenza B Not Detected     Parainfluenza Virus 1 Not Detected     Parainfluenza Virus 2 Not Detected     Parainfluenza Virus 3 Not Detected     Parainfluenza Virus 4 Not Detected     Respiratory Syncytial Virus Not Detected     Bordetella Parapertussis (GP7858) Not Detected     Bordetella pertussis (ptxP) Not Detected     Chlamydia pneumoniae Not Detected     Mycoplasma pneumoniae Not Detected     Comment: Respiratory Infection Panel testing performed by Multiplex PCR.       Narrative:       For all other respiratory sources order [id number] Respiratory  Viral Panel by PCR (RVPCR)    Blood culture [[id number]] Collected:  03/12/20 1140    Order Status:  Completed Specimen:  Blood from Peripheral, Forearm, Left Updated:  03/13/20 1412     Blood Culture, Routine No Growth to date      No Growth to date    Narrative:       Blood cultures from 2 different sites. 4 bottles total.  Please draw before starting antibiotics.    Blood culture [[id number]] Collected:  03/12/20 1140    Order Status:  Completed Specimen:  Blood from Peripheral, Forearm, Right Updated:  03/13/20 1412     Blood Culture, Routine No Growth to date      No Growth to date    Narrative:       Blood cultures x 2 different sites. 4 bottles total. Please  draw cultures before administering antibiotics.    Culture, Respiratory with Gram Stain [[id number]] Collected:  03/12/20 1153    Order Status:  Completed Specimen:  Respiratory from Endotracheal Aspirate Updated:  03/13/20 0900     Respiratory Culture No Growth     Gram Stain  (Respiratory) <10 epithelial cells per low power field.     Gram Stain (Respiratory) Moderate WBC's     Gram Stain (Respiratory) No organisms seen    Narrative:       Mini-BAL. Before antibiotics.          I have reviewed all pertinent labs within the past 24 hours.    Estimated Creatinine Clearance: 132 mL/min (based on SCr of 1 mg/dL).    Diagnostic Results:  I have reviewed and interpreted all pertinent imaging results/findings within the past 24 hours.

## 2020-03-14 NOTE — PROGRESS NOTES
Pharmacokinetic Assessment Follow Up: IV Vancomycin    Vancomycin serum concentration assessment(s):  · Trough of 13.9 mcg/mL is slightly subtherapeutic for target of 15-20 mcg/mL  · Expect further accumulation of vancomycin with repeated dosing due to BMI and increasing Scr    Vancomycin Regimen Plan:  1. Will continue current regimen of vancomycin 1750mg q12 hours  2. Repeat trough 3/16 @ 0430 or sooner if renal function declines    Drug levels (last 3 results):  Recent Labs   Lab Result Units 03/12/20  1126 03/14/20  0437   Vancomycin, Random ug/mL <1.1  --    Vancomycin-Trough ug/mL  --  13.9       Pharmacy will continue to follow and monitor vancomycin.    Please contact pharmacy at extension [phone] for questions regarding this assessment.    Thank you for the consult,   Valeria Agosto       Patient brief summary:  Jaquan Hines Jr. is a 49 y.o. male initiated on antimicrobial therapy with IV Vancomycin for treatment of lower respiratory infection      Drug Allergies:   Review of patient's allergies indicates:   Allergen Reactions    Clopidogrel      Other reaction(s): Other (See Comments)  Pt reports it does not work        Actual Body Weight:   164.4 kg    Renal Function:   Estimated Creatinine Clearance: 132 mL/min (based on SCr of 1 mg/dL).,       CBC (last 72 hours):  Recent Labs   Lab Result Units 03/12/20  1319 03/13/20  0310 03/14/20  0438   WBC K/uL 14.80* 11.50 13.50*   Hemoglobin g/dL 7.9* 7.7* 8.2*   Hematocrit % 27.2* 26.8* 28.2*   Platelets K/uL 293 315 366*   Gran% % 80.9* 87.0* 77.0*   Lymph% % 7.6* 5.0* 11.0*   Mono% % 6.1 4.0 4.0   Eosinophil% % 0.9 1.0 2.0   Basophil% % 0.2 0.0 0.0   Differential Method  Automated Manual Manual       Metabolic Panel (last 72 hours):  Recent Labs   Lab Result Units 03/12/20  1126 03/12/20  1150 03/13/20  0310 03/13/20  1832 03/14/20  0438   Sodium mmol/L 131*  --  136 138 139   Potassium mmol/L 4.0  --  3.3* 3.2* 4.3   Chloride mmol/L 94*  --  94* 94* 95    CO2 mmol/L 31*  --  33* 37* 36*   Glucose mg/dL 117*  --  99 118* 114*   Glucose, UA   --  Negative  --   --   --    BUN, Bld mg/dL 21*  --  16 14 17   Creatinine mg/dL 0.8  --  0.8 0.8 1.0   Albumin g/dL 2.4*  --  2.2*  --  2.3*   Total Bilirubin mg/dL 0.6  --  0.5  --  0.7   Alkaline Phosphatase U/L 130  --  118  --  110   AST U/L 54*  --  42*  --  32   ALT U/L 60*  --  52*  --  44   Magnesium mg/dL 2.3  --  2.1  --  2.1   Phosphorus mg/dL 3.8  --  3.6  --  3.4       Vancomycin Administrations:  vancomycin given in the last 96 hours                   vancomycin 1750 mg in 0.9% sodium chloride 500 mL IVPB (mg) 1,750 mg New Bag 03/14/20 0550     1,750 mg New Bag 03/13/20 1655    vancomycin 1.75 g in 5 % dextrose 500 mL IVPB (mg) 1,750 mg New Bag 03/13/20 0519    vancomycin (VANCOCIN) 2,500 mg in dextrose 5 % 500 mL IVPB (mg) 2,500 mg New Bag 03/12/20 1710                Microbiologic Results:  Microbiology Results (last 7 days)     Procedure Component Value Units Date/Time    Respiratory Infection Panel (PCR), Nasopharyngeal [[id number]]  (Abnormal) Collected:  03/12/20 1446    Order Status:  Completed Specimen:  Nasopharyngeal Swab Updated:  03/13/20 1916     Respiratory Infection Panel Source NP Swab     Adenovirus Not Detected     Coronavirus 229E, Common Cold Virus Not Detected     Coronavirus HKU1, Common Cold Virus Not Detected     Coronavirus NL63, Common Cold Virus Not Detected     Coronavirus OC43, Common Cold Virus Not Detected     Comment: The Coronavirus strains detected in this test cause the common cold.  These strains are not the COVID-19 (novel Coronavirus)strain   associated with the respiratory disease outbreak.          Human Metapneumovirus Detected     Human Rhinovirus/Enterovirus Not Detected     Influenza A (subtypes H1, H1-2009,H3) Not Detected     Influenza B Not Detected     Parainfluenza Virus 1 Not Detected     Parainfluenza Virus 2 Not Detected     Parainfluenza Virus 3 Not Detected      Parainfluenza Virus 4 Not Detected     Respiratory Syncytial Virus Not Detected     Bordetella Parapertussis ([id number]) Not Detected     Bordetella pertussis (ptxP) Not Detected     Chlamydia pneumoniae Not Detected     Mycoplasma pneumoniae Not Detected     Comment: Respiratory Infection Panel testing performed by Multiplex PCR.       Narrative:       For all other respiratory sources order [id number] Respiratory  Viral Panel by PCR (RVPCR)    Blood culture [[id number]] Collected:  03/12/20 1140    Order Status:  Completed Specimen:  Blood from Peripheral, Forearm, Left Updated:  03/13/20 1412     Blood Culture, Routine No Growth to date      No Growth to date    Narrative:       Blood cultures from 2 different sites. 4 bottles total.  Please draw before starting antibiotics.    Blood culture [[id number]] Collected:  03/12/20 1140    Order Status:  Completed Specimen:  Blood from Peripheral, Forearm, Right Updated:  03/13/20 1412     Blood Culture, Routine No Growth to date      No Growth to date    Narrative:       Blood cultures x 2 different sites. 4 bottles total. Please  draw cultures before administering antibiotics.    Culture, Respiratory with Gram Stain [[id number]] Collected:  03/12/20 1153    Order Status:  Completed Specimen:  Respiratory from Endotracheal Aspirate Updated:  03/13/20 0900     Respiratory Culture No Growth     Gram Stain (Respiratory) <10 epithelial cells per low power field.     Gram Stain (Respiratory) Moderate WBC's     Gram Stain (Respiratory) No organisms seen    Narrative:       Mini-BAL. Before antibiotics.

## 2020-03-14 NOTE — ASSESSMENT & PLAN NOTE
Appreciate HTS input    --Adempas restarted as it is the only crushable pulm HTN med he is on. Restart the other 2 when able to swallow.   --TTE with Normal EF, PA 49   --nitric oxide mgmt per HTS

## 2020-03-15 PROBLEM — R79.89 ELEVATED LFTS: Status: RESOLVED | Noted: 2020-03-13 | Resolved: 2020-03-15

## 2020-03-15 LAB
ALBUMIN SERPL BCP-MCNC: 2.2 G/DL (ref 3.5–5.2)
ALLENS TEST: ABNORMAL
ALP SERPL-CCNC: 95 U/L (ref 55–135)
ALT SERPL W/O P-5'-P-CCNC: 34 U/L (ref 10–44)
ANION GAP SERPL CALC-SCNC: 10 MMOL/L (ref 8–16)
ANION GAP SERPL CALC-SCNC: 10 MMOL/L (ref 8–16)
ANION GAP SERPL CALC-SCNC: 9 MMOL/L (ref 8–16)
ANISOCYTOSIS BLD QL SMEAR: SLIGHT
AST SERPL-CCNC: 30 U/L (ref 10–40)
BASOPHILS # BLD AUTO: ABNORMAL K/UL (ref 0–0.2)
BASOPHILS NFR BLD: 0 % (ref 0–1.9)
BILIRUB SERPL-MCNC: 0.8 MG/DL (ref 0.1–1)
BUN SERPL-MCNC: 20 MG/DL (ref 6–20)
BUN SERPL-MCNC: 22 MG/DL (ref 6–20)
BUN SERPL-MCNC: 23 MG/DL (ref 6–20)
CALCIUM SERPL-MCNC: 8.6 MG/DL (ref 8.7–10.5)
CALCIUM SERPL-MCNC: 8.6 MG/DL (ref 8.7–10.5)
CALCIUM SERPL-MCNC: 8.9 MG/DL (ref 8.7–10.5)
CHLORIDE SERPL-SCNC: 93 MMOL/L (ref 95–110)
CHLORIDE SERPL-SCNC: 95 MMOL/L (ref 95–110)
CHLORIDE SERPL-SCNC: 95 MMOL/L (ref 95–110)
CO2 SERPL-SCNC: 36 MMOL/L (ref 23–29)
CO2 SERPL-SCNC: 36 MMOL/L (ref 23–29)
CO2 SERPL-SCNC: 38 MMOL/L (ref 23–29)
CREAT SERPL-MCNC: 0.9 MG/DL (ref 0.5–1.4)
DACRYOCYTES BLD QL SMEAR: ABNORMAL
DELSYS: ABNORMAL
DIFFERENTIAL METHOD: ABNORMAL
EOSINOPHIL # BLD AUTO: ABNORMAL K/UL (ref 0–0.5)
EOSINOPHIL NFR BLD: 3 % (ref 0–8)
ERYTHROCYTE [DISTWIDTH] IN BLOOD BY AUTOMATED COUNT: 15.8 % (ref 11.5–14.5)
ERYTHROCYTE [SEDIMENTATION RATE] IN BLOOD BY WESTERGREN METHOD: 14 MM/H
EST. GFR  (AFRICAN AMERICAN): >60 ML/MIN/1.73 M^2
EST. GFR  (NON AFRICAN AMERICAN): >60 ML/MIN/1.73 M^2
FIO2: 50
GIANT PLATELETS BLD QL SMEAR: PRESENT
GLUCOSE SERPL-MCNC: 111 MG/DL (ref 70–110)
GLUCOSE SERPL-MCNC: 113 MG/DL (ref 70–110)
GLUCOSE SERPL-MCNC: 99 MG/DL (ref 70–110)
HCO3 UR-SCNC: 37.3 MMOL/L (ref 24–28)
HCT VFR BLD AUTO: 27.9 % (ref 40–54)
HGB BLD-MCNC: 7.9 G/DL (ref 14–18)
HYPOCHROMIA BLD QL SMEAR: ABNORMAL
IMM GRANULOCYTES # BLD AUTO: ABNORMAL K/UL (ref 0–0.04)
IMM GRANULOCYTES NFR BLD AUTO: ABNORMAL % (ref 0–0.5)
LYMPHOCYTES # BLD AUTO: ABNORMAL K/UL (ref 1–4.8)
LYMPHOCYTES NFR BLD: 10 % (ref 18–48)
MAGNESIUM SERPL-MCNC: 2.4 MG/DL (ref 1.6–2.6)
MAGNESIUM SERPL-MCNC: 2.4 MG/DL (ref 1.6–2.6)
MCH RBC QN AUTO: 21.9 PG (ref 27–31)
MCHC RBC AUTO-ENTMCNC: 28.3 G/DL (ref 32–36)
MCV RBC AUTO: 78 FL (ref 82–98)
METAMYELOCYTES NFR BLD MANUAL: 5 %
METHEMOGLOBIN: 0.4 % (ref 0–3)
MODE: ABNORMAL
MONOCYTES # BLD AUTO: ABNORMAL K/UL (ref 0.3–1)
MONOCYTES NFR BLD: 6 % (ref 4–15)
MYELOCYTES NFR BLD MANUAL: 1 %
NEUTROPHILS NFR BLD: 73 % (ref 38–73)
NEUTS BAND NFR BLD MANUAL: 2 %
NRBC BLD-RTO: 1 /100 WBC
OVALOCYTES BLD QL SMEAR: ABNORMAL
PCO2 BLDA: 48.4 MMHG (ref 35–45)
PEEP: 10
PH SMN: 7.49 [PH] (ref 7.35–7.45)
PHOSPHATE SERPL-MCNC: 3.7 MG/DL (ref 2.7–4.5)
PHOSPHATE SERPL-MCNC: 3.7 MG/DL (ref 2.7–4.5)
PLATELET # BLD AUTO: 370 K/UL (ref 150–350)
PLATELET BLD QL SMEAR: ABNORMAL
PMV BLD AUTO: 11.2 FL (ref 9.2–12.9)
PO2 BLDA: 74 MMHG (ref 80–100)
POC BE: 14 MMOL/L
POC SATURATED O2: 96 % (ref 95–100)
POC TCO2: 39 MMOL/L (ref 23–27)
POIKILOCYTOSIS BLD QL SMEAR: SLIGHT
POLYCHROMASIA BLD QL SMEAR: ABNORMAL
POTASSIUM SERPL-SCNC: 3.1 MMOL/L (ref 3.5–5.1)
POTASSIUM SERPL-SCNC: 3.6 MMOL/L (ref 3.5–5.1)
POTASSIUM SERPL-SCNC: 3.7 MMOL/L (ref 3.5–5.1)
PROT SERPL-MCNC: 6.3 G/DL (ref 6–8.4)
RBC # BLD AUTO: 3.6 M/UL (ref 4.6–6.2)
SAMPLE: ABNORMAL
SITE: ABNORMAL
SODIUM SERPL-SCNC: 139 MMOL/L (ref 136–145)
SODIUM SERPL-SCNC: 141 MMOL/L (ref 136–145)
SODIUM SERPL-SCNC: 142 MMOL/L (ref 136–145)
TARGETS BLD QL SMEAR: ABNORMAL
TOXIC GRANULES BLD QL SMEAR: PRESENT
VT: 420
WBC # BLD AUTO: 12.15 K/UL (ref 3.9–12.7)

## 2020-03-15 PROCEDURE — 80048 BASIC METABOLIC PNL TOTAL CA: CPT | Mod: 91

## 2020-03-15 PROCEDURE — 27000221 HC OXYGEN, UP TO 24 HOURS

## 2020-03-15 PROCEDURE — 20000000 HC ICU ROOM

## 2020-03-15 PROCEDURE — 84100 ASSAY OF PHOSPHORUS: CPT

## 2020-03-15 PROCEDURE — 27200966 HC CLOSED SUCTION SYSTEM

## 2020-03-15 PROCEDURE — 83735 ASSAY OF MAGNESIUM: CPT | Mod: 91

## 2020-03-15 PROCEDURE — 63600175 PHARM REV CODE 636 W HCPCS: Performed by: GENERAL ACUTE CARE HOSPITAL

## 2020-03-15 PROCEDURE — 99233 SBSQ HOSP IP/OBS HIGH 50: CPT | Mod: ,,, | Performed by: INTERNAL MEDICINE

## 2020-03-15 PROCEDURE — 85007 BL SMEAR W/DIFF WBC COUNT: CPT

## 2020-03-15 PROCEDURE — 63600175 PHARM REV CODE 636 W HCPCS: Performed by: INTERNAL MEDICINE

## 2020-03-15 PROCEDURE — 83735 ASSAY OF MAGNESIUM: CPT

## 2020-03-15 PROCEDURE — 99900026 HC AIRWAY MAINTENANCE (STAT)

## 2020-03-15 PROCEDURE — 99233 PR SUBSEQUENT HOSPITAL CARE,LEVL III: ICD-10-PCS | Mod: ,,, | Performed by: INTERNAL MEDICINE

## 2020-03-15 PROCEDURE — 82803 BLOOD GASES ANY COMBINATION: CPT

## 2020-03-15 PROCEDURE — 80048 BASIC METABOLIC PNL TOTAL CA: CPT

## 2020-03-15 PROCEDURE — 84100 ASSAY OF PHOSPHORUS: CPT | Mod: 91

## 2020-03-15 PROCEDURE — 94761 N-INVAS EAR/PLS OXIMETRY MLT: CPT

## 2020-03-15 PROCEDURE — 25000242 PHARM REV CODE 250 ALT 637 W/ HCPCS: Performed by: GENERAL ACUTE CARE HOSPITAL

## 2020-03-15 PROCEDURE — 99291 PR CRITICAL CARE, E/M 30-74 MINUTES: ICD-10-PCS | Mod: ,,, | Performed by: NURSE PRACTITIONER

## 2020-03-15 PROCEDURE — 94640 AIRWAY INHALATION TREATMENT: CPT

## 2020-03-15 PROCEDURE — 85027 COMPLETE CBC AUTOMATED: CPT

## 2020-03-15 PROCEDURE — 93463 DRUG ADMIN & HEMODYNMIC MEAS: CPT

## 2020-03-15 PROCEDURE — 80053 COMPREHEN METABOLIC PANEL: CPT

## 2020-03-15 PROCEDURE — 94003 VENT MGMT INPAT SUBQ DAY: CPT

## 2020-03-15 PROCEDURE — 99291 CRITICAL CARE FIRST HOUR: CPT | Mod: ,,, | Performed by: NURSE PRACTITIONER

## 2020-03-15 PROCEDURE — 25000003 PHARM REV CODE 250: Performed by: NURSE PRACTITIONER

## 2020-03-15 PROCEDURE — 25000003 PHARM REV CODE 250: Performed by: STUDENT IN AN ORGANIZED HEALTH CARE EDUCATION/TRAINING PROGRAM

## 2020-03-15 PROCEDURE — 25000003 PHARM REV CODE 250: Performed by: GENERAL ACUTE CARE HOSPITAL

## 2020-03-15 PROCEDURE — 63600367 HC NITRIC OXIDE PER HOUR

## 2020-03-15 PROCEDURE — 36600 WITHDRAWAL OF ARTERIAL BLOOD: CPT

## 2020-03-15 PROCEDURE — 99900035 HC TECH TIME PER 15 MIN (STAT)

## 2020-03-15 PROCEDURE — 63600175 PHARM REV CODE 636 W HCPCS: Performed by: STUDENT IN AN ORGANIZED HEALTH CARE EDUCATION/TRAINING PROGRAM

## 2020-03-15 RX ORDER — POTASSIUM CHLORIDE 20 MEQ/15ML
40 SOLUTION ORAL ONCE
Status: COMPLETED | OUTPATIENT
Start: 2020-03-15 | End: 2020-03-15

## 2020-03-15 RX ORDER — POTASSIUM CHLORIDE 20 MEQ/15ML
40 SOLUTION ORAL
Status: COMPLETED | OUTPATIENT
Start: 2020-03-15 | End: 2020-03-15

## 2020-03-15 RX ORDER — IPRATROPIUM BROMIDE AND ALBUTEROL SULFATE 2.5; .5 MG/3ML; MG/3ML
3 SOLUTION RESPIRATORY (INHALATION) EVERY 6 HOURS
Status: DISCONTINUED | OUTPATIENT
Start: 2020-03-16 | End: 2020-03-20 | Stop reason: HOSPADM

## 2020-03-15 RX ADMIN — PROPOFOL 45 MCG/KG/MIN: 10 INJECTION, EMULSION INTRAVENOUS at 11:03

## 2020-03-15 RX ADMIN — PROPOFOL 45 MCG/KG/MIN: 10 INJECTION, EMULSION INTRAVENOUS at 01:03

## 2020-03-15 RX ADMIN — FUROSEMIDE 60 MG: 10 INJECTION, SOLUTION INTRAMUSCULAR; INTRAVENOUS at 11:03

## 2020-03-15 RX ADMIN — PROPOFOL 45 MCG/KG/MIN: 10 INJECTION, EMULSION INTRAVENOUS at 03:03

## 2020-03-15 RX ADMIN — PROPOFOL 50 MCG/KG/MIN: 10 INJECTION, EMULSION INTRAVENOUS at 05:03

## 2020-03-15 RX ADMIN — PROPOFOL 45 MCG/KG/MIN: 10 INJECTION, EMULSION INTRAVENOUS at 09:03

## 2020-03-15 RX ADMIN — PIPERACILLIN AND TAZOBACTAM 4.5 G: 4; .5 INJECTION, POWDER, LYOPHILIZED, FOR SOLUTION INTRAVENOUS; PARENTERAL at 04:03

## 2020-03-15 RX ADMIN — FAMOTIDINE 20 MG: 20 TABLET, FILM COATED ORAL at 10:03

## 2020-03-15 RX ADMIN — CEFTRIAXONE 2 G: 2 INJECTION, SOLUTION INTRAVENOUS at 11:03

## 2020-03-15 RX ADMIN — IPRATROPIUM BROMIDE AND ALBUTEROL SULFATE 3 ML: .5; 3 SOLUTION RESPIRATORY (INHALATION) at 03:03

## 2020-03-15 RX ADMIN — POTASSIUM CHLORIDE 40 MEQ: 20 SOLUTION ORAL at 09:03

## 2020-03-15 RX ADMIN — CHLORHEXIDINE GLUCONATE 0.12% ORAL RINSE 15 ML: 1.2 LIQUID ORAL at 08:03

## 2020-03-15 RX ADMIN — POTASSIUM CHLORIDE 40 MEQ: 20 SOLUTION ORAL at 05:03

## 2020-03-15 RX ADMIN — RIOCIGUAT 2.5 MG: 2.5 TABLET, FILM COATED ORAL at 08:03

## 2020-03-15 RX ADMIN — IPRATROPIUM BROMIDE AND ALBUTEROL SULFATE 3 ML: .5; 3 SOLUTION RESPIRATORY (INHALATION) at 08:03

## 2020-03-15 RX ADMIN — POTASSIUM CHLORIDE 40 MEQ: 20 SOLUTION ORAL at 12:03

## 2020-03-15 RX ADMIN — PROPOFOL 45 MCG/KG/MIN: 10 INJECTION, EMULSION INTRAVENOUS at 05:03

## 2020-03-15 RX ADMIN — PROPOFOL 50 MCG/KG/MIN: 10 INJECTION, EMULSION INTRAVENOUS at 03:03

## 2020-03-15 RX ADMIN — PROPOFOL 45 MCG/KG/MIN: 10 INJECTION, EMULSION INTRAVENOUS at 06:03

## 2020-03-15 RX ADMIN — FUROSEMIDE 60 MG: 10 INJECTION, SOLUTION INTRAMUSCULAR; INTRAVENOUS at 06:03

## 2020-03-15 RX ADMIN — CHLORHEXIDINE GLUCONATE 0.12% ORAL RINSE 15 ML: 1.2 LIQUID ORAL at 09:03

## 2020-03-15 RX ADMIN — APIXABAN 5 MG: 5 TABLET, FILM COATED ORAL at 09:03

## 2020-03-15 RX ADMIN — FAMOTIDINE 20 MG: 20 TABLET, FILM COATED ORAL at 08:03

## 2020-03-15 RX ADMIN — APIXABAN 5 MG: 5 TABLET, FILM COATED ORAL at 08:03

## 2020-03-15 RX ADMIN — PROPOFOL 50 MCG/KG/MIN: 10 INJECTION, EMULSION INTRAVENOUS at 02:03

## 2020-03-15 RX ADMIN — FUROSEMIDE 60 MG: 10 INJECTION, SOLUTION INTRAMUSCULAR; INTRAVENOUS at 02:03

## 2020-03-15 RX ADMIN — IPRATROPIUM BROMIDE AND ALBUTEROL SULFATE 3 ML: .5; 3 SOLUTION RESPIRATORY (INHALATION) at 11:03

## 2020-03-15 RX ADMIN — RIOCIGUAT 2.5 MG: 2.5 TABLET, FILM COATED ORAL at 03:03

## 2020-03-15 RX ADMIN — POTASSIUM CHLORIDE 40 MEQ: 20 SOLUTION ORAL at 11:03

## 2020-03-15 RX ADMIN — RIOCIGUAT 2.5 MG: 2.5 TABLET, FILM COATED ORAL at 11:03

## 2020-03-15 RX ADMIN — PROPOFOL 50 MCG/KG/MIN: 10 INJECTION, EMULSION INTRAVENOUS at 10:03

## 2020-03-15 RX ADMIN — VANCOMYCIN HYDROCHLORIDE 1750 MG: 10 INJECTION, POWDER, LYOPHILIZED, FOR SOLUTION INTRAVENOUS at 04:03

## 2020-03-15 NOTE — PLAN OF CARE
Pt follows all commands, moves all extremities. Tmax 99.3 F. Pt remains on ventilator, weaned to AC 50% FiO2 and 5 PEEP, rate of 14, with 10 ppm of nitric oxide, O2 sats >90%. HR 70-90s NSR. MAP >65. Last flat CVP 12. Propofol gtt @ 45 mcg/kg/min to maintain a RASS of -2. Tube feeds at @ goal of 50 cc/h, pt tolerating well with minimal residuals. UOP  cc/h per zuñiga. Vanc and zosyn discontinued today, rocephin started. 40meq of potassium given for K of 3.1. Pt remains on contact isolation for positive metapneumovirus. Test for COVID-19 was negative. CCS updated throughout shift. Plan of care reviewed with pt. VSS at this time. Will continue to monitor. See flowsheet for full assessment details.      Skin note: Bed plugged in, waffle mattress inflated. Pt turned Q2h and lateral rotation initiated on bed. Pillows underneath arms to protect elbows. Heel foams and heel offloading boots in place. Sacral foam dsg in place. Area underneath soft restraints with no breakdown. Skin tear on pubis open to air per wound care recommendation, CDI. No new breakdown noted.

## 2020-03-15 NOTE — EICU
Rounding (Video Assessment):  Yes        Comments: Patient is sedated on vent and resting quietly.

## 2020-03-15 NOTE — SUBJECTIVE & OBJECTIVE
Interval History: No acute events overnight. Remains intubated. CVP 12 today. Diuresing with Lasix 60mg IVP TID. RVP positive for Human Metapneumovirus. ECHO showed PA 44mmhg.     Continuous Infusions:   nitric oxide gas      propofoL 45 mcg/kg/min (03/15/20 0900)     Scheduled Meds:   albuterol-ipratropium  3 mL Nebulization Q4H    apixaban  5 mg Oral BID    chlorhexidine  15 mL Mouth/Throat BID    famotidine  20 mg Per NG tube Q12H    furosemide (LASIX) IV  60 mg Intravenous Q8H    piperacillin-tazobactam 4.5 g in sodium chloride 0.9% 100 mL IVPB (ready to mix system)  4.5 g Intravenous Q8H    riociguat  2.5 mg Per OG tube TID    vancomycin (VANCOCIN) IVPB  1,750 mg Intravenous Q12H     PRN Meds:labetaloL    Review of patient's allergies indicates:   Allergen Reactions    Clopidogrel      Other reaction(s): Other (See Comments)  Pt reports it does not work      Objective:     Vital Signs (Most Recent):  Temp: 98.4 °F (36.9 °C) (03/15/20 0700)  Pulse: 82 (03/15/20 0945)  Resp: 15 (03/15/20 0945)  BP: (!) 94/51 (03/15/20 0930)  SpO2: 96 % (03/15/20 0945) Vital Signs (24h Range):  Temp:  [98.4 °F (36.9 °C)-99.7 °F (37.6 °C)] 98.4 °F (36.9 °C)  Pulse:  [81-94] 82  Resp:  [12-54] 15  SpO2:  [88 %-98 %] 96 %  BP: ()/(46-61) 94/51     Patient Vitals for the past 72 hrs (Last 3 readings):   Weight   03/15/20 0500 (!) 163 kg (359 lb 5.6 oz)   03/14/20 0700 (!) 164.4 kg (362 lb 7 oz)   03/13/20 1515 (!) 167.8 kg (370 lb)     Body mass index is 54.64 kg/m².      Intake/Output Summary (Last 24 hours) at 3/15/2020 0956  Last data filed at 3/15/2020 0900  Gross per 24 hour   Intake 3943 ml   Output 4035 ml   Net -92 ml       Hemodynamic Parameters:       Telemetry: Reviewed    Physical Exam   Constitutional: He appears well-developed and well-nourished.   HENT:   Head: Normocephalic and atraumatic.   ETT   Eyes: Pupils are equal, round, and reactive to light. EOM are normal.   Neck: Normal range of motion. Neck  supple. No JVD present.   IJ   Cardiovascular: Normal rate, regular rhythm, normal heart sounds and intact distal pulses. Exam reveals no gallop and no friction rub.   No murmur heard.  Pulmonary/Chest: No respiratory distress. He has no wheezes. He has rales. He exhibits no tenderness.   Mechanical breath sounds   Abdominal: Soft. Bowel sounds are normal. He exhibits no distension and no mass. There is no tenderness.   Musculoskeletal: Normal range of motion. He exhibits no edema.   Neurological:   Intubated, sedated   Skin: Skin is warm and dry.   Vitals reviewed.      Significant Labs:  CBC:  Recent Labs   Lab 03/13/20  0310 03/14/20  0438 03/15/20  0328   WBC 11.50 13.50* 12.15   RBC 3.48* 3.64* 3.60*   HGB 7.7* 8.2* 7.9*   HCT 26.8* 28.2* 27.9*    366* 370*   MCV 77* 78* 78*   MCH 22.1* 22.5* 21.9*   MCHC 28.7* 29.1* 28.3*     BNP:  Recent Labs   Lab 03/12/20  1319   BNP 69     CMP:  Recent Labs   Lab 03/13/20  0310  03/14/20  0438 03/14/20  2015 03/15/20  0328   GLU 99   < > 114* 110 113*   CALCIUM 7.8*   < > 8.3* 8.3* 8.6*   ALBUMIN 2.2*  --  2.3*  --  2.2*   PROT 5.6*  --  6.2  --  6.3      < > 139 141 141   K 3.3*   < > 4.3 3.3* 3.7   CO2 33*   < > 36* 36* 36*   CL 94*   < > 95 94* 95   BUN 16   < > 17 19 20   CREATININE 0.8   < > 1.0 0.9 0.9   ALKPHOS 118  --  110  --  95   ALT 52*  --  44  --  34   AST 42*  --  32  --  30   BILITOT 0.5  --  0.7  --  0.8    < > = values in this interval not displayed.      Coagulation:   No results for input(s): PT, INR, APTT in the last 168 hours.  LDH:  No results for input(s): LDH in the last 72 hours.  Microbiology:  Microbiology Results (last 7 days)     Procedure Component Value Units Date/Time    Blood culture [[id number]] Collected:  03/12/20 1140    Order Status:  Completed Specimen:  Blood from Peripheral, Forearm, Left Updated:  03/14/20 1412     Blood Culture, Routine No Growth to date      No Growth to date      No Growth to date    Narrative:        Blood cultures from 2 different sites. 4 bottles total.  Please draw before starting antibiotics.    Blood culture [[id number]] Collected:  03/12/20 1140    Order Status:  Completed Specimen:  Blood from Peripheral, Forearm, Right Updated:  03/14/20 1412     Blood Culture, Routine No Growth to date      No Growth to date      No Growth to date    Narrative:       Blood cultures x 2 different sites. 4 bottles total. Please  draw cultures before administering antibiotics.    Culture, Respiratory with Gram Stain [[id number]] Collected:  03/12/20 1153    Order Status:  Completed Specimen:  Respiratory from Endotracheal Aspirate Updated:  03/14/20 1045     Respiratory Culture No growth     Gram Stain (Respiratory) <10 epithelial cells per low power field.     Gram Stain (Respiratory) Moderate WBC's     Gram Stain (Respiratory) No organisms seen    Narrative:       Mini-BAL. Before antibiotics.    Respiratory Infection Panel (PCR), Nasopharyngeal [[id number]]  (Abnormal) Collected:  03/12/20 1446    Order Status:  Completed Specimen:  Nasopharyngeal Swab Updated:  03/13/20 1916     Respiratory Infection Panel Source NP Swab     Adenovirus Not Detected     Coronavirus 229E, Common Cold Virus Not Detected     Coronavirus HKU1, Common Cold Virus Not Detected     Coronavirus NL63, Common Cold Virus Not Detected     Coronavirus OC43, Common Cold Virus Not Detected     Comment: The Coronavirus strains detected in this test cause the common cold.  These strains are not the COVID-19 (novel Coronavirus)strain   associated with the respiratory disease outbreak.          Human Metapneumovirus Detected     Human Rhinovirus/Enterovirus Not Detected     Influenza A (subtypes H1, H1-2009,H3) Not Detected     Influenza B Not Detected     Parainfluenza Virus 1 Not Detected     Parainfluenza Virus 2 Not Detected     Parainfluenza Virus 3 Not Detected     Parainfluenza Virus 4 Not Detected     Respiratory Syncytial Virus Not Detected      Bordetella Parapertussis ([id number]) Not Detected     Bordetella pertussis (ptxP) Not Detected     Chlamydia pneumoniae Not Detected     Mycoplasma pneumoniae Not Detected     Comment: Respiratory Infection Panel testing performed by Multiplex PCR.       Narrative:       For all other respiratory sources order [id number] Respiratory  Viral Panel by PCR (RVPCR)          I have reviewed all pertinent labs within the past 24 hours.    Estimated Creatinine Clearance: 145.9 mL/min (based on SCr of 0.9 mg/dL).    Diagnostic Results:  I have reviewed and interpreted all pertinent imaging results/findings within the past 24 hours.

## 2020-03-15 NOTE — PROGRESS NOTES
[facility]  Heart Transplant  Progress Note    Patient Name: Jaquan Hines Jr.  MRN: [id number]  Admission Date: 3/12/2020  Hospital Length of Stay: 3 days  Attending Physician: Nilay Arvizu*  Primary Care Provider: Allen Hubbard MD  Principal Problem:Acute hypoxemic respiratory failure    Subjective:     Interval History: No acute events overnight. Remains intubated. CVP 12 today. Diuresing with Lasix 60mg IVP TID. RVP positive for Human Metapneumovirus. ECHO showed PA 44mmhg.     Continuous Infusions:   nitric oxide gas      propofoL 45 mcg/kg/min (03/15/20 0900)     Scheduled Meds:   albuterol-ipratropium  3 mL Nebulization Q4H    apixaban  5 mg Oral BID    chlorhexidine  15 mL Mouth/Throat BID    famotidine  20 mg Per NG tube Q12H    furosemide (LASIX) IV  60 mg Intravenous Q8H    piperacillin-tazobactam 4.5 g in sodium chloride 0.9% 100 mL IVPB (ready to mix system)  4.5 g Intravenous Q8H    riociguat  2.5 mg Per OG tube TID    vancomycin (VANCOCIN) IVPB  1,750 mg Intravenous Q12H     PRN Meds:labetaloL    Review of patient's allergies indicates:   Allergen Reactions    Clopidogrel      Other reaction(s): Other (See Comments)  Pt reports it does not work      Objective:     Vital Signs (Most Recent):  Temp: 98.4 °F (36.9 °C) (03/15/20 0700)  Pulse: 82 (03/15/20 0945)  Resp: 15 (03/15/20 0945)  BP: (!) 94/51 (03/15/20 0930)  SpO2: 96 % (03/15/20 0945) Vital Signs (24h Range):  Temp:  [98.4 °F (36.9 °C)-99.7 °F (37.6 °C)] 98.4 °F (36.9 °C)  Pulse:  [81-94] 82  Resp:  [12-54] 15  SpO2:  [88 %-98 %] 96 %  BP: ()/(46-61) 94/51     Patient Vitals for the past 72 hrs (Last 3 readings):   Weight   03/15/20 0500 (!) 163 kg (359 lb 5.6 oz)   03/14/20 0700 (!) 164.4 kg (362 lb 7 oz)   03/13/20 1515 (!) 167.8 kg (370 lb)     Body mass index is 54.64 kg/m².      Intake/Output Summary (Last 24 hours) at 3/15/2020 0956  Last data filed at 3/15/2020 0900  Gross per 24 hour    Intake 3943 ml   Output 4035 ml   Net -92 ml       Hemodynamic Parameters:       Telemetry: Reviewed    Physical Exam   Constitutional: He appears well-developed and well-nourished.   HENT:   Head: Normocephalic and atraumatic.   ETT   Eyes: Pupils are equal, round, and reactive to light. EOM are normal.   Neck: Normal range of motion. Neck supple. No JVD present.   IJ   Cardiovascular: Normal rate, regular rhythm, normal heart sounds and intact distal pulses. Exam reveals no gallop and no friction rub.   No murmur heard.  Pulmonary/Chest: No respiratory distress. He has no wheezes. He has rales. He exhibits no tenderness.   Mechanical breath sounds   Abdominal: Soft. Bowel sounds are normal. He exhibits no distension and no mass. There is no tenderness.   Musculoskeletal: Normal range of motion. He exhibits no edema.   Neurological:   Intubated, sedated   Skin: Skin is warm and dry.   Vitals reviewed.      Significant Labs:  CBC:  Recent Labs   Lab 03/13/20  0310 03/14/20  0438 03/15/20  0328   WBC 11.50 13.50* 12.15   RBC 3.48* 3.64* 3.60*   HGB 7.7* 8.2* 7.9*   HCT 26.8* 28.2* 27.9*    366* 370*   MCV 77* 78* 78*   MCH 22.1* 22.5* 21.9*   MCHC 28.7* 29.1* 28.3*     BNP:  Recent Labs   Lab 03/12/20  1319   BNP 69     CMP:  Recent Labs   Lab 03/13/20  0310  03/14/20  0438 03/14/20  2015 03/15/20  0328   GLU 99   < > 114* 110 113*   CALCIUM 7.8*   < > 8.3* 8.3* 8.6*   ALBUMIN 2.2*  --  2.3*  --  2.2*   PROT 5.6*  --  6.2  --  6.3      < > 139 141 141   K 3.3*   < > 4.3 3.3* 3.7   CO2 33*   < > 36* 36* 36*   CL 94*   < > 95 94* 95   BUN 16   < > 17 19 20   CREATININE 0.8   < > 1.0 0.9 0.9   ALKPHOS 118  --  110  --  95   ALT 52*  --  44  --  34   AST 42*  --  32  --  30   BILITOT 0.5  --  0.7  --  0.8    < > = values in this interval not displayed.      Coagulation:   No results for input(s): PT, INR, APTT in the last 168 hours.  LDH:  No results for input(s): LDH in the last 72  hours.  Microbiology:  Microbiology Results (last 7 days)     Procedure Component Value Units Date/Time    Blood culture [[id number]] Collected:  03/12/20 1140    Order Status:  Completed Specimen:  Blood from Peripheral, Forearm, Left Updated:  03/14/20 1412     Blood Culture, Routine No Growth to date      No Growth to date      No Growth to date    Narrative:       Blood cultures from 2 different sites. 4 bottles total.  Please draw before starting antibiotics.    Blood culture [[id number]] Collected:  03/12/20 1140    Order Status:  Completed Specimen:  Blood from Peripheral, Forearm, Right Updated:  03/14/20 1412     Blood Culture, Routine No Growth to date      No Growth to date      No Growth to date    Narrative:       Blood cultures x 2 different sites. 4 bottles total. Please  draw cultures before administering antibiotics.    Culture, Respiratory with Gram Stain [[id number]] Collected:  03/12/20 1153    Order Status:  Completed Specimen:  Respiratory from Endotracheal Aspirate Updated:  03/14/20 1045     Respiratory Culture No growth     Gram Stain (Respiratory) <10 epithelial cells per low power field.     Gram Stain (Respiratory) Moderate WBC's     Gram Stain (Respiratory) No organisms seen    Narrative:       Mini-BAL. Before antibiotics.    Respiratory Infection Panel (PCR), Nasopharyngeal [[id number]]  (Abnormal) Collected:  03/12/20 1446    Order Status:  Completed Specimen:  Nasopharyngeal Swab Updated:  03/13/20 1916     Respiratory Infection Panel Source NP Swab     Adenovirus Not Detected     Coronavirus 229E, Common Cold Virus Not Detected     Coronavirus HKU1, Common Cold Virus Not Detected     Coronavirus NL63, Common Cold Virus Not Detected     Coronavirus OC43, Common Cold Virus Not Detected     Comment: The Coronavirus strains detected in this test cause the common cold.  These strains are not the COVID-19 (novel Coronavirus)strain   associated with the respiratory disease outbreak.           Human Metapneumovirus Detected     Human Rhinovirus/Enterovirus Not Detected     Influenza A (subtypes H1, H1-2009,H3) Not Detected     Influenza B Not Detected     Parainfluenza Virus 1 Not Detected     Parainfluenza Virus 2 Not Detected     Parainfluenza Virus 3 Not Detected     Parainfluenza Virus 4 Not Detected     Respiratory Syncytial Virus Not Detected     Bordetella Parapertussis (UN7238) Not Detected     Bordetella pertussis (ptxP) Not Detected     Chlamydia pneumoniae Not Detected     Mycoplasma pneumoniae Not Detected     Comment: Respiratory Infection Panel testing performed by Multiplex PCR.       Narrative:       For all other respiratory sources order [id number] Respiratory  Viral Panel by PCR (RVPCR)          I have reviewed all pertinent labs within the past 24 hours.    Estimated Creatinine Clearance: 145.9 mL/min (based on SCr of 0.9 mg/dL).    Diagnostic Results:  I have reviewed and interpreted all pertinent imaging results/findings within the past 24 hours.    Assessment and Plan:     50 yo man with HTN, morbid obesity with OHS and chronic hypoxic resp failure, HTN, referred by Dr Jennings for management of PH- pt underwent PTE on 3/13/18. Last seen by Dr. Hinojosa in clinic on 1/24/2020 and was doing well then on Uptravi, Adempas, and Macitentan without need for home oxygen. Per records, patient was diagnosed with pharyngitis on 2/27/2020 and treated with amoxicillin without improvement. Subsequently went to urgent care due to lack of improvement and was prescribed oxygen, bronchodilators. Presented to [facility] on 3/08/2020, treated for PNA and discharged on 3/10. Returned immediately due to lack of relief in symptoms and worsening dyspnea leading to intubation for acute hypoxic respiratory failure. [place] [facility] [place] Based on records, does not appear to be any documentation that PH meds were continued during this admission there. Supposedly  negative for flu and negative blood cultures from there as well.     Since arrival he has remained intubated, afebrile but did have increased wbc. Full sepsis work-up underway and being treated for presumptive pneumonia. Of note, patient being tested for COVID-19 also.     TTE 3/13/2020   · Limited Echo.  · Normal left ventricular systolic function. The estimated ejection fraction is 65%.  · Indeterminate left ventricular diastolic function.  · No wall motion abnormalities.  · Normal right ventricular systolic function.  · The estimated PA systolic pressure is 49 mmHg. Pulmonary hypertension present.  · Elevated central venous pressure (15 mmHg).           * Acute hypoxemic respiratory failure  Transferred from OSH 2/2 respiratory failure with known history of CTEPH. Currently under sepsis work-up and being treated for presumed PNA.     Respiratory viral panel positive for Human Metapneumovirus.    Pulmonary hypertension  50 yo M pmhx hx of CTEPH s/p PTE on 3/18/18 transferred [place] [facility] [place] level of care. Consulted regarding recommendations for pulm htn medication management. Echo done and PA pressure 45, RV and LV function look normal     Recs:  - Agree with continuation of lasix, rec close monitoring of cvp, rec goal of ~10  - Agree with resuming Adempas at 2.5mg TID but hold on resuming any further home PH medications at this time. Has been off uptravi now for more than 3 days, will resume when able to, starting at 200mg po TID. Patient can have uptravi dissolved in water and then placed down NGT if needed.  - Will plan to resume Macitentan and Uptravi once patient extubated and more stable from respiratory standpoint.  - Continue inhaled nitric oxide at 10ppm while intubated  - strict ins/outs    CTEPH (chronic thromboembolic pulmonary hypertension)  S/p PTE 3/13/18      Pérez Dean MD  Heart Transplant  [facility]

## 2020-03-15 NOTE — PROGRESS NOTES
Jessie NP at bedside to wean vent. Decreases PEEP from 8 to 5. Vent currently on AC 50% and 5, O2 sats >90%. VSS. MILLICENT.

## 2020-03-15 NOTE — ASSESSMENT & PLAN NOTE
Secondary to metapneumovirus pna in setting of severe pHTN    --COVID-19 negative  --Prodrome (fever, cough, pharyngitis),   --negative influenza, and negative sputum/blood cultures  --Continue Nitric oxide for pHTN mgmt per [facility] recs  --Maintain contact isolation.   --Duonebs q4h.  --wean Furosemide 60 mg BID  --Wean vent as tolerated

## 2020-03-15 NOTE — PROGRESS NOTES
[facility]  Critical Care Medicine  Progress Note    Patient Name: Jaquan Hines Jr.  MRN: [id number]  Admission Date: 3/12/2020  Hospital Length of Stay: 3 days  Code Status: Full Code  Attending Provider: Nilay Arvizu*  Primary Care Provider: Allen Hubbard MD   Principal Problem: Acute hypoxemic respiratory failure    Subjective:     HPI:  Mr. Hines is a 48 y/o male with a history significant for pulmonary embolus with resulting pulmonary hypertension (improved s/p pulmonary thrombectomy March 2018) who was in his usual state of health (full ADLs and IADLs no longer requiring supplemental home O2) until February 27th, 2020 when he began having pharyngitis. He began taking amoxicillin, which he had at home without relief. He eventually went to an urgent care where he was given IM ceftriaxone and steroids. He also started using home bronchodilators and oxygen which he typically does not need. He presented to [facility] on 03/08/20 with worsening dyspnea and was admitted with a presumptive diagnosis on pneumonia (records not available) and was discharged on 03/10/20. Immediately after leaving the hospital he began having worsening dyspnea so his sister brought him back to the ED and he was admitted for hypoxemic respiratory failure. He was started on bipap, which he failed so he was intubated. He was treated with pip/tazo + vancomycin and diuretics.     Tmax at the OSH on presentation was 99.5F. Microlab reports blood and urine cultures are NGTD (drawn 3/10/20). An influenza test at the OSH was also negative.     Hospital/ICU Course:  Admitted to Critical Care Medicine as a transfer from [facility] for acute hypoxemic respiratory failure. Negative flu and no growth reported from OSH. Due to concerns for COVID 19, appropriate testing was sent off; awaiting results. Started on aggressive diuresis and broad spectrum  antibiotics. HTS consulted, 1 out of 3 home pulmonary HTN meds restarted as the other 2 are unable to be crushed and administered through the OG tube. HTS started Nitric as complementary treatment for pulmonary HTN, RIP positive for metapneumonvirus. COVID negative    Interval History/Significant Events: metapneumovirus positive, COVID19 negative, continue nitric oxide per HTS. Wean vent settings as tolerated    Review of Systems   Unable to perform ROS: Intubated     Objective:     Vital Signs (Most Recent):  Temp: 98.4 °F (36.9 °C) (03/15/20 0700)  Pulse: 83 (03/15/20 1000)  Resp: 14 (03/15/20 1000)  BP: (!) 94/51 (03/15/20 0930)  SpO2: 96 % (03/15/20 1000) Vital Signs (24h Range):  Temp:  [98.4 °F (36.9 °C)-99.7 °F (37.6 °C)] 98.4 °F (36.9 °C)  Pulse:  [81-94] 83  Resp:  [12-54] 14  SpO2:  [88 %-98 %] 96 %  BP: ()/(46-61) 94/51   Weight: (!) 163 kg (359 lb 5.6 oz)  Body mass index is 54.64 kg/m².      Intake/Output Summary (Last 24 hours) at 3/15/2020 1006  Last data filed at 3/15/2020 1000  Gross per 24 hour   Intake 3943 ml   Output 3965 ml   Net -22 ml       Physical Exam   Constitutional: Vital signs are normal. He appears listless. He has a sickly appearance. No distress. He is sedated, intubated and restrained.   Morbid obesity   HENT:   Head: Normocephalic and atraumatic.   Eyes: Pupils are equal, round, and reactive to light. Conjunctivae are normal. Right eye exhibits no exudate. Left eye exhibits no exudate. Right conjunctiva has no hemorrhage. Left conjunctiva has no hemorrhage. No scleral icterus. Right eye exhibits no nystagmus. Left eye exhibits no nystagmus.   Neck: Trachea normal. No neck rigidity. No tracheal deviation present.   Cardiovascular: Normal rate, regular rhythm and normal heart sounds. PMI is not displaced. Exam reveals no gallop and no friction rub.   No murmur heard.  Pulses:       Radial pulses are 2+ on the right side, and 2+ on the left side.   2+ pitting pedal edema and  bilateral lower ankles   Pulmonary/Chest: Effort normal and breath sounds normal. No accessory muscle usage. He is intubated. No respiratory distress. He has no wheezes. He has no rhonchi. He has no rales.   Abdominal: Soft. Normal appearance and bowel sounds are normal. There is no tenderness.   Musculoskeletal: Normal range of motion.   Neurological: He appears listless. No cranial nerve deficit or sensory deficit. GCS eye subscore is 2. GCS verbal subscore is 1. GCS motor subscore is 3.   Sedated, not withdrawing from pain, cough, gag reflexes intact, no focal deficits to note.      Skin: Skin is warm and dry. Capillary refill takes 2 to 3 seconds. He is not diaphoretic. No cyanosis. Nails show no clubbing.   Nursing note and vitals reviewed.      Vents:  Vent Mode: A/C (03/15/20 0801)  Ventilator Initiated: Yes (03/12/20 1117)  Set Rate: 14 BPM (03/15/20 0801)  Vt Set: 420 mL (03/15/20 0801)  PEEP/CPAP: 10 cmH20 (03/15/20 0801)  Oxygen Concentration (%): 50 (03/15/20 1000)  Peak Airway Pressure: 27 cmH2O (03/15/20 0801)  Plateau Pressure: 15 cmH20 (03/15/20 0801)  Total Ve: 9.39 mL (03/15/20 0801)  F/VT Ratio<105 (RSBI): (!) 44.03 (03/15/20 0801)  Lines/Drains/Airways     Central Venous Catheter Line                 Introducer 03/13/18 0801 right internal jugular 733 days    Percutaneous Central Line Insertion/Assessment - Triple Lumen  03/12/20 1600 right internal jugular 2 days          Drain                 NG/OG Tube 03/10/20 Center mouth 5 days         Urethral Catheter 03/10/20 5 days          Airway                 Airway - Non-Surgical Endotracheal Tube -- days          Peripheral Intravenous Line                 Peripheral IV - Single Lumen 03/15/20 0400 20 G Right Hand less than 1 day              Significant Labs:    CBC/Anemia Profile:  Recent Labs   Lab 03/14/20  0438 03/15/20  0328   WBC 13.50* 12.15   HGB 8.2* 7.9*   HCT 28.2* 27.9*   * 370*   MCV 78* 78*   RDW 15.8* 15.8*         Chemistries:  Recent Labs   Lab 03/14/20  0438 03/14/20  2015 03/15/20  0328    141 141   K 4.3 3.3* 3.7   CL 95 94* 95   CO2 36* 36* 36*   BUN 17 19 20   CREATININE 1.0 0.9 0.9   CALCIUM 8.3* 8.3* 8.6*   ALBUMIN 2.3*  --  2.2*   PROT 6.2  --  6.3   BILITOT 0.7  --  0.8   ALKPHOS 110  --  95   ALT 44  --  34   AST 32  --  30   MG 2.1 2.1 2.4   PHOS 3.4 3.4 3.7       All pertinent labs within the past 24 hours have been reviewed.    Significant Imaging:  I have reviewed all pertinent imaging results/findings within the past 24 hours.      ABG  Recent Labs   Lab 03/15/20  0411   PH 7.494*   PO2 74*   PCO2 48.4*   HCO3 37.3*   BE 14     Assessment/Plan:     Neuro  Encephalopathy, metabolic  --Likely 2/2 sedation.   --Continue propofol for now. Wean sedation as tolerated.     Pulmonary  * Acute hypoxemic respiratory failure  Secondary to metapneumovirus pna in setting of severe pHTN    --COVID-19 negative  --Prodrome (fever, cough, pharyngitis),   --negative influenza, and negative sputum/blood cultures  --Continue Nitric oxide for pHTN mgmt per HTS recs  --Maintain contact isolation.   --Duonebs q4h.  --wean Furosemide 60 mg BID  --Wean vent as tolerated    Pulmonary hypertension  Appreciate HTS input    --Adempas restarted as it is the only crushable pulm HTN med he is on. Restart the other 2 when able to swallow.   --TTE with Normal EF, PA 49   --nitric oxide mgmt per HTS    Pneumonia  --See above.     Renal/  Other microscopic hematuria  Occult blood 3+, RBC >100 on UA. Urine did not reflex to culture. Making good urine with diuresis.     --watch for now  --consider renal US if concern for stones/hydronephrosis       Critical Care Time: 40 minutes  Critical secondary to Patient has a condition that poses threat to life and bodily function: Acute respiratory failure      Critical care was time spent personally by me on the following activities: development of treatment plan with patient or surrogate and  bedside caregivers, discussions with consultants, evaluation of patient's response to treatment, examination of patient, ordering and performing treatments and interventions, ordering and review of laboratory studies, ordering and review of radiographic studies, pulse oximetry, re-evaluation of patient's condition. This critical care time did not overlap with that of any other provider or involve time for any procedures.     Aleksander oRman NP  Critical Care Medicine  [facility]

## 2020-03-15 NOTE — ASSESSMENT & PLAN NOTE
Appreciate HTS input    --Adempas restarted as it is the only crushable pulm HTN med he is on. Restart the other 2 when able to swallow.   --TTE with Normal EF, PA 49   --nitric oxide mgmt per HTS   26-Nov-2019 14:00

## 2020-03-15 NOTE — PLAN OF CARE
Pt remained intubated overnight. AC, 50%, 10-peep, rate-14.  Nitric @10 ppm.   O2: > 92%.   HR: 80's-90's, NSR.   MAP: > 65.  CVP: 12-11-13.  Temp max: 99.7 F.   Gtts: Propofol.  UOP:  cc/hr.   No BM this shift.   TF tolerated at goal of 50 cc/hr with minimal residuals.   Pt follows commands and moves all extremities during sedation vacations. COVID-19 results negative. Pt remains on contact precautions for Human metapneumovirus.        Skin assessment:   Skin tear to super-pubic area, site is dry and open to air per wound care recommendations. Pt on inflated waffle mattress. Heel and sacrum foams intact. Heel off loading boots in use. Wrists under soft restraints free of breakdown. Pt turned Q2hrs with pillows and wedge. Pt free of additional skin breakdown at this time.

## 2020-03-15 NOTE — SUBJECTIVE & OBJECTIVE
Interval History/Significant Events: metapneumovirus positive, COVID19 negative, continue nitric oxide per HTS. Wean vent settings as tolerated    Review of Systems   Unable to perform ROS: Intubated     Objective:     Vital Signs (Most Recent):  Temp: 98.4 °F (36.9 °C) (03/15/20 0700)  Pulse: 83 (03/15/20 1000)  Resp: 14 (03/15/20 1000)  BP: (!) 94/51 (03/15/20 0930)  SpO2: 96 % (03/15/20 1000) Vital Signs (24h Range):  Temp:  [98.4 °F (36.9 °C)-99.7 °F (37.6 °C)] 98.4 °F (36.9 °C)  Pulse:  [81-94] 83  Resp:  [12-54] 14  SpO2:  [88 %-98 %] 96 %  BP: ()/(46-61) 94/51   Weight: (!) 163 kg (359 lb 5.6 oz)  Body mass index is 54.64 kg/m².      Intake/Output Summary (Last 24 hours) at 3/15/2020 1006  Last data filed at 3/15/2020 1000  Gross per 24 hour   Intake 3943 ml   Output 3965 ml   Net -22 ml       Physical Exam   Constitutional: Vital signs are normal. He appears listless. He has a sickly appearance. No distress. He is sedated, intubated and restrained.   Morbid obesity   HENT:   Head: Normocephalic and atraumatic.   Eyes: Pupils are equal, round, and reactive to light. Conjunctivae are normal. Right eye exhibits no exudate. Left eye exhibits no exudate. Right conjunctiva has no hemorrhage. Left conjunctiva has no hemorrhage. No scleral icterus. Right eye exhibits no nystagmus. Left eye exhibits no nystagmus.   Neck: Trachea normal. No neck rigidity. No tracheal deviation present.   Cardiovascular: Normal rate, regular rhythm and normal heart sounds. PMI is not displaced. Exam reveals no gallop and no friction rub.   No murmur heard.  Pulses:       Radial pulses are 2+ on the right side, and 2+ on the left side.   2+ pitting pedal edema and bilateral lower ankles   Pulmonary/Chest: Effort normal and breath sounds normal. No accessory muscle usage. He is intubated. No respiratory distress. He has no wheezes. He has no rhonchi. He has no rales.   Abdominal: Soft. Normal appearance and bowel sounds are normal.  There is no tenderness.   Musculoskeletal: Normal range of motion.   Neurological: He appears listless. No cranial nerve deficit or sensory deficit. GCS eye subscore is 2. GCS verbal subscore is 1. GCS motor subscore is 3.   Sedated, not withdrawing from pain, cough, gag reflexes intact, no focal deficits to note.      Skin: Skin is warm and dry. Capillary refill takes 2 to 3 seconds. He is not diaphoretic. No cyanosis. Nails show no clubbing.   Nursing note and vitals reviewed.      Vents:  Vent Mode: A/C (03/15/20 0801)  Ventilator Initiated: Yes (03/12/20 1117)  Set Rate: 14 BPM (03/15/20 0801)  Vt Set: 420 mL (03/15/20 0801)  PEEP/CPAP: 10 cmH20 (03/15/20 0801)  Oxygen Concentration (%): 50 (03/15/20 1000)  Peak Airway Pressure: 27 cmH2O (03/15/20 0801)  Plateau Pressure: 15 cmH20 (03/15/20 0801)  Total Ve: 9.39 mL (03/15/20 0801)  F/VT Ratio<105 (RSBI): (!) 44.03 (03/15/20 0801)  Lines/Drains/Airways     Central Venous Catheter Line                 Introducer 03/13/18 0801 right internal jugular 733 days    Percutaneous Central Line Insertion/Assessment - Triple Lumen  03/12/20 1600 right internal jugular 2 days          Drain                 NG/OG Tube 03/10/20 Center mouth 5 days         Urethral Catheter 03/10/20 5 days          Airway                 Airway - Non-Surgical Endotracheal Tube -- days          Peripheral Intravenous Line                 Peripheral IV - Single Lumen 03/15/20 0400 20 G Right Hand less than 1 day              Significant Labs:    CBC/Anemia Profile:  Recent Labs   Lab 03/14/20  0438 03/15/20  0328   WBC 13.50* 12.15   HGB 8.2* 7.9*   HCT 28.2* 27.9*   * 370*   MCV 78* 78*   RDW 15.8* 15.8*        Chemistries:  Recent Labs   Lab 03/14/20  0438 03/14/20  2015 03/15/20  0328    141 141   K 4.3 3.3* 3.7   CL 95 94* 95   CO2 36* 36* 36*   BUN 17 19 20   CREATININE 1.0 0.9 0.9   CALCIUM 8.3* 8.3* 8.6*   ALBUMIN 2.3*  --  2.2*   PROT 6.2  --  6.3   BILITOT 0.7  --  0.8    ALKPHOS 110  --  95   ALT 44  --  34   AST 32  --  30   MG 2.1 2.1 2.4   PHOS 3.4 3.4 3.7       All pertinent labs within the past 24 hours have been reviewed.    Significant Imaging:  I have reviewed all pertinent imaging results/findings within the past 24 hours.

## 2020-03-15 NOTE — ASSESSMENT & PLAN NOTE
48 yo M pmhx hx of CTEPH s/p PTE on 3/18/18 [place] [facility] [place]. Consulted regarding recommendations for pulm htn medication management. Echo done and PA pressure 45, RV and LV function look normal     Recs:  - Agree with continuation of lasix, rec close monitoring of cvp, rec goal of ~10  - Agree with resuming Adempas at 2.5mg TID but hold on resuming any further home PH medications at this time. Has been off uptravi now for more than 3 days, will resume when able to, starting at 200mg po TID. Patient can have uptravi dissolved in water and then placed down NGT if needed.  - Will plan to resume Macitentan and Uptravi once patient extubated and more stable from respiratory standpoint.  - Continue inhaled nitric oxide at 10ppm while intubated  - strict ins/outs

## 2020-03-15 NOTE — EICU
Rounding (Video Assessment):  Yes    Comments: Patient remains sedated and on vent. Resting quietly.

## 2020-03-15 NOTE — PROGRESS NOTES
VANCOMYCIN DOSING BY PHARMACY DISCONTINUATION NOTE    Jaquan Hines Jr. is a 49 y.o. male who had been consulted for vancomycin dosing.    The pharmacy consult for vancomycin dosing has been discontinued.     Vancomycin Dosing by Pharmacy Consult will sign-off. Please reconsult if necessary. Thank you for allowing us to participate in this patient's care.       Valeria Agosto, PharmD  EXT [phone]

## 2020-03-16 LAB
ALBUMIN SERPL BCP-MCNC: 2.4 G/DL (ref 3.5–5.2)
ALLENS TEST: ABNORMAL
ALP SERPL-CCNC: 95 U/L (ref 55–135)
ALT SERPL W/O P-5'-P-CCNC: 32 U/L (ref 10–44)
ANION GAP SERPL CALC-SCNC: 9 MMOL/L (ref 8–16)
ANISOCYTOSIS BLD QL SMEAR: SLIGHT
AST SERPL-CCNC: 30 U/L (ref 10–40)
BASOPHILS NFR BLD: 2 % (ref 0–1.9)
BILIRUB SERPL-MCNC: 0.7 MG/DL (ref 0.1–1)
BUN SERPL-MCNC: 26 MG/DL (ref 6–20)
CALCIUM SERPL-MCNC: 8.8 MG/DL (ref 8.7–10.5)
CHLORIDE SERPL-SCNC: 94 MMOL/L (ref 95–110)
CO2 SERPL-SCNC: 36 MMOL/L (ref 23–29)
CREAT SERPL-MCNC: 0.9 MG/DL (ref 0.5–1.4)
DELSYS: ABNORMAL
DIFFERENTIAL METHOD: ABNORMAL
EOSINOPHIL NFR BLD: 4 % (ref 0–8)
ERYTHROCYTE [DISTWIDTH] IN BLOOD BY AUTOMATED COUNT: 15.9 % (ref 11.5–14.5)
EST. GFR  (AFRICAN AMERICAN): >60 ML/MIN/1.73 M^2
EST. GFR  (NON AFRICAN AMERICAN): >60 ML/MIN/1.73 M^2
FIO2: 40
GLUCOSE SERPL-MCNC: 104 MG/DL (ref 70–110)
HCO3 UR-SCNC: 36.3 MMOL/L (ref 24–28)
HCT VFR BLD AUTO: 29.6 % (ref 40–54)
HGB BLD-MCNC: 8.1 G/DL (ref 14–18)
HYPOCHROMIA BLD QL SMEAR: ABNORMAL
IMM GRANULOCYTES # BLD AUTO: ABNORMAL K/UL (ref 0–0.04)
IMM GRANULOCYTES NFR BLD AUTO: ABNORMAL % (ref 0–0.5)
LYMPHOCYTES NFR BLD: 4 % (ref 18–48)
MAGNESIUM SERPL-MCNC: 2.5 MG/DL (ref 1.6–2.6)
MCH RBC QN AUTO: 21.5 PG (ref 27–31)
MCHC RBC AUTO-ENTMCNC: 27.4 G/DL (ref 32–36)
MCV RBC AUTO: 79 FL (ref 82–98)
METHEMOGLOBIN: 0.8 % (ref 0–3)
MODE: ABNORMAL
MONOCYTES NFR BLD: 4 % (ref 4–15)
MYELOCYTES NFR BLD MANUAL: 1 %
NEUTROPHILS NFR BLD: 84 % (ref 38–73)
NEUTS BAND NFR BLD MANUAL: 1 %
NRBC BLD-RTO: 1 /100 WBC
OVALOCYTES BLD QL SMEAR: ABNORMAL
PCO2 BLDA: 43.9 MMHG (ref 35–45)
PEEP: 5
PH SMN: 7.53 [PH] (ref 7.35–7.45)
PHOSPHATE SERPL-MCNC: 3.4 MG/DL (ref 2.7–4.5)
PLATELET # BLD AUTO: 407 K/UL (ref 150–350)
PLATELET BLD QL SMEAR: ABNORMAL
PMV BLD AUTO: 11.2 FL (ref 9.2–12.9)
PO2 BLDA: 82 MMHG (ref 80–100)
POC BE: 14 MMOL/L
POC SATURATED O2: 97 % (ref 95–100)
POC TCO2: 38 MMOL/L (ref 23–27)
POIKILOCYTOSIS BLD QL SMEAR: SLIGHT
POLYCHROMASIA BLD QL SMEAR: ABNORMAL
POTASSIUM SERPL-SCNC: 3.5 MMOL/L (ref 3.5–5.1)
PROT SERPL-MCNC: 7 G/DL (ref 6–8.4)
PS: 5
RBC # BLD AUTO: 3.76 M/UL (ref 4.6–6.2)
SAMPLE: ABNORMAL
SITE: ABNORMAL
SODIUM SERPL-SCNC: 139 MMOL/L (ref 136–145)
WBC # BLD AUTO: 12.54 K/UL (ref 3.9–12.7)

## 2020-03-16 PROCEDURE — 25000003 PHARM REV CODE 250: Performed by: GENERAL ACUTE CARE HOSPITAL

## 2020-03-16 PROCEDURE — 99900035 HC TECH TIME PER 15 MIN (STAT)

## 2020-03-16 PROCEDURE — 99233 PR SUBSEQUENT HOSPITAL CARE,LEVL III: ICD-10-PCS | Mod: ,,, | Performed by: SURGERY

## 2020-03-16 PROCEDURE — 94640 AIRWAY INHALATION TREATMENT: CPT

## 2020-03-16 PROCEDURE — 80053 COMPREHEN METABOLIC PANEL: CPT

## 2020-03-16 PROCEDURE — 84100 ASSAY OF PHOSPHORUS: CPT

## 2020-03-16 PROCEDURE — 25000242 PHARM REV CODE 250 ALT 637 W/ HCPCS: Performed by: INTERNAL MEDICINE

## 2020-03-16 PROCEDURE — 20000000 HC ICU ROOM

## 2020-03-16 PROCEDURE — 85007 BL SMEAR W/DIFF WBC COUNT: CPT

## 2020-03-16 PROCEDURE — 83050 HGB METHEMOGLOBIN QUAN: CPT

## 2020-03-16 PROCEDURE — 99900017 HC EXTUBATION W/PARAMETERS (STAT)

## 2020-03-16 PROCEDURE — 36600 WITHDRAWAL OF ARTERIAL BLOOD: CPT

## 2020-03-16 PROCEDURE — 83735 ASSAY OF MAGNESIUM: CPT

## 2020-03-16 PROCEDURE — 99900026 HC AIRWAY MAINTENANCE (STAT)

## 2020-03-16 PROCEDURE — 85027 COMPLETE CBC AUTOMATED: CPT

## 2020-03-16 PROCEDURE — 63600367 HC NITRIC OXIDE PER HOUR

## 2020-03-16 PROCEDURE — 25000003 PHARM REV CODE 250: Performed by: STUDENT IN AN ORGANIZED HEALTH CARE EDUCATION/TRAINING PROGRAM

## 2020-03-16 PROCEDURE — 94003 VENT MGMT INPAT SUBQ DAY: CPT

## 2020-03-16 PROCEDURE — 99232 PR SUBSEQUENT HOSPITAL CARE,LEVL II: ICD-10-PCS | Mod: ,,, | Performed by: INTERNAL MEDICINE

## 2020-03-16 PROCEDURE — 63600175 PHARM REV CODE 636 W HCPCS: Performed by: GENERAL ACUTE CARE HOSPITAL

## 2020-03-16 PROCEDURE — 25000003 PHARM REV CODE 250: Performed by: NURSE PRACTITIONER

## 2020-03-16 PROCEDURE — 99233 SBSQ HOSP IP/OBS HIGH 50: CPT | Mod: ,,, | Performed by: SURGERY

## 2020-03-16 PROCEDURE — 94761 N-INVAS EAR/PLS OXIMETRY MLT: CPT

## 2020-03-16 PROCEDURE — 99232 SBSQ HOSP IP/OBS MODERATE 35: CPT | Mod: ,,, | Performed by: INTERNAL MEDICINE

## 2020-03-16 PROCEDURE — 27000221 HC OXYGEN, UP TO 24 HOURS

## 2020-03-16 PROCEDURE — 82803 BLOOD GASES ANY COMBINATION: CPT

## 2020-03-16 PROCEDURE — 63600175 PHARM REV CODE 636 W HCPCS: Performed by: STUDENT IN AN ORGANIZED HEALTH CARE EDUCATION/TRAINING PROGRAM

## 2020-03-16 PROCEDURE — 94010 BREATHING CAPACITY TEST: CPT

## 2020-03-16 PROCEDURE — 94150 VITAL CAPACITY TEST: CPT

## 2020-03-16 RX ORDER — POTASSIUM CHLORIDE 20 MEQ/15ML
40 SOLUTION ORAL
Status: COMPLETED | OUTPATIENT
Start: 2020-03-16 | End: 2020-03-16

## 2020-03-16 RX ADMIN — POTASSIUM CHLORIDE 40 MEQ: 20 SOLUTION ORAL at 05:03

## 2020-03-16 RX ADMIN — IPRATROPIUM BROMIDE AND ALBUTEROL SULFATE 3 ML: .5; 3 SOLUTION RESPIRATORY (INHALATION) at 07:03

## 2020-03-16 RX ADMIN — CEFTRIAXONE 2 G: 2 INJECTION, SOLUTION INTRAVENOUS at 11:03

## 2020-03-16 RX ADMIN — APIXABAN 5 MG: 5 TABLET, FILM COATED ORAL at 08:03

## 2020-03-16 RX ADMIN — PROPOFOL 50 MCG/KG/MIN: 10 INJECTION, EMULSION INTRAVENOUS at 05:03

## 2020-03-16 RX ADMIN — APIXABAN 5 MG: 5 TABLET, FILM COATED ORAL at 09:03

## 2020-03-16 RX ADMIN — RIOCIGUAT 2.5 MG: 2.5 TABLET, FILM COATED ORAL at 01:03

## 2020-03-16 RX ADMIN — PROPOFOL 50 MCG/KG/MIN: 10 INJECTION, EMULSION INTRAVENOUS at 07:03

## 2020-03-16 RX ADMIN — RIOCIGUAT 2.5 MG: 2.5 TABLET, FILM COATED ORAL at 09:03

## 2020-03-16 RX ADMIN — CHLORHEXIDINE GLUCONATE 0.12% ORAL RINSE 15 ML: 1.2 LIQUID ORAL at 09:03

## 2020-03-16 RX ADMIN — FAMOTIDINE 20 MG: 20 TABLET, FILM COATED ORAL at 09:03

## 2020-03-16 RX ADMIN — FAMOTIDINE 20 MG: 20 TABLET, FILM COATED ORAL at 08:03

## 2020-03-16 RX ADMIN — FUROSEMIDE 60 MG: 10 INJECTION, SOLUTION INTRAMUSCULAR; INTRAVENOUS at 09:03

## 2020-03-16 RX ADMIN — PROPOFOL 50 MCG/KG/MIN: 10 INJECTION, EMULSION INTRAVENOUS at 12:03

## 2020-03-16 RX ADMIN — PROPOFOL 50 MCG/KG/MIN: 10 INJECTION, EMULSION INTRAVENOUS at 09:03

## 2020-03-16 RX ADMIN — PROPOFOL 50 MCG/KG/MIN: 10 INJECTION, EMULSION INTRAVENOUS at 04:03

## 2020-03-16 RX ADMIN — FUROSEMIDE 60 MG: 10 INJECTION, SOLUTION INTRAMUSCULAR; INTRAVENOUS at 02:03

## 2020-03-16 RX ADMIN — IPRATROPIUM BROMIDE AND ALBUTEROL SULFATE 3 ML: .5; 3 SOLUTION RESPIRATORY (INHALATION) at 01:03

## 2020-03-16 RX ADMIN — POTASSIUM CHLORIDE 40 MEQ: 20 SOLUTION ORAL at 08:03

## 2020-03-16 RX ADMIN — FUROSEMIDE 60 MG: 10 INJECTION, SOLUTION INTRAMUSCULAR; INTRAVENOUS at 05:03

## 2020-03-16 RX ADMIN — PROPOFOL 50 MCG/KG/MIN: 10 INJECTION, EMULSION INTRAVENOUS at 02:03

## 2020-03-16 RX ADMIN — IPRATROPIUM BROMIDE AND ALBUTEROL SULFATE 3 ML: .5; 3 SOLUTION RESPIRATORY (INHALATION) at 08:03

## 2020-03-16 RX ADMIN — CHLORHEXIDINE GLUCONATE 0.12% ORAL RINSE 15 ML: 1.2 LIQUID ORAL at 08:03

## 2020-03-16 NOTE — SUBJECTIVE & OBJECTIVE
Interval History: No acute events overnight. Remains intubated. CVP 14 today. Diuresing with Lasix 60mg IVP TID. RVP positive for Human Metapneumovirus. ECHO showed PA 44mmhg. SBT today. Hoping to extubate today.     Continuous Infusions:   nitric oxide gas      propofoL 50 mcg/kg/min (03/16/20 0800)     Scheduled Meds:   albuterol-ipratropium  3 mL Nebulization Q6H    apixaban  5 mg Oral BID    cefTRIAXone (ROCEPHIN) IVPB  2 g Intravenous Q24H    chlorhexidine  15 mL Mouth/Throat BID    famotidine  20 mg Per NG tube Q12H    furosemide (LASIX) IV  60 mg Intravenous Q8H    riociguat  2.5 mg Per OG tube TID     PRN Meds:[email or web address]    Review of patient's allergies indicates:   Allergen Reactions    Clopidogrel      Other reaction(s): Other (See Comments)  Pt reports it does not work      Objective:     Vital Signs (Most Recent):  Temp: 99.1 °F (37.3 °C) (03/16/20 0700)  Pulse: 86 (03/16/20 0800)  Resp: 16 (03/16/20 0800)  BP: (!) 103/56 (03/16/20 0800)  SpO2: (!) 94 % (03/16/20 0800) Vital Signs (24h Range):  Temp:  [98.8 °F (37.1 °C)-99.7 °F (37.6 °C)] 99.1 °F (37.3 °C)  Pulse:  [80-94] 86  Resp:  [13-28] 16  SpO2:  [90 %-100 %] 94 %  BP: ()/(49-65) 103/56     Patient Vitals for the past 72 hrs (Last 3 readings):   Weight   03/16/20 0500 (!) 162.4 kg (358 lb 0.4 oz)   03/15/20 0500 (!) 163 kg (359 lb 5.6 oz)   03/14/20 0700 (!) 164.4 kg (362 lb 7 oz)     Body mass index is 54.44 kg/m².      Intake/Output Summary (Last 24 hours) at 3/16/2020 0822  Last data filed at 3/16/2020 0800  Gross per 24 hour   Intake 2941.41 ml   Output 3950 ml   Net -1008.59 ml       Hemodynamic Parameters:       Telemetry: Reviewed    Physical Exam   Constitutional: He appears well-developed and well-nourished.   HENT:   Head: Normocephalic and atraumatic.   ETT   Eyes: Pupils are equal, round, and reactive to light. EOM are normal.   Neck: Normal range of motion. Neck supple. No JVD present.   IJ   Cardiovascular: Normal rate,  regular rhythm, normal heart sounds and intact distal pulses. Exam reveals no gallop and no friction rub.   No murmur heard.  Pulmonary/Chest: No respiratory distress. He has no wheezes. He has rales. He exhibits no tenderness.   Mechanical breath sounds   Abdominal: Soft. Bowel sounds are normal. He exhibits no distension and no mass. There is no tenderness.   Musculoskeletal: Normal range of motion. He exhibits no edema.   Neurological:   Intubated, sedated   Skin: Skin is warm and dry.   Vitals reviewed.      Significant Labs:  CBC:  Recent Labs   Lab 03/14/20  0438 03/15/20  0328 03/16/20  0300   WBC 13.50* 12.15 12.54   RBC 3.64* 3.60* 3.76*   HGB 8.2* 7.9* 8.1*   HCT 28.2* 27.9* 29.6*   * 370* 407*   MCV 78* 78* 79*   MCH 22.5* 21.9* 21.5*   MCHC 29.1* 28.3* 27.4*     BNP:  Recent Labs   Lab 03/12/20  1319   BNP 69     CMP:  Recent Labs   Lab 03/14/20  0438  03/15/20  0328 03/15/20  1500 03/15/20  2000 03/16/20  0300   *   < > 113* 111* 99 104   CALCIUM 8.3*   < > 8.6* 8.9 8.6* 8.8   ALBUMIN 2.3*  --  2.2*  --   --  2.4*   PROT 6.2  --  6.3  --   --  7.0      < > 141 139 142 139   K 4.3   < > 3.7 3.1* 3.6 3.5   CO2 36*   < > 36* 36* 38* 36*   CL 95   < > 95 93* 95 94*   BUN 17   < > 20 22* 23* 26*   CREATININE 1.0   < > 0.9 0.9 0.9 0.9   ALKPHOS 110  --  95  --   --  95   ALT 44  --  34  --   --  32   AST 32  --  30  --   --  30   BILITOT 0.7  --  0.8  --   --  0.7    < > = values in this interval not displayed.      Coagulation:   No results for input(s): PT, INR, APTT in the last 168 hours.  LDH:  No results for input(s): LDH in the last 72 hours.  Microbiology:  Microbiology Results (last 7 days)     Procedure Component Value Units Date/Time    Blood culture [[id number]] Collected:  03/12/20 1140    Order Status:  Completed Specimen:  Blood from Peripheral, Forearm, Left Updated:  03/15/20 1412     Blood Culture, Routine No Growth to date      No Growth to date      No Growth to date       No Growth to date    Narrative:       Blood cultures from 2 different sites. 4 bottles total.  Please draw before starting antibiotics.    Blood culture [[id number]] Collected:  03/12/20 1140    Order Status:  Completed Specimen:  Blood from Peripheral, Forearm, Right Updated:  03/15/20 1412     Blood Culture, Routine No Growth to date      No Growth to date      No Growth to date      No Growth to date    Narrative:       Blood cultures x 2 different sites. 4 bottles total. Please  draw cultures before administering antibiotics.    Culture, Respiratory with Gram Stain [[id number]] Collected:  03/12/20 1153    Order Status:  Completed Specimen:  Respiratory from Endotracheal Aspirate Updated:  03/14/20 1045     Respiratory Culture No growth     Gram Stain (Respiratory) <10 epithelial cells per low power field.     Gram Stain (Respiratory) Moderate WBC's     Gram Stain (Respiratory) No organisms seen    Narrative:       Mini-BAL. Before antibiotics.    Respiratory Infection Panel (PCR), Nasopharyngeal [[id number]]  (Abnormal) Collected:  03/12/20 1446    Order Status:  Completed Specimen:  Nasopharyngeal Swab Updated:  03/13/20 1916     Respiratory Infection Panel Source NP Swab     Adenovirus Not Detected     Coronavirus 229E, Common Cold Virus Not Detected     Coronavirus HKU1, Common Cold Virus Not Detected     Coronavirus NL63, Common Cold Virus Not Detected     Coronavirus OC43, Common Cold Virus Not Detected     Comment: The Coronavirus strains detected in this test cause the common cold.  These strains are not the COVID-19 (novel Coronavirus)strain   associated with the respiratory disease outbreak.          Human Metapneumovirus Detected     Human Rhinovirus/Enterovirus Not Detected     Influenza A (subtypes H1, H1-2009,H3) Not Detected     Influenza B Not Detected     Parainfluenza Virus 1 Not Detected     Parainfluenza Virus 2 Not Detected     Parainfluenza Virus 3 Not Detected     Parainfluenza Virus 4 Not  Detected     Respiratory Syncytial Virus Not Detected     Bordetella Parapertussis (NC1813) Not Detected     Bordetella pertussis (ptxP) Not Detected     Chlamydia pneumoniae Not Detected     Mycoplasma pneumoniae Not Detected     Comment: Respiratory Infection Panel testing performed by Multiplex PCR.       Narrative:       For all other respiratory sources order [id number] Respiratory  Viral Panel by PCR (RVPCR)          I have reviewed all pertinent labs within the past 24 hours.    Estimated Creatinine Clearance: 145.6 mL/min (based on SCr of 0.9 mg/dL).    Diagnostic Results:  I have reviewed and interpreted all pertinent imaging results/findings within the past 24 hours.

## 2020-03-16 NOTE — ASSESSMENT & PLAN NOTE
48 yo man with HTN, morbid obesity with OHS and chronic hypoxic resp failure, HTN, referred by Dr Jennings for management of PH- pt underwent PTE on 3/13/18. Last seen by Dr. Hinojosa in clinic on 1/24/2020 and was doing well then on Uptravi, Adempas, and Macitentan without need for home oxygen. Per records, patient was diagnosed with pharyngitis on 2/27/2020 and treated with amoxicillin without improvement. Subsequently went to urgent care due to lack of improvement and was prescribed oxygen, bronchodilators. Presented to [facility] on 3/08/2020, treated for PNA and discharged on 3/10. Returned immediately due to lack of relief in symptoms and worsening dyspnea leading to intubation for acute hypoxic respiratory failure. Then transferred to [facility] for higher level of care. Based on records, does not appear to be any documentation that PH meds were continued during this admission there. Supposedly negative for flu and negative blood cultures from there as well.      Since arrival he has remained intubated, afebrile but did have increased wbc. Full sepsis work-up revealed metapneumovirus. COVID-19 negative.     SICU team has assumed care 03/16/20 due to extenuating circumstances of COVID-19 admissions. Per MICU team hospital course is as follows.    Admitted to Critical Care Medicine as a transfer from [facility] for acute hypoxemic respiratory failure. Negative flu and no growth reported from OSH. Tested negative for COVID-19 here. Started on aggressive diuresis and broad spectrum antibiotics. HTS consulted, 1 out of 3 home pulmonary HTN meds restarted as the other 2 are unable to be crushed and administered through the OG tube. HTS started Nitric as complementary treatment for pulmonary HTN, RIP positive for metapneumonvirus. COVID negative      Neuro  - propofol gtt while on vent  - follows commands, moves all extremities off sedation  - wean to  extubate    Pulmonary  * Acute hypoxemic respiratory failure  Secondary to metapneumovirus pna in setting of severe pHTN     --COVID-19 negative  --Prodrome (fever, cough, pharyngitis),   --negative influenza, and negative sputum/blood cultures  --Continue Nitric oxide for pHTN mgmt per HTS recs  --Maintain contact isolation.   --Duonebs q4h.  --lasix 60 mg TID  --Wean to extubate toda  --Plan to extubate to nasal cannula with Leena 10, may need HFNC for PEEP given obesity/viral PNA     Pulmonary hypertension  HTS consulted     --Adempas restarted as it is the only crushable pulm HTN med he is on. Restart the other 2 when able to swallow.   -- 3/13 TTE LVEF 65%, RV normal size and EF, R atrium normal size unable to assess function, PAP 49   --Leena 10, keep upon extubation  --once extubated then swallow study and plan to restart home pulm HTN meds     Pneumonia  --See above.   --Ceftriaxone     Renal/  Other microscopic hematuria  3/12 - Occult blood 3+, RBC >100 on UA. Urine did not reflex to culture. Making good urine with diuresis.      --Daily BMP  --BUN/Cr WNL  -- continue diuresis  --cole Enciso UOP hourly  --Medicine crit care per handoff was considering renal US if concern for stones/hydronephrosis

## 2020-03-16 NOTE — PROGRESS NOTES
[facility]  Heart Transplant  Progress Note    Patient Name: Jaquan Hiens Jr.  MRN: [id number]  Admission Date: 3/12/2020  Hospital Length of Stay: 4 days  Attending Physician: Nilay Arvizu*  Primary Care Provider: Allen Hubbard MD  Principal Problem:Acute hypoxemic respiratory failure    Subjective:     Interval History: No acute events overnight. Remains intubated. CVP 14 today. Diuresing with Lasix 60mg IVP TID. RVP positive for Human Metapneumovirus. ECHO showed PA 44mmhg. SBT today. Hoping to extubate today.     Continuous Infusions:   nitric oxide gas      propofoL 50 mcg/kg/min (03/16/20 0800)     Scheduled Meds:   albuterol-ipratropium  3 mL Nebulization Q6H    apixaban  5 mg Oral BID    cefTRIAXone (ROCEPHIN) IVPB  2 g Intravenous Q24H    chlorhexidine  15 mL Mouth/Throat BID    famotidine  20 mg Per NG tube Q12H    furosemide (LASIX) IV  60 mg Intravenous Q8H    riociguat  2.5 mg Per OG tube TID     PRN Meds:labetaloL    Review of patient's allergies indicates:   Allergen Reactions    Clopidogrel      Other reaction(s): Other (See Comments)  Pt reports it does not work      Objective:     Vital Signs (Most Recent):  Temp: 99.1 °F (37.3 °C) (03/16/20 0700)  Pulse: 86 (03/16/20 0800)  Resp: 16 (03/16/20 0800)  BP: (!) 103/56 (03/16/20 0800)  SpO2: (!) 94 % (03/16/20 0800) Vital Signs (24h Range):  Temp:  [98.8 °F (37.1 °C)-99.7 °F (37.6 °C)] 99.1 °F (37.3 °C)  Pulse:  [80-94] 86  Resp:  [13-28] 16  SpO2:  [90 %-100 %] 94 %  BP: ()/(49-65) 103/56     Patient Vitals for the past 72 hrs (Last 3 readings):   Weight   03/16/20 0500 (!) 162.4 kg (358 lb 0.4 oz)   03/15/20 0500 (!) 163 kg (359 lb 5.6 oz)   03/14/20 0700 (!) 164.4 kg (362 lb 7 oz)     Body mass index is 54.44 kg/m².      Intake/Output Summary (Last 24 hours) at 3/16/2020 0822  Last data filed at 3/16/2020 0800  Gross per 24 hour   Intake 2941.41 ml   Output 3950 ml   Net -1008.59 ml       Hemodynamic  Parameters:       Telemetry: Reviewed    Physical Exam   Constitutional: He appears well-developed and well-nourished.   HENT:   Head: Normocephalic and atraumatic.   ETT   Eyes: Pupils are equal, round, and reactive to light. EOM are normal.   Neck: Normal range of motion. Neck supple. No JVD present.   IJ   Cardiovascular: Normal rate, regular rhythm, normal heart sounds and intact distal pulses. Exam reveals no gallop and no friction rub.   No murmur heard.  Pulmonary/Chest: No respiratory distress. He has no wheezes. He has rales. He exhibits no tenderness.   Mechanical breath sounds   Abdominal: Soft. Bowel sounds are normal. He exhibits no distension and no mass. There is no tenderness.   Musculoskeletal: Normal range of motion. He exhibits no edema.   Neurological:   Intubated, sedated   Skin: Skin is warm and dry.   Vitals reviewed.      Significant Labs:  CBC:  Recent Labs   Lab 03/14/20  0438 03/15/20  0328 03/16/20  0300   WBC 13.50* 12.15 12.54   RBC 3.64* 3.60* 3.76*   HGB 8.2* 7.9* 8.1*   HCT 28.2* 27.9* 29.6*   * 370* 407*   MCV 78* 78* 79*   MCH 22.5* 21.9* 21.5*   MCHC 29.1* 28.3* 27.4*     BNP:  Recent Labs   Lab 03/12/20  1319   BNP 69     CMP:  Recent Labs   Lab 03/14/20  0438  03/15/20  0328 03/15/20  1500 03/15/20  2000 03/16/20  0300   *   < > 113* 111* 99 104   CALCIUM 8.3*   < > 8.6* 8.9 8.6* 8.8   ALBUMIN 2.3*  --  2.2*  --   --  2.4*   PROT 6.2  --  6.3  --   --  7.0      < > 141 139 142 139   K 4.3   < > 3.7 3.1* 3.6 3.5   CO2 36*   < > 36* 36* 38* 36*   CL 95   < > 95 93* 95 94*   BUN 17   < > 20 22* 23* 26*   CREATININE 1.0   < > 0.9 0.9 0.9 0.9   ALKPHOS 110  --  95  --   --  95   ALT 44  --  34  --   --  32   AST 32  --  30  --   --  30   BILITOT 0.7  --  0.8  --   --  0.7    < > = values in this interval not displayed.      Coagulation:   No results for input(s): PT, INR, APTT in the last 168 hours.  LDH:  No results for input(s): LDH in the last 72  hours.  Microbiology:  Microbiology Results (last 7 days)     Procedure Component Value Units Date/Time    Blood culture [[id number]] Collected:  03/12/20 1140    Order Status:  Completed Specimen:  Blood from Peripheral, Forearm, Left Updated:  03/15/20 1412     Blood Culture, Routine No Growth to date      No Growth to date      No Growth to date      No Growth to date    Narrative:       Blood cultures from 2 different sites. 4 bottles total.  Please draw before starting antibiotics.    Blood culture [[id number]] Collected:  03/12/20 1140    Order Status:  Completed Specimen:  Blood from Peripheral, Forearm, Right Updated:  03/15/20 1412     Blood Culture, Routine No Growth to date      No Growth to date      No Growth to date      No Growth to date    Narrative:       Blood cultures x 2 different sites. 4 bottles total. Please  draw cultures before administering antibiotics.    Culture, Respiratory with Gram Stain [[id number]] Collected:  03/12/20 1153    Order Status:  Completed Specimen:  Respiratory from Endotracheal Aspirate Updated:  03/14/20 1045     Respiratory Culture No growth     Gram Stain (Respiratory) <10 epithelial cells per low power field.     Gram Stain (Respiratory) Moderate WBC's     Gram Stain (Respiratory) No organisms seen    Narrative:       Mini-BAL. Before antibiotics.    Respiratory Infection Panel (PCR), Nasopharyngeal [[id number]]  (Abnormal) Collected:  03/12/20 1446    Order Status:  Completed Specimen:  Nasopharyngeal Swab Updated:  03/13/20 1916     Respiratory Infection Panel Source NP Swab     Adenovirus Not Detected     Coronavirus 229E, Common Cold Virus Not Detected     Coronavirus HKU1, Common Cold Virus Not Detected     Coronavirus NL63, Common Cold Virus Not Detected     Coronavirus OC43, Common Cold Virus Not Detected     Comment: The Coronavirus strains detected in this test cause the common cold.  These strains are not the COVID-19 (novel Coronavirus)strain   associated  with the respiratory disease outbreak.          Human Metapneumovirus Detected     Human Rhinovirus/Enterovirus Not Detected     Influenza A (subtypes H1, H1-2009,H3) Not Detected     Influenza B Not Detected     Parainfluenza Virus 1 Not Detected     Parainfluenza Virus 2 Not Detected     Parainfluenza Virus 3 Not Detected     Parainfluenza Virus 4 Not Detected     Respiratory Syncytial Virus Not Detected     Bordetella Parapertussis (OY1208) Not Detected     Bordetella pertussis (ptxP) Not Detected     Chlamydia pneumoniae Not Detected     Mycoplasma pneumoniae Not Detected     Comment: Respiratory Infection Panel testing performed by Multiplex PCR.       Narrative:       For all other respiratory sources order [id number] Respiratory  Viral Panel by PCR (RVPCR)          I have reviewed all pertinent labs within the past 24 hours.    Estimated Creatinine Clearance: 145.6 mL/min (based on SCr of 0.9 mg/dL).    Diagnostic Results:  I have reviewed and interpreted all pertinent imaging results/findings within the past 24 hours.    Assessment and Plan:     50 yo man with HTN, morbid obesity with OHS and chronic hypoxic resp failure, HTN, referred by Dr Jennings for management of PH- pt underwent PTE on 3/13/18. Last seen by Dr. Hinojosa in clinic on 1/24/2020 and was doing well then on Uptravi, Adempas, and Macitentan without need for home oxygen. Per records, patient was diagnosed with pharyngitis on 2/27/2020 and treated with amoxicillin without improvement. Subsequently went to urgent care due to lack of improvement and was prescribed oxygen, bronchodilators. Presented to [facility] on 3/08/2020, treated for PNA and discharged on 3/10. Returned immediately due to lack of relief in symptoms and worsening dyspnea leading to intubation for acute hypoxic respiratory failure. Then transferred to [facility] for higher level of care. Based on records, does not appear to be any documentation that PH meds were  continued during this admission there. Supposedly negative for flu and negative blood cultures from there as well.     Since arrival he has remained intubated, afebrile but did have increased wbc. Full sepsis work-up underway and being treated for presumptive pneumonia. Of note, patient being tested for COVID-19 also.     TTE 3/13/2020   · Limited Echo.  · Normal left ventricular systolic function. The estimated ejection fraction is 65%.  · Indeterminate left ventricular diastolic function.  · No wall motion abnormalities.  · Normal right ventricular systolic function.  · The estimated PA systolic pressure is 49 mmHg. Pulmonary hypertension present.  · Elevated central venous pressure (15 mmHg).           * Acute hypoxemic respiratory failure  Transferred from OSH 2/2 respiratory failure with known history of CTEPH. Currently under sepsis work-up and being treated for presumed PNA.     Respiratory viral panel positive for Human Metapneumovirus.    Pulmonary hypertension  50 yo M pmhx hx of CTEPH s/p PTE on 3/18/18 transferred to [facility] [place] higher level of care. Consulted regarding recommendations for pulm htn medication management. Echo done and PA pressure 45, RV and LV function look normal     Recs:  - Agree with continuation of lasix, rec close monitoring of cvp, rec goal of ~10  - Agree with resuming Adempas at 2.5mg TID but hold on resuming any further home PH medications at this time. Has been off uptravi now for more than 3 days, will resume when able to, starting at 200mg po TID. Patient can have uptravi dissolved in water and then placed down NGT if needed.  - Will plan to resume Macitentan and Uptravi once patient extubated and more stable from respiratory standpoint.  - Continue inhaled nitric oxide at 10ppm while intubated  - strict ins/outs    CTEPH (chronic thromboembolic pulmonary hypertension)  S/p PTE 3/13/18        Pérez Dean MD  Heart Transplant  [facility]

## 2020-03-16 NOTE — PROGRESS NOTES
[facility]  Critical Care - Surgery  Progress Note    Patient Name: Jaquan Hines Jr.  MRN: [id number]  Admission Date: 3/12/2020  Hospital Length of Stay: 4 days  Code Status: Full Code  Attending Provider: Nilay Arvizu*  Primary Care Provider: Allen Hubbard MD   Principal Problem: Acute hypoxemic respiratory failure    Subjective:     Hospital/ICU Course:  No notes on file    Interval History/Significant Events:    Remains intubated on minimal vent settings.  Leena 10  ECHO with PA 44 mmhg  Good UOP    Review of Systems   Unable to perform ROS: Intubated     Objective:     Vital Signs (Most Recent):  Temp: 99.1 °F (37.3 °C) (03/16/20 0700)  Pulse: 83 (03/16/20 0945)  Resp: 19 (03/16/20 0945)  BP: (!) 102/54 (03/16/20 0930)  SpO2: 97 % (03/16/20 0945) Vital Signs (24h Range):  Temp:  [98.8 °F (37.1 °C)-99.7 °F (37.6 °C)] 99.1 °F (37.3 °C)  Pulse:  [80-94] 83  Resp:  [13-28] 19  SpO2:  [90 %-100 %] 97 %  BP: ()/(49-65) 102/54   Weight: (!) 162.4 kg (358 lb 0.4 oz)  Body mass index is 54.44 kg/m².      Intake/Output Summary (Last 24 hours) at 3/16/2020 1006  Last data filed at 3/16/2020 0900  Gross per 24 hour   Intake 2841.41 ml   Output 3835 ml   Net -993.59 ml       Physical Exam   Constitutional: He is sedated, intubated and restrained.   Morbid obesity   HENT:   Head: Normocephalic and atraumatic.   Eyes: Pupils are equal, round, and reactive to light. Conjunctivae are normal. Right eye exhibits no exudate. Left eye exhibits no exudate. Right conjunctiva has no hemorrhage. Left conjunctiva has no hemorrhage. No scleral icterus. Right eye exhibits no nystagmus. Left eye exhibits no nystagmus.   Neck: Trachea normal. No neck rigidity. No tracheal deviation present.   Cardiovascular: Normal rate and regular rhythm. PMI is not displaced.   Pulses:       Radial pulses are 2+ on the right side, and 2+ on the left side.   Pulmonary/Chest: Effort normal. No accessory muscle usage. He  is intubated. No respiratory distress. He has no rhonchi.   Bilateral mechanical breath sounds   Abdominal: Soft. Normal appearance. He exhibits no distension.   Musculoskeletal: Normal range of motion.   Neurological:   Sedated, follows commands upon wean, moves all extremities     Skin: Skin is warm and dry. He is not diaphoretic. No cyanosis. Nails show no clubbing.   Nursing note and vitals reviewed.      Vents:  Vent Mode: A/C (03/16/20 0939)  Ventilator Initiated: Yes (03/12/20 1117)  Set Rate: 14 BPM (03/16/20 0939)  Vt Set: 420 mL (03/16/20 0939)  PEEP/CPAP: 5 cmH20 (03/16/20 0939)  Oxygen Concentration (%): 45 (03/16/20 0945)  Peak Airway Pressure: 25 cmH2O (03/16/20 0939)  Plateau Pressure: 15 cmH20 (03/16/20 0939)  Total Ve: 8.58 mL (03/16/20 0939)  F/VT Ratio<105 (RSBI): (!) 19.7 (03/16/20 0939)  Lines/Drains/Airways     Central Venous Catheter Line                 Introducer 03/13/18 0801 right internal jugular 734 days    Percutaneous Central Line Insertion/Assessment - Triple Lumen  03/12/20 1600 right internal jugular 3 days          Drain                 NG/OG Tube 03/10/20 Center mouth 6 days         Urethral Catheter 03/10/20 6 days          Airway                 Airway - Non-Surgical Endotracheal Tube -- days          Peripheral Intravenous Line                 Peripheral IV - Single Lumen 03/15/20 0400 20 G Right Hand 1 day              Significant Labs:    CBC/Anemia Profile:  Recent Labs   Lab 03/15/20  0328 03/16/20  0300   WBC 12.15 12.54   HGB 7.9* 8.1*   HCT 27.9* 29.6*   * 407*   MCV 78* 79*   RDW 15.8* 15.9*        Chemistries:  Recent Labs   Lab 03/15/20  0328 03/15/20  1500 03/15/20  2000 03/16/20  0300    139 142 139   K 3.7 3.1* 3.6 3.5   CL 95 93* 95 94*   CO2 36* 36* 38* 36*   BUN 20 22* 23* 26*   CREATININE 0.9 0.9 0.9 0.9   CALCIUM 8.6* 8.9 8.6* 8.8   ALBUMIN 2.2*  --   --  2.4*   PROT 6.3  --   --  7.0   BILITOT 0.8  --   --  0.7   ALKPHOS 95  --   --  95   ALT 34   --   --  32   AST 30  --   --  30   MG 2.4  --  2.4 2.5   PHOS 3.7  --  3.7 3.4       All pertinent labs within the past 24 hours have been reviewed.    Significant Imaging:  I have reviewed all pertinent imaging results/findings within the past 24 hours.    Assessment/Plan:     * Acute hypoxemic respiratory failure  48 yo man with HTN, morbid obesity with OHS and chronic hypoxic resp failure, HTN, referred by Dr Jennings for management of PH- pt underwent PTE on 3/13/18. Last seen by Dr. Hinojosa in clinic on 1/24/2020 and was doing well then on Uptravi, Adempas, and Macitentan without need for home oxygen. Per records, patient was diagnosed with pharyngitis on 2/27/2020 and treated with amoxicillin without improvement. Subsequently went to urgent care due to lack of improvement and was prescribed oxygen, bronchodilators. Presented to [facility] on 3/08/2020, treated for PNA and discharged on 3/10. Returned immediately due to lack of relief in symptoms and worsening dyspnea leading to intubation for acute hypoxic respiratory failure. Then [facility] higher level of care. Based on records, does not appear to be any documentation that PH meds were continued during this admission there. Supposedly negative for flu and negative blood cultures from there as well.      Since arrival he has remained intubated, afebrile but did have increased wbc. Full sepsis work-up revealed metapneumovirus. COVID-19 negative.     SICU team has assumed care 03/16/20 due to extenuating circumstances of COVID-19 admissions. Per MICU team hospital course is as follows.    Admitted to Critical Care Medicine as a transfer from [facility] for acute hypoxemic respiratory failure. Negative flu and no growth reported from OSH. Tested negative for COVID-19 here. Started on aggressive diuresis and broad spectrum antibiotics. HTS consulted, 1 out of 3 home pulmonary HTN meds  restarted as the other 2 are unable to be crushed and administered through the OG tube. HTS started Nitric as complementary treatment for pulmonary HTN, RIP positive for metapneumonvirus. COVID negative      Neuro  - propofol gtt while on vent  - follows commands, moves all extremities off sedation  - wean to extubate    Pulmonary  * Acute hypoxemic respiratory failure  Secondary to metapneumovirus pna in setting of severe pHTN     --COVID-19 negative  --Prodrome (fever, cough, pharyngitis),   --negative influenza, and negative sputum/blood cultures  --Continue Nitric oxide for pHTN mgmt per HTS recs  --Maintain contact isolation.   --Duonebs q4h.  --lasix 60 mg TID  --Wean to extubate toda  --Plan to extubate to nasal cannula with Leena 10, may need HFNC for PEEP given obesity/viral PNA     Pulmonary hypertension  [facility] consulted     --Adempas restarted as it is the only crushable pulm HTN med he is on. Restart the other 2 when able to swallow.   -- 3/13 TTE LVEF 65%, RV normal size and EF, R atrium normal size unable to assess function, PAP 49   --Leena 10, keep upon extubation  --once extubated then swallow study and plan to restart home pulm HTN meds     Pneumonia  --See above.   --Ceftriaxone     Renal/  Other microscopic hematuria  3/12 - Occult blood 3+, RBC >100 on UA. Urine did not reflex to culture. Making good urine with diuresis.      --Daily BMP  --BUN/Cr WNL  -- continue diuresis  --Zaria, trend UOP hourly  --Medicine crit care per handoff was considering renal US if concern for stones/hydronephrosis             Critical care was time spent personally by me on the following activities: development of treatment plan with patient or surrogate and bedside caregivers, discussions with consultants, evaluation of patient's response to treatment, examination of patient, ordering and performing treatments and interventions, ordering and review of laboratory studies, ordering and review of radiographic studies,  pulse oximetry, re-evaluation of patient's condition.  This critical care time did not overlap with that of any other provider or involve time for any procedures.     Larry Martinez MD  Critical Care - Surgery  [facility]

## 2020-03-16 NOTE — SUBJECTIVE & OBJECTIVE
Interval History/Significant Events:    Remains intubated on minimal vent settings.  Leena 10  ECHO with PA 44 mmhg    Review of Systems   Unable to perform ROS: Intubated     Objective:     Vital Signs (Most Recent):  Temp: 99.1 °F (37.3 °C) (03/16/20 0700)  Pulse: 83 (03/16/20 0945)  Resp: 19 (03/16/20 0945)  BP: (!) 102/54 (03/16/20 0930)  SpO2: 97 % (03/16/20 0945) Vital Signs (24h Range):  Temp:  [98.8 °F (37.1 °C)-99.7 °F (37.6 °C)] 99.1 °F (37.3 °C)  Pulse:  [80-94] 83  Resp:  [13-28] 19  SpO2:  [90 %-100 %] 97 %  BP: ()/(49-65) 102/54   Weight: (!) 162.4 kg (358 lb 0.4 oz)  Body mass index is 54.44 kg/m².      Intake/Output Summary (Last 24 hours) at 3/16/2020 1006  Last data filed at 3/16/2020 0900  Gross per 24 hour   Intake 2841.41 ml   Output 3835 ml   Net -993.59 ml       Physical Exam   Constitutional: He is sedated, intubated and restrained.   Morbid obesity   HENT:   Head: Normocephalic and atraumatic.   Eyes: Pupils are equal, round, and reactive to light. Conjunctivae are normal. Right eye exhibits no exudate. Left eye exhibits no exudate. Right conjunctiva has no hemorrhage. Left conjunctiva has no hemorrhage. No scleral icterus. Right eye exhibits no nystagmus. Left eye exhibits no nystagmus.   Neck: Trachea normal. No neck rigidity. No tracheal deviation present.   Cardiovascular: Normal rate and regular rhythm. PMI is not displaced.   Pulses:       Radial pulses are 2+ on the right side, and 2+ on the left side.   Pulmonary/Chest: Effort normal. No accessory muscle usage. He is intubated. No respiratory distress. He has no rhonchi.   Bilateral mechanical breath sounds   Abdominal: Soft. Normal appearance. He exhibits no distension.   Musculoskeletal: Normal range of motion.   Neurological:   Sedated, follows commands upon wean, moves all extremities     Skin: Skin is warm and dry. He is not diaphoretic. No cyanosis. Nails show no clubbing.   Nursing note and vitals  reviewed.      Vents:  Vent Mode: A/C (03/16/20 0939)  Ventilator Initiated: Yes (03/12/20 1117)  Set Rate: 14 BPM (03/16/20 0939)  Vt Set: 420 mL (03/16/20 0939)  PEEP/CPAP: 5 cmH20 (03/16/20 0939)  Oxygen Concentration (%): 45 (03/16/20 0945)  Peak Airway Pressure: 25 cmH2O (03/16/20 0939)  Plateau Pressure: 15 cmH20 (03/16/20 0939)  Total Ve: 8.58 mL (03/16/20 0939)  F/VT Ratio<105 (RSBI): (!) 19.7 (03/16/20 0939)  Lines/Drains/Airways     Central Venous Catheter Line                 Introducer 03/13/18 0801 right internal jugular 734 days    Percutaneous Central Line Insertion/Assessment - Triple Lumen  03/12/20 1600 right internal jugular 3 days          Drain                 NG/OG Tube 03/10/20 Center mouth 6 days         Urethral Catheter 03/10/20 6 days          Airway                 Airway - Non-Surgical Endotracheal Tube -- days          Peripheral Intravenous Line                 Peripheral IV - Single Lumen 03/15/20 0400 20 G Right Hand 1 day              Significant Labs:    CBC/Anemia Profile:  Recent Labs   Lab 03/15/20  0328 03/16/20  0300   WBC 12.15 12.54   HGB 7.9* 8.1*   HCT 27.9* 29.6*   * 407*   MCV 78* 79*   RDW 15.8* 15.9*        Chemistries:  Recent Labs   Lab 03/15/20  0328 03/15/20  1500 03/15/20  2000 03/16/20  0300    139 142 139   K 3.7 3.1* 3.6 3.5   CL 95 93* 95 94*   CO2 36* 36* 38* 36*   BUN 20 22* 23* 26*   CREATININE 0.9 0.9 0.9 0.9   CALCIUM 8.6* 8.9 8.6* 8.8   ALBUMIN 2.2*  --   --  2.4*   PROT 6.3  --   --  7.0   BILITOT 0.8  --   --  0.7   ALKPHOS 95  --   --  95   ALT 34  --   --  32   AST 30  --   --  30   MG 2.4  --  2.4 2.5   PHOS 3.7  --  3.7 3.4       All pertinent labs within the past 24 hours have been reviewed.    Significant Imaging:  I have reviewed all pertinent imaging results/findings within the past 24 hours.

## 2020-03-16 NOTE — PT/OT/SLP PROGRESS
Occupational Therapy      Patient Name:  Jaquan Hines Jr.   MRN:  [id number]    Patient not seen today secondary to pt remains intubated and sedated  . Will follow-up 3/17.    SERGO Oliva  3/16/2020

## 2020-03-16 NOTE — PT/OT/SLP PROGRESS
Physical Therapy      Patient Name:  Jaquan Hines Jr.   MRN:  [id number]    Patient not seen today. Pt remains intubated and sedated. Will follow up when appropriate     Aminta Correa PT, DPT  3/16/2020  [phone]

## 2020-03-16 NOTE — RESIDENT HANDOFF
ICU Transfer of Care Note  Critical Care Medicine    Admit Date: 3/12/2020  LOS: 3    CC: Acute hypoxemic respiratory failure    Code Status: Full Code     Patient transferring to SICU service as of 0700 on 03/16/2020    HPI and Hospital Course:     HPI:  Mr. Hines is a 48 y/o male with a history significant for pulmonary embolus with resulting pulmonary hypertension (improved s/p pulmonary thrombectomy March 2018) who was in his usual state of health (full ADLs and IADLs no longer requiring supplemental home O2) until February 27th, 2020 when he began having pharyngitis. He began taking amoxicillin, which he had at home without relief. He eventually went to an urgent care where he was given IM ceftriaxone and steroids. He also started using home bronchodilators and oxygen which he typically does not need. He presented to [facility] on 03/08/20 with worsening dyspnea and was admitted with a presumptive diagnosis on pneumonia (records not available) and was discharged on 03/10/20. Immediately after leaving the hospital he began having worsening dyspnea so his sister brought him back to the ED and he was admitted for hypoxemic respiratory failure. He was started on bipap, which he failed so he was intubated. He was treated with pip/tazo + vancomycin and diuretics.     Tmax at the OSH on presentation was 99.5F. Microlab reports blood and urine cultures are NGTD (drawn 3/10/20). An influenza test at the OSH was also negative.     Hospital/ICU Course:  Admitted to Critical Care Medicine as a transfer from [facility] for acute hypoxemic respiratory failure. Negative flu and no growth reported from OSH. Due to concerns for COVID 19, appropriate testing was sent off; awaiting results. Started on aggressive diuresis and broad spectrum antibiotics. HTS consulted, 1 out of 3 home pulmonary HTN meds restarted as the other 2 are unable to be crushed and  administered through the OG tube. HTS started Nitric as complementary treatment for pulmonary HTN, RIP positive for metapneumonvirus. COVID negative      To Follow Up:      * Acute hypoxemic respiratory failure  Secondary to positive RIP for metapneumovirus pna in setting of severe pHTN   --COVID-19 negative  --Continue Nitric oxide for pHTN mgmt per HTS recs  --Maintain contact isolation.   --Duonebs q4h.  --wean Furosemide to 60 mg BID  --Wean vent as tolerated     Pulmonary hypertension  Appreciate HTS input   --Adempas restarted as it is the only crushable pulm HTN med he is on. Restart the other 2 when able to swallow.   --TTE with Normal EF, PA 49   --nitric oxide mgmt per HTN    Call with questions. [phone]      Aleksander Roman NP  Critical Care Medicine  [facility]

## 2020-03-16 NOTE — PROGRESS NOTES
Wound care reconsulted for blanchable heels.     Patient remains intubated in the ICU.  Heels appear intact. No pink noted at time of assessment. Patient externally rotated in both feet.   Discussed with bedside nurse it would be best to use a pillow or open the boot to float the heels.     Will order for Mighty Air ABBI mattress.    Wound care to follow PRN.  Jennifer Azul RN [facility]   [phone]

## 2020-03-16 NOTE — PLAN OF CARE
Pt remained intubated overnight. AC, 50%, 5-peep, rate-14.  Nitric @10 ppm.   O2: > 92%.   HR: 80's-90's, NSR.   MAP: > 65.  CVP: 12-10-12.  Temp max: 99.7 F.   Gtts: Propofol.  UOP:  cc/hr.   No BM this shift.   TF tolerated at goal of 50 cc/hr with minimal residuals.   Pt follows commands and moves all extremities during sedation vacations. Pt remains on contact precautions for Human metapneumovirus.        Skin assessment:   Wound care consulted due to pt's weight and for blanchable redness to bilateral heels despite use of heel foams and offloading boots.   Skin tear to super-pubic area, site is dry and open to air per wound care recommendations. Pt on inflated waffle mattress. Sacrum foam intact. Wrists under soft restraints free of breakdown. Pt turned Q2hrs with pillows and wedge.

## 2020-03-16 NOTE — PROGRESS NOTES
STARLA Skin Integrity Evaluation      Subjective    STARLA skin integrity champion evaluation of patient as part of the comprehensive skin care team .       Jaquan Hines  is being evaluated as per the Epic  pressure injury skin report.  He has been admitted to SICU for 4 days.   Skin injury was noted on 3/12.          Limited Physical exam     Lesion 1: L anterior groin/pubic area            Assessment :       Lesion 1:  Abrasion scar tissue, healing, no open area. Not pressure related. POA.     POA : yes    Consults: Wound Care, Nutrition, Support surfaces as per WOCN recommendations  and Respiratory Therapy         Follow up:    Patient will be re evaluated weekly.

## 2020-03-17 LAB
ALBUMIN SERPL BCP-MCNC: 2.6 G/DL (ref 3.5–5.2)
ALP SERPL-CCNC: 91 U/L (ref 55–135)
ALT SERPL W/O P-5'-P-CCNC: 36 U/L (ref 10–44)
ANION GAP SERPL CALC-SCNC: 12 MMOL/L (ref 8–16)
ANISOCYTOSIS BLD QL SMEAR: SLIGHT
AST SERPL-CCNC: 37 U/L (ref 10–40)
BACTERIA BLD CULT: NORMAL
BACTERIA BLD CULT: NORMAL
BASO STIPL BLD QL SMEAR: ABNORMAL
BASOPHILS # BLD AUTO: ABNORMAL K/UL (ref 0–0.2)
BASOPHILS NFR BLD: 1.5 % (ref 0–1.9)
BILIRUB SERPL-MCNC: 0.9 MG/DL (ref 0.1–1)
BUN SERPL-MCNC: 29 MG/DL (ref 6–20)
CALCIUM SERPL-MCNC: 8.7 MG/DL (ref 8.7–10.5)
CHLORIDE SERPL-SCNC: 94 MMOL/L (ref 95–110)
CO2 SERPL-SCNC: 34 MMOL/L (ref 23–29)
CREAT SERPL-MCNC: 0.9 MG/DL (ref 0.5–1.4)
DIFFERENTIAL METHOD: ABNORMAL
EOSINOPHIL # BLD AUTO: ABNORMAL K/UL (ref 0–0.5)
EOSINOPHIL NFR BLD: 1 % (ref 0–8)
ERYTHROCYTE [DISTWIDTH] IN BLOOD BY AUTOMATED COUNT: 16 % (ref 11.5–14.5)
EST. GFR  (AFRICAN AMERICAN): >60 ML/MIN/1.73 M^2
EST. GFR  (NON AFRICAN AMERICAN): >60 ML/MIN/1.73 M^2
GLUCOSE SERPL-MCNC: 92 MG/DL (ref 70–110)
HCT VFR BLD AUTO: 31.1 % (ref 40–54)
HGB BLD-MCNC: 8.4 G/DL (ref 14–18)
IMM GRANULOCYTES # BLD AUTO: ABNORMAL K/UL (ref 0–0.04)
IMM GRANULOCYTES NFR BLD AUTO: ABNORMAL % (ref 0–0.5)
LYMPHOCYTES # BLD AUTO: ABNORMAL K/UL (ref 1–4.8)
LYMPHOCYTES NFR BLD: 6.5 % (ref 18–48)
MAGNESIUM SERPL-MCNC: 2.2 MG/DL (ref 1.6–2.6)
MCH RBC QN AUTO: 20.9 PG (ref 27–31)
MCHC RBC AUTO-ENTMCNC: 27 G/DL (ref 32–36)
MCV RBC AUTO: 78 FL (ref 82–98)
METAMYELOCYTES NFR BLD MANUAL: 1 %
METHEMOGLOBIN: 0.7 % (ref 0–3)
MONOCYTES # BLD AUTO: ABNORMAL K/UL (ref 0.3–1)
MONOCYTES NFR BLD: 2.5 % (ref 4–15)
MYELOCYTES NFR BLD MANUAL: 0.5 %
NEUTROPHILS NFR BLD: 85 % (ref 38–73)
NEUTS BAND NFR BLD MANUAL: 2 %
NRBC BLD-RTO: 1 /100 WBC
OVALOCYTES BLD QL SMEAR: ABNORMAL
PHOSPHATE SERPL-MCNC: 3.6 MG/DL (ref 2.7–4.5)
PLATELET # BLD AUTO: 421 K/UL (ref 150–350)
PLATELET BLD QL SMEAR: ABNORMAL
PMV BLD AUTO: 11 FL (ref 9.2–12.9)
POIKILOCYTOSIS BLD QL SMEAR: SLIGHT
POLYCHROMASIA BLD QL SMEAR: ABNORMAL
POTASSIUM SERPL-SCNC: 2.8 MMOL/L (ref 3.5–5.1)
POTASSIUM SERPL-SCNC: 3.3 MMOL/L (ref 3.5–5.1)
PROT SERPL-MCNC: 7.1 G/DL (ref 6–8.4)
RBC # BLD AUTO: 4.01 M/UL (ref 4.6–6.2)
SODIUM SERPL-SCNC: 140 MMOL/L (ref 136–145)
WBC # BLD AUTO: 12.56 K/UL (ref 3.9–12.7)

## 2020-03-17 PROCEDURE — 25000003 PHARM REV CODE 250: Performed by: INTERNAL MEDICINE

## 2020-03-17 PROCEDURE — 83050 HGB METHEMOGLOBIN QUAN: CPT

## 2020-03-17 PROCEDURE — 25000003 PHARM REV CODE 250: Performed by: GENERAL ACUTE CARE HOSPITAL

## 2020-03-17 PROCEDURE — 80053 COMPREHEN METABOLIC PANEL: CPT

## 2020-03-17 PROCEDURE — 25000003 PHARM REV CODE 250: Performed by: NURSE PRACTITIONER

## 2020-03-17 PROCEDURE — 84132 ASSAY OF SERUM POTASSIUM: CPT

## 2020-03-17 PROCEDURE — 25000242 PHARM REV CODE 250 ALT 637 W/ HCPCS: Performed by: INTERNAL MEDICINE

## 2020-03-17 PROCEDURE — 25000003 PHARM REV CODE 250: Performed by: STUDENT IN AN ORGANIZED HEALTH CARE EDUCATION/TRAINING PROGRAM

## 2020-03-17 PROCEDURE — 97161 PT EVAL LOW COMPLEX 20 MIN: CPT

## 2020-03-17 PROCEDURE — 94660 CPAP INITIATION&MGMT: CPT

## 2020-03-17 PROCEDURE — 63600367 HC NITRIC OXIDE PER HOUR

## 2020-03-17 PROCEDURE — 99900035 HC TECH TIME PER 15 MIN (STAT)

## 2020-03-17 PROCEDURE — 83735 ASSAY OF MAGNESIUM: CPT

## 2020-03-17 PROCEDURE — 99232 SBSQ HOSP IP/OBS MODERATE 35: CPT | Mod: ,,, | Performed by: INTERNAL MEDICINE

## 2020-03-17 PROCEDURE — 63600175 PHARM REV CODE 636 W HCPCS: Performed by: GENERAL ACUTE CARE HOSPITAL

## 2020-03-17 PROCEDURE — 97535 SELF CARE MNGMENT TRAINING: CPT

## 2020-03-17 PROCEDURE — 63600175 PHARM REV CODE 636 W HCPCS: Performed by: STUDENT IN AN ORGANIZED HEALTH CARE EDUCATION/TRAINING PROGRAM

## 2020-03-17 PROCEDURE — 94761 N-INVAS EAR/PLS OXIMETRY MLT: CPT

## 2020-03-17 PROCEDURE — 27000221 HC OXYGEN, UP TO 24 HOURS

## 2020-03-17 PROCEDURE — 99233 PR SUBSEQUENT HOSPITAL CARE,LEVL III: ICD-10-PCS | Mod: ,,, | Performed by: SURGERY

## 2020-03-17 PROCEDURE — 97803 MED NUTRITION INDIV SUBSEQ: CPT

## 2020-03-17 PROCEDURE — 97165 OT EVAL LOW COMPLEX 30 MIN: CPT

## 2020-03-17 PROCEDURE — 99232 PR SUBSEQUENT HOSPITAL CARE,LEVL II: ICD-10-PCS | Mod: ,,, | Performed by: INTERNAL MEDICINE

## 2020-03-17 PROCEDURE — 84100 ASSAY OF PHOSPHORUS: CPT

## 2020-03-17 PROCEDURE — 93010 ELECTROCARDIOGRAM REPORT: CPT | Mod: ,,, | Performed by: INTERNAL MEDICINE

## 2020-03-17 PROCEDURE — 85007 BL SMEAR W/DIFF WBC COUNT: CPT

## 2020-03-17 PROCEDURE — 20600001 HC STEP DOWN PRIVATE ROOM

## 2020-03-17 PROCEDURE — 27000190 HC CPAP FULL FACE MASK W/VALVE

## 2020-03-17 PROCEDURE — 93010 EKG 12-LEAD: ICD-10-PCS | Mod: ,,, | Performed by: INTERNAL MEDICINE

## 2020-03-17 PROCEDURE — 82803 BLOOD GASES ANY COMBINATION: CPT

## 2020-03-17 PROCEDURE — 85027 COMPLETE CBC AUTOMATED: CPT

## 2020-03-17 PROCEDURE — 94640 AIRWAY INHALATION TREATMENT: CPT

## 2020-03-17 PROCEDURE — 97530 THERAPEUTIC ACTIVITIES: CPT

## 2020-03-17 PROCEDURE — 93005 ELECTROCARDIOGRAM TRACING: CPT

## 2020-03-17 PROCEDURE — 99233 SBSQ HOSP IP/OBS HIGH 50: CPT | Mod: ,,, | Performed by: SURGERY

## 2020-03-17 RX ORDER — ONDANSETRON 2 MG/ML
4 INJECTION INTRAMUSCULAR; INTRAVENOUS EVERY 6 HOURS PRN
Status: DISCONTINUED | OUTPATIENT
Start: 2020-03-17 | End: 2020-03-20 | Stop reason: HOSPADM

## 2020-03-17 RX ORDER — POTASSIUM CHLORIDE 14.9 MG/ML
20 INJECTION INTRAVENOUS
Status: DISCONTINUED | OUTPATIENT
Start: 2020-03-17 | End: 2020-03-17

## 2020-03-17 RX ORDER — POTASSIUM CHLORIDE 14.9 MG/ML
60 INJECTION INTRAVENOUS
Status: DISCONTINUED | OUTPATIENT
Start: 2020-03-17 | End: 2020-03-20 | Stop reason: HOSPADM

## 2020-03-17 RX ORDER — MAGNESIUM SULFATE HEPTAHYDRATE 40 MG/ML
2 INJECTION, SOLUTION INTRAVENOUS
Status: DISCONTINUED | OUTPATIENT
Start: 2020-03-17 | End: 2020-03-20 | Stop reason: HOSPADM

## 2020-03-17 RX ORDER — POTASSIUM CHLORIDE 20 MEQ/1
60 TABLET, EXTENDED RELEASE ORAL ONCE
Status: COMPLETED | OUTPATIENT
Start: 2020-03-17 | End: 2020-03-17

## 2020-03-17 RX ORDER — CITALOPRAM 40 MG/1
40 TABLET, FILM COATED ORAL DAILY
Status: DISCONTINUED | OUTPATIENT
Start: 2020-03-17 | End: 2020-03-18

## 2020-03-17 RX ORDER — TALC
6 POWDER (GRAM) TOPICAL NIGHTLY PRN
Status: DISCONTINUED | OUTPATIENT
Start: 2020-03-17 | End: 2020-03-20 | Stop reason: HOSPADM

## 2020-03-17 RX ORDER — ACETAMINOPHEN 325 MG/1
650 TABLET ORAL EVERY 6 HOURS PRN
Status: DISCONTINUED | OUTPATIENT
Start: 2020-03-17 | End: 2020-03-20 | Stop reason: HOSPADM

## 2020-03-17 RX ORDER — POTASSIUM CHLORIDE 29.8 MG/ML
80 INJECTION INTRAVENOUS
Status: DISCONTINUED | OUTPATIENT
Start: 2020-03-17 | End: 2020-03-20 | Stop reason: HOSPADM

## 2020-03-17 RX ORDER — CALCIUM CARBONATE 200(500)MG
500 TABLET,CHEWABLE ORAL DAILY PRN
Status: DISCONTINUED | OUTPATIENT
Start: 2020-03-17 | End: 2020-03-20 | Stop reason: HOSPADM

## 2020-03-17 RX ORDER — POLYETHYLENE GLYCOL 3350 17 G/17G
17 POWDER, FOR SOLUTION ORAL DAILY
Status: DISCONTINUED | OUTPATIENT
Start: 2020-03-17 | End: 2020-03-20 | Stop reason: HOSPADM

## 2020-03-17 RX ORDER — QUETIAPINE FUMARATE 100 MG/1
200 TABLET, FILM COATED ORAL NIGHTLY
Status: DISCONTINUED | OUTPATIENT
Start: 2020-03-17 | End: 2020-03-17

## 2020-03-17 RX ORDER — ZIPRASIDONE HYDROCHLORIDE 40 MG/1
40 CAPSULE ORAL DAILY
Status: DISCONTINUED | OUTPATIENT
Start: 2020-03-17 | End: 2020-03-18

## 2020-03-17 RX ORDER — AMOXICILLIN 250 MG
1 CAPSULE ORAL DAILY PRN
Status: DISCONTINUED | OUTPATIENT
Start: 2020-03-17 | End: 2020-03-20 | Stop reason: HOSPADM

## 2020-03-17 RX ORDER — POTASSIUM CHLORIDE 29.8 MG/ML
40 INJECTION INTRAVENOUS
Status: DISCONTINUED | OUTPATIENT
Start: 2020-03-17 | End: 2020-03-20 | Stop reason: HOSPADM

## 2020-03-17 RX ORDER — MAGNESIUM SULFATE HEPTAHYDRATE 40 MG/ML
4 INJECTION, SOLUTION INTRAVENOUS
Status: DISCONTINUED | OUTPATIENT
Start: 2020-03-17 | End: 2020-03-20 | Stop reason: HOSPADM

## 2020-03-17 RX ADMIN — RIOCIGUAT 2.5 MG: 2.5 TABLET, FILM COATED ORAL at 08:03

## 2020-03-17 RX ADMIN — MACITENTAN 10 MG: 10 TABLET, FILM COATED ORAL at 08:03

## 2020-03-17 RX ADMIN — IPRATROPIUM BROMIDE AND ALBUTEROL SULFATE 3 ML: .5; 3 SOLUTION RESPIRATORY (INHALATION) at 08:03

## 2020-03-17 RX ADMIN — RIOCIGUAT 2.5 MG: 2.5 TABLET, FILM COATED ORAL at 01:03

## 2020-03-17 RX ADMIN — RIOCIGUAT 2.5 MG: 2.5 TABLET, FILM COATED ORAL at 09:03

## 2020-03-17 RX ADMIN — CALCIUM CARBONATE (ANTACID) CHEW TAB 500 MG 500 MG: 500 CHEW TAB at 04:03

## 2020-03-17 RX ADMIN — FUROSEMIDE 60 MG: 10 INJECTION, SOLUTION INTRAMUSCULAR; INTRAVENOUS at 06:03

## 2020-03-17 RX ADMIN — ZIPRASIDONE HYDROCHLORIDE 40 MG: 40 CAPSULE ORAL at 04:03

## 2020-03-17 RX ADMIN — CITALOPRAM HYDROBROMIDE 40 MG: 40 TABLET ORAL at 02:03

## 2020-03-17 RX ADMIN — QUETIAPINE FUMARATE 200 MG: 100 TABLET ORAL at 01:03

## 2020-03-17 RX ADMIN — IPRATROPIUM BROMIDE AND ALBUTEROL SULFATE 3 ML: .5; 3 SOLUTION RESPIRATORY (INHALATION) at 12:03

## 2020-03-17 RX ADMIN — POTASSIUM CHLORIDE 80 MEQ: 29.8 INJECTION, SOLUTION INTRAVENOUS at 08:03

## 2020-03-17 RX ADMIN — RIOCIGUAT 2.5 MG: 2.5 TABLET, FILM COATED ORAL at 03:03

## 2020-03-17 RX ADMIN — IPRATROPIUM BROMIDE AND ALBUTEROL SULFATE 3 ML: .5; 3 SOLUTION RESPIRATORY (INHALATION) at 01:03

## 2020-03-17 RX ADMIN — Medication 6 MG: at 09:03

## 2020-03-17 RX ADMIN — POTASSIUM CHLORIDE 60 MEQ: 1500 TABLET, EXTENDED RELEASE ORAL at 06:03

## 2020-03-17 RX ADMIN — ONDANSETRON 4 MG: 2 INJECTION INTRAMUSCULAR; INTRAVENOUS at 01:03

## 2020-03-17 RX ADMIN — APIXABAN 5 MG: 5 TABLET, FILM COATED ORAL at 09:03

## 2020-03-17 RX ADMIN — POLYETHYLENE GLYCOL 3350 17 G: 17 POWDER, FOR SOLUTION ORAL at 12:03

## 2020-03-17 RX ADMIN — APIXABAN 5 MG: 5 TABLET, FILM COATED ORAL at 08:03

## 2020-03-17 NOTE — ASSESSMENT & PLAN NOTE
48 yo M pmhx hx of CTEPH s/p PTE on 3/18/18 transferred to [facility] [place] [place] for higher level of care. Consulted regarding recommendations for pulm htn medication management. Echo done and PA pressure 45, RV and LV function look normal     Recs:  - DC lasix, CVP 7 today, rec goal of ~10  - Continue Adempas at 2.5mg TID.   - Restart Macitentan 10mg po daily.   - Uptravi not available per inpatient pharmacy. Home dose was 1600mcg BID but has been off for more than 3 days. If able to be discharged within a few days, will plan on restarting as an outpatient at 200mcg TID.   -Decreased Leena to 5ppm, will plan to decrease every 2 hours until off today. Once off Leena, ok per HTS to transfer to the floor.   - strict ins/outs

## 2020-03-17 NOTE — NURSING TRANSFER
Nursing Transfer Note      3/17/2020     Transfer To: [id number]    Transfer via bed    Transfer with 5L NC to O2 and Telemetry in place    Transported by SICU RN    Medicines sent: Yes    Chart send with patient: Yes    Notified: Patient's sibiling    Patient reassessed at: 03/17/2020 @ 1518    Upon arrival to floor: cardiac monitor applied, patient oriented to room, call bell in reach and bed in lowest position,   Potassium 40 mEq infusing @ 25mL/hr., Telemetry in place, 5L NC, Enciso in place- catheter care provided, Patient c/o some burning in his chest pain and LUQ Abdomen 6 on 0-10 pain scale-- Dr. Dean notified, patient in a manic episode stating he has not slept since 6pm yesterday 3/16/20. No evidence of skin breakdown or redness over bony prominences. Foam dressing applied to pressure points. Will continue to monitor

## 2020-03-17 NOTE — ASSESSMENT & PLAN NOTE
48 yo man with HTN, morbid obesity with OHS and chronic hypoxic resp failure, HTN, referred by Dr Jennings for management of PH- pt underwent PTE on 3/13/18. Last seen by Dr. Hinojosa in clinic on 1/24/2020 and was doing well then on Uptravi, Adempas, and Macitentan without need for home oxygen. Per records, patient was diagnosed with pharyngitis on 2/27/2020 and treated with amoxicillin without improvement. Subsequently went to urgent care due to lack of improvement and was prescribed oxygen, bronchodilators. Presented to [facility] on 3/08/2020, treated for PNA and discharged on 3/10. Returned immediately due to lack of relief in symptoms and worsening dyspnea leading to intubation for acute hypoxic respiratory failure. Then transferred to [facility] [place] for higher level of care. Based on records, does not appear to be any documentation that PH meds were continued during this admission there. Supposedly negative for flu and negative blood cultures from there as well.      Since arrival he has remained intubated, afebrile but did have increased wbc. Full sepsis work-up revealed metapneumovirus. COVID-19 negative.     SICU team has assumed care 03/16/20 due to extenuating circumstances of COVID-19 admissions. Per MICU team hospital course is as follows.    Admitted to Critical Care Medicine as a transfer from [facility] for acute hypoxemic respiratory failure. Negative flu and no growth reported from OSH. Tested negative for COVID-19 here. Started on aggressive diuresis and broad spectrum antibiotics. HTS consulted, 1 out of 3 home pulmonary HTN meds restarted as the other 2 are unable to be crushed and administered through the OG tube. HTS started Nitric as complementary treatment for pulmonary HTN, RIP positive for metapneumonvirus. COVID negative      Neuro  - propofol gtt while on vent  - follows commands, moves all extremities off sedation  - wean to  extubate    Pulmonary  * Acute hypoxemic respiratory failure  Secondary to metapneumovirus pna in setting of severe pHTN     --COVID-19 negative  --Prodrome (fever, cough, pharyngitis),   --negative influenza, and negative sputum/blood cultures  --Continue Nitric oxide for pHTN mgmt per HTS recs  --Maintain contact isolation.   --Duonebs q4h.  --lasix 60 mg TID  --Wean to extubate toda  --Plan to extubate to nasal cannula with Leena 10, may need HFNC for PEEP given obesity/viral PNA     Pulmonary hypertension  HTS consulted     --Adempas restarted as it is the only crushable pulm HTN med he is on. Restart the other 2 when able to swallow.   -- 3/13 TTE LVEF 65%, RV normal size and EF, R atrium normal size unable to assess function, PAP 49   --Leena 10, keep upon extubation  --once extubated then swallow study and plan to restart home pulm HTN meds     Pneumonia  --See above.   --Ceftriaxone     Renal/  Other microscopic hematuria  3/12 - Occult blood 3+, RBC >100 on UA. Urine did not reflex to culture. Making good urine with diuresis.      --Daily BMP  --BUN/Cr WNL  -- continue diuresis  --cole Enciso UOP hourly  --Medicine crit care per handoff was considering renal US if concern for stones/hydronephrosis

## 2020-03-17 NOTE — PLAN OF CARE
03/17/20 1351   Discharge Reassessment   Assessment Type Discharge Planning Reassessment   Discharge Plan A   (patient's discharge disposition will depend on patient's prognosis)   DME Needed Upon Discharge  other (see comments)  (TBD)   Post-Acute Status   Discharge Delays None known at this time       Cordelia Peralta MPH, RN, CM  Ext. [phone]

## 2020-03-17 NOTE — PROGRESS NOTES
[facility]  Heart Transplant  Progress Note    Patient Name: Jaquan Hines Jr.  MRN: [id number]  Admission Date: 3/12/2020  Hospital Length of Stay: 5 days  Attending Physician: Gabriel Lee MD  Primary Care Provider: Allen Hubbard MD  Principal Problem:Acute hypoxemic respiratory failure    Subjective:     Interval History: Extubated yesterday. No acute events overnight. On NC. CVP 7 today. Diuresed well with IVP lasix, DC lasix today. RVP positive for Human Metapneumovirus. ECHO showed PA 44mmhg. Weaned Leena to 5ppm, plan to wean off today and transfer to floor.     Continuous Infusions:   nitric oxide gas       Scheduled Meds:   albuterol-ipratropium  3 mL Nebulization Q6H    apixaban  5 mg Oral BID    cefTRIAXone (ROCEPHIN) IVPB  2 g Intravenous Q24H    macitentan  10 mg Oral Daily    potassium chloride in water  20 mEq Intravenous Q2H    QUEtiapine  200 mg Oral QHS    riociguat  2.5 mg Per OG tube TID     PRN Meds:labetaloL, magnesium sulfate IVPB, magnesium sulfate IVPB, ondansetron, potassium chloride in water **AND** potassium chloride in water **AND** potassium chloride in water    Review of patient's allergies indicates:   Allergen Reactions    Clopidogrel      Other reaction(s): Other (See Comments)  Pt reports it does not work      Objective:     Vital Signs (Most Recent):  Temp: 98.2 °F (36.8 °C) (03/17/20 0700)  Pulse: 93 (03/17/20 0801)  Resp: 20 (03/17/20 0801)  BP: (!) 115/57 (03/17/20 0801)  SpO2: 96 % (03/17/20 0801) Vital Signs (24h Range):  Temp:  [98.2 °F (36.8 °C)-99.1 °F (37.3 °C)] 98.2 °F (36.8 °C)  Pulse:  [78-95] 93  Resp:  [8-31] 20  SpO2:  [90 %-100 %] 96 %  BP: ()/(50-62) 115/57     Patient Vitals for the past 72 hrs (Last 3 readings):   Weight   03/17/20 0519 (!) 155.4 kg (342 lb 9.5 oz)   03/16/20 0500 (!) 162.4 kg (358 lb 0.4 oz)   03/15/20 0500 (!) 163 kg (359 lb 5.6 oz)     Body mass index is 52.09 kg/m².      Intake/Output Summary (Last 24  hours) at 3/17/2020 0820  Last data filed at 3/17/2020 0700  Gross per 24 hour   Intake 770 ml   Output 3075 ml   Net -2305 ml       Hemodynamic Parameters:       Telemetry: Reviewed    Physical Exam   Constitutional: He is oriented to person, place, and time. He appears well-developed and well-nourished.   HENT:   Head: Normocephalic and atraumatic.   Eyes: Pupils are equal, round, and reactive to light. EOM are normal.   Neck: Normal range of motion. Neck supple. No JVD present.   IJ   Cardiovascular: Normal rate, regular rhythm, normal heart sounds and intact distal pulses. Exam reveals no gallop and no friction rub.   No murmur heard.  Pulmonary/Chest: Effort normal. No respiratory distress. He has no wheezes. He has rales. He exhibits no tenderness.   Abdominal: Soft. Bowel sounds are normal. He exhibits no distension and no mass. There is no tenderness.   Musculoskeletal: Normal range of motion. He exhibits no edema.   Neurological: He is alert and oriented to person, place, and time.   Skin: Skin is warm and dry.   Psychiatric: He has a normal mood and affect.   Vitals reviewed.      Significant Labs:  CBC:  Recent Labs   Lab 03/15/20  0328 03/16/20  0300 03/17/20  0305   WBC 12.15 12.54 12.56   RBC 3.60* 3.76* 4.01*   HGB 7.9* 8.1* 8.4*   HCT 27.9* 29.6* 31.1*   * 407* 421*   MCV 78* 79* 78*   MCH 21.9* 21.5* 20.9*   MCHC 28.3* 27.4* 27.0*     BNP:  Recent Labs   Lab 03/12/20  1319   BNP 69     CMP:  Recent Labs   Lab 03/15/20  0328  03/15/20  2000 03/16/20  0300 03/17/20  0305   *   < > 99 104 92   CALCIUM 8.6*   < > 8.6* 8.8 8.7   ALBUMIN 2.2*  --   --  2.4* 2.6*   PROT 6.3  --   --  7.0 7.1      < > 142 139 140   K 3.7   < > 3.6 3.5 2.8*   CO2 36*   < > 38* 36* 34*   CL 95   < > 95 94* 94*   BUN 20   < > 23* 26* 29*   CREATININE 0.9   < > 0.9 0.9 0.9   ALKPHOS 95  --   --  95 91   ALT 34  --   --  32 36   AST 30  --   --  30 37   BILITOT 0.8  --   --  0.7 0.9    < > = values in this  interval not displayed.      Coagulation:   No results for input(s): PT, INR, APTT in the last 168 hours.  LDH:  No results for input(s): LDH in the last 72 hours.  Microbiology:  Microbiology Results (last 7 days)     Procedure Component Value Units Date/Time    Blood culture [[id number]] Collected:  03/12/20 1140    Order Status:  Completed Specimen:  Blood from Peripheral, Forearm, Left Updated:  03/16/20 1412     Blood Culture, Routine No Growth to date      No Growth to date      No Growth to date      No Growth to date      No Growth to date    Narrative:       Blood cultures from 2 different sites. 4 bottles total.  Please draw before starting antibiotics.    Blood culture [[id number]] Collected:  03/12/20 1140    Order Status:  Completed Specimen:  Blood from Peripheral, Forearm, Right Updated:  03/16/20 1412     Blood Culture, Routine No Growth to date      No Growth to date      No Growth to date      No Growth to date      No Growth to date    Narrative:       Blood cultures x 2 different sites. 4 bottles total. Please  draw cultures before administering antibiotics.    Culture, Respiratory with Gram Stain [[id number]] Collected:  03/12/20 1153    Order Status:  Completed Specimen:  Respiratory from Endotracheal Aspirate Updated:  03/14/20 1045     Respiratory Culture No growth     Gram Stain (Respiratory) <10 epithelial cells per low power field.     Gram Stain (Respiratory) Moderate WBC's     Gram Stain (Respiratory) No organisms seen    Narrative:       Mini-BAL. Before antibiotics.    Respiratory Infection Panel (PCR), Nasopharyngeal [[id number]]  (Abnormal) Collected:  03/12/20 1446    Order Status:  Completed Specimen:  Nasopharyngeal Swab Updated:  03/13/20 1916     Respiratory Infection Panel Source NP Swab     Adenovirus Not Detected     Coronavirus 229E, Common Cold Virus Not Detected     Coronavirus HKU1, Common Cold Virus Not Detected     Coronavirus NL63, Common Cold Virus Not Detected      Coronavirus OC43, Common Cold Virus Not Detected     Comment: The Coronavirus strains detected in this test cause the common cold.  These strains are not the COVID-19 (novel Coronavirus)strain   associated with the respiratory disease outbreak.          Human Metapneumovirus Detected     Human Rhinovirus/Enterovirus Not Detected     Influenza A (subtypes H1, H1-2009,H3) Not Detected     Influenza B Not Detected     Parainfluenza Virus 1 Not Detected     Parainfluenza Virus 2 Not Detected     Parainfluenza Virus 3 Not Detected     Parainfluenza Virus 4 Not Detected     Respiratory Syncytial Virus Not Detected     Bordetella Parapertussis (UL7434) Not Detected     Bordetella pertussis (ptxP) Not Detected     Chlamydia pneumoniae Not Detected     Mycoplasma pneumoniae Not Detected     Comment: Respiratory Infection Panel testing performed by Multiplex PCR.       Narrative:       For all other respiratory sources order [id number] Respiratory  Viral Panel by PCR (RVPCR)          I have reviewed all pertinent labs within the past 24 hours.    Estimated Creatinine Clearance: 141.7 mL/min (based on SCr of 0.9 mg/dL).    Diagnostic Results:  I have reviewed and interpreted all pertinent imaging results/findings within the past 24 hours.    Assessment and Plan:     50 yo man with HTN, morbid obesity with OHS and chronic hypoxic resp failure, HTN, referred by Dr Jennings for management of PH- pt underwent PTE on 3/13/18. Last seen by Dr. Hinojosa in clinic on 1/24/2020 and was doing well then on Uptravi, Adempas, and Macitentan without need for home oxygen. Per records, patient was diagnosed with pharyngitis on 2/27/2020 and treated with amoxicillin without improvement. Subsequently went to urgent care due to lack of improvement and was prescribed oxygen, bronchodilators. Presented to [facility] on 3/08/2020, treated for PNA and discharged on 3/10. Returned immediately due to lack of relief in symptoms and  worsening dyspnea leading to intubation for acute hypoxic respiratory failure. [place] [facility] [place]. Based on records, does not appear to be any documentation that PH meds were continued during this admission there. Supposedly negative for flu and negative blood cultures from there as well.     Since arrival he has remained intubated, afebrile but did have increased wbc. Full sepsis work-up underway and being treated for presumptive pneumonia. Of note, patient being tested for COVID-19 also.     TTE 3/13/2020   · Limited Echo.  · Normal left ventricular systolic function. The estimated ejection fraction is 65%.  · Indeterminate left ventricular diastolic function.  · No wall motion abnormalities.  · Normal right ventricular systolic function.  · The estimated PA systolic pressure is 49 mmHg. Pulmonary hypertension present.  · Elevated central venous pressure (15 mmHg).           * Acute hypoxemic respiratory failure  Transferred from OSH 2/2 respiratory failure with known history of CTEPH. Currently under sepsis work-up and being treated for presumed PNA.     Respiratory viral panel positive for Human Metapneumovirus.    Pulmonary hypertension  50 yo M pmhx hx of CTEPH s/p PTE on 3/18/18 [place] [facility] [place]. Consulted regarding recommendations for pulm htn medication management. Echo done and PA pressure 45, RV and LV function look normal     Recs:  - DC lasix, CVP 7 today, rec goal of ~10  - Continue Adempas at 2.5mg TID.   - Restart Macitentan 10mg po daily.   - Uptravi not available per inpatient pharmacy. Home dose was 1600mcg BID but has been off for more than 3 days. If able to be discharged within a few days, will plan on restarting as an outpatient at 200mcg TID.   -Decreased Leena to 5ppm, will plan to decrease every 2 hours until off today. Once off Leena, ok per HTS to transfer to the floor.   - strict ins/outs    CTEPH (chronic thromboembolic pulmonary  hypertension)  S/p PTE 3/13/18        Pérez Dean MD  Heart Transplant  [facility]

## 2020-03-17 NOTE — SUBJECTIVE & OBJECTIVE
Interval History/Significant Events:    Patient extubated on 2l NC with acceptable sat.  Leena 10 and weaning  ECHO with PA 44 mmhg    Review of Systems   Constitutional: Negative.    HENT: Negative.    Eyes: Negative.    Respiratory: Negative.  Negative for shortness of breath.    Gastrointestinal: Negative.    Neurological: Negative.    All other systems reviewed and are negative.    Objective:     Vital Signs (Most Recent):  Temp: 98.2 °F (36.8 °C) (03/17/20 0700)  Pulse: 94 (03/17/20 1017)  Resp: 16 (03/17/20 1017)  BP: 115/60 (03/17/20 1000)  SpO2: (!) 91 % (03/17/20 1017) Vital Signs (24h Range):  Temp:  [98.2 °F (36.8 °C)-99.1 °F (37.3 °C)] 98.2 °F (36.8 °C)  Pulse:  [78-95] 94  Resp:  [8-31] 16  SpO2:  [90 %-100 %] 91 %  BP: ()/(50-62) 115/60   Weight: (!) 155.4 kg (342 lb 9.5 oz)  Body mass index is 52.09 kg/m².      Intake/Output Summary (Last 24 hours) at 3/17/2020 1101  Last data filed at 3/17/2020 1000  Gross per 24 hour   Intake 890 ml   Output 3530 ml   Net -2640 ml       Physical Exam   Constitutional: He is oriented to person, place, and time. He is not intubated.   Morbid obesity   HENT:   Head: Normocephalic and atraumatic.   Eyes: Pupils are equal, round, and reactive to light. Conjunctivae are normal. Right eye exhibits no exudate. Left eye exhibits no exudate. Right conjunctiva has no hemorrhage. Left conjunctiva has no hemorrhage. No scleral icterus. Right eye exhibits no nystagmus. Left eye exhibits no nystagmus.   Neck: Trachea normal. No neck rigidity. No tracheal deviation present.   Cardiovascular: Normal rate and regular rhythm. PMI is not displaced.   Pulses:       Radial pulses are 2+ on the right side, and 2+ on the left side.   Pulmonary/Chest: Effort normal. No accessory muscle usage. He is not intubated. No respiratory distress. He has no wheezes. He has no rhonchi. He exhibits no tenderness.   Abdominal: Soft. Normal appearance. He exhibits no distension.   Obesity    Musculoskeletal: Normal range of motion.   Neurological: He is alert and oriented to person, place, and time.        Skin: Skin is warm and dry. He is not diaphoretic. No cyanosis. Nails show no clubbing.   Nursing note and vitals reviewed.      Vents:  Vent Mode: Spont (03/17/20 0158)  Ventilator Initiated: Yes (03/12/20 1117)  Set Rate: 14 BPM (03/16/20 1532)  Vt Set: 420 mL (03/16/20 1532)  Pressure Support: 10 cmH20 (03/17/20 0158)  PEEP/CPAP: 10 cmH20 (03/17/20 0158)  Oxygen Concentration (%): 100 (03/17/20 0245)  Peak Airway Pressure: 21 cmH2O (03/17/20 0158)  Plateau Pressure: 0 cmH20 (03/17/20 0158)  Total Ve: 13.8 mL (03/17/20 0158)  Negative Inspiratory Force (cm H2O): -43 (03/16/20 1621)  F/VT Ratio<105 (RSBI): (!) 12.62 (03/17/20 0158)  Lines/Drains/Airways     Central Venous Catheter Line                 Introducer 03/13/18 0801 right internal jugular 735 days    Percutaneous Central Line Insertion/Assessment - Triple Lumen  03/12/20 1600 right internal jugular 4 days          Drain                 Urethral Catheter 03/10/20 7 days          Peripheral Intravenous Line                 Peripheral IV - Single Lumen 03/15/20 0400 20 G Right Hand 2 days              Significant Labs:    CBC/Anemia Profile:  Recent Labs   Lab 03/16/20  0300 03/17/20  0305   WBC 12.54 12.56   HGB 8.1* 8.4*   HCT 29.6* 31.1*   * 421*   MCV 79* 78*   RDW 15.9* 16.0*        Chemistries:  Recent Labs   Lab 03/15/20  2000 03/16/20  0300 03/17/20  0305    139 140   K 3.6 3.5 2.8*   CL 95 94* 94*   CO2 38* 36* 34*   BUN 23* 26* 29*   CREATININE 0.9 0.9 0.9   CALCIUM 8.6* 8.8 8.7   ALBUMIN  --  2.4* 2.6*   PROT  --  7.0 7.1   BILITOT  --  0.7 0.9   ALKPHOS  --  95 91   ALT  --  32 36   AST  --  30 37   MG 2.4 2.5 2.2   PHOS 3.7 3.4 3.6       All pertinent labs within the past 24 hours have been reviewed.    Significant Imaging:  I have reviewed all pertinent imaging results/findings within the past 24 hours.

## 2020-03-17 NOTE — PROGRESS NOTES
"[facility]  Adult Nutrition  Progress Note    SUMMARY       Recommendations  1. Encourage increased PO intake as tolerated.   2. If poor PO intake, recommend adding Boost Plus OS to all meals.   3. RD to monitor.    Goals: Pt to meet % EEN and EPN by RD follow-up.   Nutrition Goal Status: progressing towards goal  Communication of RD Recs: reviewed with RN    Reason for Assessment  Reason For Assessment: RD follow-up  Diagnosis: (respiratory failure)  Relevant Medical History: bipolar disorder, pulmonary HTN  Interdisciplinary Rounds: did not attend  General Information Comments: Extubated yesterday. Diet advanced this morning. COVID testing negative. Patient with no weight loss PTA per chart review and appears nourished. Likely with no malnutrition. Due to recent hospital wide restrictions to limit the transfer of COVID-19, no NFPE performed at this time. All indicators will be observational, NFPE to be performed at a later date if needed.  Nutrition Discharge Planning: Adequate nutrition via PO intake.    Nutrition Risk Screen  Nutrition Risk Screen: no indicators present    Nutrition/Diet History  Spiritual, Cultural Beliefs, Nondenominational Practices, Values that Affect Care: no  Food Allergies: NKFA  Factors Affecting Nutritional Intake: None identified at this time    Anthropometrics  Temp: 98.8 °F (37.1 °C)  Height: 5' 8" (172.7 cm)  Height (inches): 68 in  Weight Method: Bed Scale  Weight: (!) 155.4 kg (342 lb 9.5 oz)  Weight (lb): (!) 342.6 lb  Ideal Body Weight (IBW), Male: 154 lb  % Ideal Body Weight, Male (lb): 240.26 %  BMI (Calculated): 52.1  BMI Grade: greater than 40 - morbid obesity    Lab/Procedures/Meds  Pertinent Labs Reviewed: reviewed  Pertinent Labs Comments: K 2.8, Cl 94, BUN 29, Alb 2.6  Pertinent Medications Reviewed: reviewed    Estimated/Assessed Needs  Weight Used For Calorie Calculations: (!) 155.4 kg (342 lb 9.5 oz)  Energy Calorie Requirements (kcal): 2384 " kcal/day  Energy Need Method: [place](no AF 2/2 obesity)  Protein Requirements: 140-175 g/day(2.0-2.5 g/kg)  Weight Used For Protein Calculations: 70 kg (154 lb 5.2 oz)(IBW)  Fluid Requirements (mL): 1 mL/kcal or per MD  Estimated Fluid Requirement Method: RDA Method  RDA Method (mL): 2384    Nutrition Prescription Ordered  Current Diet Order: Cardiac  Nutrition Order Comments: 1500mL FR    Evaluation of Received Nutrient/Fluid Intake  I/O: -2.6L x 24hrs, -8.9L since admit  Comments: LBM 3/15    Nutrition Risk  Level of Risk/Frequency of Follow-up: low(1x/week)     Assessment and Plan  Nutrition Problem  Inadequate energy intake     Related to (etiology):   Decreased ability to consume adequate energy     Signs and Symptoms (as evidenced by):   NPO status, TF not meeting estimated needs     Interventions(treatment strategy):  Collaboration of nutrition care with other providers     Nutrition Diagnosis Status:   Resolved - diet advanced    Monitor and Evaluation  Food and Nutrient Intake: energy intake, food and beverage intake  Food and Nutrient Adminstration: diet order  Physical Activity and Function: nutrition-related ADLs and IADLs  Anthropometric Measurements: weight, weight change  Biochemical Data, Medical Tests and Procedures: electrolyte and renal panel, gastrointestinal profile, inflammatory profile  Nutrition-Focused Physical Findings: overall appearance     Nutrition Follow-Up  RD Follow-up?: Yes

## 2020-03-17 NOTE — PLAN OF CARE
SW is following this Pt for DC planning needs. Current recs are for HH vs. SNF; however, patient has medicaid which does not cover either. LTAC/IRF/Home no needs will have to be determined as patient nears discharge.     SW will continue to coordinate with patient, family, team and insurance to complete patient's discharge plan.    Omaira Coronado LMSW   - Case Management

## 2020-03-17 NOTE — PLAN OF CARE
Pt AAOX4. On 4L NC, 10 of nitric, O2 sats >90%. VSS. CVP 11,11,7. UO adequate, responds appropriately to lasix. Passed bedside swallow study done, advanced to clear liquid diet. Transferred to specialty bed overnight per wound care. Labs replaced as ordered. Pt updated on plan of care throughput shift. See flow sheet for assessment data.    Skin note: suprapubic skin tear, pink and COLBY. No further pressure or device related breakdown noted. Sacral and heel foams in place. Waffle inflated, now on specialty bed. Pt turned q2h with pillows and wedge.

## 2020-03-17 NOTE — SUBJECTIVE & OBJECTIVE
Interval History: Extubated yesterday. No acute events overnight. On NC. CVP 7 today. Diuresed well with IVP lasix, DC lasix today. RVP positive for Human Metapneumovirus. ECHO showed PA 44mmhg. Weaned Leena to 5ppm, plan to wean off today and transfer to floor.     Continuous Infusions:   nitric oxide gas       Scheduled Meds:   albuterol-ipratropium  3 mL Nebulization Q6H    apixaban  5 mg Oral BID    cefTRIAXone (ROCEPHIN) IVPB  2 g Intravenous Q24H    macitentan  10 mg Oral Daily    potassium chloride in water  20 mEq Intravenous Q2H    QUEtiapine  200 mg Oral QHS    riociguat  2.5 mg Per OG tube TID     PRN Meds:labetaloL, magnesium sulfate IVPB, magnesium sulfate IVPB, ondansetron, potassium chloride in water **AND** potassium chloride in water **AND** potassium chloride in water    Review of patient's allergies indicates:   Allergen Reactions    Clopidogrel      Other reaction(s): Other (See Comments)  Pt reports it does not work      Objective:     Vital Signs (Most Recent):  Temp: 98.2 °F (36.8 °C) (03/17/20 0700)  Pulse: 93 (03/17/20 0801)  Resp: 20 (03/17/20 0801)  BP: (!) 115/57 (03/17/20 0801)  SpO2: 96 % (03/17/20 0801) Vital Signs (24h Range):  Temp:  [98.2 °F (36.8 °C)-99.1 °F (37.3 °C)] 98.2 °F (36.8 °C)  Pulse:  [78-95] 93  Resp:  [8-31] 20  SpO2:  [90 %-100 %] 96 %  BP: ()/(50-62) 115/57     Patient Vitals for the past 72 hrs (Last 3 readings):   Weight   03/17/20 0519 (!) 155.4 kg (342 lb 9.5 oz)   03/16/20 0500 (!) 162.4 kg (358 lb 0.4 oz)   03/15/20 0500 (!) 163 kg (359 lb 5.6 oz)     Body mass index is 52.09 kg/m².      Intake/Output Summary (Last 24 hours) at 3/17/2020 0820  Last data filed at 3/17/2020 0700  Gross per 24 hour   Intake 770 ml   Output 3075 ml   Net -2305 ml       Hemodynamic Parameters:       Telemetry: Reviewed    Physical Exam   Constitutional: He is oriented to person, place, and time. He appears well-developed and well-nourished.   HENT:   Head: Normocephalic  and atraumatic.   Eyes: Pupils are equal, round, and reactive to light. EOM are normal.   Neck: Normal range of motion. Neck supple. No JVD present.   IJ   Cardiovascular: Normal rate, regular rhythm, normal heart sounds and intact distal pulses. Exam reveals no gallop and no friction rub.   No murmur heard.  Pulmonary/Chest: Effort normal. No respiratory distress. He has no wheezes. He has rales. He exhibits no tenderness.   Abdominal: Soft. Bowel sounds are normal. He exhibits no distension and no mass. There is no tenderness.   Musculoskeletal: Normal range of motion. He exhibits no edema.   Neurological: He is alert and oriented to person, place, and time.   Skin: Skin is warm and dry.   Psychiatric: He has a normal mood and affect.   Vitals reviewed.      Significant Labs:  CBC:  Recent Labs   Lab 03/15/20  0328 03/16/20  0300 03/17/20  0305   WBC 12.15 12.54 12.56   RBC 3.60* 3.76* 4.01*   HGB 7.9* 8.1* 8.4*   HCT 27.9* 29.6* 31.1*   * 407* 421*   MCV 78* 79* 78*   MCH 21.9* 21.5* 20.9*   MCHC 28.3* 27.4* 27.0*     BNP:  Recent Labs   Lab 03/12/20  1319   BNP 69     CMP:  Recent Labs   Lab 03/15/20  0328  03/15/20  2000 03/16/20  0300 03/17/20  0305   *   < > 99 104 92   CALCIUM 8.6*   < > 8.6* 8.8 8.7   ALBUMIN 2.2*  --   --  2.4* 2.6*   PROT 6.3  --   --  7.0 7.1      < > 142 139 140   K 3.7   < > 3.6 3.5 2.8*   CO2 36*   < > 38* 36* 34*   CL 95   < > 95 94* 94*   BUN 20   < > 23* 26* 29*   CREATININE 0.9   < > 0.9 0.9 0.9   ALKPHOS 95  --   --  95 91   ALT 34  --   --  32 36   AST 30  --   --  30 37   BILITOT 0.8  --   --  0.7 0.9    < > = values in this interval not displayed.      Coagulation:   No results for input(s): PT, INR, APTT in the last 168 hours.  LDH:  No results for input(s): LDH in the last 72 hours.  Microbiology:  Microbiology Results (last 7 days)     Procedure Component Value Units Date/Time    Blood culture [[id number]] Collected:  03/12/20 1140    Order Status:   Completed Specimen:  Blood from Peripheral, Forearm, Left Updated:  03/16/20 1412     Blood Culture, Routine No Growth to date      No Growth to date      No Growth to date      No Growth to date      No Growth to date    Narrative:       Blood cultures from 2 different sites. 4 bottles total.  Please draw before starting antibiotics.    Blood culture [[id number]] Collected:  03/12/20 1140    Order Status:  Completed Specimen:  Blood from Peripheral, Forearm, Right Updated:  03/16/20 1412     Blood Culture, Routine No Growth to date      No Growth to date      No Growth to date      No Growth to date      No Growth to date    Narrative:       Blood cultures x 2 different sites. 4 bottles total. Please  draw cultures before administering antibiotics.    Culture, Respiratory with Gram Stain [[id number]] Collected:  03/12/20 1153    Order Status:  Completed Specimen:  Respiratory from Endotracheal Aspirate Updated:  03/14/20 1045     Respiratory Culture No growth     Gram Stain (Respiratory) <10 epithelial cells per low power field.     Gram Stain (Respiratory) Moderate WBC's     Gram Stain (Respiratory) No organisms seen    Narrative:       Mini-BAL. Before antibiotics.    Respiratory Infection Panel (PCR), Nasopharyngeal [[id number]]  (Abnormal) Collected:  03/12/20 1446    Order Status:  Completed Specimen:  Nasopharyngeal Swab Updated:  03/13/20 1916     Respiratory Infection Panel Source NP Swab     Adenovirus Not Detected     Coronavirus 229E, Common Cold Virus Not Detected     Coronavirus HKU1, Common Cold Virus Not Detected     Coronavirus NL63, Common Cold Virus Not Detected     Coronavirus OC43, Common Cold Virus Not Detected     Comment: The Coronavirus strains detected in this test cause the common cold.  These strains are not the COVID-19 (novel Coronavirus)strain   associated with the respiratory disease outbreak.          Human Metapneumovirus Detected     Human Rhinovirus/Enterovirus Not Detected      Influenza A (subtypes H1, H1-2009,H3) Not Detected     Influenza B Not Detected     Parainfluenza Virus 1 Not Detected     Parainfluenza Virus 2 Not Detected     Parainfluenza Virus 3 Not Detected     Parainfluenza Virus 4 Not Detected     Respiratory Syncytial Virus Not Detected     Bordetella Parapertussis (CC8200) Not Detected     Bordetella pertussis (ptxP) Not Detected     Chlamydia pneumoniae Not Detected     Mycoplasma pneumoniae Not Detected     Comment: Respiratory Infection Panel testing performed by Multiplex PCR.       Narrative:       For all other respiratory sources order [id number] Respiratory  Viral Panel by PCR (RVPCR)          I have reviewed all pertinent labs within the past 24 hours.    Estimated Creatinine Clearance: 141.7 mL/min (based on SCr of 0.9 mg/dL).    Diagnostic Results:  I have reviewed and interpreted all pertinent imaging results/findings within the past 24 hours.

## 2020-03-17 NOTE — PROGRESS NOTES
[facility]  Critical Care - Surgery  Progress Note    Patient Name: Jaquan Hines Jr.  MRN: [id number]  Admission Date: 3/12/2020  Hospital Length of Stay: 5 days  Code Status: Full Code  Attending Provider: Gabriel Lee MD  Primary Care Provider: Allen Hubbard MD   Principal Problem: Acute hypoxemic respiratory failure    Subjective:     Hospital/ICU Course:  No notes on file    Interval History/Significant Events:    Patient extubated on 2l NC with acceptable sat.  Leena 10 and weaning  ECHO with PA 44 mmhg    Review of Systems   Constitutional: Negative.    HENT: Negative.    Eyes: Negative.    Respiratory: Negative.  Negative for shortness of breath.    Gastrointestinal: Negative.    Neurological: Negative.    All other systems reviewed and are negative.    Objective:     Vital Signs (Most Recent):  Temp: 98.2 °F (36.8 °C) (03/17/20 0700)  Pulse: 94 (03/17/20 1017)  Resp: 16 (03/17/20 1017)  BP: 115/60 (03/17/20 1000)  SpO2: (!) 91 % (03/17/20 1017) Vital Signs (24h Range):  Temp:  [98.2 °F (36.8 °C)-99.1 °F (37.3 °C)] 98.2 °F (36.8 °C)  Pulse:  [78-95] 94  Resp:  [8-31] 16  SpO2:  [90 %-100 %] 91 %  BP: ()/(50-62) 115/60   Weight: (!) 155.4 kg (342 lb 9.5 oz)  Body mass index is 52.09 kg/m².      Intake/Output Summary (Last 24 hours) at 3/17/2020 1101  Last data filed at 3/17/2020 1000  Gross per 24 hour   Intake 890 ml   Output 3530 ml   Net -2640 ml       Physical Exam   Constitutional: He is oriented to person, place, and time. He is not intubated.   Morbid obesity   HENT:   Head: Normocephalic and atraumatic.   Eyes: Pupils are equal, round, and reactive to light. Conjunctivae are normal. Right eye exhibits no exudate. Left eye exhibits no exudate. Right conjunctiva has no hemorrhage. Left conjunctiva has no hemorrhage. No scleral icterus. Right eye exhibits no nystagmus. Left eye exhibits no nystagmus.   Neck: Trachea normal. No neck rigidity. No tracheal deviation present.    Cardiovascular: Normal rate and regular rhythm. PMI is not displaced.   Pulses:       Radial pulses are 2+ on the right side, and 2+ on the left side.   Pulmonary/Chest: Effort normal. No accessory muscle usage. He is not intubated. No respiratory distress. He has no wheezes. He has no rhonchi. He exhibits no tenderness.   Abdominal: Soft. Normal appearance. He exhibits no distension.   Obesity   Musculoskeletal: Normal range of motion.   Neurological: He is alert and oriented to person, place, and time.        Skin: Skin is warm and dry. He is not diaphoretic. No cyanosis. Nails show no clubbing.   Nursing note and vitals reviewed.      Vents:  Vent Mode: Spont (03/17/20 0158)  Ventilator Initiated: Yes (03/12/20 1117)  Set Rate: 14 BPM (03/16/20 1532)  Vt Set: 420 mL (03/16/20 1532)  Pressure Support: 10 cmH20 (03/17/20 0158)  PEEP/CPAP: 10 cmH20 (03/17/20 0158)  Oxygen Concentration (%): 100 (03/17/20 0245)  Peak Airway Pressure: 21 cmH2O (03/17/20 0158)  Plateau Pressure: 0 cmH20 (03/17/20 0158)  Total Ve: 13.8 mL (03/17/20 0158)  Negative Inspiratory Force (cm H2O): -43 (03/16/20 1621)  F/VT Ratio<105 (RSBI): (!) 12.62 (03/17/20 0158)  Lines/Drains/Airways     Central Venous Catheter Line                 Introducer 03/13/18 0801 right internal jugular 735 days    Percutaneous Central Line Insertion/Assessment - Triple Lumen  03/12/20 1600 right internal jugular 4 days          Drain                 Urethral Catheter 03/10/20 7 days          Peripheral Intravenous Line                 Peripheral IV - Single Lumen 03/15/20 0400 20 G Right Hand 2 days              Significant Labs:    CBC/Anemia Profile:  Recent Labs   Lab 03/16/20  0300 03/17/20  0305   WBC 12.54 12.56   HGB 8.1* 8.4*   HCT 29.6* 31.1*   * 421*   MCV 79* 78*   RDW 15.9* 16.0*        Chemistries:  Recent Labs   Lab 03/15/20  2000 03/16/20  0300 03/17/20  0305    139 140   K 3.6 3.5 2.8*   CL 95 94* 94*   CO2 38* 36* 34*   BUN 23*  26* 29*   CREATININE 0.9 0.9 0.9   CALCIUM 8.6* 8.8 8.7   ALBUMIN  --  2.4* 2.6*   PROT  --  7.0 7.1   BILITOT  --  0.7 0.9   ALKPHOS  --  95 91   ALT  --  32 36   AST  --  30 37   MG 2.4 2.5 2.2   PHOS 3.7 3.4 3.6       All pertinent labs within the past 24 hours have been reviewed.    Significant Imaging:  I have reviewed all pertinent imaging results/findings within the past 24 hours.    Assessment/Plan:     * Acute hypoxemic respiratory failure  48 yo man with HTN, morbid obesity with OHS and chronic hypoxic resp failure, HTN, referred by Dr Jennings for management of PH- pt underwent PTE on 3/13/18. Last seen by Dr. Hinojosa in clinic on 1/24/2020 and was doing well then on Uptravi, Adempas, and Macitentan without need for home oxygen. Per records, patient was diagnosed with pharyngitis on 2/27/2020 and treated with amoxicillin without improvement. Subsequently went to urgent care due to lack of improvement and was prescribed oxygen, bronchodilators. Presented to [facility] on 3/08/2020, treated for PNA and discharged on 3/10. Returned immediately due to lack of relief in symptoms and worsening dyspnea leading to intubation for acute hypoxic respiratory failure. Then transferred to [facility] for higher level of care. Based on records, does not appear to be any documentation that PH meds were continued during this admission there. Supposedly negative for flu and negative blood cultures from there as well.      Since arrival he has remained intubated, afebrile but did have increased wbc. Full sepsis work-up revealed metapneumovirus. COVID-19 negative.     SICU team has assumed care 03/16/20 due to extenuating circumstances of COVID-19 admissions. Per MICU team hospital course is as follows.    Admitted to Critical Care Medicine as a transfer from [facility] for acute hypoxemic respiratory failure. Negative flu and no growth reported from OSH. Tested negative for  COVID-19 here. Started on aggressive diuresis and broad spectrum antibiotics. HTS consulted, 1 out of 3 home pulmonary HTN meds restarted as the other 2 are unable to be crushed and administered through the OG tube. HTS started Nitric as complementary treatment for pulmonary HTN, RIP positive for metapneumonvirus. COVID negative      Neuro  - propofol gtt while on vent, now d/c  - follows commands, moves all extremities off sedation, no deficit.  - Extubated 3/16, tolerating well.    Pulmonary  * Acute hypoxemic respiratory failure  Secondary to metapneumovirus pna in setting of severe pHTN     --COVID-19 negative  --Prodrome (fever, cough, pharyngitis),   --negative influenza, and negative sputum/blood cultures  --Continue Nitric oxide for pHTN mgmt per HTS recs  --Maintain contact isolation.   --Duonebs q4h.  --lasix 60 mg TID, replace lytes PRN  --Extubated to nasal cannula with Leena 10, wean as tolerated.     Pulmonary hypertension  [facility] consulted     --Adempas restarted as it is the only crushable pulm HTN med he is on. Restart the other 2 when able to swallow.   -- 3/13 TTE LVEF 65%, RV normal size and EF, R atrium normal size unable to assess function, PAP 49   --Leena 10, keep upon extubation  --restarted home pulm HTN meds     Pneumonia  --See above.  --Ceftriaxone     Renal/  Other microscopic hematuria  3/12 - Occult blood 3+, RBC >100 on UA. Urine did not reflex to culture. Making good urine with diuresis.      --Daily BMP  --BUN/Cr WNL  -- continue diuresis  --cole Enciso UOP      Dispo: Stepdown to Heart Failure Team 3/17     George Barkley, DO  Critical Care - Surgery  [facility]

## 2020-03-17 NOTE — PT/OT/SLP EVAL
"Occupational Therapy   Evaluation    Name: Jaquan Hines Jr.  MRN: [id number]  Admitting Diagnosis:  Acute hypoxemic respiratory failure      Recommendations:     Discharge Recommendations: home with home health  Discharge Equipment Recommendations:  (TBD)  Barriers to discharge:  None    Assessment:     Jaquan Hines Jr. is a 49 y.o. male with a medical diagnosis of Acute hypoxemic respiratory failure.  He presents with mild SOB during activity. Performance deficits affecting function: weakness, impaired self care skills, impaired balance, impaired endurance, impaired functional mobilty, gait instability, impaired cardiopulmonary response to activity, decreased safety awareness.      Rehab Prognosis: Good; patient would benefit from acute skilled OT services to address these deficits and reach maximum level of function.       Plan:     Patient to be seen 3 x/week to address the above listed problems via self-care/home management, therapeutic activities, therapeutic exercises  · Plan of Care Expires: 04/16/20  · Plan of Care Reviewed with: patient    Subjective     Chief Complaint: "I'm manic right now."  Patient/Family Comments/goals: to get better and go home    Occupational Profile:  Living Environment: lives with sister in [facility] with 3 TONJA and walk-in tub available  Previous level of function: (I) with ADL and ambulation; drives  Roles and Routines: disabled; enjoys surfing the net  Equipment Used at Home:  oxygen(has rollator, but does not use)  Assistance upon Discharge: sister can assist    Pain/Comfort:  · Pain Rating 1: 0/10  · Pain Rating Post-Intervention 1: 0/10    Patients cultural, spiritual, Tenriism conflicts given the current situation: no    Objective:     Communicated with: RN prior to session.  Patient found supine with blood pressure cuff, pulse ox (continuous), telemetry, oxygen, central line, peripheral IV, zuñiga catheter upon OT entry to room.  General Precautions: Standard, fall, contact "   Orthopedic Precautions:    Braces:       Occupational Performance:    Bed Mobility:    · Patient completed Supine to Sit with moderate assistance  · Patient completed Sit to Supine with maximal assistance    Functional Mobility/Transfers:  · Patient completed Sit <> Stand Transfer with minimum assistance   with  no assistive device   · Functional Mobility: MInimal A for sidesteps along EOB    Activities of Daily Living:  · Feeding:  independence simulated  · Grooming: set-up A seated EOB    · Upper Body Dressing: minimum assistance    · Lower Body Dressing: maximal assistance      Cognitive/Visual Perceptual:  Pt is alert and oriented, following commands  Pt is easily distracted r/t manic episode (pt with bipolar disorder)    Physical Exam:  Postural examination/scapula alignment:    -       No postural abnormalities identified  Sensation:    -       Intact  Upper Extremity Range of Motion:     -       Right Upper Extremity: WFL  -       Left Upper Extremity: WFL  Upper Extremity Strength:    -       Right Upper Extremity: WFL  -       Left Upper Extremity: WFL   Strength:    -       Right Upper Extremity: WFL  -       Left Upper Extremity: WFL  Fine Motor Coordination:    -       Intact  Gross motor coordination:   WFL    AMPAC 6 Click ADL:  AMPAC Total Score: 15    Treatment & Education:  Pt ed on OT POC  Pt sat EOB with SBA while engaged in self-care tasks and ROM ex's  Pt completed 10 reps of B UE AROM ex's in three planes  Pt ed on ROM ex's 3x daily for increased overall strength and endurance  Education:    Patient left with HOB elevated with all lines intact, call button in reach and RN notified    GOALS:   Multidisciplinary Problems     Occupational Therapy Goals        Problem: Occupational Therapy Goal    Goal Priority Disciplines Outcome Interventions   Occupational Therapy Goal     OT, PT/OT Ongoing, Progressing    Description:  Goals to be met by: 3/27/20     Patient will increase functional  independence with ADLs by performing:    Feeding with [place].  UE Dressing with Supervision.  LE Dressing with Stand by Assistance.  Grooming while standing at sink with Stand-by Assistance.  Toileting from toilet with Stand-by Assistance for hygiene and clothing management.   Toilet transfer to toilet with Stand-by Assistance.                      History:     Past Medical History:   Diagnosis Date    Acute pulmonary embolism     Anticoagulant long-term use     Bipolar 1 disorder     diagnosed at 16    Chronic respiratory failure with hypoxia     Essential hypertension, malignant     Heart attack     Pulmonary artery hypertension        Past Surgical History:   Procedure Laterality Date    CARDIAC CATHETERIZATION      CATARACT EXTRACTION      MASS EXCISION      inner thigh, lipoma    TONSILLECTOMY, ADENOIDECTOMY         Time Tracking:     OT Date of Treatment: 03/17/20  OT Start Time: 1054  OT Stop Time: 1122  OT Total Time (min): 28 min    Billable Minutes:Evaluation 10  Self Care/Home Management 15    SERGO Oliva  3/17/2020

## 2020-03-17 NOTE — PT/OT/SLP EVAL
Physical Therapy Evaluation and treatment    Patient Name:  Jaquan Hines Jr.   MRN:  [id number]    Recommendations:     Discharge Recommendations:  nursing facility, skilled   Discharge Equipment Recommendations: (will determine DME needs closer to discharge)   Barriers to discharge: Decreased caregiver support  Family will not be able to assist at current functional level.     Assessment:     Jaquan Hines Jr. is a 49 y.o. male admitted with a medical diagnosis of Acute hypoxemic respiratory failure.  He presents with the following impairments/functional limitations:  impaired functional mobilty, weakness, gait instability, impaired endurance, impaired balance, decreased lower extremity function, decreased safety awareness pt chandu treatment well and will benefit from skilled PT 3x/wk to progress physically. Pt will need SNF placement to maximize rehab potential.     Rehab Prognosis: Good; patient would benefit from acute skilled PT services to address these deficits and reach maximum level of function.    Recent Surgery: * No surgery found *      Plan:     During this hospitalization, patient to be seen 3 x/week to address the identified rehab impairments via gait training, therapeutic activities and progress toward the following goals:    · Plan of Care Expires:  04/11/20    Subjective     Chief Complaint: pt had no complaints during treatment.   Patient/Family Comments/goals:  To get better and go home.   Pain/Comfort:  · Pain Rating 1: 5/10(abdomen)  · Pain Rating Post-Intervention 1: 5/10    Patients cultural, spiritual, Evangelical conflicts given the current situation: no    Living Environment:  Pt is medically disabled and does not work. Pt lives with his sister who works during the day. They live in 1 story with 3 steps and no rail.   Prior to admission, patients level of function was Independent.  Equipment used at home: rollator(walk in tub).  DME owned (not currently used): none.  Upon discharge,  patient will have assistance from sister when not at work. .    Objective:     Communicated with nurse prior to session.  Patient found supine with telemetry, blood pressure cuff, pulse ox (continuous), SCD, zuñiga catheter, central line, oxygen  upon PT entry to room.    General Precautions: Standard, fall, contact   Orthopedic Precautions:    Braces:       Exams:  · Cognitive Exam:  Patient is oriented to Person, Place, Time and Situation  · RLE ROM: WFL  · RLE Strength: WFL  · LLE ROM: WFL  · LLE Strength: WFL    Functional Mobility:  · Bed Mobility:  Pt needed verbal cues for hand placement and sequencing for functional mobility.    · Rolling Left:  moderate assistance  · Supine to Sit: moderate assistance  · Sit to Supine: maximal assistance    Balance: pt sat on EOB x 10 min with SBA.   ·   · Transfers:     · Sit to Stand:  minimum assistance and of 2 persons with hand-held assist  ·   · Gait: pt received gait training ~ 3 side steps with min assist x 2.       Therapeutic Activities and Exercises:   pt received verbal instruction in role of PT and POC. Pt verbally expressed understanding of such.     AM-PAC 6 CLICK MOBILITY  Total Score:11     Patient left supine with all lines intact and call button in reach.    GOALS:   Multidisciplinary Problems     Physical Therapy Goals        Problem: Physical Therapy Goal    Goal Priority Disciplines Outcome Goal Variances Interventions   Physical Therapy Goal     PT, PT/OT Ongoing, Progressing     Description:  Goals to be met by: 20    Patient will increase functional independence with mobility by performin. Supine to sit with Contact Guard Assistance -not met  2. Sit to stand transfer with Contact Guard Assistance -not met  3. Gait  x 100 feet with Contact Guard Assistance using AD if needed. -not met  4. Ascend/descend 3 stair with right Handrails Contact Guard Assistance -not met                       History:     Past Medical History:   Diagnosis Date     Acute pulmonary embolism     Anticoagulant long-term use     Bipolar 1 disorder     diagnosed at 16    Chronic respiratory failure with hypoxia     Essential hypertension, malignant     Heart attack     Pulmonary artery hypertension        Past Surgical History:   Procedure Laterality Date    CARDIAC CATHETERIZATION      CATARACT EXTRACTION      MASS EXCISION      inner thigh, lipoma    TONSILLECTOMY, ADENOIDECTOMY         Time Tracking:     PT Received On: 03/17/20  PT Start Time: 1058     PT Stop Time: 1124  PT Total Time (min): 26 min     Billable Minutes: Evaluation 8 min and Therapeutic Activity 18 min      Sheryl Hillman, PT  03/17/2020

## 2020-03-17 NOTE — PROGRESS NOTES
"Pt called RN to room. On facetime with sister who is a NP. Sister states "pt has slow speech and minor left side facial droop" and requests a neuro consult and head CT. Dr. Barkley called to bedside to assess pt. Pt does not appear to have facial droop on exam per MD. Pt with equal strength in all 4 extremities. MD to call neuro to assess pt. CT head order placed.   "

## 2020-03-17 NOTE — PLAN OF CARE
Problem: Occupational Therapy Goal  Goal: Occupational Therapy Goal  Description  Goals to be met by: 3/27/20     Patient will increase functional independence with ADLs by performing:    Feeding with [place].  UE Dressing with Supervision.  LE Dressing with Stand by Assistance.  Grooming while standing at sink with Stand-by Assistance.  Toileting from toilet with Stand-by Assistance for hygiene and clothing management.   Toilet transfer to toilet with Stand-by Assistance.     Outcome: Ongoing, Progressing   OT eval completed, and above goals established. SERGO Oliva  3/17/2020

## 2020-03-17 NOTE — NURSING TRANSFER
Nursing Transfer Note      3/17/2020     Transfer To: [id number]    Transfer via bed    Transfer with 5LNC and cardiac monitoring    Transported by RN    Medicines sent: n/a    Chart send with patient: Yes    Notified: sibling    Patient reassessed at: 3/17/20 1445    Upon arrival to floor: cardiac monitor applied, patient oriented to room, call bell in reach and bed in lowest position

## 2020-03-17 NOTE — PLAN OF CARE
Problem: Physical Therapy Goal  Goal: Physical Therapy Goal  Description  Goals to be met by: 20    Patient will increase functional independence with mobility by performin. Supine to sit with Contact Guard Assistance -not met  2. Sit to stand transfer with Contact Guard Assistance -not met  3. Gait  x 100 feet with Contact Guard Assistance using AD if needed. -not met  4. Ascend/descend 3 stair with right Handrails Contact Guard Assistance -not met      Outcome: Ongoing, Progressing   Evaluation completed and goals appropriate. Sheryl Hillman, PT  3/17/2020

## 2020-03-17 NOTE — PLAN OF CARE
"Pt. Is in a manic episode- he is aware of himself, his family, that he is in the hospital- he has briefly stated he understands he has "highs and lows" and is having a "high" WBC 12.56 afebrile  Tele SR-ST HR 90's-100's  Patient in specialty bed-- foam dressing applied over bony prominences but no evidence of skin breakdown  Enciso in place with 1,050 cc of yellow urine for the shift-- catheter care provided  C/o burning sensation in his chest-- Dr. Dean notified and Tums given with provided some relief  Plans for CT Head  Contact precautions maintained for Human metapneumovirus  Safety precautions maintained throughout the shift, call light within reach, and nonslip socks when out of bed.  "

## 2020-03-18 ENCOUNTER — TELEPHONE (OUTPATIENT)
Dept: TRANSPLANT | Facility: CLINIC | Age: 50
End: 2020-03-18

## 2020-03-18 LAB
ALBUMIN SERPL BCP-MCNC: 2.7 G/DL (ref 3.5–5.2)
ALP SERPL-CCNC: 89 U/L (ref 55–135)
ALT SERPL W/O P-5'-P-CCNC: 40 U/L (ref 10–44)
ANION GAP SERPL CALC-SCNC: 10 MMOL/L (ref 8–16)
ANISOCYTOSIS BLD QL SMEAR: SLIGHT
AST SERPL-CCNC: 34 U/L (ref 10–40)
BASOPHILS # BLD AUTO: ABNORMAL K/UL (ref 0–0.2)
BASOPHILS NFR BLD: 0 % (ref 0–1.9)
BILIRUB SERPL-MCNC: 1 MG/DL (ref 0.1–1)
BUN SERPL-MCNC: 23 MG/DL (ref 6–20)
CALCIUM SERPL-MCNC: 8.9 MG/DL (ref 8.7–10.5)
CHLORIDE SERPL-SCNC: 95 MMOL/L (ref 95–110)
CO2 SERPL-SCNC: 31 MMOL/L (ref 23–29)
CREAT SERPL-MCNC: 1 MG/DL (ref 0.5–1.4)
DIFFERENTIAL METHOD: ABNORMAL
EOSINOPHIL # BLD AUTO: ABNORMAL K/UL (ref 0–0.5)
EOSINOPHIL NFR BLD: 0 % (ref 0–8)
ERYTHROCYTE [DISTWIDTH] IN BLOOD BY AUTOMATED COUNT: 16.2 % (ref 11.5–14.5)
EST. GFR  (AFRICAN AMERICAN): >60 ML/MIN/1.73 M^2
EST. GFR  (NON AFRICAN AMERICAN): >60 ML/MIN/1.73 M^2
GLUCOSE SERPL-MCNC: 104 MG/DL (ref 70–110)
HCT VFR BLD AUTO: 31.5 % (ref 40–54)
HGB BLD-MCNC: 9 G/DL (ref 14–18)
HYPOCHROMIA BLD QL SMEAR: ABNORMAL
IMM GRANULOCYTES # BLD AUTO: ABNORMAL K/UL (ref 0–0.04)
IMM GRANULOCYTES NFR BLD AUTO: ABNORMAL % (ref 0–0.5)
LYMPHOCYTES # BLD AUTO: ABNORMAL K/UL (ref 1–4.8)
LYMPHOCYTES NFR BLD: 7 % (ref 18–48)
MAGNESIUM SERPL-MCNC: 2.3 MG/DL (ref 1.6–2.6)
MCH RBC QN AUTO: 22.1 PG (ref 27–31)
MCHC RBC AUTO-ENTMCNC: 28.6 G/DL (ref 32–36)
MCV RBC AUTO: 77 FL (ref 82–98)
MONOCYTES # BLD AUTO: ABNORMAL K/UL (ref 0.3–1)
MONOCYTES NFR BLD: 8 % (ref 4–15)
MYELOCYTES NFR BLD MANUAL: 1 %
NEUTROPHILS NFR BLD: 84 % (ref 38–73)
NRBC BLD-RTO: 1 /100 WBC
OVALOCYTES BLD QL SMEAR: ABNORMAL
PHOSPHATE SERPL-MCNC: 4.3 MG/DL (ref 2.7–4.5)
PLATELET # BLD AUTO: 381 K/UL (ref 150–350)
PLATELET BLD QL SMEAR: ABNORMAL
PMV BLD AUTO: 10.4 FL (ref 9.2–12.9)
POIKILOCYTOSIS BLD QL SMEAR: SLIGHT
POLYCHROMASIA BLD QL SMEAR: ABNORMAL
POTASSIUM SERPL-SCNC: 3.3 MMOL/L (ref 3.5–5.1)
PROT SERPL-MCNC: 7 G/DL (ref 6–8.4)
RBC # BLD AUTO: 4.08 M/UL (ref 4.6–6.2)
SODIUM SERPL-SCNC: 136 MMOL/L (ref 136–145)
WBC # BLD AUTO: 12.61 K/UL (ref 3.9–12.7)

## 2020-03-18 PROCEDURE — 25000003 PHARM REV CODE 250: Performed by: STUDENT IN AN ORGANIZED HEALTH CARE EDUCATION/TRAINING PROGRAM

## 2020-03-18 PROCEDURE — 85027 COMPLETE CBC AUTOMATED: CPT

## 2020-03-18 PROCEDURE — 90792 PSYCH DIAG EVAL W/MED SRVCS: CPT | Mod: AF,HB,S$PBB, | Performed by: PSYCHIATRY & NEUROLOGY

## 2020-03-18 PROCEDURE — 99233 PR SUBSEQUENT HOSPITAL CARE,LEVL III: ICD-10-PCS | Mod: ,,, | Performed by: INTERNAL MEDICINE

## 2020-03-18 PROCEDURE — 90792 PR PSYCHIATRIC DIAGNOSTIC EVALUATION W/MEDICAL SERVICES: ICD-10-PCS | Mod: AF,HB,S$PBB, | Performed by: PSYCHIATRY & NEUROLOGY

## 2020-03-18 PROCEDURE — 80053 COMPREHEN METABOLIC PANEL: CPT

## 2020-03-18 PROCEDURE — 83735 ASSAY OF MAGNESIUM: CPT

## 2020-03-18 PROCEDURE — 94660 CPAP INITIATION&MGMT: CPT

## 2020-03-18 PROCEDURE — 25000003 PHARM REV CODE 250: Performed by: GENERAL ACUTE CARE HOSPITAL

## 2020-03-18 PROCEDURE — 25000242 PHARM REV CODE 250 ALT 637 W/ HCPCS: Performed by: INTERNAL MEDICINE

## 2020-03-18 PROCEDURE — 25000003 PHARM REV CODE 250: Performed by: NURSE PRACTITIONER

## 2020-03-18 PROCEDURE — 27000221 HC OXYGEN, UP TO 24 HOURS

## 2020-03-18 PROCEDURE — 99233 SBSQ HOSP IP/OBS HIGH 50: CPT | Mod: ,,, | Performed by: INTERNAL MEDICINE

## 2020-03-18 PROCEDURE — 84100 ASSAY OF PHOSPHORUS: CPT

## 2020-03-18 PROCEDURE — 94761 N-INVAS EAR/PLS OXIMETRY MLT: CPT

## 2020-03-18 PROCEDURE — 99900035 HC TECH TIME PER 15 MIN (STAT)

## 2020-03-18 PROCEDURE — 25000003 PHARM REV CODE 250: Performed by: INTERNAL MEDICINE

## 2020-03-18 PROCEDURE — 20600001 HC STEP DOWN PRIVATE ROOM

## 2020-03-18 PROCEDURE — 85007 BL SMEAR W/DIFF WBC COUNT: CPT

## 2020-03-18 PROCEDURE — 94640 AIRWAY INHALATION TREATMENT: CPT

## 2020-03-18 RX ORDER — QUETIAPINE FUMARATE 100 MG/1
100 TABLET, FILM COATED ORAL EVERY 6 HOURS PRN
Status: DISCONTINUED | OUTPATIENT
Start: 2020-03-18 | End: 2020-03-20 | Stop reason: HOSPADM

## 2020-03-18 RX ORDER — QUETIAPINE FUMARATE 100 MG/1
200 TABLET, FILM COATED ORAL NIGHTLY
Status: DISCONTINUED | OUTPATIENT
Start: 2020-03-18 | End: 2020-03-18

## 2020-03-18 RX ORDER — QUETIAPINE FUMARATE 100 MG/1
400 TABLET, FILM COATED ORAL NIGHTLY
Status: DISCONTINUED | OUTPATIENT
Start: 2020-03-18 | End: 2020-03-20 | Stop reason: HOSPADM

## 2020-03-18 RX ORDER — POTASSIUM CHLORIDE 20 MEQ/1
40 TABLET, EXTENDED RELEASE ORAL ONCE
Status: COMPLETED | OUTPATIENT
Start: 2020-03-18 | End: 2020-03-18

## 2020-03-18 RX ADMIN — POTASSIUM CHLORIDE 40 MEQ: 1500 TABLET, EXTENDED RELEASE ORAL at 08:03

## 2020-03-18 RX ADMIN — QUETIAPINE FUMARATE 400 MG: 100 TABLET ORAL at 08:03

## 2020-03-18 RX ADMIN — MACITENTAN 10 MG: 10 TABLET, FILM COATED ORAL at 11:03

## 2020-03-18 RX ADMIN — RIOCIGUAT 2.5 MG: 2.5 TABLET, FILM COATED ORAL at 02:03

## 2020-03-18 RX ADMIN — IPRATROPIUM BROMIDE AND ALBUTEROL SULFATE 3 ML: .5; 3 SOLUTION RESPIRATORY (INHALATION) at 09:03

## 2020-03-18 RX ADMIN — IPRATROPIUM BROMIDE AND ALBUTEROL SULFATE 3 ML: .5; 3 SOLUTION RESPIRATORY (INHALATION) at 12:03

## 2020-03-18 RX ADMIN — Medication 5 ML: at 02:03

## 2020-03-18 RX ADMIN — IPRATROPIUM BROMIDE AND ALBUTEROL SULFATE 3 ML: .5; 3 SOLUTION RESPIRATORY (INHALATION) at 01:03

## 2020-03-18 RX ADMIN — RIOCIGUAT 2.5 MG: 2.5 TABLET, FILM COATED ORAL at 11:03

## 2020-03-18 RX ADMIN — ZIPRASIDONE HYDROCHLORIDE 40 MG: 40 CAPSULE ORAL at 11:03

## 2020-03-18 RX ADMIN — CITALOPRAM HYDROBROMIDE 40 MG: 40 TABLET ORAL at 08:03

## 2020-03-18 RX ADMIN — IPRATROPIUM BROMIDE AND ALBUTEROL SULFATE 3 ML: .5; 3 SOLUTION RESPIRATORY (INHALATION) at 07:03

## 2020-03-18 RX ADMIN — SPIRONOLACTONE 25 MG: 25 TABLET, FILM COATED ORAL at 08:03

## 2020-03-18 RX ADMIN — APIXABAN 5 MG: 5 TABLET, FILM COATED ORAL at 08:03

## 2020-03-18 RX ADMIN — RIOCIGUAT 2.5 MG: 2.5 TABLET, FILM COATED ORAL at 08:03

## 2020-03-18 RX ADMIN — POLYETHYLENE GLYCOL 3350 17 G: 17 POWDER, FOR SOLUTION ORAL at 08:03

## 2020-03-18 NOTE — PROGRESS NOTES
unable to speak with pt via phone in order to assess needs given pt's current orientation.  attempted to contact the pt's sister Torrie via phone at [phone] (cell), with no answer. Transplant  will follow.

## 2020-03-18 NOTE — SUBJECTIVE & OBJECTIVE
Patient History           Medical as of 3/18/2020     Past Medical History     Diagnosis Date Comments Source    Acute pulmonary embolism -- -- Provider    Anticoagulant long-term use -- -- Provider    Bipolar 1 disorder -- diagnosed at 16 Provider    Chronic respiratory failure with hypoxia -- -- Provider    Essential hypertension, malignant -- -- Provider    Heart attack -- -- Provider    Pulmonary artery hypertension -- -- Provider                  Surgical as of 3/18/2020     Past Surgical History     Procedure Laterality Date Comments Source    CARDIAC CATHETERIZATION -- -- -- Provider    TONSILLECTOMY, ADENOIDECTOMY -- -- -- Provider    MASS EXCISION -- -- inner thigh, lipoma Provider    CATARACT EXTRACTION -- -- -- Provider                  Family as of 3/18/2020    None           Tobacco Use as of 3/18/2020     Smoking Status Smoking Start Date Smoking Quit Date Packs/Day Years Used    Never Smoker -- -- -- --    Types Comments Smokeless Tobacco Status Smokeless Tobacco Quit Date Source    -- -- Never Used -- Provider            Alcohol Use as of 3/18/2020     Alcohol Use Drinks/Week Alcohol/Week Comments Source    No -- -- -- Provider    Frequency Standard Drinks Binge Drinking        -- -- --              Drug Use as of 3/18/2020     Drug Use Types Frequency Comments Source    No -- -- -- Provider            Sexual Activity as of 3/18/2020     Sexually Active Birth Control Partners Comments Source    -- -- -- -- Provider            Activities of Daily Living as of 3/18/2020    None           Social Documentation as of 3/18/2020    Never . No kids. He lives with his sister Torrie in [place], [place] in a one story house. He is disabled due to bipolar and cardiac history.     He does not drink alcohol. No cigarette or drug use. Not sexually active.     Full ADLs and IADLs.   Source: Provider           Occupational as of 3/18/2020    None           Socioeconomic as of 3/18/2020     Marital Status  Spouse Name Number of Children Years Education Education Level Preferred Language Ethnicity Race Source    Single -- -- -- -- English /White White --    Financial Resource Strain Food Insecurity: Worry Food Insecurity: Inability Transportation Needs: Medical Transportation Needs: Non-medical    -- -- -- -- --            Pertinent History     Question Response Comments    Lives with -- --    Place in Birth Order -- --    Lives in -- --    Number of Siblings -- --    Raised by -- --    Legal Involvement -- --    Childhood Trauma -- --    Criminal History of -- --    Financial Status -- --    Highest Level of Education -- --    Does patient have access to a firearm? -- --     Service -- --    Primary Leisure Activity -- --    Spirituality -- --        Past Medical History:   Diagnosis Date    Acute pulmonary embolism     Anticoagulant long-term use     Bipolar 1 disorder     diagnosed at 16    Chronic respiratory failure with hypoxia     Essential hypertension, malignant     Heart attack     Pulmonary artery hypertension      Past Surgical History:   Procedure Laterality Date    CARDIAC CATHETERIZATION      CATARACT EXTRACTION      MASS EXCISION      inner thigh, lipoma    TONSILLECTOMY, ADENOIDECTOMY       Family History     None        Tobacco Use    Smoking status: Never Smoker    Smokeless tobacco: Never Used   Substance and Sexual Activity    Alcohol use: No    Drug use: No    Sexual activity: Not on file     Review of patient's allergies indicates:   Allergen Reactions    Clopidogrel      Other reaction(s): Other (See Comments)  Pt reports it does not work        No current facility-administered medications on file prior to encounter.      Current Outpatient Medications on File Prior to Encounter   Medication Sig    apixaban 5 mg Tab Take 1 tablet (5 mg total) by mouth 2 (two) times daily.    aspirin (ECOTRIN) 81 MG EC tablet Take 1 tablet (81 mg total) by mouth once daily.     atorvastatin (LIPITOR) 40 MG tablet Take 1 tablet (40 mg total) by mouth once daily.    bumetanide (BUMEX) 2 MG tablet Take 1 tablet (2 mg total) by mouth 2 (two) times daily.    cetirizine (ZYRTEC) 10 MG tablet Take 10 mg by mouth once daily.     citalopram (CELEXA) 40 MG tablet Take 40 mg by mouth once daily.    fluticasone (FLONASE) 50 mcg/actuation nasal spray 1 spray by Each Nare route daily as needed for Rhinitis.    KLOR-CON M20 20 mEq tablet TAKE 1 TABLET BY MOUTH TWICE A DAY    macitentan (OPSUMIT) 10 mg Tab Take 10 mg by mouth once daily.    metoprolol tartrate (LOPRESSOR) 25 MG tablet Take 0.5 tablets (12.5 mg total) by mouth once daily.    omeprazole (PRILOSEC) 20 MG capsule Take 20 mg by mouth 2 (two) times daily.     QUEtiapine (SEROQUEL) 200 MG Tab Take 200 mg by mouth every evening.    riociguat (ADEMPAS) 2.5 mg tablet Take 2.5 mg by mouth 3 (three) times daily. Take 1 tablet, by mouth, three times daily.    spironolactone (ALDACTONE) 25 MG tablet Take 1 tablet (25 mg total) by mouth once daily.    UPTRAVI 1,600 mcg Tab TAKE 1 TABLET BY MOUTH TWICE DAILY AS DIRECTED BY MD. TAKE MEDICATION 12 HOURS APART. SWALLOW WHOLE.    VENTOLIN HFA 90 mcg/actuation inhaler TAKE 1 PUFF EVERY 6 HOURS AS NEEDED FOR WHEEZING/SOB    ziprasidone HCl (GEODON ORAL) Take by mouth.     Psychotherapeutics (From admission, onward)    Start     Stop Route Frequency Ordered    03/18/20 2100  QUEtiapine tablet 400 mg      -- Oral Nightly 03/18/20 1500    03/18/20 1559  QUEtiapine tablet 100 mg      -- Oral Every 6 hours PRN 03/18/20 1500        Review of Systems   Unable to perform ROS: Psychiatric disorder (perseverative, unable to answer full ROS)     Strengths and Liabilities: Strength: Patient accepts guidance/feedback, Strength: Patient has positive support network.    Objective:     Vital Signs (Most Recent):  Temp: 98.4 °F (36.9 °C) (03/18/20 1216)  Pulse: 96 (03/18/20 1221)  Resp: 16 (03/18/20 1221)  BP:  "118/77 (03/18/20 1215)  SpO2: (!) 90 % (03/18/20 1221) Vital Signs (24h Range):  Temp:  [98.4 °F (36.9 °C)-98.8 °F (37.1 °C)] 98.4 °F (36.9 °C)  Pulse:  [] 96  Resp:  [9-24] 16  SpO2:  [9 %-98 %] 90 %  BP: (104-124)/(63-77) 118/77     Height: 5' 8" (172.7 cm)  Weight: (!) 150 kg (330 lb 11 oz)  Body mass index is 50.28 kg/m².      Intake/Output Summary (Last 24 hours) at 3/18/2020 1509  Last data filed at 3/18/2020 1300  Gross per 24 hour   Intake 1740 ml   Output 1200 ml   Net 540 ml       Physical Exam   Psychiatric:   Mental Status Exam:  Appearance: NAD, lying in bed with NC O2 in place, obese  Behavior/Cooperation: cooperative, inconsistent eye contact  Speech: pressured but not rapid, normal rate. Monotonous. Normal volume  Language: English but at times spoke in non-sensical words/syllables  Mood: "manic"  Affect: blunted  Thought Process: perseverative, tangential  Associations: loose   Thought Content: denies SI/HI. +tactile hallucinations (see HPI).   Sensorium: alert, awake   Orientation: person, place, day of week, date, month, year, situation  Memory: recent and remote intact; recalls 2/3 objects  Attention/Concentration: impaired on interview, however he was able to pass [place] [employer] ([employer]) with no errors. Could not spell WORLD or GLOBE backwards  Fund of Knowledge: intact and appropriate to age and level of education, 4 of 4 recent presidents   Abstraction: concrete  Insight: fair  Judgement: fair to limited  Impulse Control: fair  Reliability: fair       Nursing note and vitals reviewed.       Significant Labs:   Last 24 Hours:   Recent Lab Results       03/18/20  0500        Albumin 2.7     Alkaline Phosphatase 89     ALT 40     Anion Gap 10     Aniso Slight     AST 34     Baso # CANCELED  Comment:  Result canceled by the ancillary.     Basophil% 0.0     BILIRUBIN TOTAL 1.0  Comment:  For infants and newborns, interpretation of results should be based  on gestational age, weight and in agreement with " clinical  observations.  Premature Infant recommended reference ranges:  Up to 24 hours.............<8.0 mg/dL  Up to 48 hours............<12.0 mg/dL  3-5 days..................<15.0 mg/dL  6-29 days.................<15.0 mg/dL       BUN, Bld 23     Calcium 8.9     Chloride 95     CO2 31     Creatinine 1.0     Differential Method Manual     eGFR if African American >60.0     eGFR if non  >60.0  Comment:  Calculation used to obtain the estimated glomerular filtration  rate (eGFR) is the CKD-EPI equation.        Eos # CANCELED  Comment:  Result canceled by the ancillary.     Eosinophil% 0.0     Glucose 104     Gran% 84.0     Hematocrit 31.5     Hemoglobin 9.0     Hypo Occasional     Immature Grans (Abs) CANCELED  Comment:  Mild elevation in immature granulocytes is non specific and   can be seen in a variety of conditions including stress response,   acute inflammation, trauma and pregnancy. Correlation with other   laboratory and clinical findings is essential.    Result canceled by the ancillary.       Immature Granulocytes CANCELED  Comment:  Result canceled by the ancillary.     Lymph # CANCELED  Comment:  Result canceled by the ancillary.     Lymph% 7.0     Magnesium 2.3     MCH 22.1     MCHC 28.6     MCV 77     Mono # CANCELED  Comment:  Result canceled by the ancillary.     Mono% 8.0     MPV 10.4     Myelocytes 1.0     nRBC 1     Ovalocytes Occasional     Phosphorus 4.3     Platelet Estimate Appears normal     Platelets 381     Poik Slight     Poly Occasional     Potassium 3.3     PROTEIN TOTAL 7.0     RBC 4.08     RDW 16.2     Sodium 136     WBC 12.61           Significant Imaging: I have reviewed all pertinent imaging results/findings within the past 24 hours.

## 2020-03-18 NOTE — ASSESSMENT & PLAN NOTE
48 yo M pmhx hx of CTEPH s/p PTE on 3/18/18 transferred to [facility] for higher level of care. Consulted regarding recommendations for pulm htn medication management. Echo done and PA pressure 45, RV and LV function look normal       - CVP 3 today, rgoal of ~10  - Continue Adempas at 2.5mg TID.   - Continue Macitentan 10mg po daily.   - Uptravi not available per inpatient pharmacy. Home dose was 1600mcg BID but has been off for more than 3 days. If able to be discharged within a few days, will plan on restarting as an outpatient at 200mcg TID.   - strict ins/outs

## 2020-03-18 NOTE — NURSING
Pt remains A&Ox4. VSS at this time. Map maintained > 65 per order. Remains on  4L NC and 10ppm of nitric oxide with sats > 88% per order. CVPs 14,10, 8. U/O adequate with lasix scheduled t.i.d. Pt continuing to tolerate extubation well. No gtts. Contact/droplet precautions remain in place. POC reviewed with pt. WCTM.      Skin note: Suprapubic skin tear COLBY. Sacral and heel foams in place. Heels elevated with open heel boot per wound care RN recommendation. Bed plugged in and waffle mattress inflated, Pt turned q2h, no new skin breakdown noted. WCTM.

## 2020-03-18 NOTE — TELEPHONE ENCOUNTER
----- Message from Viji Tinoco MA sent at 3/18/2020 12:45 PM CDT -----  Contact: patient call  The patient would like to talk to the nurse he is in the hospital please call [phone]. Thank you.

## 2020-03-18 NOTE — ASSESSMENT & PLAN NOTE
- Increase scheduled Seroquel to 400 mg QHS. This should help with multiple concerns including malissa, psychosis, and possible delirium  - Discontinue Geodon due to concerns for risk of QTc prolongation  - Discontinue Celexa due to risk of exacerbating malissa  - Start Seroquel 100 mg PO q6h PRN for non-redirectable agitation   - QTc 477 on 3/17. Please get daily EKGs to monitor while using antipsychotics

## 2020-03-18 NOTE — PROGRESS NOTES
Admit Note      was able to reach the pt's sister via phone in order to complete assessment. Worker is not able to speak to the pt at this time due to his current medical status.   Patient is a 49 y.o. single male, admitted due to acute hypoxemic respiratory failure, bipolar disorder, pulmonary hypertension and pneumonia.     Patient transferred to [facility] on 3/12/2020 .  When  attempted to speak to the pt via phone he was not able to answer any questions.    At this time, patients caregiver presents via phone  as alert and oriented x 4 and calm. Worker had a brief conversation with the pt's sister because she was at work.       Household/Family Systems (as reported by patients caregiver)     Patient resides with patient's sister, Torrie at    [address] [place] [address], [place] [address]      Pt's home address:    [address] [place] [address].        Support system includes pt's sister.     Patient does not have dependents that are need of being cared for.   The pt's sister reports the pt has never been  and does not have any children.   Pt's sister reports there is the possibility the pt has an adult child but it has not been confirmed.      Prior to illness the Patients primary caregiver was himself. The pt's sister is now assisting with some of the pt's needs.      Pt's cell: [phone]    Emergency contact  Torrie (sister, works full time as a nurse practitioner ) [phone]    Pt's sister indicated no other available family.     During admission, patient's caregiver plans to stay at home.  Confirmed patient and patients caregivers do have access to reliable transportation.    Cognitive Status/Learning     Patients caregiver reports prior to admission the patients reading ability as 12th grade and states patient does not have difficulty with reading, writing, seeing, hearing, comprehension, learning and memory. Pt's sister reports due to cataracts the pt has new lenses.     Patients caregiver reports patient learns best by one on one.     Needed: No.   Highest education level: High School (9-12) or GED    Vocation/Disability (as reported by patients caregiver)    Working for Income: No  If no, reason not working: Disability  Patient's sister reports he pt has been on disability since his early 20's due to Bipolar disorder.     Adherence     Patients caregiver reports patient has a medium-low level of adherence to patients health care regimen. Pt's sister reports she helps to manage the pt's doctor appointments and medications.  Pt's sister reports the pt will not follow a diet.     Adherence counseling and education provided.  Patient's caregiver verbalizes understanding.    Substance Use    Patients caregiver reports patients substance usage as the following:    Tobacco: none  Alcohol: none  Illicit Drugs/Non-prescribed Medications: none  Patients caregiver states clear understanding of the potential impact of substance use.  Substance abstinence/cessation counseling, education and resources provided and reviewed.     Services Utilizing/ADLS (as reported by patients caregiver)    Infusion Service: Prior to admission, patient utilizing? no  Home Health: Prior to admission, patient utilizing? no  DME: Prior to admission, yes, pt has a home 02 concentrator at 2LPM and a portable concentrator.    Pulmonary/Cardiac Rehab: Prior to admission, no  Dialysis:  Prior to admission, no  Transplant Specialty Pharmacy:  Prior to admission, no.    Prior to admission into transfer hospital , patients caregiver reports patient was independent with ADLS and was not driving.  Patients caregiver reports patient is not able to care for self at this time due to compromised medical condition (as documented in medical record) and physical weakness..  Patients caregiver reports patient indicates a willingness to care for self once medically cleared to do  so.    Insurance/Medications    Insured by   Payor/Plan Subscr  Sex Relation Sub. Ins. ID Effective Group Num   1. MEDICAID - [place]* JOLIE LOZANO W * 1970 Male  [id number]* [id number]                                    [address]      Primary Insurance (for UNOS reporting): Public Insurance - Medicaid  Secondary Insurance (for UNOS reporting): None    Patients caregiver reports patient is able to obtain and afford medications at this time and at time of discharge.    Living Will/Healthcare Power of     Patients caregiver reports patient does not have a LW and/or HCPA.   provided education regarding LW and HCPA and the completion of forms.    Coping/Mental Health (as reported by patients caregiver)    Worker is not able to assess pt's current coping/mental health needs.   The pt's sister reports the pt takes medication for bipolar disorder and does not attend any out pt counseling.     Pt's sister reports no needs at this time.     Discharge Planning (as reported by patients caregiver)    At time of discharge, patient plans to return to his sisters home under the care of himself and his sister.  Patients sister will transport patient.  Per rounds today, expected discharge date has not been medically determined at this time. Patients caretaker verbalizes understanding and is involved in treatment planning and discharge process.    Additional Concerns    Patient is being followed for needs, education, resources, information, emotional support, supportive counseling, and for supportive and skilled discharge plan of care.  providing ongoing psychosocial support, education, resources and d/c planning as needed.  SW remains available. Patient's caregiver verbalizes understanding and agreement with information reviewed,  availability and how to access available resources as needed.

## 2020-03-18 NOTE — TELEPHONE ENCOUNTER
"Patient called to let coordinator know that he was in the hospital, the events that led him here and that he needed to uptitrate his Uptravi. Patient was pleasant, oriented to self, time and place but presented with flight of ideas, switching from topic to topic and perseveration. He knew who he was speaking with on the phone. Some of his words were slurred but his speech was mostly clear. He recited his PH medication regimen and medication precautions verbatim. He was easily redirected but would quickly lose focus again.   Reassured patient. Advised him that PH coordinator has been in contact with his medical team and that we were working on obtaining his medication. Patient verbalized understanding.     PH Coordinator contact HTS service. Advised that this behavior is new to this nurse and our team, however patient had called prior to hospitalization about problems with his psych meds. He said that he thought he was "having major depressive episodes" which had resolved since stopping Tegretol (see note from 2/20)  "

## 2020-03-18 NOTE — PROGRESS NOTES
[facility]  Heart Transplant  Progress Note    Patient Name: Jaquan Hines Jr.  MRN: [id number]  Admission Date: 3/12/2020  Hospital Length of Stay: 6 days  Attending Physician: Alexi Velasco MD  Primary Care Provider: Allen Hubbard MD  Principal Problem:Acute hypoxemic respiratory failure    Subjective:     Interval History: Transferred to floor. Overnight having episodes of confusion, pressured speech. Today, he is alert and oriented but he is tangential and perseverates. On baseline O2 requirement. CVP 3 today.  RVP positive for Human Metapneumovirus. ECHO showed PA 44mmhg.     Continuous Infusions:    Scheduled Meds:   albuterol-ipratropium  3 mL Nebulization Q6H    apixaban  5 mg Oral BID    citalopram  40 mg Oral Daily    macitentan  10 mg Oral Daily    polyethylene glycol  17 g Oral Daily    QUEtiapine  200 mg Oral QHS    riociguat  2.5 mg Per OG tube TID    spironolactone  25 mg Oral Daily    ziprasidone  40 mg Oral Daily     PRN Meds:acetaminophen, calcium carbonate, labetaloL, magnesium sulfate IVPB, magnesium sulfate IVPB, melatonin, ondansetron, potassium chloride in water **AND** potassium chloride in water **AND** potassium chloride in water, senna-docusate 8.6-50 mg    Review of patient's allergies indicates:   Allergen Reactions    Clopidogrel      Other reaction(s): Other (See Comments)  Pt reports it does not work      Objective:     Vital Signs (Most Recent):  Temp: 98.4 °F (36.9 °C) (03/18/20 0800)  Pulse: 93 (03/18/20 0925)  Resp: 17 (03/18/20 0925)  BP: 119/73 (03/18/20 0800)  SpO2: 95 % (03/18/20 0925) Vital Signs (24h Range):  Temp:  [98.4 °F (36.9 °C)-98.8 °F (37.1 °C)] 98.4 °F (36.9 °C)  Pulse:  [] 93  Resp:  [9-20] 17  SpO2:  [9 %-98 %] 95 %  BP: (104-137)/(58-76) 119/73     Patient Vitals for the past 72 hrs (Last 3 readings):   Weight   03/18/20 0600 (!) 150 kg (330 lb 11 oz)   03/17/20 0519 (!) 155.4 kg (342 lb 9.5 oz)   03/16/20 0500 (!) 162.4 kg  (358 lb 0.4 oz)     Body mass index is 50.28 kg/m².      Intake/Output Summary (Last 24 hours) at 3/18/2020 1056  Last data filed at 3/18/2020 0900  Gross per 24 hour   Intake 1380 ml   Output 1050 ml   Net 330 ml       Hemodynamic Parameters:       Telemetry: Reviewed    Physical Exam   Constitutional: He is oriented to person, place, and time. He appears well-developed and well-nourished.   HENT:   Head: Normocephalic and atraumatic.   Eyes: Pupils are equal, round, and reactive to light. EOM are normal.   Neck: Normal range of motion. Neck supple. No JVD present.   IJ   Cardiovascular: Normal rate, regular rhythm, normal heart sounds and intact distal pulses. Exam reveals no gallop and no friction rub.   No murmur heard.  Pulmonary/Chest: Effort normal. No respiratory distress. He has no wheezes. He exhibits no tenderness.   Abdominal: Soft. Bowel sounds are normal. He exhibits no distension and no mass. There is no tenderness.   Musculoskeletal: Normal range of motion. He exhibits no edema.   Neurological: He is alert and oriented to person, place, and time.   Skin: Skin is warm and dry.   Psychiatric: He has a normal mood and affect.   Vitals reviewed.      Significant Labs:  CBC:  Recent Labs   Lab 03/16/20  0300 03/17/20  0305 03/18/20  0500   WBC 12.54 12.56 12.61   RBC 3.76* 4.01* 4.08*   HGB 8.1* 8.4* 9.0*   HCT 29.6* 31.1* 31.5*   * 421* 381*   MCV 79* 78* 77*   MCH 21.5* 20.9* 22.1*   MCHC 27.4* 27.0* 28.6*     BNP:  Recent Labs   Lab 03/12/20  1319   BNP 69     CMP:  Recent Labs   Lab 03/16/20  0300 03/17/20  0305 03/17/20  1218 03/18/20  0500    92  --  104   CALCIUM 8.8 8.7  --  8.9   ALBUMIN 2.4* 2.6*  --  2.7*   PROT 7.0 7.1  --  7.0    140  --  136   K 3.5 2.8* 3.3* 3.3*   CO2 36* 34*  --  31*   CL 94* 94*  --  95   BUN 26* 29*  --  23*   CREATININE 0.9 0.9  --  1.0   ALKPHOS 95 91  --  89   ALT 32 36  --  40   AST 30 37  --  34   BILITOT 0.7 0.9  --  1.0      Coagulation:   No  results for input(s): PT, INR, APTT in the last 168 hours.  LDH:  No results for input(s): LDH in the last 72 hours.  Microbiology:  Microbiology Results (last 7 days)     Procedure Component Value Units Date/Time    Blood culture [[id number]] Collected:  03/12/20 1140    Order Status:  Completed Specimen:  Blood from Peripheral, Forearm, Left Updated:  03/17/20 1412     Blood Culture, Routine No growth after 5 days.    Narrative:       Blood cultures from 2 different sites. 4 bottles total.  Please draw before starting antibiotics.    Blood culture [[id number]] Collected:  03/12/20 1140    Order Status:  Completed Specimen:  Blood from Peripheral, Forearm, Right Updated:  03/17/20 1412     Blood Culture, Routine No growth after 5 days.    Narrative:       Blood cultures x 2 different sites. 4 bottles total. Please  draw cultures before administering antibiotics.    Culture, Respiratory with Gram Stain [[id number]] Collected:  03/12/20 1153    Order Status:  Completed Specimen:  Respiratory from Endotracheal Aspirate Updated:  03/14/20 1045     Respiratory Culture No growth     Gram Stain (Respiratory) <10 epithelial cells per low power field.     Gram Stain (Respiratory) Moderate WBC's     Gram Stain (Respiratory) No organisms seen    Narrative:       Mini-BAL. Before antibiotics.    Respiratory Infection Panel (PCR), Nasopharyngeal [[id number]]  (Abnormal) Collected:  03/12/20 1446    Order Status:  Completed Specimen:  Nasopharyngeal Swab Updated:  03/13/20 1916     Respiratory Infection Panel Source NP Swab     Adenovirus Not Detected     Coronavirus 229E, Common Cold Virus Not Detected     Coronavirus HKU1, Common Cold Virus Not Detected     Coronavirus NL63, Common Cold Virus Not Detected     Coronavirus OC43, Common Cold Virus Not Detected     Comment: The Coronavirus strains detected in this test cause the common cold.  These strains are not the COVID-19 (novel Coronavirus)strain   associated with the  respiratory disease outbreak.          Human Metapneumovirus Detected     Human Rhinovirus/Enterovirus Not Detected     Influenza A (subtypes H1, H1-2009,H3) Not Detected     Influenza B Not Detected     Parainfluenza Virus 1 Not Detected     Parainfluenza Virus 2 Not Detected     Parainfluenza Virus 3 Not Detected     Parainfluenza Virus 4 Not Detected     Respiratory Syncytial Virus Not Detected     Bordetella Parapertussis ([id number]) Not Detected     Bordetella pertussis (ptxP) Not Detected     Chlamydia pneumoniae Not Detected     Mycoplasma pneumoniae Not Detected     Comment: Respiratory Infection Panel testing performed by Multiplex PCR.       Narrative:       For all other respiratory sources order [id number] Respiratory  Viral Panel by PCR (RVPCR)          I have reviewed all pertinent labs within the past 24 hours.    Estimated Creatinine Clearance: 124.8 mL/min (based on SCr of 1 mg/dL).    Diagnostic Results:  I have reviewed and interpreted all pertinent imaging results/findings within the past 24 hours.    Assessment and Plan:     48 yo man with HTN, morbid obesity with OHS and chronic hypoxic resp failure, HTN, referred by Dr Jennings for management of PH- pt underwent PTE on 3/13/18. Last seen by Dr. Hinojosa in clinic on 1/24/2020 and was doing well then on Uptravi, Adempas, and Macitentan without need for home oxygen. Per records, patient was diagnosed with pharyngitis on 2/27/2020 and treated with amoxicillin without improvement. Subsequently went to urgent care due to lack of improvement and was prescribed oxygen, bronchodilators. Presented to [facility] on 3/08/2020, treated for PNA and discharged on 3/10. Returned immediately due to lack of relief in symptoms and worsening dyspnea leading to intubation for acute hypoxic respiratory failure. Then transferred to [facility] for higher level of care. Based on records, does not appear to be any documentation that PH meds were continued  during this admission there. Supposedly negative for flu and negative blood cultures from there as well.     Since arrival he has remained intubated, afebrile but did have increased wbc. Full sepsis work-up underway and being treated for presumptive pneumonia. Of note, patient being tested for COVID-19 also.     TTE 3/13/2020   · Limited Echo.  · Normal left ventricular systolic function. The estimated ejection fraction is 65%.  · Indeterminate left ventricular diastolic function.  · No wall motion abnormalities.  · Normal right ventricular systolic function.  · The estimated PA systolic pressure is 49 mmHg. Pulmonary hypertension present.  · Elevated central venous pressure (15 mmHg).           * Acute hypoxemic respiratory failure  Transferred from OSH 2/2 respiratory failure with known history of CTEPH. Currently under sepsis work-up and being treated for presumed PNA.     Respiratory viral panel positive for Human Metapneumovirus.    Pulmonary hypertension  48 yo M pmhx hx of CTEPH s/p PTE on 3/18/18 transferred to [facility] for higher level of care. Consulted regarding recommendations for pulm htn medication management. Echo done and PA pressure 45, RV and LV function look normal       - CVP 3 today, rgoal of ~10  - Continue Adempas at 2.5mg TID.   - Continue Macitentan 10mg po daily.   - Uptravi not available per inpatient pharmacy. Home dose was 1600mcg BID but has been off for more than 3 days. If able to be discharged within a few days, will plan on restarting as an outpatient at 200mcg TID.   - strict ins/outs    Encephalopathy, metabolic  - may be secondary to Bipolar disorder  -Ordered CT head    CTEPH (chronic thromboembolic pulmonary hypertension)  S/p PTE 3/13/18    Bipolar disorder  - continue Seroquel, Geodon  - Consulted psychiatry      Pérez Dean MD  Heart Transplant  [facility]
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
## 2020-03-18 NOTE — PROGRESS NOTES
"Pt constantly keeps saying, "I want to be intubated and put back in ICU because the rapture is coming and im a unicorn." I have tried to calm him down and teach him that he is stable. Pt keeps taking off his bipap mask to suction because he thinks he will aspirate. He has not coughed up anything so far on this shift.   "
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
## 2020-03-18 NOTE — SUBJECTIVE & OBJECTIVE
Interval History: Transferred to floor. Overnight having episodes of confusion, pressured speech. Today, he is alert and oriented but he is tangential and perseverates. On baseline O2 requirement. CVP 3 today.  RVP positive for Human Metapneumovirus. ECHO showed PA 44mmhg.     Continuous Infusions:    Scheduled Meds:   albuterol-ipratropium  3 mL Nebulization Q6H    apixaban  5 mg Oral BID    citalopram  40 mg Oral Daily    macitentan  10 mg Oral Daily    polyethylene glycol  17 g Oral Daily    QUEtiapine  200 mg Oral QHS    riociguat  2.5 mg Per OG tube TID    spironolactone  25 mg Oral Daily    ziprasidone  40 mg Oral Daily     PRN Meds:acetaminophen, calcium carbonate, labetaloL, magnesium sulfate IVPB, magnesium sulfate IVPB, melatonin, ondansetron, potassium chloride in water **AND** potassium chloride in water **AND** potassium chloride in water, senna-docusate 8.6-50 mg    Review of patient's allergies indicates:   Allergen Reactions    Clopidogrel      Other reaction(s): Other (See Comments)  Pt reports it does not work      Objective:     Vital Signs (Most Recent):  Temp: 98.4 °F (36.9 °C) (03/18/20 0800)  Pulse: 93 (03/18/20 0925)  Resp: 17 (03/18/20 0925)  BP: 119/73 (03/18/20 0800)  SpO2: 95 % (03/18/20 0925) Vital Signs (24h Range):  Temp:  [98.4 °F (36.9 °C)-98.8 °F (37.1 °C)] 98.4 °F (36.9 °C)  Pulse:  [] 93  Resp:  [9-20] 17  SpO2:  [9 %-98 %] 95 %  BP: (104-137)/(58-76) 119/73     Patient Vitals for the past 72 hrs (Last 3 readings):   Weight   03/18/20 0600 (!) 150 kg (330 lb 11 oz)   03/17/20 0519 (!) 155.4 kg (342 lb 9.5 oz)   03/16/20 0500 (!) 162.4 kg (358 lb 0.4 oz)     Body mass index is 50.28 kg/m².      Intake/Output Summary (Last 24 hours) at 3/18/2020 1056  Last data filed at 3/18/2020 0900  Gross per 24 hour   Intake 1380 ml   Output 1050 ml   Net 330 ml       Hemodynamic Parameters:       Telemetry: Reviewed    Physical Exam   Constitutional: He is oriented to person,  place, and time. He appears well-developed and well-nourished.   HENT:   Head: Normocephalic and atraumatic.   Eyes: Pupils are equal, round, and reactive to light. EOM are normal.   Neck: Normal range of motion. Neck supple. No JVD present.   IJ   Cardiovascular: Normal rate, regular rhythm, normal heart sounds and intact distal pulses. Exam reveals no gallop and no friction rub.   No murmur heard.  Pulmonary/Chest: Effort normal. No respiratory distress. He has no wheezes. He exhibits no tenderness.   Abdominal: Soft. Bowel sounds are normal. He exhibits no distension and no mass. There is no tenderness.   Musculoskeletal: Normal range of motion. He exhibits no edema.   Neurological: He is alert and oriented to person, place, and time.   Skin: Skin is warm and dry.   Psychiatric: He has a normal mood and affect.   Vitals reviewed.      Significant Labs:  CBC:  Recent Labs   Lab 03/16/20  0300 03/17/20  0305 03/18/20  0500   WBC 12.54 12.56 12.61   RBC 3.76* 4.01* 4.08*   HGB 8.1* 8.4* 9.0*   HCT 29.6* 31.1* 31.5*   * 421* 381*   MCV 79* 78* 77*   MCH 21.5* 20.9* 22.1*   MCHC 27.4* 27.0* 28.6*     BNP:  Recent Labs   Lab 03/12/20  1319   BNP 69     CMP:  Recent Labs   Lab 03/16/20  0300 03/17/20  0305 03/17/20  1218 03/18/20  0500    92  --  104   CALCIUM 8.8 8.7  --  8.9   ALBUMIN 2.4* 2.6*  --  2.7*   PROT 7.0 7.1  --  7.0    140  --  136   K 3.5 2.8* 3.3* 3.3*   CO2 36* 34*  --  31*   CL 94* 94*  --  95   BUN 26* 29*  --  23*   CREATININE 0.9 0.9  --  1.0   ALKPHOS 95 91  --  89   ALT 32 36  --  40   AST 30 37  --  34   BILITOT 0.7 0.9  --  1.0      Coagulation:   No results for input(s): PT, INR, APTT in the last 168 hours.  LDH:  No results for input(s): LDH in the last 72 hours.  Microbiology:  Microbiology Results (last 7 days)     Procedure Component Value Units Date/Time    Blood culture [[id number]] Collected:  03/12/20 1140    Order Status:  Completed Specimen:  Blood from Peripheral,  Forearm, Left Updated:  03/17/20 1412     Blood Culture, Routine No growth after 5 days.    Narrative:       Blood cultures from 2 different sites. 4 bottles total.  Please draw before starting antibiotics.    Blood culture [[id number]] Collected:  03/12/20 1140    Order Status:  Completed Specimen:  Blood from Peripheral, Forearm, Right Updated:  03/17/20 1412     Blood Culture, Routine No growth after 5 days.    Narrative:       Blood cultures x 2 different sites. 4 bottles total. Please  draw cultures before administering antibiotics.    Culture, Respiratory with Gram Stain [[id number]] Collected:  03/12/20 1153    Order Status:  Completed Specimen:  Respiratory from Endotracheal Aspirate Updated:  03/14/20 1045     Respiratory Culture No growth     Gram Stain (Respiratory) <10 epithelial cells per low power field.     Gram Stain (Respiratory) Moderate WBC's     Gram Stain (Respiratory) No organisms seen    Narrative:       Mini-BAL. Before antibiotics.    Respiratory Infection Panel (PCR), Nasopharyngeal [[id number]]  (Abnormal) Collected:  03/12/20 1446    Order Status:  Completed Specimen:  Nasopharyngeal Swab Updated:  03/13/20 1916     Respiratory Infection Panel Source NP Swab     Adenovirus Not Detected     Coronavirus 229E, Common Cold Virus Not Detected     Coronavirus HKU1, Common Cold Virus Not Detected     Coronavirus NL63, Common Cold Virus Not Detected     Coronavirus OC43, Common Cold Virus Not Detected     Comment: The Coronavirus strains detected in this test cause the common cold.  These strains are not the COVID-19 (novel Coronavirus)strain   associated with the respiratory disease outbreak.          Human Metapneumovirus Detected     Human Rhinovirus/Enterovirus Not Detected     Influenza A (subtypes H1, H1-2009,H3) Not Detected     Influenza B Not Detected     Parainfluenza Virus 1 Not Detected     Parainfluenza Virus 2 Not Detected     Parainfluenza Virus 3 Not Detected     Parainfluenza Virus  4 Not Detected     Respiratory Syncytial Virus Not Detected     Bordetella Parapertussis (TF6879) Not Detected     Bordetella pertussis (ptxP) Not Detected     Chlamydia pneumoniae Not Detected     Mycoplasma pneumoniae Not Detected     Comment: Respiratory Infection Panel testing performed by Multiplex PCR.       Narrative:       For all other respiratory sources order [id number] Respiratory  Viral Panel by PCR (RVPCR)          I have reviewed all pertinent labs within the past 24 hours.    Estimated Creatinine Clearance: 124.8 mL/min (based on SCr of 1 mg/dL).    Diagnostic Results:  I have reviewed and interpreted all pertinent imaging results/findings within the past 24 hours.

## 2020-03-18 NOTE — NURSING
"Patient called to let coordinator know that he was in the hospital, the events that led him here and that he needed to uptitrate his Uptravi. Patient was pleasant, oriented to self, time and place but presented with flight of ideas, switching from topic to topic and perseveration. He knew who he was speaking with on the phone. Some of his words were slurred but his speech was mostly clear. He recited his PH medication regimen and medication precautions verbatim. He was easily redirected but would quickly lose focus again.   Reassured patient. Advised him that PH coordinator has been in contact with his medical team and that we were working on obtaining his medication. Patient verbalized understanding.      PH Coordinator contact HTS service. Advised that this behavior is new to this nurse and our team, however patient had called prior to hospitalization about problems with his psych meds. He said that he thought he was "having major depressive episodes" which had resolved since stopping Tegretol (see note from 2/20).  Reinforced need to speak with patient's sister for accurate information on patient's behavior and history.                 "

## 2020-03-18 NOTE — PROGRESS NOTES
1706: Pt extubated by RT with RN and MD present. Placed on 4L NC with 10ppm of nitric oxide continued, sats 94-95%. Pt able to answer orientation questions appropriately, A&Ox4. WCTM.

## 2020-03-18 NOTE — PLAN OF CARE
Pt has call bell in reach, non slip socks on, and bedrails up x2. Pt on visi and telemetry monitoring. He has bipap mask on at night and 5 liters nasal cannula during day. Pt encouraged to wash hands. Pt on contact precautions.

## 2020-03-19 PROBLEM — F30.2 BIPOLAR I DISORDER, SINGLE MANIC EPISODE, SEVERE, WITH PSYCHOSIS: Status: ACTIVE | Noted: 2017-07-19

## 2020-03-19 LAB
ALBUMIN SERPL BCP-MCNC: 2.4 G/DL (ref 3.5–5.2)
ALP SERPL-CCNC: 76 U/L (ref 55–135)
ALT SERPL W/O P-5'-P-CCNC: 36 U/L (ref 10–44)
ANION GAP SERPL CALC-SCNC: 11 MMOL/L (ref 8–16)
AST SERPL-CCNC: 25 U/L (ref 10–40)
BASOPHILS # BLD AUTO: 0.06 K/UL (ref 0–0.2)
BASOPHILS NFR BLD: 0.6 % (ref 0–1.9)
BILIRUB SERPL-MCNC: 0.8 MG/DL (ref 0.1–1)
BUN SERPL-MCNC: 17 MG/DL (ref 6–20)
CALCIUM SERPL-MCNC: 8.1 MG/DL (ref 8.7–10.5)
CHLORIDE SERPL-SCNC: 100 MMOL/L (ref 95–110)
CO2 SERPL-SCNC: 29 MMOL/L (ref 23–29)
CREAT SERPL-MCNC: 0.8 MG/DL (ref 0.5–1.4)
DIFFERENTIAL METHOD: ABNORMAL
EOSINOPHIL # BLD AUTO: 0.2 K/UL (ref 0–0.5)
EOSINOPHIL NFR BLD: 1.9 % (ref 0–8)
ERYTHROCYTE [DISTWIDTH] IN BLOOD BY AUTOMATED COUNT: 16.7 % (ref 11.5–14.5)
EST. GFR  (AFRICAN AMERICAN): >60 ML/MIN/1.73 M^2
EST. GFR  (NON AFRICAN AMERICAN): >60 ML/MIN/1.73 M^2
GLUCOSE SERPL-MCNC: 99 MG/DL (ref 70–110)
HCT VFR BLD AUTO: 30.5 % (ref 40–54)
HGB BLD-MCNC: 8.4 G/DL (ref 14–18)
IMM GRANULOCYTES # BLD AUTO: 0.39 K/UL (ref 0–0.04)
IMM GRANULOCYTES NFR BLD AUTO: 3.8 % (ref 0–0.5)
LYMPHOCYTES # BLD AUTO: 1.4 K/UL (ref 1–4.8)
LYMPHOCYTES NFR BLD: 13.2 % (ref 18–48)
MAGNESIUM SERPL-MCNC: 2.2 MG/DL (ref 1.6–2.6)
MCH RBC QN AUTO: 21.8 PG (ref 27–31)
MCHC RBC AUTO-ENTMCNC: 27.5 G/DL (ref 32–36)
MCV RBC AUTO: 79 FL (ref 82–98)
MONOCYTES # BLD AUTO: 1 K/UL (ref 0.3–1)
MONOCYTES NFR BLD: 9.6 % (ref 4–15)
NEUTROPHILS # BLD AUTO: 7.4 K/UL (ref 1.8–7.7)
NEUTROPHILS NFR BLD: 70.9 % (ref 38–73)
NRBC BLD-RTO: 0 /100 WBC
PHOSPHATE SERPL-MCNC: 4.5 MG/DL (ref 2.7–4.5)
PLATELET # BLD AUTO: 324 K/UL (ref 150–350)
PMV BLD AUTO: 10.9 FL (ref 9.2–12.9)
POTASSIUM SERPL-SCNC: 3 MMOL/L (ref 3.5–5.1)
PROT SERPL-MCNC: 6.2 G/DL (ref 6–8.4)
RBC # BLD AUTO: 3.85 M/UL (ref 4.6–6.2)
SODIUM SERPL-SCNC: 140 MMOL/L (ref 136–145)
WBC # BLD AUTO: 10.36 K/UL (ref 3.9–12.7)

## 2020-03-19 PROCEDURE — 84100 ASSAY OF PHOSPHORUS: CPT

## 2020-03-19 PROCEDURE — 27000190 HC CPAP FULL FACE MASK W/VALVE

## 2020-03-19 PROCEDURE — 99233 PR SUBSEQUENT HOSPITAL CARE,LEVL III: ICD-10-PCS | Mod: ,,, | Performed by: INTERNAL MEDICINE

## 2020-03-19 PROCEDURE — 93005 ELECTROCARDIOGRAM TRACING: CPT

## 2020-03-19 PROCEDURE — 80053 COMPREHEN METABOLIC PANEL: CPT

## 2020-03-19 PROCEDURE — 97530 THERAPEUTIC ACTIVITIES: CPT

## 2020-03-19 PROCEDURE — 25000003 PHARM REV CODE 250: Performed by: STUDENT IN AN ORGANIZED HEALTH CARE EDUCATION/TRAINING PROGRAM

## 2020-03-19 PROCEDURE — 99233 SBSQ HOSP IP/OBS HIGH 50: CPT | Mod: ,,, | Performed by: INTERNAL MEDICINE

## 2020-03-19 PROCEDURE — 20600001 HC STEP DOWN PRIVATE ROOM

## 2020-03-19 PROCEDURE — 83735 ASSAY OF MAGNESIUM: CPT

## 2020-03-19 PROCEDURE — 85025 COMPLETE CBC W/AUTO DIFF WBC: CPT

## 2020-03-19 PROCEDURE — 25000003 PHARM REV CODE 250: Performed by: INTERNAL MEDICINE

## 2020-03-19 PROCEDURE — 99900035 HC TECH TIME PER 15 MIN (STAT)

## 2020-03-19 PROCEDURE — 94761 N-INVAS EAR/PLS OXIMETRY MLT: CPT

## 2020-03-19 PROCEDURE — 93010 ELECTROCARDIOGRAM REPORT: CPT | Mod: ,,, | Performed by: INTERNAL MEDICINE

## 2020-03-19 PROCEDURE — 27000221 HC OXYGEN, UP TO 24 HOURS

## 2020-03-19 PROCEDURE — 94640 AIRWAY INHALATION TREATMENT: CPT

## 2020-03-19 PROCEDURE — 25000003 PHARM REV CODE 250: Performed by: GENERAL ACUTE CARE HOSPITAL

## 2020-03-19 PROCEDURE — 25000003 PHARM REV CODE 250: Performed by: NURSE PRACTITIONER

## 2020-03-19 PROCEDURE — 25000242 PHARM REV CODE 250 ALT 637 W/ HCPCS: Performed by: INTERNAL MEDICINE

## 2020-03-19 PROCEDURE — 97530 THERAPEUTIC ACTIVITIES: CPT | Mod: CQ

## 2020-03-19 PROCEDURE — 93010 EKG 12-LEAD: ICD-10-PCS | Mod: ,,, | Performed by: INTERNAL MEDICINE

## 2020-03-19 PROCEDURE — 97110 THERAPEUTIC EXERCISES: CPT | Mod: CQ

## 2020-03-19 PROCEDURE — 94660 CPAP INITIATION&MGMT: CPT

## 2020-03-19 PROCEDURE — 97116 GAIT TRAINING THERAPY: CPT | Mod: CQ

## 2020-03-19 RX ORDER — POTASSIUM CHLORIDE 20 MEQ/1
40 TABLET, EXTENDED RELEASE ORAL ONCE
Status: COMPLETED | OUTPATIENT
Start: 2020-03-19 | End: 2020-03-19

## 2020-03-19 RX ORDER — POTASSIUM CHLORIDE 20 MEQ/1
40 TABLET, EXTENDED RELEASE ORAL DAILY
Status: DISCONTINUED | OUTPATIENT
Start: 2020-03-19 | End: 2020-03-20 | Stop reason: HOSPADM

## 2020-03-19 RX ORDER — BUMETANIDE 1 MG/1
2 TABLET ORAL 2 TIMES DAILY
Status: DISCONTINUED | OUTPATIENT
Start: 2020-03-19 | End: 2020-03-20 | Stop reason: HOSPADM

## 2020-03-19 RX ADMIN — APIXABAN 5 MG: 5 TABLET, FILM COATED ORAL at 08:03

## 2020-03-19 RX ADMIN — IPRATROPIUM BROMIDE AND ALBUTEROL SULFATE 3 ML: .5; 3 SOLUTION RESPIRATORY (INHALATION) at 07:03

## 2020-03-19 RX ADMIN — POTASSIUM CHLORIDE 40 MEQ: 1500 TABLET, EXTENDED RELEASE ORAL at 04:03

## 2020-03-19 RX ADMIN — POTASSIUM CHLORIDE 40 MEQ: 1500 TABLET, EXTENDED RELEASE ORAL at 01:03

## 2020-03-19 RX ADMIN — RIOCIGUAT 2.5 MG: 2.5 TABLET, FILM COATED ORAL at 04:03

## 2020-03-19 RX ADMIN — IPRATROPIUM BROMIDE AND ALBUTEROL SULFATE 3 ML: .5; 3 SOLUTION RESPIRATORY (INHALATION) at 08:03

## 2020-03-19 RX ADMIN — MACITENTAN 10 MG: 10 TABLET, FILM COATED ORAL at 08:03

## 2020-03-19 RX ADMIN — RIOCIGUAT 2.5 MG: 2.5 TABLET, FILM COATED ORAL at 10:03

## 2020-03-19 RX ADMIN — IPRATROPIUM BROMIDE AND ALBUTEROL SULFATE 3 ML: .5; 3 SOLUTION RESPIRATORY (INHALATION) at 12:03

## 2020-03-19 RX ADMIN — QUETIAPINE FUMARATE 400 MG: 100 TABLET ORAL at 08:03

## 2020-03-19 RX ADMIN — SPIRONOLACTONE 25 MG: 25 TABLET, FILM COATED ORAL at 08:03

## 2020-03-19 RX ADMIN — POTASSIUM CHLORIDE 40 MEQ: 1500 TABLET, EXTENDED RELEASE ORAL at 08:03

## 2020-03-19 RX ADMIN — BUMETANIDE 2 MG: 1 TABLET ORAL at 08:03

## 2020-03-19 RX ADMIN — BUMETANIDE 2 MG: 1 TABLET ORAL at 11:03

## 2020-03-19 RX ADMIN — RIOCIGUAT 2.5 MG: 2.5 TABLET, FILM COATED ORAL at 08:03

## 2020-03-19 RX ADMIN — POLYETHYLENE GLYCOL 3350 17 G: 17 POWDER, FOR SOLUTION ORAL at 08:03

## 2020-03-19 NOTE — PT/OT/SLP PROGRESS
Physical Therapy Treatment    Patient Name:  Jaquan Hines Jr.   MRN:  [id number]    Recommendations:     Discharge Recommendations:  home health PT   Discharge Equipment Recommendations: (TBD)   Barriers to discharge: None    Assessment:     Jaquan Hines Jr. is a 49 y.o. male admitted with a medical diagnosis of Acute hypoxemic respiratory failure.  He presents with the following impairments/functional limitations:  weakness, impaired endurance, impaired functional mobilty, gait instability, impaired balance, decreased safety awareness, decreased coordination, impaired self care skills . Pt limited due to dizziness upon standing.   Pt would continue to benefit from skilled PT to address overall functional mobility and goals. Goals remain appropriate    Rehab Prognosis: Good; patient would benefit from acute skilled PT services to address these deficits and reach maximum level of function.    Recent Surgery: * No surgery found *      Plan:     During this hospitalization, patient to be seen 3 x/week to address the identified rehab impairments via gait training, therapeutic activities, therapeutic exercises and progress toward the following goals:    · Plan of Care Expires:  04/11/20    Subjective     Chief Complaint: dizziness  Patient/Family Comments/goals: I want to try to walk  Pain/Comfort:  · Pain Rating 1: 0/10  · Pain Rating Post-Intervention 1: 0/10      Objective:     Communicated with RN prior to session.  Patient found up in chair with oxygen, telemetry upon PT entry to room.     General Precautions: Standard, fall, contact   Orthopedic Precautions:N/A   Braces: N/A     Functional Mobility:  · Transfers:     · Sit to Stand:  minimum assistance with hand-held assist and rolling walker with vcs for hand placement  · Gait: pt amb 2 ft with B HHA with NATALIIA Adiel, discontinued  due to dizziness      AM-PAC 6 CLICK MOBILITY  Turning over in bed (including adjusting bedclothes, sheets and blankets)?: 2  Sitting  down on and standing up from a chair with arms (e.g., wheelchair, bedside commode, etc.): 3  Moving from lying on back to sitting on the side of the bed?: 2  Moving to and from a bed to a chair (including a wheelchair)?: 2  Need to walk in hospital room?: 2  Climbing 3-5 steps with a railing?: 1  Basic Mobility Total Score: 12       Therapeutic Activities and Exercises:   educated patient on progress, safety,d/c,PT POC, on the effects of prolonged immobility and the importance of performing OOB activity and exercises to promote healing and reduce recovery time   Patient performed therex  seated in bedside chair B LE AROM AP, LAQ, Hip Flexion, Hip Abd/Add with facilitation for correct performance and sequencing. Exercises performed to develop and maintain pt's strength, endurance and flexibility.   Updated white board with appropriate PT mobility information for medical team notification  Donned an extra gown  Bedside table in front of patient and area set up for function, convenience, and safety. RN aware of patient's mobility needs and status. Questions/concerns addressed within PTA scope of practice; patient with no further questions. Time was provided for active listening, discussion of health disposition, and discussion of safe discharge. Pt?verbalized?agreement .      Patient left up in chair with all lines intact, call button in reach and nsg notified..    GOALS:   Multidisciplinary Problems     Physical Therapy Goals        Problem: Physical Therapy Goal    Goal Priority Disciplines Outcome Goal Variances Interventions   Physical Therapy Goal     PT, PT/OT Ongoing, Progressing     Description:  Goals to be met by: 20    Patient will increase functional independence with mobility by performin. Supine to sit with Contact Guard Assistance -not met  2. Sit to stand transfer with Contact Guard Assistance -not met  3. Gait  x 100 feet with Contact Guard Assistance using AD if needed. -not met  4.  Ascend/descend 3 stair with right Handrails Contact Guard Assistance -not met                       Time Tracking:     PT Received On: 03/19/20  PT Start Time: 1308     PT Stop Time: 1347  PT Total Time (min): 39 min     Billable Minutes: Gait Training 10, Therapeutic Activity 15 and Therapeutic Exercise 14    Treatment Type: Treatment  PT/PTA: PTA     PTA Visit Number: 1     Mike Bullock, PTA  03/19/2020

## 2020-03-19 NOTE — PLAN OF CARE
VSS. Call light and personal items in reach. Pt had an EKG this morning & a CT. Pt on 5L N/C. CVP was 11. Pt worked w/ PT & OT today. No issues today. WCTM.

## 2020-03-19 NOTE — SUBJECTIVE & OBJECTIVE
Interval History: No acute events overnight. He slept well. Now on seroquel after psych recommendations. On baseline O2 requirement. CVP 11 today.  RVP positive for Human Metapneumovirus. ECHO showed PA 44mmhg.     Continuous Infusions:    Scheduled Meds:   albuterol-ipratropium  3 mL Nebulization Q6H    apixaban  5 mg Oral BID    bumetanide  2 mg Oral BID    macitentan  10 mg Oral Daily    polyethylene glycol  17 g Oral Daily    QUEtiapine  400 mg Oral QHS    riociguat  2.5 mg Per OG tube TID    selexipag  200 mcg Oral TID    spironolactone  25 mg Oral Daily     PRN Meds:(Magic mouthwash) 1:1:1 Benadryl 12.5mg/5ml liq, aluminum & magnesium hydroxide-simehticone (Maalox), lidocaine viscous 2%, acetaminophen, calcium carbonate, labetaloL, magnesium sulfate IVPB, magnesium sulfate IVPB, melatonin, ondansetron, potassium chloride in water **AND** potassium chloride in water **AND** potassium chloride in water, QUEtiapine, senna-docusate 8.6-50 mg    Review of patient's allergies indicates:   Allergen Reactions    Clopidogrel      Other reaction(s): Other (See Comments)  Pt reports it does not work      Objective:     Vital Signs (Most Recent):  Temp: 98.4 °F (36.9 °C) (03/18/20 2000)  Pulse: 86 (03/19/20 0747)  Resp: 13 (03/19/20 0747)  BP: (!) 98/58 (03/19/20 0400)  SpO2: (!) 94 % (03/19/20 0747) Vital Signs (24h Range):  Temp:  [98.4 °F (36.9 °C)-98.6 °F (37 °C)] 98.4 °F (36.9 °C)  Pulse:  [] 86  Resp:  [13-24] 13  SpO2:  [90 %-95 %] 94 %  BP: ()/(54-77) 98/58     Patient Vitals for the past 72 hrs (Last 3 readings):   Weight   03/19/20 0700 (!) 150 kg (330 lb 11 oz)   03/18/20 0600 (!) 150 kg (330 lb 11 oz)   03/17/20 0519 (!) 155.4 kg (342 lb 9.5 oz)     Body mass index is 50.28 kg/m².      Intake/Output Summary (Last 24 hours) at 3/19/2020 1109  Last data filed at 3/19/2020 0700  Gross per 24 hour   Intake 720 ml   Output --   Net 720 ml       Hemodynamic Parameters:       Telemetry:  Reviewed    Physical Exam   Constitutional: He is oriented to person, place, and time. He appears well-developed and well-nourished.   HENT:   Head: Normocephalic and atraumatic.   Eyes: Pupils are equal, round, and reactive to light. EOM are normal.   Neck: Normal range of motion. Neck supple. No JVD present.   IJ   Cardiovascular: Normal rate, regular rhythm, normal heart sounds and intact distal pulses. Exam reveals no gallop and no friction rub.   No murmur heard.  Pulmonary/Chest: Effort normal. No respiratory distress. He has no wheezes. He exhibits no tenderness.   Abdominal: Soft. Bowel sounds are normal. He exhibits no distension and no mass. There is no tenderness.   Musculoskeletal: Normal range of motion. He exhibits no edema.   Neurological: He is alert and oriented to person, place, and time.   Skin: Skin is warm and dry.   Psychiatric: He has a normal mood and affect.   Vitals reviewed.      Significant Labs:  CBC:  Recent Labs   Lab 03/17/20  0305 03/18/20  0500 03/19/20  0614   WBC 12.56 12.61 10.36   RBC 4.01* 4.08* 3.85*   HGB 8.4* 9.0* 8.4*   HCT 31.1* 31.5* 30.5*   * 381* 324   MCV 78* 77* 79*   MCH 20.9* 22.1* 21.8*   MCHC 27.0* 28.6* 27.5*     BNP:  Recent Labs   Lab 03/12/20  1319   BNP 69     CMP:  Recent Labs   Lab 03/17/20  0305 03/17/20  1218 03/18/20  0500 03/19/20  0614   GLU 92  --  104 99   CALCIUM 8.7  --  8.9 8.1*   ALBUMIN 2.6*  --  2.7* 2.4*   PROT 7.1  --  7.0 6.2     --  136 140   K 2.8* 3.3* 3.3* 3.0*   CO2 34*  --  31* 29   CL 94*  --  95 100   BUN 29*  --  23* 17   CREATININE 0.9  --  1.0 0.8   ALKPHOS 91  --  89 76   ALT 36  --  40 36   AST 37  --  34 25   BILITOT 0.9  --  1.0 0.8      Coagulation:   No results for input(s): PT, INR, APTT in the last 168 hours.  LDH:  No results for input(s): LDH in the last 72 hours.  Microbiology:  Microbiology Results (last 7 days)     Procedure Component Value Units Date/Time    Blood culture [[id number]] Collected:  03/12/20  1140    Order Status:  Completed Specimen:  Blood from Peripheral, Forearm, Left Updated:  03/17/20 1412     Blood Culture, Routine No growth after 5 days.    Narrative:       Blood cultures from 2 different sites. 4 bottles total.  Please draw before starting antibiotics.    Blood culture [[id number]] Collected:  03/12/20 1140    Order Status:  Completed Specimen:  Blood from Peripheral, Forearm, Right Updated:  03/17/20 1412     Blood Culture, Routine No growth after 5 days.    Narrative:       Blood cultures x 2 different sites. 4 bottles total. Please  draw cultures before administering antibiotics.    Culture, Respiratory with Gram Stain [[id number]] Collected:  03/12/20 1153    Order Status:  Completed Specimen:  Respiratory from Endotracheal Aspirate Updated:  03/14/20 1045     Respiratory Culture No growth     Gram Stain (Respiratory) <10 epithelial cells per low power field.     Gram Stain (Respiratory) Moderate WBC's     Gram Stain (Respiratory) No organisms seen    Narrative:       Mini-BAL. Before antibiotics.    Respiratory Infection Panel (PCR), Nasopharyngeal [[id number]]  (Abnormal) Collected:  03/12/20 1446    Order Status:  Completed Specimen:  Nasopharyngeal Swab Updated:  03/13/20 1916     Respiratory Infection Panel Source NP Swab     Adenovirus Not Detected     Coronavirus 229E, Common Cold Virus Not Detected     Coronavirus HKU1, Common Cold Virus Not Detected     Coronavirus NL63, Common Cold Virus Not Detected     Coronavirus OC43, Common Cold Virus Not Detected     Comment: The Coronavirus strains detected in this test cause the common cold.  These strains are not the COVID-19 (novel Coronavirus)strain   associated with the respiratory disease outbreak.          Human Metapneumovirus Detected     Human Rhinovirus/Enterovirus Not Detected     Influenza A (subtypes H1, H1-2009,H3) Not Detected     Influenza B Not Detected     Parainfluenza Virus 1 Not Detected     Parainfluenza Virus 2 Not  Detected     Parainfluenza Virus 3 Not Detected     Parainfluenza Virus 4 Not Detected     Respiratory Syncytial Virus Not Detected     Bordetella Parapertussis (PO1728) Not Detected     Bordetella pertussis (ptxP) Not Detected     Chlamydia pneumoniae Not Detected     Mycoplasma pneumoniae Not Detected     Comment: Respiratory Infection Panel testing performed by Multiplex PCR.       Narrative:       For all other respiratory sources order [id number] Respiratory  Viral Panel by PCR (RVPCR)          I have reviewed all pertinent labs within the past 24 hours.    Estimated Creatinine Clearance: 156.1 mL/min (based on SCr of 0.8 mg/dL).    Diagnostic Results:  I have reviewed and interpreted all pertinent imaging results/findings within the past 24 hours.

## 2020-03-19 NOTE — SUBJECTIVE & OBJECTIVE
"Interval History: see hospital course    Family History     None        Tobacco Use    Smoking status: Never Smoker    Smokeless tobacco: Never Used   Substance and Sexual Activity    Alcohol use: No    Drug use: No    Sexual activity: Not on file     Psychotherapeutics (From admission, onward)    Start     Stop Route Frequency Ordered    03/18/20 2100  QUEtiapine tablet 400 mg      -- Oral Nightly 03/18/20 1500    03/18/20 1559  QUEtiapine tablet 100 mg      -- Oral Every 6 hours PRN 03/18/20 1500           Review of Systems   Respiratory: Positive for cough.    Neurological: Negative for seizures.   Psychiatric/Behavioral: Negative for agitation, behavioral problems, confusion, decreased concentration, dysphoric mood, hallucinations, self-injury, sleep disturbance and suicidal ideas. The patient is not nervous/anxious and is not hyperactive.      Objective:     Vital Signs (Most Recent):  Temp: 98.4 °F (36.9 °C) (03/18/20 2000)  Pulse: 86 (03/19/20 0747)  Resp: 13 (03/19/20 0747)  BP: (!) 98/58 (03/19/20 0400)  SpO2: (!) 94 % (03/19/20 0747) Vital Signs (24h Range):  Temp:  [98.4 °F (36.9 °C)-98.6 °F (37 °C)] 98.4 °F (36.9 °C)  Pulse:  [] 86  Resp:  [13-24] 13  SpO2:  [90 %-95 %] 94 %  BP: ()/(54-77) 98/58     Height: 5' 8" (172.7 cm)  Weight: (!) 150 kg (330 lb 11 oz)  Body mass index is 50.28 kg/m².      Intake/Output Summary (Last 24 hours) at 3/19/2020 1021  Last data filed at 3/19/2020 0700  Gross per 24 hour   Intake 900 ml   Output 400 ml   Net 500 ml       Physical Exam   Psychiatric:   Mental Status Exam:  Appearance: NAD, appropriate hygiene, lying in bed eating breakfast  Behavior/Cooperation: calm, cooperative, appropriate eye contact  Speech: normal rate, tone, and volume  Language: English  Mood: "better"  Affect: reactive, appropriate  Thought Process: linear and goal-directed. Not perseverative today  Associations: no BLANCHE  Thought Content: denies SI/HI/AVH  Sensorium: alert, awake "   Orientation: person, place, day of week, date, month, year, situation  Memory: recent and remote intact  Attention/Concentration: intact, able to attend to interview. Spells GLOBE backwards  Fund of Knowledge: intact and appropriate to age and level of education   Abstraction: intact  Insight: good  Judgement: good       Nursing note and vitals reviewed.       Significant Labs:   Last 24 Hours:   Recent Lab Results       03/19/20  0614        Albumin 2.4     Alkaline Phosphatase 76     ALT 36     Anion Gap 11     AST 25     Baso # 0.06     Basophil% 0.6     BILIRUBIN TOTAL 0.8  Comment:  For infants and newborns, interpretation of results should be based  on gestational age, weight and in agreement with clinical  observations.  Premature Infant recommended reference ranges:  Up to 24 hours.............<8.0 mg/dL  Up to 48 hours............<12.0 mg/dL  3-5 days..................<15.0 mg/dL  6-29 days.................<15.0 mg/dL       BUN, Bld 17     Calcium 8.1     Chloride 100     CO2 29     Creatinine 0.8     Differential Method Automated     eGFR if African American >60.0     eGFR if non  >60.0  Comment:  Calculation used to obtain the estimated glomerular filtration  rate (eGFR) is the CKD-EPI equation.        Eos # 0.2     Eosinophil% 1.9     Glucose 99     Gran # (ANC) 7.4     Gran% 70.9     Hematocrit 30.5     Hemoglobin 8.4     Immature Grans (Abs) 0.39  Comment:  Mild elevation in immature granulocytes is non specific and   can be seen in a variety of conditions including stress response,   acute inflammation, trauma and pregnancy. Correlation with other   laboratory and clinical findings is essential.       Immature Granulocytes 3.8     Lymph # 1.4     Lymph% 13.2     Magnesium 2.2     MCH 21.8     MCHC 27.5     MCV 79     Mono # 1.0     Mono% 9.6     MPV 10.9     nRBC 0     Phosphorus 4.5     Platelets 324     Potassium 3.0     PROTEIN TOTAL 6.2     RBC 3.85     RDW 16.7     Sodium 140      WBC 10.36           Significant Imaging: I have reviewed all pertinent imaging results/findings within the past 24 hours.

## 2020-03-19 NOTE — PROGRESS NOTES
[facility]  Heart Transplant  Progress Note    Patient Name: Jaquan Hines Jr.  MRN: [id number]  Admission Date: 3/12/2020  Hospital Length of Stay: 7 days  Attending Physician: Alexi Velasco MD  Primary Care Provider: Allen Hubbard MD  Principal Problem:Acute hypoxemic respiratory failure    Subjective:     Interval History: No acute events overnight. He slept well. Now on seroquel after psych recommendations. On baseline O2 requirement. CVP 11 today.  RVP positive for Human Metapneumovirus. ECHO showed PA 44mmhg.     Continuous Infusions:    Scheduled Meds:   albuterol-ipratropium  3 mL Nebulization Q6H    apixaban  5 mg Oral BID    bumetanide  2 mg Oral BID    macitentan  10 mg Oral Daily    polyethylene glycol  17 g Oral Daily    QUEtiapine  400 mg Oral QHS    riociguat  2.5 mg Per OG tube TID    selexipag  200 mcg Oral TID    spironolactone  25 mg Oral Daily     PRN Meds:(Magic mouthwash) 1:1:1 Benadryl 12.5mg/5ml liq, aluminum & magnesium hydroxide-simehticone (Maalox), lidocaine viscous 2%, acetaminophen, calcium carbonate, labetaloL, magnesium sulfate IVPB, magnesium sulfate IVPB, melatonin, ondansetron, potassium chloride in water **AND** potassium chloride in water **AND** potassium chloride in water, QUEtiapine, senna-docusate 8.6-50 mg    Review of patient's allergies indicates:   Allergen Reactions    Clopidogrel      Other reaction(s): Other (See Comments)  Pt reports it does not work      Objective:     Vital Signs (Most Recent):  Temp: 98.4 °F (36.9 °C) (03/18/20 2000)  Pulse: 86 (03/19/20 0747)  Resp: 13 (03/19/20 0747)  BP: (!) 98/58 (03/19/20 0400)  SpO2: (!) 94 % (03/19/20 0747) Vital Signs (24h Range):  Temp:  [98.4 °F (36.9 °C)-98.6 °F (37 °C)] 98.4 °F (36.9 °C)  Pulse:  [] 86  Resp:  [13-24] 13  SpO2:  [90 %-95 %] 94 %  BP: ()/(54-77) 98/58     Patient Vitals for the past 72 hrs (Last 3 readings):   Weight   03/19/20 0700 (!) 150 kg (330 lb 11 oz)    03/18/20 0600 (!) 150 kg (330 lb 11 oz)   03/17/20 0519 (!) 155.4 kg (342 lb 9.5 oz)     Body mass index is 50.28 kg/m².      Intake/Output Summary (Last 24 hours) at 3/19/2020 1109  Last data filed at 3/19/2020 0700  Gross per 24 hour   Intake 720 ml   Output --   Net 720 ml       Hemodynamic Parameters:       Telemetry: Reviewed    Physical Exam   Constitutional: He is oriented to person, place, and time. He appears well-developed and well-nourished.   HENT:   Head: Normocephalic and atraumatic.   Eyes: Pupils are equal, round, and reactive to light. EOM are normal.   Neck: Normal range of motion. Neck supple. No JVD present.   IJ   Cardiovascular: Normal rate, regular rhythm, normal heart sounds and intact distal pulses. Exam reveals no gallop and no friction rub.   No murmur heard.  Pulmonary/Chest: Effort normal. No respiratory distress. He has no wheezes. He exhibits no tenderness.   Abdominal: Soft. Bowel sounds are normal. He exhibits no distension and no mass. There is no tenderness.   Musculoskeletal: Normal range of motion. He exhibits no edema.   Neurological: He is alert and oriented to person, place, and time.   Skin: Skin is warm and dry.   Psychiatric: He has a normal mood and affect.   Vitals reviewed.      Significant Labs:  CBC:  Recent Labs   Lab 03/17/20  0305 03/18/20  0500 03/19/20  0614   WBC 12.56 12.61 10.36   RBC 4.01* 4.08* 3.85*   HGB 8.4* 9.0* 8.4*   HCT 31.1* 31.5* 30.5*   * 381* 324   MCV 78* 77* 79*   MCH 20.9* 22.1* 21.8*   MCHC 27.0* 28.6* 27.5*     BNP:  Recent Labs   Lab 03/12/20  1319   BNP 69     CMP:  Recent Labs   Lab 03/17/20  0305 03/17/20  1218 03/18/20  0500 03/19/20  0614   GLU 92  --  104 99   CALCIUM 8.7  --  8.9 8.1*   ALBUMIN 2.6*  --  2.7* 2.4*   PROT 7.1  --  7.0 6.2     --  136 140   K 2.8* 3.3* 3.3* 3.0*   CO2 34*  --  31* 29   CL 94*  --  95 100   BUN 29*  --  23* 17   CREATININE 0.9  --  1.0 0.8   ALKPHOS 91  --  89 76   ALT 36  --  40 36   AST  37  --  34 25   BILITOT 0.9  --  1.0 0.8      Coagulation:   No results for input(s): PT, INR, APTT in the last 168 hours.  LDH:  No results for input(s): LDH in the last 72 hours.  Microbiology:  Microbiology Results (last 7 days)     Procedure Component Value Units Date/Time    Blood culture [[id number]] Collected:  03/12/20 1140    Order Status:  Completed Specimen:  Blood from Peripheral, Forearm, Left Updated:  03/17/20 1412     Blood Culture, Routine No growth after 5 days.    Narrative:       Blood cultures from 2 different sites. 4 bottles total.  Please draw before starting antibiotics.    Blood culture [[id number]] Collected:  03/12/20 1140    Order Status:  Completed Specimen:  Blood from Peripheral, Forearm, Right Updated:  03/17/20 1412     Blood Culture, Routine No growth after 5 days.    Narrative:       Blood cultures x 2 different sites. 4 bottles total. Please  draw cultures before administering antibiotics.    Culture, Respiratory with Gram Stain [[id number]] Collected:  03/12/20 1153    Order Status:  Completed Specimen:  Respiratory from Endotracheal Aspirate Updated:  03/14/20 1045     Respiratory Culture No growth     Gram Stain (Respiratory) <10 epithelial cells per low power field.     Gram Stain (Respiratory) Moderate WBC's     Gram Stain (Respiratory) No organisms seen    Narrative:       Mini-BAL. Before antibiotics.    Respiratory Infection Panel (PCR), Nasopharyngeal [[id number]]  (Abnormal) Collected:  03/12/20 1446    Order Status:  Completed Specimen:  Nasopharyngeal Swab Updated:  03/13/20 1916     Respiratory Infection Panel Source NP Swab     Adenovirus Not Detected     Coronavirus 229E, Common Cold Virus Not Detected     Coronavirus HKU1, Common Cold Virus Not Detected     Coronavirus NL63, Common Cold Virus Not Detected     Coronavirus OC43, Common Cold Virus Not Detected     Comment: The Coronavirus strains detected in this test cause the common cold.  These strains are not the  COVID-19 (novel Coronavirus)strain   associated with the respiratory disease outbreak.          Human Metapneumovirus Detected     Human Rhinovirus/Enterovirus Not Detected     Influenza A (subtypes H1, H1-2009,H3) Not Detected     Influenza B Not Detected     Parainfluenza Virus 1 Not Detected     Parainfluenza Virus 2 Not Detected     Parainfluenza Virus 3 Not Detected     Parainfluenza Virus 4 Not Detected     Respiratory Syncytial Virus Not Detected     Bordetella Parapertussis (IC9007) Not Detected     Bordetella pertussis (ptxP) Not Detected     Chlamydia pneumoniae Not Detected     Mycoplasma pneumoniae Not Detected     Comment: Respiratory Infection Panel testing performed by Multiplex PCR.       Narrative:       For all other respiratory sources order [id number] Respiratory  Viral Panel by PCR (RVPCR)          I have reviewed all pertinent labs within the past 24 hours.    Estimated Creatinine Clearance: 156.1 mL/min (based on SCr of 0.8 mg/dL).    Diagnostic Results:  I have reviewed and interpreted all pertinent imaging results/findings within the past 24 hours.    Assessment and Plan:     48 yo man with HTN, morbid obesity with OHS and chronic hypoxic resp failure, HTN, referred by Dr Jennings for management of PH- pt underwent PTE on 3/13/18. Last seen by Dr. Hinojosa in clinic on 1/24/2020 and was doing well then on Uptravi, Adempas, and Macitentan without need for home oxygen. Per records, patient was diagnosed with pharyngitis on 2/27/2020 and treated with amoxicillin without improvement. Subsequently went to urgent care due to lack of improvement and was prescribed oxygen, bronchodilators. Presented to [facility] on 3/08/2020, treated for PNA and discharged on 3/10. Returned immediately due to lack of relief in symptoms and worsening dyspnea leading to intubation for acute hypoxic respiratory failure. [place] [facility] [place]. Based on records, does not  appear to be any documentation that PH meds were continued during this admission there. Supposedly negative for flu and negative blood cultures from there as well.     Since arrival he has remained intubated, afebrile but did have increased wbc. Full sepsis work-up underway and being treated for presumptive pneumonia. Of note, patient being tested for COVID-19 also.     TTE 3/13/2020   · Limited Echo.  · Normal left ventricular systolic function. The estimated ejection fraction is 65%.  · Indeterminate left ventricular diastolic function.  · No wall motion abnormalities.  · Normal right ventricular systolic function.  · The estimated PA systolic pressure is 49 mmHg. Pulmonary hypertension present.  · Elevated central venous pressure (15 mmHg).           * Acute hypoxemic respiratory failure  Transferred from OSH 2/2 respiratory failure with known history of CTEPH. Currently under sepsis work-up and being treated for presumed PNA.     Respiratory viral panel positive for Human Metapneumovirus.    Pulmonary hypertension  50 yo M pmhx hx of CTEPH s/p PTE on 3/18/18 [place] [facility] [place]. Consulted regarding recommendations for pulm htn medication management. Echo done and PA pressure 45, RV and LV function look normal    - CVP 11 today, rgoal of ~10  - Continue Adempas at 2.5mg TID.   - Continue Macitentan 10mg po daily.   - Start Bumex 2mg PO BID  - Uptravi not available per inpatient pharmacy. Home dose was 1600mcg BID but has been off for more than 3 days. If able to be discharged within a few days, will plan on restarting as an outpatient at 200mcg TID.   - strict ins/outs    Encephalopathy, metabolic  - may be secondary to Bipolar disorder  -Ordered CT head    CTEPH (chronic thromboembolic pulmonary hypertension)  S/p PTE 3/13/18    Bipolar I disorder, single manic episode, severe, with psychosis  - continue Seroquel  - DC Geodon, celexa  - Consulted psychiatry      Pérez Dean MD  Heart  Transplant  [facility]

## 2020-03-19 NOTE — HOSPITAL COURSE
"03/19/2020   Pt reports that he is feeling much better today. States that he was finally able to sleep with increased dose of Seroquel. Slept probably 6 hours. Reports "the tactile and visual hallucinations have completely resolved". No longer concerned about the rapture. Worship delusions resolved. Fully oriented still. Remembers events from yesterday. Still mildly verbally hypersexual with staff but no inappropriate behaviors noted. Able to spell GLOBE backwards. Able to abstract today. No psych PRNs required overnight. Pt reports that he feels almost back to baseline.     03/20/2020  Pt sitting up in chair. States that he is going home today which he is very happy about. His sister will be coming to pick him up this evening. He reports that he slept well again last night with higher dose of Seroquel. No PRNs required. His head CT came back with no intracranial abnormalities. Denies side effects from Seroquel. Continuing to feel better today. No ongoing signs/symptoms of delirium, malissa, or psychosis. Still mildly tangential and intrusive at times, but easily redirectable. Not perseverative. No hallucinations or delusions. Denies feeling depressed. Discussed plan to go home on current dose of Seroquel monotherapy. May consider slowly adding back the Celexa as an outpatient if desired, however will need to closely monitor for re-emergence of malissa and stop and/or adjust dose of Seroquel accordingly. Pt feels comfortable with plan and stable from a psychiatric standpoint.   "

## 2020-03-19 NOTE — ASSESSMENT & PLAN NOTE
48 yo M pmhx hx of CTEPH s/p PTE on 3/18/18 [place] [facility] [place]. Consulted regarding recommendations for pulm htn medication management. Echo done and PA pressure 45, RV and LV function look normal    - CVP 11 today, rgoal of ~10  - Continue Adempas at 2.5mg TID.   - Continue Macitentan 10mg po daily.   - Start Bumex 2mg PO BID  - Uptravi not available per inpatient pharmacy. Home dose was 1600mcg BID but has been off for more than 3 days. If able to be discharged within a few days, will plan on restarting as an outpatient at 200mcg TID.   - strict ins/outs

## 2020-03-19 NOTE — PLAN OF CARE
Problem: Physical Therapy Goal  Goal: Physical Therapy Goal  Description  Goals to be met by: 20    Patient will increase functional independence with mobility by performin. Supine to sit with Contact Guard Assistance -not met  2. Sit to stand transfer with Contact Guard Assistance -not met  3. Gait  x 100 feet with Contact Guard Assistance using AD if needed. -not met  4. Ascend/descend 3 stair with right Handrails Contact Guard Assistance -not met      Outcome: Ongoing, Progressing    continue with PT POC.Goals remain appropriate.  Mike Bullock PTA

## 2020-03-19 NOTE — ASSESSMENT & PLAN NOTE
- Continue scheduled Seroquel 400 mg QHS. This should help with multiple concerns including malissa, psychosis, and possible delirium, which now seems resolved.  - Continue Seroquel 100 mg PO q6h PRN for non-redirectable agitation   - Discontinued Geodon and Celexa due to concerns for risk of QTc prolongation and exacerbation of malissa, respectively  - QTc 477 on 3/17. Results of repeat EKG pending today. Please continue to get daily EKGs to monitor while using antipsychotics

## 2020-03-19 NOTE — PT/OT/SLP PROGRESS
"Occupational Therapy   Treatment    Name: Jaquan Hines Jr.  MRN: [id number]  Admitting Diagnosis:  Acute hypoxemic respiratory failure       Recommendations:     Discharge Recommendations: home health OT  Discharge Equipment Recommendations:  (TBD)  Barriers to discharge:  None    Assessment:     Jaquan Hines Jr. is a 49 y.o. male with a medical diagnosis of Acute hypoxemic respiratory failure. Pt Min A to t/f from stretcher to chair. Attempted functional mobility but pt with c/o dizziness upon standing and needing to sit (BP = 106/66). Performance deficits affecting function are weakness, gait instability, impaired endurance, impaired balance, impaired self care skills, decreased coordination, decreased safety awareness, impaired functional mobilty.     Rehab Prognosis:  Good; patient would benefit from acute skilled OT services to address these deficits and reach maximum level of function.       Plan:     Patient to be seen 3 x/week to address the above listed problems via self-care/home management, therapeutic activities, therapeutic exercises  · Plan of Care Expires: 04/16/20  · Plan of Care Reviewed with: patient    Subjective     "My goal is to walk to the bathroom."    Pain/Comfort:  · Pain Rating 1: 0/10    Objective:     Communicated with: rn prior to session.  Patient found supine with   upon OT entry to room.    General Precautions: Standard, fall   Orthopedic Precautions:    Braces:       Occupational Performance:     Bed Mobility:    · Patient completed Rolling/Turning to Left with  minimum assistance  · Patient completed Scooting/Bridging with contact guard assistance  · Patient completed Supine to Sit with minimum assistance     Functional Mobility/Transfers:  · Patient completed Sit <> Stand Transfer with minimum assistance  with  hand-held assist   · Patient completed Bed <> Chair Transfer using Stand Pivot technique with minimum assistance with hand-held assist  · Functional Mobility: Deferred " due to dizziness.    Activities of Daily Living:  · Upper Body Dressing: minimum assistance    AMPA 6 Click ADL: 17    Treatment & Education:  Educated on hand placement for sit<>stand and RW management.  Encouraged OOB sitting and UE ROM.  Discussed OT POC.    Patient left up in chair with all lines intact and call button in reachEducation:      GOALS:   Multidisciplinary Problems     Occupational Therapy Goals        Problem: Occupational Therapy Goal    Goal Priority Disciplines Outcome Interventions   Occupational Therapy Goal     OT, PT/OT Ongoing, Progressing    Description:  Goals to be met by: 3/27/20     Patient will increase functional independence with ADLs by performing:    Feeding with [place].  UE Dressing with Supervision.  LE Dressing with Stand by Assistance.  Grooming while standing at sink with Stand-by Assistance.  Toileting from toilet with Stand-by Assistance for hygiene and clothing management.   Toilet transfer to toilet with Stand-by Assistance.                      Time Tracking:     OT Date of Treatment: 03/19/20  OT Start Time: 1259  OT Stop Time: 1340  OT Total Time (min): 41 min    Billable Minutes:Therapeutic Activity 31 (10 to PT)    SERGO Milligan  3/19/2020
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
## 2020-03-20 VITALS
OXYGEN SATURATION: 95 % | BODY MASS INDEX: 47.74 KG/M2 | DIASTOLIC BLOOD PRESSURE: 65 MMHG | HEIGHT: 68 IN | SYSTOLIC BLOOD PRESSURE: 118 MMHG | RESPIRATION RATE: 20 BRPM | TEMPERATURE: 99 F | HEART RATE: 100 BPM | WEIGHT: 315 LBS

## 2020-03-20 PROBLEM — G93.41 ENCEPHALOPATHY, METABOLIC: Status: RESOLVED | Noted: 2020-03-12 | Resolved: 2020-03-20

## 2020-03-20 PROBLEM — J96.01 ACUTE HYPOXEMIC RESPIRATORY FAILURE: Status: RESOLVED | Noted: 2020-03-12 | Resolved: 2020-03-20

## 2020-03-20 PROBLEM — J18.9 PNEUMONIA: Status: RESOLVED | Noted: 2020-03-12 | Resolved: 2020-03-20

## 2020-03-20 PROBLEM — R31.29 OTHER MICROSCOPIC HEMATURIA: Status: RESOLVED | Noted: 2020-03-13 | Resolved: 2020-03-20

## 2020-03-20 PROBLEM — R07.9 CHEST PAIN: Status: RESOLVED | Noted: 2019-03-27 | Resolved: 2020-03-20

## 2020-03-20 PROBLEM — R73.9 STRESS HYPERGLYCEMIA: Status: RESOLVED | Noted: 2018-03-14 | Resolved: 2020-03-20

## 2020-03-20 LAB
ALBUMIN SERPL BCP-MCNC: 2.5 G/DL (ref 3.5–5.2)
ALP SERPL-CCNC: 79 U/L (ref 55–135)
ALT SERPL W/O P-5'-P-CCNC: 35 U/L (ref 10–44)
ANION GAP SERPL CALC-SCNC: 8 MMOL/L (ref 8–16)
AST SERPL-CCNC: 21 U/L (ref 10–40)
BASOPHILS # BLD AUTO: 0.04 K/UL (ref 0–0.2)
BASOPHILS NFR BLD: 0.4 % (ref 0–1.9)
BILIRUB SERPL-MCNC: 0.7 MG/DL (ref 0.1–1)
BUN SERPL-MCNC: 16 MG/DL (ref 6–20)
CALCIUM SERPL-MCNC: 8.4 MG/DL (ref 8.7–10.5)
CHLORIDE SERPL-SCNC: 99 MMOL/L (ref 95–110)
CO2 SERPL-SCNC: 30 MMOL/L (ref 23–29)
CREAT SERPL-MCNC: 0.8 MG/DL (ref 0.5–1.4)
DIFFERENTIAL METHOD: ABNORMAL
EOSINOPHIL # BLD AUTO: 0.2 K/UL (ref 0–0.5)
EOSINOPHIL NFR BLD: 1.8 % (ref 0–8)
ERYTHROCYTE [DISTWIDTH] IN BLOOD BY AUTOMATED COUNT: 17.1 % (ref 11.5–14.5)
EST. GFR  (AFRICAN AMERICAN): >60 ML/MIN/1.73 M^2
EST. GFR  (NON AFRICAN AMERICAN): >60 ML/MIN/1.73 M^2
GLUCOSE SERPL-MCNC: 103 MG/DL (ref 70–110)
HCT VFR BLD AUTO: 29 % (ref 40–54)
HGB BLD-MCNC: 8.2 G/DL (ref 14–18)
IMM GRANULOCYTES # BLD AUTO: 0.21 K/UL (ref 0–0.04)
IMM GRANULOCYTES NFR BLD AUTO: 2.1 % (ref 0–0.5)
LYMPHOCYTES # BLD AUTO: 1.5 K/UL (ref 1–4.8)
LYMPHOCYTES NFR BLD: 15.6 % (ref 18–48)
MAGNESIUM SERPL-MCNC: 2.1 MG/DL (ref 1.6–2.6)
MCH RBC QN AUTO: 22.2 PG (ref 27–31)
MCHC RBC AUTO-ENTMCNC: 28.3 G/DL (ref 32–36)
MCV RBC AUTO: 79 FL (ref 82–98)
MONOCYTES # BLD AUTO: 0.8 K/UL (ref 0.3–1)
MONOCYTES NFR BLD: 7.8 % (ref 4–15)
NEUTROPHILS # BLD AUTO: 7.1 K/UL (ref 1.8–7.7)
NEUTROPHILS NFR BLD: 72.3 % (ref 38–73)
NRBC BLD-RTO: 0 /100 WBC
PHOSPHATE SERPL-MCNC: 4.6 MG/DL (ref 2.7–4.5)
PLATELET # BLD AUTO: 287 K/UL (ref 150–350)
PMV BLD AUTO: 10.7 FL (ref 9.2–12.9)
POTASSIUM SERPL-SCNC: 3.1 MMOL/L (ref 3.5–5.1)
PROT SERPL-MCNC: 6.2 G/DL (ref 6–8.4)
RBC # BLD AUTO: 3.69 M/UL (ref 4.6–6.2)
SODIUM SERPL-SCNC: 137 MMOL/L (ref 136–145)
WBC # BLD AUTO: 9.86 K/UL (ref 3.9–12.7)

## 2020-03-20 PROCEDURE — 25000003 PHARM REV CODE 250: Performed by: STUDENT IN AN ORGANIZED HEALTH CARE EDUCATION/TRAINING PROGRAM

## 2020-03-20 PROCEDURE — 25000003 PHARM REV CODE 250: Performed by: INTERNAL MEDICINE

## 2020-03-20 PROCEDURE — 97530 THERAPEUTIC ACTIVITIES: CPT

## 2020-03-20 PROCEDURE — 27000221 HC OXYGEN, UP TO 24 HOURS

## 2020-03-20 PROCEDURE — 80053 COMPREHEN METABOLIC PANEL: CPT

## 2020-03-20 PROCEDURE — 25000242 PHARM REV CODE 250 ALT 637 W/ HCPCS: Performed by: INTERNAL MEDICINE

## 2020-03-20 PROCEDURE — 99900035 HC TECH TIME PER 15 MIN (STAT)

## 2020-03-20 PROCEDURE — 94761 N-INVAS EAR/PLS OXIMETRY MLT: CPT

## 2020-03-20 PROCEDURE — 25000003 PHARM REV CODE 250: Performed by: GENERAL ACUTE CARE HOSPITAL

## 2020-03-20 PROCEDURE — 83735 ASSAY OF MAGNESIUM: CPT

## 2020-03-20 PROCEDURE — 94640 AIRWAY INHALATION TREATMENT: CPT

## 2020-03-20 PROCEDURE — 99238 HOSP IP/OBS DSCHRG MGMT 30/<: CPT | Mod: ,,, | Performed by: INTERNAL MEDICINE

## 2020-03-20 PROCEDURE — 85025 COMPLETE CBC W/AUTO DIFF WBC: CPT

## 2020-03-20 PROCEDURE — 97530 THERAPEUTIC ACTIVITIES: CPT | Mod: CQ

## 2020-03-20 PROCEDURE — 99232 SBSQ HOSP IP/OBS MODERATE 35: CPT | Mod: AF,HB,, | Performed by: PSYCHIATRY & NEUROLOGY

## 2020-03-20 PROCEDURE — 25000003 PHARM REV CODE 250: Performed by: NURSE PRACTITIONER

## 2020-03-20 PROCEDURE — 99232 PR SUBSEQUENT HOSPITAL CARE,LEVL II: ICD-10-PCS | Mod: AF,HB,, | Performed by: PSYCHIATRY & NEUROLOGY

## 2020-03-20 PROCEDURE — 97110 THERAPEUTIC EXERCISES: CPT | Mod: CQ

## 2020-03-20 PROCEDURE — 99238 PR HOSPITAL DISCHARGE DAY,<30 MIN: ICD-10-PCS | Mod: ,,, | Performed by: INTERNAL MEDICINE

## 2020-03-20 PROCEDURE — 97116 GAIT TRAINING THERAPY: CPT | Mod: CQ

## 2020-03-20 PROCEDURE — 84100 ASSAY OF PHOSPHORUS: CPT

## 2020-03-20 RX ORDER — QUETIAPINE FUMARATE 400 MG/1
400 TABLET, FILM COATED ORAL NIGHTLY
Qty: 30 TABLET | Refills: 11 | Status: ON HOLD | OUTPATIENT
Start: 2020-03-20 | End: 2022-05-07

## 2020-03-20 RX ORDER — POTASSIUM CHLORIDE 20 MEQ/1
40 TABLET, EXTENDED RELEASE ORAL ONCE
Status: COMPLETED | OUTPATIENT
Start: 2020-03-20 | End: 2020-03-20

## 2020-03-20 RX ADMIN — IPRATROPIUM BROMIDE AND ALBUTEROL SULFATE 3 ML: .5; 3 SOLUTION RESPIRATORY (INHALATION) at 01:03

## 2020-03-20 RX ADMIN — POTASSIUM CHLORIDE 40 MEQ: 1500 TABLET, EXTENDED RELEASE ORAL at 12:03

## 2020-03-20 RX ADMIN — APIXABAN 5 MG: 5 TABLET, FILM COATED ORAL at 10:03

## 2020-03-20 RX ADMIN — BUMETANIDE 2 MG: 1 TABLET ORAL at 10:03

## 2020-03-20 RX ADMIN — IPRATROPIUM BROMIDE AND ALBUTEROL SULFATE 3 ML: .5; 3 SOLUTION RESPIRATORY (INHALATION) at 02:03

## 2020-03-20 RX ADMIN — IPRATROPIUM BROMIDE AND ALBUTEROL SULFATE 3 ML: .5; 3 SOLUTION RESPIRATORY (INHALATION) at 07:03

## 2020-03-20 RX ADMIN — POTASSIUM CHLORIDE 40 MEQ: 1500 TABLET, EXTENDED RELEASE ORAL at 02:03

## 2020-03-20 RX ADMIN — POTASSIUM CHLORIDE 40 MEQ: 1500 TABLET, EXTENDED RELEASE ORAL at 10:03

## 2020-03-20 RX ADMIN — POLYETHYLENE GLYCOL 3350 17 G: 17 POWDER, FOR SOLUTION ORAL at 10:03

## 2020-03-20 RX ADMIN — SPIRONOLACTONE 25 MG: 25 TABLET, FILM COATED ORAL at 10:03

## 2020-03-20 RX ADMIN — RIOCIGUAT 2.5 MG: 2.5 TABLET, FILM COATED ORAL at 05:03

## 2020-03-20 RX ADMIN — MACITENTAN 10 MG: 10 TABLET, FILM COATED ORAL at 10:03

## 2020-03-20 RX ADMIN — RIOCIGUAT 2.5 MG: 2.5 TABLET, FILM COATED ORAL at 10:03
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
## 2020-03-20 NOTE — PT/OT/SLP PROGRESS
Occupational Therapy   Treatment    Name: Jaquan Hines Jr.  MRN: [id number]  Admitting Diagnosis:  Acute hypoxemic respiratory failure       Recommendations:     Discharge Recommendations: home health OT  Discharge Equipment Recommendations:  (Bariatric RW)  Barriers to discharge:  None    Assessment:     Jaquan Hines Jr. is a 49 y.o. male with a medical diagnosis of Acute hypoxemic respiratory failure. Pt reports he is going home today and would like a RW. Pt only wanting to t/f from chair>sofa so assisted with SBA and chair left near pt. Performance deficits affecting function are gait instability, weakness, impaired endurance, impaired balance, decreased safety awareness, impaired functional mobilty, decreased coordination, impaired self care skills.     Rehab Prognosis:  Good; patient would benefit from acute skilled OT services to address these deficits and reach maximum level of function.       Plan:     Patient to be seen 3 x/week to address the above listed problems via self-care/home management, therapeutic activities, therapeutic exercises  · Plan of Care Expires: 04/16/20  · Plan of Care Reviewed with: patient    Subjective     Pain/Comfort:  · Pain Rating 1: 0/10    Objective:     Communicated with: rn prior to session.  Patient found up in chair with oxygen upon OT entry to room.    General Precautions: Standard, fall   Orthopedic Precautions:    Braces:       Occupational Performance:     Bed Mobility:    · Up in chair.    Functional Mobility/Transfers:  · Patient completed Sit <> Stand Transfer with stand by assistance  with  no assistive device   · Patient completed Chair <> Sofa Transfer using Step Transfer technique with stand by assistance with no assistive device    [facility] 6 Click ADL: 17    Treatment & Education:  Discussed any post-D/C needs with pt.   Educated on calling staff for assistance with mobility.    Patient left up in chair with all lines intact and nurse  notifiedEducation:      GOALS:   Multidisciplinary Problems     Occupational Therapy Goals        Problem: Occupational Therapy Goal    Goal Priority Disciplines Outcome Interventions   Occupational Therapy Goal     OT, PT/OT Ongoing, Progressing    Description:  Goals to be met by: 3/27/20     Patient will increase functional independence with ADLs by performing:    Feeding with [place].  UE Dressing with Supervision.  LE Dressing with Stand by Assistance.  Grooming while standing at sink with Stand-by Assistance.  Toileting from toilet with Stand-by Assistance for hygiene and clothing management.   Toilet transfer to toilet with Stand-by Assistance.                      Time Tracking:     OT Date of Treatment: 03/20/20  OT Start Time: 1134  OT Stop Time: 1150  OT Total Time (min): 16 min    Billable Minutes:Therapeutic Activity 16    SERGO Milligan  3/20/2020

## 2020-03-20 NOTE — ASSESSMENT & PLAN NOTE
- Continue scheduled Seroquel 400 mg QHS  - Discontinued Geodon and Celexa due to concerns for risk of QTc prolongation and exacerbation of malissa, respectively  - Counseled pt that he may consider adding back low dose Celexa in the future to treat/prevent depressive episode, however he will need to closely monitor for re-emergence of malissa. Recommended that he establish care with a psychiatrist rather than using his sister as his PCP/psychiatrist  - QTc 444 on 3/19

## 2020-03-20 NOTE — PT/OT/SLP PROGRESS
Physical Therapy Treatment    Patient Name:  Jaquan Hines Jr.   MRN:  [id number]    Recommendations:     Discharge Recommendations:  home health PT   Discharge Equipment Recommendations: (TBD)   Barriers to discharge: None    Assessment:     Jaquan Hines Jr. is a 49 y.o. male admitted with a medical diagnosis of Acute hypoxemic respiratory failure.  He presents with the following impairments/functional limitations:  weakness, impaired endurance, impaired self care skills, gait instability, impaired balance, impaired functional mobilty, decreased coordination   Pt Progressing with PT Intervention. Pt Progressing with improving gait distance. Pt would continue to benefit from skilled PT to address overall functional mobility and goals. Goals remain appropria    Rehab Prognosis: Good; patient would benefit from acute skilled PT services to address these deficits and reach maximum level of function.    Recent Surgery: * No surgery found *      Plan:     During this hospitalization, patient to be seen 3 x/week to address the identified rehab impairments via gait training, therapeutic activities, therapeutic exercises and progress toward the following goals:    · Plan of Care Expires:  04/11/20    Subjective     Chief Complaint: fatigue  Patient/Family Comments/goals: I need to walk to the bathroom can you help me?  Pain/Comfort:  · Pain Rating 1: 0/10  · Pain Rating Post-Intervention 1: 0/10      Objective:     Communicated with RN prior to session.  Patient found supine with oxygen, telemetry upon PT entry to room.     General Precautions: Standard, fall, contact   Orthopedic Precautions:N/A   Braces: N/A     Functional Mobility:  · Bed Mobility:     · Rolling Left:  minimum assistance  · Scooting: stand by assistance  · Supine to Sit: minimum assistance  · Transfers:     · Sit to Stand:  contact guard assistance with rolling walker and from toilet with RW with CGA vcs for hand placement  · Gait: pt amb with RW 12  ft x 2  bed > toilet to chair with CGA with O2 in tow at 6L      AM-PAC 6 CLICK MOBILITY  Turning over in bed (including adjusting bedclothes, sheets and blankets)?: 3  Sitting down on and standing up from a chair with arms (e.g., wheelchair, bedside commode, etc.): 3  Moving from lying on back to sitting on the side of the bed?: 2  Moving to and from a bed to a chair (including a wheelchair)?: 3  Need to walk in hospital room?: 3  Climbing 3-5 steps with a railing?: 2  Basic Mobility Total Score: 16       Therapeutic Activities and Exercises:   educated patient on progress, safety,d/c,PT POC, on the effects of prolonged immobility and the importance of performing OOB activity and exercises to promote healing and reduce recovery time   Patient performed therex  seated in bedside chair B LE AROM AP, LAQ, Hip Flexion, Hip Abd/Add with facilitation for correct performance and sequencing. Exercises performed to develop and maintain pt's strength, endurance and flexibility.   Pt issued and instructed in supine and seated HEP   Updated white board with appropriate PT mobility information for medical team notification  Donned an extra gown  Bedside table in front of patient and area set up for function, convenience, and safety. RN aware of patient's mobility needs and status. Questions/concerns addressed within PTA scope of practice; patient with no further questions. Time was provided for active listening, discussion of health disposition, and discussion of safe discharge. Pt?verbalized?agreement .        Patient left up in chair with all lines intact, call button in reach and nsg notified..     GOALS:   Multidisciplinary Problems     Physical Therapy Goals        Problem: Physical Therapy Goal    Goal Priority Disciplines Outcome Goal Variances Interventions   Physical Therapy Goal     PT, PT/OT Ongoing, Progressing     Description:  Goals to be met by: 4/7/20    Patient will increase functional independence with  mobility by performin. Supine to sit with Contact Guard Assistance -not met  2. Sit to stand transfer with Contact Guard Assistance -not met  3. Gait  x 100 feet with Contact Guard Assistance using AD if needed. -not met  4. Ascend/descend 3 stair with right Handrails Contact Guard Assistance -not met                       Time Tracking:     PT Received On: 20  PT Start Time: 841     PT Stop Time: 936  PT Total Time (min): 55 min     Billable Minutes: Gait Training 15, Therapeutic Activity 25 and Therapeutic Exercise 15    Treatment Type: Treatment  PT/PTA: PTA     PTA Visit Number: 2     Mike Bullock, PTA  2020

## 2020-03-20 NOTE — NURSING
Pt reported to this RN that pt's sister is to pick him up today and she gets off of work at 4 pm.  She also lives 3 hours away.  Pt anticipates her arrival between 7-8 pm.  RN notified charge SEAN Anderson.  RN will continue to monitor.

## 2020-03-20 NOTE — PLAN OF CARE
[facility]   Heart Transplant/PHTN Clinic   [address] [place] [address]   [phone] after hours [phone] fax   HOME HEALTH ORDERS     Admit to Home Health   Diagnosis:  Patient Active Problem List   Diagnosis    Anxiety    Benign essential hypertension    Bipolar I disorder, single manic episode, severe, with psychosis    Obesity hypoventilation syndrome    Hyperlipidemia    CAD (coronary artery disease), native coronary artery    Presence of stent in coronary artery    Glaucoma    Chronic respiratory failure with hypoxia    GOMEZ (dyspnea on exertion)    CTEPH (chronic thromboembolic pulmonary hypertension)    Stress hyperglycemia    Severe obesity (BMI >= 40)    Pulmonary embolus    Chronic pulmonary heart disease    Chronic diastolic heart failure    Pulmonary artery hypertension    Chest pain    Pulmonary embolism without acute cor pulmonale    Acute hypoxemic respiratory failure    Pneumonia    Encephalopathy, metabolic    Pulmonary hypertension    Other microscopic hematuria       Patient is homebound due to: Respiratory failure/PHTN    Diet:Low Sodium    Acitivities: As Tolerated    Nursing:   SN to complete comprehensive assessment including routine vital signs. Instruct on disease process and s/s of complications to report to MD. Review/verify medication list sent home with the patient at time of discharge and instruct patient/caregiver as needed. Frequency may be adjusted depending on start of care date.     Notify MD if SBP > 160 or < 90; DBP > 90 or < 50; HR > 120 or < 50; Temp > 101; Weight gain >3lbs in 1 day or 5lbs in 1 week.  Other:        CONSULTS:    Physical Therapy to evaluate and treat. Evaluate for home safety and equipment needs; Establish/upgrade home exercise program. Perform / instruct on therapeutic exercises, gait training, transfer training, and Range of Motion.     Occupational Therapy to evaluate and  treat. Evaluate home environment for safety and equipment needs. Perform/Instruct on transfers, ADL training, ROM, and therapeutic exercises.      to evaluate for community resources/long-range planning.   Send initial Home Health orders to HTS attending physician on call.     Aide to provide assistance with personal care, ADLs, and vital signs    Send follow up questions to [phone] or [phone].

## 2020-03-20 NOTE — HOSPITAL COURSE
Admitted to MICU for respiratory distress on a ventilator.  [facility] was consulted for management of PH medications on admission.  He was continued on Adempas 2.5 t.i.d. Macitentan and Uptravi were initially held due to these medications inability to be crushed and given via NG tube.  He was aggressively IV diuresed.  He was able to be extubated and weaned off inhaled nitric.  He was ruled out for COVID-19.  RVP was positive for human metapneumovirus.  He was also treated for bacterial pneumonia.  He was then transferred over to the [facility] primary service.  Post extubation, he had issues with delirium/malissa or hypomanic episodes.  Previously on Celexa, Geodon, Seroquel.  Psychiatry was consulted who recommended discontinuing Celexa and Geodon.  His Seroquel was increased to 400 mg q.h.s..  After 48 hr monitoring his QTC was normal and he was tolerating this medication well.  He was evaluated by PT/OT recommended home health with PT OT services.  He is on his O2 requirement.  He was restarted on his home Bumex.  He was restarted on Macitentan at its original dose.  He was off UPTRAVI for a period time therefore discharged on a reduced dose of 200 mcg b.i.d.. This will be up titrated as an outpatient.  He is discharged home in stable condition.  Follow-up PH clinic in 1-2 weeks.

## 2020-03-20 NOTE — SUBJECTIVE & OBJECTIVE
"Interval History: see hospital course    Family History     None        Tobacco Use    Smoking status: Never Smoker    Smokeless tobacco: Never Used   Substance and Sexual Activity    Alcohol use: No    Drug use: No    Sexual activity: Not on file     Psychotherapeutics (From admission, onward)    Start     Stop Route Frequency Ordered    03/18/20 2100  QUEtiapine tablet 400 mg      -- Oral Nightly 03/18/20 1500    03/18/20 1559  QUEtiapine tablet 100 mg      -- Oral Every 6 hours PRN 03/18/20 1500           Review of Systems   Respiratory: Positive for cough.    Neurological: Negative for seizures.   Psychiatric/Behavioral: Negative for agitation, behavioral problems, confusion, decreased concentration, dysphoric mood, hallucinations, self-injury, sleep disturbance and suicidal ideas. The patient is not nervous/anxious and is not hyperactive.      Objective:     Vital Signs (Most Recent):  Temp: 97.9 °F (36.6 °C) (03/20/20 0715)  Pulse: 95 (03/20/20 0716)  Resp: 17 (03/20/20 0716)  BP: 106/67 (03/20/20 0715)  SpO2: 95 % (03/20/20 0716) Vital Signs (24h Range):  Temp:  [97.9 °F (36.6 °C)-98.2 °F (36.8 °C)] 97.9 °F (36.6 °C)  Pulse:  [] 95  Resp:  [14-20] 17  SpO2:  [90 %-96 %] 95 %  BP: ()/(62-73) 106/67     Height: 5' 8" (172.7 cm)  Weight: (!) 149.7 kg (330 lb)  Body mass index is 50.18 kg/m².      Intake/Output Summary (Last 24 hours) at 3/20/2020 1112  Last data filed at 3/20/2020 1000  Gross per 24 hour   Intake 540 ml   Output 500 ml   Net 40 ml       Physical Exam   Psychiatric:   Mental Status Exam:  Appearance: NAD, appropriate hygiene, sitting up in chair  Behavior/Cooperation: calm, cooperative, appropriate eye contact  Speech: spontaneous, normal rate, tone, and volume  Language: English  Mood: "good"  Affect: full, reactive, appropriate  Thought Process: somewhat tangential. Not perseverative   Associations: no BLANCHE  Thought Content: denies SI/HI/AVH  Sensorium: alert, awake "   Orientation: person, place, day of week, date, month, year, situation  Memory: recent and remote intact  Attention/Concentration: intact, able to attend to interview  Fund of Knowledge: intact and appropriate to age and level of education   Abstraction: intact  Insight: good  Judgement: good       Nursing note and vitals reviewed.         Significant Labs:   Last 24 Hours:   Recent Lab Results       03/20/20  0600        Albumin 2.5     Alkaline Phosphatase 79     ALT 35     Anion Gap 8     AST 21     Baso # 0.04     Basophil% 0.4     BILIRUBIN TOTAL 0.7  Comment:  For infants and newborns, interpretation of results should be based  on gestational age, weight and in agreement with clinical  observations.  Premature Infant recommended reference ranges:  Up to 24 hours.............<8.0 mg/dL  Up to 48 hours............<12.0 mg/dL  3-5 days..................<15.0 mg/dL  6-29 days.................<15.0 mg/dL       BUN, Bld 16     Calcium 8.4     Chloride 99     CO2 30     Creatinine 0.8     Differential Method Automated     eGFR if African American >60.0     eGFR if non  >60.0  Comment:  Calculation used to obtain the estimated glomerular filtration  rate (eGFR) is the CKD-EPI equation.        Eos # 0.2     Eosinophil% 1.8     Glucose 103     Gran # (ANC) 7.1     Gran% 72.3     Hematocrit 29.0     Hemoglobin 8.2     Immature Grans (Abs) 0.21  Comment:  Mild elevation in immature granulocytes is non specific and   can be seen in a variety of conditions including stress response,   acute inflammation, trauma and pregnancy. Correlation with other   laboratory and clinical findings is essential.       Immature Granulocytes 2.1     Lymph # 1.5     Lymph% 15.6     Magnesium 2.1     MCH 22.2     MCHC 28.3     MCV 79     Mono # 0.8     Mono% 7.8     MPV 10.7     nRBC 0     Phosphorus 4.6     Platelets 287     Potassium 3.1     PROTEIN TOTAL 6.2     RBC 3.69     RDW 17.1     Sodium 137     WBC 9.86            Significant Imaging: I have reviewed all pertinent imaging results/findings within the past 24 hours.

## 2020-03-20 NOTE — PROGRESS NOTES
Discharge Note:    Per a unique contact precaution period at [facility], JOSÉ MIGUEL contacted pt's sister Quyen ([phone]) in order to notify her of the pt's discharge plan. Quyen is an emergency room NP and states that she plans on picking up the pt this evening. She is aware of the special instructions for  given COVID19 restrictions. Quyen reports that she has pt's portable oxygen concentrator and will bring with her in the car. Quyen notified by JOSÉ MIGUEL that pt cannot get HH due to his specific Medicaid policy and will need to get outpt PT/OT. Quyen stating that once things settle down with COVID19, she will set pt up with a local facility near her home. JOSÉ MIGUEL faxed Quyen PT/OT ambulatory orders to her work fax ([phone]) so that she can present them to the agency she chooses for the pt. At this time, Quyen denying any further questions or concerns. JOSÉ MIGUEL remains available for continued psychosocial support, education, resources, and additional d/c planning as needed.

## 2020-03-20 NOTE — NURSING
RIJ triple lumen catheter removed, vaseline gauze dressing placed to site and pressure held for 10 minutes.  Pt lying flat for 30 minutes per policy.  No s/sx of distress and no s/sx of bleeding.  Call light within reach.  RN will continue to monitor.

## 2020-03-20 NOTE — PLAN OF CARE
Problem: Physical Therapy Goal  Goal: Physical Therapy Goal  Description  Goals to be met by: 20    Patient will increase functional independence with mobility by performin. Supine to sit with Contact Guard Assistance -not met  2. Sit to stand transfer with Contact Guard Assistance -not met  3. Gait  x 100 feet with Contact Guard Assistance using AD if needed. -not met  4. Ascend/descend 3 stair with right Handrails Contact Guard Assistance -not met      Outcome: Ongoing, Progressing   Pt progressing towards goals. continue with PT POC.Goals remain appropriate.  Mike Bullock PTA

## 2020-03-20 NOTE — DISCHARGE SUMMARY
[facility]  Heart Transplant  Discharge Summary      Patient Name: Jaquan Hines Jr.  MRN: [id number]  Admission Date: 3/12/2020  Hospital Length of Stay: 8 days  Discharge Date and Time: 03/20/2020 11:25 AM  Attending Physician: Alexi Velasco MD   Discharging Provider: Pérez Dean MD  Primary Care Provider: Allen Hubbard MD     HPI: 50 yo man with HTN, morbid obesity with OHS and chronic hypoxic resp failure, HTN, referred by Dr Jennings for management of PH- pt underwent PTE on 3/13/18. Last seen by Dr. Hinojosa in clinic on 1/24/2020 and was doing well then on Uptravi, Adempas, and Macitentan without need for home oxygen. Per records, patient was diagnosed with pharyngitis on 2/27/2020 and treated with amoxicillin without improvement. Subsequently went to urgent care due to lack of improvement and was prescribed oxygen, bronchodilators. Presented to [facility] on 3/08/2020, treated for PNA and discharged on 3/10. Returned immediately due to lack of relief in symptoms and worsening dyspnea leading to intubation for acute hypoxic respiratory failure. Then transferred to [facility] for higher level of care. Based on records, does not appear to be any documentation that PH meds were continued during this admission there. Supposedly negative for flu and negative blood cultures from there as well.     Since arrival he has remained intubated, afebrile but did have increased wbc. Full sepsis work-up underway and being treated for presumptive pneumonia. Of note, patient being tested for COVID-19 also.     TTE 3/13/2020   · Limited Echo.  · Normal left ventricular systolic function. The estimated ejection fraction is 65%.  · Indeterminate left ventricular diastolic function.  · No wall motion abnormalities.  · Normal right ventricular systolic function.  · The estimated PA systolic pressure is 49 mmHg. Pulmonary hypertension present.  · Elevated central venous pressure (15 mmHg).            * No surgery found *     Hospital Course: Admitted to MICU for respiratory distress on a ventilator.  [facility] was consulted for management of PH medications on admission.  He was continued on Adempas 2.5 t.i.d. Macitentan and Uptravi were initially held due to these medications inability to be crushed and given via NG tube.  He was aggressively IV diuresed.  He was able to be extubated and weaned off inhaled nitric.  He was ruled out for COVID-19.  RVP was positive for human metapneumovirus.  He was also treated for bacterial pneumonia.  He was then transferred over to the [facility] primary service.  Post extubation, he had issues with delirium/malissa or hypomanic episodes.  Previously on Celexa, Geodon, Seroquel.  Psychiatry was consulted who recommended discontinuing Celexa and Geodon.  His Seroquel was increased to 400 mg q.h.s..  After 48 hr monitoring his QTC was normal and he was tolerating this medication well.  He was evaluated by PT/OT recommended home health with PT OT services.  He is on his O2 requirement.  He was restarted on his home Bumex.  He was restarted on Macitentan at its original dose.  He was off UPTRAVI for a period time therefore discharged on a reduced dose of 200 mcg b.i.d.. This will be up titrated as an outpatient.  He is discharged home in stable condition.  Follow-up PH clinic in 1-2 weeks.    Consults (From admission, onward)        Status Ordering Provider     Inpatient consult to Heart Transplant  Once     Provider:  (Not yet assigned)    Completed ISSA FONSECA     Inpatient consult to Psychiatry  Once     Provider:  (Not yet assigned)    Completed MARIE BUSBY          Significant Diagnostic Studies: Labs:   CMP   Recent Labs   Lab 03/19/20  0614 03/20/20  0600    137   K 3.0* 3.1*    99   CO2 29 30*   GLU 99 103   BUN 17 16   CREATININE 0.8 0.8   CALCIUM 8.1* 8.4*   PROT 6.2 6.2   ALBUMIN 2.4* 2.5*   BILITOT 0.8 0.7   ALKPHOS 76 79   AST 25 21   ALT 36 35    ANIONGAP 11 8   ESTGFRAFRICA >60.0 >60.0   EGFRNONAA >60.0 >60.0   , CBC   Recent Labs   Lab 03/19/20  0614 03/20/20  0600   WBC 10.36 9.86   HGB 8.4* 8.2*   HCT 30.5* 29.0*    287   , INR   Lab Results   Component Value Date    INR 1.1 05/31/2018    INR 2.7 (H) 03/19/2018    INR 2.1 (H) 03/18/2018   , Lipid Panel   Lab Results   Component Value Date    CHOL 196 11/13/2017    HDL 41 11/13/2017    LDLCALC 105.8 11/13/2017    TRIG 246 (H) 11/13/2017    CHOLHDL 20.9 11/13/2017   , Troponin No results for input(s): TROPONINI in the last 168 hours., A1C: No results for input(s): HGBA1C in the last 4320 hours. and All labs within the past 24 hours have been reviewed  Microbiology:   Blood Culture   Lab Results   Component Value Date    LABBLOO No growth after 5 days. 03/12/2020    LABBLOO No growth after 5 days. 03/12/2020    and Sputum Culture   Lab Results   Component Value Date    GSRESP <10 epithelial cells per low power field. 03/12/2020    GSRESP Moderate WBC's 03/12/2020    GSRESP No organisms seen 03/12/2020    RESPIRATORYC No growth 03/12/2020     Cardiac Graphics: Echocardiogram:   2D echo with color flow doppler:   Results for orders placed or performed during the hospital encounter of 05/31/18   2D echo with color flow doppler   Result Value Ref Range    QEF 55 55 - 65    Mitral Valve Regurgitation MILD     Diastolic Dysfunction No     Est. PA Systolic Pressure 38.19     Pericardial Effusion SMALL (A)     Tricuspid Valve Regurgitation MILD     Narrative    Date of Procedure: 05/31/2018        TEST DESCRIPTION   Technical Quality: This is a technically challenging study. There is poor endocardial definition. This study was performed in conjunction with a 3ml intravenous injection of Optison contrast agent.     General: A catheter is present in the right-sided cardiac chambers.     Aorta: The aortic root is normal in size, measuring 2.8 cm at sinotubular junction and 3.2 cm at Sinuses of Valsalva. The  proximal ascending aorta is normal in size, measuring 2.9 cm across.     Left Atrium: The left atrial volume index is normal, measuring 33.31 cc/m2.     Left Ventricle: The left ventricle is normal in size, with an end-diastolic diameter of 5.0 cm, and an end-systolic diameter of 3.5 cm. LV wall thickness is normal, with the septum and the posterior wall each measuring 0.8 cm across. Relative wall   thickness was normal at 0.32, and the LV mass index was 61.4 g/m2 consistent with normal left ventricular mass. There are no regional wall motion abnormalities. Left ventricular systolic function appears normal. Visually estimated ejection fraction is   55-60%. The LV Doppler derived stroke volume equals 65.0 ccs.     Diastolic indices: E wave velocity 0.6 m/s, E/A ratio 1.1,  msec., E/e' ratio(avg) 6. Diastolic function is normal.     Right Atrium: The right atrium is normal in size, measuring 5.0 cm in length and 3.3 cm in width in the apical view.     Right Ventricle: The right ventricle is not well seen in size. Global right ventricular systolic function was not well seen. Tricuspid annular plane systolic excursion (TAPSE) is 1.7 cm. Tissue Doppler-derived tricuspid annular peak systolic velocity (S   prime) is 10.6 cm/s. The estimated PA systolic pressure is greater than 38 mmHg.     Aortic Valve:  The aortic valve is normal in structure.     Mitral Valve:  The mitral valve is normal in structure. There is mild mitral regurgitation.     Tricuspid Valve:  The tricuspid valve is not well seen. There is mild tricuspid regurgitation.     Pulmonary Valve:  The pulmonic valve is not well seen.     Pericardium: There is evidence of a small circumferential pericardial effusion.     IVC: The IVC is not visualized.     Intracavitary: There is no evidence of intracavity mass, thrombi, or vegetation.         CONCLUSIONS     1 - Normal left ventricular systolic function (EF 55-60%).     2 - No wall motion abnormalities.      3 - Normal left ventricular diastolic function.     4 - The estimated PA systolic pressure is greater than 38 mmHg.     5 - Mild mitral regurgitation.     6 - Mild tricuspid regurgitation.     7 - Small pericardial effusion.             This document has been electronically    SIGNED BY: Manuela Gupta MD On: 05/31/2018 12:22    and Transthoracic echo (TTE) complete (Cupid Only):   Results for orders placed or performed during the hospital encounter of 03/12/20   Echo Color Flow Doppler? Yes   Result Value Ref Range    TR Max Juan Pablo 2.92 m/s    Triscuspid Valve Regurgitation Peak Gradient 34 mmHg    BSA 2.84 m2    Right Atrial Pressure (from IVC) 15 mmHg    LVIDD 5.30 3.5 - 6.0 cm    IVS 1.00 0.6 - 1.1 cm    PW 0.60 0.6 - 1.1 cm    LVIDS 4.70 2.1 - 4.0 cm    FS 11 28 - 44 %    LV mass 150.05 g    Left Ventricle Relative Wall Thickness 0.23 cm    TV rest pulmonary artery pressure 49 mmHg    LV Mass Index 57 g/m2    Narrative    · Limited Echo.  · Normal left ventricular systolic function. The estimated ejection   fraction is 65%.  · Indeterminate left ventricular diastolic function.  · No wall motion abnormalities.  · Normal right ventricular systolic function.  · The estimated PA systolic pressure is 49 mmHg. Pulmonary hypertension   present.  · Elevated central venous pressure (15 mmHg).          Pending Diagnostic Studies:     None        Final Active Diagnoses:    Diagnosis Date Noted POA    Pulmonary hypertension [I27.20] 03/12/2020 Yes    Pulmonary artery hypertension [I27.21] 03/07/2019 Yes    CTEPH (chronic thromboembolic pulmonary hypertension) [I27.24] 01/02/2018 Yes    Obesity hypoventilation syndrome [E66.2] 07/19/2017 Yes    Benign essential hypertension [I10] 07/19/2017 Yes    Anxiety [F41.9] 07/19/2017 Yes    Bipolar I disorder, single manic episode, severe, with psychosis [F30.2] 07/19/2017 Yes    CAD (coronary artery disease), native coronary artery [I25.10] 12/19/2016 Yes      Problems  Resolved During this Admission:    Diagnosis Date Noted Date Resolved POA    PRINCIPAL PROBLEM:  Acute hypoxemic respiratory failure [J96.01] 03/12/2020 03/20/2020 Yes    Elevated LFTs [R94.5] 03/13/2020 03/15/2020 Yes    Other microscopic hematuria [R31.29] 03/13/2020 03/20/2020 No    Pneumonia [J18.9] 03/12/2020 03/20/2020 Yes    Encephalopathy, metabolic [G93.41] 03/12/2020 03/20/2020 Yes      Discharged Condition: good    Disposition: Home or Self Care    Follow Up:    Patient Instructions:      Ambulatory referral/consult to Physical/Occupational Therapy   Standing Status: Future   Referral Priority: Routine Referral Type: Physical Medicine   Referral Reason: Specialty Services Required   Number of Visits Requested: 1     Medications:  Reconciled Home Medications:      Medication List      CHANGE how you take these medications    QUEtiapine 400 MG tablet  Commonly known as:  SEROQUEL  Take 1 tablet (400 mg total) by mouth every evening.  What changed:    · medication strength  · how much to take     selexipag 200 mcg Tab  Take 200 mcg by mouth 2 (two) times daily.  What changed:    · medication strength  · See the new instructions.        CONTINUE taking these medications    ADEMPAS 2.5 mg tablet  Generic drug:  riociguat  Take 2.5 mg by mouth 3 (three) times daily. Take 1 tablet, by mouth, three times daily.     apixaban 5 mg Tab  Commonly known as:  ELIQUIS  Take 1 tablet (5 mg total) by mouth 2 (two) times daily.     aspirin 81 MG EC tablet  Commonly known as:  ECOTRIN  Take 1 tablet (81 mg total) by mouth once daily.     atorvastatin 40 MG tablet  Commonly known as:  LIPITOR  Take 1 tablet (40 mg total) by mouth once daily.     bumetanide 2 MG tablet  Commonly known as:  BUMEX  Take 1 tablet (2 mg total) by mouth 2 (two) times daily.     cetirizine 10 MG tablet  Commonly known as:  ZYRTEC  Take 10 mg by mouth once daily.     fluticasone propionate 50 mcg/actuation nasal spray  Commonly known as:   FLONASE  1 spray by Each Nare route daily as needed for Rhinitis.     KLOR-CON M20 20 MEQ tablet  Generic drug:  potassium chloride SA  TAKE 1 TABLET BY MOUTH TWICE A DAY     OPSUMIT 10 mg Tab  Generic drug:  macitentan  Take 10 mg by mouth once daily.     PriLOSEC 20 MG capsule  Generic drug:  omeprazole  Take 20 mg by mouth 2 (two) times daily.     spironolactone 25 MG tablet  Commonly known as:  ALDACTONE  Take 1 tablet (25 mg total) by mouth once daily.     VENTOLIN HFA 90 mcg/actuation inhaler  Generic drug:  albuterol  TAKE 1 PUFF EVERY 6 HOURS AS NEEDED FOR WHEEZING/SOB        STOP taking these medications    citalopram 40 MG tablet  Commonly known as:  CELEXA     GEODON ORAL     metoprolol tartrate 25 MG tablet  Commonly known as:  LOPRESSOR            Pérez Dean MD  Heart Transplant  [facility]

## 2020-03-20 NOTE — PHYSICIAN QUERY
PT Name: Jaquan Hines Jr.  MR #: [id number]     CDS/: Aj William RN               Contact information:   [email or web address]      This form is a permanent document in the medical record.     Query Date: March 20, 2020    Physician Query - Neurological Condition Clarification    By submitting this query, we are merely seeking further clarification of documentation to reflect the severity of illness of your patient. Please utilize your independent clinical judgment when addressing the question(s) below.    The Medical record reflects the following:     Indicators   Supporting Clinical Findings Location in Medical Record    AMS, Confusion,  LOC, etc.      x Acute / Chronic Illness Neuro: Encephalopathy, metabolic:  --Likely 2/2 sedation. --Continue propofol for now. Wean sedation as tolerated....Acute hypoxemic respiratory failure... Prodrome (fever, cough, pharyngitis), negative influenza, and negative cultures consistent with viral pneumonia....Pulmonary/Chest: He is intubated. No respiratory distress. He has wheezes     Acute hypoxemic respiratory failure  Secondary to metapneumovirus in setting of severe pHTN    Encephalopathy, metabolic  - may be secondary to Bipolar disorder  -Ordered CT head    Exam today is atypical for malissa, seems more consistent with delirium vs almost catatonic psychosis, although pt was fully oriented and passed SAVEAHAART other tests of attention/concentration were impaired.   DDx:  Delirium;   Bipolar I disorder with psychotic features;  Schizoaffective disorder, bipolar type   3/12  H&P                  3/14 Progress note- critical care medicine ( CCM)      3/18  Progress note- Heart transplant- Dr. Dean      3/18 consult- psychiatry   x Radiology Findings Impression:  Partially visualized patchy severe paranasal sinus opacification with superimposed scattered aerated secretions can not exclude component of acute sinusitis.  Otherwise unremarkable CT head  specifically without evidence for acute intracranial hemorrhage or sulcal effacement to suggest large territory recent infarction.     3/19 CT head   x Electrolyte Imbalance sCO2: ^^31, 33, 37.....    Labs     x Medication ....last manic episode was in April 2019. At that time he was on Tegretol, Seroquel, and Celexa. The Tegretol was uptitrated which was very effective.  That was when he was started on Geodon. .....    Psychiatry was consulted who recommended discontinuing Celexa and Geodon   3/18 consult- psychiatry          3/20 Discharge summary    Treatment             x Other COVID-19 negative....negative influenza, and negative sputum/blood cultures 3/15  Progress note- CCM     Encephalopathy- is a general term for any diffuse disease of the brain that alters brain function or structure. Treatment of the cognitive dysfunction varies but is ultimately dependent on the treatment of the underlying condition.    Major Symptoms of Encephalopathy - Decreased level of consciousness, fluctuating alertness/concentration, confusion, agitation, lethargy, somnolence, drowsiness, obtundation, stupor, or coma.         References: National Institutes of Healths (NIH) National [place] of Neurological Disorders and Strokes;  HCPro 2016; Advisory Board     Clinical Guidelines:   These guidelines will set system standards to assist providers in managing, documentation, and coding of encephalopathy. The intent of this document is to serve as a system guideline, not replace the providers clinical judgment:  Provider, please specify the diagnosis or diagnoses associated with above clinical findings.    Provider, please clarify, after study, the neurological condition/ encephalopathy associated with the above clinical findings:   (please sheron all that apply)    [   ] Anoxic/Hypoxic Encephalopathy- Brain damage due to anoxia/hypoxia--- associated with acute respiratory failure     [ X  ] Metabolic Encephalopathy - Due to  electrolye imbalance, metabolic derangements, or infections processes, includes Septic Encephalopathy-- associated with metapneumovirus infection     [   ] Toxic Encephalopathy - Due to drugs, chemicals, or other toxic substances: withdrawal of tegretol, Seroquel, Celexa      [   ] Other Encephalopathy - Includes uremic encephalopathy     [   ] Unspecified Encephalopathy        [   ] Other Neurological Condition-  Includes Post-ictal altered mental status. (please specify condition): _________     [   ]  Clinically Undetermined     Please document in your progress notes daily for the duration of treatment until resolved, and include in your discharge summary.

## 2020-03-21 NOTE — NURSING
RN provided patient with verbal explanation and printed copy of AVS, pt verbalized understanding to all.  Pt verbalized understanding to updated home medication information.  IJ removed without issue.  Pt discharged from [facility] with all belongings in tow.  Pt denies pain or other need at time of discharge.

## 2020-03-23 ENCOUNTER — PATIENT OUTREACH (OUTPATIENT)
Dept: ADMINISTRATIVE | Facility: CLINIC | Age: 50
End: 2020-03-23

## 2020-03-23 NOTE — PATIENT INSTRUCTIONS
Pulmonary Embolism (PE)  A pulmonary embolus is most often due to a blood clot that develops in a deep vein of the leg (deep vein thrombosis). If that clot, breaks loose and travels to the lung, it is called a pulmonary embolism (PE). This can cut off the flow of blood in the lungs.  A blood clot in the lungs is a medical emergency and may cause death.   Healthcare providers use the term venous thromboembolism (VTE) to describe these 2 conditions: deep vein thrombosis and pulmonary embolism. They use the term VTE because the 2 conditions are very closely related. And, because their prevention and treatment are also closely related.      A pulmonary embolism occurs when a blood clot forms in a vein and travels to the lungs.   How is pulmonary embolism diagnosed?  Your healthcare provider examines you and asks about your symptoms and health history. You may also have one or more of the following:  · Blood tests to check for blood clotting or other problems  · Imaging tests to look for clots in the veins or lung  · Electrocardiography (ECG) to test how well the heart is working  How is pulmonary embolism treated?  · Blood-thinning medicines (anticoagulants). These medicines thin the blood. They may be given as a pill, as an injection, or through a tube into a vein (intravenous or IV). Blood thinners help prevent more blood clots from forming. They also help to prevent an existing clot from getting larger.  · Thrombolysis. Thrombolytic medicines are used to quickly dissolve a blood clot. A long, narrow tube (catheter) is used to deliver medicine directly to the clot. Thrombolytic medicines increase the risk of bleeding so they are used very carefully.  · Inferior vena cava (IVC) filter surgery. The vena cava is the bodys largest vein. It carries blood from the body to the heart. A small filter traps blood clots in the lower body and prevents them from traveling to the lungs. The filter is inserted into the vein  through a catheter. The filter may be used if blood thinners cannot be taken or if they don't work.   · Pulmonary embolectomy. This is a procedure to remove a blood clot in the lungs. It may be done with surgery or with a catheter inserted in the body. It may be done when other treatments aren't safe or don't work.  What are the long-term concerns?  With treatment, blood clots are usually dissolved or removed. Some treatments can even help prevent future clots. But having a PE can put you at risk for another life-threatening blood clot. So, you will likely need to take anticoagulants to help keep blood clots from forming again. You may need to take this medicine for months or years.  You may also need to make lifestyle changes. This may include getting more active and eating healthier. You may need to wear elastic (compression) stockings and and take breaks on long trips.  Call 911  Call 911 or get emergency help if you have symptoms of a blood clot that has traveled to the lungs. The symptoms include:  · Chest pain  · Trouble breathing  · Coughing (may cough up blood)  · Fainting  · Fast heartbeat  · Sweating  Call 911 if you have heavy or uncontrolled bleeding.  When to call your healthcare provider  Call your healthcare provider if you have swelling or pain in your leg, arm, or other area. These are symptoms of a blood clot.  You may have bleeding if you take medicine to help prevent blood clots.  Call your healthcare provider if you have signs or symptoms of bleeding. This includes:  · Blood in the urine  · Bleeding with bowel movements  · Bleeding from the nose, gums, a cut, or vagina   Date Last Reviewed: 2/1/2017 © 2000-2017 [employer]. [address] [place] [address], [place] [address]. All rights reserved. This information is not intended as a substitute for professional medical care. Always follow your healthcare professional's instructions.        Left- or Right- Side Congestive Heart Failure  (CHF)    The heart is a large muscle. It is a pump that circulates blood throughout the body. Blood carries oxygen to all of the organs, including the brain, muscles, and skin. After your body takes the oxygen out of the blood, the blood returns to the heart. The right side of the heart collects the blood from the body and pumps it to the lungs. In the lungs, it gets fresh oxygen and gives up carbon dioxide. The oxygen-rich blood from the lungs then returns to the left side of the heart, where it is pumped back out to the rest of your body, starting the process all over.  Congestive heart failure (CHF) occurs when the heart muscle is weakened. This affects the pumping action of the heart. Heart failure can affect the right side of the heart or the left side. But heart failure may affect not only the right side of the heart or only the left side. Although it may have started on one side, it can and often eventually does affect both sides.  Right-side heart failure  When the right side of the heart is weakened, it cant handle the blood it is getting from the rest of the body. This blood returns to the heart through veins. When too much pressure builds up in the veins, fluid leaks out into the tissues. Gravity then causes that fluid to move to those parts of the body that are the lowest. So one of the first symptoms of right-side CHF can include swelling in the feet and ankles. If the condition gets worse, the swelling can even go up past the knees. Sometimes it gets so severe, the liver can get congested as well.  Left-side heart failure  When the left side of the heart is weakened, it cant handle the blood it gets from the lungs. Pressure then builds up in the veins of the lungs, causing fluid to leak into the lung tissues. This may cause CHF and pulmonary edema. This causes you to feel short of breath, weak, or dizzy. These symptoms are often worse with exertion, such as when climbing stairs or walking up hills.  Lying with your head flat is uncomfortable and can make your breathing worse. This may make sleeping difficult. You may need to use extra pillows to elevate your upper body to sleep well. The same is true when just resting during the daytime.  There are many causes of heart failure including:  · Coronary artery disease  · Past heart attack (also known as acute myocardial infarction, or AMI)  · High blood pressure  · Damaged heart valve  · Diabetes  · Obesity  · Cigarette smoking  · Alcohol abuse  Heart failure is a chronic condition. There is no cure. The purpose of medical treatment is to improve the pumping action of the heart. The main way to do this is to remove excess water from the body. A number of medicines can help reach this goal, improve symptoms, and prevent the heart from becoming weaker. Sometimes, heart failure can become so severe that a device is placed in the heart to help with pumping. Another major goal is to better treat the causes of heart failure, such as diabetes and high blood pressure, by making changes in your lifestyle and maximizing medical control when needed.  Home care  Follow these guidelines when caring for yourself at home:  · Check your weight every day. This is very important because a sudden increase in weight gain could mean worsening heart failure. Keep these things in mind:  ¨ Use the same scale every day.  ¨ Weigh yourself at the same time every day.  ¨ Make sure the scale is on a hard floor surface, not on a rug or carpet.  ¨ Keep a record of your weight every day so your healthcare provider can see it. If you are not given a log sheet for this, keep a separate journal for this purpose.   · Cut back on the amount of salt (sodium) you eat. Follow your healthcare provider's recommendation on how much salt or sodium you should have each day.  ¨ Avoid high-salt foods. These include olives, pickles, smoked meats, salted potato chips, and most prepared foods.  ¨ Don't add salt to  your food at the table. Use only small amounts of salt when cooking.  ¨ Read the labels carefully on food packages to learn how much salt or sodium is in each serving in the package. Remember, a can or package of food may contain more than 1 serving. So if you eat all the food in the package, you may be getting more salt than you think.  · Follow your healthcare provider's recommendations about how much fluid you should have. Be aware that some foods, such as soup, pudding, and juicy fruits like oranges or melons, contain liquid. You'll need to count the liquid in those foods as part of your daily fluid intake. Your provider can help you with this.  · Stop smoking.  · Cut back on how much alcohol you drink.  · Lose weight if you are overweight. The excess weight adds a lot of stress on the workload of the heart.  · Stay active. Talk with your provider about an exercise program that is safe for your heart.  · Keep your feet elevated to reduce swelling. Ask your provider about support hose as a preventive treatment for daytime leg swelling.  Besides taking your medicine as instructed, an important part of treatment is lifestyle changes. These include diet, physical activity, stopping smoking, and weight control.  Improve your diet by including more fresh foods, cutting back on how much sugar and saturated fat you eat, and eating fewer processed foods and less salt.  Follow-up care  Follow up with your healthcare provider, or as advised.  Make sure to keep any appointments that were made for you. These can help better control your congestive heart failure. You will need to follow up with your provider on a routine basis to make sure your heart failure is well managed.  If an X-ray, ECG, or other tests were done, you will be told of any new findings that may affect your care.  Call 911  Call 911 if you:  · Become severely short of breath  · Feel lightheaded, or feel like you might pass out or faint  · Have chest pain or  discomfort that is different than usual, the medicines your doctor told you to use for this don't help, or the pain lasts longer than 10 to 15 minutes  · You suddenly develop a rapid heart rate  When to seek medical advice  The following may be signs that your heart failure is getting worse. Call your healthcare provider right away if any of these happen:  · Sudden weight gain. This means 3 or more pounds in one day, or 5 or more pounds in 1 week.  · Trouble breathing not related to being active  · New or increased swelling of your legs or ankles  · Swelling or pain in your abdomen  · Breathing trouble at night. This means waking up short of breath or needing more pillows to breathe.  · Frequent coughing that doesnt go away  · Feeling much more tired than usual  Date Last Reviewed: 1/4/2016  © 2408-7530 [employer]. [address] [place] [address], [place] [address]. All rights reserved. This information is not intended as a substitute for professional medical care. Always follow your healthcare professional's instructions.

## 2020-03-24 ENCOUNTER — TELEPHONE (OUTPATIENT)
Dept: TRANSPLANT | Facility: CLINIC | Age: 50
End: 2020-03-24

## 2020-03-24 NOTE — TELEPHONE ENCOUNTER
"F/U with patient. Mr. Hines states that he is doing good. He has "pulled a lot of weight off." He states that he has a "walk-in bathtub with jets that helps get fluid off." He states that he can sit without his oxygen on but has to put it to 5L with any exertion. Mr. Hines says he tried not to use his oxygen if his O2 sat is good. Reminded Mr. Hines that oxygen is a medicine and it helps to relax the blood vessels in his lungs. He verbalized understanding. Patient says he is taking Uptravi 200 mcg, BID and will increase weekly but has not done so yet. He endorses taking his other PH medications as directed.  Mr. Hines also states that he has not seen his pulmonologist in quite some time. Encouraged him to make an appointment with Dr. Goodson. Patient would like all of his care with [facility]. Asks about pulmonary rehab. Patient is also concerned that at his previous appts he has not shared as much information as needed with Dr. Hinojosa. He states that he was in a "major depression" for all of last year. He says he is doing better with recent medication changes.  Patient has an appointment with Dr. Hinojosa in May. Discussed the virtual visit option with patient and he is agreeable. Made appt for him on April 8th.  Reassured patient. Answered all questions and concerns.  "

## 2020-03-25 ENCOUNTER — TELEPHONE (OUTPATIENT)
Dept: PULMONOLOGY | Facility: CLINIC | Age: 50
End: 2020-03-25

## 2020-03-25 ENCOUNTER — TELEPHONE (OUTPATIENT)
Dept: TRANSPLANT | Facility: CLINIC | Age: 50
End: 2020-03-25

## 2020-03-25 NOTE — TELEPHONE ENCOUNTER
----- Message from Ann-Marie Benoit sent at 3/25/2020 10:10 AM CDT -----  Contact: [phone]-[phone]  Alla is calling to speak with someone to get the Pt a script for a heavy duty walker and send it fax [phone]. Thanks, Ann-Marie

## 2020-03-25 NOTE — TELEPHONE ENCOUNTER
Patient called stating he was is in the hospital at [facility] in [facility] discharged Friday Evening for Pneumonia treated with antivirals and antibiotics. Pt was intubated at [facility] in [facility] for 36 hours to be transported to ICU at [facility]. Pt said he was awaken Monday March 16.  They tested him for COVID 19 on March 12 th and test was negative. He was told to FU with his Pulmonologist and need to be seen. Please advise me how to handle the patients visit. He was last seen by you 1 year ago 03/07/2019.

## 2020-03-31 ENCOUNTER — OFFICE VISIT (OUTPATIENT)
Dept: PULMONOLOGY | Facility: CLINIC | Age: 50
End: 2020-03-31
Payer: MEDICAID

## 2020-03-31 DIAGNOSIS — I27.24 CTEPH (CHRONIC THROMBOEMBOLIC PULMONARY HYPERTENSION): ICD-10-CM

## 2020-03-31 DIAGNOSIS — I27.20 PULMONARY HYPERTENSION: Primary | ICD-10-CM

## 2020-03-31 DIAGNOSIS — I27.21 PULMONARY ARTERY HYPERTENSION: ICD-10-CM

## 2020-03-31 DIAGNOSIS — I27.9 CHRONIC PULMONARY HEART DISEASE: ICD-10-CM

## 2020-03-31 DIAGNOSIS — J96.11 CHRONIC RESPIRATORY FAILURE WITH HYPOXIA: ICD-10-CM

## 2020-03-31 PROCEDURE — 99213 OFFICE O/P EST LOW 20 MIN: CPT | Mod: 95,,, | Performed by: INTERNAL MEDICINE

## 2020-03-31 PROCEDURE — 99213 PR OFFICE/OUTPT VISIT, EST, LEVL III, 20-29 MIN: ICD-10-PCS | Mod: 95,,, | Performed by: INTERNAL MEDICINE

## 2020-03-31 NOTE — PROGRESS NOTES
"HPI:    10/24/2017 - Here for follow up, SOB, GOMEZ seem to be a little worse has required increased OI2 (5 LPM with exertion).  Has also noted increased edema which has not responded to diuretics.  He has appointment with Dr Jennings (Thoracic surgery at [facility]) for evaluation.  D/W pt and sister.  Will try to add zaroxylyn to see if we get a better diuresis.    1/2/2018 - Here for follow up.  Has been evaluated and is being considered for pulmonary artery thromboembolectomy.  Had PFT which show no obstruction, mild air trapping and a minimal decrease in DLCO.  No new complaints or iussues.  Will send PFT to surgeon.  Told pt and sister to contact his office to make sure that follow up is arranged.    4/9/2018 - Here for follow up, had thrombectomy by Dr Jennings (hospitalized 3/13 - 3/19), since being DC is doing well, now using O2 with his Trilogy machine, sats ranging low - mid 90's.  To see surgery soon and "get a battery of tests".  No new issues reported.  Feels less SOB but still with some chest soreness.  Has lost some weight as well.      3/7/2019 - Here for follow up, has been doing well.  Now getting around without oxygen.  Has had some issues with his TRILOGY machine (we have discussed this and he will contact NARINDER),  Also is requesting portable O2 concentrator (will need to requalify).  Overall doing well, had 6 minute walk test about 2 months ago sat to 89% did not have O2 put on - need to repeat.    3/31/2020 - Virtual Visit      The chief complaint leading to consultation is: follow up after recent hospital stay at [facility]  The patient location is:  home  Visit type: Virtual visit with synchronous audio and video    I haven't seen pt in about 1 year.  Recently had an episode of respiratory failure (was Covid-19 negative, human metapneumonia +.  He did require intubation and ventilation for a few days.  He did have an epsiode of delerium which is better now.  His pulmonary hypertension is being managed by " Dr Lopez at [facility] (currently on 3 meds).  Breathing is better now.  Has had issues with depression as well.  Trilogy machine was removed a few months ago (I just found out about this now - [employer] provided service).  He has no known corona virus exposures.    I have discussed the limitations of a virtual visit with the patient including the fact that there are no vital signs and no way to fully examine the patient.  This could lead to misdiagnosis and possibly to delays in appropriate care.    The physical exam in this note is pulled forward from prior visits.  It will be updated based on any findings which I can discern based on seeing the patient on a video screen.  I will not eliminate findings from prior exams at this visit.          Home Oxygen    Qualifier - O2 sat 88% on room air      Date - 7/18/2017                   + Exertion      O2 sat 94 on 2 LPM via nasal cannuli    DX - pulmonary embolism, obesity hypoventilation    Face to face visit with pt concerning the need for O2 therapy, all questions answered.  I stressed the need for compliance.  Pt to call with any questions.    NIPPV for Obesity Hypoventilation    Ordering nocturnal volume ventilator PRN use to reduce the risk of exacerbation.  Home BiPAP in sufficient due to the severity of the condition.  Obesity hypoventilation is the cause of the chronic respiratory failure.            Medications    Current Outpatient Medications:     apixaban 5 mg Tab, Take 1 tablet (5 mg total) by mouth 2 (two) times daily., Disp: 60 tablet, Rfl: 11    aspirin (ECOTRIN) 81 MG EC tablet, Take 1 tablet (81 mg total) by mouth once daily., Disp: , Rfl: 0    atorvastatin (LIPITOR) 40 MG tablet, Take 1 tablet (40 mg total) by mouth once daily., Disp: 30 tablet, Rfl: 11    bumetanide (BUMEX) 2 MG tablet, Take 1 tablet (2 mg total) by mouth 2 (two) times daily., Disp: 60 tablet, Rfl: 11    cetirizine (ZYRTEC) 10 MG tablet, Take 10 mg by mouth once daily. , Disp: ,  Rfl:     fluticasone (FLONASE) 50 mcg/actuation nasal spray, 1 spray by Each Nare route daily as needed for Rhinitis., Disp: , Rfl:     KLOR-CON M20 20 mEq tablet, TAKE 1 TABLET BY MOUTH TWICE A DAY, Disp: 60 tablet, Rfl: 11    macitentan (OPSUMIT) 10 mg Tab, Take 10 mg by mouth once daily., Disp: , Rfl:     omeprazole (PRILOSEC) 20 MG capsule, Take 20 mg by mouth 2 (two) times daily. , Disp: , Rfl:     QUEtiapine (SEROQUEL) 400 MG tablet, Take 1 tablet (400 mg total) by mouth every evening., Disp: 30 tablet, Rfl: 11    riociguat (ADEMPAS) 2.5 mg tablet, Take 2.5 mg by mouth 3 (three) times daily. Take 1 tablet, by mouth, three times daily., Disp: , Rfl:     selexipag 200 mcg Tab, Take 200 mcg by mouth 2 (two) times daily., Disp: 60 tablet, Rfl: 1    spironolactone (ALDACTONE) 25 MG tablet, Take 1 tablet (25 mg total) by mouth once daily., Disp: 30 tablet, Rfl: 10    VENTOLIN HFA 90 mcg/actuation inhaler, TAKE 1 PUFF EVERY 6 HOURS AS NEEDED FOR WHEEZING/SOB, Disp: , Rfl: 6    Allergies  Review of patient's allergies indicates:   Allergen Reactions    Clopidogrel        Social History    Social History     Tobacco Use   Smoking Status Never Smoker   Smokeless Tobacco Never Used     ETOH - 0 drinks per week.  Social History     Substance and Sexual Activity   Drug Use No     Occupation - not working, worked at [employer]    Family History  History reviewed. No pertinent family history.    ROS  Review of Systems   Constitutional: Positive for malaise/fatigue. Negative for chills, diaphoresis, fever and weight loss.   HENT: Negative for congestion.    Eyes: Negative for pain.   Respiratory: Positive for shortness of breath. Negative for cough, hemoptysis, sputum production, wheezing and stridor.    Cardiovascular: Positive for leg swelling. Negative for chest pain, palpitations, orthopnea, claudication and PND.   Gastrointestinal: Negative for abdominal pain, constipation, diarrhea, heartburn, nausea and vomiting.    Genitourinary: Negative for dysuria, frequency and urgency.   Musculoskeletal: Negative for falls and myalgias.   Neurological: Negative for sensory change, focal weakness and weakness.   Psychiatric/Behavioral: Negative for depression. The patient is not nervous/anxious.        Physical Exam    There were no vitals filed for this visit.    Physical Exam   Constitutional: He is oriented to person, place, and time. He appears well-developed and well-nourished. No distress.   HENT:   Head: Normocephalic and atraumatic.   Nose: Nose normal.   Mouth/Throat: Oropharynx is clear and moist.   Eyes: Pupils are equal, round, and reactive to light. EOM are normal.   Neck: Normal range of motion. Neck supple. No JVD present. No tracheal deviation present. No thyromegaly present.   Cardiovascular: Normal rate, regular rhythm, normal heart sounds and intact distal pulses. Exam reveals no gallop and no friction rub.   No murmur heard.  Pulmonary/Chest: Effort normal and breath sounds normal. No stridor. No respiratory distress. He has no wheezes. He has no rales. He exhibits no tenderness.   Abdominal: Soft. Bowel sounds are normal. He exhibits no distension.   obese   Musculoskeletal: Normal range of motion. He exhibits edema. He exhibits no tenderness.   Lymphadenopathy:     He has no cervical adenopathy.   Neurological: He is alert and oriented to person, place, and time. He has normal reflexes. No cranial nerve deficit.   Skin: He is not diaphoretic.   Psychiatric: He has a normal mood and affect. His behavior is normal.   Nursing note and vitals reviewed.      Lab        Xray      Impression/Plan  Problem List Items Addressed This Visit        Pulmonary    Pulmonary embolism  - findings consistent with unresolved clots and CTEPH - studies reviewed  - s/p surgery per Dr Jennings  - Continue present meds  - RTC 3 months      Chronic respiratory failure with hypoxia  - on O2  - oxygenation is better  - needs O2 concentrator  serviced    Pulmonary hypertension  - on meds per [facility](Dr Hinojosa)    Edema  - reasonably controlled         Fluids/Electrolytes/Nutrition/GI    Obesity hypoventilation syndrome  - did better with NIPPV but has been removed  - will contact [facility]ER    Respiratory failure  - recent episode  - has recovered  - seems to be at baseline        Other Visit Diagnoses    None.       Total time spent with patient: 20    Each patient to whom he or she provides medical services by telemedicine is:  (1) informed of the relationship between the physician and patient and the respective role of any other health care provider with respect to management of the patient; and (2) notified that he or she may decline to receive medical services by telemedicine and may withdraw from such care at any time.      George Montaño MD

## 2020-04-01 ENCOUNTER — TELEPHONE (OUTPATIENT)
Dept: PULMONOLOGY | Facility: CLINIC | Age: 50
End: 2020-04-01

## 2020-04-01 DIAGNOSIS — G47.33 OBSTRUCTIVE SLEEP APNEA SYNDROME: Primary | ICD-10-CM

## 2020-04-01 NOTE — TELEPHONE ENCOUNTER
Spoke with Alireza from [facility] and he stated not only did they have a non compliance issue with the patient but when they contacted the patient to get the equipment back the patient refused to give it back and then fled with machine to a [place] location. I spoke with Alireza as to how we could go about getting the machine back for the pt but alireza stated we would have many issues due to patient insurance and the non compliance issue. We may not be able to find a company to accept pt.

## 2020-04-08 ENCOUNTER — OFFICE VISIT (OUTPATIENT)
Dept: TRANSPLANT | Facility: CLINIC | Age: 50
End: 2020-04-08
Payer: MEDICAID

## 2020-04-08 VITALS — DIASTOLIC BLOOD PRESSURE: 78 MMHG | WEIGHT: 315 LBS | SYSTOLIC BLOOD PRESSURE: 118 MMHG | BODY MASS INDEX: 49.42 KG/M2

## 2020-04-08 DIAGNOSIS — J96.11 CHRONIC RESPIRATORY FAILURE WITH HYPOXIA: ICD-10-CM

## 2020-04-08 DIAGNOSIS — I27.82 CHRONIC PULMONARY EMBOLISM WITHOUT ACUTE COR PULMONALE, UNSPECIFIED PULMONARY EMBOLISM TYPE: ICD-10-CM

## 2020-04-08 DIAGNOSIS — I25.10 CORONARY ARTERY DISEASE INVOLVING NATIVE CORONARY ARTERY OF NATIVE HEART WITHOUT ANGINA PECTORIS: ICD-10-CM

## 2020-04-08 DIAGNOSIS — I50.32 CHRONIC DIASTOLIC HEART FAILURE: ICD-10-CM

## 2020-04-08 DIAGNOSIS — I27.9 CHRONIC PULMONARY HEART DISEASE: ICD-10-CM

## 2020-04-08 DIAGNOSIS — Z95.5 PRESENCE OF STENT IN CORONARY ARTERY: ICD-10-CM

## 2020-04-08 DIAGNOSIS — I27.24 CTEPH (CHRONIC THROMBOEMBOLIC PULMONARY HYPERTENSION): Primary | ICD-10-CM

## 2020-04-08 DIAGNOSIS — F30.2 BIPOLAR I DISORDER, SINGLE MANIC EPISODE, SEVERE, WITH PSYCHOSIS: ICD-10-CM

## 2020-04-08 DIAGNOSIS — I10 BENIGN ESSENTIAL HYPERTENSION: ICD-10-CM

## 2020-04-08 DIAGNOSIS — I27.21 PULMONARY ARTERY HYPERTENSION: ICD-10-CM

## 2020-04-08 DIAGNOSIS — E66.2 OBESITY HYPOVENTILATION SYNDROME: ICD-10-CM

## 2020-04-08 DIAGNOSIS — E78.2 MIXED HYPERLIPIDEMIA: ICD-10-CM

## 2020-04-08 DIAGNOSIS — E66.01 SEVERE OBESITY (BMI >= 40): ICD-10-CM

## 2020-04-08 PROCEDURE — 99214 OFFICE O/P EST MOD 30 MIN: CPT | Mod: 95,,, | Performed by: INTERNAL MEDICINE

## 2020-04-08 PROCEDURE — 99214 PR OFFICE/OUTPT VISIT, EST, LEVL IV, 30-39 MIN: ICD-10-PCS | Mod: 95,,, | Performed by: INTERNAL MEDICINE

## 2020-04-08 NOTE — PROGRESS NOTES
Subjective:    Patient ID:  Jaquan Hines Jr. is a 49 y.o. male who presents for follow-up of Pulmonary Hypertension.    The patient location is: home (COVID outbreak)  The chief complaint leading to consultation is: PH f/u  Visit type: Virtual visit with synchronous audio and video  Total time spent with patient: 15 min  Each patient to whom he or she provides medical services by telemedicine is:  (1) informed of the relationship between the physician and patient and the respective role of any other health care provider with respect to management of the patient; and (2) notified that he or she may decline to receive medical services by telemedicine and may withdraw from such care at any time.          HPI  48 yo man with HTN, morbid obesity with OHS and chronic hypoxic resp failure, HTN, referred by Dr Jennings for management of PH- pt underwent PTE  on 3/13/18. Postoperatively, the patient was taken from the  Operating Room to the ICU where the vital signs were monitored and pain was kept under control. The patient was weaned from the drips and extubated in the ICU per protocol. Once hemodynamically stable, the patient was transferred to the Cardiac Step-Down floor for continued strengthening and ambulation. On postoperative day 6, the patient was ready for discharge to home. At the time of discharge, the patient was ambulating unassisted. Pain was well controlled with oral analgesics and the patient was tolerating the diet.  Initial hemodynamics in ICU  PA 56/16 (28) CVP 17 PCW 21  Co 8.5/Ci 3.4 SVO2 77%   Prior to PAC removal PA 44/22- now here for PH f/u (on adempas and uptravi, 2.5mg and 600mg respectively)    Since last visit, pt was admitted to OSH with acute resp failure- transferred and here 3/12-3/20/20- Admitted to MICU service for respiratory distress on a ventilator- He was ruled out for COVID-19.  RVP was positive for human metapneumovirus.  He was also treated for bacterial pneumonia.  He was then  transferred over to the [facility] primary service.  Post extubation, he had issues with delirium/malissa or hypomanic episodes.  Previously on Celexa, Geodon, Seroquel.  Psychiatry was consulted who recommended discontinuing Celexa and Geodon.  His Seroquel was increased to 400 mg q.h.s..  After 48 hr monitoring his QTC was normal and he was tolerating this medication well.  He was evaluated by PT/OT recommended home health with PT OT services    Since dc home says he back to his baseline in terms of his psychosis and his breathing- staying home for the covid outbreak- says his only activty is going to the BR and kitchen and back to bed- doesn't get SOB walking around the house but still gets LH/dizzy/disoriented (has been ongoing)  No fluid retention. No CP.  He is still uptitrating uptravi- up to 600mg bid and goes to 800mcg tomorrow- doing ok with SE, back on full dose adempas and opsumit  Has been using a walk in tub  bumex is 2mg bid with aldactone- urinates a good bit for an  Hour or two once it kicks in-  Wt 335# in marco a now 325# and feels like his breathing is better and is carrying less water    Pulm: Dr Vasques    Card Dr Miguel Ángel Greene    TTE 3/13/20  · Limited Echo.  · Normal left ventricular systolic function. The estimated ejection fraction is 65%.  · Indeterminate left ventricular diastolic function.  · No wall motion abnormalities.  · Normal right ventricular systolic function.  · The estimated PA systolic pressure is 49 mmHg. Pulmonary hypertension present.  · Elevated central venous pressure (15 mmHg).    TTE 1/24/20  · Low normal left ventricular systolic function. The estimated ejection fraction is 55%.  · Septal wall has abnormal motion.  · Local segmental wall motion abnormalities.  · Normal LV diastolic function.  · Low normal right ventricular systolic function.  RV 3.4cm TAPSE 1.7 cm  · Moderate biatrial enlargement.  · Mild tricuspid regurgitation.  · Mild mitral regurgitation.  · The estimated PA  systolic pressure is 27 mmHg.  · Normal central venous pressure (3 mmHg).  · The mean diastolic gradient across the mitral valve is 35 mmHg at a heart rate of bpm  TTE 1/11/19  · Normal left ventricular systolic function. The estimated ejection fraction is 55%  · Normal right ventricular systolic function.  · Normal LV diastolic function.  · Trace tricuspid regurgitation. Max velocity visualized 2 m/s; however, TR jets are poor.  · Normal central venous pressure (3 mm Hg).  · Mild left atrial enlargement.  · Technically challenging study  ·   Six Minute Walk Test:   341m (366m 4/19, 335 m, 1/19,  305  m    )                                              O2 sat  95 ->89  %    On 3L pull tank                                                       HR 84  -> 130                                                                 BP  139/66  ->149/68                                                         Ammon   1 -> 7-8    Echo        Results for orders placed or performed during the hospital encounter of 05/31/18   2D echo with color flow doppler   Result Value Ref Range    QEF 55 55 - 65    Mitral Valve Regurgitation MILD     Diastolic Dysfunction No     Est. PA Systolic Pressure 38.19     Pericardial Effusion SMALL (A)     Tricuspid Valve Regurgitation MILD    left atrial volume index is severely enlarged, measuring 217.27 cc/m2.   right ventricle is enlarged measuring 4.1 cm at the base in the apical right ventricle-focused view. RV free wall thickness is increased measuring 0.7 cm in Subcostal view, consistent with RVH. Global right ventricular systolic   function appears mildly depressed. There is abnormal septal motion. Tricuspid annular plane systolic excursion (TAPSE) is 2.1 cm. Tissue Doppler-derived tricuspid annular peak systolic velocity (S prime) is 13.0 cm/s. The estimated PA systolic pressure   is 81 mmHg.     EKG   /    /      R/LHC  12 /16    Pressures in mmHg, right heart pressures, pulmonary capillary  wedge pressure mean of 28, pulmonary artery 70-80/29-35, mean of 45-50, RV   average of 75/25.  RA mean of 18.  Average cardiac output was 4.3 liters per minute.  Left heart pressures,   central aortic pressure 90/60, left ventricle 97/15-20.  No gradient upon pullback.    Left Coronary Artery:  Left main coronary artery was of medium caliber, free of disease.  It bifurcated into left anterior descending and   circumflex arteries.  In the proximal portion of the left anterior descending artery, there was a stent that had a   20% concentric narrowing with no what appear to be flow limiting abnormalities.  The continuation of the vessel was normal and free of   disease.  There was a large diagonal branch that arose from the proximal portion of the left anterior descending artery   that was normal and free of disease.    Circumflex System:  The circumflex system was of medium caliber with high obtuse marginal branch arising from it and a second longer obtuse marginal   branch, both of which were normal and free of disease.    Right Coronary Artery:  The right coronary artery had some mild diffuse plaquing throughout its course with no evidence of significant obstructive   disease.  It did give rise to posterior descending and posterolateral branch.    Left Ventriculogram:  The left ventricle demonstrated normal left ventricular function with EF estimated at approximately 60%; however,   there was much ventricular ectopy during the examination and I could not tell if there was any significant mitral regurgitation.      CXR  3 /   18  Cardiomegaly.    CT Chest   8 / 17Suboptimal exam due to timing of the contrast bolus and motion. Apparent Low-density intraluminal filling defect in the proximal right lower lobar artery, best seen series 300 image 29. Apparent intraluminal filling defect in the distal left lower lobar   artery. Apparent small filling defects within segmental branches in the left upper lobe.     There is  no evidence of pleural effusion or pneumothorax.  There is a transvenous wire with tip along the interventricular septum. Normal size heart. Reflux of contrast into the hepatic veins. Coronary artery disease. Small pericardial effusion. There   is no evidence of lymphadenopathy.  No acute osseous abnormality. Intramuscular lipoma within the left subscapularis    PFTs    11 /17  FVC    3.2  L  74  % pred  FEV1 2.12L   60  % pred  FEV1/FVC  66    %  Patient passed out during test X 2.    V/Q Scan  11/17  Two large mismatched perfusion defects in the left and right lower lobes of the lung respectively, and one moderate mismatch perfusion defects in the left upper lobe.    Review of Systems   Constitution: Positive for weight loss. Negative for chills, fever, malaise/fatigue and weight gain.   HENT: Negative.    Eyes: Negative.    Cardiovascular: Positive for dyspnea on exertion. Negative for chest pain, near-syncope, orthopnea, palpitations, paroxysmal nocturnal dyspnea and syncope.   Respiratory: Negative for cough and shortness of breath.    Endocrine: Negative.    Skin: Negative.    Musculoskeletal: Negative.    Gastrointestinal: Negative for bloating, abdominal pain and change in bowel habit.   Neurological: Positive for light-headedness and loss of balance. Negative for dizziness.   Psychiatric/Behavioral: Negative for depression.        Objective:  /78   Wt (!) 147.4 kg (325 lb)   BMI 49.42 kg/m²          Limited PE by video chat  Comfortable on RA, NAD  No JVD  Normal respiratory effort  No abd distension  No LE edema          Chemistry        Component Value Date/Time     03/20/2020 0600     02/15/2017 1615    K 3.1 (L) 03/20/2020 0600    CL 99 03/20/2020 0600     02/15/2017 1615    CO2 30 (H) 03/20/2020 0600    BUN 16 03/20/2020 0600    CREATININE 0.8 03/20/2020 0600    CREATININE 1.11 02/15/2017 1615     03/20/2020 0600     (H) 02/15/2017 1615        Component Value  Date/Time    CALCIUM 8.4 (L) 03/20/2020 0600    ALKPHOS 79 03/20/2020 0600    AST 21 03/20/2020 0600    ALT 35 03/20/2020 0600    BILITOT 0.7 03/20/2020 0600    ESTGFRAFRICA >60.0 03/20/2020 0600    EGFRNONAA >60.0 03/20/2020 0600            Magnesium   Date Value Ref Range Status   03/20/2020 2.1 1.6 - 2.6 mg/dL Final       Lab Results   Component Value Date    WBC 9.86 03/20/2020    HGB 8.2 (L) 03/20/2020    HCT 29.0 (L) 03/20/2020    MCV 79 (L) 03/20/2020     03/20/2020       Lab Results   Component Value Date    INR 1.1 05/31/2018    INR 2.7 (H) 03/19/2018    INR 2.1 (H) 03/18/2018       BNP   Date Value Ref Range Status   03/12/2020 69 0 - 99 pg/mL Final     Comment:     Values of less than 100 pg/ml are consistent with non-CHF populations.   01/24/2020 62 0 - 99 pg/mL Final     Comment:     Values of less than 100 pg/ml are consistent with non-CHF populations.   04/23/2019 42 0 - 99 pg/mL Final     Comment:     Values of less than 100 pg/ml are consistent with non-CHF populations.       No results found for: LDH          Assessment:       1. CTEPH (chronic thromboembolic pulmonary hypertension)- s/p PTE with dramatic improvement in PAP. FC subjectively better, small drop on 6mw today, but echo with improved RV size and fxn and low PAP   2. Chronic respiratory failure with hypoxia    3. Coronary artery disease involving native coronary artery of native heart without angina pectoris    4. Benign essential hypertension    5. Obesity hypoventilation syndrome    6. Presence of stent in coronary artery    7. Severe obesity (BMI >= 40)  Body mass index is 49.42 kg/m².     8. Other chronic pulmonary embolism without acute cor pulmonale    9. Stress hyperglycemia    10. Chronic diastolic heart failure      WHO Group:4  Functional Class 2-3     Plan:         Cont opsumit  adempas and uptravi  (keep uptitrating back to full dose  uptravi as tolerated)    Cont bumex 2mg twice daily , Continue  spironolactone 25mg  once a  Day     Cont O2 with ANY exertion (he uses it mainly to recover as his sats stay mostly between 91-94%, knows to put it on when it dips into the 80's)      Keep salt intake to under 2000 mg sodium, fluids to under 2 L (64 oz)    Check weights every morning after getting out of bed and urinating. If weight goes up 3# overnight or 5# in one week he should take an extra bumex as above, if he does this a couple days in a row and it doesn't come off, he should call us     F/u 3-4 mo with walk and labs if covid outbreak has ended, sooner prn, virtual prn

## 2020-04-27 ENCOUNTER — TELEPHONE (OUTPATIENT)
Dept: TRANSPLANT | Facility: CLINIC | Age: 50
End: 2020-04-27

## 2020-05-13 ENCOUNTER — PATIENT MESSAGE (OUTPATIENT)
Dept: TRANSPLANT | Facility: CLINIC | Age: 50
End: 2020-05-13

## 2020-06-04 ENCOUNTER — TELEPHONE (OUTPATIENT)
Dept: TRANSPLANT | Facility: CLINIC | Age: 50
End: 2020-06-04

## 2020-07-21 ENCOUNTER — OFFICE VISIT (OUTPATIENT)
Dept: TRANSPLANT | Facility: CLINIC | Age: 50
End: 2020-07-21
Payer: MEDICAID

## 2020-07-21 VITALS — WEIGHT: 315 LBS | BODY MASS INDEX: 47.74 KG/M2 | HEIGHT: 68 IN

## 2020-07-21 DIAGNOSIS — J96.11 CHRONIC RESPIRATORY FAILURE WITH HYPOXIA: ICD-10-CM

## 2020-07-21 DIAGNOSIS — E66.01 SEVERE OBESITY (BMI >= 40): ICD-10-CM

## 2020-07-21 DIAGNOSIS — I25.10 CORONARY ARTERY DISEASE INVOLVING NATIVE CORONARY ARTERY OF NATIVE HEART WITHOUT ANGINA PECTORIS: ICD-10-CM

## 2020-07-21 DIAGNOSIS — I50.32 CHRONIC DIASTOLIC HEART FAILURE: ICD-10-CM

## 2020-07-21 DIAGNOSIS — E66.2 OBESITY HYPOVENTILATION SYNDROME: ICD-10-CM

## 2020-07-21 DIAGNOSIS — F30.2 BIPOLAR I DISORDER, SINGLE MANIC EPISODE, SEVERE, WITH PSYCHOSIS: ICD-10-CM

## 2020-07-21 DIAGNOSIS — I27.82 CHRONIC PULMONARY EMBOLISM WITHOUT ACUTE COR PULMONALE, UNSPECIFIED PULMONARY EMBOLISM TYPE: ICD-10-CM

## 2020-07-21 DIAGNOSIS — I27.24 CTEPH (CHRONIC THROMBOEMBOLIC PULMONARY HYPERTENSION): Primary | ICD-10-CM

## 2020-07-21 DIAGNOSIS — I10 BENIGN ESSENTIAL HYPERTENSION: ICD-10-CM

## 2020-07-21 DIAGNOSIS — E78.2 MIXED HYPERLIPIDEMIA: ICD-10-CM

## 2020-07-21 PROCEDURE — 99214 PR OFFICE/OUTPT VISIT, EST, LEVL IV, 30-39 MIN: ICD-10-PCS | Mod: 95,,, | Performed by: INTERNAL MEDICINE

## 2020-07-21 PROCEDURE — 99214 OFFICE O/P EST MOD 30 MIN: CPT | Mod: 95,,, | Performed by: INTERNAL MEDICINE

## 2020-07-21 NOTE — PROGRESS NOTES
Subjective:    Patient ID:  Jaquan Hines Jr. is a 50 y.o. male who presents for follow-up of Pulmonary Hypertension.    The patient location is: home (COVID outbreak)  The chief complaint leading to consultation is: PH f/u  Visit type: Virtual visit with synchronous audio and video  Total time spent with patient: 15 min  Each patient to whom he or she provides medical services by telemedicine is:  (1) informed of the relationship between the physician and patient and the respective role of any other health care provider with respect to management of the patient; and (2) notified that he or she may decline to receive medical services by telemedicine and may withdraw from such care at any time.          HPI  49 yo man with HTN, morbid obesity with OHS and chronic hypoxic resp failure, HTN, referred by Dr Jennings for management of PH- pt underwent PTE  on 3/13/18. Postoperatively, the patient was taken from the  Operating Room to the ICU where the vital signs were monitored and pain was kept under control. The patient was weaned from the drips and extubated in the ICU per protocol. Once hemodynamically stable, the patient was transferred to the Cardiac Step-Down floor for continued strengthening and ambulation. On postoperative day 6, the patient was ready for discharge to home. At the time of discharge, the patient was ambulating unassisted. Pain was well controlled with oral analgesics and the patient was tolerating the diet.  Initial hemodynamics in ICU  PA 56/16 (28) CVP 17 PCW 21  Co 8.5/Ci 3.4 SVO2 77%   Prior to PAC removal PA 44/22- now here for PH f/u (on adempas and uptravi, 2.5mg and 600mg respectively)  admitted to OSH with acute resp failure- transferred and here 3/12-3/20/20- Admitted to MICU service for respiratory distress on a ventilator- He was ruled out for COVID-19.  RVP was positive for human metapneumovirus.  He was also treated for bacterial pneumonia.  He was then transferred over to the [facility]  primary service.  Post extubation, he had issues with delirium/malissa or hypomanic episodes.  Previously on Celexa, Geodon, Seroquel.  Psychiatry was consulted who recommended discontinuing Celexa and Geodon.  His Seroquel was increased to 400 mg q.h.s..  After 48 hr monitoring his QTC was normal and he was tolerating this medication well.  He was evaluated by PT/OT recommended home health with PT OT services    Since our last virtual visit pt says he's been alright- keeping busy surfing the internet, watching tv- hasnt left the house-his sister is really worried as she works in the ER- doing things around the house he doesn't have any problems with his breathing, but mainly just goes from kitchen to BR to couch to bed- says the meds are amazing- has a walk in tub and has absolutely no trouble with his lighter ADLS includng showering  2011 wt 180#, now 320-330# though he hasnt gained wt with COVID-  With change to bumex/aldactone hasnt had any trouble with swelling    He is back up to 1600mg bid- doing ok with SE, back on full dose adempas and opsumit- still has GI SE- takes fiber and its tolerable    bumex is 2mg bid with aldactone- did not check his BP this am, but wt today was 326.6    Pulm: Dr Vasques    Card Dr Miguel Ángel Greene    TTE 3/13/20  · Limited Echo.  · Normal left ventricular systolic function. The estimated ejection fraction is 65%.  · Indeterminate left ventricular diastolic function.  · No wall motion abnormalities.  · Normal right ventricular systolic function.  · The estimated PA systolic pressure is 49 mmHg. Pulmonary hypertension present.  · Elevated central venous pressure (15 mmHg).    TTE 1/24/20  · Low normal left ventricular systolic function. The estimated ejection fraction is 55%.  · Septal wall has abnormal motion.  · Local segmental wall motion abnormalities.  · Normal LV diastolic function.  · Low normal right ventricular systolic function.  RV 3.4cm TAPSE 1.7 cm  · Moderate biatrial  enlargement.  · Mild tricuspid regurgitation.  · Mild mitral regurgitation.  · The estimated PA systolic pressure is 27 mmHg.  · Normal central venous pressure (3 mmHg).  · The mean diastolic gradient across the mitral valve is 35 mmHg at a heart rate of bpm  TTE 1/11/19  · Normal left ventricular systolic function. The estimated ejection fraction is 55%  · Normal right ventricular systolic function.  · Normal LV diastolic function.  · Trace tricuspid regurgitation. Max velocity visualized 2 m/s; however, TR jets are poor.  · Normal central venous pressure (3 mm Hg).  · Mild left atrial enlargement.  · Technically challenging study  ·   Six Minute Walk Test:   341m (366m 4/19, 335 m, 1/19,  305  m    )                                              O2 sat  95 ->89  %    On 3L pull tank                                                       HR 84  -> 130                                                                 BP  139/66  ->149/68                                                         Ammon   1 -> 7-8    Echo        Results for orders placed or performed during the hospital encounter of 05/31/18   2D echo with color flow doppler   Result Value Ref Range    QEF 55 55 - 65    Mitral Valve Regurgitation MILD     Diastolic Dysfunction No     Est. PA Systolic Pressure 38.19     Pericardial Effusion SMALL (A)     Tricuspid Valve Regurgitation MILD    left atrial volume index is severely enlarged, measuring 217.27 cc/m2.   right ventricle is enlarged measuring 4.1 cm at the base in the apical right ventricle-focused view. RV free wall thickness is increased measuring 0.7 cm in Subcostal view, consistent with RVH. Global right ventricular systolic   function appears mildly depressed. There is abnormal septal motion. Tricuspid annular plane systolic excursion (TAPSE) is 2.1 cm. Tissue Doppler-derived tricuspid annular peak systolic velocity (S prime) is 13.0 cm/s. The estimated PA systolic pressure   is 81 mmHg.     EKG    /    /      R/LHC  12 /16    Pressures in mmHg, right heart pressures, pulmonary capillary wedge pressure mean of 28, pulmonary artery 70-80/29-35, mean of 45-50, RV   average of 75/25.  RA mean of 18.  Average cardiac output was 4.3 liters per minute.  Left heart pressures,   central aortic pressure 90/60, left ventricle 97/15-20.  No gradient upon pullback.    Left Coronary Artery:  Left main coronary artery was of medium caliber, free of disease.  It bifurcated into left anterior descending and   circumflex arteries.  In the proximal portion of the left anterior descending artery, there was a stent that had a   20% concentric narrowing with no what appear to be flow limiting abnormalities.  The continuation of the vessel was normal and free of   disease.  There was a large diagonal branch that arose from the proximal portion of the left anterior descending artery   that was normal and free of disease.    Circumflex System:  The circumflex system was of medium caliber with high obtuse marginal branch arising from it and a second longer obtuse marginal   branch, both of which were normal and free of disease.    Right Coronary Artery:  The right coronary artery had some mild diffuse plaquing throughout its course with no evidence of significant obstructive   disease.  It did give rise to posterior descending and posterolateral branch.    Left Ventriculogram:  The left ventricle demonstrated normal left ventricular function with EF estimated at approximately 60%; however,   there was much ventricular ectopy during the examination and I could not tell if there was any significant mitral regurgitation.      CXR  3 /   18  Cardiomegaly.    CT Chest   8 / 17Suboptimal exam due to timing of the contrast bolus and motion. Apparent Low-density intraluminal filling defect in the proximal right lower lobar artery, best seen series 300 image 29. Apparent intraluminal filling defect in the distal left lower lobar   artery.  "Apparent small filling defects within segmental branches in the left upper lobe.     There is no evidence of pleural effusion or pneumothorax.  There is a transvenous wire with tip along the interventricular septum. Normal size heart. Reflux of contrast into the hepatic veins. Coronary artery disease. Small pericardial effusion. There   is no evidence of lymphadenopathy.  No acute osseous abnormality. Intramuscular lipoma within the left subscapularis    PFTs    11 /17  FVC    3.2  L  74  % pred  FEV1 2.12L   60  % pred  FEV1/FVC  66    %  Patient passed out during test X 2.    V/Q Scan  11/17  Two large mismatched perfusion defects in the left and right lower lobes of the lung respectively, and one moderate mismatch perfusion defects in the left upper lobe.    Review of Systems   Constitution: Negative for chills, fever, malaise/fatigue, weight gain and weight loss.   HENT: Negative.    Eyes: Negative.    Cardiovascular: Negative for chest pain, dyspnea on exertion, near-syncope, orthopnea, palpitations, paroxysmal nocturnal dyspnea and syncope.   Respiratory: Negative for cough and shortness of breath.    Endocrine: Negative.    Skin: Negative.    Musculoskeletal: Negative.    Gastrointestinal: Negative for bloating, abdominal pain and change in bowel habit.   Neurological: Negative for dizziness and light-headedness.   Psychiatric/Behavioral: Negative for depression.        Objective:  Ht 5' 8" (1.727 m)   Wt (!) 148.1 kg (326 lb 9.6 oz)   BMI 49.66 kg/m²          Limited PE by video chat  Comfortable on RA, NAD  No JVD  Normal respiratory effort  No abd distension  No LE edema          Chemistry        Component Value Date/Time     03/20/2020 0600     02/15/2017 1615    K 3.1 (L) 03/20/2020 0600    CL 99 03/20/2020 0600     02/15/2017 1615    CO2 30 (H) 03/20/2020 0600    BUN 16 03/20/2020 0600    CREATININE 0.8 03/20/2020 0600    CREATININE 1.11 02/15/2017 1615     03/20/2020 0600    "  (H) 02/15/2017 1615        Component Value Date/Time    CALCIUM 8.4 (L) 03/20/2020 0600    ALKPHOS 79 03/20/2020 0600    AST 21 03/20/2020 0600    ALT 35 03/20/2020 0600    BILITOT 0.7 03/20/2020 0600    ESTGFRAFRICA >60.0 03/20/2020 0600    EGFRNONAA >60.0 03/20/2020 0600            Magnesium   Date Value Ref Range Status   03/20/2020 2.1 1.6 - 2.6 mg/dL Final       Lab Results   Component Value Date    WBC 9.86 03/20/2020    HGB 8.2 (L) 03/20/2020    HCT 29.0 (L) 03/20/2020    MCV 79 (L) 03/20/2020     03/20/2020       Lab Results   Component Value Date    INR 1.1 05/31/2018    INR 2.7 (H) 03/19/2018    INR 2.1 (H) 03/18/2018       BNP   Date Value Ref Range Status   03/12/2020 69 0 - 99 pg/mL Final     Comment:     Values of less than 100 pg/ml are consistent with non-CHF populations.   01/24/2020 62 0 - 99 pg/mL Final     Comment:     Values of less than 100 pg/ml are consistent with non-CHF populations.   04/23/2019 42 0 - 99 pg/mL Final     Comment:     Values of less than 100 pg/ml are consistent with non-CHF populations.       No results found for: LDH          Assessment:       1. CTEPH (chronic thromboembolic pulmonary hypertension)- s/p PTE with dramatic improvement in PAP. FC subjectively better, no 6mw today (virtual visit), but last echo with improved RV size and fxn and low PAP   2. Chronic respiratory failure with hypoxia    3. Coronary artery disease involving native coronary artery of native heart without angina pectoris    4. Benign essential hypertension    5. Obesity hypoventilation syndrome    6. Presence of stent in coronary artery    7. Severe obesity (BMI >= 40)  Body mass index is 49.66 kg/m².     8. Other chronic pulmonary embolism without acute cor pulmonale    9. Stress hyperglycemia    10. Chronic diastolic heart failure      WHO Group:4  Functional Class 2-3     Plan:       Main change for today is that I asked him to start walking around the yard daily, working up to  30min a day- do not like how sedentary he has been since COVID started    Cont opsumit  adempas and uptravi  (now back on full dose and really doing well!)    Cont bumex 2mg twice daily , Continue  spironolactone 25mg once a  Day     Cont O2 with ANY exertion (he uses it mainly to recover as his sats stay mostly between 91-94%, knows to put it on when it dips into the 80's)    Keep salt intake to under 2000 mg sodium, fluids to under 2 L (64 oz)    Check weights every morning after getting out of bed and urinating. If weight goes up 3# overnight or 5# in one week he should take an extra bumex as above, if he does this a couple days in a row and it doesn't come off, he should call us     F/u 3-4 mo with walk and labs and repeat echo

## 2020-10-26 DIAGNOSIS — I27.9 CHRONIC PULMONARY HEART DISEASE: ICD-10-CM

## 2020-10-26 DIAGNOSIS — Z79.899 POLYPHARMACY: Primary | ICD-10-CM

## 2020-10-26 DIAGNOSIS — R06.82 TACHYPNEA: ICD-10-CM

## 2020-12-06 NOTE — PROCEDURES
Jaquan Hines Jr. is a 49 y.o.  male patient, who presents for a 6 minute walk test ordered by Edna Hinojosa MD.  The diagnosis is Pulmonary Hypertension; CTEPH.  The patient's BMI is 49.4 kg/m2.  Predicted distance (lower limit of normal) is 355.93 meters.      Test Results:    The test was completed without stopping.  The total time walked was 360 seconds.  During walking, the patient reported:  Dyspnea.  The patient used no assistive devices during testing.     01/24/2020---------Distance: 341.38 meters (1120 feet)     O2 Sat % Supplemental Oxygen Heart Rate Blood Pressure Ammon Scale   Pre-exercise  (Resting) 95 % Room Air 84 bpm 139/66 mmHg 1   During Exercise 89 % Room Air 130 bpm 149/68 mmHg 7-8   Post-exercise  (Recovery) 93 % Room Air  119 bpm       Recovery Time:  56 seconds    Performing nurse/tech:  RAINE Diaz      PREVIOUS STUDY:   04/23/2019---------Distance: 365.76 meters (1200 feet)       O2 Sat % Supplemental Oxygen Heart Rate Blood Pressure Ammon Scale   Pre-exercise  (Resting) 96 % 3 L/M 94 bpm 132/72 mmHg 3   During Exercise   88 % 3 L/M 127 bpm 168/77 mmHg 10   Post-exercise  (Recovery) 97 % 3 L/M  110 bpm 165/75 mmHg         CLINICAL INTERPRETATION:  Six minute walk distance is 341.38 meters (1120 feet) with very heavy dyspnea.  During exercise, there was significant desaturation while breathing room air.  Blood pressure remained stable and Heart rate increased significantly with walking.  This may represent a tachycardic response to exercise.  The patient did not report non-pulmonary symptoms during exercise.  Since the previous study in April 2019, exercise capacity is unchanged.  Based upon age and body mass index, exercise capacity is less than predicted.  
No

## 2021-01-06 RX ORDER — POTASSIUM CHLORIDE 20 MEQ/1
20 TABLET, EXTENDED RELEASE ORAL 2 TIMES DAILY
Qty: 60 TABLET | Refills: 11 | Status: ON HOLD | OUTPATIENT
Start: 2021-01-06 | End: 2021-05-14 | Stop reason: HOSPADM

## 2021-01-12 ENCOUNTER — OFFICE VISIT (OUTPATIENT)
Dept: TRANSPLANT | Facility: CLINIC | Age: 51
End: 2021-01-12
Payer: MEDICAID

## 2021-01-12 ENCOUNTER — HOSPITAL ENCOUNTER (OUTPATIENT)
Dept: PULMONOLOGY | Facility: CLINIC | Age: 51
Discharge: HOME | End: 2021-01-12
Payer: MEDICAID

## 2021-01-12 ENCOUNTER — HOSPITAL ENCOUNTER (OUTPATIENT)
Dept: CARDIOLOGY | Facility: HOSPITAL | Age: 51
Discharge: HOME | End: 2021-01-12
Attending: INTERNAL MEDICINE
Payer: MEDICAID

## 2021-01-12 VITALS — HEIGHT: 67 IN | BODY MASS INDEX: 49.44 KG/M2 | WEIGHT: 315 LBS

## 2021-01-12 VITALS
HEART RATE: 64 BPM | HEIGHT: 68 IN | SYSTOLIC BLOOD PRESSURE: 118 MMHG | BODY MASS INDEX: 47.74 KG/M2 | WEIGHT: 315 LBS | DIASTOLIC BLOOD PRESSURE: 78 MMHG

## 2021-01-12 VITALS
BODY MASS INDEX: 47.74 KG/M2 | OXYGEN SATURATION: 96 % | HEART RATE: 86 BPM | SYSTOLIC BLOOD PRESSURE: 133 MMHG | WEIGHT: 315 LBS | DIASTOLIC BLOOD PRESSURE: 70 MMHG | HEIGHT: 68 IN

## 2021-01-12 DIAGNOSIS — I10 BENIGN ESSENTIAL HYPERTENSION: Primary | ICD-10-CM

## 2021-01-12 DIAGNOSIS — E66.01 SEVERE OBESITY (BMI >= 40): ICD-10-CM

## 2021-01-12 DIAGNOSIS — I27.20 PULMONARY HYPERTENSION: ICD-10-CM

## 2021-01-12 DIAGNOSIS — J96.11 CHRONIC RESPIRATORY FAILURE WITH HYPOXIA: ICD-10-CM

## 2021-01-12 DIAGNOSIS — I27.9 CHRONIC PULMONARY HEART DISEASE: ICD-10-CM

## 2021-01-12 DIAGNOSIS — F30.2 BIPOLAR I DISORDER, SINGLE MANIC EPISODE, SEVERE, WITH PSYCHOSIS: ICD-10-CM

## 2021-01-12 LAB
ASCENDING AORTA: 2.75 CM
AV INDEX (PROSTH): 0.76
AV MEAN GRADIENT: 6 MMHG
AV PEAK GRADIENT: 10 MMHG
AV VALVE AREA: 2.5 CM2
AV VELOCITY RATIO: 0.76
BSA FOR ECHO PROCEDURE: 2.66 M2
CV ECHO LV RWT: 0.46 CM
DOP CALC AO PEAK VEL: 1.55 M/S
DOP CALC AO VTI: 30.62 CM
DOP CALC LVOT AREA: 3.3 CM2
DOP CALC LVOT DIAMETER: 2.04 CM
DOP CALC LVOT PEAK VEL: 1.18 M/S
DOP CALC LVOT STROKE VOLUME: 76.41 CM3
DOP CALCLVOT PEAK VEL VTI: 23.39 CM
E WAVE DECELERATION TIME: 217.54 MSEC
E/A RATIO: 1.36
E/E' RATIO: 8.36 M/S
ECHO LV POSTERIOR WALL: 1.13 CM (ref 0.6–1.1)
FRACTIONAL SHORTENING: 31 % (ref 28–44)
INTERVENTRICULAR SEPTUM: 0.93 CM (ref 0.6–1.1)
LA MAJOR: 6 CM
LA MINOR: 5.56 CM
LA WIDTH: 3.99 CM
LEFT ATRIUM SIZE: 4.02 CM
LEFT ATRIUM VOLUME INDEX MOD: 29 ML/M2
LEFT ATRIUM VOLUME INDEX: 31.3 ML/M2
LEFT ATRIUM VOLUME MOD: 73 CM3
LEFT ATRIUM VOLUME: 78.69 CM3
LEFT INTERNAL DIMENSION IN SYSTOLE: 3.39 CM (ref 2.1–4)
LEFT VENTRICLE DIASTOLIC VOLUME INDEX: 44.47 ML/M2
LEFT VENTRICLE DIASTOLIC VOLUME: 111.87 ML
LEFT VENTRICLE MASS INDEX: 72 G/M2
LEFT VENTRICLE SYSTOLIC VOLUME INDEX: 18.7 ML/M2
LEFT VENTRICLE SYSTOLIC VOLUME: 47.15 ML
LEFT VENTRICULAR INTERNAL DIMENSION IN DIASTOLE: 4.88 CM (ref 3.5–6)
LEFT VENTRICULAR MASS: 182.01 G
LV LATERAL E/E' RATIO: 6.88 M/S
LV SEPTAL E/E' RATIO: 10.64 M/S
MV A" WAVE DURATION": 9.99 MSEC
MV PEAK A VEL: 0.86 M/S
MV PEAK E VEL: 1.17 M/S
PISA TR MAX VEL: 3.12 M/S
PULM VEIN S/D RATIO: 0.81
PV PEAK D VEL: 0.84 M/S
PV PEAK S VEL: 0.68 M/S
RA MAJOR: 5.55 CM
RA PRESSURE: 3 MMHG
RA WIDTH: 3.14 CM
RIGHT VENTRICULAR END-DIASTOLIC DIMENSION: 2.82 CM
RV TISSUE DOPPLER FREE WALL SYSTOLIC VELOCITY 1 (APICAL 4 CHAMBER VIEW): 11.8 CM/S
SINUS: 2.67 CM
STJ: 2.69 CM
TDI LATERAL: 0.17 M/S
TDI SEPTAL: 0.11 M/S
TDI: 0.14 M/S
TR MAX PG: 39 MMHG
TRICUSPID ANNULAR PLANE SYSTOLIC EXCURSION: 2.03 CM
TV REST PULMONARY ARTERY PRESSURE: 42 MMHG

## 2021-01-12 PROCEDURE — 94618 PULMONARY STRESS TESTING: CPT | Mod: 26,S$PBB,, | Performed by: INTERNAL MEDICINE

## 2021-01-12 PROCEDURE — 99214 PR OFFICE/OUTPT VISIT, EST, LEVL IV, 30-39 MIN: ICD-10-PCS | Mod: S$PBB,,, | Performed by: INTERNAL MEDICINE

## 2021-01-12 PROCEDURE — 99999 PR PBB SHADOW E&M-EST. PATIENT-LVL III: CPT | Mod: PBBFAC,,, | Performed by: INTERNAL MEDICINE

## 2021-01-12 PROCEDURE — 93306 TTE W/DOPPLER COMPLETE: CPT | Mod: 26,,, | Performed by: INTERNAL MEDICINE

## 2021-01-12 PROCEDURE — 99213 OFFICE O/P EST LOW 20 MIN: CPT | Mod: PBBFAC,25 | Performed by: INTERNAL MEDICINE

## 2021-01-12 PROCEDURE — C8929 TTE W OR WO FOL WCON,DOPPLER: HCPCS

## 2021-01-12 PROCEDURE — 93306 ECHO (CUPID ONLY): ICD-10-PCS | Mod: 26,,, | Performed by: INTERNAL MEDICINE

## 2021-01-12 PROCEDURE — 94618 PULMONARY STRESS TESTING: ICD-10-PCS | Mod: 26,S$PBB,, | Performed by: INTERNAL MEDICINE

## 2021-01-12 PROCEDURE — 99999 PR PBB SHADOW E&M-EST. PATIENT-LVL III: ICD-10-PCS | Mod: PBBFAC,,, | Performed by: INTERNAL MEDICINE

## 2021-01-12 PROCEDURE — 99214 OFFICE O/P EST MOD 30 MIN: CPT | Mod: S$PBB,,, | Performed by: INTERNAL MEDICINE

## 2021-01-12 PROCEDURE — 94618 PULMONARY STRESS TESTING: CPT | Mod: PBBFAC | Performed by: INTERNAL MEDICINE

## 2021-01-12 RX ORDER — CITALOPRAM 40 MG/1
40 TABLET, FILM COATED ORAL DAILY
Status: ON HOLD | COMMUNITY
End: 2022-05-07 | Stop reason: HOSPADM

## 2021-01-26 RX ORDER — MACITENTAN 10 MG/1
10 TABLET, FILM COATED ORAL DAILY
Qty: 30 TABLET | Refills: 11 | Status: ON HOLD | OUTPATIENT
Start: 2021-01-26 | End: 2021-05-14 | Stop reason: HOSPADM

## 2021-01-28 RX ORDER — BUMETANIDE 2 MG/1
2 TABLET ORAL 2 TIMES DAILY
Qty: 60 TABLET | Refills: 11 | Status: ON HOLD | OUTPATIENT
Start: 2021-01-28 | End: 2021-05-14 | Stop reason: HOSPADM

## 2021-01-28 RX ORDER — SPIRONOLACTONE 25 MG/1
25 TABLET ORAL DAILY
Qty: 30 TABLET | Refills: 10 | Status: ON HOLD | OUTPATIENT
Start: 2021-01-28 | End: 2021-05-14 | Stop reason: HOSPADM

## 2021-01-28 RX ORDER — ATORVASTATIN CALCIUM 40 MG/1
40 TABLET, FILM COATED ORAL DAILY
Qty: 30 TABLET | Refills: 11 | Status: SHIPPED | OUTPATIENT
Start: 2021-01-28

## 2021-03-23 ENCOUNTER — OFFICE VISIT (OUTPATIENT)
Dept: PULMONOLOGY | Facility: CLINIC | Age: 51
End: 2021-03-23
Payer: MEDICAID

## 2021-03-23 DIAGNOSIS — I27.20 PULMONARY HYPERTENSION: ICD-10-CM

## 2021-03-23 DIAGNOSIS — I27.82 OTHER CHRONIC PULMONARY EMBOLISM WITHOUT ACUTE COR PULMONALE: Primary | ICD-10-CM

## 2021-03-23 DIAGNOSIS — E66.2 OBESITY HYPOVENTILATION SYNDROME: ICD-10-CM

## 2021-03-23 DIAGNOSIS — I27.24 CTEPH (CHRONIC THROMBOEMBOLIC PULMONARY HYPERTENSION): ICD-10-CM

## 2021-03-23 DIAGNOSIS — E66.01 SEVERE OBESITY (BMI >= 40): ICD-10-CM

## 2021-03-23 PROCEDURE — 99214 OFFICE O/P EST MOD 30 MIN: CPT | Mod: 95,,, | Performed by: INTERNAL MEDICINE

## 2021-03-23 PROCEDURE — 99214 PR OFFICE/OUTPT VISIT, EST, LEVL IV, 30-39 MIN: ICD-10-PCS | Mod: 95,,, | Performed by: INTERNAL MEDICINE

## 2021-04-07 ENCOUNTER — TELEPHONE (OUTPATIENT)
Dept: PULMONOLOGY | Facility: CLINIC | Age: 51
End: 2021-04-07

## 2021-04-07 DIAGNOSIS — I27.20 PULMONARY HYPERTENSION: Primary | ICD-10-CM

## 2021-04-08 ENCOUNTER — TELEPHONE (OUTPATIENT)
Dept: TRANSPLANT | Facility: CLINIC | Age: 51
End: 2021-04-08

## 2021-04-16 ENCOUNTER — PATIENT MESSAGE (OUTPATIENT)
Dept: RESEARCH | Facility: HOSPITAL | Age: 51
End: 2021-04-16

## 2021-04-22 ENCOUNTER — TELEPHONE (OUTPATIENT)
Dept: TRANSPLANT | Facility: CLINIC | Age: 51
End: 2021-04-22

## 2021-04-23 ENCOUNTER — TELEPHONE (OUTPATIENT)
Dept: TRANSPLANT | Facility: CLINIC | Age: 51
End: 2021-04-23

## 2021-05-04 ENCOUNTER — OFFICE VISIT (OUTPATIENT)
Dept: TRANSPLANT | Facility: CLINIC | Age: 51
DRG: 287 | End: 2021-05-04
Payer: MEDICAID

## 2021-05-04 ENCOUNTER — HOSPITAL ENCOUNTER (INPATIENT)
Facility: HOSPITAL | Age: 51
LOS: 10 days | Discharge: HOME | DRG: 287 | End: 2021-05-14
Attending: INTERNAL MEDICINE | Admitting: INTERNAL MEDICINE
Payer: MEDICAID

## 2021-05-04 ENCOUNTER — HOSPITAL ENCOUNTER (OUTPATIENT)
Dept: PULMONOLOGY | Facility: CLINIC | Age: 51
Discharge: HOME | DRG: 287 | End: 2021-05-04
Payer: MEDICAID

## 2021-05-04 VITALS — HEIGHT: 68 IN | WEIGHT: 315 LBS | BODY MASS INDEX: 47.74 KG/M2

## 2021-05-04 VITALS
DIASTOLIC BLOOD PRESSURE: 68 MMHG | OXYGEN SATURATION: 93 % | HEIGHT: 68 IN | SYSTOLIC BLOOD PRESSURE: 135 MMHG | HEART RATE: 92 BPM | WEIGHT: 315 LBS | BODY MASS INDEX: 47.74 KG/M2

## 2021-05-04 DIAGNOSIS — I27.20 PULMONARY HYPERTENSION: Primary | ICD-10-CM

## 2021-05-04 DIAGNOSIS — I27.20 PULMONARY HYPERTENSION: ICD-10-CM

## 2021-05-04 DIAGNOSIS — E87.70 HYPERVOLEMIA, UNSPECIFIED HYPERVOLEMIA TYPE: ICD-10-CM

## 2021-05-04 DIAGNOSIS — I27.0 PRIMARY PULMONARY HYPERTENSION: ICD-10-CM

## 2021-05-04 DIAGNOSIS — I27.82 CHRONIC PULMONARY EMBOLISM WITHOUT ACUTE COR PULMONALE, UNSPECIFIED PULMONARY EMBOLISM TYPE: ICD-10-CM

## 2021-05-04 DIAGNOSIS — Z13.9 SCREENING FOR UNSPECIFIED CONDITION: ICD-10-CM

## 2021-05-04 DIAGNOSIS — I50.9 ACUTE DECOMPENSATED HEART FAILURE: Primary | ICD-10-CM

## 2021-05-04 DIAGNOSIS — I50.32 CHRONIC DIASTOLIC HEART FAILURE: ICD-10-CM

## 2021-05-04 DIAGNOSIS — I50.9 ACUTE DECOMPENSATED HEART FAILURE: ICD-10-CM

## 2021-05-04 LAB — SARS-COV-2 RDRP RESP QL NAA+PROBE: NEGATIVE

## 2021-05-04 PROCEDURE — 99900035 HC TECH TIME PER 15 MIN (STAT)

## 2021-05-04 PROCEDURE — 99214 PR OFFICE/OUTPT VISIT, EST, LEVL IV, 30-39 MIN: ICD-10-PCS | Mod: S$PBB,,, | Performed by: INTERNAL MEDICINE

## 2021-05-04 PROCEDURE — 93005 ELECTROCARDIOGRAM TRACING: CPT

## 2021-05-04 PROCEDURE — 94618 PULMONARY STRESS TESTING: CPT | Mod: 26,S$PBB,, | Performed by: INTERNAL MEDICINE

## 2021-05-04 PROCEDURE — 94761 N-INVAS EAR/PLS OXIMETRY MLT: CPT

## 2021-05-04 PROCEDURE — 20600001 HC STEP DOWN PRIVATE ROOM

## 2021-05-04 PROCEDURE — 27000190 HC CPAP FULL FACE MASK W/VALVE

## 2021-05-04 PROCEDURE — 99214 OFFICE O/P EST MOD 30 MIN: CPT | Mod: S$PBB,,, | Performed by: INTERNAL MEDICINE

## 2021-05-04 PROCEDURE — 93010 EKG 12-LEAD: ICD-10-PCS | Mod: ,,, | Performed by: INTERNAL MEDICINE

## 2021-05-04 PROCEDURE — 63600175 PHARM REV CODE 636 W HCPCS: Performed by: INTERNAL MEDICINE

## 2021-05-04 PROCEDURE — 94618 PULMONARY STRESS TESTING: CPT | Mod: PBBFAC | Performed by: INTERNAL MEDICINE

## 2021-05-04 PROCEDURE — 25000003 PHARM REV CODE 250: Performed by: PHYSICIAN ASSISTANT

## 2021-05-04 PROCEDURE — 94660 CPAP INITIATION&MGMT: CPT

## 2021-05-04 PROCEDURE — 99999 PR PBB SHADOW E&M-EST. PATIENT-LVL III: ICD-10-PCS | Mod: PBBFAC,,, | Performed by: INTERNAL MEDICINE

## 2021-05-04 PROCEDURE — 93010 ELECTROCARDIOGRAM REPORT: CPT | Mod: ,,, | Performed by: INTERNAL MEDICINE

## 2021-05-04 PROCEDURE — U0002 COVID-19 LAB TEST NON-CDC: HCPCS | Performed by: INTERNAL MEDICINE

## 2021-05-04 PROCEDURE — 94618 PULMONARY STRESS TESTING: ICD-10-PCS | Mod: 26,S$PBB,, | Performed by: INTERNAL MEDICINE

## 2021-05-04 PROCEDURE — 99213 OFFICE O/P EST LOW 20 MIN: CPT | Mod: PBBFAC,25 | Performed by: INTERNAL MEDICINE

## 2021-05-04 PROCEDURE — 99999 PR PBB SHADOW E&M-EST. PATIENT-LVL III: CPT | Mod: PBBFAC,,, | Performed by: INTERNAL MEDICINE

## 2021-05-04 RX ORDER — SODIUM CHLORIDE 0.9 % (FLUSH) 0.9 %
10 SYRINGE (ML) INJECTION
Status: DISCONTINUED | OUTPATIENT
Start: 2021-05-04 | End: 2021-05-14 | Stop reason: HOSPADM

## 2021-05-04 RX ORDER — CITALOPRAM 20 MG/1
40 TABLET, FILM COATED ORAL DAILY
Status: DISCONTINUED | OUTPATIENT
Start: 2021-05-05 | End: 2021-05-14 | Stop reason: HOSPADM

## 2021-05-04 RX ORDER — POTASSIUM CHLORIDE 20 MEQ/1
40 TABLET, EXTENDED RELEASE ORAL 3 TIMES DAILY
Status: DISCONTINUED | OUTPATIENT
Start: 2021-05-04 | End: 2021-05-04

## 2021-05-04 RX ORDER — CETIRIZINE HYDROCHLORIDE 10 MG/1
10 TABLET ORAL DAILY
Status: DISCONTINUED | OUTPATIENT
Start: 2021-05-05 | End: 2021-05-14 | Stop reason: HOSPADM

## 2021-05-04 RX ORDER — MAGNESIUM SULFATE HEPTAHYDRATE 40 MG/ML
2 INJECTION, SOLUTION INTRAVENOUS ONCE
Status: COMPLETED | OUTPATIENT
Start: 2021-05-04 | End: 2021-05-04

## 2021-05-04 RX ORDER — PANTOPRAZOLE SODIUM 40 MG/1
40 TABLET, DELAYED RELEASE ORAL DAILY
Status: DISCONTINUED | OUTPATIENT
Start: 2021-05-05 | End: 2021-05-14 | Stop reason: HOSPADM

## 2021-05-04 RX ORDER — FUROSEMIDE 10 MG/ML
80 INJECTION INTRAMUSCULAR; INTRAVENOUS ONCE
Status: COMPLETED | OUTPATIENT
Start: 2021-05-04 | End: 2021-05-04

## 2021-05-04 RX ORDER — POTASSIUM CHLORIDE 20 MEQ/1
40 TABLET, EXTENDED RELEASE ORAL 3 TIMES DAILY
Status: DISCONTINUED | OUTPATIENT
Start: 2021-05-05 | End: 2021-05-07

## 2021-05-04 RX ORDER — FLUTICASONE PROPIONATE 50 MCG
1 SPRAY, SUSPENSION (ML) NASAL DAILY PRN
Status: DISCONTINUED | OUTPATIENT
Start: 2021-05-04 | End: 2021-05-10

## 2021-05-04 RX ORDER — ALBUTEROL SULFATE 90 UG/1
1 AEROSOL, METERED RESPIRATORY (INHALATION) EVERY 6 HOURS PRN
Status: DISCONTINUED | OUTPATIENT
Start: 2021-05-04 | End: 2021-05-14 | Stop reason: HOSPADM

## 2021-05-04 RX ORDER — POTASSIUM CHLORIDE 20 MEQ/1
60 TABLET, EXTENDED RELEASE ORAL ONCE
Status: COMPLETED | OUTPATIENT
Start: 2021-05-04 | End: 2021-05-05

## 2021-05-04 RX ORDER — POTASSIUM CHLORIDE 20 MEQ/1
60 TABLET, EXTENDED RELEASE ORAL ONCE
Status: COMPLETED | OUTPATIENT
Start: 2021-05-04 | End: 2021-05-04

## 2021-05-04 RX ORDER — QUETIAPINE FUMARATE 100 MG/1
400 TABLET, FILM COATED ORAL NIGHTLY
Status: DISCONTINUED | OUTPATIENT
Start: 2021-05-04 | End: 2021-05-14 | Stop reason: HOSPADM

## 2021-05-04 RX ORDER — POTASSIUM CHLORIDE 7.45 MG/ML
10 INJECTION INTRAVENOUS
Status: DISPENSED | OUTPATIENT
Start: 2021-05-04 | End: 2021-05-04

## 2021-05-04 RX ORDER — ATORVASTATIN CALCIUM 20 MG/1
40 TABLET, FILM COATED ORAL DAILY
Status: DISCONTINUED | OUTPATIENT
Start: 2021-05-05 | End: 2021-05-14 | Stop reason: HOSPADM

## 2021-05-04 RX ORDER — SPIRONOLACTONE 25 MG/1
25 TABLET ORAL DAILY
Status: DISCONTINUED | OUTPATIENT
Start: 2021-05-05 | End: 2021-05-10

## 2021-05-04 RX ORDER — ASPIRIN 81 MG/1
81 TABLET ORAL DAILY
Status: DISCONTINUED | OUTPATIENT
Start: 2021-05-05 | End: 2021-05-14 | Stop reason: HOSPADM

## 2021-05-04 RX ADMIN — MAGNESIUM SULFATE IN WATER 2 G: 40 INJECTION, SOLUTION INTRAVENOUS at 07:05

## 2021-05-04 RX ADMIN — APIXABAN 5 MG: 5 TABLET, FILM COATED ORAL at 10:05

## 2021-05-04 RX ADMIN — RIOCIGUAT 2.5 MG: 2.5 TABLET, FILM COATED ORAL at 10:05

## 2021-05-04 RX ADMIN — POTASSIUM CHLORIDE 10 MEQ: 7.46 INJECTION, SOLUTION INTRAVENOUS at 10:05

## 2021-05-04 RX ADMIN — FUROSEMIDE 80 MG: 10 INJECTION, SOLUTION INTRAMUSCULAR; INTRAVENOUS at 10:05

## 2021-05-04 RX ADMIN — POTASSIUM CHLORIDE 60 MEQ: 1500 TABLET, EXTENDED RELEASE ORAL at 10:05

## 2021-05-04 RX ADMIN — FUROSEMIDE 10 MG/HR: 10 INJECTION, SOLUTION INTRAMUSCULAR; INTRAVENOUS at 10:05

## 2021-05-04 RX ADMIN — QUETIAPINE FUMARATE 400 MG: 200 TABLET ORAL at 11:05

## 2021-05-05 PROBLEM — I27.0 PRIMARY PULMONARY HYPERTENSION: Status: ACTIVE | Noted: 2021-05-05

## 2021-05-05 LAB
ALBUMIN SERPL BCP-MCNC: 3.3 G/DL (ref 3.5–5.2)
ALP SERPL-CCNC: 144 U/L (ref 55–135)
ALT SERPL W/O P-5'-P-CCNC: 47 U/L (ref 10–44)
ANION GAP SERPL CALC-SCNC: 14 MMOL/L (ref 8–16)
ASCENDING AORTA: 2.99 CM
AST SERPL-CCNC: 22 U/L (ref 10–40)
AV INDEX (PROSTH): 0.74
AV MEAN GRADIENT: 8 MMHG
AV PEAK GRADIENT: 16 MMHG
AV VALVE AREA: 2.8 CM2
AV VELOCITY RATIO: 0.68
BASOPHILS # BLD AUTO: 0.09 K/UL (ref 0–0.2)
BASOPHILS NFR BLD: 0.9 % (ref 0–1.9)
BILIRUB SERPL-MCNC: 0.4 MG/DL (ref 0.1–1)
BSA FOR ECHO PROCEDURE: 2.81 M2
BUN SERPL-MCNC: 15 MG/DL (ref 6–20)
CALCIUM SERPL-MCNC: 8.7 MG/DL (ref 8.7–10.5)
CHLORIDE SERPL-SCNC: 97 MMOL/L (ref 95–110)
CHOLEST SERPL-MCNC: 120 MG/DL (ref 120–199)
CHOLEST/HDLC SERPL: 3.3 {RATIO} (ref 2–5)
CO2 SERPL-SCNC: 28 MMOL/L (ref 23–29)
CREAT SERPL-MCNC: 1.2 MG/DL (ref 0.5–1.4)
CV ECHO LV RWT: 0.42 CM
DIFFERENTIAL METHOD: ABNORMAL
DOP CALC AO PEAK VEL: 1.97 M/S
DOP CALC AO VTI: 32.66 CM
DOP CALC LVOT AREA: 3.8 CM2
DOP CALC LVOT DIAMETER: 2.19 CM
DOP CALC LVOT PEAK VEL: 1.33 M/S
DOP CALC LVOT STROKE VOLUME: 91.53 CM3
DOP CALCLVOT PEAK VEL VTI: 24.31 CM
E WAVE DECELERATION TIME: 198.13 MSEC
E/A RATIO: 1.43
E/E' RATIO: 8.61 M/S
ECHO LV POSTERIOR WALL: 1.13 CM (ref 0.6–1.1)
EJECTION FRACTION: 60 %
EOSINOPHIL # BLD AUTO: 0.2 K/UL (ref 0–0.5)
EOSINOPHIL NFR BLD: 2.3 % (ref 0–8)
ERYTHROCYTE [DISTWIDTH] IN BLOOD BY AUTOMATED COUNT: 18.7 % (ref 11.5–14.5)
EST. GFR  (AFRICAN AMERICAN): >60 ML/MIN/1.73 M^2
EST. GFR  (NON AFRICAN AMERICAN): >60 ML/MIN/1.73 M^2
ESTIMATED AVG GLUCOSE: 131 MG/DL (ref 68–131)
FRACTIONAL SHORTENING: 29 % (ref 28–44)
GLUCOSE SERPL-MCNC: 118 MG/DL (ref 70–110)
HBA1C MFR BLD: 6.2 % (ref 4–5.6)
HCT VFR BLD AUTO: 34.4 % (ref 40–54)
HDLC SERPL-MCNC: 36 MG/DL (ref 40–75)
HDLC SERPL: 30 % (ref 20–50)
HGB BLD-MCNC: 9.9 G/DL (ref 14–18)
IMM GRANULOCYTES # BLD AUTO: 0.09 K/UL (ref 0–0.04)
IMM GRANULOCYTES NFR BLD AUTO: 0.9 % (ref 0–0.5)
INTERVENTRICULAR SEPTUM: 0.78 CM (ref 0.6–1.1)
LA MAJOR: 5.85 CM
LA MINOR: 5.93 CM
LA WIDTH: 4.84 CM
LDLC SERPL CALC-MCNC: 55.2 MG/DL (ref 63–159)
LEFT ATRIUM SIZE: 3.84 CM
LEFT ATRIUM VOLUME INDEX MOD: 25.7 ML/M2
LEFT ATRIUM VOLUME INDEX: 35.4 ML/M2
LEFT ATRIUM VOLUME MOD: 67.58 CM3
LEFT ATRIUM VOLUME: 93.04 CM3
LEFT INTERNAL DIMENSION IN SYSTOLE: 3.81 CM (ref 2.1–4)
LEFT VENTRICLE DIASTOLIC VOLUME INDEX: 53.75 ML/M2
LEFT VENTRICLE DIASTOLIC VOLUME: 141.37 ML
LEFT VENTRICLE MASS INDEX: 74 G/M2
LEFT VENTRICLE SYSTOLIC VOLUME INDEX: 23.8 ML/M2
LEFT VENTRICLE SYSTOLIC VOLUME: 62.5 ML
LEFT VENTRICULAR INTERNAL DIMENSION IN DIASTOLE: 5.4 CM (ref 3.5–6)
LEFT VENTRICULAR MASS: 194.58 G
LV LATERAL E/E' RATIO: 6.6 M/S
LV SEPTAL E/E' RATIO: 12.38 M/S
LYMPHOCYTES # BLD AUTO: 1.5 K/UL (ref 1–4.8)
LYMPHOCYTES NFR BLD: 14.8 % (ref 18–48)
MAGNESIUM SERPL-MCNC: 2 MG/DL (ref 1.6–2.6)
MAGNESIUM SERPL-MCNC: 2.1 MG/DL (ref 1.6–2.6)
MCH RBC QN AUTO: 20.8 PG (ref 27–31)
MCHC RBC AUTO-ENTMCNC: 28.8 G/DL (ref 32–36)
MCV RBC AUTO: 72 FL (ref 82–98)
MONOCYTES # BLD AUTO: 0.9 K/UL (ref 0.3–1)
MONOCYTES NFR BLD: 9.1 % (ref 4–15)
MV A" WAVE DURATION": 10.28 MSEC
MV PEAK A VEL: 0.69 M/S
MV PEAK E VEL: 0.99 M/S
MV STENOSIS PRESSURE HALF TIME: 57.46 MS
MV VALVE AREA P 1/2 METHOD: 3.83 CM2
NEUTROPHILS # BLD AUTO: 7.2 K/UL (ref 1.8–7.7)
NEUTROPHILS NFR BLD: 72 % (ref 38–73)
NONHDLC SERPL-MCNC: 84 MG/DL
NRBC BLD-RTO: 0 /100 WBC
PISA TR MAX VEL: 3.18 M/S
PLATELET # BLD AUTO: 321 K/UL (ref 150–450)
PMV BLD AUTO: 11.3 FL (ref 9.2–12.9)
POTASSIUM SERPL-SCNC: 3 MMOL/L (ref 3.5–5.1)
POTASSIUM SERPL-SCNC: 4 MMOL/L (ref 3.5–5.1)
PROT SERPL-MCNC: 7.2 G/DL (ref 6–8.4)
PULM VEIN S/D RATIO: 0.76
PV PEAK D VEL: 0.67 M/S
PV PEAK S VEL: 0.51 M/S
RA MAJOR: 6.36 CM
RA PRESSURE: 8 MMHG
RA WIDTH: 3.88 CM
RBC # BLD AUTO: 4.77 M/UL (ref 4.6–6.2)
RIGHT VENTRICULAR END-DIASTOLIC DIMENSION: 3.52 CM
RV TISSUE DOPPLER FREE WALL SYSTOLIC VELOCITY 1 (APICAL 4 CHAMBER VIEW): 12.25 CM/S
SINUS: 3.01 CM
SODIUM SERPL-SCNC: 139 MMOL/L (ref 136–145)
STJ: 3.07 CM
TDI LATERAL: 0.15 M/S
TDI SEPTAL: 0.08 M/S
TDI: 0.12 M/S
TR MAX PG: 40 MMHG
TRICUSPID ANNULAR PLANE SYSTOLIC EXCURSION: 2.23 CM
TRIGL SERPL-MCNC: 144 MG/DL (ref 30–150)
TV REST PULMONARY ARTERY PRESSURE: 48 MMHG
WBC # BLD AUTO: 9.96 K/UL (ref 3.9–12.7)

## 2021-05-05 PROCEDURE — 25000003 PHARM REV CODE 250: Performed by: PHYSICIAN ASSISTANT

## 2021-05-05 PROCEDURE — 63600175 PHARM REV CODE 636 W HCPCS: Performed by: INTERNAL MEDICINE

## 2021-05-05 PROCEDURE — 99233 PR SUBSEQUENT HOSPITAL CARE,LEVL III: ICD-10-PCS | Mod: ,,, | Performed by: INTERNAL MEDICINE

## 2021-05-05 PROCEDURE — 84132 ASSAY OF SERUM POTASSIUM: CPT | Performed by: INTERNAL MEDICINE

## 2021-05-05 PROCEDURE — 80053 COMPREHEN METABOLIC PANEL: CPT | Performed by: INTERNAL MEDICINE

## 2021-05-05 PROCEDURE — 80061 LIPID PANEL: CPT | Performed by: INTERNAL MEDICINE

## 2021-05-05 PROCEDURE — 99900035 HC TECH TIME PER 15 MIN (STAT)

## 2021-05-05 PROCEDURE — 83735 ASSAY OF MAGNESIUM: CPT | Performed by: INTERNAL MEDICINE

## 2021-05-05 PROCEDURE — 83036 HEMOGLOBIN GLYCOSYLATED A1C: CPT | Performed by: INTERNAL MEDICINE

## 2021-05-05 PROCEDURE — 25000003 PHARM REV CODE 250: Performed by: INTERNAL MEDICINE

## 2021-05-05 PROCEDURE — 99233 SBSQ HOSP IP/OBS HIGH 50: CPT | Mod: ,,, | Performed by: INTERNAL MEDICINE

## 2021-05-05 PROCEDURE — 36415 COLL VENOUS BLD VENIPUNCTURE: CPT | Performed by: INTERNAL MEDICINE

## 2021-05-05 PROCEDURE — 25500020 PHARM REV CODE 255: Performed by: INTERNAL MEDICINE

## 2021-05-05 PROCEDURE — 20600001 HC STEP DOWN PRIVATE ROOM

## 2021-05-05 PROCEDURE — 85025 COMPLETE CBC W/AUTO DIFF WBC: CPT | Performed by: INTERNAL MEDICINE

## 2021-05-05 RX ORDER — LANOLIN ALCOHOL/MO/W.PET/CERES
400 CREAM (GRAM) TOPICAL 3 TIMES DAILY
Status: DISCONTINUED | OUTPATIENT
Start: 2021-05-05 | End: 2021-05-14 | Stop reason: HOSPADM

## 2021-05-05 RX ORDER — POTASSIUM CHLORIDE 20 MEQ/1
40 TABLET, EXTENDED RELEASE ORAL
Status: COMPLETED | OUTPATIENT
Start: 2021-05-05 | End: 2021-05-05

## 2021-05-05 RX ORDER — POTASSIUM CHLORIDE 7.45 MG/ML
10 INJECTION INTRAVENOUS
Status: COMPLETED | OUTPATIENT
Start: 2021-05-05 | End: 2021-05-05

## 2021-05-05 RX ADMIN — MACITENTAN 10 MG: 10 TABLET, FILM COATED ORAL at 12:05

## 2021-05-05 RX ADMIN — POTASSIUM CHLORIDE 40 MEQ: 1500 TABLET, EXTENDED RELEASE ORAL at 04:05

## 2021-05-05 RX ADMIN — HUMAN ALBUMIN MICROSPHERES AND PERFLUTREN 0.66 MG: 10; .22 INJECTION, SOLUTION INTRAVENOUS at 11:05

## 2021-05-05 RX ADMIN — PANTOPRAZOLE SODIUM 40 MG: 40 TABLET, DELAYED RELEASE ORAL at 08:05

## 2021-05-05 RX ADMIN — RIOCIGUAT 2.5 MG: 2.5 TABLET, FILM COATED ORAL at 09:05

## 2021-05-05 RX ADMIN — POTASSIUM CHLORIDE 40 MEQ: 1500 TABLET, EXTENDED RELEASE ORAL at 08:05

## 2021-05-05 RX ADMIN — ASPIRIN 81 MG: 81 TABLET, COATED ORAL at 08:05

## 2021-05-05 RX ADMIN — POTASSIUM CHLORIDE 10 MEQ: 7.46 INJECTION, SOLUTION INTRAVENOUS at 08:05

## 2021-05-05 RX ADMIN — ATORVASTATIN CALCIUM 40 MG: 20 TABLET, FILM COATED ORAL at 08:05

## 2021-05-05 RX ADMIN — POTASSIUM CHLORIDE 40 MEQ: 1500 TABLET, EXTENDED RELEASE ORAL at 05:05

## 2021-05-05 RX ADMIN — Medication 400 MG: at 05:05

## 2021-05-05 RX ADMIN — APIXABAN 5 MG: 5 TABLET, FILM COATED ORAL at 09:05

## 2021-05-05 RX ADMIN — RIOCIGUAT 2.5 MG: 2.5 TABLET, FILM COATED ORAL at 03:05

## 2021-05-05 RX ADMIN — Medication 400 MG: at 09:05

## 2021-05-05 RX ADMIN — CITALOPRAM HYDROBROMIDE 40 MG: 20 TABLET ORAL at 08:05

## 2021-05-05 RX ADMIN — POTASSIUM CHLORIDE 10 MEQ: 7.46 INJECTION, SOLUTION INTRAVENOUS at 07:05

## 2021-05-05 RX ADMIN — POTASSIUM CHLORIDE 60 MEQ: 1500 TABLET, EXTENDED RELEASE ORAL at 12:05

## 2021-05-05 RX ADMIN — QUETIAPINE FUMARATE 400 MG: 200 TABLET ORAL at 09:05

## 2021-05-05 RX ADMIN — APIXABAN 5 MG: 5 TABLET, FILM COATED ORAL at 08:05

## 2021-05-05 RX ADMIN — Medication 400 MG: at 04:05

## 2021-05-05 RX ADMIN — SPIRONOLACTONE 25 MG: 25 TABLET, FILM COATED ORAL at 08:05

## 2021-05-05 RX ADMIN — POTASSIUM CHLORIDE 10 MEQ: 7.46 INJECTION, SOLUTION INTRAVENOUS at 05:05

## 2021-05-05 RX ADMIN — Medication 400 MG: at 08:05

## 2021-05-05 RX ADMIN — CETIRIZINE HYDROCHLORIDE 10 MG: 10 TABLET, FILM COATED ORAL at 08:05

## 2021-05-05 RX ADMIN — POTASSIUM CHLORIDE 40 MEQ: 1500 TABLET, EXTENDED RELEASE ORAL at 07:05

## 2021-05-05 RX ADMIN — POTASSIUM CHLORIDE 40 MEQ: 1500 TABLET, EXTENDED RELEASE ORAL at 09:05

## 2021-05-06 LAB
ANION GAP SERPL CALC-SCNC: 11 MMOL/L (ref 8–16)
BASOPHILS # BLD AUTO: 0.06 K/UL (ref 0–0.2)
BASOPHILS NFR BLD: 0.8 % (ref 0–1.9)
BUN SERPL-MCNC: 11 MG/DL (ref 6–20)
CALCIUM SERPL-MCNC: 7.9 MG/DL (ref 8.7–10.5)
CHLORIDE SERPL-SCNC: 101 MMOL/L (ref 95–110)
CO2 SERPL-SCNC: 29 MMOL/L (ref 23–29)
CREAT SERPL-MCNC: 0.9 MG/DL (ref 0.5–1.4)
DIFFERENTIAL METHOD: ABNORMAL
EOSINOPHIL # BLD AUTO: 0.2 K/UL (ref 0–0.5)
EOSINOPHIL NFR BLD: 2.5 % (ref 0–8)
ERYTHROCYTE [DISTWIDTH] IN BLOOD BY AUTOMATED COUNT: 18.9 % (ref 11.5–14.5)
EST. GFR  (AFRICAN AMERICAN): >60 ML/MIN/1.73 M^2
EST. GFR  (NON AFRICAN AMERICAN): >60 ML/MIN/1.73 M^2
GLUCOSE SERPL-MCNC: 120 MG/DL (ref 70–110)
HCT VFR BLD AUTO: 33.9 % (ref 40–54)
HGB BLD-MCNC: 9.7 G/DL (ref 14–18)
IMM GRANULOCYTES # BLD AUTO: 0.06 K/UL (ref 0–0.04)
IMM GRANULOCYTES NFR BLD AUTO: 0.8 % (ref 0–0.5)
LYMPHOCYTES # BLD AUTO: 1.5 K/UL (ref 1–4.8)
LYMPHOCYTES NFR BLD: 19 % (ref 18–48)
MAGNESIUM SERPL-MCNC: 2 MG/DL (ref 1.6–2.6)
MCH RBC QN AUTO: 20.4 PG (ref 27–31)
MCHC RBC AUTO-ENTMCNC: 28.6 G/DL (ref 32–36)
MCV RBC AUTO: 71 FL (ref 82–98)
MONOCYTES # BLD AUTO: 0.8 K/UL (ref 0.3–1)
MONOCYTES NFR BLD: 10.2 % (ref 4–15)
NEUTROPHILS # BLD AUTO: 5.1 K/UL (ref 1.8–7.7)
NEUTROPHILS NFR BLD: 66.7 % (ref 38–73)
NRBC BLD-RTO: 0 /100 WBC
PLATELET # BLD AUTO: 295 K/UL (ref 150–450)
PMV BLD AUTO: 11 FL (ref 9.2–12.9)
POTASSIUM SERPL-SCNC: 2.9 MMOL/L (ref 3.5–5.1)
POTASSIUM SERPL-SCNC: 3 MMOL/L (ref 3.5–5.1)
POTASSIUM SERPL-SCNC: 3.3 MMOL/L (ref 3.5–5.1)
RBC # BLD AUTO: 4.76 M/UL (ref 4.6–6.2)
SODIUM SERPL-SCNC: 141 MMOL/L (ref 136–145)
WBC # BLD AUTO: 7.64 K/UL (ref 3.9–12.7)

## 2021-05-06 PROCEDURE — 80048 BASIC METABOLIC PNL TOTAL CA: CPT | Performed by: PHYSICIAN ASSISTANT

## 2021-05-06 PROCEDURE — 99900035 HC TECH TIME PER 15 MIN (STAT)

## 2021-05-06 PROCEDURE — 63600175 PHARM REV CODE 636 W HCPCS: Performed by: PHYSICIAN ASSISTANT

## 2021-05-06 PROCEDURE — 94761 N-INVAS EAR/PLS OXIMETRY MLT: CPT

## 2021-05-06 PROCEDURE — 36415 COLL VENOUS BLD VENIPUNCTURE: CPT | Performed by: INTERNAL MEDICINE

## 2021-05-06 PROCEDURE — 25000003 PHARM REV CODE 250: Performed by: INTERNAL MEDICINE

## 2021-05-06 PROCEDURE — 25000003 PHARM REV CODE 250: Performed by: PHYSICIAN ASSISTANT

## 2021-05-06 PROCEDURE — 27000221 HC OXYGEN, UP TO 24 HOURS

## 2021-05-06 PROCEDURE — 83735 ASSAY OF MAGNESIUM: CPT | Performed by: PHYSICIAN ASSISTANT

## 2021-05-06 PROCEDURE — 99233 PR SUBSEQUENT HOSPITAL CARE,LEVL III: ICD-10-PCS | Mod: ,,, | Performed by: INTERNAL MEDICINE

## 2021-05-06 PROCEDURE — 84132 ASSAY OF SERUM POTASSIUM: CPT | Mod: 91 | Performed by: INTERNAL MEDICINE

## 2021-05-06 PROCEDURE — 99233 SBSQ HOSP IP/OBS HIGH 50: CPT | Mod: ,,, | Performed by: INTERNAL MEDICINE

## 2021-05-06 PROCEDURE — 84132 ASSAY OF SERUM POTASSIUM: CPT | Performed by: PHYSICIAN ASSISTANT

## 2021-05-06 PROCEDURE — 20600001 HC STEP DOWN PRIVATE ROOM

## 2021-05-06 PROCEDURE — 36415 COLL VENOUS BLD VENIPUNCTURE: CPT | Performed by: PHYSICIAN ASSISTANT

## 2021-05-06 PROCEDURE — 63600175 PHARM REV CODE 636 W HCPCS: Performed by: INTERNAL MEDICINE

## 2021-05-06 PROCEDURE — 85025 COMPLETE CBC W/AUTO DIFF WBC: CPT | Performed by: PHYSICIAN ASSISTANT

## 2021-05-06 PROCEDURE — 94660 CPAP INITIATION&MGMT: CPT

## 2021-05-06 RX ORDER — POTASSIUM CHLORIDE 7.45 MG/ML
10 INJECTION INTRAVENOUS
Status: COMPLETED | OUTPATIENT
Start: 2021-05-06 | End: 2021-05-07

## 2021-05-06 RX ORDER — POTASSIUM CHLORIDE 7.45 MG/ML
10 INJECTION INTRAVENOUS ONCE
Status: COMPLETED | OUTPATIENT
Start: 2021-05-06 | End: 2021-05-06

## 2021-05-06 RX ORDER — SODIUM CHLORIDE 9 MG/ML
INJECTION, SOLUTION INTRAVENOUS
Status: DISCONTINUED | OUTPATIENT
Start: 2021-05-06 | End: 2021-05-14 | Stop reason: HOSPADM

## 2021-05-06 RX ORDER — POTASSIUM CHLORIDE 750 MG/1
50 CAPSULE, EXTENDED RELEASE ORAL ONCE
Status: COMPLETED | OUTPATIENT
Start: 2021-05-06 | End: 2021-05-06

## 2021-05-06 RX ORDER — POTASSIUM CHLORIDE 20 MEQ/1
40 TABLET, EXTENDED RELEASE ORAL 3 TIMES DAILY
Status: DISCONTINUED | OUTPATIENT
Start: 2021-05-06 | End: 2021-05-06

## 2021-05-06 RX ADMIN — POTASSIUM CHLORIDE 40 MEQ: 1500 TABLET, EXTENDED RELEASE ORAL at 08:05

## 2021-05-06 RX ADMIN — RIOCIGUAT 2.5 MG: 2.5 TABLET, FILM COATED ORAL at 04:05

## 2021-05-06 RX ADMIN — QUETIAPINE FUMARATE 400 MG: 200 TABLET ORAL at 08:05

## 2021-05-06 RX ADMIN — APIXABAN 5 MG: 5 TABLET, FILM COATED ORAL at 08:05

## 2021-05-06 RX ADMIN — Medication 400 MG: at 08:05

## 2021-05-06 RX ADMIN — SPIRONOLACTONE 25 MG: 25 TABLET, FILM COATED ORAL at 08:05

## 2021-05-06 RX ADMIN — PANTOPRAZOLE SODIUM 40 MG: 40 TABLET, DELAYED RELEASE ORAL at 08:05

## 2021-05-06 RX ADMIN — SODIUM CHLORIDE: 0.9 INJECTION, SOLUTION INTRAVENOUS at 11:05

## 2021-05-06 RX ADMIN — ATORVASTATIN CALCIUM 40 MG: 20 TABLET, FILM COATED ORAL at 08:05

## 2021-05-06 RX ADMIN — MACITENTAN 10 MG: 10 TABLET, FILM COATED ORAL at 08:05

## 2021-05-06 RX ADMIN — ASPIRIN 81 MG: 81 TABLET, COATED ORAL at 08:05

## 2021-05-06 RX ADMIN — POTASSIUM CHLORIDE 10 MEQ: 7.46 INJECTION, SOLUTION INTRAVENOUS at 11:05

## 2021-05-06 RX ADMIN — CETIRIZINE HYDROCHLORIDE 10 MG: 10 TABLET, FILM COATED ORAL at 08:05

## 2021-05-06 RX ADMIN — POTASSIUM CHLORIDE 10 MEQ: 7.46 INJECTION, SOLUTION INTRAVENOUS at 01:05

## 2021-05-06 RX ADMIN — CITALOPRAM HYDROBROMIDE 40 MG: 20 TABLET ORAL at 08:05

## 2021-05-06 RX ADMIN — RIOCIGUAT 2.5 MG: 2.5 TABLET, FILM COATED ORAL at 08:05

## 2021-05-06 RX ADMIN — POTASSIUM CHLORIDE 40 MEQ: 1500 TABLET, EXTENDED RELEASE ORAL at 04:05

## 2021-05-06 RX ADMIN — POTASSIUM CHLORIDE 50 MEQ: 10 CAPSULE, COATED, EXTENDED RELEASE ORAL at 11:05

## 2021-05-06 RX ADMIN — FUROSEMIDE 10 MG/HR: 10 INJECTION, SOLUTION INTRAMUSCULAR; INTRAVENOUS at 05:05

## 2021-05-06 RX ADMIN — Medication 400 MG: at 04:05

## 2021-05-07 LAB
ALBUMIN SERPL BCP-MCNC: 3.1 G/DL (ref 3.5–5.2)
ALP SERPL-CCNC: 125 U/L (ref 55–135)
ALT SERPL W/O P-5'-P-CCNC: 28 U/L (ref 10–44)
ANION GAP SERPL CALC-SCNC: 11 MMOL/L (ref 8–16)
ANION GAP SERPL CALC-SCNC: 11 MMOL/L (ref 8–16)
AST SERPL-CCNC: 10 U/L (ref 10–40)
BASOPHILS # BLD AUTO: 0.08 K/UL (ref 0–0.2)
BASOPHILS NFR BLD: 0.8 % (ref 0–1.9)
BILIRUB SERPL-MCNC: 0.5 MG/DL (ref 0.1–1)
BNP SERPL-MCNC: 32 PG/ML (ref 0–99)
BUN SERPL-MCNC: 9 MG/DL (ref 6–20)
BUN SERPL-MCNC: 9 MG/DL (ref 6–20)
CALCIUM SERPL-MCNC: 7.9 MG/DL (ref 8.7–10.5)
CALCIUM SERPL-MCNC: 7.9 MG/DL (ref 8.7–10.5)
CHLORIDE SERPL-SCNC: 98 MMOL/L (ref 95–110)
CHLORIDE SERPL-SCNC: 98 MMOL/L (ref 95–110)
CO2 SERPL-SCNC: 25 MMOL/L (ref 23–29)
CO2 SERPL-SCNC: 25 MMOL/L (ref 23–29)
CREAT SERPL-MCNC: 0.9 MG/DL (ref 0.5–1.4)
CREAT SERPL-MCNC: 0.9 MG/DL (ref 0.5–1.4)
DIFFERENTIAL METHOD: ABNORMAL
EOSINOPHIL # BLD AUTO: 0.2 K/UL (ref 0–0.5)
EOSINOPHIL NFR BLD: 2.4 % (ref 0–8)
ERYTHROCYTE [DISTWIDTH] IN BLOOD BY AUTOMATED COUNT: 18.2 % (ref 11.5–14.5)
EST. GFR  (AFRICAN AMERICAN): >60 ML/MIN/1.73 M^2
EST. GFR  (AFRICAN AMERICAN): >60 ML/MIN/1.73 M^2
EST. GFR  (NON AFRICAN AMERICAN): >60 ML/MIN/1.73 M^2
EST. GFR  (NON AFRICAN AMERICAN): >60 ML/MIN/1.73 M^2
GLUCOSE SERPL-MCNC: 115 MG/DL (ref 70–110)
GLUCOSE SERPL-MCNC: 115 MG/DL (ref 70–110)
HCT VFR BLD AUTO: 33.1 % (ref 40–54)
HGB BLD-MCNC: 9.5 G/DL (ref 14–18)
IMM GRANULOCYTES # BLD AUTO: 0.06 K/UL (ref 0–0.04)
IMM GRANULOCYTES NFR BLD AUTO: 0.6 % (ref 0–0.5)
LYMPHOCYTES # BLD AUTO: 1.6 K/UL (ref 1–4.8)
LYMPHOCYTES NFR BLD: 17.2 % (ref 18–48)
MAGNESIUM SERPL-MCNC: 2 MG/DL (ref 1.6–2.6)
MCH RBC QN AUTO: 21 PG (ref 27–31)
MCHC RBC AUTO-ENTMCNC: 28.7 G/DL (ref 32–36)
MCV RBC AUTO: 73 FL (ref 82–98)
MONOCYTES # BLD AUTO: 0.7 K/UL (ref 0.3–1)
MONOCYTES NFR BLD: 7.7 % (ref 4–15)
NEUTROPHILS # BLD AUTO: 6.8 K/UL (ref 1.8–7.7)
NEUTROPHILS NFR BLD: 71.3 % (ref 38–73)
NRBC BLD-RTO: 0 /100 WBC
PLATELET # BLD AUTO: 285 K/UL (ref 150–450)
PMV BLD AUTO: 11.2 FL (ref 9.2–12.9)
POTASSIUM SERPL-SCNC: 3.3 MMOL/L (ref 3.5–5.1)
POTASSIUM SERPL-SCNC: 3.5 MMOL/L (ref 3.5–5.1)
POTASSIUM SERPL-SCNC: 3.5 MMOL/L (ref 3.5–5.1)
PROT SERPL-MCNC: 6.3 G/DL (ref 6–8.4)
RBC # BLD AUTO: 4.52 M/UL (ref 4.6–6.2)
SODIUM SERPL-SCNC: 134 MMOL/L (ref 136–145)
SODIUM SERPL-SCNC: 134 MMOL/L (ref 136–145)
WBC # BLD AUTO: 9.54 K/UL (ref 3.9–12.7)

## 2021-05-07 PROCEDURE — 94660 CPAP INITIATION&MGMT: CPT

## 2021-05-07 PROCEDURE — 99900035 HC TECH TIME PER 15 MIN (STAT)

## 2021-05-07 PROCEDURE — 85025 COMPLETE CBC W/AUTO DIFF WBC: CPT | Performed by: PHYSICIAN ASSISTANT

## 2021-05-07 PROCEDURE — 63600175 PHARM REV CODE 636 W HCPCS: Performed by: PHYSICIAN ASSISTANT

## 2021-05-07 PROCEDURE — 25000003 PHARM REV CODE 250: Performed by: INTERNAL MEDICINE

## 2021-05-07 PROCEDURE — 99233 SBSQ HOSP IP/OBS HIGH 50: CPT | Mod: ,,, | Performed by: INTERNAL MEDICINE

## 2021-05-07 PROCEDURE — 25000003 PHARM REV CODE 250: Performed by: PHYSICIAN ASSISTANT

## 2021-05-07 PROCEDURE — 84132 ASSAY OF SERUM POTASSIUM: CPT | Performed by: PHYSICIAN ASSISTANT

## 2021-05-07 PROCEDURE — 20600001 HC STEP DOWN PRIVATE ROOM

## 2021-05-07 PROCEDURE — 63600175 PHARM REV CODE 636 W HCPCS: Performed by: INTERNAL MEDICINE

## 2021-05-07 PROCEDURE — 83880 ASSAY OF NATRIURETIC PEPTIDE: CPT | Performed by: PHYSICIAN ASSISTANT

## 2021-05-07 PROCEDURE — 83735 ASSAY OF MAGNESIUM: CPT | Performed by: PHYSICIAN ASSISTANT

## 2021-05-07 PROCEDURE — 36415 COLL VENOUS BLD VENIPUNCTURE: CPT | Performed by: PHYSICIAN ASSISTANT

## 2021-05-07 PROCEDURE — 80053 COMPREHEN METABOLIC PANEL: CPT | Performed by: PHYSICIAN ASSISTANT

## 2021-05-07 PROCEDURE — 99233 PR SUBSEQUENT HOSPITAL CARE,LEVL III: ICD-10-PCS | Mod: ,,, | Performed by: INTERNAL MEDICINE

## 2021-05-07 RX ORDER — FUROSEMIDE 10 MG/ML
80 INJECTION INTRAMUSCULAR; INTRAVENOUS ONCE
Status: COMPLETED | OUTPATIENT
Start: 2021-05-07 | End: 2021-05-07

## 2021-05-07 RX ORDER — POTASSIUM CHLORIDE 20 MEQ/1
40 TABLET, EXTENDED RELEASE ORAL ONCE
Status: DISCONTINUED | OUTPATIENT
Start: 2021-05-07 | End: 2021-05-07

## 2021-05-07 RX ORDER — POTASSIUM CHLORIDE 20 MEQ/1
60 TABLET, EXTENDED RELEASE ORAL ONCE
Status: COMPLETED | OUTPATIENT
Start: 2021-05-07 | End: 2021-05-07

## 2021-05-07 RX ORDER — POTASSIUM CHLORIDE 20 MEQ/1
40 TABLET, EXTENDED RELEASE ORAL 3 TIMES DAILY
Status: DISCONTINUED | OUTPATIENT
Start: 2021-05-07 | End: 2021-05-10

## 2021-05-07 RX ORDER — POTASSIUM CHLORIDE 20 MEQ/1
60 TABLET, EXTENDED RELEASE ORAL 3 TIMES DAILY
Status: DISCONTINUED | OUTPATIENT
Start: 2021-05-07 | End: 2021-05-07

## 2021-05-07 RX ADMIN — POTASSIUM CHLORIDE 10 MEQ: 7.46 INJECTION, SOLUTION INTRAVENOUS at 03:05

## 2021-05-07 RX ADMIN — ASPIRIN 81 MG: 81 TABLET, COATED ORAL at 08:05

## 2021-05-07 RX ADMIN — APIXABAN 5 MG: 5 TABLET, FILM COATED ORAL at 08:05

## 2021-05-07 RX ADMIN — POTASSIUM CHLORIDE 10 MEQ: 7.46 INJECTION, SOLUTION INTRAVENOUS at 12:05

## 2021-05-07 RX ADMIN — POTASSIUM CHLORIDE 40 MEQ: 1500 TABLET, EXTENDED RELEASE ORAL at 08:05

## 2021-05-07 RX ADMIN — FUROSEMIDE 80 MG: 10 INJECTION, SOLUTION INTRAMUSCULAR; INTRAVENOUS at 11:05

## 2021-05-07 RX ADMIN — MACITENTAN 10 MG: 10 TABLET, FILM COATED ORAL at 08:05

## 2021-05-07 RX ADMIN — QUETIAPINE FUMARATE 400 MG: 200 TABLET ORAL at 08:05

## 2021-05-07 RX ADMIN — FUROSEMIDE 20 MG/HR: 10 INJECTION, SOLUTION INTRAMUSCULAR; INTRAVENOUS at 01:05

## 2021-05-07 RX ADMIN — RIOCIGUAT 2.5 MG: 2.5 TABLET, FILM COATED ORAL at 08:05

## 2021-05-07 RX ADMIN — FUROSEMIDE 20 MG/HR: 10 INJECTION, SOLUTION INTRAMUSCULAR; INTRAVENOUS at 11:05

## 2021-05-07 RX ADMIN — CETIRIZINE HYDROCHLORIDE 10 MG: 10 TABLET, FILM COATED ORAL at 08:05

## 2021-05-07 RX ADMIN — POTASSIUM CHLORIDE 10 MEQ: 7.46 INJECTION, SOLUTION INTRAVENOUS at 02:05

## 2021-05-07 RX ADMIN — RIOCIGUAT 2.5 MG: 2.5 TABLET, FILM COATED ORAL at 01:05

## 2021-05-07 RX ADMIN — POTASSIUM CHLORIDE 40 MEQ: 1500 TABLET, EXTENDED RELEASE ORAL at 01:05

## 2021-05-07 RX ADMIN — Medication 400 MG: at 08:05

## 2021-05-07 RX ADMIN — SPIRONOLACTONE 25 MG: 25 TABLET, FILM COATED ORAL at 08:05

## 2021-05-07 RX ADMIN — CITALOPRAM HYDROBROMIDE 40 MG: 20 TABLET ORAL at 08:05

## 2021-05-07 RX ADMIN — ATORVASTATIN CALCIUM 40 MG: 20 TABLET, FILM COATED ORAL at 08:05

## 2021-05-07 RX ADMIN — POTASSIUM CHLORIDE 60 MEQ: 1500 TABLET, EXTENDED RELEASE ORAL at 11:05

## 2021-05-07 RX ADMIN — Medication 400 MG: at 01:05

## 2021-05-07 RX ADMIN — PANTOPRAZOLE SODIUM 40 MG: 40 TABLET, DELAYED RELEASE ORAL at 08:05

## 2021-05-08 LAB
ANION GAP SERPL CALC-SCNC: 10 MMOL/L (ref 8–16)
BASOPHILS # BLD AUTO: 0.06 K/UL (ref 0–0.2)
BASOPHILS NFR BLD: 0.7 % (ref 0–1.9)
BUN SERPL-MCNC: 9 MG/DL (ref 6–20)
CALCIUM SERPL-MCNC: 7.7 MG/DL (ref 8.7–10.5)
CHLORIDE SERPL-SCNC: 100 MMOL/L (ref 95–110)
CO2 SERPL-SCNC: 25 MMOL/L (ref 23–29)
CREAT SERPL-MCNC: 0.9 MG/DL (ref 0.5–1.4)
DIFFERENTIAL METHOD: ABNORMAL
EOSINOPHIL # BLD AUTO: 0.2 K/UL (ref 0–0.5)
EOSINOPHIL NFR BLD: 2.5 % (ref 0–8)
ERYTHROCYTE [DISTWIDTH] IN BLOOD BY AUTOMATED COUNT: 17.8 % (ref 11.5–14.5)
EST. GFR  (AFRICAN AMERICAN): >60 ML/MIN/1.73 M^2
EST. GFR  (NON AFRICAN AMERICAN): >60 ML/MIN/1.73 M^2
GLUCOSE SERPL-MCNC: 136 MG/DL (ref 70–110)
HCT VFR BLD AUTO: 33.7 % (ref 40–54)
HGB BLD-MCNC: 9.6 G/DL (ref 14–18)
IMM GRANULOCYTES # BLD AUTO: 0.07 K/UL (ref 0–0.04)
IMM GRANULOCYTES NFR BLD AUTO: 0.8 % (ref 0–0.5)
LYMPHOCYTES # BLD AUTO: 1.5 K/UL (ref 1–4.8)
LYMPHOCYTES NFR BLD: 16.9 % (ref 18–48)
MAGNESIUM SERPL-MCNC: 2 MG/DL (ref 1.6–2.6)
MCH RBC QN AUTO: 20.9 PG (ref 27–31)
MCHC RBC AUTO-ENTMCNC: 28.5 G/DL (ref 32–36)
MCV RBC AUTO: 73 FL (ref 82–98)
MONOCYTES # BLD AUTO: 0.6 K/UL (ref 0.3–1)
MONOCYTES NFR BLD: 7.2 % (ref 4–15)
NEUTROPHILS # BLD AUTO: 6.4 K/UL (ref 1.8–7.7)
NEUTROPHILS NFR BLD: 71.9 % (ref 38–73)
NRBC BLD-RTO: 0 /100 WBC
PLATELET # BLD AUTO: 290 K/UL (ref 150–450)
PMV BLD AUTO: 11.1 FL (ref 9.2–12.9)
POTASSIUM SERPL-SCNC: 3.2 MMOL/L (ref 3.5–5.1)
RBC # BLD AUTO: 4.59 M/UL (ref 4.6–6.2)
SODIUM SERPL-SCNC: 135 MMOL/L (ref 136–145)
WBC # BLD AUTO: 8.91 K/UL (ref 3.9–12.7)

## 2021-05-08 PROCEDURE — 25000003 PHARM REV CODE 250: Performed by: PHYSICIAN ASSISTANT

## 2021-05-08 PROCEDURE — 20600001 HC STEP DOWN PRIVATE ROOM

## 2021-05-08 PROCEDURE — 63600175 PHARM REV CODE 636 W HCPCS: Performed by: STUDENT IN AN ORGANIZED HEALTH CARE EDUCATION/TRAINING PROGRAM

## 2021-05-08 PROCEDURE — 25000003 PHARM REV CODE 250: Performed by: INTERNAL MEDICINE

## 2021-05-08 PROCEDURE — 63600175 PHARM REV CODE 636 W HCPCS: Performed by: PHYSICIAN ASSISTANT

## 2021-05-08 PROCEDURE — 94660 CPAP INITIATION&MGMT: CPT

## 2021-05-08 PROCEDURE — 99233 SBSQ HOSP IP/OBS HIGH 50: CPT | Mod: ,,, | Performed by: INTERNAL MEDICINE

## 2021-05-08 PROCEDURE — 36415 COLL VENOUS BLD VENIPUNCTURE: CPT | Performed by: PHYSICIAN ASSISTANT

## 2021-05-08 PROCEDURE — 80048 BASIC METABOLIC PNL TOTAL CA: CPT | Performed by: PHYSICIAN ASSISTANT

## 2021-05-08 PROCEDURE — 85025 COMPLETE CBC W/AUTO DIFF WBC: CPT | Performed by: PHYSICIAN ASSISTANT

## 2021-05-08 PROCEDURE — 99233 PR SUBSEQUENT HOSPITAL CARE,LEVL III: ICD-10-PCS | Mod: ,,, | Performed by: INTERNAL MEDICINE

## 2021-05-08 PROCEDURE — 83735 ASSAY OF MAGNESIUM: CPT | Performed by: PHYSICIAN ASSISTANT

## 2021-05-08 PROCEDURE — 99900035 HC TECH TIME PER 15 MIN (STAT)

## 2021-05-08 RX ORDER — MAGNESIUM SULFATE 1 G/100ML
1 INJECTION INTRAVENOUS ONCE
Status: COMPLETED | OUTPATIENT
Start: 2021-05-08 | End: 2021-05-08

## 2021-05-08 RX ORDER — MAGNESIUM SULFATE HEPTAHYDRATE 40 MG/ML
2 INJECTION, SOLUTION INTRAVENOUS ONCE
Status: COMPLETED | OUTPATIENT
Start: 2021-05-08 | End: 2021-05-08

## 2021-05-08 RX ORDER — POTASSIUM CHLORIDE 7.45 MG/ML
10 INJECTION INTRAVENOUS
Status: COMPLETED | OUTPATIENT
Start: 2021-05-08 | End: 2021-05-08

## 2021-05-08 RX ADMIN — POTASSIUM CHLORIDE 40 MEQ: 1500 TABLET, EXTENDED RELEASE ORAL at 02:05

## 2021-05-08 RX ADMIN — ASPIRIN 81 MG: 81 TABLET, COATED ORAL at 08:05

## 2021-05-08 RX ADMIN — PANTOPRAZOLE SODIUM 40 MG: 40 TABLET, DELAYED RELEASE ORAL at 08:05

## 2021-05-08 RX ADMIN — Medication 400 MG: at 08:05

## 2021-05-08 RX ADMIN — APIXABAN 5 MG: 5 TABLET, FILM COATED ORAL at 08:05

## 2021-05-08 RX ADMIN — SPIRONOLACTONE 25 MG: 25 TABLET, FILM COATED ORAL at 08:05

## 2021-05-08 RX ADMIN — MAGNESIUM SULFATE HEPTAHYDRATE 1 G: 500 INJECTION, SOLUTION INTRAMUSCULAR; INTRAVENOUS at 11:05

## 2021-05-08 RX ADMIN — RIOCIGUAT 2.5 MG: 2.5 TABLET, FILM COATED ORAL at 02:05

## 2021-05-08 RX ADMIN — CETIRIZINE HYDROCHLORIDE 10 MG: 10 TABLET, FILM COATED ORAL at 08:05

## 2021-05-08 RX ADMIN — CITALOPRAM HYDROBROMIDE 40 MG: 20 TABLET ORAL at 08:05

## 2021-05-08 RX ADMIN — FUROSEMIDE 20 MG/HR: 10 INJECTION, SOLUTION INTRAMUSCULAR; INTRAVENOUS at 02:05

## 2021-05-08 RX ADMIN — POTASSIUM CHLORIDE 10 MEQ: 7.46 INJECTION, SOLUTION INTRAVENOUS at 08:05

## 2021-05-08 RX ADMIN — RIOCIGUAT 2.5 MG: 2.5 TABLET, FILM COATED ORAL at 08:05

## 2021-05-08 RX ADMIN — POTASSIUM CHLORIDE 10 MEQ: 7.46 INJECTION, SOLUTION INTRAVENOUS at 09:05

## 2021-05-08 RX ADMIN — MACITENTAN 10 MG: 10 TABLET, FILM COATED ORAL at 08:05

## 2021-05-08 RX ADMIN — POTASSIUM CHLORIDE 40 MEQ: 1500 TABLET, EXTENDED RELEASE ORAL at 08:05

## 2021-05-08 RX ADMIN — MAGNESIUM SULFATE 2 G: 2 INJECTION INTRAVENOUS at 12:05

## 2021-05-08 RX ADMIN — Medication 400 MG: at 02:05

## 2021-05-08 RX ADMIN — ATORVASTATIN CALCIUM 40 MG: 20 TABLET, FILM COATED ORAL at 08:05

## 2021-05-08 RX ADMIN — SODIUM CHLORIDE: 0.9 INJECTION, SOLUTION INTRAVENOUS at 08:05

## 2021-05-08 RX ADMIN — QUETIAPINE FUMARATE 400 MG: 200 TABLET ORAL at 08:05

## 2021-05-09 LAB
ANION GAP SERPL CALC-SCNC: 13 MMOL/L (ref 8–16)
BASOPHILS # BLD AUTO: 0.05 K/UL (ref 0–0.2)
BASOPHILS NFR BLD: 0.5 % (ref 0–1.9)
BUN SERPL-MCNC: 10 MG/DL (ref 6–20)
CALCIUM SERPL-MCNC: 8.3 MG/DL (ref 8.7–10.5)
CHLORIDE SERPL-SCNC: 98 MMOL/L (ref 95–110)
CO2 SERPL-SCNC: 25 MMOL/L (ref 23–29)
CREAT SERPL-MCNC: 0.9 MG/DL (ref 0.5–1.4)
DIFFERENTIAL METHOD: ABNORMAL
EOSINOPHIL # BLD AUTO: 0.2 K/UL (ref 0–0.5)
EOSINOPHIL NFR BLD: 1.9 % (ref 0–8)
ERYTHROCYTE [DISTWIDTH] IN BLOOD BY AUTOMATED COUNT: 18.2 % (ref 11.5–14.5)
EST. GFR  (AFRICAN AMERICAN): >60 ML/MIN/1.73 M^2
EST. GFR  (NON AFRICAN AMERICAN): >60 ML/MIN/1.73 M^2
GLUCOSE SERPL-MCNC: 146 MG/DL (ref 70–110)
HCT VFR BLD AUTO: 34.1 % (ref 40–54)
HGB BLD-MCNC: 10 G/DL (ref 14–18)
IMM GRANULOCYTES # BLD AUTO: 0.08 K/UL (ref 0–0.04)
IMM GRANULOCYTES NFR BLD AUTO: 0.9 % (ref 0–0.5)
LYMPHOCYTES # BLD AUTO: 1.6 K/UL (ref 1–4.8)
LYMPHOCYTES NFR BLD: 17.7 % (ref 18–48)
MAGNESIUM SERPL-MCNC: 2.1 MG/DL (ref 1.6–2.6)
MCH RBC QN AUTO: 21.1 PG (ref 27–31)
MCHC RBC AUTO-ENTMCNC: 29.3 G/DL (ref 32–36)
MCV RBC AUTO: 72 FL (ref 82–98)
MONOCYTES # BLD AUTO: 0.6 K/UL (ref 0.3–1)
MONOCYTES NFR BLD: 6.9 % (ref 4–15)
NEUTROPHILS # BLD AUTO: 6.6 K/UL (ref 1.8–7.7)
NEUTROPHILS NFR BLD: 72.1 % (ref 38–73)
NRBC BLD-RTO: 0 /100 WBC
PLATELET # BLD AUTO: 289 K/UL (ref 150–450)
PMV BLD AUTO: 10.9 FL (ref 9.2–12.9)
POTASSIUM SERPL-SCNC: 3.2 MMOL/L (ref 3.5–5.1)
RBC # BLD AUTO: 4.75 M/UL (ref 4.6–6.2)
SODIUM SERPL-SCNC: 136 MMOL/L (ref 136–145)
WBC # BLD AUTO: 9.11 K/UL (ref 3.9–12.7)

## 2021-05-09 PROCEDURE — 94761 N-INVAS EAR/PLS OXIMETRY MLT: CPT

## 2021-05-09 PROCEDURE — 83735 ASSAY OF MAGNESIUM: CPT | Performed by: PHYSICIAN ASSISTANT

## 2021-05-09 PROCEDURE — 99233 SBSQ HOSP IP/OBS HIGH 50: CPT | Mod: ,,, | Performed by: INTERNAL MEDICINE

## 2021-05-09 PROCEDURE — 25000003 PHARM REV CODE 250: Performed by: INTERNAL MEDICINE

## 2021-05-09 PROCEDURE — 25000003 PHARM REV CODE 250: Performed by: PHYSICIAN ASSISTANT

## 2021-05-09 PROCEDURE — 85025 COMPLETE CBC W/AUTO DIFF WBC: CPT | Performed by: PHYSICIAN ASSISTANT

## 2021-05-09 PROCEDURE — 20600001 HC STEP DOWN PRIVATE ROOM

## 2021-05-09 PROCEDURE — 36415 COLL VENOUS BLD VENIPUNCTURE: CPT | Performed by: PHYSICIAN ASSISTANT

## 2021-05-09 PROCEDURE — 63600175 PHARM REV CODE 636 W HCPCS: Performed by: PHYSICIAN ASSISTANT

## 2021-05-09 PROCEDURE — 80048 BASIC METABOLIC PNL TOTAL CA: CPT | Performed by: PHYSICIAN ASSISTANT

## 2021-05-09 PROCEDURE — 99900035 HC TECH TIME PER 15 MIN (STAT)

## 2021-05-09 PROCEDURE — 99233 PR SUBSEQUENT HOSPITAL CARE,LEVL III: ICD-10-PCS | Mod: ,,, | Performed by: INTERNAL MEDICINE

## 2021-05-09 RX ADMIN — SPIRONOLACTONE 25 MG: 25 TABLET, FILM COATED ORAL at 08:05

## 2021-05-09 RX ADMIN — POTASSIUM CHLORIDE 40 MEQ: 1500 TABLET, EXTENDED RELEASE ORAL at 08:05

## 2021-05-09 RX ADMIN — MACITENTAN 10 MG: 10 TABLET, FILM COATED ORAL at 08:05

## 2021-05-09 RX ADMIN — ATORVASTATIN CALCIUM 40 MG: 20 TABLET, FILM COATED ORAL at 08:05

## 2021-05-09 RX ADMIN — Medication 400 MG: at 08:05

## 2021-05-09 RX ADMIN — RIOCIGUAT 2.5 MG: 2.5 TABLET, FILM COATED ORAL at 08:05

## 2021-05-09 RX ADMIN — RIOCIGUAT 2.5 MG: 2.5 TABLET, FILM COATED ORAL at 02:05

## 2021-05-09 RX ADMIN — POTASSIUM CHLORIDE 40 MEQ: 1500 TABLET, EXTENDED RELEASE ORAL at 02:05

## 2021-05-09 RX ADMIN — QUETIAPINE FUMARATE 400 MG: 200 TABLET ORAL at 08:05

## 2021-05-09 RX ADMIN — Medication 400 MG: at 02:05

## 2021-05-09 RX ADMIN — FUROSEMIDE 20 MG/HR: 10 INJECTION, SOLUTION INTRAMUSCULAR; INTRAVENOUS at 02:05

## 2021-05-09 RX ADMIN — CITALOPRAM HYDROBROMIDE 40 MG: 20 TABLET ORAL at 08:05

## 2021-05-09 RX ADMIN — APIXABAN 5 MG: 5 TABLET, FILM COATED ORAL at 08:05

## 2021-05-09 RX ADMIN — CETIRIZINE HYDROCHLORIDE 10 MG: 10 TABLET, FILM COATED ORAL at 09:05

## 2021-05-09 RX ADMIN — ASPIRIN 81 MG: 81 TABLET, COATED ORAL at 08:05

## 2021-05-09 RX ADMIN — APIXABAN 5 MG: 5 TABLET, FILM COATED ORAL at 09:05

## 2021-05-09 RX ADMIN — PANTOPRAZOLE SODIUM 40 MG: 40 TABLET, DELAYED RELEASE ORAL at 08:05

## 2021-05-10 LAB
ALBUMIN SERPL BCP-MCNC: 3.2 G/DL (ref 3.5–5.2)
ALP SERPL-CCNC: 110 U/L (ref 55–135)
ALT SERPL W/O P-5'-P-CCNC: 18 U/L (ref 10–44)
ANION GAP SERPL CALC-SCNC: 10 MMOL/L (ref 8–16)
ANION GAP SERPL CALC-SCNC: 10 MMOL/L (ref 8–16)
AST SERPL-CCNC: 12 U/L (ref 10–40)
BASOPHILS # BLD AUTO: 0.06 K/UL (ref 0–0.2)
BASOPHILS NFR BLD: 0.7 % (ref 0–1.9)
BILIRUB SERPL-MCNC: 0.4 MG/DL (ref 0.1–1)
BNP SERPL-MCNC: 99 PG/ML (ref 0–99)
BUN SERPL-MCNC: 11 MG/DL (ref 6–20)
BUN SERPL-MCNC: 11 MG/DL (ref 6–20)
CALCIUM SERPL-MCNC: 8.1 MG/DL (ref 8.7–10.5)
CALCIUM SERPL-MCNC: 8.1 MG/DL (ref 8.7–10.5)
CHLORIDE SERPL-SCNC: 97 MMOL/L (ref 95–110)
CHLORIDE SERPL-SCNC: 97 MMOL/L (ref 95–110)
CO2 SERPL-SCNC: 28 MMOL/L (ref 23–29)
CO2 SERPL-SCNC: 28 MMOL/L (ref 23–29)
CREAT SERPL-MCNC: 1 MG/DL (ref 0.5–1.4)
CREAT SERPL-MCNC: 1 MG/DL (ref 0.5–1.4)
DIFFERENTIAL METHOD: ABNORMAL
EOSINOPHIL # BLD AUTO: 0.1 K/UL (ref 0–0.5)
EOSINOPHIL NFR BLD: 1.5 % (ref 0–8)
ERYTHROCYTE [DISTWIDTH] IN BLOOD BY AUTOMATED COUNT: 18.2 % (ref 11.5–14.5)
EST. GFR  (AFRICAN AMERICAN): >60 ML/MIN/1.73 M^2
EST. GFR  (AFRICAN AMERICAN): >60 ML/MIN/1.73 M^2
EST. GFR  (NON AFRICAN AMERICAN): >60 ML/MIN/1.73 M^2
EST. GFR  (NON AFRICAN AMERICAN): >60 ML/MIN/1.73 M^2
GLUCOSE SERPL-MCNC: 140 MG/DL (ref 70–110)
GLUCOSE SERPL-MCNC: 140 MG/DL (ref 70–110)
HCT VFR BLD AUTO: 31.9 % (ref 40–54)
HGB BLD-MCNC: 9.3 G/DL (ref 14–18)
IMM GRANULOCYTES # BLD AUTO: 0.1 K/UL (ref 0–0.04)
IMM GRANULOCYTES NFR BLD AUTO: 1.1 % (ref 0–0.5)
LYMPHOCYTES # BLD AUTO: 1.2 K/UL (ref 1–4.8)
LYMPHOCYTES NFR BLD: 14.1 % (ref 18–48)
MAGNESIUM SERPL-MCNC: 2.3 MG/DL (ref 1.6–2.6)
MCH RBC QN AUTO: 20.4 PG (ref 27–31)
MCHC RBC AUTO-ENTMCNC: 29.2 G/DL (ref 32–36)
MCV RBC AUTO: 70 FL (ref 82–98)
MONOCYTES # BLD AUTO: 0.6 K/UL (ref 0.3–1)
MONOCYTES NFR BLD: 6.7 % (ref 4–15)
NEUTROPHILS # BLD AUTO: 6.7 K/UL (ref 1.8–7.7)
NEUTROPHILS NFR BLD: 75.9 % (ref 38–73)
NRBC BLD-RTO: 0 /100 WBC
PLATELET # BLD AUTO: 270 K/UL (ref 150–450)
PMV BLD AUTO: 11.5 FL (ref 9.2–12.9)
POTASSIUM SERPL-SCNC: 2.8 MMOL/L (ref 3.5–5.1)
POTASSIUM SERPL-SCNC: 2.8 MMOL/L (ref 3.5–5.1)
POTASSIUM SERPL-SCNC: 3.8 MMOL/L (ref 3.5–5.1)
PROT SERPL-MCNC: 6.7 G/DL (ref 6–8.4)
RBC # BLD AUTO: 4.55 M/UL (ref 4.6–6.2)
SODIUM SERPL-SCNC: 135 MMOL/L (ref 136–145)
SODIUM SERPL-SCNC: 135 MMOL/L (ref 136–145)
WBC # BLD AUTO: 8.79 K/UL (ref 3.9–12.7)

## 2021-05-10 PROCEDURE — 99900035 HC TECH TIME PER 15 MIN (STAT)

## 2021-05-10 PROCEDURE — 80053 COMPREHEN METABOLIC PANEL: CPT | Performed by: PHYSICIAN ASSISTANT

## 2021-05-10 PROCEDURE — 25000003 PHARM REV CODE 250: Performed by: NURSE PRACTITIONER

## 2021-05-10 PROCEDURE — 25000003 PHARM REV CODE 250: Performed by: STUDENT IN AN ORGANIZED HEALTH CARE EDUCATION/TRAINING PROGRAM

## 2021-05-10 PROCEDURE — 99233 SBSQ HOSP IP/OBS HIGH 50: CPT | Mod: 95,,, | Performed by: NURSE PRACTITIONER

## 2021-05-10 PROCEDURE — 84132 ASSAY OF SERUM POTASSIUM: CPT | Performed by: STUDENT IN AN ORGANIZED HEALTH CARE EDUCATION/TRAINING PROGRAM

## 2021-05-10 PROCEDURE — 94660 CPAP INITIATION&MGMT: CPT

## 2021-05-10 PROCEDURE — 36415 COLL VENOUS BLD VENIPUNCTURE: CPT | Performed by: STUDENT IN AN ORGANIZED HEALTH CARE EDUCATION/TRAINING PROGRAM

## 2021-05-10 PROCEDURE — 83735 ASSAY OF MAGNESIUM: CPT | Performed by: PHYSICIAN ASSISTANT

## 2021-05-10 PROCEDURE — 94761 N-INVAS EAR/PLS OXIMETRY MLT: CPT

## 2021-05-10 PROCEDURE — 27000190 HC CPAP FULL FACE MASK W/VALVE

## 2021-05-10 PROCEDURE — 83880 ASSAY OF NATRIURETIC PEPTIDE: CPT | Performed by: PHYSICIAN ASSISTANT

## 2021-05-10 PROCEDURE — 85025 COMPLETE CBC W/AUTO DIFF WBC: CPT | Performed by: PHYSICIAN ASSISTANT

## 2021-05-10 PROCEDURE — 36415 COLL VENOUS BLD VENIPUNCTURE: CPT | Performed by: PHYSICIAN ASSISTANT

## 2021-05-10 PROCEDURE — 63600175 PHARM REV CODE 636 W HCPCS: Performed by: PHYSICIAN ASSISTANT

## 2021-05-10 PROCEDURE — 25000003 PHARM REV CODE 250: Performed by: PHYSICIAN ASSISTANT

## 2021-05-10 PROCEDURE — 25000242 PHARM REV CODE 250 ALT 637 W/ HCPCS: Performed by: NURSE PRACTITIONER

## 2021-05-10 PROCEDURE — 25000003 PHARM REV CODE 250: Performed by: INTERNAL MEDICINE

## 2021-05-10 PROCEDURE — 20600001 HC STEP DOWN PRIVATE ROOM

## 2021-05-10 PROCEDURE — 99233 PR SUBSEQUENT HOSPITAL CARE,LEVL III: ICD-10-PCS | Mod: 95,,, | Performed by: NURSE PRACTITIONER

## 2021-05-10 PROCEDURE — 27000221 HC OXYGEN, UP TO 24 HOURS

## 2021-05-10 RX ORDER — BUTALBITAL, ACETAMINOPHEN AND CAFFEINE 50; 325; 40 MG/1; MG/1; MG/1
1 TABLET ORAL ONCE
Status: COMPLETED | OUTPATIENT
Start: 2021-05-10 | End: 2021-05-10

## 2021-05-10 RX ORDER — ACETAMINOPHEN 325 MG/1
650 TABLET ORAL EVERY 6 HOURS PRN
Status: DISCONTINUED | OUTPATIENT
Start: 2021-05-10 | End: 2021-05-14 | Stop reason: HOSPADM

## 2021-05-10 RX ORDER — FLUTICASONE PROPIONATE 50 MCG
2 SPRAY, SUSPENSION (ML) NASAL DAILY
Status: DISCONTINUED | OUTPATIENT
Start: 2021-05-10 | End: 2021-05-14 | Stop reason: HOSPADM

## 2021-05-10 RX ORDER — SPIRONOLACTONE 50 MG/1
50 TABLET, FILM COATED ORAL DAILY
Status: DISCONTINUED | OUTPATIENT
Start: 2021-05-10 | End: 2021-05-14 | Stop reason: HOSPADM

## 2021-05-10 RX ORDER — POTASSIUM CHLORIDE 20 MEQ/1
60 TABLET, EXTENDED RELEASE ORAL 3 TIMES DAILY
Status: DISCONTINUED | OUTPATIENT
Start: 2021-05-10 | End: 2021-05-14 | Stop reason: HOSPADM

## 2021-05-10 RX ORDER — POTASSIUM CHLORIDE 20 MEQ/1
40 TABLET, EXTENDED RELEASE ORAL ONCE
Status: COMPLETED | OUTPATIENT
Start: 2021-05-10 | End: 2021-05-10

## 2021-05-10 RX ADMIN — Medication 400 MG: at 09:05

## 2021-05-10 RX ADMIN — RIOCIGUAT 2.5 MG: 2.5 TABLET, FILM COATED ORAL at 03:05

## 2021-05-10 RX ADMIN — SPIRONOLACTONE 50 MG: 50 TABLET ORAL at 09:05

## 2021-05-10 RX ADMIN — POTASSIUM CHLORIDE 60 MEQ: 1500 TABLET, EXTENDED RELEASE ORAL at 03:05

## 2021-05-10 RX ADMIN — BUTALBITAL, ACETAMINOPHEN, AND CAFFEINE 1 TABLET: 50; 325; 40 TABLET ORAL at 08:05

## 2021-05-10 RX ADMIN — FUROSEMIDE 20 MG/HR: 10 INJECTION, SOLUTION INTRAMUSCULAR; INTRAVENOUS at 01:05

## 2021-05-10 RX ADMIN — CETIRIZINE HYDROCHLORIDE 10 MG: 10 TABLET, FILM COATED ORAL at 09:05

## 2021-05-10 RX ADMIN — RIOCIGUAT 2.5 MG: 2.5 TABLET, FILM COATED ORAL at 08:05

## 2021-05-10 RX ADMIN — ACETAMINOPHEN 650 MG: 325 TABLET ORAL at 03:05

## 2021-05-10 RX ADMIN — PANTOPRAZOLE SODIUM 40 MG: 40 TABLET, DELAYED RELEASE ORAL at 09:05

## 2021-05-10 RX ADMIN — FLUTICASONE PROPIONATE 100 MCG: 50 SPRAY, METERED NASAL at 09:05

## 2021-05-10 RX ADMIN — POTASSIUM CHLORIDE 40 MEQ: 1500 TABLET, EXTENDED RELEASE ORAL at 09:05

## 2021-05-10 RX ADMIN — APIXABAN 5 MG: 5 TABLET, FILM COATED ORAL at 09:05

## 2021-05-10 RX ADMIN — ASPIRIN 81 MG: 81 TABLET, COATED ORAL at 09:05

## 2021-05-10 RX ADMIN — ATORVASTATIN CALCIUM 40 MG: 20 TABLET, FILM COATED ORAL at 09:05

## 2021-05-10 RX ADMIN — POTASSIUM CHLORIDE 60 MEQ: 1500 TABLET, EXTENDED RELEASE ORAL at 08:05

## 2021-05-10 RX ADMIN — CITALOPRAM HYDROBROMIDE 40 MG: 20 TABLET ORAL at 09:05

## 2021-05-10 RX ADMIN — POTASSIUM CHLORIDE 60 MEQ: 1500 TABLET, EXTENDED RELEASE ORAL at 09:05

## 2021-05-10 RX ADMIN — ACETAMINOPHEN 650 MG: 325 TABLET ORAL at 09:05

## 2021-05-10 RX ADMIN — Medication 400 MG: at 08:05

## 2021-05-10 RX ADMIN — RIOCIGUAT 2.5 MG: 2.5 TABLET, FILM COATED ORAL at 09:05

## 2021-05-10 RX ADMIN — APIXABAN 5 MG: 5 TABLET, FILM COATED ORAL at 08:05

## 2021-05-10 RX ADMIN — MACITENTAN 10 MG: 10 TABLET, FILM COATED ORAL at 09:05

## 2021-05-10 RX ADMIN — QUETIAPINE FUMARATE 400 MG: 200 TABLET ORAL at 08:05

## 2021-05-10 RX ADMIN — Medication 400 MG: at 03:05

## 2021-05-11 ENCOUNTER — DOCUMENTATION ONLY (OUTPATIENT)
Dept: TRANSPLANT | Facility: CLINIC | Age: 51
End: 2021-05-11

## 2021-05-11 LAB
ANION GAP SERPL CALC-SCNC: 11 MMOL/L (ref 8–16)
BASOPHILS # BLD AUTO: 0.05 K/UL (ref 0–0.2)
BASOPHILS NFR BLD: 0.4 % (ref 0–1.9)
BUN SERPL-MCNC: 12 MG/DL (ref 6–20)
CALCIUM SERPL-MCNC: 8.3 MG/DL (ref 8.7–10.5)
CHLORIDE SERPL-SCNC: 97 MMOL/L (ref 95–110)
CO2 SERPL-SCNC: 27 MMOL/L (ref 23–29)
CREAT SERPL-MCNC: 0.9 MG/DL (ref 0.5–1.4)
DIFFERENTIAL METHOD: ABNORMAL
EOSINOPHIL # BLD AUTO: 0.1 K/UL (ref 0–0.5)
EOSINOPHIL NFR BLD: 0.7 % (ref 0–8)
ERYTHROCYTE [DISTWIDTH] IN BLOOD BY AUTOMATED COUNT: 18.3 % (ref 11.5–14.5)
EST. GFR  (AFRICAN AMERICAN): >60 ML/MIN/1.73 M^2
EST. GFR  (NON AFRICAN AMERICAN): >60 ML/MIN/1.73 M^2
GLUCOSE SERPL-MCNC: 145 MG/DL (ref 70–110)
HCT VFR BLD AUTO: 32.2 % (ref 40–54)
HGB BLD-MCNC: 9.4 G/DL (ref 14–18)
IMM GRANULOCYTES # BLD AUTO: 0.14 K/UL (ref 0–0.04)
IMM GRANULOCYTES NFR BLD AUTO: 1.2 % (ref 0–0.5)
LYMPHOCYTES # BLD AUTO: 1 K/UL (ref 1–4.8)
LYMPHOCYTES NFR BLD: 8.7 % (ref 18–48)
MAGNESIUM SERPL-MCNC: 2.4 MG/DL (ref 1.6–2.6)
MCH RBC QN AUTO: 20.4 PG (ref 27–31)
MCHC RBC AUTO-ENTMCNC: 29.2 G/DL (ref 32–36)
MCV RBC AUTO: 70 FL (ref 82–98)
MONOCYTES # BLD AUTO: 0.7 K/UL (ref 0.3–1)
MONOCYTES NFR BLD: 6.4 % (ref 4–15)
NEUTROPHILS # BLD AUTO: 9.6 K/UL (ref 1.8–7.7)
NEUTROPHILS NFR BLD: 82.6 % (ref 38–73)
NRBC BLD-RTO: 0 /100 WBC
PLATELET # BLD AUTO: 310 K/UL (ref 150–450)
PMV BLD AUTO: 11.2 FL (ref 9.2–12.9)
POTASSIUM SERPL-SCNC: 3.5 MMOL/L (ref 3.5–5.1)
RBC # BLD AUTO: 4.6 M/UL (ref 4.6–6.2)
SARS-COV-2 RDRP RESP QL NAA+PROBE: NEGATIVE
SODIUM SERPL-SCNC: 135 MMOL/L (ref 136–145)
WBC # BLD AUTO: 11.56 K/UL (ref 3.9–12.7)

## 2021-05-11 PROCEDURE — 25000003 PHARM REV CODE 250: Performed by: PHYSICIAN ASSISTANT

## 2021-05-11 PROCEDURE — 83735 ASSAY OF MAGNESIUM: CPT | Performed by: PHYSICIAN ASSISTANT

## 2021-05-11 PROCEDURE — U0002 COVID-19 LAB TEST NON-CDC: HCPCS | Performed by: NURSE PRACTITIONER

## 2021-05-11 PROCEDURE — 25000003 PHARM REV CODE 250: Performed by: INTERNAL MEDICINE

## 2021-05-11 PROCEDURE — 25000003 PHARM REV CODE 250: Performed by: NURSE PRACTITIONER

## 2021-05-11 PROCEDURE — 63600175 PHARM REV CODE 636 W HCPCS: Performed by: NURSE PRACTITIONER

## 2021-05-11 PROCEDURE — 85025 COMPLETE CBC W/AUTO DIFF WBC: CPT | Performed by: PHYSICIAN ASSISTANT

## 2021-05-11 PROCEDURE — 99900035 HC TECH TIME PER 15 MIN (STAT)

## 2021-05-11 PROCEDURE — 20600001 HC STEP DOWN PRIVATE ROOM

## 2021-05-11 PROCEDURE — 80048 BASIC METABOLIC PNL TOTAL CA: CPT | Performed by: PHYSICIAN ASSISTANT

## 2021-05-11 PROCEDURE — 36415 COLL VENOUS BLD VENIPUNCTURE: CPT | Performed by: PHYSICIAN ASSISTANT

## 2021-05-11 PROCEDURE — 63600175 PHARM REV CODE 636 W HCPCS: Performed by: PHYSICIAN ASSISTANT

## 2021-05-11 RX ORDER — BUTALBITAL, ACETAMINOPHEN AND CAFFEINE 50; 325; 40 MG/1; MG/1; MG/1
1 TABLET ORAL EVERY 4 HOURS PRN
Status: DISCONTINUED | OUTPATIENT
Start: 2021-05-11 | End: 2021-05-14 | Stop reason: HOSPADM

## 2021-05-11 RX ORDER — FUROSEMIDE 10 MG/ML
80 INJECTION INTRAMUSCULAR; INTRAVENOUS ONCE
Status: COMPLETED | OUTPATIENT
Start: 2021-05-11 | End: 2021-05-11

## 2021-05-11 RX ORDER — POTASSIUM CHLORIDE 20 MEQ/1
40 TABLET, EXTENDED RELEASE ORAL ONCE
Status: COMPLETED | OUTPATIENT
Start: 2021-05-11 | End: 2021-05-11

## 2021-05-11 RX ADMIN — Medication 400 MG: at 10:05

## 2021-05-11 RX ADMIN — ACETAMINOPHEN 650 MG: 325 TABLET ORAL at 06:05

## 2021-05-11 RX ADMIN — POTASSIUM CHLORIDE 60 MEQ: 1500 TABLET, EXTENDED RELEASE ORAL at 08:05

## 2021-05-11 RX ADMIN — CETIRIZINE HYDROCHLORIDE 10 MG: 10 TABLET, FILM COATED ORAL at 10:05

## 2021-05-11 RX ADMIN — MACITENTAN 10 MG: 10 TABLET, FILM COATED ORAL at 10:05

## 2021-05-11 RX ADMIN — POTASSIUM CHLORIDE 60 MEQ: 1500 TABLET, EXTENDED RELEASE ORAL at 10:05

## 2021-05-11 RX ADMIN — CITALOPRAM HYDROBROMIDE 40 MG: 20 TABLET ORAL at 10:05

## 2021-05-11 RX ADMIN — BUTALBITAL, ACETAMINOPHEN, AND CAFFEINE 1 TABLET: 50; 325; 40 TABLET ORAL at 03:05

## 2021-05-11 RX ADMIN — PANTOPRAZOLE SODIUM 40 MG: 40 TABLET, DELAYED RELEASE ORAL at 10:05

## 2021-05-11 RX ADMIN — APIXABAN 5 MG: 5 TABLET, FILM COATED ORAL at 10:05

## 2021-05-11 RX ADMIN — SPIRONOLACTONE 50 MG: 50 TABLET ORAL at 10:05

## 2021-05-11 RX ADMIN — ASPIRIN 81 MG: 81 TABLET, COATED ORAL at 10:05

## 2021-05-11 RX ADMIN — QUETIAPINE FUMARATE 400 MG: 200 TABLET ORAL at 08:05

## 2021-05-11 RX ADMIN — POTASSIUM CHLORIDE 60 MEQ: 1500 TABLET, EXTENDED RELEASE ORAL at 03:05

## 2021-05-11 RX ADMIN — FLUTICASONE PROPIONATE 100 MCG: 50 SPRAY, METERED NASAL at 10:05

## 2021-05-11 RX ADMIN — RIOCIGUAT 2.5 MG: 2.5 TABLET, FILM COATED ORAL at 08:05

## 2021-05-11 RX ADMIN — RIOCIGUAT 2.5 MG: 2.5 TABLET, FILM COATED ORAL at 10:05

## 2021-05-11 RX ADMIN — POTASSIUM CHLORIDE 40 MEQ: 1500 TABLET, EXTENDED RELEASE ORAL at 10:05

## 2021-05-11 RX ADMIN — FUROSEMIDE 20 MG/HR: 10 INJECTION, SOLUTION INTRAMUSCULAR; INTRAVENOUS at 07:05

## 2021-05-11 RX ADMIN — FUROSEMIDE 80 MG: 10 INJECTION, SOLUTION INTRAMUSCULAR; INTRAVENOUS at 10:05

## 2021-05-11 RX ADMIN — BUTALBITAL, ACETAMINOPHEN, AND CAFFEINE 1 TABLET: 50; 325; 40 TABLET ORAL at 07:05

## 2021-05-11 RX ADMIN — Medication 400 MG: at 08:05

## 2021-05-11 RX ADMIN — ATORVASTATIN CALCIUM 40 MG: 20 TABLET, FILM COATED ORAL at 10:05

## 2021-05-11 RX ADMIN — Medication 400 MG: at 03:05

## 2021-05-11 RX ADMIN — RIOCIGUAT 2.5 MG: 2.5 TABLET, FILM COATED ORAL at 03:05

## 2021-05-12 LAB
ALBUMIN SERPL BCP-MCNC: 3.3 G/DL (ref 3.5–5.2)
ALP SERPL-CCNC: 120 U/L (ref 55–135)
ALT SERPL W/O P-5'-P-CCNC: 20 U/L (ref 10–44)
ANION GAP SERPL CALC-SCNC: 10 MMOL/L (ref 8–16)
ANION GAP SERPL CALC-SCNC: 10 MMOL/L (ref 8–16)
AST SERPL-CCNC: 12 U/L (ref 10–40)
BASOPHILS # BLD AUTO: 0.06 K/UL (ref 0–0.2)
BASOPHILS NFR BLD: 0.6 % (ref 0–1.9)
BILIRUB SERPL-MCNC: 0.5 MG/DL (ref 0.1–1)
BNP SERPL-MCNC: 164 PG/ML (ref 0–99)
BUN SERPL-MCNC: 13 MG/DL (ref 6–20)
BUN SERPL-MCNC: 13 MG/DL (ref 6–20)
CALCIUM SERPL-MCNC: 8.6 MG/DL (ref 8.7–10.5)
CALCIUM SERPL-MCNC: 8.6 MG/DL (ref 8.7–10.5)
CHLORIDE SERPL-SCNC: 100 MMOL/L (ref 95–110)
CHLORIDE SERPL-SCNC: 100 MMOL/L (ref 95–110)
CO2 SERPL-SCNC: 27 MMOL/L (ref 23–29)
CO2 SERPL-SCNC: 27 MMOL/L (ref 23–29)
CREAT SERPL-MCNC: 1.1 MG/DL (ref 0.5–1.4)
CREAT SERPL-MCNC: 1.1 MG/DL (ref 0.5–1.4)
DIFFERENTIAL METHOD: ABNORMAL
EOSINOPHIL # BLD AUTO: 0.1 K/UL (ref 0–0.5)
EOSINOPHIL NFR BLD: 1 % (ref 0–8)
ERYTHROCYTE [DISTWIDTH] IN BLOOD BY AUTOMATED COUNT: 18.3 % (ref 11.5–14.5)
EST. GFR  (AFRICAN AMERICAN): >60 ML/MIN/1.73 M^2
EST. GFR  (AFRICAN AMERICAN): >60 ML/MIN/1.73 M^2
EST. GFR  (NON AFRICAN AMERICAN): >60 ML/MIN/1.73 M^2
EST. GFR  (NON AFRICAN AMERICAN): >60 ML/MIN/1.73 M^2
GLUCOSE SERPL-MCNC: 161 MG/DL (ref 70–110)
GLUCOSE SERPL-MCNC: 161 MG/DL (ref 70–110)
HCT VFR BLD AUTO: 30.5 % (ref 40–54)
HGB BLD-MCNC: 9.1 G/DL (ref 14–18)
IMM GRANULOCYTES # BLD AUTO: 0.14 K/UL (ref 0–0.04)
IMM GRANULOCYTES NFR BLD AUTO: 1.4 % (ref 0–0.5)
INR PPP: 1.2 (ref 0.8–1.2)
LYMPHOCYTES # BLD AUTO: 1.1 K/UL (ref 1–4.8)
LYMPHOCYTES NFR BLD: 11.7 % (ref 18–48)
MAGNESIUM SERPL-MCNC: 2.4 MG/DL (ref 1.6–2.6)
MCH RBC QN AUTO: 20.6 PG (ref 27–31)
MCHC RBC AUTO-ENTMCNC: 29.8 G/DL (ref 32–36)
MCV RBC AUTO: 69 FL (ref 82–98)
MONOCYTES # BLD AUTO: 0.7 K/UL (ref 0.3–1)
MONOCYTES NFR BLD: 6.7 % (ref 4–15)
NEUTROPHILS # BLD AUTO: 7.7 K/UL (ref 1.8–7.7)
NEUTROPHILS NFR BLD: 78.6 % (ref 38–73)
NRBC BLD-RTO: 0 /100 WBC
PLATELET # BLD AUTO: 280 K/UL (ref 150–450)
PMV BLD AUTO: 11.2 FL (ref 9.2–12.9)
POTASSIUM SERPL-SCNC: 3.3 MMOL/L (ref 3.5–5.1)
POTASSIUM SERPL-SCNC: 3.3 MMOL/L (ref 3.5–5.1)
PROT SERPL-MCNC: 6.8 G/DL (ref 6–8.4)
PROTHROMBIN TIME: 12.9 SEC (ref 9–12.5)
RBC # BLD AUTO: 4.42 M/UL (ref 4.6–6.2)
SODIUM SERPL-SCNC: 137 MMOL/L (ref 136–145)
SODIUM SERPL-SCNC: 137 MMOL/L (ref 136–145)
WBC # BLD AUTO: 9.77 K/UL (ref 3.9–12.7)

## 2021-05-12 PROCEDURE — 63600175 PHARM REV CODE 636 W HCPCS: Performed by: NURSE PRACTITIONER

## 2021-05-12 PROCEDURE — 85610 PROTHROMBIN TIME: CPT | Performed by: NURSE PRACTITIONER

## 2021-05-12 PROCEDURE — 80053 COMPREHEN METABOLIC PANEL: CPT | Performed by: PHYSICIAN ASSISTANT

## 2021-05-12 PROCEDURE — 25000003 PHARM REV CODE 250: Performed by: NURSE PRACTITIONER

## 2021-05-12 PROCEDURE — 25000003 PHARM REV CODE 250: Performed by: PHYSICIAN ASSISTANT

## 2021-05-12 PROCEDURE — 99900035 HC TECH TIME PER 15 MIN (STAT)

## 2021-05-12 PROCEDURE — 36415 COLL VENOUS BLD VENIPUNCTURE: CPT | Performed by: NURSE PRACTITIONER

## 2021-05-12 PROCEDURE — C1751 CATH, INF, PER/CENT/MIDLINE: HCPCS | Performed by: INTERNAL MEDICINE

## 2021-05-12 PROCEDURE — 85025 COMPLETE CBC W/AUTO DIFF WBC: CPT | Performed by: PHYSICIAN ASSISTANT

## 2021-05-12 PROCEDURE — 99233 SBSQ HOSP IP/OBS HIGH 50: CPT | Mod: 95,,, | Performed by: NURSE PRACTITIONER

## 2021-05-12 PROCEDURE — 93451 RIGHT HEART CATH: CPT | Mod: 26,,, | Performed by: INTERNAL MEDICINE

## 2021-05-12 PROCEDURE — 99233 PR SUBSEQUENT HOSPITAL CARE,LEVL III: ICD-10-PCS | Mod: 95,,, | Performed by: NURSE PRACTITIONER

## 2021-05-12 PROCEDURE — 25000003 PHARM REV CODE 250: Performed by: INTERNAL MEDICINE

## 2021-05-12 PROCEDURE — 93451 PR RIGHT HEART CATH O2 SATURATION & CARDIAC OUTPUT: ICD-10-PCS | Mod: 26,,, | Performed by: INTERNAL MEDICINE

## 2021-05-12 PROCEDURE — 20600001 HC STEP DOWN PRIVATE ROOM

## 2021-05-12 PROCEDURE — 83880 ASSAY OF NATRIURETIC PEPTIDE: CPT | Performed by: PHYSICIAN ASSISTANT

## 2021-05-12 PROCEDURE — 63600175 PHARM REV CODE 636 W HCPCS: Performed by: PHYSICIAN ASSISTANT

## 2021-05-12 PROCEDURE — C1894 INTRO/SHEATH, NON-LASER: HCPCS | Performed by: INTERNAL MEDICINE

## 2021-05-12 PROCEDURE — 83735 ASSAY OF MAGNESIUM: CPT | Performed by: PHYSICIAN ASSISTANT

## 2021-05-12 PROCEDURE — 93451 RIGHT HEART CATH: CPT | Performed by: INTERNAL MEDICINE

## 2021-05-12 PROCEDURE — 94660 CPAP INITIATION&MGMT: CPT

## 2021-05-12 RX ORDER — FUROSEMIDE 10 MG/ML
80 INJECTION INTRAMUSCULAR; INTRAVENOUS ONCE
Status: COMPLETED | OUTPATIENT
Start: 2021-05-12 | End: 2021-05-12

## 2021-05-12 RX ORDER — POTASSIUM CHLORIDE 20 MEQ/1
40 TABLET, EXTENDED RELEASE ORAL ONCE
Status: COMPLETED | OUTPATIENT
Start: 2021-05-12 | End: 2021-05-12

## 2021-05-12 RX ADMIN — Medication 400 MG: at 02:05

## 2021-05-12 RX ADMIN — POTASSIUM CHLORIDE 40 MEQ: 1500 TABLET, EXTENDED RELEASE ORAL at 11:05

## 2021-05-12 RX ADMIN — SPIRONOLACTONE 50 MG: 50 TABLET ORAL at 08:05

## 2021-05-12 RX ADMIN — POTASSIUM CHLORIDE 60 MEQ: 1500 TABLET, EXTENDED RELEASE ORAL at 08:05

## 2021-05-12 RX ADMIN — Medication 400 MG: at 08:05

## 2021-05-12 RX ADMIN — RIOCIGUAT 2.5 MG: 2.5 TABLET, FILM COATED ORAL at 02:05

## 2021-05-12 RX ADMIN — BUTALBITAL, ACETAMINOPHEN, AND CAFFEINE 1 TABLET: 50; 325; 40 TABLET ORAL at 07:05

## 2021-05-12 RX ADMIN — FUROSEMIDE 80 MG: 10 INJECTION, SOLUTION INTRAMUSCULAR; INTRAVENOUS at 05:05

## 2021-05-12 RX ADMIN — APIXABAN 5 MG: 5 TABLET, FILM COATED ORAL at 08:05

## 2021-05-12 RX ADMIN — QUETIAPINE FUMARATE 400 MG: 200 TABLET ORAL at 08:05

## 2021-05-12 RX ADMIN — RIOCIGUAT 2.5 MG: 2.5 TABLET, FILM COATED ORAL at 08:05

## 2021-05-12 RX ADMIN — PANTOPRAZOLE SODIUM 40 MG: 40 TABLET, DELAYED RELEASE ORAL at 08:05

## 2021-05-12 RX ADMIN — POTASSIUM CHLORIDE 60 MEQ: 1500 TABLET, EXTENDED RELEASE ORAL at 02:05

## 2021-05-12 RX ADMIN — ASPIRIN 81 MG: 81 TABLET, COATED ORAL at 08:05

## 2021-05-12 RX ADMIN — FUROSEMIDE 20 MG/HR: 10 INJECTION, SOLUTION INTRAMUSCULAR; INTRAVENOUS at 04:05

## 2021-05-12 RX ADMIN — CITALOPRAM HYDROBROMIDE 40 MG: 20 TABLET ORAL at 08:05

## 2021-05-12 RX ADMIN — ATORVASTATIN CALCIUM 40 MG: 20 TABLET, FILM COATED ORAL at 08:05

## 2021-05-12 RX ADMIN — FUROSEMIDE 30 MG/HR: 10 INJECTION, SOLUTION INTRAMUSCULAR; INTRAVENOUS at 08:05

## 2021-05-12 RX ADMIN — BUTALBITAL, ACETAMINOPHEN, AND CAFFEINE 1 TABLET: 50; 325; 40 TABLET ORAL at 08:05

## 2021-05-12 RX ADMIN — CETIRIZINE HYDROCHLORIDE 10 MG: 10 TABLET, FILM COATED ORAL at 08:05

## 2021-05-12 RX ADMIN — MACITENTAN 10 MG: 10 TABLET, FILM COATED ORAL at 08:05

## 2021-05-12 RX ADMIN — FLUTICASONE PROPIONATE 100 MCG: 50 SPRAY, METERED NASAL at 08:05

## 2021-05-13 ENCOUNTER — DOCUMENTATION ONLY (OUTPATIENT)
Dept: TRANSPLANT | Facility: CLINIC | Age: 51
End: 2021-05-13

## 2021-05-13 LAB
ANION GAP SERPL CALC-SCNC: 11 MMOL/L (ref 8–16)
BASOPHILS # BLD AUTO: 0.07 K/UL (ref 0–0.2)
BASOPHILS NFR BLD: 0.8 % (ref 0–1.9)
BUN SERPL-MCNC: 12 MG/DL (ref 6–20)
CALCIUM SERPL-MCNC: 8.7 MG/DL (ref 8.7–10.5)
CHLORIDE SERPL-SCNC: 97 MMOL/L (ref 95–110)
CO2 SERPL-SCNC: 26 MMOL/L (ref 23–29)
CREAT SERPL-MCNC: 1.1 MG/DL (ref 0.5–1.4)
DIFFERENTIAL METHOD: ABNORMAL
EOSINOPHIL # BLD AUTO: 0.1 K/UL (ref 0–0.5)
EOSINOPHIL NFR BLD: 1.4 % (ref 0–8)
ERYTHROCYTE [DISTWIDTH] IN BLOOD BY AUTOMATED COUNT: 18.3 % (ref 11.5–14.5)
EST. GFR  (AFRICAN AMERICAN): >60 ML/MIN/1.73 M^2
EST. GFR  (NON AFRICAN AMERICAN): >60 ML/MIN/1.73 M^2
GLUCOSE SERPL-MCNC: 122 MG/DL (ref 70–110)
HCT VFR BLD AUTO: 32.1 % (ref 40–54)
HGB BLD-MCNC: 9.5 G/DL (ref 14–18)
IMM GRANULOCYTES # BLD AUTO: 0.16 K/UL (ref 0–0.04)
IMM GRANULOCYTES NFR BLD AUTO: 1.8 % (ref 0–0.5)
LYMPHOCYTES # BLD AUTO: 1.2 K/UL (ref 1–4.8)
LYMPHOCYTES NFR BLD: 13.9 % (ref 18–48)
MAGNESIUM SERPL-MCNC: 2.4 MG/DL (ref 1.6–2.6)
MCH RBC QN AUTO: 20.9 PG (ref 27–31)
MCHC RBC AUTO-ENTMCNC: 29.6 G/DL (ref 32–36)
MCV RBC AUTO: 71 FL (ref 82–98)
MONOCYTES # BLD AUTO: 0.8 K/UL (ref 0.3–1)
MONOCYTES NFR BLD: 8.9 % (ref 4–15)
NEUTROPHILS # BLD AUTO: 6.5 K/UL (ref 1.8–7.7)
NEUTROPHILS NFR BLD: 73.2 % (ref 38–73)
NRBC BLD-RTO: 0 /100 WBC
PLATELET # BLD AUTO: 278 K/UL (ref 150–450)
PMV BLD AUTO: 10.9 FL (ref 9.2–12.9)
POTASSIUM SERPL-SCNC: 3.1 MMOL/L (ref 3.5–5.1)
RBC # BLD AUTO: 4.55 M/UL (ref 4.6–6.2)
SODIUM SERPL-SCNC: 134 MMOL/L (ref 136–145)
WBC # BLD AUTO: 8.84 K/UL (ref 3.9–12.7)

## 2021-05-13 PROCEDURE — 80048 BASIC METABOLIC PNL TOTAL CA: CPT | Performed by: PHYSICIAN ASSISTANT

## 2021-05-13 PROCEDURE — 85025 COMPLETE CBC W/AUTO DIFF WBC: CPT | Performed by: PHYSICIAN ASSISTANT

## 2021-05-13 PROCEDURE — 83735 ASSAY OF MAGNESIUM: CPT | Performed by: PHYSICIAN ASSISTANT

## 2021-05-13 PROCEDURE — 36415 COLL VENOUS BLD VENIPUNCTURE: CPT | Performed by: PHYSICIAN ASSISTANT

## 2021-05-13 PROCEDURE — 99900035 HC TECH TIME PER 15 MIN (STAT)

## 2021-05-13 PROCEDURE — 99233 PR SUBSEQUENT HOSPITAL CARE,LEVL III: ICD-10-PCS | Mod: 95,,, | Performed by: NURSE PRACTITIONER

## 2021-05-13 PROCEDURE — 25000003 PHARM REV CODE 250: Performed by: INTERNAL MEDICINE

## 2021-05-13 PROCEDURE — 25000003 PHARM REV CODE 250: Performed by: NURSE PRACTITIONER

## 2021-05-13 PROCEDURE — 20600001 HC STEP DOWN PRIVATE ROOM

## 2021-05-13 PROCEDURE — 99233 SBSQ HOSP IP/OBS HIGH 50: CPT | Mod: 95,,, | Performed by: NURSE PRACTITIONER

## 2021-05-13 PROCEDURE — 25000003 PHARM REV CODE 250: Performed by: PHYSICIAN ASSISTANT

## 2021-05-13 RX ORDER — TORSEMIDE 20 MG/1
60 TABLET ORAL 2 TIMES DAILY
Status: DISCONTINUED | OUTPATIENT
Start: 2021-05-13 | End: 2021-05-14 | Stop reason: HOSPADM

## 2021-05-13 RX ORDER — POTASSIUM CHLORIDE 20 MEQ/1
40 TABLET, EXTENDED RELEASE ORAL ONCE
Status: COMPLETED | OUTPATIENT
Start: 2021-05-13 | End: 2021-05-13

## 2021-05-13 RX ADMIN — SPIRONOLACTONE 50 MG: 50 TABLET ORAL at 08:05

## 2021-05-13 RX ADMIN — APIXABAN 5 MG: 5 TABLET, FILM COATED ORAL at 08:05

## 2021-05-13 RX ADMIN — POTASSIUM CHLORIDE 40 MEQ: 1500 TABLET, EXTENDED RELEASE ORAL at 11:05

## 2021-05-13 RX ADMIN — FLUTICASONE PROPIONATE 100 MCG: 50 SPRAY, METERED NASAL at 08:05

## 2021-05-13 RX ADMIN — RIOCIGUAT 2.5 MG: 2.5 TABLET, FILM COATED ORAL at 03:05

## 2021-05-13 RX ADMIN — Medication 400 MG: at 08:05

## 2021-05-13 RX ADMIN — TORSEMIDE 60 MG: 20 TABLET ORAL at 05:05

## 2021-05-13 RX ADMIN — POTASSIUM CHLORIDE 60 MEQ: 1500 TABLET, EXTENDED RELEASE ORAL at 08:05

## 2021-05-13 RX ADMIN — BUTALBITAL, ACETAMINOPHEN, AND CAFFEINE 1 TABLET: 50; 325; 40 TABLET ORAL at 07:05

## 2021-05-13 RX ADMIN — CETIRIZINE HYDROCHLORIDE 10 MG: 10 TABLET, FILM COATED ORAL at 08:05

## 2021-05-13 RX ADMIN — CITALOPRAM HYDROBROMIDE 40 MG: 20 TABLET ORAL at 08:05

## 2021-05-13 RX ADMIN — POTASSIUM CHLORIDE 60 MEQ: 1500 TABLET, EXTENDED RELEASE ORAL at 01:05

## 2021-05-13 RX ADMIN — ATORVASTATIN CALCIUM 40 MG: 20 TABLET, FILM COATED ORAL at 08:05

## 2021-05-13 RX ADMIN — QUETIAPINE FUMARATE 400 MG: 200 TABLET ORAL at 08:05

## 2021-05-13 RX ADMIN — RIOCIGUAT 2.5 MG: 2.5 TABLET, FILM COATED ORAL at 08:05

## 2021-05-13 RX ADMIN — ASPIRIN 81 MG: 81 TABLET, COATED ORAL at 08:05

## 2021-05-13 RX ADMIN — Medication 400 MG: at 01:05

## 2021-05-13 RX ADMIN — PANTOPRAZOLE SODIUM 40 MG: 40 TABLET, DELAYED RELEASE ORAL at 08:05

## 2021-05-14 VITALS
RESPIRATION RATE: 20 BRPM | OXYGEN SATURATION: 96 % | WEIGHT: 315 LBS | SYSTOLIC BLOOD PRESSURE: 109 MMHG | HEART RATE: 90 BPM | BODY MASS INDEX: 47.74 KG/M2 | DIASTOLIC BLOOD PRESSURE: 53 MMHG | HEIGHT: 68 IN | TEMPERATURE: 98 F

## 2021-05-14 LAB
ALBUMIN SERPL BCP-MCNC: 3.3 G/DL (ref 3.5–5.2)
ALP SERPL-CCNC: 112 U/L (ref 55–135)
ALT SERPL W/O P-5'-P-CCNC: 23 U/L (ref 10–44)
ANION GAP SERPL CALC-SCNC: 8 MMOL/L (ref 8–16)
ANION GAP SERPL CALC-SCNC: 8 MMOL/L (ref 8–16)
AST SERPL-CCNC: 13 U/L (ref 10–40)
BASOPHILS # BLD AUTO: 0.09 K/UL (ref 0–0.2)
BASOPHILS NFR BLD: 1.1 % (ref 0–1.9)
BILIRUB SERPL-MCNC: 0.5 MG/DL (ref 0.1–1)
BNP SERPL-MCNC: 102 PG/ML (ref 0–99)
BUN SERPL-MCNC: 13 MG/DL (ref 6–20)
BUN SERPL-MCNC: 13 MG/DL (ref 6–20)
CALCIUM SERPL-MCNC: 8.8 MG/DL (ref 8.7–10.5)
CALCIUM SERPL-MCNC: 8.8 MG/DL (ref 8.7–10.5)
CHLORIDE SERPL-SCNC: 98 MMOL/L (ref 95–110)
CHLORIDE SERPL-SCNC: 98 MMOL/L (ref 95–110)
CO2 SERPL-SCNC: 26 MMOL/L (ref 23–29)
CO2 SERPL-SCNC: 26 MMOL/L (ref 23–29)
CREAT SERPL-MCNC: 0.9 MG/DL (ref 0.5–1.4)
CREAT SERPL-MCNC: 0.9 MG/DL (ref 0.5–1.4)
DIFFERENTIAL METHOD: ABNORMAL
EOSINOPHIL # BLD AUTO: 0.2 K/UL (ref 0–0.5)
EOSINOPHIL NFR BLD: 1.8 % (ref 0–8)
ERYTHROCYTE [DISTWIDTH] IN BLOOD BY AUTOMATED COUNT: 18.3 % (ref 11.5–14.5)
EST. GFR  (AFRICAN AMERICAN): >60 ML/MIN/1.73 M^2
EST. GFR  (AFRICAN AMERICAN): >60 ML/MIN/1.73 M^2
EST. GFR  (NON AFRICAN AMERICAN): >60 ML/MIN/1.73 M^2
EST. GFR  (NON AFRICAN AMERICAN): >60 ML/MIN/1.73 M^2
GLUCOSE SERPL-MCNC: 113 MG/DL (ref 70–110)
GLUCOSE SERPL-MCNC: 113 MG/DL (ref 70–110)
HCT VFR BLD AUTO: 30.1 % (ref 40–54)
HGB BLD-MCNC: 8.9 G/DL (ref 14–18)
IMM GRANULOCYTES # BLD AUTO: 0.12 K/UL (ref 0–0.04)
IMM GRANULOCYTES NFR BLD AUTO: 1.4 % (ref 0–0.5)
LYMPHOCYTES # BLD AUTO: 1.3 K/UL (ref 1–4.8)
LYMPHOCYTES NFR BLD: 15.4 % (ref 18–48)
MAGNESIUM SERPL-MCNC: 2.7 MG/DL (ref 1.6–2.6)
MCH RBC QN AUTO: 20.9 PG (ref 27–31)
MCHC RBC AUTO-ENTMCNC: 29.6 G/DL (ref 32–36)
MCV RBC AUTO: 71 FL (ref 82–98)
MONOCYTES # BLD AUTO: 0.8 K/UL (ref 0.3–1)
MONOCYTES NFR BLD: 9.3 % (ref 4–15)
NEUTROPHILS # BLD AUTO: 6 K/UL (ref 1.8–7.7)
NEUTROPHILS NFR BLD: 71 % (ref 38–73)
NRBC BLD-RTO: 0 /100 WBC
PLATELET # BLD AUTO: 253 K/UL (ref 150–450)
PMV BLD AUTO: 11.3 FL (ref 9.2–12.9)
POTASSIUM SERPL-SCNC: 3.8 MMOL/L (ref 3.5–5.1)
POTASSIUM SERPL-SCNC: 3.8 MMOL/L (ref 3.5–5.1)
PROT SERPL-MCNC: 6.6 G/DL (ref 6–8.4)
RBC # BLD AUTO: 4.25 M/UL (ref 4.6–6.2)
SODIUM SERPL-SCNC: 132 MMOL/L (ref 136–145)
SODIUM SERPL-SCNC: 132 MMOL/L (ref 136–145)
WBC # BLD AUTO: 8.42 K/UL (ref 3.9–12.7)

## 2021-05-14 PROCEDURE — 85025 COMPLETE CBC W/AUTO DIFF WBC: CPT | Performed by: PHYSICIAN ASSISTANT

## 2021-05-14 PROCEDURE — 83735 ASSAY OF MAGNESIUM: CPT | Performed by: PHYSICIAN ASSISTANT

## 2021-05-14 PROCEDURE — 25000003 PHARM REV CODE 250: Performed by: INTERNAL MEDICINE

## 2021-05-14 PROCEDURE — 25000003 PHARM REV CODE 250: Performed by: PHYSICIAN ASSISTANT

## 2021-05-14 PROCEDURE — 94660 CPAP INITIATION&MGMT: CPT

## 2021-05-14 PROCEDURE — 25000003 PHARM REV CODE 250: Performed by: NURSE PRACTITIONER

## 2021-05-14 PROCEDURE — 36415 COLL VENOUS BLD VENIPUNCTURE: CPT | Performed by: PHYSICIAN ASSISTANT

## 2021-05-14 PROCEDURE — 83880 ASSAY OF NATRIURETIC PEPTIDE: CPT | Performed by: PHYSICIAN ASSISTANT

## 2021-05-14 PROCEDURE — 80053 COMPREHEN METABOLIC PANEL: CPT | Performed by: PHYSICIAN ASSISTANT

## 2021-05-14 PROCEDURE — 99900035 HC TECH TIME PER 15 MIN (STAT)

## 2021-05-14 RX ORDER — POTASSIUM CHLORIDE 20 MEQ/1
60 TABLET, EXTENDED RELEASE ORAL 3 TIMES DAILY
Qty: 90 TABLET | Refills: 3 | Status: SHIPPED | OUTPATIENT
Start: 2021-05-14 | End: 2021-05-28

## 2021-05-14 RX ORDER — TORSEMIDE 20 MG/1
60 TABLET ORAL 2 TIMES DAILY
Qty: 180 TABLET | Refills: 11 | Status: SHIPPED | OUTPATIENT
Start: 2021-05-14 | End: 2021-06-11 | Stop reason: SDUPTHER

## 2021-05-14 RX ORDER — SPIRONOLACTONE 50 MG/1
50 TABLET, FILM COATED ORAL DAILY
Qty: 30 TABLET | Refills: 11 | Status: SHIPPED | OUTPATIENT
Start: 2021-05-14 | End: 2022-07-13

## 2021-05-14 RX ADMIN — RIOCIGUAT 2.5 MG: 2.5 TABLET, FILM COATED ORAL at 02:05

## 2021-05-14 RX ADMIN — TORSEMIDE 60 MG: 20 TABLET ORAL at 08:05

## 2021-05-14 RX ADMIN — CETIRIZINE HYDROCHLORIDE 10 MG: 10 TABLET, FILM COATED ORAL at 08:05

## 2021-05-14 RX ADMIN — FLUTICASONE PROPIONATE 100 MCG: 50 SPRAY, METERED NASAL at 08:05

## 2021-05-14 RX ADMIN — APIXABAN 5 MG: 5 TABLET, FILM COATED ORAL at 08:05

## 2021-05-14 RX ADMIN — Medication 400 MG: at 08:05

## 2021-05-14 RX ADMIN — Medication 400 MG: at 02:05

## 2021-05-14 RX ADMIN — POTASSIUM CHLORIDE 60 MEQ: 1500 TABLET, EXTENDED RELEASE ORAL at 08:05

## 2021-05-14 RX ADMIN — PANTOPRAZOLE SODIUM 40 MG: 40 TABLET, DELAYED RELEASE ORAL at 08:05

## 2021-05-14 RX ADMIN — ATORVASTATIN CALCIUM 40 MG: 20 TABLET, FILM COATED ORAL at 08:05

## 2021-05-14 RX ADMIN — RIOCIGUAT 2.5 MG: 2.5 TABLET, FILM COATED ORAL at 08:05

## 2021-05-14 RX ADMIN — ASPIRIN 81 MG: 81 TABLET, COATED ORAL at 08:05

## 2021-05-14 RX ADMIN — SPIRONOLACTONE 50 MG: 50 TABLET ORAL at 08:05

## 2021-05-14 RX ADMIN — POTASSIUM CHLORIDE 60 MEQ: 1500 TABLET, EXTENDED RELEASE ORAL at 02:05

## 2021-05-14 RX ADMIN — CITALOPRAM HYDROBROMIDE 40 MG: 20 TABLET ORAL at 08:05

## 2021-05-17 ENCOUNTER — TELEPHONE (OUTPATIENT)
Dept: TRANSPLANT | Facility: HOSPITAL | Age: 51
End: 2021-05-17

## 2021-05-18 ENCOUNTER — TELEPHONE (OUTPATIENT)
Dept: TRANSPLANT | Facility: CLINIC | Age: 51
End: 2021-05-18

## 2021-05-28 RX ORDER — POTASSIUM CHLORIDE 20 MEQ/1
40 TABLET, EXTENDED RELEASE ORAL 2 TIMES DAILY
Qty: 120 TABLET | Refills: 11 | Status: SHIPPED | OUTPATIENT
Start: 2021-05-28 | End: 2021-06-11 | Stop reason: SDUPTHER

## 2021-06-11 ENCOUNTER — PATIENT MESSAGE (OUTPATIENT)
Dept: TRANSPLANT | Facility: CLINIC | Age: 51
End: 2021-06-11

## 2021-06-11 RX ORDER — POTASSIUM CHLORIDE 20 MEQ/1
40 TABLET, EXTENDED RELEASE ORAL 2 TIMES DAILY
Qty: 120 TABLET | Refills: 11 | Status: ON HOLD | OUTPATIENT
Start: 2021-06-11 | End: 2022-05-07 | Stop reason: SDUPTHER

## 2021-06-11 RX ORDER — TORSEMIDE 100 MG/1
TABLET ORAL
Qty: 30 TABLET | Refills: 11 | Status: ON HOLD | OUTPATIENT
Start: 2021-06-11 | End: 2022-05-07 | Stop reason: HOSPADM

## 2021-06-11 RX ORDER — TORSEMIDE 20 MG/1
60 TABLET ORAL 2 TIMES DAILY
Qty: 180 TABLET | Refills: 11 | Status: ON HOLD | OUTPATIENT
Start: 2021-06-11 | End: 2022-05-07 | Stop reason: SDUPTHER

## 2021-06-18 ENCOUNTER — PATIENT MESSAGE (OUTPATIENT)
Dept: TRANSPLANT | Facility: CLINIC | Age: 51
End: 2021-06-18

## 2021-10-18 ENCOUNTER — PATIENT MESSAGE (OUTPATIENT)
Dept: TRANSPLANT | Facility: CLINIC | Age: 51
End: 2021-10-18
Payer: MEDICAID

## 2021-11-29 ENCOUNTER — PATIENT MESSAGE (OUTPATIENT)
Dept: TRANSPLANT | Facility: CLINIC | Age: 51
End: 2021-11-29
Payer: MEDICAID

## 2021-12-02 NOTE — PROGRESS NOTES
[facility]  Critical Care - Surgery  Progress Note    Patient Name: Jaquan Hines Jr.  MRN: [id number]  Admission Date: 3/13/2018  Hospital Length of Stay: 2 days  Code Status: Prior  Attending Provider: Gabriel Jennings MD  Primary Care Provider: Justin Vale MD   Principal Problem: CTEPH (chronic thromboembolic pulmonary hypertension)    Subjective:     Hospital/ICU Course:  3/14 - extubated successfully in ICU      Follow-up For: Procedure(s) (LRB):  ENDARTERECTOMY- PULMONARY (N/A)    Post-Operative Day: 2 Days Post-Op    Objective:     Vital Signs (Most Recent):  Temp: 98.6 °F (37 °C) (03/15/18 0300)  Pulse: 101 (03/15/18 0715)  Resp: (!) 32 (03/15/18 0715)  BP: (!) 155/61 (03/15/18 0700)  SpO2: (!) 88 % (03/15/18 0715) Vital Signs (24h Range):  Temp:  [98.6 °F (37 °C)-99.3 °F (37.4 °C)] 98.6 °F (37 °C)  Pulse:  [] 101  Resp:  [11-36] 32  SpO2:  [85 %-96 %] 88 %  BP: ()/(52-74) 155/61  Arterial Line BP: ()/(49-67) 111/53     Weight: (!) 155.2 kg (342 lb 2.5 oz)  Body mass index is 53.59 kg/m².      Intake/Output Summary (Last 24 hours) at 03/15/18 0720  Last data filed at 03/15/18 0600   Gross per 24 hour   Intake             2850 ml   Output             2635 ml   Net              215 ml       Physical Exam   Constitutional: He is oriented to person, place, and time. He appears well-developed and well-nourished. No distress.   HENT:   Head: Normocephalic and atraumatic.   Eyes: Pupils are equal, round, and reactive to light.   Cardiovascular: Normal rate and regular rhythm.    Pulmonary/Chest: Effort normal. No respiratory distress.   Abdominal: Soft. Bowel sounds are normal.   Neurological: He is alert and oriented to person, place, and time.   Skin: He is not diaphoretic.   Psychiatric: He has a normal mood and affect. His behavior is normal.   Nursing note and vitals reviewed.      Vents:  Vent Mode: Spont/T (03/14/18 0912)  Ventilator Initiated: Yes (03/13/18  1713)  Set Rate: 24 bmp (03/14/18 0905)  Vt Set: 500 mL (03/14/18 0905)  Pressure Support: 5 cmH20 (03/14/18 0912)  PEEP/CPAP: 5 cmH20 (03/14/18 0912)  Oxygen Concentration (%): 50 (03/14/18 0905)  Peak Airway Pressure: 26 cmH2O (03/14/18 0905)  Plateau Pressure: 24 cmH20 (03/13/18 2105)  Total Ve: 11.7 mL (03/14/18 0912)  F/VT Ratio<105 (RSBI): (!) 15.97 (03/14/18 0501)    Lines/Drains/Airways     Central Venous Catheter Line                 Introducer 03/13/18 0801 right internal jugular 1 day         Percutaneous Central Line Insertion/Assessment - triple lumen  03/13/18 0801 right internal jugular 1 day          Drain                 Urethral Catheter 03/13/18 0815 Non-latex;Straight-tip;Temperature probe 16 Fr. 1 day         Y Chest Tube 1 and 2 03/13/18 1506 1 Right Pleural 19 Fr. 2 Left Pleural 19 Fr. 1 day          Arterial Line                 Arterial Line 03/13/18 0716 Left Other (Comment) 2 days          Line                 Pacer Wires 03/13/18 1441 1 day                Significant Labs:    CBC/Anemia Profile:    Recent Labs  Lab 03/13/18  1706 03/13/18  1710 03/14/18  0307 03/15/18  0322   WBC 20.65*  --  23.29* 26.44*   HGB 9.9*  --  9.5*  9.5* 8.7*   HCT 27.8* 27* 27.3*  27.3* 25.2*     --  251 195   MCV 84  --  83 86   RDW 14.6*  --  14.7* 15.9*        Chemistries:    Recent Labs  Lab 03/14/18  0307 03/14/18  0609  03/14/18  1618 03/15/18  0006 03/15/18  0322     --   --   --  144 144   K 2.5* 2.6*  < > 3.3* 3.4* 3.7  3.7  3.7     --   --   --  105 103   CO2 25  --   --   --  28 29   BUN 16  --   --   --  16 16   CREATININE 1.4  --   --   --  1.1 1.1   CALCIUM 9.5  --   --   --  8.3* 8.5*   MG  --  2.1  --   --  1.8 1.7   PHOS  --  1.4*  --   --  2.9 3.5   < > = values in this interval not displayed.    All pertinent labs within the past 24 hours have been reviewed.    Significant Imaging:  I have reviewed all pertinent imaging results/findings within the past 24  hours.    Assessment/Plan:     * CTEPH (chronic thromboembolic pulmonary hypertension)     Plan:     Neuro:   - off sedation, doing well     Pulmonary:   - extubated on 3/14 to NC  Goal sats >85%  - continues to sat appropriately     Cardiac:  Minimal pressor support with Epi  Titrate as tolerated     Renal:   UOP adequate, BUN/Cr stable  Lasix TID     Infectious Disease:   Post op prophylaxis per CTS     Hematology/Oncology:  H/h stable post-op, trend CBC      Endocrine:  Insulin infusion as per endocrinology     Fluids/Electrolytes/Nutrition/GI:   NPO for now  Replace lytes PRN  PPI for ulcer prophylaxis     Dispo:  Monitoring in ICU. CTS primary.                  Critical care was time spent personally by me on the following activities: development of treatment plan with patient or surrogate and bedside caregivers, discussions with consultants, evaluation of patient's response to treatment, examination of patient, ordering and performing treatments and interventions, ordering and review of laboratory studies, ordering and review of radiographic studies, pulse oximetry, re-evaluation of patient's condition.  This critical care time did not overlap with that of any other provider or involve time for any procedures.     Donavan Valentine MD  Critical Care - Surgery  [facility]     PAST MEDICAL HISTORY:  HLD (hyperlipidemia)     HTN (hypertension)

## 2022-03-28 ENCOUNTER — PATIENT MESSAGE (OUTPATIENT)
Dept: TRANSPLANT | Facility: CLINIC | Age: 52
End: 2022-03-28
Payer: MEDICAID

## 2022-03-28 ENCOUNTER — PATIENT MESSAGE (OUTPATIENT)
Dept: PULMONOLOGY | Facility: CLINIC | Age: 52
End: 2022-03-28

## 2022-03-28 DIAGNOSIS — I27.21 PULMONARY ARTERY HYPERTENSION: Primary | ICD-10-CM

## 2022-03-28 DIAGNOSIS — Z79.899 POLYPHARMACY: ICD-10-CM

## 2022-03-28 DIAGNOSIS — R06.02 SHORTNESS OF BREATH: ICD-10-CM

## 2022-04-20 ENCOUNTER — TELEPHONE (OUTPATIENT)
Dept: TRANSPLANT | Facility: CLINIC | Age: 52
End: 2022-04-20
Payer: MEDICAID

## 2022-05-04 ENCOUNTER — TELEPHONE (OUTPATIENT)
Dept: TRANSPLANT | Facility: CLINIC | Age: 52
End: 2022-05-04
Payer: MEDICAID

## 2022-05-04 ENCOUNTER — HOSPITAL ENCOUNTER (INPATIENT)
Facility: HOSPITAL | Age: 52
LOS: 3 days | Discharge: HOME | DRG: 314 | End: 2022-05-07
Attending: EMERGENCY MEDICINE | Admitting: STUDENT IN AN ORGANIZED HEALTH CARE EDUCATION/TRAINING PROGRAM
Payer: MEDICAID

## 2022-05-04 DIAGNOSIS — I27.24 CTEPH (CHRONIC THROMBOEMBOLIC PULMONARY HYPERTENSION): ICD-10-CM

## 2022-05-04 DIAGNOSIS — U07.1 COVID-19: Primary | ICD-10-CM

## 2022-05-04 DIAGNOSIS — I27.0 PRIMARY PULMONARY HYPERTENSION: ICD-10-CM

## 2022-05-04 DIAGNOSIS — R06.02 SHORTNESS OF BREATH: ICD-10-CM

## 2022-05-04 DIAGNOSIS — M79.89 RIGHT LEG SWELLING: ICD-10-CM

## 2022-05-04 PROBLEM — S89.91XA RIGHT LEG INJURY: Status: ACTIVE | Noted: 2022-05-04

## 2022-05-04 LAB
ALBUMIN SERPL BCP-MCNC: 2.9 G/DL (ref 3.5–5.2)
ALP SERPL-CCNC: 140 U/L (ref 55–135)
ALT SERPL W/O P-5'-P-CCNC: 85 U/L (ref 10–44)
AMPHET+METHAMPHET UR QL: NEGATIVE
ANION GAP SERPL CALC-SCNC: 7 MMOL/L (ref 8–16)
APAP SERPL-MCNC: <3 UG/ML (ref 10–20)
AST SERPL-CCNC: 36 U/L (ref 10–40)
BACTERIA #/AREA URNS AUTO: NORMAL /HPF
BARBITURATES UR QL SCN>200 NG/ML: NEGATIVE
BASOPHILS # BLD AUTO: 0.08 K/UL (ref 0–0.2)
BASOPHILS NFR BLD: 0.7 % (ref 0–1.9)
BENZODIAZ UR QL SCN>200 NG/ML: NEGATIVE
BILIRUB SERPL-MCNC: 0.8 MG/DL (ref 0.1–1)
BILIRUB UR QL STRIP: NEGATIVE
BNP SERPL-MCNC: 259 PG/ML (ref 0–99)
BUN SERPL-MCNC: 8 MG/DL (ref 6–20)
BZE UR QL SCN: NEGATIVE
CALCIUM SERPL-MCNC: 8.2 MG/DL (ref 8.7–10.5)
CANNABINOIDS UR QL SCN: NEGATIVE
CHLORIDE SERPL-SCNC: 108 MMOL/L (ref 95–110)
CLARITY UR REFRACT.AUTO: CLEAR
CO2 SERPL-SCNC: 23 MMOL/L (ref 23–29)
COLOR UR AUTO: YELLOW
CREAT SERPL-MCNC: 0.8 MG/DL (ref 0.5–1.4)
CREAT UR-MCNC: 137 MG/DL (ref 23–375)
CTP QC/QA: YES
DIFFERENTIAL METHOD: ABNORMAL
EOSINOPHIL # BLD AUTO: 0.1 K/UL (ref 0–0.5)
EOSINOPHIL NFR BLD: 1.1 % (ref 0–8)
ERYTHROCYTE [DISTWIDTH] IN BLOOD BY AUTOMATED COUNT: 17.2 % (ref 11.5–14.5)
EST. GFR  (AFRICAN AMERICAN): >60 ML/MIN/1.73 M^2
EST. GFR  (NON AFRICAN AMERICAN): >60 ML/MIN/1.73 M^2
ETHANOL SERPL-MCNC: <10 MG/DL
GLUCOSE SERPL-MCNC: 108 MG/DL (ref 70–110)
GLUCOSE UR QL STRIP: NEGATIVE
HCT VFR BLD AUTO: 33.5 % (ref 40–54)
HGB BLD-MCNC: 9.6 G/DL (ref 14–18)
HGB UR QL STRIP: NEGATIVE
HYALINE CASTS UR QL AUTO: 0 /LPF
IMM GRANULOCYTES # BLD AUTO: 0.09 K/UL (ref 0–0.04)
IMM GRANULOCYTES NFR BLD AUTO: 0.8 % (ref 0–0.5)
KETONES UR QL STRIP: NEGATIVE
LEUKOCYTE ESTERASE UR QL STRIP: NEGATIVE
LYMPHOCYTES # BLD AUTO: 1.5 K/UL (ref 1–4.8)
LYMPHOCYTES NFR BLD: 12.7 % (ref 18–48)
MCH RBC QN AUTO: 21.2 PG (ref 27–31)
MCHC RBC AUTO-ENTMCNC: 28.7 G/DL (ref 32–36)
MCV RBC AUTO: 74 FL (ref 82–98)
METHADONE UR QL SCN>300 NG/ML: NEGATIVE
MICROSCOPIC COMMENT: NORMAL
MONOCYTES # BLD AUTO: 0.9 K/UL (ref 0.3–1)
MONOCYTES NFR BLD: 7.5 % (ref 4–15)
NEUTROPHILS # BLD AUTO: 8.8 K/UL (ref 1.8–7.7)
NEUTROPHILS NFR BLD: 77.2 % (ref 38–73)
NITRITE UR QL STRIP: NEGATIVE
NRBC BLD-RTO: 0 /100 WBC
OPIATES UR QL SCN: NEGATIVE
PCP UR QL SCN>25 NG/ML: NEGATIVE
PH UR STRIP: 5 [PH] (ref 5–8)
PLATELET # BLD AUTO: 316 K/UL (ref 150–450)
PMV BLD AUTO: 12.8 FL (ref 9.2–12.9)
POTASSIUM SERPL-SCNC: 3.4 MMOL/L (ref 3.5–5.1)
PROT SERPL-MCNC: 6.4 G/DL (ref 6–8.4)
PROT UR QL STRIP: ABNORMAL
RBC # BLD AUTO: 4.52 M/UL (ref 4.6–6.2)
RBC #/AREA URNS AUTO: 1 /HPF (ref 0–4)
SARS-COV-2 RDRP RESP QL NAA+PROBE: POSITIVE
SODIUM SERPL-SCNC: 138 MMOL/L (ref 136–145)
SP GR UR STRIP: 1.01 (ref 1–1.03)
SQUAMOUS #/AREA URNS AUTO: 4 /HPF
TOXICOLOGY INFORMATION: NORMAL
TROPONIN I SERPL DL<=0.01 NG/ML-MCNC: <0.006 NG/ML (ref 0–0.03)
TSH SERPL DL<=0.005 MIU/L-ACNC: 2.46 UIU/ML (ref 0.4–4)
URN SPEC COLLECT METH UR: ABNORMAL
WBC # BLD AUTO: 11.38 K/UL (ref 3.9–12.7)
WBC #/AREA URNS AUTO: 3 /HPF (ref 0–5)

## 2022-05-04 PROCEDURE — 93010 EKG 12-LEAD: ICD-10-PCS | Mod: ,,, | Performed by: INTERNAL MEDICINE

## 2022-05-04 PROCEDURE — 63600175 PHARM REV CODE 636 W HCPCS: Performed by: EMERGENCY MEDICINE

## 2022-05-04 PROCEDURE — 25000003 PHARM REV CODE 250: Performed by: STUDENT IN AN ORGANIZED HEALTH CARE EDUCATION/TRAINING PROGRAM

## 2022-05-04 PROCEDURE — 86803 HEPATITIS C AB TEST: CPT | Performed by: EMERGENCY MEDICINE

## 2022-05-04 PROCEDURE — 99223 PR INITIAL HOSPITAL CARE,LEVL III: ICD-10-PCS | Mod: ,,, | Performed by: STUDENT IN AN ORGANIZED HEALTH CARE EDUCATION/TRAINING PROGRAM

## 2022-05-04 PROCEDURE — 82077 ASSAY SPEC XCP UR&BREATH IA: CPT | Performed by: EMERGENCY MEDICINE

## 2022-05-04 PROCEDURE — 99223 1ST HOSP IP/OBS HIGH 75: CPT | Mod: ,,, | Performed by: STUDENT IN AN ORGANIZED HEALTH CARE EDUCATION/TRAINING PROGRAM

## 2022-05-04 PROCEDURE — 99285 PR EMERGENCY DEPT VISIT,LEVEL V: ICD-10-PCS | Mod: CR,CS,, | Performed by: EMERGENCY MEDICINE

## 2022-05-04 PROCEDURE — 80143 DRUG ASSAY ACETAMINOPHEN: CPT | Performed by: EMERGENCY MEDICINE

## 2022-05-04 PROCEDURE — 83880 ASSAY OF NATRIURETIC PEPTIDE: CPT | Performed by: EMERGENCY MEDICINE

## 2022-05-04 PROCEDURE — 80307 DRUG TEST PRSMV CHEM ANLYZR: CPT | Performed by: EMERGENCY MEDICINE

## 2022-05-04 PROCEDURE — 93010 ELECTROCARDIOGRAM REPORT: CPT | Mod: ,,, | Performed by: INTERNAL MEDICINE

## 2022-05-04 PROCEDURE — 81001 URINALYSIS AUTO W/SCOPE: CPT | Performed by: EMERGENCY MEDICINE

## 2022-05-04 PROCEDURE — 85025 COMPLETE CBC W/AUTO DIFF WBC: CPT | Performed by: EMERGENCY MEDICINE

## 2022-05-04 PROCEDURE — 20600001 HC STEP DOWN PRIVATE ROOM

## 2022-05-04 PROCEDURE — 80053 COMPREHEN METABOLIC PANEL: CPT | Performed by: EMERGENCY MEDICINE

## 2022-05-04 PROCEDURE — 84484 ASSAY OF TROPONIN QUANT: CPT | Performed by: EMERGENCY MEDICINE

## 2022-05-04 PROCEDURE — 84443 ASSAY THYROID STIM HORMONE: CPT | Performed by: EMERGENCY MEDICINE

## 2022-05-04 PROCEDURE — 93005 ELECTROCARDIOGRAM TRACING: CPT

## 2022-05-04 PROCEDURE — 99285 EMERGENCY DEPT VISIT HI MDM: CPT | Mod: CR,CS,, | Performed by: EMERGENCY MEDICINE

## 2022-05-04 PROCEDURE — 27000207 HC ISOLATION

## 2022-05-04 PROCEDURE — 87389 HIV-1 AG W/HIV-1&-2 AB AG IA: CPT | Performed by: EMERGENCY MEDICINE

## 2022-05-04 PROCEDURE — 99285 EMERGENCY DEPT VISIT HI MDM: CPT | Mod: 25

## 2022-05-04 PROCEDURE — 63600175 PHARM REV CODE 636 W HCPCS: Performed by: STUDENT IN AN ORGANIZED HEALTH CARE EDUCATION/TRAINING PROGRAM

## 2022-05-04 PROCEDURE — 96374 THER/PROPH/DIAG INJ IV PUSH: CPT

## 2022-05-04 PROCEDURE — U0002 COVID-19 LAB TEST NON-CDC: HCPCS | Performed by: EMERGENCY MEDICINE

## 2022-05-04 RX ORDER — CETIRIZINE HYDROCHLORIDE 10 MG/1
10 TABLET ORAL DAILY
Status: DISCONTINUED | OUTPATIENT
Start: 2022-05-05 | End: 2022-05-07 | Stop reason: HOSPADM

## 2022-05-04 RX ORDER — SPIRONOLACTONE 50 MG/1
50 TABLET, FILM COATED ORAL DAILY
Status: DISCONTINUED | OUTPATIENT
Start: 2022-05-05 | End: 2022-05-07 | Stop reason: HOSPADM

## 2022-05-04 RX ORDER — FUROSEMIDE 10 MG/ML
80 INJECTION INTRAMUSCULAR; INTRAVENOUS
Status: COMPLETED | OUTPATIENT
Start: 2022-05-04 | End: 2022-05-04

## 2022-05-04 RX ORDER — FUROSEMIDE 10 MG/ML
80 INJECTION INTRAMUSCULAR; INTRAVENOUS EVERY 8 HOURS
Status: DISCONTINUED | OUTPATIENT
Start: 2022-05-04 | End: 2022-05-06

## 2022-05-04 RX ORDER — ASPIRIN 81 MG/1
81 TABLET ORAL DAILY
Status: DISCONTINUED | OUTPATIENT
Start: 2022-05-05 | End: 2022-05-07 | Stop reason: HOSPADM

## 2022-05-04 RX ORDER — ALBUTEROL SULFATE 90 UG/1
2 AEROSOL, METERED RESPIRATORY (INHALATION) EVERY 6 HOURS
Status: DISCONTINUED | OUTPATIENT
Start: 2022-05-04 | End: 2022-05-04

## 2022-05-04 RX ORDER — ATORVASTATIN CALCIUM 20 MG/1
40 TABLET, FILM COATED ORAL DAILY
Status: DISCONTINUED | OUTPATIENT
Start: 2022-05-05 | End: 2022-05-07 | Stop reason: HOSPADM

## 2022-05-04 RX ORDER — SODIUM CHLORIDE 0.9 % (FLUSH) 0.9 %
10 SYRINGE (ML) INJECTION
Status: DISCONTINUED | OUTPATIENT
Start: 2022-05-04 | End: 2022-05-07 | Stop reason: HOSPADM

## 2022-05-04 RX ORDER — QUETIAPINE FUMARATE 25 MG/1
50 TABLET, FILM COATED ORAL NIGHTLY
Status: DISCONTINUED | OUTPATIENT
Start: 2022-05-04 | End: 2022-05-05

## 2022-05-04 RX ORDER — ASCORBIC ACID 500 MG
500 TABLET ORAL 2 TIMES DAILY
Status: DISCONTINUED | OUTPATIENT
Start: 2022-05-04 | End: 2022-05-07 | Stop reason: HOSPADM

## 2022-05-04 RX ORDER — CITALOPRAM 20 MG/1
40 TABLET, FILM COATED ORAL DAILY
Status: DISCONTINUED | OUTPATIENT
Start: 2022-05-05 | End: 2022-05-04

## 2022-05-04 RX ORDER — PANTOPRAZOLE SODIUM 40 MG/1
40 TABLET, DELAYED RELEASE ORAL DAILY
Status: DISCONTINUED | OUTPATIENT
Start: 2022-05-05 | End: 2022-05-07 | Stop reason: HOSPADM

## 2022-05-04 RX ORDER — POTASSIUM CHLORIDE 20 MEQ/1
40 TABLET, EXTENDED RELEASE ORAL EVERY 4 HOURS
Status: COMPLETED | OUTPATIENT
Start: 2022-05-04 | End: 2022-05-04

## 2022-05-04 RX ADMIN — FUROSEMIDE 80 MG: 10 INJECTION, SOLUTION INTRAMUSCULAR; INTRAVENOUS at 09:05

## 2022-05-04 RX ADMIN — POTASSIUM CHLORIDE 40 MEQ: 20 TABLET, EXTENDED RELEASE ORAL at 09:05

## 2022-05-04 RX ADMIN — QUETIAPINE FUMARATE 50 MG: 25 TABLET ORAL at 09:05

## 2022-05-04 RX ADMIN — APIXABAN 5 MG: 5 TABLET, FILM COATED ORAL at 09:05

## 2022-05-04 RX ADMIN — FUROSEMIDE 80 MG: 10 INJECTION, SOLUTION INTRAMUSCULAR; INTRAVENOUS at 01:05

## 2022-05-04 RX ADMIN — POTASSIUM CHLORIDE 40 MEQ: 20 TABLET, EXTENDED RELEASE ORAL at 07:05

## 2022-05-04 RX ADMIN — OXYCODONE HYDROCHLORIDE AND ACETAMINOPHEN 500 MG: 500 TABLET ORAL at 09:05

## 2022-05-04 NOTE — ED NOTES
Pt transported from ultrasound to room 77830 via stretcher  Pt belongings were sent to room 78056 per Yanely PCT Pt condition stable upon leaving the ED transporting to Ultrasound ,and pt will notify family of room number

## 2022-05-04 NOTE — ASSESSMENT & PLAN NOTE
This is Mr. Jaquan Hines ., a pleasant 51 y.o. 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 He presented with volume overlaoded and possible hypomanic episode   - Admit to inpatient with volume overloaded and possible right lower extremity swelling (?DVT)  - Agree with CTA to rule out PE given his right LE swelling and presentation of dyspnea   - Resume his PH medication selexipag 1,600 mcg BID and riociguat 2.5 mg TID.  - Diuresis with Lasix 80 mg IV TID with close attention to his serum K (known to have HypoK in past)  - Defer Covid treatment to hospital Medicine protocol

## 2022-05-04 NOTE — SUBJECTIVE & OBJECTIVE
Past Medical History:   Diagnosis Date    Acute pulmonary embolism     Anticoagulant long-term use     Bipolar 1 disorder     diagnosed at 16    Chronic respiratory failure with hypoxia     Essential hypertension, malignant     Heart attack     Pulmonary artery hypertension        Past Surgical History:   Procedure Laterality Date    CARDIAC CATHETERIZATION      CATARACT EXTRACTION      MASS EXCISION      inner thigh, lipoma    RIGHT HEART CATHETERIZATION Right 5/12/2021    Procedure: INSERTION, CATHETER, RIGHT HEART;  Surgeon: Carl Schneider Jr., MD;  Location: [facility] CATH LAB;  Service: Cardiology;  Laterality: Right;    TONSILLECTOMY, ADENOIDECTOMY         Review of patient's allergies indicates:   Allergen Reactions    Clopidogrel      Other reaction(s): Other (See Comments)  Pt reports it does not work        No current facility-administered medications on file prior to encounter.     Current Outpatient Medications on File Prior to Encounter   Medication Sig    apixaban 5 mg Tab Take 1 tablet (5 mg total) by mouth 2 (two) times daily.    aspirin (ECOTRIN) 81 MG EC tablet Take 1 tablet (81 mg total) by mouth once daily.    atorvastatin (LIPITOR) 40 MG tablet Take 1 tablet (40 mg total) by mouth once daily.    cetirizine (ZYRTEC) 10 MG tablet Take 10 mg by mouth once daily.     citalopram (CELEXA) 40 MG tablet Take 40 mg by mouth once daily.    fluticasone (FLONASE) 50 mcg/actuation nasal spray 1 spray by Each Nare route daily as needed for Rhinitis.    omeprazole (PRILOSEC) 20 MG capsule Take 20 mg by mouth 2 (two) times daily.     potassium chloride SA (K-DUR,KLOR-CON) 20 MEQ tablet Take 2 tablets (40 mEq total) by mouth 2 (two) times daily.    QUEtiapine (SEROQUEL) 400 MG tablet Take 1 tablet (400 mg total) by mouth every evening.    riociguat (ADEMPAS) 2.5 mg tablet Take 1 tablet (2.5 mg total) by mouth 3 (three) times daily. Take 1 tablet, by mouth, three times daily.    selexipag 1,600 mcg Tab Take 1,600  mcg by mouth 2 (two) times daily.    spironolactone (ALDACTONE) 50 MG tablet Take 1 tablet (50 mg total) by mouth once daily.    torsemide (DEMADEX) 20 MG Tab Take half a tablet by mouth twice daily    torsemide (DEMADEX) 100 MG Tab Take half a tablet by mouth twice daily    torsemide (DEMADEX) 20 MG Tab Take 3 tablets (60 mg total) by mouth 2 (two) times a day.    VENTOLIN HFA 90 mcg/actuation inhaler TAKE 1 PUFF EVERY 6 HOURS AS NEEDED FOR WHEEZING/SOB     Family History    None       Tobacco Use    Smoking status: Never Smoker    Smokeless tobacco: Never Used   Substance and Sexual Activity    Alcohol use: No    Drug use: No    Sexual activity: Not Currently     Review of Systems   Constitutional:  Negative for fatigue and fever.   HENT:  Positive for congestion.    Respiratory:  Positive for shortness of breath. Negative for cough, wheezing and stridor.    Cardiovascular:  Negative for chest pain and leg swelling.   Gastrointestinal:  Negative for abdominal distention and abdominal pain.   Genitourinary:  Negative for dysuria and frequency.   Neurological:  Negative for dizziness and headaches.   All other systems reviewed and are negative.  Objective:     Vital Signs (Most Recent):  Temp: 98.3 °F (36.8 °C) (05/04/22 1830)  Pulse: 88 (05/04/22 1830)  Resp: 20 (05/04/22 1830)  BP: 124/62 (05/04/22 1830)  SpO2: 95 % (05/04/22 1830) Vital Signs (24h Range):  Temp:  [98.1 °F (36.7 °C)-99.1 °F (37.3 °C)] 98.3 °F (36.8 °C)  Pulse:  [84-92] 88  Resp:  [16-23] 20  SpO2:  [90 %-100 %] 95 %  BP: (115-138)/(55-78) 124/62     Weight: (!) 160.6 kg (354 lb 0.9 oz)  Body mass index is 53.83 kg/m².    Physical Exam  Constitutional:       General: He is not in acute distress.     Appearance: Normal appearance. He is obese. He is not ill-appearing.   Cardiovascular:      Rate and Rhythm: Normal rate and regular rhythm.      Pulses: Normal pulses.      Heart sounds: Normal heart sounds. No murmur heard.  Pulmonary:      Effort:  Pulmonary effort is normal. No respiratory distress.      Breath sounds: Normal breath sounds.   Abdominal:      General: Abdomen is flat. Bowel sounds are normal.      Palpations: Abdomen is soft.   Musculoskeletal:      Right lower leg: Edema present.      Left lower leg: Edema present.      Comments: RLE swelling > LLE, with overlying redness but non tender   Skin:     General: Skin is warm and dry.      Capillary Refill: Capillary refill takes less than 2 seconds.   Neurological:      General: No focal deficit present.      Mental Status: He is alert.   Psychiatric:         Mood and Affect: Mood normal.         Behavior: Behavior normal.           Significant Labs: All pertinent labs within the past 24 hours have been reviewed.  CBC:   Recent Labs   Lab 05/04/22  1306   WBC 11.38   HGB 9.6*   HCT 33.5*        CMP:   Recent Labs   Lab 05/04/22  1306      K 3.4*      CO2 23      BUN 8   CREATININE 0.8   CALCIUM 8.2*   PROT 6.4   ALBUMIN 2.9*   BILITOT 0.8   ALKPHOS 140*   AST 36   ALT 85*   ANIONGAP 7*   EGFRNONAA >60.0     Cardiac Markers:   Recent Labs   Lab 05/04/22  1306   *       Significant Imaging: I have reviewed all pertinent imaging results/findings within the past 24 hours.

## 2022-05-04 NOTE — ASSESSMENT & PLAN NOTE
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    Volume overload  PH  Patient presenting with volume overload, responded well to IV lasix, will continue lasix 80mg IV TID and hold home selexipag and riociguat per cards recs.  - Lasix 80mg IV TID  - Hold selexipag 1600mg BID  - Hold Riociguat 2.5mg TID  - K >4, Mg >2  - Continue home apixaban 5mg BID  - Spironolactone 50mg daily

## 2022-05-04 NOTE — PROVIDER PROGRESS NOTES - EMERGENCY DEPT.
Encounter Date: 5/4/2022    ED Physician Progress Notes        Physician Note:   Signed out to me.  Here with shortness of breath and hypomania.  Labs of mildly elevated BNP, he was given a dose of Lasix.  He was seen by Psychiatry, based on their evaluation he does not need to be PEC'd.    Chart review shows that he was previously admitted to heart transplant team given his pulmonary hypertension and the meds he is on.  Discussed with Cardiology who will come evaluate the patient.  They recommend CT PE to rule out PE.  Disposition is pending.  Patient is hemodynamically stable.    Update:  Seen by Psychiatry no indication for pec.  Seen by Cardiology they like to admit to Hospital Medicine and they will follow.  No obvious PE on his scan.  Admit to Hospital Medicine.

## 2022-05-04 NOTE — ASSESSMENT & PLAN NOTE
Appears to have swelling to RLE, reportedly from a fall, DVT US negative, no signs of infection  - CTM

## 2022-05-04 NOTE — ASSESSMENT & PLAN NOTE
Currently mood is stable, psych consulted, patient not clear of home meds, will hold citalopram and resuem seroquel  - Seroquel 50mg QHS  - Psych recs appreciated

## 2022-05-04 NOTE — CONSULTS
[facility] - Emergency Dept  Cardiology  Consult Note    Patient Name: Jaquan Hines Jr.  MRN: [id number]  Admission Date: 5/4/2022  Hospital Length of Stay: 0 days  Code Status: Prior   Attending Provider: Allen Otero MD   Consulting Provider: Christian Churchill MD  Primary Care Physician: Allen Hubbard MD  Principal Problem:<principal problem not specified>    Patient information was obtained from patient and ER records.     Inpatient consult to Cardiology  Consult performed by: Christian Churchill MD  Consult ordered by: Irving Austin MD        Subjective:     Chief Complaint:  dyspnea on exertion      HPI:   This is Mr. Jaquan Hines Jr., a pleasant 51 y.o. 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 He is on medication for PH torsemide 60 mg BID, selexipag 1,600 mcg BID and riociguat 2.5 mg TID. Of note, he has prior acute hypoxic respitaroy failure and underwent mechanical ventilation (viral due to human metapneumovirus). He presented to ED with different complain. Firstly, he complain of having weight gain, lower extremities swelling and feeling NYHA III. He reported not taking his anticoagulation for his CTEPH and missing many days of diuretics. In ED, his vital signs were stable, his labs showed  (highest recorded and he is obese). He is COVID positive (received 2 doses and a booster). Cardiology consulted for recommendations.       Past Medical History:   Diagnosis Date    Acute pulmonary embolism     Anticoagulant long-term use     Bipolar 1 disorder     diagnosed at 16    Chronic respiratory failure with hypoxia     Essential hypertension, malignant     Heart attack     Pulmonary artery hypertension        Past Surgical History:   Procedure Laterality Date    CARDIAC CATHETERIZATION      CATARACT EXTRACTION      MASS EXCISION      inner thigh, lipoma    RIGHT HEART CATHETERIZATION Right 5/12/2021    Procedure:  INSERTION, CATHETER, RIGHT HEART;  Surgeon: Carl Schneider Jr., MD;  Location: [facility] CATH LAB;  Service: Cardiology;  Laterality: Right;    TONSILLECTOMY, ADENOIDECTOMY         Review of patient's allergies indicates:   Allergen Reactions    Clopidogrel      Other reaction(s): Other (See Comments)  Pt reports it does not work        No current facility-administered medications on file prior to encounter.     Current Outpatient Medications on File Prior to Encounter   Medication Sig    apixaban 5 mg Tab Take 1 tablet (5 mg total) by mouth 2 (two) times daily.    aspirin (ECOTRIN) 81 MG EC tablet Take 1 tablet (81 mg total) by mouth once daily.    atorvastatin (LIPITOR) 40 MG tablet Take 1 tablet (40 mg total) by mouth once daily.    cetirizine (ZYRTEC) 10 MG tablet Take 10 mg by mouth once daily.     citalopram (CELEXA) 40 MG tablet Take 40 mg by mouth once daily.    fluticasone (FLONASE) 50 mcg/actuation nasal spray 1 spray by Each Nare route daily as needed for Rhinitis.    omeprazole (PRILOSEC) 20 MG capsule Take 20 mg by mouth 2 (two) times daily.     potassium chloride SA (K-DUR,KLOR-CON) 20 MEQ tablet Take 2 tablets (40 mEq total) by mouth 2 (two) times daily.    QUEtiapine (SEROQUEL) 400 MG tablet Take 1 tablet (400 mg total) by mouth every evening.    riociguat (ADEMPAS) 2.5 mg tablet Take 1 tablet (2.5 mg total) by mouth 3 (three) times daily. Take 1 tablet, by mouth, three times daily.    selexipag 1,600 mcg Tab Take 1,600 mcg by mouth 2 (two) times daily.    spironolactone (ALDACTONE) 50 MG tablet Take 1 tablet (50 mg total) by mouth once daily.    torsemide (DEMADEX) 20 MG Tab Take half a tablet by mouth twice daily    torsemide (DEMADEX) 100 MG Tab Take half a tablet by mouth twice daily    torsemide (DEMADEX) 20 MG Tab Take 3 tablets (60 mg total) by mouth 2 (two) times a day.    VENTOLIN HFA 90 mcg/actuation inhaler TAKE 1 PUFF EVERY 6 HOURS AS NEEDED FOR WHEEZING/SOB     Family  History    None       Tobacco Use    Smoking status: Never Smoker    Smokeless tobacco: Never Used   Substance and Sexual Activity    Alcohol use: No    Drug use: No    Sexual activity: Not Currently     Review of Systems   Constitutional: Positive for decreased appetite and malaise/fatigue. Negative for chills.   HENT:  Negative for congestion.    Cardiovascular:  Positive for dyspnea on exertion, irregular heartbeat, leg swelling, near-syncope and orthopnea. Negative for chest pain and palpitations.   Respiratory:  Positive for cough and shortness of breath.    Endocrine: Negative for cold intolerance and heat intolerance.   Skin:  Negative for rash.   Musculoskeletal:  Negative for arthritis and back pain.   Gastrointestinal:  Negative for abdominal pain, constipation and diarrhea.   Genitourinary:  Negative for dysuria and hematuria.   Neurological:  Negative for dizziness and headaches.   Psychiatric/Behavioral:  Negative for altered mental status.    Objective:     Vital Signs (Most Recent):  Temp: 99.1 °F (37.3 °C) (05/04/22 1254)  Pulse: 85 (05/04/22 1418)  Resp: 16 (05/04/22 1418)  BP: 138/78 (05/04/22 1418)  SpO2: 98 % (05/04/22 1418) Vital Signs (24h Range):  Temp:  [98.1 °F (36.7 °C)-99.1 °F (37.3 °C)] 99.1 °F (37.3 °C)  Pulse:  [85-92] 85  Resp:  [16-23] 16  SpO2:  [98 %-100 %] 98 %  BP: (115-138)/(55-78) 138/78     Weight: (!) 160.6 kg (354 lb 0.9 oz)  Body mass index is 53.83 kg/m².    SpO2: 98 %  O2 Device (Oxygen Therapy): room air      Intake/Output Summary (Last 24 hours) at 5/4/2022 1506  Last data filed at 5/4/2022 1504  Gross per 24 hour   Intake --   Output 3200 ml   Net -3200 ml       Lines/Drains/Airways       Peripheral Intravenous Line  Duration                  Peripheral IV - Single Lumen 05/04/22 1306 20 G;1 in Right Forearm <1 day                    Physical Exam  Vitals reviewed.   Constitutional:       General: He is not in acute distress.  HENT:      Head: Normocephalic.    Eyes:      Pupils: Pupils are equal, round, and reactive to light.   Neck:      Thyroid: No thyromegaly.      Vascular: No JVD.   Cardiovascular:      Rate and Rhythm: Normal rate and regular rhythm.      Heart sounds: Murmur heard.     No friction rub.   Pulmonary:      Effort: Respiratory distress present.      Breath sounds: Wheezing and rales present.   Abdominal:      General: Abdomen is flat.   Musculoskeletal:      Cervical back: Rigidity present.      Right lower leg: Edema present.      Left lower leg: Edema present.   Neurological:      Mental Status: He is alert and oriented to person, place, and time.         Significant Labs: CMP   Recent Labs   Lab 05/04/22  1306      K 3.4*      CO2 23      BUN 8   CREATININE 0.8   CALCIUM 8.2*   PROT 6.4   ALBUMIN 2.9*   BILITOT 0.8   ALKPHOS 140*   AST 36   ALT 85*   ANIONGAP 7*   ESTGFRAFRICA >60.0   EGFRNONAA >60.0   , CBC   Recent Labs   Lab 05/04/22  1306   WBC 11.38   HGB 9.6*   HCT 33.5*      , Troponin   Recent Labs   Lab 05/04/22  1306   TROPONINI <0.006   , and All pertinent lab results from the last 24 hours have been reviewed.    Significant Imaging: Echocardiogram: 2D echo with color flow doppler:   Results for orders placed or performed during the hospital encounter of 05/31/18   2D echo with color flow doppler   Result Value Ref Range    EF + QEF 55 55 - 65    Mitral Valve Regurgitation MILD     Diastolic Dysfunction No     Est. PA Systolic Pressure 38.19     Pericardial Effusion SMALL (A)     Tricuspid Valve Regurgitation MILD     Narrative    Date of Procedure: 05/31/2018        TEST DESCRIPTION   Technical Quality: This is a technically challenging study. There is poor endocardial definition. This study was performed in conjunction with a 3ml intravenous injection of Optison contrast agent.     General: A catheter is present in the right-sided cardiac chambers.     Aorta: The aortic root is normal in size, measuring 2.8  cm at sinotubular junction and 3.2 cm at Sinuses of Valsalva. The proximal ascending aorta is normal in size, measuring 2.9 cm across.     Left Atrium: The left atrial volume index is normal, measuring 33.31 cc/m2.     Left Ventricle: The left ventricle is normal in size, with an end-diastolic diameter of 5.0 cm, and an end-systolic diameter of 3.5 cm. LV wall thickness is normal, with the septum and the posterior wall each measuring 0.8 cm across. Relative wall   thickness was normal at 0.32, and the LV mass index was 61.4 g/m2 consistent with normal left ventricular mass. There are no regional wall motion abnormalities. Left ventricular systolic function appears normal. Visually estimated ejection fraction is   55-60%. The LV Doppler derived stroke volume equals 65.0 ccs.     Diastolic indices: E wave velocity 0.6 m/s, E/A ratio 1.1,  msec., E/e' ratio(avg) 6. Diastolic function is normal.     Right Atrium: The right atrium is normal in size, measuring 5.0 cm in length and 3.3 cm in width in the apical view.     Right Ventricle: The right ventricle is not well seen in size. Global right ventricular systolic function was not well seen. Tricuspid annular plane systolic excursion (TAPSE) is 1.7 cm. Tissue Doppler-derived tricuspid annular peak systolic velocity (S   prime) is 10.6 cm/s. The estimated PA systolic pressure is greater than 38 mmHg.     Aortic Valve:  The aortic valve is normal in structure.     Mitral Valve:  The mitral valve is normal in structure. There is mild mitral regurgitation.     Tricuspid Valve:  The tricuspid valve is not well seen. There is mild tricuspid regurgitation.     Pulmonary Valve:  The pulmonic valve is not well seen.     Pericardium: There is evidence of a small circumferential pericardial effusion.     IVC: The IVC is not visualized.     Intracavitary: There is no evidence of intracavity mass, thrombi, or vegetation.         CONCLUSIONS     1 - Normal left ventricular  "systolic function (EF 55-60%).     2 - No wall motion abnormalities.     3 - Normal left ventricular diastolic function.     4 - The estimated PA systolic pressure is greater than 38 mmHg.     5 - Mild mitral regurgitation.     6 - Mild tricuspid regurgitation.     7 - Small pericardial effusion.             This document has been electronically    SIGNED BY: Manuela Gupta MD On: 05/31/2018 12:22    and Transthoracic echo (TTE) complete (Cupid Only):   Results for orders placed or performed during the hospital encounter of 05/04/21   Echo Color Flow Doppler? Yes   Result Value Ref Range    Ascending aorta 2.99 cm    STJ 3.07 cm    AV mean gradient 8 mmHg    Ao peak juan pablo 1.97 m/s    Ao VTI 32.66 cm    IVS 0.78 0.6 - 1.1 cm    LA size 3.84 cm    Left Atrium Major Axis 5.85 cm    Left Atrium Minor Axis 5.93 cm    LVIDd 5.40 3.5 - 6.0 cm    LVIDs 3.81 2.1 - 4.0 cm    LVOT diameter 2.19 cm    LVOT peak VTI 24.31 cm    Posterior Wall 1.13 (A) 0.6 - 1.1 cm    MV Peak A Juan Pablo 0.69 m/s    E wave deceleration time 198.13 msec    MV Peak E Juan Pablo 0.99 m/s    PV Peak D Juan Pablo 0.67 m/s    PV Peak S Juan Pablo 0.51 m/s    RA Major Axis 6.36 cm    RA Width 3.88 cm    RVDD 3.52 cm    Sinus 3.01 cm    TAPSE 2.23 cm    TR Max Juan Pablo 3.18 m/s    TDI LATERAL 0.15 m/s    TDI SEPTAL 0.08 m/s    LA WIDTH 4.84 cm    MV stenosis pressure 1/2 time 57.46 ms    LV Diastolic Volume 141.37 mL    LV Systolic Volume 62.50 mL    RV S' 12.25 cm/s    LVOT peak juan pablo 1.33 m/s    LA volume (mod) 67.58 cm3    MV "A" wave duration 10.28 msec    LV LATERAL E/E' RATIO 6.60 m/s    LV SEPTAL E/E' RATIO 12.38 m/s    FS 29 %    LA volume 93.04 cm3    LV mass 194.58 g    Left Ventricle Relative Wall Thickness 0.42 cm    AV valve area 2.80 cm2    AV Velocity Ratio 0.68     AV index (prosthetic) 0.74     MV valve area p 1/2 method 3.83 cm2    E/A ratio 1.43     Mean e' 0.12 m/s    Pulm vein S/D ratio 0.76     LVOT area 3.8 cm2    LVOT stroke volume 91.53 cm3    AV peak gradient " 16 mmHg    E/E' ratio 8.61 m/s    LV Systolic Volume Index 23.8 mL/m2    LV Diastolic Volume Index 53.75 mL/m2    LA Volume Index 35.4 mL/m2    LV Mass Index 74 g/m2    Triscuspid Valve Regurgitation Peak Gradient 40 mmHg    LA Volume Index (Mod) 25.7 mL/m2    BSA 2.81 m2    Right Atrial Pressure (from IVC) 8 mmHg    EF 60 %    TV rest pulmonary artery pressure 48 mmHg    Narrative    · The estimated ejection fraction is 60%.  · The left ventricle is normal in size with normal systolic function.  · Indeterminate left ventricular diastolic function.  · Mild left atrial enlargement.  · Mild tricuspid regurgitation.  · Normal right ventricular size with normal right ventricular systolic   function.  · There is abnormal septal wall motion consistent with right ventricular   pacemaker.  · The estimated PA systolic pressure is 48 mmHg.  · Intermediate central venous pressure (8 mmHg).        Assessment and Plan:     Dyspnea on exertion    This is Mr. Jaquan Hines Jr., a pleasant 51 y.o. 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 He presented with volume overlaoded and possible hypomanic episode   - Admit to inpatient (hospital medicine) with volume overloaded and possible right lower extremity swelling (?DVT)  - Agree with CTA to rule out PE given his right LE swelling and presentation of dyspnea   - Hold his PH medication selexipag 1,600 mcg BID and riociguat 2.5 mg TID.  - Diuresis with Lasix 80 mg IV TID with close attention to his serum K (known to have HypoK in past)  - Defer Covid treatment to hospital Medicine protocol       VTE Risk Mitigation (From admission, onward)    None          Thank you for your consult. I will follow-up with patient. Please contact us if you have any additional questions.    Christian Churchill MD  Cardiology   [facility] - Emergency Dept

## 2022-05-04 NOTE — ASSESSMENT & PLAN NOTE
Patient is asymptomatic, has seasonal allergies which are chronic and not worsened, satting >94% on RA.  - No treatment needed

## 2022-05-04 NOTE — ED NOTES
Pt identifiers Jaquan Hines  were checked and are correct  LOC: The patient is awake, alert, aware of environment with an appropriate affect. Oriented x4, speaking appropriately  APPEARANCE: Pt resting comfortably, in no acute distress, pt is clean and well groomed, clothing properly fastened  SKIN: Skin warm, dry and intact, normal skin turgor, moist mucus membranes  RESPIRATORY: Airway is open and patent, respirations are spontaneous, even and unlabored, normal effort and rate Breath sounds clear simon to all lung fields on auscultation  CARDIAC: Normal rate and rhythm, 2 +  peripheral edema noted, capillary refill < 3 seconds, bilateral radial pulses 2+  ABDOMEN: Soft, nontender, nondistended. Bowel sounds present to all four quad of abd on auscultation  NEUROLOGIC: PERRL, facial expression is symmetrical, patient moving all extremities spontaneously, normal sensation in all extremities when touched with a finger.  Follows all commands appropriately  MUSCULOSKELETAL: No obvious deformities.

## 2022-05-04 NOTE — H&P
"[facility] ([address] Medicine  History & Physical    Patient Name: Jaquan Hines Jr.  MRN: [id number]  Patient Class: IP- Inpatient  Admission Date: 5/4/2022  Attending Physician: Nick Casas MD  Primary Care Provider: Allen Hubbard MD         Patient information was obtained from patient, past medical records and ER records.     Subjective:     Principal Problem:CTEPH (chronic thromboembolic pulmonary hypertension)    Chief Complaint:   Chief Complaint   Patient presents with    Multiple complaints     Bipolar manic now not taking meds, been partying all weekend, fell r lower leg swollen, thinks has dvt, not taking eliquis for 4 weeks    Psychiatric Evaluation     States i'm manic and doing harm to myself by not taking meds,          HPI: 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    Patient states he had gone to a festival in [place] this past weekend. Since then he noted weight gain of about 12lbs, and had intermittent shortness of breath. He reports his "endurance was not great". He had a fall and sustained an injury to his R lower leg with some overlying swelling, but also has noted significant swelling in both of his legs. Denies orthopnea or PND. He reports he presented to the ED because of this shortness of breath, he states he is not compliant with his home medications including his apixaban or his PH medications.     Patients BP on arrival was 138/78, satting well on RA, tmax 99.1, he was given 80mg IV lasix and diuresed well, put out about 4000ml.His labs were notable for hgb 9.6, near his baseline, K 3.4, , normal Cr, and COVID positive. He was admitted for diuresis.      Past Medical History:   Diagnosis Date    Acute pulmonary embolism     Anticoagulant long-term use     Bipolar 1 disorder     diagnosed at 16    Chronic " respiratory failure with hypoxia     Essential hypertension, malignant     Heart attack     Pulmonary artery hypertension        Past Surgical History:   Procedure Laterality Date    CARDIAC CATHETERIZATION      CATARACT EXTRACTION      MASS EXCISION      inner thigh, lipoma    RIGHT HEART CATHETERIZATION Right 5/12/2021    Procedure: INSERTION, CATHETER, RIGHT HEART;  Surgeon: Carl Schneider Jr., MD;  Location: [facility] CATH LAB;  Service: Cardiology;  Laterality: Right;    TONSILLECTOMY, ADENOIDECTOMY         Review of patient's allergies indicates:   Allergen Reactions    Clopidogrel      Other reaction(s): Other (See Comments)  Pt reports it does not work        No current facility-administered medications on file prior to encounter.     Current Outpatient Medications on File Prior to Encounter   Medication Sig    apixaban 5 mg Tab Take 1 tablet (5 mg total) by mouth 2 (two) times daily.    aspirin (ECOTRIN) 81 MG EC tablet Take 1 tablet (81 mg total) by mouth once daily.    atorvastatin (LIPITOR) 40 MG tablet Take 1 tablet (40 mg total) by mouth once daily.    cetirizine (ZYRTEC) 10 MG tablet Take 10 mg by mouth once daily.     citalopram (CELEXA) 40 MG tablet Take 40 mg by mouth once daily.    fluticasone (FLONASE) 50 mcg/actuation nasal spray 1 spray by Each Nare route daily as needed for Rhinitis.    omeprazole (PRILOSEC) 20 MG capsule Take 20 mg by mouth 2 (two) times daily.     potassium chloride SA (K-DUR,KLOR-CON) 20 MEQ tablet Take 2 tablets (40 mEq total) by mouth 2 (two) times daily.    QUEtiapine (SEROQUEL) 400 MG tablet Take 1 tablet (400 mg total) by mouth every evening.    riociguat (ADEMPAS) 2.5 mg tablet Take 1 tablet (2.5 mg total) by mouth 3 (three) times daily. Take 1 tablet, by mouth, three times daily.    selexipag 1,600 mcg Tab Take 1,600 mcg by mouth 2 (two) times daily.    spironolactone (ALDACTONE) 50 MG tablet Take 1 tablet (50 mg total) by mouth once daily.     torsemide (DEMADEX) 20 MG Tab Take half a tablet by mouth twice daily    torsemide (DEMADEX) 100 MG Tab Take half a tablet by mouth twice daily    torsemide (DEMADEX) 20 MG Tab Take 3 tablets (60 mg total) by mouth 2 (two) times a day.    VENTOLIN HFA 90 mcg/actuation inhaler TAKE 1 PUFF EVERY 6 HOURS AS NEEDED FOR WHEEZING/SOB     Family History    None       Tobacco Use    Smoking status: Never Smoker    Smokeless tobacco: Never Used   Substance and Sexual Activity    Alcohol use: No    Drug use: No    Sexual activity: Not Currently     Review of Systems   Constitutional:  Negative for fatigue and fever.   HENT:  Positive for congestion.    Respiratory:  Positive for shortness of breath. Negative for cough, wheezing and stridor.    Cardiovascular:  Negative for chest pain and leg swelling.   Gastrointestinal:  Negative for abdominal distention and abdominal pain.   Genitourinary:  Negative for dysuria and frequency.   Neurological:  Negative for dizziness and headaches.   All other systems reviewed and are negative.  Objective:     Vital Signs (Most Recent):  Temp: 98.3 °F (36.8 °C) (05/04/22 1830)  Pulse: 88 (05/04/22 1830)  Resp: 20 (05/04/22 1830)  BP: 124/62 (05/04/22 1830)  SpO2: 95 % (05/04/22 1830) Vital Signs (24h Range):  Temp:  [98.1 °F (36.7 °C)-99.1 °F (37.3 °C)] 98.3 °F (36.8 °C)  Pulse:  [84-92] 88  Resp:  [16-23] 20  SpO2:  [90 %-100 %] 95 %  BP: (115-138)/(55-78) 124/62     Weight: (!) 160.6 kg (354 lb 0.9 oz)  Body mass index is 53.83 kg/m².    Physical Exam  Constitutional:       General: He is not in acute distress.     Appearance: Normal appearance. He is obese. He is not ill-appearing.   Cardiovascular:      Rate and Rhythm: Normal rate and regular rhythm.      Pulses: Normal pulses.      Heart sounds: Normal heart sounds. No murmur heard.  Pulmonary:      Effort: Pulmonary effort is normal. No respiratory distress.      Breath sounds: Normal breath sounds.   Abdominal:      General:  Abdomen is flat. Bowel sounds are normal.      Palpations: Abdomen is soft.   Musculoskeletal:      Right lower leg: Edema present.      Left lower leg: Edema present.      Comments: RLE swelling > LLE, with overlying redness but non tender   Skin:     General: Skin is warm and dry.      Capillary Refill: Capillary refill takes less than 2 seconds.   Neurological:      General: No focal deficit present.      Mental Status: He is alert.   Psychiatric:         Mood and Affect: Mood normal.         Behavior: Behavior normal.           Significant Labs: All pertinent labs within the past 24 hours have been reviewed.  CBC:   Recent Labs   Lab 05/04/22  1306   WBC 11.38   HGB 9.6*   HCT 33.5*        CMP:   Recent Labs   Lab 05/04/22  1306      K 3.4*      CO2 23      BUN 8   CREATININE 0.8   CALCIUM 8.2*   PROT 6.4   ALBUMIN 2.9*   BILITOT 0.8   ALKPHOS 140*   AST 36   ALT 85*   ANIONGAP 7*   EGFRNONAA >60.0     Cardiac Markers:   Recent Labs   Lab 05/04/22  1306   *       Significant Imaging: I have reviewed all pertinent imaging results/findings within the past 24 hours.      Assessment/Plan:     * CTEPH (chronic thromboembolic pulmonary hypertension)  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    Volume overload  PH  Patient presenting with volume overload, responded well to IV lasix, will continue lasix 80mg IV TID and hold home selexipag and riociguat per cards recs.  - Lasix 80mg IV TID  - Hold selexipag 1600mg BID  - Hold Riociguat 2.5mg TID  - K >4, Mg >2  - Continue home apixaban 5mg BID  - Spironolactone 50mg daily      Right leg injury  Appears to have swelling to RLE, reportedly from a fall, DVT US negative, no signs of infection  - CTM      COVID-19  Patient is asymptomatic, has seasonal allergies which are chronic and not worsened, satting >94%  on RA.  - No treatment needed      CAD (coronary artery disease), native coronary artery  - Continue home aspirin and atorvastatin      Bipolar I disorder, single manic episode, severe, with psychosis  Currently mood is stable, psych consulted, patient not clear of home meds, will hold citalopram and resuem seroquel  - Seroquel 50mg QHS  - Psych recs appreciated      VTE Risk Mitigation (From admission, onward)         Ordered     apixaban tablet 5 mg  2 times daily         05/04/22 1638     Reason for No Pharmacological VTE Prophylaxis  Once        Question:  Reasons:  Answer:  Already adequately anticoagulated on oral Anticoagulants    05/04/22 1638     IP VTE HIGH RISK PATIENT  Once         05/04/22 1638     Place sequential compression device  Until discontinued         05/04/22 1638                   Nick Casas MD  Department of Hospital Medicine   [facility] - Intensive Care ([place]-[facility])

## 2022-05-04 NOTE — ED PROVIDER NOTES
Source of History   Patient and friend    Chief Complaint   Multiple complaints (Bipolar manic now not taking meds, been partying all weekend, fell r lower leg swollen, thinks has dvt, not taking eliquis for 4 weeks) and Psychiatric Evaluation (States i'm manic and doing harm to myself by not taking meds,  )      History Of Present Illness   Jaquan Hines Jr. is a 51 y.o. male presenting with dyspnea on exertion and worsening volume overload this been present for several days.  He has gained at least 12 lb.  He notes history of congestive heart failure.  He also fell on his leg recently and is worried that he might have another blood clot in his right leg.  He is not on blood thinners at this time; he is supposed to be taking them.  He is not compliant with his Seroquel because he feels it makes him sleep too much.  He feels more alive when he is not taking it.  He has history of bipolar malissa.    Review Of Systems   As per HPI and below:  General: No fever.  No chills.  Eyes: No visual changes.  Head: No headache.    Integument: No rashes or lesions.  Chest: Notes shortness of breath.  Cardiovascular: No chest pain.  Abdomen: No abdominal pain.  No nausea or vomiting.  Urinary: No abnormal urination.  Neurologic: No focal weakness.  No numbness.  Hematologic: No easy bruising.  Endocrine: No excessive thirst or urination.      Review of patient's allergies indicates:   Allergen Reactions    Clopidogrel      Other reaction(s): Other (See Comments)  Pt reports it does not work        No current facility-administered medications on file prior to encounter.     Current Outpatient Medications on File Prior to Encounter   Medication Sig Dispense Refill    apixaban 5 mg Tab Take 1 tablet (5 mg total) by mouth 2 (two) times daily. 60 tablet 11    aspirin (ECOTRIN) 81 MG EC tablet Take 1 tablet (81 mg total) by mouth once daily.  0    atorvastatin (LIPITOR) 40 MG tablet Take 1 tablet (40 mg total) by mouth once  daily. 30 tablet 11    cetirizine (ZYRTEC) 10 MG tablet Take 10 mg by mouth once daily.       citalopram (CELEXA) 40 MG tablet Take 40 mg by mouth once daily.      fluticasone (FLONASE) 50 mcg/actuation nasal spray 1 spray by Each Nare route daily as needed for Rhinitis.      omeprazole (PRILOSEC) 20 MG capsule Take 20 mg by mouth 2 (two) times daily.       potassium chloride SA (K-DUR,KLOR-CON) 20 MEQ tablet Take 2 tablets (40 mEq total) by mouth 2 (two) times daily. 120 tablet 11    QUEtiapine (SEROQUEL) 400 MG tablet Take 1 tablet (400 mg total) by mouth every evening. 30 tablet 11    riociguat (ADEMPAS) 2.5 mg tablet Take 1 tablet (2.5 mg total) by mouth 3 (three) times daily. Take 1 tablet, by mouth, three times daily. 90 tablet 11    selexipag 1,600 mcg Tab Take 1,600 mcg by mouth 2 (two) times daily. 60 tablet 11    spironolactone (ALDACTONE) 50 MG tablet Take 1 tablet (50 mg total) by mouth once daily. 30 tablet 11    torsemide (DEMADEX) 20 MG Tab Take half a tablet by mouth twice daily 30 tablet 11    torsemide (DEMADEX) 100 MG Tab Take half a tablet by mouth twice daily 30 tablet 11    torsemide (DEMADEX) 20 MG Tab Take 3 tablets (60 mg total) by mouth 2 (two) times a day. 180 tablet 11    VENTOLIN HFA 90 mcg/actuation inhaler TAKE 1 PUFF EVERY 6 HOURS AS NEEDED FOR WHEEZING/SOB  6       Past History   As per HPI and below:  Past Medical History:   Diagnosis Date    Acute pulmonary embolism     Anticoagulant long-term use     Bipolar 1 disorder     diagnosed at 16    Chronic respiratory failure with hypoxia     Essential hypertension, malignant     Heart attack     Pulmonary artery hypertension      Past Surgical History:   Procedure Laterality Date    CARDIAC CATHETERIZATION      CATARACT EXTRACTION      MASS EXCISION      inner thigh, lipoma    RIGHT HEART CATHETERIZATION Right 5/12/2021    Procedure: INSERTION, CATHETER, RIGHT HEART;  Surgeon: Carl Schneider Jr., MD;   Location[facility] CATH LAB;  Service: Cardiology;  Laterality: Right;    TONSILLECTOMY, ADENOIDECTOMY         Social History     Socioeconomic History    Marital status: Single   Tobacco Use    Smoking status: Never Smoker    Smokeless tobacco: Never Used   Substance and Sexual Activity    Alcohol use: No    Drug use: No    Sexual activity: Not Currently   Social History Narrative    Never . No kids. He lives with his sister Torrie in [place], [place] in a one story house. He is disabled due to bipolar and cardiac history.         He does not drink alcohol. No cigarette or drug use. Not sexually active.         Full ADLs and IADLs.      Social Determinants of Health     Financial Resource Strain: Low Risk     Difficulty of Paying Living Expenses: Not hard at all   Food Insecurity: No Food Insecurity    Worried About Running Out of Food in the Last Year: Never true    Ran Out of Food in the Last Year: Never true   Transportation Needs: No Transportation Needs    Lack of Transportation (Medical): No    Lack of Transportation (Non-Medical): No   Physical Activity: Unknown    Days of Exercise per Week: 0 days    Minutes of Exercise per Session: Patient refused   Stress: No Stress Concern Present    Feeling of Stress : Not at all   Social Connections: Unknown    Frequency of Communication with Friends and Family: More than three times a week    Frequency of Social Gatherings with Friends and Family: More than three times a week    Active Member of Clubs or Organizations: Yes    Attends Club or Organization Meetings: More than 4 times per year    Marital Status: Never    Housing Stability: Low Risk     Unable to Pay for Housing in the Last Year: No    Number of Places Lived in the Last Year: 1    Unstable Housing in the Last Year: No       History reviewed. No pertinent family history.    Physical Exam     Vitals:    05/04/22 2355 05/05/22 0414 05/05/22 0500 05/05/22 0725   BP:   136/67    BP  Location:   Right arm    Patient Position:   Lying    Pulse: 86 88 101    Resp:   18    Temp:   98.8 °F (37.1 °C) 98.4 °F (36.9 °C)   TempSrc:   Oral Oral   SpO2: 96% Comment: 98 98%    Weight:       Height:         Appearance: No acute distress.  Elevated BMI.  Skin: No rashes seen.  Good turgor.  No abrasions.  No ecchymoses.  Eyes: No conjunctival injection.  ENT: Oropharynx clear.    Chest: Clear to auscultation bilaterally.  Good air movement.  No wheezes.  No rhonchi.  Cardiovascular: Regular rate and rhythm.  No murmurs. No gallops. No rubs. 2+ leg edema R>L.  Only slight pitting.  Abdomen: Soft.  Not distended.  Nontender.  No guarding.  No rebound.  Musculoskeletal: Good range of motion all joints.  No deformities.  Neck supple.  No meningismus.  Neurologic: Motor intact.  Sensation intact.  Cranial nerves intact.  Mental Status:  Alert and oriented x 3.  Appropriate, conversant.      Initial MDM   Dyspnea exertion and leg swelling consistent with CHF exacerbation.  I doubt ACS,  PE.  Patient notes history of same.  Patient is also noncompliant with his bipolar medications.  I think it will likely need medical admission.  I will do CHF workup.  The patient is insistent I do an ultrasound as well.  My consult Psychiatry to determine if they feel he needs to be PEC'd as well    I decided to obtain the patient's medical records.    Medications   apixaban tablet 5 mg (5 mg Oral Given 5/4/22 2125)   aspirin EC tablet 81 mg (has no administration in time range)   atorvastatin tablet 40 mg (has no administration in time range)   cetirizine tablet 10 mg (has no administration in time range)   pantoprazole EC tablet 40 mg (has no administration in time range)   spironolactone tablet 50 mg (has no administration in time range)   sodium chloride 0.9% flush 10 mL (has no administration in time range)   furosemide injection 80 mg (80 mg Intravenous Given 5/5/22 9750)   ascorbic acid (vitamin C) tablet 500 mg (500 mg  Oral Given 5/4/22 2125)   multivitamin tablet (has no administration in time range)   QUEtiapine tablet 50 mg (50 mg Oral Given 5/4/22 2125)   furosemide injection 80 mg (80 mg Intravenous Given 5/4/22 1316)   potassium chloride SA CR tablet 40 mEq (40 mEq Oral Given 5/4/22 2125)   iohexoL (OMNIPAQUE 350) injection 100 mL (100 mLs Intravenous Given 5/5/22 0700)       Results and Course     Labs Reviewed   CBC W/ AUTO DIFFERENTIAL - Abnormal; Notable for the following components:       Result Value    RBC 4.52 (*)     Hemoglobin 9.6 (*)     Hematocrit 33.5 (*)     MCV 74 (*)     MCH 21.2 (*)     MCHC 28.7 (*)     RDW 17.2 (*)     Immature Granulocytes 0.8 (*)     Gran # (ANC) 8.8 (*)     Immature Grans (Abs) 0.09 (*)     Gran % 77.2 (*)     Lymph % 12.7 (*)     All other components within normal limits   COMPREHENSIVE METABOLIC PANEL - Abnormal; Notable for the following components:    Potassium 3.4 (*)     Calcium 8.2 (*)     Albumin 2.9 (*)     Alkaline Phosphatase 140 (*)     ALT 85 (*)     Anion Gap 7 (*)     All other components within normal limits   B-TYPE NATRIURETIC PEPTIDE - Abnormal; Notable for the following components:     (*)     All other components within normal limits   URINALYSIS, REFLEX TO URINE CULTURE - Abnormal; Notable for the following components:    Protein, UA 1+ (*)     All other components within normal limits    Narrative:     Specimen Source->Urine   ACETAMINOPHEN LEVEL - Abnormal; Notable for the following components:    Acetaminophen (Tylenol), Serum <3.0 (*)     All other components within normal limits   SARS-COV-2 RDRP GENE - Abnormal; Notable for the following components:    POC Rapid COVID Positive (*)     All other components within normal limits    Narrative:     This test utilizes isothermal nucleic acid amplification   technology to detect the SARS-CoV-2 RdRp nucleic acid segment.   The analytical sensitivity (limit of detection) is 125 genome   equivalents/mL.   A  POSITIVE result implies infection with the SARS-CoV-2 virus;   the patient is presumed to be contagious.     A NEGATIVE result means that SARS-CoV-2 nucleic acids are not   present above the limit of detection. A NEGATIVE result should be   treated as presumptive. It does not rule out the possibility of   COVID-19 and should not be the sole basis for treatment decisions.   If COVID-19 is strongly suspected based on clinical and exposure   history, re-testing using an alternate molecular assay should be   considered.   This test is only for use under the Food and Drug   Administration s Emergency Use Authorization (EUA).   Commercial kits are provided by [employer].   Performance characteristics of the EUA have been independently   verified by [facility] Department of   Pathology and Laboratory Medicine.   _________________________________________________________________   The authorized Fact Sheet for Healthcare Providers and the authorized Fact   Sheet for Patients of the ID NOW COVID-19 are available on the FDA   website:     https://www.fda.gov/media/402771/download  https://www.fda.gov/media/621461/download           TROPONIN I   TSH   DRUG SCREEN PANEL, URINE EMERGENCY    Narrative:     Specimen Source->Urine   ALCOHOL,MEDICAL (ETHANOL)   URINALYSIS MICROSCOPIC    Narrative:     Specimen Source->Urine   HIV 1 / 2 ANTIBODY   HEPATITIS C ANTIBODY       Imaging Results          CTA Chest Non-Coronary - PE Study (Final result)  Result time 05/05/22 07:35:41    Final result by Randell Mackenzie MD (05/05/22 07:35:41)                 Impression:      1. Examination considered diagnostic to the lobar pulmonary arterial level without convincing evidence of acute pulmonary embolism.  2. Solid and sub solid/ground-glass pulmonary nodules could relate to infectious or inflammatory etiology.  Imaging surveillance would be recommended.  For a solid nodule 6-8 mm, Fleischner Society 2017 guidelines  recommend follow up with non-contrast chest CT at 6-12 months and 18-24 months after discovery.  For multiple <6 mm sub solid nodules, Fleischner Society 2017 guidelines recommend short term follow up non-contrast chest CT in 3-6 months, as these are typically benign in nature (due to etiologies such as infection). If these findings persist at that time, additional follow up at 2 and 4 years after initial discovery should be considered in high risk patients to confirm absence of growth.  3. Additional details, as provided in the body of report.      Electronically signed by: Randell Mackenzie  Date:    05/05/2022  Time:    07:35             Narrative:    EXAMINATION:  CTA CHEST NON CORONARY    CLINICAL HISTORY:  Pulmonary embolism (PE) suspected, high prob;SOB, hx of Pulm HTN.;    TECHNIQUE:  Low dose axial images, sagittal and coronal reformations were obtained from the thoracic inlet to the lung bases following the IV administration of 100 mL of Omnipaque 350.  Contrast timing was optimized to evaluate the pulmonary arteries.  MIP images were performed.    COMPARISON:  Chest radiograph 05/04/2022.  CT chest 02/14/2018.    FINDINGS:  Exam quality: Examination considered diagnostic to the lobar pulmonary arterial level.  Evaluation of more distal pulmonary arterial branches limited due to suboptimal contrast bolus timing and respiratory motion.    Pulmonary embolism: No acute or chronic pulmonary embolism.    Central pulmonary arteries: Normal caliber.    Aorta: Normal caliber.  Suggested short segment common origin of the brachiocephalic and left common carotid artery.    Heart: No right heart strain. Normal size.    Coronary arteries: Coronary artery calcifications identified.    Pericardium: Normal. No effusion, thickening, or calcification.    Support tubes and lines: None.    Base of neck/thyroid: Normal.    Lymph nodes: No supraclavicular, axillary, internal mammary, mediastinal, or hilar  adenopathy.    Esophagus: Normal.    Pleura: No effusion, thickening, or calcification.    Upper abdomen: Findings in keeping with hepatic steatosis.  Equivocal minor contour nodularity of the liver.    Body wall: Unremarkable    Airways: Central airways appear patent.    Lungs: Assessment the lungs is limited due to respiratory motion.  Dependent atelectasis is a few patchy areas of nonspecific ground-glass attenuation noted in the right lower lobe.  7 mm solid nodule left lower lobe (series 3, image 272).  Several additional sub solid/ground-glass nodules are noted, for example 5 mm nodule in the right lower lobe (series 3, image 296).    Bones: No focal lesion.                               US Lower Extremity Veins Bilateral (Final result)  Result time 05/04/22 17:57:31    Final result by Marty Leslie MD (05/04/22 17:57:31)                 Impression:      No evidence of deep venous thrombosis in the examined lower extremity.    Electronically signed by resident: Mic Corona MD  Date:    05/04/2022  Time:    17:51    Electronically signed by: Marty Leslie MD  Date:    05/04/2022  Time:    17:57             Narrative:    EXAMINATION:  US LOWER EXTREMITY VEINS BILATERAL    CLINICAL HISTORY:  Other specified soft tissue disorders    TECHNIQUE:  Duplex and color flow Doppler and dynamic compression was performed of the bilateral lower extremity veins to the level of the popliteal veins.  Limited exam due to COVID 19 infection status.    COMPARISON:  None    FINDINGS:  Right thigh veins: The common femoral, femoral, popliteal, upper greater saphenous, and deep femoral veins are patent and free of thrombus. The veins are normally compressible and have normal phasic flow and augmentation response.    Left thigh veins: The common femoral, femoral, popliteal, upper greater saphenous, and deep femoral veins are patent and free of thrombus. The veins are normally compressible and have normal phasic flow and augmentation  response.    Miscellaneous: None                               X-Ray Chest AP Portable (Final result)  Result time 05/04/22 14:10:18    Final result by Mike Bledsoe MD (05/04/22 14:10:18)                 Impression:      Borderline cardiomegaly, with the appearance of the chest otherwise appearing unremarkable allowing for projection.  No significant detrimental interval change in the appearance of the chest since 03/16/2020, with significant improvement since that time.      Electronically signed by: Mike Bledsoe MD  Date:    05/04/2022  Time:    14:10             Narrative:    EXAMINATION:  XR CHEST AP PORTABLE    CLINICAL HISTORY:  CHF;    COMPARISON:  Comparison is made to the most recent prior chest radiograph of 03/16/2020.    FINDINGS:  Postoperative changes are again noted in the thorax.  A solitary pacing lead with an electrode tip in the right ventricle is again noted, though this lead is not attached to a generator.  Cardiomediastinal silhouette is magnified by projection, and the true cardiac size is at the upper limit of normal to minimally enlarged.  Pulmonary vascularity appears within normal limits, and there are no findings indicating current cardiac decompensation.  Lung zones are now clear, and are free of significant airspace consolidation or volume loss.  No pleural fluid of any substantial volume is seen on either side.  No pneumothorax.                                ED Course as of 05/05/22 0945   Wed May 04, 2022   1254 EKG 12-lead  EKG shows normal sinus rhythm and no acute ischemia per my independent interpretation.     [DC]   1300 Discussed with psychiatry [DC]   1339 WBC: 11.38 [DC]   1339 Hemoglobin(!): 9.6 [DC]   1339 Platelets: 316 [DC]   1349 Urinalysis Microscopic  negative [DC]   1411 Troponin I: <0.006 [DC]   1411 BNP(!): 259 [DC]   1411 Alcohol, Serum: <10 [DC]   1411 TSH: 2.463 [DC]   1411 Acetaminophen (Tylenol), Serum(!): <3.0 [DC]   1411 Creatinine: 0.8 [DC]      ED Course User  Index  [DC] Allen Otero MD           MDM, Impression and Plan   51 y.o. male with SOB, CHF, also with Covid.  Psych has seen patient and states does not need PEC.  Will admit to HM for management.           Final diagnoses:  [R06.02] Shortness of breath  [M79.89] Right leg swelling          ED Disposition Condition    Admit               Allen Otero MD  05/05/22 0946

## 2022-05-04 NOTE — HPI
This is Mr. Jaquan Hines Jr., a pleasant 51 y.o. 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 He is on medication for PH torsemide 60 mg BID, selexipag 1,600 mcg BID and riociguat 2.5 mg TID. Of note, he has prior acute hypoxic respitaroy failure and underwent mechanical ventilation (viral due to human metapneumovirus). He presented to ED with different complain. Firstly, he complain of having weight gain, lower extremities swelling and feeling NYHA III. He reported not taking his anticoagulation for his CTEPH and missing many days of diuretics. In ED, his vital signs were stable, his labs showed  (highest recorded and he is obese). He is COVID positive (received 2 doses and a booster). Cardiology consulted for recommendations.

## 2022-05-04 NOTE — TELEPHONE ENCOUNTER
"Patient called in today to inform nurse navigator that he is in a manic state, has been non-compliant with all medications (PH, eliquis, fluid, etc) for at least 2 weeks (patients sister was in background stating that it has been 4 weeks), he recently went to ER and was treated for fluid and was discharged the same day. His fluid went down but he is noticing his leg is extremely swollen, tender, and warm to touch. He prefers to come to [facility] rather than getting ultrasound closer to home (nurse navigator explained he can see local MD for ultrasound but declined and would rather come to [facility] ER). Patient states that he would like to restart on his medications "become more complaint" and also be seen for his manic state. Patient will be leaving his house with his sister and will arrive within the next 3 hours from [place].    HTS team notified.   "

## 2022-05-04 NOTE — SUBJECTIVE & OBJECTIVE
Past Medical History:   Diagnosis Date    Acute pulmonary embolism     Anticoagulant long-term use     Bipolar 1 disorder     diagnosed at 16    Chronic respiratory failure with hypoxia     Essential hypertension, malignant     Heart attack     Pulmonary artery hypertension        Past Surgical History:   Procedure Laterality Date    CARDIAC CATHETERIZATION      CATARACT EXTRACTION      MASS EXCISION      inner thigh, lipoma    RIGHT HEART CATHETERIZATION Right 5/12/2021    Procedure: INSERTION, CATHETER, RIGHT HEART;  Surgeon: Carl Schneider Jr., MD;  Location: [facility] CATH LAB;  Service: Cardiology;  Laterality: Right;    TONSILLECTOMY, ADENOIDECTOMY         Review of patient's allergies indicates:   Allergen Reactions    Clopidogrel      Other reaction(s): Other (See Comments)  Pt reports it does not work        No current facility-administered medications on file prior to encounter.     Current Outpatient Medications on File Prior to Encounter   Medication Sig    apixaban 5 mg Tab Take 1 tablet (5 mg total) by mouth 2 (two) times daily.    aspirin (ECOTRIN) 81 MG EC tablet Take 1 tablet (81 mg total) by mouth once daily.    atorvastatin (LIPITOR) 40 MG tablet Take 1 tablet (40 mg total) by mouth once daily.    cetirizine (ZYRTEC) 10 MG tablet Take 10 mg by mouth once daily.     citalopram (CELEXA) 40 MG tablet Take 40 mg by mouth once daily.    fluticasone (FLONASE) 50 mcg/actuation nasal spray 1 spray by Each Nare route daily as needed for Rhinitis.    omeprazole (PRILOSEC) 20 MG capsule Take 20 mg by mouth 2 (two) times daily.     potassium chloride SA (K-DUR,KLOR-CON) 20 MEQ tablet Take 2 tablets (40 mEq total) by mouth 2 (two) times daily.    QUEtiapine (SEROQUEL) 400 MG tablet Take 1 tablet (400 mg total) by mouth every evening.    riociguat (ADEMPAS) 2.5 mg tablet Take 1 tablet (2.5 mg total) by mouth 3 (three) times daily. Take 1 tablet, by mouth, three times daily.    selexipag 1,600 mcg Tab Take 1,600  mcg by mouth 2 (two) times daily.    spironolactone (ALDACTONE) 50 MG tablet Take 1 tablet (50 mg total) by mouth once daily.    torsemide (DEMADEX) 20 MG Tab Take half a tablet by mouth twice daily    torsemide (DEMADEX) 100 MG Tab Take half a tablet by mouth twice daily    torsemide (DEMADEX) 20 MG Tab Take 3 tablets (60 mg total) by mouth 2 (two) times a day.    VENTOLIN HFA 90 mcg/actuation inhaler TAKE 1 PUFF EVERY 6 HOURS AS NEEDED FOR WHEEZING/SOB     Family History    None       Tobacco Use    Smoking status: Never Smoker    Smokeless tobacco: Never Used   Substance and Sexual Activity    Alcohol use: No    Drug use: No    Sexual activity: Not Currently     Review of Systems   Constitutional: Positive for decreased appetite and malaise/fatigue. Negative for chills.   HENT:  Negative for congestion.    Cardiovascular:  Positive for dyspnea on exertion, irregular heartbeat, leg swelling, near-syncope and orthopnea. Negative for chest pain and palpitations.   Respiratory:  Positive for cough and shortness of breath.    Endocrine: Negative for cold intolerance and heat intolerance.   Skin:  Negative for rash.   Musculoskeletal:  Negative for arthritis and back pain.   Gastrointestinal:  Negative for abdominal pain, constipation and diarrhea.   Genitourinary:  Negative for dysuria and hematuria.   Neurological:  Negative for dizziness and headaches.   Psychiatric/Behavioral:  Negative for altered mental status.    Objective:     Vital Signs (Most Recent):  Temp: 99.1 °F (37.3 °C) (05/04/22 1254)  Pulse: 85 (05/04/22 1418)  Resp: 16 (05/04/22 1418)  BP: 138/78 (05/04/22 1418)  SpO2: 98 % (05/04/22 1418) Vital Signs (24h Range):  Temp:  [98.1 °F (36.7 °C)-99.1 °F (37.3 °C)] 99.1 °F (37.3 °C)  Pulse:  [85-92] 85  Resp:  [16-23] 16  SpO2:  [98 %-100 %] 98 %  BP: (115-138)/(55-78) 138/78     Weight: (!) 160.6 kg (354 lb 0.9 oz)  Body mass index is 53.83 kg/m².    SpO2: 98 %  O2 Device (Oxygen Therapy): room  air      Intake/Output Summary (Last 24 hours) at 5/4/2022 1506  Last data filed at 5/4/2022 1504  Gross per 24 hour   Intake --   Output 3200 ml   Net -3200 ml       Lines/Drains/Airways       Peripheral Intravenous Line  Duration                  Peripheral IV - Single Lumen 05/04/22 1306 20 G;1 in Right Forearm <1 day                    Physical Exam  Vitals reviewed.   Constitutional:       General: He is not in acute distress.  HENT:      Head: Normocephalic.   Eyes:      Pupils: Pupils are equal, round, and reactive to light.   Neck:      Thyroid: No thyromegaly.      Vascular: No JVD.   Cardiovascular:      Rate and Rhythm: Normal rate and regular rhythm.      Heart sounds: Murmur heard.     No friction rub.   Pulmonary:      Effort: Respiratory distress present.      Breath sounds: Wheezing and rales present.   Abdominal:      General: Abdomen is flat.   Musculoskeletal:      Cervical back: Rigidity present.      Right lower leg: Edema present.      Left lower leg: Edema present.   Neurological:      Mental Status: He is alert and oriented to person, place, and time.         Significant Labs: CMP   Recent Labs   Lab 05/04/22  1306      K 3.4*      CO2 23      BUN 8   CREATININE 0.8   CALCIUM 8.2*   PROT 6.4   ALBUMIN 2.9*   BILITOT 0.8   ALKPHOS 140*   AST 36   ALT 85*   ANIONGAP 7*   ESTGFRAFRICA >60.0   EGFRNONAA >60.0   , CBC   Recent Labs   Lab 05/04/22  1306   WBC 11.38   HGB 9.6*   HCT 33.5*      , Troponin   Recent Labs   Lab 05/04/22  1306   TROPONINI <0.006   , and All pertinent lab results from the last 24 hours have been reviewed.    Significant Imaging: Echocardiogram: 2D echo with color flow doppler:   Results for orders placed or performed during the hospital encounter of 05/31/18   2D echo with color flow doppler   Result Value Ref Range    EF + QEF 55 55 - 65    Mitral Valve Regurgitation MILD     Diastolic Dysfunction No     Est. PA Systolic Pressure 38.19      Pericardial Effusion SMALL (A)     Tricuspid Valve Regurgitation MILD     Narrative    Date of Procedure: 05/31/2018        TEST DESCRIPTION   Technical Quality: This is a technically challenging study. There is poor endocardial definition. This study was performed in conjunction with a 3ml intravenous injection of Optison contrast agent.     General: A catheter is present in the right-sided cardiac chambers.     Aorta: The aortic root is normal in size, measuring 2.8 cm at sinotubular junction and 3.2 cm at Sinuses of Valsalva. The proximal ascending aorta is normal in size, measuring 2.9 cm across.     Left Atrium: The left atrial volume index is normal, measuring 33.31 cc/m2.     Left Ventricle: The left ventricle is normal in size, with an end-diastolic diameter of 5.0 cm, and an end-systolic diameter of 3.5 cm. LV wall thickness is normal, with the septum and the posterior wall each measuring 0.8 cm across. Relative wall   thickness was normal at 0.32, and the LV mass index was 61.4 g/m2 consistent with normal left ventricular mass. There are no regional wall motion abnormalities. Left ventricular systolic function appears normal. Visually estimated ejection fraction is   55-60%. The LV Doppler derived stroke volume equals 65.0 ccs.     Diastolic indices: E wave velocity 0.6 m/s, E/A ratio 1.1,  msec., E/e' ratio(avg) 6. Diastolic function is normal.     Right Atrium: The right atrium is normal in size, measuring 5.0 cm in length and 3.3 cm in width in the apical view.     Right Ventricle: The right ventricle is not well seen in size. Global right ventricular systolic function was not well seen. Tricuspid annular plane systolic excursion (TAPSE) is 1.7 cm. Tissue Doppler-derived tricuspid annular peak systolic velocity (S   prime) is 10.6 cm/s. The estimated PA systolic pressure is greater than 38 mmHg.     Aortic Valve:  The aortic valve is normal in structure.     Mitral Valve:  The mitral valve is  normal in structure. There is mild mitral regurgitation.     Tricuspid Valve:  The tricuspid valve is not well seen. There is mild tricuspid regurgitation.     Pulmonary Valve:  The pulmonic valve is not well seen.     Pericardium: There is evidence of a small circumferential pericardial effusion.     IVC: The IVC is not visualized.     Intracavitary: There is no evidence of intracavity mass, thrombi, or vegetation.         CONCLUSIONS     1 - Normal left ventricular systolic function (EF 55-60%).     2 - No wall motion abnormalities.     3 - Normal left ventricular diastolic function.     4 - The estimated PA systolic pressure is greater than 38 mmHg.     5 - Mild mitral regurgitation.     6 - Mild tricuspid regurgitation.     7 - Small pericardial effusion.             This document has been electronically    SIGNED BY: Manuela Gupta MD On: 05/31/2018 12:22    and Transthoracic echo (TTE) complete (Cupid Only):   Results for orders placed or performed during the hospital encounter of 05/04/21   Echo Color Flow Doppler? Yes   Result Value Ref Range    Ascending aorta 2.99 cm    STJ 3.07 cm    AV mean gradient 8 mmHg    Ao peak juan pablo 1.97 m/s    Ao VTI 32.66 cm    IVS 0.78 0.6 - 1.1 cm    LA size 3.84 cm    Left Atrium Major Axis 5.85 cm    Left Atrium Minor Axis 5.93 cm    LVIDd 5.40 3.5 - 6.0 cm    LVIDs 3.81 2.1 - 4.0 cm    LVOT diameter 2.19 cm    LVOT peak VTI 24.31 cm    Posterior Wall 1.13 (A) 0.6 - 1.1 cm    MV Peak A Juan Pablo 0.69 m/s    E wave deceleration time 198.13 msec    MV Peak E Juan Pablo 0.99 m/s    PV Peak D Juan Pablo 0.67 m/s    PV Peak S Juan Pablo 0.51 m/s    RA Major Axis 6.36 cm    RA Width 3.88 cm    RVDD 3.52 cm    Sinus 3.01 cm    TAPSE 2.23 cm    TR Max Juan Pablo 3.18 m/s    TDI LATERAL 0.15 m/s    TDI SEPTAL 0.08 m/s    LA WIDTH 4.84 cm    MV stenosis pressure 1/2 time 57.46 ms    LV Diastolic Volume 141.37 mL    LV Systolic Volume 62.50 mL    RV S' 12.25 cm/s    LVOT peak juan pablo 1.33 m/s    LA volume (mod) 67.58 cm3  "   MV "A" wave duration 10.28 msec    LV LATERAL E/E' RATIO 6.60 m/s    LV SEPTAL E/E' RATIO 12.38 m/s    FS 29 %    LA volume 93.04 cm3    LV mass 194.58 g    Left Ventricle Relative Wall Thickness 0.42 cm    AV valve area 2.80 cm2    AV Velocity Ratio 0.68     AV index (prosthetic) 0.74     MV valve area p 1/2 method 3.83 cm2    E/A ratio 1.43     Mean e' 0.12 m/s    Pulm vein S/D ratio 0.76     LVOT area 3.8 cm2    LVOT stroke volume 91.53 cm3    AV peak gradient 16 mmHg    E/E' ratio 8.61 m/s    LV Systolic Volume Index 23.8 mL/m2    LV Diastolic Volume Index 53.75 mL/m2    LA Volume Index 35.4 mL/m2    LV Mass Index 74 g/m2    Triscuspid Valve Regurgitation Peak Gradient 40 mmHg    LA Volume Index (Mod) 25.7 mL/m2    BSA 2.81 m2    Right Atrial Pressure (from IVC) 8 mmHg    EF 60 %    TV rest pulmonary artery pressure 48 mmHg    Narrative    · The estimated ejection fraction is 60%.  · The left ventricle is normal in size with normal systolic function.  · Indeterminate left ventricular diastolic function.  · Mild left atrial enlargement.  · Mild tricuspid regurgitation.  · Normal right ventricular size with normal right ventricular systolic   function.  · There is abnormal septal wall motion consistent with right ventricular   pacemaker.  · The estimated PA systolic pressure is 48 mmHg.  · Intermediate central venous pressure (8 mmHg).        "

## 2022-05-04 NOTE — ED NOTES
Informed that patient is COVID positive. Closed door and grabbed isolation sign and gear. Closed door but still can see patient through the glass on the door. Confirmed with charge nurse (Jay) that this was appropriate.

## 2022-05-04 NOTE — HPI
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    Patient states he had gone to a festival in [place] this past weekend. Since then he noted weight gain of about 12lbs, and had intermittent shortness of breath. He reports his "endurance was not great". He had a fall and sustained an injury to his R lower leg with some overlying swelling, but also has noted significant swelling in both of his legs. Denies orthopnea or PND. He reports he presented to the ED because of this shortness of breath, he states he is not compliant with his home medications including his apixaban or his PH medications.     Patients BP on arrival was 138/78, satting well on RA, tmax 99.1, he was given 80mg IV lasix and diuresed well, put out about 4000ml.His labs were notable for hgb 9.6, near his baseline, K 3.4, , normal Cr, and COVID positive. He was admitted for diuresis.  "

## 2022-05-04 NOTE — CONSULTS
"Emergency Psychiatry Consult Note    5/4/2022 2:54 PM  Jaquan Hines Jr.  MRN: [id number]    Chief Complaint / Reason for Consult: malissa     SUBJECTIVE     History of Present Illness:   Jaquan Hines Jr. is a 51 y.o. male with a past psychiatric history of Bipolar I Disorder, with psychotic features, currently presenting with right leg swelling (currently trying to rule out PE), CHF, and COVID+  Emergency Psychiatry was originally consulted to address the patient's symptoms of malissa    Per ED MD:  Multiple complaints (Bipolar manic now not taking meds, been partying all weekend, fell r lower leg swollen, thinks has dvt, not taking eliquis for 4 weeks) and Psychiatric Evaluation (States i'm manic and doing harm to myself by not taking meds)  Jaquan Hines Jr. is a 51 y.o. male presenting with dyspnea on exertion and worsening volume overload this been present for several days.  He has gained at least 12 lb.  He notes history of congestive heart failure.  He also fell on his leg recently and is worried that he might have another blood clot in his right leg.  He is not on blood thinners at this time; he is supposed to be taking them.  He is not compliant with his Seroquel because he feels it makes him sleep too much.  He feels more alive when he is not taking it.  He has history of bipolar malissa.    Per ED Psych MD:  Upon initiation of interview, pt was sitting up in bed watching television. Pt with good hygeine/grooming. States he is hear for multiple medical concern mostly swelling in his right leg. Believes he needs to be under a 72 hold to see psychiatry - explained we can see him without that. He states he is happy to see psychiatry and has been having trouble with his medication. During interview, patient linear, logical, organized. Able to engage in small talk and talk about his weekend. A bit elevated self-esteem/grandiosity - states his "hypomania is my super power" and believes that is why he is not " "having COVID symptoms Mildly pressured speech thought not rapid. States he has not been taking his medications in over a month, when asked why, states "my bipolar". States he takes Seroquel but does not think he takes any other psych meds. States it "stopped working" for sleep and malissa, but also states at higher doses he "can't get out of bed" and is sedated. Is familiar with the psych consult process and is cooperative with interview. Lives with sister who is an NP, states she helps with his meds. Sees [facility] for psychiatry, last saw a year ago. Wants to back on medications and understands he gets manic/hypomanic when not on them. States he sleeps 8hrs every other day.    Psychiatric Review Of Systems - Is patient experiencing or having changes in:  sleep: yes, insomnia  appetite/weight: no  energy/anergy: yes, elevated emergy  interest/pleasure/anhedonia: no  somatic symptoms: no  guilty/hopelessness: no  concentration: no  S.I.B.s/risky behavior: no  SI/SA:  no  anxiety/panic: no  Irritability: no  Racing thoughts: no  Pressured speech:  yes, mildly  Paranoia:no  Delusions: no  AVH:no    Medical Review Of Systems:  Comprehensive 10 pt review of systems negative except for items noted in HPI    Psychiatric History:  Diagnose(s): Yes - Bipolar I Disorder  Current Psychiatric Medications: No  Previous Medication Trials: Yes - Celexa, Seroquel,Tegretol, Trileptal, Latuda, Geodon, Depakate has bad rx)  Previous Psychiatric Hospitalizations: Yes - multiple, first as teenager, most recently in 2013/14, seen by [facility] team in 2020 for delirium and bipolar disorder  Outpatient Psychiatrist: Yes - [facility]    Suicide/Violence Risk Assessment:  Current/active suicidal ideation/plan/intent: no  Previous suicide attempts: no  Current/active homicidal ideation/plan/intent: noNo  History of threats/arrests associated with violent conduct : no  Access to firearms/lethal weapons: no    Social History:  Marital Status: " single  Children: 1   Employment Status: on disability  Housing Status: Yes - with sister  History of Abuse: did not assess  Access to Gun: No    Substance Abuse History:  Recreational Drugs: social alcohol use, last drank a few days ago, no illicits  Use of Alcohol: occasional, social use  Rehab History: No  Tobacco Use: No  If applicable, legal consequences of chemical use: n/a  If applicable, is the patient aware of the biomedical complications associated with substance abuse and mental illness? N/a    Legal History:  Arrested x 2    Psychosocial Factors:  Psychosocial Stressors: health.   Functioning Relationships: good support system    Family Psychiatric History:   Yes - mom (schizophrenia), sister (MDD)    Past Medical/Surgical History:  Past Medical History:   Diagnosis Date    Acute pulmonary embolism     Anticoagulant long-term use     Bipolar 1 disorder     diagnosed at 16    Chronic respiratory failure with hypoxia     Essential hypertension, malignant     Heart attack     Pulmonary artery hypertension      Past Surgical History:   Procedure Laterality Date    CARDIAC CATHETERIZATION      CATARACT EXTRACTION      MASS EXCISION      inner thigh, lipoma    RIGHT HEART CATHETERIZATION Right 5/12/2021    Procedure: INSERTION, CATHETER, RIGHT HEART;  Surgeon: Carl Schneider Jr., MD;  Location: [facility] CATH LAB;  Service: Cardiology;  Laterality: Right;    TONSILLECTOMY, ADENOIDECTOMY         Home Meds:  Prior to Admission medications    Medication Sig Start Date End Date Taking? Authorizing Provider   apixaban 5 mg Tab Take 1 tablet (5 mg total) by mouth 2 (two) times daily. 4/11/18   Maria Guadalupe Duncan NP   aspirin (ECOTRIN) 81 MG EC tablet Take 1 tablet (81 mg total) by mouth once daily. 3/20/18 5/4/21  Maria Guadalupe Duncan NP   atorvastatin (LIPITOR) 40 MG tablet Take 1 tablet (40 mg total) by mouth once daily. 1/28/21   Roseanna Mccain MD   cetirizine (ZYRTEC) 10 MG tablet Take 10 mg by mouth once  daily.     Historical Provider   citalopram (CELEXA) 40 MG tablet Take 40 mg by mouth once daily.    Historical Provider   fluticasone (FLONASE) 50 mcg/actuation nasal spray 1 spray by Each Nare route daily as needed for Rhinitis.    Historical Provider   omeprazole (PRILOSEC) 20 MG capsule Take 20 mg by mouth 2 (two) times daily.     Historical Provider   potassium chloride SA (K-DUR,KLOR-CON) 20 MEQ tablet Take 2 tablets (40 mEq total) by mouth 2 (two) times daily. 6/11/21   Roseanna Mccain MD   QUEtiapine (SEROQUEL) 400 MG tablet Take 1 tablet (400 mg total) by mouth every evening. 3/20/20 5/4/21  Pérez Dean MD   riociguat (ADEMPAS) 2.5 mg tablet Take 1 tablet (2.5 mg total) by mouth 3 (three) times daily. Take 1 tablet, by mouth, three times daily. 12/22/20   Roseanna Mccain MD   selexipag 1,600 mcg Tab Take 1,600 mcg by mouth 2 (two) times daily. 1/26/21   Roseanna Mccain MD   spironolactone (ALDACTONE) 50 MG tablet Take 1 tablet (50 mg total) by mouth once daily. 5/14/21 5/14/22  TONY Miller   torsemide (DEMADEX) 20 MG Tab Take half a tablet by mouth twice daily 5/14/21   TONY Miller   torsemide (DEMADEX) 100 MG Tab Take half a tablet by mouth twice daily 6/11/21   Roseanna Mccain MD   torsemide (DEMADEX) 20 MG Tab Take 3 tablets (60 mg total) by mouth 2 (two) times a day. 6/11/21 6/11/22  Roseanna Mccain MD   VENTOLIN HFA 90 mcg/actuation inhaler TAKE 1 PUFF EVERY 6 HOURS AS NEEDED FOR WHEEZING/SOB 6/5/17   Historical Provider       OBJECTIVE   Vital Signs:  Temp:  [98.1 °F (36.7 °C)-99.1 °F (37.3 °C)]   Pulse:  [85-92]   Resp:  [16-23]   BP: (115-138)/(55-78)   SpO2:  [98 %-100 %]     Mental Status Exam:  Appearance: age appropriate, obese  Level of Consciousness: alert  Behavior/Cooperation: normal, friendly and cooperative  Psychomotor: unremarkable and within normal limits   Speech: normal tone, normal rate, normal pitch, normal volume, pressured  Language: english,  "fluid  Orientation: person, place, situation, month of year, year  Attention Span/Concentration: intact  Memory: Intact and Recent and remote intact  Mood: "good"  Affect: normal  Thought Process: linear, normal and logical  Associations: normal and logical  Thought Content: normal, no suicidality, no homicidality, delusions, or paranoia  Fund of Knowledge: Aware of current events, Intact and Vocabulary appropriate   Insight: fair   Judgment: fair    Laboratory Data:  CBC:   Recent Labs   Lab 05/04/22  1306   WBC 11.38   HGB 9.6*   HCT 33.5*        CMP:   Recent Labs   Lab 05/04/22  1306      K 3.4*      CO2 23      BUN 8   CALCIUM 8.2*   PROT 6.4   ALBUMIN 2.9*   BILITOT 0.8   ALKPHOS 140*   AST 36   ALT 85*   ANIONGAP 7*   EGFRNONAA >60.0       Significant Labs: All pertinent labs within the past 24 hours have been reviewed.  Significant Imaging: I have reviewed all pertinent imaging results/findings within the past 24 hours.    Scheduled Meds:    Psychotherapeutics (From admission, onward)            None          PRN Meds: none    ASSESSMENT     Jaquan Hines Jr. is a 51 y.o. male with a past psychiatric history of Bipolar I Disorder, with psychotic features, currently presenting with right leg swelling (currently trying to rule out PE), CHF, and COVID+  Emergency Psychiatry was originally consulted to address the patient's symptoms of malissa. During interview, patient linear, logical, organized. No flight of ideas, distractibility, psychomotor agitation, racing thoughts. Some elevated self-esteem/grandiosity, some pressured speech, decreased need for sleep. No SI/HI/AVH. Has been off his medications, but has fair insight about why he is taking them and that not taking them can cause malissa/hypomania, but still non-adherent. Interested in medication change.    IMPRESSION  Bipolar I Disorder, mre hypomanic    RECOMMENDATION(S)      1. Scheduled Medication(s):  Patient unsure how much " Seroquel he is taking - states he has a med list in his bag - if can find home Seroquel dose, then restart. If not, then start with Seroquel 100mg qhs.with plan to uptitrate as needed.    2. PRN Medication(s):  None    3. Legal Status/Precaution(s):  Does not meet PEC criteria as he is not a danger to himself, others, or gravely disabled due to an acute psychiatric illness    [facility] Psychiatry team will follow    In cases of emergency, daily coverage provided by Acute/ED Psych MD, NP, or SW, with associated contact numbers listed in the [facility] On Call Schedule.    Nemo Patel MD   [facility], PGY-3  [facility] [address]

## 2022-05-04 NOTE — ED NOTES
Patient brought medications in a bag with him:   Albuterol inhalation solution 0.083% (2.5 mg / 3 mL)  Spironolactone 50 mg (filled 2/28/22 with 30 tablets) ~30 tablets left [take 1 tablet by mouth daily]  Omeprazole 40 mg (filled 1/24/22) no capsules remain [take one capsule by mouth every day]  G.E. citalopram HBR 40 mg tablet (filled 1/3/22 with 30 tablets) ~30 tablets [take 1 tablet by mouth every day]  G.E. carbamazapine 200 mg tablet (filled 5/24/21 with 60 tablets) ~30 tablets remain [take 2 tablets by mouth at bedtime]   Loratadine 10 mg tablets (filled 2/28/22 with 30 tablets) ~  10 tablets remain [take 1 tablet by mouth daily]  Torsemide 20 mg tablets (filled 2/28/22 with 120 tablets) ~50 tablets remain [take 3 tablets by mouth twice daily]  Atorvastatin 40 mg tablets (filled 2/28/22 with 30 tablets) ~30 tablets remain [take 1 tablet by mouth daily]  Adempas 2.5 mg tablets (no filled date with 90 film-coated tablets) ~60 tablets remain [no instructions]  ProAir inhaler  Potassium CL 20 meq ER tablets (filled 2/28/22 with 180 tablets) appears full [take 3 tablets by mouth twice daily]  G.E. Aspirin EC 81 mg tablet (filled 5/24/21 with 30 tablets) ~30 tablets remain [take 1 tablet by mouth every day]  Eliquis 5 mg tablets (filled 01/24/22 with 60 tablets) ~60 remain [take 1 tablet by mouth twice daily]  Daily fiber 100% psyllium husk capsule

## 2022-05-05 LAB
ANION GAP SERPL CALC-SCNC: 12 MMOL/L (ref 8–16)
BASOPHILS # BLD AUTO: 0.11 K/UL (ref 0–0.2)
BASOPHILS NFR BLD: 1 % (ref 0–1.9)
BUN SERPL-MCNC: 11 MG/DL (ref 6–20)
CALCIUM SERPL-MCNC: 8.6 MG/DL (ref 8.7–10.5)
CHLORIDE SERPL-SCNC: 103 MMOL/L (ref 95–110)
CO2 SERPL-SCNC: 22 MMOL/L (ref 23–29)
CREAT SERPL-MCNC: 0.9 MG/DL (ref 0.5–1.4)
DIFFERENTIAL METHOD: ABNORMAL
EOSINOPHIL # BLD AUTO: 0.3 K/UL (ref 0–0.5)
EOSINOPHIL NFR BLD: 3 % (ref 0–8)
ERYTHROCYTE [DISTWIDTH] IN BLOOD BY AUTOMATED COUNT: 17.3 % (ref 11.5–14.5)
EST. GFR  (AFRICAN AMERICAN): >60 ML/MIN/1.73 M^2
EST. GFR  (NON AFRICAN AMERICAN): >60 ML/MIN/1.73 M^2
GLUCOSE SERPL-MCNC: 90 MG/DL (ref 70–110)
HCT VFR BLD AUTO: 35 % (ref 40–54)
HGB BLD-MCNC: 9.6 G/DL (ref 14–18)
IMM GRANULOCYTES # BLD AUTO: 0.09 K/UL (ref 0–0.04)
IMM GRANULOCYTES NFR BLD AUTO: 0.8 % (ref 0–0.5)
LYMPHOCYTES # BLD AUTO: 1.6 K/UL (ref 1–4.8)
LYMPHOCYTES NFR BLD: 14.3 % (ref 18–48)
MAGNESIUM SERPL-MCNC: 1.8 MG/DL (ref 1.6–2.6)
MCH RBC QN AUTO: 20.5 PG (ref 27–31)
MCHC RBC AUTO-ENTMCNC: 27.4 G/DL (ref 32–36)
MCV RBC AUTO: 75 FL (ref 82–98)
MONOCYTES # BLD AUTO: 0.9 K/UL (ref 0.3–1)
MONOCYTES NFR BLD: 7.9 % (ref 4–15)
NEUTROPHILS # BLD AUTO: 8.3 K/UL (ref 1.8–7.7)
NEUTROPHILS NFR BLD: 73 % (ref 38–73)
NRBC BLD-RTO: 0 /100 WBC
PLATELET # BLD AUTO: 289 K/UL (ref 150–450)
PMV BLD AUTO: 12.5 FL (ref 9.2–12.9)
POTASSIUM SERPL-SCNC: 3.5 MMOL/L (ref 3.5–5.1)
RBC # BLD AUTO: 4.68 M/UL (ref 4.6–6.2)
SODIUM SERPL-SCNC: 137 MMOL/L (ref 136–145)
WBC # BLD AUTO: 11.29 K/UL (ref 3.9–12.7)

## 2022-05-05 PROCEDURE — 27000207 HC ISOLATION

## 2022-05-05 PROCEDURE — 63600175 PHARM REV CODE 636 W HCPCS: Performed by: STUDENT IN AN ORGANIZED HEALTH CARE EDUCATION/TRAINING PROGRAM

## 2022-05-05 PROCEDURE — 85025 COMPLETE CBC W/AUTO DIFF WBC: CPT | Performed by: STUDENT IN AN ORGANIZED HEALTH CARE EDUCATION/TRAINING PROGRAM

## 2022-05-05 PROCEDURE — 80048 BASIC METABOLIC PNL TOTAL CA: CPT | Performed by: STUDENT IN AN ORGANIZED HEALTH CARE EDUCATION/TRAINING PROGRAM

## 2022-05-05 PROCEDURE — 99222 1ST HOSP IP/OBS MODERATE 55: CPT | Mod: AF,HB,, | Performed by: PSYCHIATRY & NEUROLOGY

## 2022-05-05 PROCEDURE — 99233 SBSQ HOSP IP/OBS HIGH 50: CPT | Mod: ,,, | Performed by: INTERNAL MEDICINE

## 2022-05-05 PROCEDURE — 25500020 PHARM REV CODE 255: Performed by: INTERNAL MEDICINE

## 2022-05-05 PROCEDURE — 25000003 PHARM REV CODE 250: Performed by: PSYCHIATRY & NEUROLOGY

## 2022-05-05 PROCEDURE — 36415 COLL VENOUS BLD VENIPUNCTURE: CPT | Performed by: STUDENT IN AN ORGANIZED HEALTH CARE EDUCATION/TRAINING PROGRAM

## 2022-05-05 PROCEDURE — 25000003 PHARM REV CODE 250: Performed by: STUDENT IN AN ORGANIZED HEALTH CARE EDUCATION/TRAINING PROGRAM

## 2022-05-05 PROCEDURE — 83735 ASSAY OF MAGNESIUM: CPT | Performed by: STUDENT IN AN ORGANIZED HEALTH CARE EDUCATION/TRAINING PROGRAM

## 2022-05-05 PROCEDURE — 99233 PR SUBSEQUENT HOSPITAL CARE,LEVL III: ICD-10-PCS | Mod: ,,, | Performed by: INTERNAL MEDICINE

## 2022-05-05 PROCEDURE — 99222 PR INITIAL HOSPITAL CARE,LEVL II: ICD-10-PCS | Mod: AF,HB,, | Performed by: PSYCHIATRY & NEUROLOGY

## 2022-05-05 PROCEDURE — 20600001 HC STEP DOWN PRIVATE ROOM

## 2022-05-05 RX ORDER — QUETIAPINE FUMARATE 200 MG/1
200 TABLET, FILM COATED ORAL NIGHTLY
Status: DISCONTINUED | OUTPATIENT
Start: 2022-05-05 | End: 2022-05-07 | Stop reason: HOSPADM

## 2022-05-05 RX ORDER — POTASSIUM CHLORIDE 750 MG/1
30 CAPSULE, EXTENDED RELEASE ORAL
Status: DISCONTINUED | OUTPATIENT
Start: 2022-05-06 | End: 2022-05-06

## 2022-05-05 RX ADMIN — OXYCODONE HYDROCHLORIDE AND ACETAMINOPHEN 500 MG: 500 TABLET ORAL at 09:05

## 2022-05-05 RX ADMIN — APIXABAN 5 MG: 5 TABLET, FILM COATED ORAL at 10:05

## 2022-05-05 RX ADMIN — FUROSEMIDE 80 MG: 10 INJECTION, SOLUTION INTRAMUSCULAR; INTRAVENOUS at 05:05

## 2022-05-05 RX ADMIN — ATORVASTATIN CALCIUM 40 MG: 20 TABLET, FILM COATED ORAL at 10:05

## 2022-05-05 RX ADMIN — OXYCODONE HYDROCHLORIDE AND ACETAMINOPHEN 500 MG: 500 TABLET ORAL at 10:05

## 2022-05-05 RX ADMIN — ASPIRIN 81 MG: 81 TABLET, COATED ORAL at 10:05

## 2022-05-05 RX ADMIN — THERA TABS 1 TABLET: TAB at 10:05

## 2022-05-05 RX ADMIN — PANTOPRAZOLE SODIUM 40 MG: 40 TABLET, DELAYED RELEASE ORAL at 10:05

## 2022-05-05 RX ADMIN — CETIRIZINE HYDROCHLORIDE 10 MG: 10 TABLET, FILM COATED ORAL at 10:05

## 2022-05-05 RX ADMIN — APIXABAN 5 MG: 5 TABLET, FILM COATED ORAL at 09:05

## 2022-05-05 RX ADMIN — IOHEXOL 100 ML: 350 INJECTION, SOLUTION INTRAVENOUS at 07:05

## 2022-05-05 RX ADMIN — FUROSEMIDE 80 MG: 10 INJECTION, SOLUTION INTRAMUSCULAR; INTRAVENOUS at 09:05

## 2022-05-05 RX ADMIN — QUETIAPINE FUMARATE 200 MG: 200 TABLET ORAL at 09:05

## 2022-05-05 RX ADMIN — SPIRONOLACTONE 50 MG: 50 TABLET ORAL at 10:05

## 2022-05-05 RX ADMIN — FUROSEMIDE 80 MG: 10 INJECTION, SOLUTION INTRAMUSCULAR; INTRAVENOUS at 02:05

## 2022-05-05 NOTE — ASSESSMENT & PLAN NOTE
Currently mood is stable, psych consulted, patient not clear of home meds  - Seroquel 200 mg QHS  - hold citalopram  - Psych recs appreciated

## 2022-05-05 NOTE — HPI
"History of Present Illness:   Jaquan Hines Jr. is a 51 y.o. male with a past psychiatric history of bipolar disorder, currently presenting with leg swelling. Emergency Psychiatry was originally consulted to address the patient's symptoms of malissa.     Per ED MD:  Multiple complaints (Bipolar manic now not taking meds, been partying all weekend, fell r lower leg swollen, thinks has dvt, not taking eliquis for 4 weeks) and Psychiatric Evaluation (States i'm manic and doing harm to myself by not taking meds)  Jaquan Hines Jr. is a 51 y.o. male presenting with dyspnea on exertion and worsening volume overload this been present for several days.  He has gained at least 12 lb.  He notes history of congestive heart failure.  He also fell on his leg recently and is worried that he might have another blood clot in his right leg.  He is not on blood thinners at this time; he is supposed to be taking them.  He is not compliant with his Seroquel because he feels it makes him sleep too much.  He feels more alive when he is not taking it.  He has history of bipolar malissa.     Per ED Psych Dr. Nemo Patel:  Upon initiation of interview, pt was sitting up in bed watching television. Pt with good hygeine/grooming. States he is hear for multiple medical concern mostly swelling in his right leg. Believes he needs to be under a 72 hold to see psychiatry - explained we can see him without that. He states he is happy to see psychiatry and has been having trouble with his medication. During interview, patient linear, logical, organized. Able to engage in small talk and talk about his weekend. A bit elevated self-esteem/grandiosity - states his "hypomania is my super power" and believes that is why he is not having COVID symptoms Mildly pressured speech thought not rapid. States he has not been taking his medications in over a month, when asked why, states "my bipolar". States he takes Seroquel but does not think he takes any " "other psych meds. States it "stopped working" for sleep and malissa, but also states at higher doses he "can't get out of bed" and is sedated. Is familiar with the psych consult process and is cooperative with interview. Lives with sister who is an NP, states she helps with his meds. Sees [facility] for psychiatry, last saw a year ago. Wants to back on medications and understands he gets manic/hypomanic when not on them. States he sleeps 8hrs every other day.  "

## 2022-05-05 NOTE — PLAN OF CARE
[facility] ([place] [address])  Initial Discharge Assessment       Primary Care Provider: Allen Hubbard MD    Admission Diagnosis: Shortness of breath [R06.02]  Right leg swelling [M79.89]    Admission Date: 5/4/2022  Expected Discharge Date: 5/7/2022         Payor: MEDICAID / [facility]: [facility] / Product Type: Managed Medicaid /     Extended Emergency Contact Information  Primary Emergency Contact: Quyen Hines  Address: [address]  Home Phone: [phone]  Mobile Phone: [phone]  Relation: Sister  Preferred language: English   needed? No    Discharge Plan A: (P) Home with family  Discharge Plan B: (P) Home with family      [id number] [place] [facility] [address] [facility] [address] OF [address] [address]  Phone: [phone] Fax: [phone]    [facility]  [place] [address] [facility]  [address] [facility]  [facility] [address]  Phone: [phone] Fax: [phone]      Initial Assessment (most recent)       Adult Discharge Assessment - 05/05/22 1333          Discharge Assessment    Assessment Type Discharge Planning Assessment     Confirmed/corrected address, phone number and insurance Yes     Confirmed Demographics Correct on Facesheet     Source of Information patient     Does patient/caregiver understand observation status Yes     Communicated SHELBY with patient/caregiver Yes     Reason For Admission CTEPH (chronic thromboembolic pulmonary hypertension (P)      Lives With sibling(s) (P)      Facility Arrived From: home (P)      Do you expect to return to your current living situation? Yes (P)      Do you have help at home or someone to help you manage your care at home? Yes (P)      Who are your caregiver(s) and their phone number(s)? Quyen Hines, sister, [phone] (P)      Prior to hospitilization cognitive status: Alert/Oriented (P)       Current cognitive status: Alert/Oriented (P)      Walking or Climbing Stairs Difficulty none (P)      Dressing/Bathing Difficulty none (P)      Do you have any problems with: Errands/Grocery (P)      Home Accessibility wheelchair accessible (P)      Home Layout Able to live on 1st floor (P)      Equipment Currently Used at Home oxygen (P)      Readmission within 30 days? No (P)      Patient currently being followed by outpatient case management? No (P)      Do you currently have service(s) that help you manage your care at home? No (P)      Do you take prescription medications? Yes (P)      Do you have prescription coverage? Yes (P)      Coverage MEDICAID (P)      Do you have any problems affording any of your prescribed medications? No (P)      Is the patient taking medications as prescribed? yes (P)      Who is going to help you get home at discharge? Quyen Hines, sister, [phone] (P)      How do you get to doctors appointments? car, drives self;family or friend will provide (P)      Are you on dialysis? No (P)      Do you take coumadin? No (P)      Discharge Plan A Home with family (P)      Discharge Plan B Home with family (P)      DME Needed Upon Discharge  none (P)      Discharge Plan discussed with: Patient (P)                             Arleen Barrow LMSW  [facility]   [id number]

## 2022-05-05 NOTE — CONSULTS
[facility] ([place] [address])  Heart Transplant  Consult Note    Patient Name: Jaquan Hines Jr.  MRN: [id number]  Admission Date: 5/4/2022  Hospital Length of Stay: 1 days  Attending Physician: Dona Murillo MD  Primary Care Provider: Allen Hubbard MD   Principal Problem:CTEPH (chronic thromboembolic pulmonary hypertension)    Consults  Subjective:     History of Present Illness:   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 He has not been compliant with his home medications including his apixaban or his PH medications for ~1month. He has been diuresing well on IVP Lasix and breathing and swelling have improved. He would like to go home tomorrow. He is not currently interested in restarting any medications at this point and wants to focus on lifestyle modifications. He says he lives with is sister(Nurse practitioner) who is there in case he needs any medical interbentions. He is happy about his breathing currently. He agrees to use diuretic/KCL supplement prn at home.         Past Medical History:   Diagnosis Date    Acute pulmonary embolism     Anticoagulant long-term use     Bipolar 1 disorder     diagnosed at 16    Chronic respiratory failure with hypoxia     Essential hypertension, malignant     Heart attack     Pulmonary artery hypertension        Past Surgical History:   Procedure Laterality Date    CARDIAC CATHETERIZATION      CATARACT EXTRACTION      MASS EXCISION      inner thigh, lipoma    RIGHT HEART CATHETERIZATION Right 5/12/2021    Procedure: INSERTION, CATHETER, RIGHT HEART;  Surgeon: Carl Schneider Jr., MD;  Location: [facility] CATH LAB;  Service: Cardiology;  Laterality: Right;    TONSILLECTOMY, ADENOIDECTOMY         Review of patient's allergies indicates:   Allergen Reactions    Clopidogrel      Other reaction(s): Other (See Comments)  Pt  reports it does not work        Current Facility-Administered Medications   Medication    apixaban tablet 5 mg    ascorbic acid (vitamin C) tablet 500 mg    aspirin EC tablet 81 mg    atorvastatin tablet 40 mg    cetirizine tablet 10 mg    furosemide injection 80 mg    multivitamin tablet    pantoprazole EC tablet 40 mg    [START ON 5/6/2022] potassium chloride CR capsule 30 mEq    QUEtiapine tablet 200 mg    sodium chloride 0.9% flush 10 mL    spironolactone tablet 50 mg     Family History    None       Tobacco Use    Smoking status: Never Smoker    Smokeless tobacco: Never Used   Substance and Sexual Activity    Alcohol use: No    Drug use: No    Sexual activity: Not Currently     Review of Systems   Constitutional: Negative.    HENT: Negative.     Eyes: Negative.    Respiratory: Negative.     Cardiovascular: Negative.    Gastrointestinal: Negative.    Genitourinary: Negative.    Neurological: Negative.    Psychiatric/Behavioral:  The patient is hyperactive.    All other systems reviewed and are negative.  Objective:     Vital Signs (Most Recent):  Temp: 99.1 °F (37.3 °C) (05/05/22 1308)  Pulse: 90 (05/05/22 0725)  Resp: 16 (05/05/22 0725)  BP: (!) 119/54 (05/05/22 0725)  SpO2: 95 % (05/05/22 0725)   Vital Signs (24h Range):  Temp:  [98.3 °F (36.8 °C)-99.1 °F (37.3 °C)] 99.1 °F (37.3 °C)  Pulse:  [] 90  Resp:  [16-24] 16  SpO2:  [90 %-99 %] 95 %  BP: (117-136)/(54-67) 119/54     Patient Vitals for the past 72 hrs (Last 3 readings):   Weight   05/04/22 1145 (!) 160.6 kg (354 lb 0.9 oz)     Body mass index is 53.83 kg/m².      Intake/Output Summary (Last 24 hours) at 5/5/2022 1512  Last data filed at 5/5/2022 0600  Gross per 24 hour   Intake 2212 ml   Output 4375 ml   Net -2163 ml       Physical Exam  Vitals and nursing note reviewed.   Constitutional:       Appearance: He is well-developed.   HENT:      Head: Normocephalic.   Eyes:      Pupils: Pupils are equal, round, and reactive to light.    Neck:      Comments: +JVD  Cardiovascular:      Rate and Rhythm: Normal rate and regular rhythm.   Pulmonary:      Effort: Pulmonary effort is normal.      Breath sounds: Normal breath sounds.   Abdominal:      General: Bowel sounds are normal. There is distension.      Palpations: Abdomen is soft.   Musculoskeletal:         General: Normal range of motion.      Cervical back: Normal range of motion and neck supple.      Right lower leg: Edema present.      Left lower leg: Edema present.   Skin:     General: Skin is warm and dry.   Neurological:      Mental Status: He is alert and oriented to person, place, and time.   Psychiatric:         Behavior: Behavior normal.       Significant Labs:  CBC:  Recent Labs   Lab 05/04/22  1306 05/05/22  0413   WBC 11.38 11.29   RBC 4.52* 4.68   HGB 9.6* 9.6*   HCT 33.5* 35.0*    289   MCV 74* 75*   MCH 21.2* 20.5*   MCHC 28.7* 27.4*     BNP:  Recent Labs   Lab 05/04/22  1306   *     CMP:  Recent Labs   Lab 05/04/22  1306 05/05/22  0413    90   CALCIUM 8.2* 8.6*   ALBUMIN 2.9*  --    PROT 6.4  --     137   K 3.4* 3.5   CO2 23 22*    103   BUN 8 11   CREATININE 0.8 0.9   ALKPHOS 140*  --    ALT 85*  --    AST 36  --    BILITOT 0.8  --       Coagulation:   No results for input(s): PT, INR, APTT in the last 168 hours.  LDH:  No results for input(s): LDH in the last 72 hours.  Microbiology:  Microbiology Results (last 7 days)       ** No results found for the last 168 hours. **          I have reviewed all pertinent labs within the past 24 hours.    Diagnostic Results:  I have reviewed all pertinent imaging results/findings within the past 24 hours.    Assessment/Plan:     * CTEPH (chronic thromboembolic pulmonary hypertension)  - CTA neg for PE. U/S neg for DVT.  - Hold his PH medication selexipag 1,600 mcg BID and riociguat 2.5 mg TID(Pt not interested in restarting).  -Would continue with IV diuresis as pt is responding well. He is not interested  in taking a scheduled diuretic at home, so prn diuretic/KCL may be an option to consider.   - Javi f/u in clinic to discuss PH therapy moving forward. Pt requesting referral to [facility] rehab [facility].       Thank you for your consult. I will sign off. Please contact us if you have any additional questions.    Andrez Bro, NP  Heart Transplant  [facility] ([place]

## 2022-05-05 NOTE — PROGRESS NOTES
Hospital Medicine  Progress note    [place]: [facility] Dona Murillo MD  Admit Date: 5/4/2022    Principal Problem:  CTEPH (chronic thromboembolic pulmonary hypertension)    Interval hx: Feelling improved    ROS  Respiratory: neg for cough neg for shortness of breath  Cardiovascular: neg for chest pain neg for palpitations  Gastrointestinal: neg for nausea neg for vomiting, neg for abdominal pain neg for diarrhea neg for constipation   Behavioral/Psych: neg for depression neg for anxiety    PEx  Temp:  [98.3 °F (36.8 °C)-99.7 °F (37.6 °C)]   Pulse:  []   Resp:  [16-24]   BP: (119-136)/(54-67)   SpO2:  [94 %-99 %]     Intake/Output Summary (Last 24 hours) at 5/5/2022 1752  Last data filed at 5/5/2022 1700  Gross per 24 hour   Intake 1740 ml   Output 6775 ml   Net -5035 ml       General Appearance: no acute distress, WD, obese  Heart: regular rate and rhythm, no heave, 3+ non-pitting edema of BLE to upper thighs  Respiratory: Normal respiratory effort, symmetric excursion, bilateral vesicular breath sounds   Abdomen: Soft, non-tender; bowel sounds active  Skin: intact, no rash, no ulcers  Neurologic:  No focal numbness or weakness  Mental status: Alert, oriented x 4, affect appropriate     Recent Labs   Lab 05/04/22  1306 05/05/22  0413   WBC 11.38 11.29   HGB 9.6* 9.6*   HCT 33.5* 35.0*    289     Recent Labs   Lab 05/04/22  1306 05/05/22  0413    137   K 3.4* 3.5    103   CO2 23 22*   BUN 8 11   CREATININE 0.8 0.9    90   CALCIUM 8.2* 8.6*   MG  --  1.8     Recent Labs   Lab 05/04/22  1306   ALKPHOS 140*   ALT 85*   AST 36   ALBUMIN 2.9*   PROT 6.4   BILITOT 0.8      Scheduled Meds:   apixaban  5 mg Oral BID    ascorbic acid (vitamin C)  500 mg Oral BID    aspirin  81 mg Oral Daily    atorvastatin  40 mg Oral Daily    cetirizine  10 mg Oral Daily    furosemide (LASIX) injection  80 mg Intravenous Q8H    multivitamin  1 tablet Oral Daily    pantoprazole  40 mg Oral Daily     [START ON 5/6/2022] potassium chloride  30 mEq Oral Daily with breakfast    QUEtiapine  200 mg Oral QHS    spironolactone  50 mg Oral Daily     Continuous Infusions:  As Needed:  sodium chloride 0.9%    Assessment and Plan  / Problems managed today    * CTEPH (chronic thromboembolic pulmonary hypertension)  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    Volume overload  PH  Patient presenting with volume overload, responded well to IV lasix, will continue lasix 80mg IV TID and hold home selexipag and riociguat per cards recs.  - Lasix 80mg IV TID  - Hold selexipag 1600mg BID  - Hold Riociguat 2.5mg TID  - K >4, Mg >2  - Continue home apixaban 5mg BID  - Spironolactone 50mg daily      Right leg injury  Appears to have swelling to RLE, reportedly from a fall, DVT US negative, no signs of infection  - CTM      COVID-19  Patient is asymptomatic, has seasonal allergies which are chronic and not worsened, satting >94% on RA.  - No treatment needed      CAD (coronary artery disease), native coronary artery  - Continue home aspirin and atorvastatin      Bipolar I disorder, single manic episode, severe, with psychosis  Currently mood is stable, psych consulted, patient not clear of home meds, will hold citalopram and resuem seroquel  - Seroquel 200 mg QHS  - Psych recs appreciated    Discharge Planning   SHELBY: 5/7/2022     Code Status: Full Code   Is the patient medically ready for discharge?: No    Reason for patient still in hospital (select all that apply): Patient trending condition  Discharge Plan A: Home with family        Diet:  regular diet  GI PPx: protonix  DVT PPx:  apixbana  Airways: room air  Wounds: none    Goals of Care:  Return to prior functional status       Time (minutes) spent in care of the patient (Greater than 1/2 spent in direct face-to-face contact and care coordination  on unit)  35 min    Dona Murillo MD

## 2022-05-05 NOTE — CONSULTS
[facility] ([place] [id number])  Psychiatry  Consult Note    Patient Name: Jaquan Hines Jr.  MRN: [id number]   Code Status: Full Code  Admission Date: 5/4/2022  Hospital Length of Stay: 1 days  Attending Physician: Dona Murillo MD  Primary Care Provider: Allen Hubbard MD    Current Legal Status: Uncontested    Patient information was obtained from patient and ER records.   Consults  Subjective:     Principal Problem:CTEPH (chronic thromboembolic pulmonary hypertension)    Chief Complaint:  malissa     HPI: History of Present Illness:   Jaquan Hines Jr. is a 51 y.o. male with a past psychiatric history of bipolar disorder, currently presenting with leg swelling. Emergency Psychiatry was originally consulted to address the patient's symptoms of malissa.     Per ED MD:  Multiple complaints (Bipolar manic now not taking meds, been partying all weekend, fell r lower leg swollen, thinks has dvt, not taking eliquis for 4 weeks) and Psychiatric Evaluation (States i'm manic and doing harm to myself by not taking meds)  aJquan Hines Jr. is a 51 y.o. male presenting with dyspnea on exertion and worsening volume overload this been present for several days.  He has gained at least 12 lb.  He notes history of congestive heart failure.  He also fell on his leg recently and is worried that he might have another blood clot in his right leg.  He is not on blood thinners at this time; he is supposed to be taking them.  He is not compliant with his Seroquel because he feels it makes him sleep too much.  He feels more alive when he is not taking it.  He has history of bipolar malissa.     Per ED Psych Dr. Nemo Patel:  Upon initiation of interview, pt was sitting up in bed watching television. Pt with good hygeine/grooming. States he is hear for multiple medical concern mostly swelling in his right leg. Believes he needs to be under a 72 hold to see psychiatry - explained we can see him without that. He states he is  "happy to see psychiatry and has been having trouble with his medication. During interview, patient linear, logical, organized. Able to engage in small talk and talk about his weekend. A bit elevated self-esteem/grandiosity - states his "hypomania is my super power" and believes that is why he is not having COVID symptoms Mildly pressured speech thought not rapid. States he has not been taking his medications in over a month, when asked why, states "my bipolar". States he takes Seroquel but does not think he takes any other psych meds. States it "stopped working" for sleep and malissa, but also states at higher doses he "can't get out of bed" and is sedated. Is familiar with the psych consult process and is cooperative with interview. Lives with sister who is an NP, states she helps with his meds. Sees [facility] for psychiatry, last saw a year ago. Wants to back on medications and understands he gets manic/hypomanic when not on them. States he sleeps 8hrs every other day.     Psychiatric Review Of Systems - Is patient experiencing or having changes in:  sleep: yes, insomnia  appetite/weight: no  energy/anergy: yes, elevated emergy  interest/pleasure/anhedonia: no  somatic symptoms: no  guilty/hopelessness: no  concentration: no  S.I.B.s/risky behavior: no  SI/SA:  no  anxiety/panic: no  Irritability: no  Racing thoughts: no  Pressured speech:  yes, mildly  Paranoia:no  Delusions: no  AVH:no     Medical Review Of Systems:  Comprehensive 10 pt review of systems negative except for items noted in HPI     Psychiatric History:  Diagnose(s): Yes - Bipolar I Disorder  Current Psychiatric Medications: No  Previous Medication Trials: Yes - Celexa, Seroquel,Tegretol, Trileptal, Latuda, Geodon, Depakote (has bad rx)  Previous Psychiatric Hospitalizations: Yes - multiple, first as teenager, most recently in 2013/14, seen by [facility] team in 2020 for delirium and bipolar disorder  [facility]   " "  Suicide/Violence Risk Assessment:  Current/active suicidal ideation/plan/intent: no  Previous suicide attempts: no  Current/active homicidal ideation/plan/intent: noNo  History of threats/arrests associated with violent conduct : no  Access to firearms/lethal weapons: no     Social History:  Marital Status: single  Children: 1   Employment Status: on disability  Housing Status: Yes - with sister  History of Abuse: did not assess  Access to Gun: No     Substance Abuse History:  Recreational Drugs: social alcohol use, last drank a few days ago, no illicits  Use of Alcohol: occasional, social use  Rehab History: No  Tobacco Use: No  If applicable, legal consequences of chemical use: n/a  If applicable, is the patient aware of the biomedical complications associated with substance abuse and mental illness? N/a     Legal History:  Arrested x 2     Psychosocial Factors:  Psychosocial Stressors: health.   Functioning Relationships: good support system     Family Psychiatric History:   Yes - mom (schizophrenia), sister (MDD)        Hospital Course: No notes on file      Objective:     Vital Signs (Most Recent):  Temp: 98.4 °F (36.9 °C) (05/05/22 0725)  Pulse: 101 (05/05/22 0500)  Resp: 18 (05/05/22 0500)  BP: 136/67 (05/05/22 0500)  SpO2: 98 % (05/05/22 0500) Vital Signs (24h Range):  Temp:  [98.1 °F (36.7 °C)-99.1 °F (37.3 °C)] 98.4 °F (36.9 °C)  Pulse:  [] 101  Resp:  [16-24] 18  SpO2:  [90 %-100 %] 98 %  BP: (115-138)/(55-78) 136/67     Height: 5' 8" (172.7 cm)  Weight: (!) 160.6 kg (354 lb 0.9 oz)  Body mass index is 53.83 kg/m².      Intake/Output Summary (Last 24 hours) at 5/5/2022 0913  Last data filed at 5/5/2022 0600  Gross per 24 hour   Intake 2212 ml   Output 7575 ml   Net -5363 ml     Mental Status Exam:  Appearance: age appropriate, obese  Level of Consciousness: alert  Behavior/Cooperation: normal, friendly and cooperative  Psychomotor: unremarkable and within normal limits   Speech: normal tone, " "normal rate, normal pitch, normal volume, pressured  Language: english, fluid  Orientation: person, place, situation, month of year, year  Attention Span/Concentration: intact  Memory: Intact and Recent and remote intact  Mood: "happy"  Affect: normal  Thought Process: linear, normal and logical  Associations: normal and logical  Thought Content: normal, no suicidality, no homicidality, delusions, or paranoia  Fund of Knowledge: Aware of current events, Intact and Vocabulary appropriate   Insight: fair   Judgment: fair    CAM-ICU negative    Significant Labs: All pertinent labs within the past 24 hours have been reviewed.    Significant Imaging: I have reviewed all pertinent imaging results/findings within the past 24 hours.    Assessment/Plan:     Bipolar I disorder, single manic episode, severe, with psychosis  ASSESSMENT     Jaquan Hines Jr. is a 51 y.o. male with a past psychiatric history of Bipolar I Disorder, with psychotic features, currently presenting with right leg swelling (currently trying to rule out PE), CHF, and COVID+. Psychiatry was originally consulted to address the patient's symptoms of malissa. During interview, patient linear, logical, organized. No flight of ideas, distractibility, psychomotor agitation, racing thoughts. Some elevated self-esteem/grandiosity, some pressured speech, decreased need for sleep. No SI/HI/AVH. Has been off his medications, but has fair insight about why he is taking them and that not taking them can cause malissa/hypomania, but still non-adherent. Interested in medication change.    IMPRESSION  Bipolar disorder, hypomania    RECOMMENDATION(S)      1. Scheduled Medication(s):  Increase Seroquel to 200 mg qhs     2. PRN Medication(s):  None at this time    3.  Monitor:  Please obtain daily EKG to monitor QTc  Recommend pt use CPAP for CLAUDIA    4. Legal Status/Precaution(s):  Patient does not meet criteria for PEC or inpatient psychiatric admission at this time. " Recommend to rescind PEC if one was placed. Patient is not currently an imminent danger to self or others and is not gravely disabled due to a psychiatric illness.    CAM ICU - negative  [facility]IOR [facility]NAGEMENT   PLEASE utilize CHEMICAL restraints with PRN meds first for agitation. Minimize use of PHYSICAL restraints   Keep window shades open and room lit during day and room dim at night in order to promote normal sleep-wake cycles   Encourage family at bedside. [place] patient often to situation, location, date   Continue to Limit or Discontinue use of Narcotics, Benzos and Anti-cholinergic medications as they may worsen delirium   Continue medical workup for causative etiology of Delirium           Total Time:  60 minutes      Kristin Hammond MD   Psychiatry  [facility] - Intensive Care ([place])

## 2022-05-05 NOTE — SUBJECTIVE & OBJECTIVE
Past Medical History:   Diagnosis Date    Acute pulmonary embolism     Anticoagulant long-term use     Bipolar 1 disorder     diagnosed at 16    Chronic respiratory failure with hypoxia     Essential hypertension, malignant     Heart attack     Pulmonary artery hypertension        Past Surgical History:   Procedure Laterality Date    CARDIAC CATHETERIZATION      CATARACT EXTRACTION      MASS EXCISION      inner thigh, lipoma    RIGHT HEART CATHETERIZATION Right 5/12/2021    Procedure: INSERTION, CATHETER, RIGHT HEART;  Surgeon: Carl Schneider Jr., MD;  Location: [facility] CATH LAB;  Service: Cardiology;  Laterality: Right;    TONSILLECTOMY, ADENOIDECTOMY         Review of patient's allergies indicates:   Allergen Reactions    Clopidogrel      Other reaction(s): Other (See Comments)  Pt reports it does not work        Current Facility-Administered Medications   Medication    apixaban tablet 5 mg    ascorbic acid (vitamin C) tablet 500 mg    aspirin EC tablet 81 mg    atorvastatin tablet 40 mg    cetirizine tablet 10 mg    furosemide injection 80 mg    multivitamin tablet    pantoprazole EC tablet 40 mg    [START ON 5/6/2022] potassium chloride CR capsule 30 mEq    QUEtiapine tablet 200 mg    sodium chloride 0.9% flush 10 mL    spironolactone tablet 50 mg     Family History    None       Tobacco Use    Smoking status: Never Smoker    Smokeless tobacco: Never Used   Substance and Sexual Activity    Alcohol use: No    Drug use: No    Sexual activity: Not Currently     Review of Systems   Constitutional: Negative.    HENT: Negative.     Eyes: Negative.    Respiratory: Negative.     Cardiovascular: Negative.    Gastrointestinal: Negative.    Genitourinary: Negative.    Neurological: Negative.    Psychiatric/Behavioral:  The patient is hyperactive.    All other systems reviewed and are negative.  Objective:     Vital Signs (Most Recent):  Temp: 99.1 °F (37.3 °C) (05/05/22 1308)  Pulse: 90 (05/05/22 0725)  Resp: 16  (05/05/22 0725)  BP: (!) 119/54 (05/05/22 0725)  SpO2: 95 % (05/05/22 0725)   Vital Signs (24h Range):  Temp:  [98.3 °F (36.8 °C)-99.1 °F (37.3 °C)] 99.1 °F (37.3 °C)  Pulse:  [] 90  Resp:  [16-24] 16  SpO2:  [90 %-99 %] 95 %  BP: (117-136)/(54-67) 119/54     Patient Vitals for the past 72 hrs (Last 3 readings):   Weight   05/04/22 1145 (!) 160.6 kg (354 lb 0.9 oz)     Body mass index is 53.83 kg/m².      Intake/Output Summary (Last 24 hours) at 5/5/2022 1512  Last data filed at 5/5/2022 0600  Gross per 24 hour   Intake 2212 ml   Output 4375 ml   Net -2163 ml       Physical Exam  Vitals and nursing note reviewed.   Constitutional:       Appearance: He is well-developed.   HENT:      Head: Normocephalic.   Eyes:      Pupils: Pupils are equal, round, and reactive to light.   Neck:      Comments: +JVD  Cardiovascular:      Rate and Rhythm: Normal rate and regular rhythm.   Pulmonary:      Effort: Pulmonary effort is normal.      Breath sounds: Normal breath sounds.   Abdominal:      General: Bowel sounds are normal. There is distension.      Palpations: Abdomen is soft.   Musculoskeletal:         General: Normal range of motion.      Cervical back: Normal range of motion and neck supple.      Right lower leg: Edema present.      Left lower leg: Edema present.   Skin:     General: Skin is warm and dry.   Neurological:      Mental Status: He is alert and oriented to person, place, and time.   Psychiatric:         Behavior: Behavior normal.       Significant Labs:  CBC:  Recent Labs   Lab 05/04/22  1306 05/05/22  0413   WBC 11.38 11.29   RBC 4.52* 4.68   HGB 9.6* 9.6*   HCT 33.5* 35.0*    289   MCV 74* 75*   MCH 21.2* 20.5*   MCHC 28.7* 27.4*     BNP:  Recent Labs   Lab 05/04/22  1306   *     CMP:  Recent Labs   Lab 05/04/22  1306 05/05/22  0413    90   CALCIUM 8.2* 8.6*   ALBUMIN 2.9*  --    PROT 6.4  --     137   K 3.4* 3.5   CO2 23 22*    103   BUN 8 11   CREATININE 0.8 0.9    ALKPHOS 140*  --    ALT 85*  --    AST 36  --    BILITOT 0.8  --       Coagulation:   No results for input(s): PT, INR, APTT in the last 168 hours.  LDH:  No results for input(s): LDH in the last 72 hours.  Microbiology:  Microbiology Results (last 7 days)       ** No results found for the last 168 hours. **          I have reviewed all pertinent labs within the past 24 hours.    Diagnostic Results:  I have reviewed all pertinent imaging results/findings within the past 24 hours.

## 2022-05-05 NOTE — ASSESSMENT & PLAN NOTE
ASSESSMENT     Jaquan Hines Jr. is a 51 y.o. male with a past psychiatric history of Bipolar I Disorder, with psychotic features, currently presenting with right leg swelling (currently trying to rule out PE), CHF, and COVID+. Psychiatry was originally consulted to address the patient's symptoms of malissa. During interview, patient linear, logical, organized. No flight of ideas, distractibility, psychomotor agitation, racing thoughts. Some elevated self-esteem/grandiosity, some pressured speech, decreased need for sleep. No SI/HI/AVH. Has been off his medications, but has fair insight about why he is taking them and that not taking them can cause malissa/hypomania, but still non-adherent. Interested in medication change.    IMPRESSION  Bipolar disorder, hypomania    RECOMMENDATION(S)      1. Scheduled Medication(s):  Increase Seroquel to 200 mg qhs     2. PRN Medication(s):  None at this time    3.  Monitor:  Please obtain daily EKG to monitor QTc  Recommend pt use CPAP for CLAUDIA    4. Legal Status/Precaution(s):  Patient does not meet criteria for PEC or inpatient psychiatric admission at this time. Recommend to rescind PEC if one was placed. Patient is not currently an imminent danger to self or others and is not gravely disabled due to a psychiatric illness.    CAM ICU - negative  DEL[facility]IUM BEHAVIOR MANAGEMENT   PLEASE utilize CHEMICAL restraints with PRN meds first for agitation. Minimize use of PHYSICAL restraints   Keep window shades open and room lit during day and room dim at night in order to promote normal sleep-wake cycles   Encourage family at bedside. [place] patient often to situation, location, date   Continue to Limit or Discontinue use of Narcotics, Benzos and Anti-cholinergic medications as they may worsen delirium   Continue medical workup for causative etiology of Delirium

## 2022-05-05 NOTE — HPI
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 He has not been compliant with his home medications including his apixaban or his PH medications for ~1month. He has been diuresing well on IVP Lasix and breathing and swelling have improved. He would like to go home tomorrow. He is not currently interested in restarting any medications at this point and wants to focus on lifestyle modifications. He says he lives with is sister(Nurse practitioner) who is there in case he needs any medical interbentions. He is happy about his breathing currently. He agrees to use diuretic/KCL supplement prn at home.

## 2022-05-05 NOTE — PROGRESS NOTES
[facility] ([facility]) Team notified of pt referral via [facility] (automated inbasket notification) .    Patient screened today by provider and RN for enrollment to program.      Pt was deemed not a candidate for enrollment at this time related to patient is followed by [facility]PH     Pt will require additional follow up planning per primary team.     Pt has f/u scheduled in PH clinic with ARUN Trejo NP 5/13/22    If pt status, diagnosis, or treatment plan changes , please place AMB referral to [facility] ([id number]).

## 2022-05-05 NOTE — ASSESSMENT & PLAN NOTE
- CTA neg for PE. U/S neg for DVT.  - Hold his PH medication selexipag 1,600 mcg BID and riociguat 2.5 mg TID(Pt not interested in restarting).  -Would continue with IV diuresis as pt is responding well. He is not interested in taking a scheduled diuretic at home, so prn diuretic/KCL may be an option to consider.   - Javi f/u in clinic to discuss PH therapy moving forward. Pt requesting referral to [facility] rehab in [facility].

## 2022-05-05 NOTE — CONSULTS
Food & Nutrition  Education    Diet Education: Low Na  Time Spent: 0 minutes     +COVID-19. Unable to reach pt via phone; Will attach low sodium diet education to pt's discharge paperwork w/ RD contact information for additional questions.    Follow-Up: Yes    UBW: 340#; tolerating diet - per RN documentation. Appears nourished w/ no indicators of malnutrition.     Please re-consult as needed.    Thanks!  MS Abril, RD, LDN

## 2022-05-05 NOTE — SUBJECTIVE & OBJECTIVE
"  Objective:     Vital Signs (Most Recent):  Temp: 98.4 °F (36.9 °C) (05/05/22 0725)  Pulse: 101 (05/05/22 0500)  Resp: 18 (05/05/22 0500)  BP: 136/67 (05/05/22 0500)  SpO2: 98 % (05/05/22 0500) Vital Signs (24h Range):  Temp:  [98.1 °F (36.7 °C)-99.1 °F (37.3 °C)] 98.4 °F (36.9 °C)  Pulse:  [] 101  Resp:  [16-24] 18  SpO2:  [90 %-100 %] 98 %  BP: (115-138)/(55-78) 136/67     Height: 5' 8" (172.7 cm)  Weight: (!) 160.6 kg (354 lb 0.9 oz)  Body mass index is 53.83 kg/m².      Intake/Output Summary (Last 24 hours) at 5/5/2022 0913  Last data filed at 5/5/2022 0600  Gross per 24 hour   Intake 2212 ml   Output 7575 ml   Net -5363 ml     Mental Status Exam:  Appearance: age appropriate, obese  Level of Consciousness: alert  Behavior/Cooperation: normal, friendly and cooperative  Psychomotor: unremarkable and within normal limits   Speech: normal tone, normal rate, normal pitch, normal volume, pressured  Language: english, fluid  Orientation: person, place, situation, month of year, year  Attention Span/Concentration: intact  Memory: Intact and Recent and remote intact  Mood: "happy"  Affect: normal  Thought Process: linear, normal and logical  Associations: normal and logical  Thought Content: normal, no suicidality, no homicidality, delusions, or paranoia  Fund of Knowledge: Aware of current events, Intact and Vocabulary appropriate   Insight: fair   Judgment: fair    CAM-ICU negative    Significant Labs: All pertinent labs within the past 24 hours have been reviewed.    Significant Imaging: I have reviewed all pertinent imaging results/findings within the past 24 hours.  "

## 2022-05-06 LAB
ANION GAP SERPL CALC-SCNC: 14 MMOL/L (ref 8–16)
BASOPHILS # BLD AUTO: 0.1 K/UL (ref 0–0.2)
BASOPHILS NFR BLD: 0.9 % (ref 0–1.9)
BUN SERPL-MCNC: 14 MG/DL (ref 6–20)
CALCIUM SERPL-MCNC: 9.2 MG/DL (ref 8.7–10.5)
CHLORIDE SERPL-SCNC: 94 MMOL/L (ref 95–110)
CO2 SERPL-SCNC: 24 MMOL/L (ref 23–29)
CREAT SERPL-MCNC: 0.9 MG/DL (ref 0.5–1.4)
DIFFERENTIAL METHOD: ABNORMAL
EOSINOPHIL # BLD AUTO: 0.3 K/UL (ref 0–0.5)
EOSINOPHIL NFR BLD: 2.4 % (ref 0–8)
ERYTHROCYTE [DISTWIDTH] IN BLOOD BY AUTOMATED COUNT: 17.3 % (ref 11.5–14.5)
EST. GFR  (AFRICAN AMERICAN): >60 ML/MIN/1.73 M^2
EST. GFR  (NON AFRICAN AMERICAN): >60 ML/MIN/1.73 M^2
GLUCOSE SERPL-MCNC: 110 MG/DL (ref 70–110)
HCT VFR BLD AUTO: 35.3 % (ref 40–54)
HGB BLD-MCNC: 10.1 G/DL (ref 14–18)
HIV 1+2 AB+HIV1 P24 AG SERPL QL IA: NEGATIVE
IMM GRANULOCYTES # BLD AUTO: 0.15 K/UL (ref 0–0.04)
IMM GRANULOCYTES NFR BLD AUTO: 1.3 % (ref 0–0.5)
LYMPHOCYTES # BLD AUTO: 1.7 K/UL (ref 1–4.8)
LYMPHOCYTES NFR BLD: 15.3 % (ref 18–48)
MAGNESIUM SERPL-MCNC: 1.8 MG/DL (ref 1.6–2.6)
MCH RBC QN AUTO: 20.6 PG (ref 27–31)
MCHC RBC AUTO-ENTMCNC: 28.6 G/DL (ref 32–36)
MCV RBC AUTO: 72 FL (ref 82–98)
MONOCYTES # BLD AUTO: 1.4 K/UL (ref 0.3–1)
MONOCYTES NFR BLD: 12.6 % (ref 4–15)
NEUTROPHILS # BLD AUTO: 7.6 K/UL (ref 1.8–7.7)
NEUTROPHILS NFR BLD: 67.5 % (ref 38–73)
NRBC BLD-RTO: 0 /100 WBC
PLATELET # BLD AUTO: 306 K/UL (ref 150–450)
PMV BLD AUTO: 11.9 FL (ref 9.2–12.9)
POTASSIUM SERPL-SCNC: 3.3 MMOL/L (ref 3.5–5.1)
RBC # BLD AUTO: 4.91 M/UL (ref 4.6–6.2)
SODIUM SERPL-SCNC: 132 MMOL/L (ref 136–145)
WBC # BLD AUTO: 11.21 K/UL (ref 3.9–12.7)

## 2022-05-06 PROCEDURE — 80048 BASIC METABOLIC PNL TOTAL CA: CPT | Performed by: STUDENT IN AN ORGANIZED HEALTH CARE EDUCATION/TRAINING PROGRAM

## 2022-05-06 PROCEDURE — 85025 COMPLETE CBC W/AUTO DIFF WBC: CPT | Performed by: STUDENT IN AN ORGANIZED HEALTH CARE EDUCATION/TRAINING PROGRAM

## 2022-05-06 PROCEDURE — 36415 COLL VENOUS BLD VENIPUNCTURE: CPT | Performed by: STUDENT IN AN ORGANIZED HEALTH CARE EDUCATION/TRAINING PROGRAM

## 2022-05-06 PROCEDURE — 99231 SBSQ HOSP IP/OBS SF/LOW 25: CPT | Mod: AF,HB,, | Performed by: PSYCHIATRY & NEUROLOGY

## 2022-05-06 PROCEDURE — 99223 1ST HOSP IP/OBS HIGH 75: CPT | Mod: ,,, | Performed by: INTERNAL MEDICINE

## 2022-05-06 PROCEDURE — 20600001 HC STEP DOWN PRIVATE ROOM

## 2022-05-06 PROCEDURE — 99231 PR SUBSEQUENT HOSPITAL CARE,LEVL I: ICD-10-PCS | Mod: AF,HB,, | Performed by: PSYCHIATRY & NEUROLOGY

## 2022-05-06 PROCEDURE — 27000207 HC ISOLATION

## 2022-05-06 PROCEDURE — 25000003 PHARM REV CODE 250: Performed by: PSYCHIATRY & NEUROLOGY

## 2022-05-06 PROCEDURE — 83735 ASSAY OF MAGNESIUM: CPT | Performed by: STUDENT IN AN ORGANIZED HEALTH CARE EDUCATION/TRAINING PROGRAM

## 2022-05-06 PROCEDURE — 63600175 PHARM REV CODE 636 W HCPCS: Performed by: STUDENT IN AN ORGANIZED HEALTH CARE EDUCATION/TRAINING PROGRAM

## 2022-05-06 PROCEDURE — 99233 PR SUBSEQUENT HOSPITAL CARE,LEVL III: ICD-10-PCS | Mod: ,,, | Performed by: INTERNAL MEDICINE

## 2022-05-06 PROCEDURE — 25000003 PHARM REV CODE 250: Performed by: INTERNAL MEDICINE

## 2022-05-06 PROCEDURE — 25000003 PHARM REV CODE 250: Performed by: STUDENT IN AN ORGANIZED HEALTH CARE EDUCATION/TRAINING PROGRAM

## 2022-05-06 PROCEDURE — 99233 SBSQ HOSP IP/OBS HIGH 50: CPT | Mod: ,,, | Performed by: INTERNAL MEDICINE

## 2022-05-06 PROCEDURE — 99223 PR INITIAL HOSPITAL CARE,LEVL III: ICD-10-PCS | Mod: ,,, | Performed by: INTERNAL MEDICINE

## 2022-05-06 RX ORDER — MUPIROCIN 20 MG/G
OINTMENT TOPICAL 2 TIMES DAILY
Status: DISCONTINUED | OUTPATIENT
Start: 2022-05-06 | End: 2022-05-07 | Stop reason: HOSPADM

## 2022-05-06 RX ORDER — BUMETANIDE 1 MG/1
2 TABLET ORAL 3 TIMES DAILY
Status: DISCONTINUED | OUTPATIENT
Start: 2022-05-06 | End: 2022-05-07 | Stop reason: HOSPADM

## 2022-05-06 RX ORDER — POTASSIUM CHLORIDE 750 MG/1
30 CAPSULE, EXTENDED RELEASE ORAL 2 TIMES DAILY WITH MEALS
Status: DISCONTINUED | OUTPATIENT
Start: 2022-05-06 | End: 2022-05-07

## 2022-05-06 RX ADMIN — SPIRONOLACTONE 50 MG: 50 TABLET ORAL at 10:05

## 2022-05-06 RX ADMIN — ATORVASTATIN CALCIUM 40 MG: 20 TABLET, FILM COATED ORAL at 10:05

## 2022-05-06 RX ADMIN — APIXABAN 5 MG: 5 TABLET, FILM COATED ORAL at 10:05

## 2022-05-06 RX ADMIN — OXYCODONE HYDROCHLORIDE AND ACETAMINOPHEN 500 MG: 500 TABLET ORAL at 10:05

## 2022-05-06 RX ADMIN — CETIRIZINE HYDROCHLORIDE 10 MG: 10 TABLET, FILM COATED ORAL at 10:05

## 2022-05-06 RX ADMIN — ASPIRIN 81 MG: 81 TABLET, COATED ORAL at 10:05

## 2022-05-06 RX ADMIN — FUROSEMIDE 80 MG: 10 INJECTION, SOLUTION INTRAMUSCULAR; INTRAVENOUS at 06:05

## 2022-05-06 RX ADMIN — QUETIAPINE FUMARATE 200 MG: 200 TABLET ORAL at 08:05

## 2022-05-06 RX ADMIN — APIXABAN 5 MG: 5 TABLET, FILM COATED ORAL at 08:05

## 2022-05-06 RX ADMIN — POTASSIUM CHLORIDE 30 MEQ: 10 CAPSULE, COATED, EXTENDED RELEASE ORAL at 04:05

## 2022-05-06 RX ADMIN — BUMETANIDE 2 MG: 1 TABLET ORAL at 04:05

## 2022-05-06 RX ADMIN — POTASSIUM CHLORIDE 30 MEQ: 10 CAPSULE, COATED, EXTENDED RELEASE ORAL at 10:05

## 2022-05-06 RX ADMIN — OXYCODONE HYDROCHLORIDE AND ACETAMINOPHEN 500 MG: 500 TABLET ORAL at 08:05

## 2022-05-06 RX ADMIN — THERA TABS 1 TABLET: TAB at 10:05

## 2022-05-06 RX ADMIN — PANTOPRAZOLE SODIUM 40 MG: 40 TABLET, DELAYED RELEASE ORAL at 10:05

## 2022-05-06 RX ADMIN — BUMETANIDE 2 MG: 1 TABLET ORAL at 08:05

## 2022-05-06 NOTE — PROGRESS NOTES
[facility] ([place] [address])  Psychiatry  Progress Note    Patient Name: Jaquan Hines Jr.  MRN: [id number]   Code Status: Full Code  Admission Date: 5/4/2022  Hospital Length of Stay: 2 days  Expected Discharge Date: 5/7/2022  Attending Physician: Dona Murillo MD  Primary Care Provider: Allen Hubbard MD    Current Legal Status: Uncontested    Patient information was obtained from patient and ER records.       Subjective:     Patient is a 51 y.o., male, presents with:    Principal Problem:CTEPH (chronic thromboembolic pulmonary hypertension)    Chief Complaint: malissa    HPI: History of Present Illness:   Jaquan Hines Jr. is a 51 y.o. male with a past psychiatric history of bipolar disorder, currently presenting with leg swelling. Emergency Psychiatry was originally consulted to address the patient's symptoms of malissa.     Per ED MD:  Multiple complaints (Bipolar manic now not taking meds, been partying all weekend, fell r lower leg swollen, thinks has dvt, not taking eliquis for 4 weeks) and Psychiatric Evaluation (States i'm manic and doing harm to myself by not taking meds)  Jaquan Hines Jr. is a 51 y.o. male presenting with dyspnea on exertion and worsening volume overload this been present for several days.  He has gained at least 12 lb.  He notes history of congestive heart failure.  He also fell on his leg recently and is worried that he might have another blood clot in his right leg.  He is not on blood thinners at this time; he is supposed to be taking them.  He is not compliant with his Seroquel because he feels it makes him sleep too much.  He feels more alive when he is not taking it.  He has history of bipolar malissa.     Per ED Psych Dr. Nemo Patel:  Upon initiation of interview, pt was sitting up in bed watching television. Pt with good hygeine/grooming. States he is hear for multiple medical concern mostly swelling in his right leg. Believes he needs to be under a 72 hold to  "see psychiatry - explained we can see him without that. He states he is happy to see psychiatry and has been having trouble with his medication. During interview, patient linear, logical, organized. Able to engage in small talk and talk about his weekend. A bit elevated self-esteem/grandiosity - states his "hypomania is my super power" and believes that is why he is not having COVID symptoms Mildly pressured speech thought not rapid. States he has not been taking his medications in over a month, when asked why, states "my bipolar". States he takes Seroquel but does not think he takes any other psych meds. States it "stopped working" for sleep and malissa, but also states at higher doses he "can't get out of bed" and is sedated. Is familiar with the psych consult process and is cooperative with interview. Lives with sister who is an NP, states she helps with his meds. Sees [facility] for psychiatry, last saw a year ago. Wants to back on medications and understands he gets manic/hypomanic when not on them. States he sleeps 8hrs every other day.      Hospital Course: 05/06/2022  Per chart review no acute events overnight. This morning pt was alert and oriented x3. Reported mood as "fine." Denied SI/HI/AVH. Reported good sleep and appetite.         Objective:     Vital Signs (Most Recent):  Temp: 99.5 °F (37.5 °C) (05/06/22 0420)  Pulse: 95 (05/06/22 0430)  Resp: 18 (05/05/22 2345)  BP: (!) 155/77 (05/06/22 0420)  SpO2: 97 % (05/06/22 0430)   Vital Signs (24h Range):  Temp:  [99.1 °F (37.3 °C)-99.7 °F (37.6 °C)] 99.5 °F (37.5 °C)  Pulse:  [95-96] 95  Resp:  [18] 18  SpO2:  [97 %] 97 %  BP: (125-155)/(58-77) 155/77     Height: 5' 8" (172.7 cm)  Weight: (!) 160.6 kg (354 lb 0.9 oz)  Body mass index is 53.83 kg/m².      Intake/Output Summary (Last 24 hours) at 5/6/2022 1024  Last data filed at 5/6/2022 0400  Gross per 24 hour   Intake --   Output 4750 ml   Net -4750 ml     Mental Status Exam:  Appearance: age appropriate, " "obese  Level of Consciousness: alert  Behavior/Cooperation: normal, friendly and cooperative  Psychomotor: unremarkable and within normal limits   Speech: normal tone, normal rate, normal pitch, normal volume, pressured  Language: english, fluid  Orientation: person, place, situation, month of year, year  Attention Span/Concentration: intact  Memory: Intact and Recent and remote intact  Mood: "good"  Affect: normal  Thought Process: linear, normal and logical  Associations: normal and logical  Thought Content: normal, no suicidality, no homicidality, delusions, or paranoia  Fund of Knowledge: Aware of current events, Intact and Vocabulary appropriate   Insight: fair   Judgment: fair     CAM-ICU negative    Significant Labs: All pertinent labs within the past 24 hours have been reviewed.    Significant Imaging: I have reviewed all pertinent imaging results/findings within the past 24 hours.       Scheduled Medications:   apixaban  5 mg Oral BID    ascorbic acid (vitamin C)  500 mg Oral BID    aspirin  81 mg Oral Daily    atorvastatin  40 mg Oral Daily    bumetanide  2 mg Oral TID    cetirizine  10 mg Oral Daily    multivitamin  1 tablet Oral Daily    mupirocin   Nasal BID    pantoprazole  40 mg Oral Daily    potassium chloride  30 mEq Oral BID WM    QUEtiapine  200 mg Oral QHS    spironolactone  50 mg Oral Daily       PRN Medications:  sodium chloride 0.9%    Review of patient's allergies indicates:   Allergen Reactions    Clopidogrel      Other reaction(s): Other (See Comments)  Pt reports it does not work        Assessment/Plan:     Bipolar I disorder, MRE hypomania  ASSESSMENT     Jaquan Hines JrPhoebe is a 51 y.o. male with a past psychiatric history of Bipolar I Disorder, with psychotic features, currently presenting with right leg swelling (currently trying to rule out PE), CHF, and COVID+. Psychiatry was originally consulted to address the patient's symptoms of malissa. During interview, patient linear, logical, " organized. No flight of ideas, distractibility, psychomotor agitation, racing thoughts. Some elevated self-esteem/grandiosity, some pressured speech, decreased need for sleep. No SI/HI/AVH. Has been off his medications, but has fair insight about why he is taking them and that not taking them can cause malissa/hypomania, but still non-adherent. Interested in medication change.    IMPRESSION  Bipolar disorder, hypomania- stabilizing    RECOMMENDATION(S)      1. Scheduled Medication(s):  Continue Seroquel 200 mg qhs     2. PRN Medication(s):  None at this time    3.  Monitor:  Please obtain daily EKG to monitor QTc  Recommend pt use CPAP for CLAUDIA    4. Legal Status/Precaution(s):  Patient does not meet criteria for PEC or inpatient psychiatric admission at this time. Recommend to rescind PEC if one was placed. Patient is not currently an imminent danger to self or others and is not gravely disabled due to a psychiatric illness.    CAM ICU - negative  DELIRIUM BEHAVIOR MANAGEMENT  PLEASE utilize CHEMICAL restraints with PRN meds first for agitation. Minimize use of PHYSICAL restraints  Keep window shades open and room lit during day and room dim at night in order to promote normal sleep-wake cycles  Encourage family at bedside. [place] patient often to situation, location, date  Continue to Limit or Discontinue use of Narcotics, Benzos and Anti-cholinergic medications as they may worsen delirium  Continue medical workup for causative etiology of Delirium    Psych Follow up. Pt was encouraged to follow up with outpt psych and he reports having a doctor he follows.    [facility] psychiatry will sign off. Please contact with questions/concerns.         Need for Continued Hospitalization:  No need for inpatient psychiatric hospitalization. Continue medical care as per the primary team.    Anticipated Disposition:  Still a Patient    Total time:  35 with greater than 50% of this time spent in counseling and/or coordination of care.        Kristin Hammond MD   Psychiatry  [facility] - Intensive Care ([facility])    --------------------------------------------------------------------------------------------------------------------------------------------------------------------------------------------------------------------------------------  STAFF NOTE: I have briefly seen the pt & I have fully reviewed patient's medical record. Case formally discussed with resident and I concur with the resident's history, assessment, and recommendations.        -Please contact ON CALL psychiatry service for any acute issues that may arise.    GALI NICOLAS MD   Department of Psychiatry   [facility]  5/6/2022 3:47 PM

## 2022-05-06 NOTE — PLAN OF CARE
Problem: Adult Inpatient Plan of Care  Goal: Plan of Care Review  Outcome: Ongoing, Progressing  Goal: Patient-Specific Goal (Individualized)  Outcome: Ongoing, Progressing  Goal: Absence of Hospital-Acquired Illness or Injury  Outcome: Ongoing, Progressing  Goal: Optimal Comfort and Wellbeing  Outcome: Ongoing, Progressing  Goal: Readiness for Transition of Care  Outcome: Ongoing, Progressing     Problem: Bariatric Environmental Safety  Goal: Safety Maintained with Care  Outcome: Ongoing, Progressing     Problem: Fall Injury Risk  Goal: Absence of Fall and Fall-Related Injury  Outcome: Ongoing, Progressing   Pt is A+Ox4. Frequent checks every hour.

## 2022-05-06 NOTE — HOSPITAL COURSE
"05/06/2022  Per chart review no acute events overnight. This morning pt was alert and oriented x3. Reported mood as "fine." Denied SI/HI/AVH. Reported good sleep and appetite.   "

## 2022-05-06 NOTE — SUBJECTIVE & OBJECTIVE
"  Objective:     Vital Signs (Most Recent):  Temp: 99.5 °F (37.5 °C) (05/06/22 0420)  Pulse: 95 (05/06/22 0430)  Resp: 18 (05/05/22 2345)  BP: (!) 155/77 (05/06/22 0420)  SpO2: 97 % (05/06/22 0430)   Vital Signs (24h Range):  Temp:  [99.1 °F (37.3 °C)-99.7 °F (37.6 °C)] 99.5 °F (37.5 °C)  Pulse:  [95-96] 95  Resp:  [18] 18  SpO2:  [97 %] 97 %  BP: (125-155)/(58-77) 155/77     Height: 5' 8" (172.7 cm)  Weight: (!) 160.6 kg (354 lb 0.9 oz)  Body mass index is 53.83 kg/m².      Intake/Output Summary (Last 24 hours) at 5/6/2022 1024  Last data filed at 5/6/2022 0400  Gross per 24 hour   Intake --   Output 4750 ml   Net -4750 ml     Mental Status Exam:  Appearance: age appropriate, obese  Level of Consciousness: alert  Behavior/Cooperation: normal, friendly and cooperative  Psychomotor: unremarkable and within normal limits   Speech: normal tone, normal rate, normal pitch, normal volume, pressured  Language: english, fluid  Orientation: person, place, situation, month of year, year  Attention Span/Concentration: intact  Memory: Intact and Recent and remote intact  Mood: "good"  Affect: normal  Thought Process: linear, normal and logical  Associations: normal and logical  Thought Content: normal, no suicidality, no homicidality, delusions, or paranoia  Fund of Knowledge: Aware of current events, Intact and Vocabulary appropriate   Insight: fair   Judgment: fair     CAM-ICU negative    Significant Labs: All pertinent labs within the past 24 hours have been reviewed.    Significant Imaging: I have reviewed all pertinent imaging results/findings within the past 24 hours.  "

## 2022-05-06 NOTE — ASSESSMENT & PLAN NOTE
ASSESSMENT     Jaquan Hines Jr. is a 51 y.o. male with a past psychiatric history of Bipolar I Disorder, with psychotic features, currently presenting with right leg swelling (currently trying to rule out PE), CHF, and COVID+. Psychiatry was originally consulted to address the patient's symptoms of malissa. During interview, patient linear, logical, organized. No flight of ideas, distractibility, psychomotor agitation, racing thoughts. Some elevated self-esteem/grandiosity, some pressured speech, decreased need for sleep. No SI/HI/AVH. Has been off his medications, but has fair insight about why he is taking them and that not taking them can cause malissa/hypomania, but still non-adherent. Interested in medication change.    IMPRESSION  Bipolar disorder, hypomania    RECOMMENDATION(S)      1. Scheduled Medication(s):  Continue Seroquel 200 mg qhs     2. PRN Medication(s):  None at this time    3.  Monitor:  Please obtain daily EKG to monitor QTc  Recommend pt use CPAP for CLAUDIA    4. Legal Status/Precaution(s):  Patient does not meet criteria for PEC or inpatient psychiatric admission at this time. Recommend to rescind PEC if one was placed. Patient is not currently an imminent danger to self or others and is not gravely disabled due to a psychiatric illness.    CAM ICU - negative  DELIRIUM BEHAVIOR MANAGEMENT   PLEASE utilize CHEMICAL restraints with PRN meds first for agitation. Minimize use of PHYSICAL restraints   Keep window shades open and room lit during day and room dim at night in order to promote normal sleep-wake cycles   Encourage family at bedside. [place] patient often to situation, location, date   Continue to Limit or Discontinue use of Narcotics, Benzos and Anti-cholinergic medications as they may worsen delirium   Continue medical workup for causative etiology of Delirium    [facility] psychiatry will sign off. Please contact with questions/concerns.

## 2022-05-07 ENCOUNTER — PATIENT MESSAGE (OUTPATIENT)
Dept: ADMINISTRATIVE | Facility: CLINIC | Age: 52
End: 2022-05-07
Payer: MEDICAID

## 2022-05-07 VITALS
HEART RATE: 91 BPM | SYSTOLIC BLOOD PRESSURE: 140 MMHG | DIASTOLIC BLOOD PRESSURE: 71 MMHG | HEIGHT: 68 IN | WEIGHT: 315 LBS | TEMPERATURE: 98 F | OXYGEN SATURATION: 97 % | BODY MASS INDEX: 47.74 KG/M2 | RESPIRATION RATE: 21 BRPM

## 2022-05-07 LAB
ANION GAP SERPL CALC-SCNC: 12 MMOL/L (ref 8–16)
BASOPHILS # BLD AUTO: 0.1 K/UL (ref 0–0.2)
BASOPHILS NFR BLD: 1 % (ref 0–1.9)
BUN SERPL-MCNC: 13 MG/DL (ref 6–20)
CALCIUM SERPL-MCNC: 9.5 MG/DL (ref 8.7–10.5)
CHLORIDE SERPL-SCNC: 94 MMOL/L (ref 95–110)
CO2 SERPL-SCNC: 28 MMOL/L (ref 23–29)
CREAT SERPL-MCNC: 1 MG/DL (ref 0.5–1.4)
DIFFERENTIAL METHOD: ABNORMAL
EOSINOPHIL # BLD AUTO: 0.3 K/UL (ref 0–0.5)
EOSINOPHIL NFR BLD: 2.4 % (ref 0–8)
ERYTHROCYTE [DISTWIDTH] IN BLOOD BY AUTOMATED COUNT: 17.2 % (ref 11.5–14.5)
EST. GFR  (AFRICAN AMERICAN): >60 ML/MIN/1.73 M^2
EST. GFR  (NON AFRICAN AMERICAN): >60 ML/MIN/1.73 M^2
GLUCOSE SERPL-MCNC: 133 MG/DL (ref 70–110)
HCT VFR BLD AUTO: 35.5 % (ref 40–54)
HGB BLD-MCNC: 10.3 G/DL (ref 14–18)
IMM GRANULOCYTES # BLD AUTO: 0.12 K/UL (ref 0–0.04)
IMM GRANULOCYTES NFR BLD AUTO: 1.1 % (ref 0–0.5)
LYMPHOCYTES # BLD AUTO: 1.6 K/UL (ref 1–4.8)
LYMPHOCYTES NFR BLD: 15.1 % (ref 18–48)
MAGNESIUM SERPL-MCNC: 1.9 MG/DL (ref 1.6–2.6)
MCH RBC QN AUTO: 20.8 PG (ref 27–31)
MCHC RBC AUTO-ENTMCNC: 29 G/DL (ref 32–36)
MCV RBC AUTO: 72 FL (ref 82–98)
MONOCYTES # BLD AUTO: 1.4 K/UL (ref 0.3–1)
MONOCYTES NFR BLD: 13.4 % (ref 4–15)
NEUTROPHILS # BLD AUTO: 7 K/UL (ref 1.8–7.7)
NEUTROPHILS NFR BLD: 67 % (ref 38–73)
NRBC BLD-RTO: 0 /100 WBC
PLATELET # BLD AUTO: 314 K/UL (ref 150–450)
PMV BLD AUTO: 11.9 FL (ref 9.2–12.9)
POTASSIUM SERPL-SCNC: 3.6 MMOL/L (ref 3.5–5.1)
RBC # BLD AUTO: 4.95 M/UL (ref 4.6–6.2)
SODIUM SERPL-SCNC: 134 MMOL/L (ref 136–145)
WBC # BLD AUTO: 10.49 K/UL (ref 3.9–12.7)

## 2022-05-07 PROCEDURE — 25000003 PHARM REV CODE 250: Performed by: STUDENT IN AN ORGANIZED HEALTH CARE EDUCATION/TRAINING PROGRAM

## 2022-05-07 PROCEDURE — 80048 BASIC METABOLIC PNL TOTAL CA: CPT | Performed by: STUDENT IN AN ORGANIZED HEALTH CARE EDUCATION/TRAINING PROGRAM

## 2022-05-07 PROCEDURE — 99239 HOSP IP/OBS DSCHRG MGMT >30: CPT | Mod: ,,, | Performed by: INTERNAL MEDICINE

## 2022-05-07 PROCEDURE — 25000003 PHARM REV CODE 250: Performed by: INTERNAL MEDICINE

## 2022-05-07 PROCEDURE — 85025 COMPLETE CBC W/AUTO DIFF WBC: CPT | Performed by: STUDENT IN AN ORGANIZED HEALTH CARE EDUCATION/TRAINING PROGRAM

## 2022-05-07 PROCEDURE — 83735 ASSAY OF MAGNESIUM: CPT | Performed by: STUDENT IN AN ORGANIZED HEALTH CARE EDUCATION/TRAINING PROGRAM

## 2022-05-07 PROCEDURE — 99239 PR HOSPITAL DISCHARGE DAY,>30 MIN: ICD-10-PCS | Mod: ,,, | Performed by: INTERNAL MEDICINE

## 2022-05-07 PROCEDURE — 36415 COLL VENOUS BLD VENIPUNCTURE: CPT | Performed by: STUDENT IN AN ORGANIZED HEALTH CARE EDUCATION/TRAINING PROGRAM

## 2022-05-07 RX ORDER — TORSEMIDE 100 MG/1
100 TABLET ORAL DAILY
Qty: 30 TABLET | Refills: 11 | Status: CANCELLED | OUTPATIENT
Start: 2022-05-07

## 2022-05-07 RX ORDER — POTASSIUM CHLORIDE 20 MEQ/1
40 TABLET, EXTENDED RELEASE ORAL 3 TIMES DAILY
Qty: 120 TABLET | Refills: 11 | Status: SHIPPED | OUTPATIENT
Start: 2022-05-07

## 2022-05-07 RX ORDER — TORSEMIDE 20 MG/1
80 TABLET ORAL 2 TIMES DAILY
Qty: 240 TABLET | Refills: 11 | Status: SHIPPED | OUTPATIENT
Start: 2022-05-07 | End: 2022-07-13

## 2022-05-07 RX ORDER — POTASSIUM CHLORIDE 750 MG/1
30 CAPSULE, EXTENDED RELEASE ORAL
Status: DISCONTINUED | OUTPATIENT
Start: 2022-05-07 | End: 2022-05-07 | Stop reason: HOSPADM

## 2022-05-07 RX ORDER — QUETIAPINE FUMARATE 200 MG/1
200 TABLET, FILM COATED ORAL NIGHTLY
Qty: 30 TABLET | Refills: 11 | Status: SHIPPED | OUTPATIENT
Start: 2022-05-07 | End: 2023-05-07

## 2022-05-07 RX ADMIN — ASPIRIN 81 MG: 81 TABLET, COATED ORAL at 09:05

## 2022-05-07 RX ADMIN — CETIRIZINE HYDROCHLORIDE 10 MG: 10 TABLET, FILM COATED ORAL at 09:05

## 2022-05-07 RX ADMIN — THERA TABS 1 TABLET: TAB at 09:05

## 2022-05-07 RX ADMIN — ATORVASTATIN CALCIUM 40 MG: 20 TABLET, FILM COATED ORAL at 09:05

## 2022-05-07 RX ADMIN — APIXABAN 5 MG: 5 TABLET, FILM COATED ORAL at 09:05

## 2022-05-07 RX ADMIN — PANTOPRAZOLE SODIUM 40 MG: 40 TABLET, DELAYED RELEASE ORAL at 09:05

## 2022-05-07 RX ADMIN — POTASSIUM CHLORIDE 30 MEQ: 10 CAPSULE, COATED, EXTENDED RELEASE ORAL at 04:05

## 2022-05-07 RX ADMIN — BUMETANIDE 2 MG: 1 TABLET ORAL at 09:05

## 2022-05-07 RX ADMIN — POTASSIUM CHLORIDE 30 MEQ: 10 CAPSULE, COATED, EXTENDED RELEASE ORAL at 09:05

## 2022-05-07 RX ADMIN — POTASSIUM CHLORIDE 30 MEQ: 10 CAPSULE, COATED, EXTENDED RELEASE ORAL at 01:05

## 2022-05-07 RX ADMIN — SPIRONOLACTONE 50 MG: 50 TABLET ORAL at 09:05

## 2022-05-07 RX ADMIN — OXYCODONE HYDROCHLORIDE AND ACETAMINOPHEN 500 MG: 500 TABLET ORAL at 09:05

## 2022-05-07 NOTE — PLAN OF CARE
SW spoke to pt's nurse Lisha to verify pt's address to arrange transport home.    SW arranged transport via [facility]. Requested  time is 3:30 pm. Requested  time does not guarantee arrival time.      2:48 PM  Pt's Nurse Lisha contacted [facility], pt reports a change in location. Pt provided the following address ([address] [place] [address]). SW contacted the [facility] to make update to address. Pt reports having access to the home ([employer] #[phone]). [facility] contacted [employer], received no answer left voice message.     Catalina Perkins, ANEL  Case Management   [facility]   Ext. [phone]

## 2022-05-07 NOTE — DISCHARGE INSTRUCTIONS
You tested positive for COVID-19. You had a mild temperature of 100.3 on 5/6/2022. You need to isolate at home for 10 days from you diagnosis on 5/4/2022.

## 2022-05-07 NOTE — PROGRESS NOTES
Hospital Medicine  Progress note    Team: [facility] HOSP MED Z Dona Murillo MD  Admit Date: 5/4/2022    Principal Problem:  CTEPH (chronic thromboembolic pulmonary hypertension)    Interval hx: Feelling improved    ROS  Respiratory: neg for cough neg for shortness of breath  Cardiovascular: neg for chest pain neg for palpitations  Gastrointestinal: neg for nausea neg for vomiting, neg for abdominal pain neg for diarrhea neg for constipation   Behavioral/Psych: neg for depression neg for anxiety    PEx  Temp:  [98.6 °F (37 °C)-100.3 °F (37.9 °C)]   Pulse:  []   Resp:  [18]   BP: (128-155)/(58-77)   SpO2:  [96 %-98 %]     Intake/Output Summary (Last 24 hours) at 5/6/2022 2311  Last data filed at 5/6/2022 1657  Gross per 24 hour   Intake --   Output 2650 ml   Net -2650 ml       General Appearance: no acute distress, WD, obese  Heart: regular rate and rhythm, no heave, 3+ non-pitting edema of BLE to upper thighs - somewhat improved  Respiratory: Normal respiratory effort, symmetric excursion, bilateral vesicular breath sounds   Abdomen: Soft, non-tender; bowel sounds active  Skin: intact, no rash, no ulcers  Neurologic:  No focal numbness or weakness  Mental status: Alert, oriented x 4, affect appropriate     Recent Labs   Lab 05/04/22  1306 05/05/22  0413 05/06/22  0557   WBC 11.38 11.29 11.21   HGB 9.6* 9.6* 10.1*   HCT 33.5* 35.0* 35.3*    289 306     Recent Labs   Lab 05/04/22  1306 05/05/22  0413 05/06/22  0557    137 132*   K 3.4* 3.5 3.3*    103 94*   CO2 23 22* 24   BUN 8 11 14   CREATININE 0.8 0.9 0.9    90 110   CALCIUM 8.2* 8.6* 9.2   MG  --  1.8 1.8     Recent Labs   Lab 05/04/22  1306   ALKPHOS 140*   ALT 85*   AST 36   ALBUMIN 2.9*   PROT 6.4   BILITOT 0.8      Scheduled Meds:   apixaban  5 mg Oral BID    ascorbic acid (vitamin C)  500 mg Oral BID    aspirin  81 mg Oral Daily    atorvastatin  40 mg Oral Daily    bumetanide  2 mg Oral TID    cetirizine  10 mg Oral Daily     multivitamin  1 tablet Oral Daily    mupirocin   Nasal BID    pantoprazole  40 mg Oral Daily    potassium chloride  30 mEq Oral BID WM    QUEtiapine  200 mg Oral QHS    spironolactone  50 mg Oral Daily     Continuous Infusions:  As Needed:  sodium chloride 0.9%    Assessment and Plan  / Problems managed today    * CTEPH (chronic thromboembolic pulmonary hypertension)  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    Volume overload  PH  Patient presenting with volume overload, responded well to IV lasix, will continue lasix 80mg IV TID and hold home selexipag and riociguat per cards recs.  - Lasix 80mg IV TID - -> bumex 2 mg TID  - K >4, Mg >2  - Continue home apixaban 5 mg BID  - Spironolactone 50 mg daily  -HTS consulted - stopped Selexipag and Riociguat    Right leg injury  Appears to have swelling to RLE, reportedly from a fall, DVT US negative, no signs of infection  - CTM    COVID-19  Patient is asymptomatic, has seasonal allergies which are chronic and not worsened, satting >94% on RA.  - No treatment needed    CAD (coronary artery disease), native coronary artery  - Continue home aspirin and atorvastatin    Bipolar I disorder, single manic episode, severe, with psychosis  Currently mood is stable, psych consulted, patient not clear of home meds  - Seroquel 200 mg QHS  - hold citalopram  - Psych recs appreciated    Discharge Planning   SHELBY: 5/7/2022     Code Status: Full Code   Is the patient medically ready for discharge?: No    Reason for patient still in hospital (select all that apply): Patient trending condition  Discharge Plan A: Home with family        Diet:  regular diet   GI PPx: protonix  DVT PPx:  apixaban  Airways: room air  Wounds: none    Goals of Care:  Return to prior functional status       Time (minutes) spent in care of the patient (Greater than 1/2 spent in  direct face-to-face contact and care coordination on unit)  35 min    Dona Murillo MD

## 2022-05-07 NOTE — ASSESSMENT & PLAN NOTE
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    Volume overload  PH  Patient presenting with volume overload, responded well to IV lasix, will continue lasix 80mg IV TID and hold home selexipag and riociguat per cards recs.  - Lasix 80mg IV TID - -> bumex 2 mg TID  - K >4, Mg >2  - Continue home apixaban 5 mg BID  - Spironolactone 50 mg daily  -HTS consulted - stopped Selexipag and Riociguat

## 2022-05-07 NOTE — NURSING
Notified Dr. Murillo: good morning. patient has few questions that he would like clarified prior to discharge. not sure of his room phone number but his cell phone number is [phone]. thank you!

## 2022-05-07 NOTE — NURSING
Dr. Murillo at bedside. Informed her that patient would need transportation home. Per Dr. Murillo, she will notify  to make arrangments

## 2022-05-07 NOTE — PROGRESS NOTES
"Discharge Summary  Hospital Medicine    Attending Provider on Discharge: Dona Murillo MD  [facility] Medicine Team: [facility] HOSP MED Z  Date of Admission:  5/4/2022     Date of Discharge:   05/07/2022  Code status: Full Code    Active Hospital Problems    Diagnosis  POA    *CTEPH (chronic thromboembolic pulmonary hypertension) [I27.24]  Yes     Priority: 1 - High    COVID-19 [U07.1]  Yes    Right leg injury [S89.91XA]  Yes    Dyspnea on exertion [R06.00]  Yes    Bipolar I disorder, single manic episode, severe, with psychosis [F30.2]  Yes    CAD (coronary artery disease), native coronary artery [I25.10]  Yes      Resolved Hospital Problems   No resolved problems to display.     HPI  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    Patient states he had gone to a festival in [place] this past weekend. Since then he noted weight gain of about 12lbs, and had intermittent shortness of breath. He reports his "endurance was not great". He had a fall and sustained an injury to his R lower leg with some overlying swelling, but also has noted significant swelling in both of his legs. Denies orthopnea or PND. He reports he presented to the ED because of this shortness of breath, he states he is not compliant with his home medications including his apixaban or his PH medications.     Patients BP on arrival was 138/78, satting well on RA, tmax 99.1, he was given 80mg IV lasix and diuresed well, put out about 4000ml.His labs were notable for hgb 9.6, near his baseline, K 3.4, , normal Cr, and COVID positive. He was admitted for diuresis.    Hospital Course  * CTEPH (chronic thromboembolic pulmonary hypertension)  51 y.o. 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    Volume overload  PH  Patient presenting with volume overload, responded well to IV lasix, will continue lasix 80mg IV TID and hold home selexipag and riociguat per cards recs.  - Lasix 80mg IV TID - -> bumex 2 mg TID, responded very well   Discharged on torsemide 80 mg BID   - Continue home apixaban 5 mg BID  - Spironolactone 50 mg daily  -HTS consulted - stopped Selexipag and Riociguat.  -follow up with cardiology as scheduled next week    Right leg injury  Appears to have swelling to RLE, reportedly from a fall, DVT US negative, no signs of infection  - CTM    COVID-19  Patient is asymptomatic, has seasonal allergies which are chronic and not worsened, satting >94% on RA.  - No treatment needed  -COVID home monitoring    CAD (coronary artery disease), native coronary artery  - Continue home aspirin and atorvastatin    Bipolar I disorder, single manic episode, severe, with psychosis  Currently mood is stable, psych consulted, patient not clear of home meds  - Seroquel 200 mg QHS  - hold citalopram  - Psych recs appreciated    Procedures: none    Consultants: psychiatry  Heart transplant service    Current Discharge Medication List      START taking these medications    Details   pulse oximeter (PULSE OXIMETER) device by Apply Externally route 2 (two) times a day. Use twice daily at 8 AM and 3 PM and record the value in [employer] as directed.  Qty: 1 each, Refills: 0    Comments: This is a NO CHARGE item.  Please override price to zero.  DO NOT PRINT.  NORMAL MODE e-PRESCRIBE ONLY.         CONTINUE these medications which have CHANGED    Details   potassium chloride SA (K-DUR,KLOR-CON) 20 MEQ tablet Take 2 tablets (40 mEq total) by mouth 3 (three) times daily.  Qty: 120 tablet, Refills: 11      QUEtiapine (SEROQUEL) 200 MG Tab Take 1 tablet (200 mg total) by mouth every evening.  Qty: 30 tablet, Refills: 11      torsemide (DEMADEX) 20 MG Tab Take 4 tablets (80 mg total) by mouth 2 (two) times a  day.  Qty: 240 tablet, Refills: 11         CONTINUE these medications which have NOT CHANGED    Details   apixaban 5 mg Tab Take 1 tablet (5 mg total) by mouth 2 (two) times daily.  Qty: 60 tablet, Refills: 11      aspirin (ECOTRIN) 81 MG EC tablet Take 1 tablet (81 mg total) by mouth once daily.  Refills: 0      atorvastatin (LIPITOR) 40 MG tablet Take 1 tablet (40 mg total) by mouth once daily.  Qty: 30 tablet, Refills: 11      cetirizine (ZYRTEC) 10 MG tablet Take 10 mg by mouth once daily.       fluticasone (FLONASE) 50 mcg/actuation nasal spray 1 spray by Each Nare route daily as needed for Rhinitis.      omeprazole (PRILOSEC) 20 MG capsule Take 20 mg by mouth 2 (two) times daily.       spironolactone (ALDACTONE) 50 MG tablet Take 1 tablet (50 mg total) by mouth once daily.  Qty: 30 tablet, Refills: 11    Comments: .      VENTOLIN HFA 90 mcg/actuation inhaler TAKE 1 PUFF EVERY 6 HOURS AS NEEDED FOR WHEEZING/SOB  Refills: 6         STOP taking these medications       citalopram (CELEXA) 40 MG tablet Comments:   Reason for Stopping:         riociguat (ADEMPAS) 2.5 mg tablet Comments:   Reason for Stopping:         selexipag 1,600 mcg Tab Comments:   Reason for Stopping:               Discharge Diet:fluid restriction: 1500 mL and 2 gram sodium diet     Activity: activity as tolerated    Discharge Condition: Good    Disposition: Home or Self Care    Tests pending at the time of discharge: none      Time spent  on the discharge of the patient including review of hospital course with the patient. reviewing discharge medications and arranging follow-up care 35 min    Discharge examination Patient was seen and examined on the date of discharge and determined to be suitable for discharge.    Discharge plan     Future Appointments   Date Time Provider Department Center   5/13/2022 12:05 PM LAB, APPOINTMENT [place] [facility] LAB [facility]   5/13/2022 12:40 PM SIX, MINUTE WALK [place] [facility]   5/13/2022  1:00  PM ECHO, [facility] ECHOSTR [facility]   5/13/2022  2:00 PM Marisabel Trejo NP [facility] HEARTTX [facility] Murillo MD

## 2022-05-07 NOTE — NURSING
Rounded on patient.   A&Ox4. Chay pain. Patient sitting in bed. Water given. Attempted to take vitals with bedside equipment. Patient disconnected monitor and cords from wall as he stated the machine keeps making noise and will not go off. Inform patient that he should not touch hospital equipment as is may pose danger to himself and other patients. Charge RN notified.

## 2022-05-07 NOTE — NURSING
Patient alert &oriented x4. Hemodynamically stable. Denies pain. Discharge instructions reviewed and given to patient. Patient provided with seroquel and pulse ox that was delivered by bedside pharmacy

## 2022-05-08 ENCOUNTER — NURSE TRIAGE (OUTPATIENT)
Dept: ADMINISTRATIVE | Facility: CLINIC | Age: 52
End: 2022-05-08
Payer: MEDICAID

## 2022-05-08 NOTE — TELEPHONE ENCOUNTER
2nd attempt to contact pt for enrollment in Covid Surveillance program. No answer. Left voicemail. [employer] message previously sent.    Reason for Disposition   Message left on unidentified voice mail.  Phone number verified.    Protocols used: NO CONTACT OR DUPLICATE CONTACT CALL-A-CHRIS

## 2022-05-08 NOTE — TELEPHONE ENCOUNTER
1st attempt to contact pt for enrollment in Covid Surveillance program. No answer at either number listed. Left voicemail. [employer] message previously sent. Will make 2nd attempt later today.    Reason for Disposition   Message left on unidentified voice mail.  Phone number verified.    Protocols used: NO CONTACT OR DUPLICATE CONTACT CALL-A-CHRIS

## 2022-05-09 ENCOUNTER — NURSE TRIAGE (OUTPATIENT)
Dept: ADMINISTRATIVE | Facility: CLINIC | Age: 52
End: 2022-05-09
Payer: MEDICAID

## 2022-05-09 ENCOUNTER — PATIENT MESSAGE (OUTPATIENT)
Dept: ADMINISTRATIVE | Facility: OTHER | Age: 52
End: 2022-05-09
Payer: MEDICAID

## 2022-05-09 ENCOUNTER — TELEPHONE (OUTPATIENT)
Dept: TRANSPLANT | Facility: CLINIC | Age: 52
End: 2022-05-09
Payer: MEDICAID

## 2022-05-09 ENCOUNTER — TELEPHONE (OUTPATIENT)
Dept: ADMINISTRATIVE | Facility: CLINIC | Age: 52
End: 2022-05-09
Payer: MEDICAID

## 2022-05-09 ENCOUNTER — PATIENT MESSAGE (OUTPATIENT)
Dept: ADMINISTRATIVE | Facility: CLINIC | Age: 52
End: 2022-05-09
Payer: MEDICAID

## 2022-05-09 LAB — HCV AB SERPL QL IA: NEGATIVE

## 2022-05-09 NOTE — TELEPHONE ENCOUNTER
Pt escalated due to no response. Pt called with no contact made. VM left, and alike message sent    Reason for Disposition   Message left on unidentified voice mail. Phone number verified.    Protocols used: NO CONTACT OR DUPLICATE CONTACT CALL-A-OH

## 2022-05-09 NOTE — DISCHARGE SUMMARY
"Discharge Summary  Hospital Medicine    Attending Provider on Discharge: Dona Murillo MD  [facility] Medicine Team: [facility] HOSP MED Z  Date of Admission:  5/4/2022     Date of Discharge:  5/7/2022 05/08/2022  Code status: Full Code    Active Hospital Problems    Diagnosis  POA    *CTEPH (chronic thromboembolic pulmonary hypertension) [I27.24]  Yes     Priority: 1 - High    COVID-19 [U07.1]  Yes    Right leg injury [S89.91XA]  Yes    Dyspnea on exertion [R06.00]  Yes    Bipolar I disorder, single manic episode, severe, with psychosis [F30.2]  Yes    CAD (coronary artery disease), native coronary artery [I25.10]  Yes      Resolved Hospital Problems   No resolved problems to display.     HPI  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    Patient states he had gone to a festival in [place] this past weekend. Since then he noted weight gain of about 12lbs, and had intermittent shortness of breath. He reports his "endurance was not great". He had a fall and sustained an injury to his R lower leg with some overlying swelling, but also has noted significant swelling in both of his legs. Denies orthopnea or PND. He reports he presented to the ED because of this shortness of breath, he states he is not compliant with his home medications including his apixaban or his PH medications.     Patients BP on arrival was 138/78, satting well on RA, tmax 99.1, he was given 80mg IV lasix and diuresed well, put out about 4000ml.His labs were notable for hgb 9.6, near his baseline, K 3.4, , normal Cr, and COVID positive. He was admitted for diuresis.    Hospital Course  * CTEPH (chronic thromboembolic pulmonary hypertension)  51 y.o. 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    Volume overload  PH  Patient presenting with volume overload, responded well to IV lasix, will continue lasix 80mg IV TID and hold home selexipag and riociguat per cards recs.  - Lasix 80mg IV TID - -> bumex 2 mg TID, responded very well   Discharged on torsemide 80 mg BID   - Continue home apixaban 5 mg BID  - Spironolactone 50 mg daily  -HTS consulted - stopped Selexipag and Riociguat.  -follow up with cardiology as scheduled next week    Right leg injury  Appears to have swelling to RLE, reportedly from a fall, DVT US negative, no signs of infection  - CTM    COVID-19  Patient is asymptomatic, has seasonal allergies which are chronic and not worsened, satting >94% on RA.  - No treatment needed  -COVID home monitoring    CAD (coronary artery disease), native coronary artery  - Continue home aspirin and atorvastatin    Bipolar I disorder, single manic episode, severe, with psychosis  Currently mood is stable, psych consulted, patient not clear of home meds  - Seroquel 200 mg QHS  - hold citalopram  - Psych recs appreciated    Procedures: none    Consultants: psychiatry  Heart transplant service    Discharge Medication List as of 5/7/2022  4:50 PM      START taking these medications    Details   pulse oximeter (PULSE OXIMETER) device by Apply Externally route 2 (two) times a day. Use twice daily at 8 AM and 3 PM and record the value in MyChart as directed., Starting Sat 5/7/2022, Normal         CONTINUE these medications which have CHANGED    Details   potassium chloride SA (K-DUR,KLOR-CON) 20 MEQ tablet Take 2 tablets (40 mEq total) by mouth 3 (three) times daily., Starting Sat 5/7/2022, Normal      QUEtiapine (SEROQUEL) 200 MG Tab Take 1 tablet (200 mg total) by mouth every evening., Starting Sat 5/7/2022, Until Sun 5/7/2023, Normal      torsemide (DEMADEX) 20 MG Tab Take 4 tablets (80 mg total) by mouth 2 (two) times a day., Starting Sat 5/7/2022, Until Sun 5/7/2023, Normal         CONTINUE these  medications which have NOT CHANGED    Details   apixaban 5 mg Tab Take 1 tablet (5 mg total) by mouth 2 (two) times daily., Starting Wed 4/11/2018, Normal      aspirin (ECOTRIN) 81 MG EC tablet Take 1 tablet (81 mg total) by mouth once daily., Starting Tue 3/20/2018, Until Tue 5/4/2021, OTC      atorvastatin (LIPITOR) 40 MG tablet Take 1 tablet (40 mg total) by mouth once daily., Starting Thu 1/28/2021, Normal      cetirizine (ZYRTEC) 10 MG tablet Take 10 mg by mouth once daily. , Historical Med      fluticasone (FLONASE) 50 mcg/actuation nasal spray 1 spray by Each Nare route daily as needed for Rhinitis., Historical Med      omeprazole (PRILOSEC) 20 MG capsule Take 20 mg by mouth 2 (two) times daily. , Historical Med      spironolactone (ALDACTONE) 50 MG tablet Take 1 tablet (50 mg total) by mouth once daily., Starting Fri 5/14/2021, Until Sat 5/14/2022, Normal      VENTOLIN HFA 90 mcg/actuation inhaler TAKE 1 PUFF EVERY 6 HOURS AS NEEDED FOR WHEEZING/SOB, Historical Med         STOP taking these medications       citalopram (CELEXA) 40 MG tablet Comments:   Reason for Stopping:         riociguat (ADEMPAS) 2.5 mg tablet Comments:   Reason for Stopping:         selexipag 1,600 mcg Tab Comments:   Reason for Stopping:               Discharge Diet:fluid restriction: 1500 mL and 2 gram sodium diet     Activity: activity as tolerated    Discharge Condition: Good    Disposition: Home or Self Care    Tests pending at the time of discharge: none      Time spent  on the discharge of the patient including review of hospital course with the patient. reviewing discharge medications and arranging follow-up care 35 min    Discharge examination Patient was seen and examined on the date of discharge and determined to be suitable for discharge.    Discharge plan     Future Appointments   Date Time Provider Department Center   5/13/2022 12:05 PM LAB, APPOINTMENT [place] [facility] LAB [facility]   5/13/2022 12:40 PM SIX, MINUTE  WALK [facility] PUL [facility]   5/13/2022  1:00 PM ECHO, [facility] ECHOSTR [facility]   5/13/2022  2:00 PM Marisabel Trejo NP [facility] HEARTTX [facility] Murillo MD

## 2022-05-09 NOTE — TELEPHONE ENCOUNTER
"Called patient to review COVID-19 Surveillance Program enrollment process.    Smartphone: yes    [facility]: yes    Program consent: yes    Pulse oximeter status: yes    Verified emergency contacts: yes    Program Overview: Reviewed , no response process, and importance of correct emergency contacts in event that well-being check is warranted. Patient will call [phone] 24/7 to speak with an OnCall nurse, if needed. Pt aware that if Sp02 less than or equal to 93% or if they have any worsening symptoms, they need to go to the emergency department. If they are having a medical emergency, they will call 911. Otherwise, patient will continue to submit data as scheduled. Reviewed importance of wearing mask if self or family members leave the house.     Pt is very confrontational and uncooperative at times. Pt complains of being "drugged out" on seroquel because his family is worried about him not sleeping. Pt calmed down after talking from this nurse. All VS are within normal limits but pt does rate SOB w/activity 5/5. Pt states he almost hyperventilates just from rolling over in his hospital bed. Pt has CTEPH so has SOB w/activity normally. Wears continuous O2. Escalating to [facility], Donato Robb NP for further eval. Gave pt number to OOC for further concerns. Patient had no further questions. Will continue to monitor.    Reason for Disposition   MILD difficulty breathing (e.g., minimal/no SOB at rest, SOB with walking, pulse <100)    Additional Information   Negative: SEVERE difficulty breathing (e.g., struggling for each breath, speaks in single words)   Negative: Difficult to awaken or acting confused (e.g., disoriented, slurred speech)   Negative: Bluish (or gray) lips or face now   Negative: Shock suspected (e.g., cold/pale/clammy skin, too weak to stand, low BP, rapid pulse)   Negative: Sounds like a life-threatening emergency to the triager   Negative: SEVERE or constant chest pain or " pressure (Exception: mild central chest pain, present only when coughing)   Negative: MODERATE difficulty breathing (e.g., speaks in phrases, SOB even at rest, pulse 100-120)   Negative: Headache and stiff neck (can't touch chin to chest)   Negative: Chest pain or pressure   Negative: Patient sounds very sick or weak to the triager    Protocols used: CORONAVIRUS (COVID-19) DIAGNOSED OR [id number]

## 2022-05-09 NOTE — TELEPHONE ENCOUNTER
Patient called nurse navigator over the weekend. States that he is suppose to be in isolation due to COVID diagnosis and he has an appointment of Friday. Tried calling patient back but there was no answer and VM was full. Appointment changed to virtual and other appointments cancelled.

## 2022-05-10 ENCOUNTER — PATIENT MESSAGE (OUTPATIENT)
Dept: ADMINISTRATIVE | Facility: OTHER | Age: 52
End: 2022-05-10
Payer: MEDICAID

## 2022-05-10 ENCOUNTER — NURSE TRIAGE (OUTPATIENT)
Dept: ADMINISTRATIVE | Facility: CLINIC | Age: 52
End: 2022-05-10
Payer: MEDICAID

## 2022-05-10 ENCOUNTER — PATIENT MESSAGE (OUTPATIENT)
Dept: ADMINISTRATIVE | Facility: CLINIC | Age: 52
End: 2022-05-10
Payer: MEDICAID

## 2022-05-10 NOTE — TELEPHONE ENCOUNTER
Surveillance pt escalated for SpO2 94%.  On retest SpO2 98% on room air and HR 81.  Pt denies any needs at this time.     Reason for Disposition   Health Information question, no triage required and triager able to answer question    Additional Information   Negative: [1] Caller is not with the adult (patient) AND [2] reporting urgent symptoms   Negative: Lab result questions   Negative: Medication questions   Negative: Caller can't be reached by phone   Negative: Caller has already spoken to PCP or another triager   Negative: RN needs further essential information from caller in order to complete triage   Negative: Requesting regular office appointment   Negative: [1] Caller requesting NON-URGENT health information AND [2] PCP's office is the best resource    Protocols used: INFORMATION ONLY CALL - NO TRIAGE-A-

## 2022-05-10 NOTE — TELEPHONE ENCOUNTER
Surveillance pt escalated for SOB 5/5 with activity. Pt states that SOB 5/5 is his norm with activity due to medical history of MIs and pulmonary hypertension. Pt's SpO2 95% on room air  No triage    Reason for Disposition   Health Information question, no triage required and triager able to answer question    Additional Information   Negative: [1] Caller is not with the adult (patient) AND [2] reporting urgent symptoms   Negative: Lab result questions   Negative: Medication questions   Negative: Caller can't be reached by phone   Negative: Caller has already spoken to PCP or another triager   Negative: RN needs further essential information from caller in order to complete triage   Negative: Requesting regular office appointment   Negative: [1] Caller requesting NON-URGENT health information AND [2] PCP's office is the best resource    Protocols used: INFORMATION ONLY CALL - NO TRIAGE-A-

## 2022-05-10 NOTE — TELEPHONE ENCOUNTER
Surveillance pt escalated for no response. Pt states he will enter VS now. No triage.    Reason for Disposition   General information question, no triage required and triager able to answer question    Additional Information   Negative: [1] Caller is not with the adult (patient) AND [2] reporting urgent symptoms   Negative: Lab result questions   Negative: Medication questions   Negative: Caller can't be reached by phone   Negative: Caller has already spoken to PCP or another triager   Negative: RN needs further essential information from caller in order to complete triage   Negative: Requesting regular office appointment   Negative: [1] Caller requesting NON-URGENT health information AND [2] PCP's office is the best resource   Negative: Health Information question, no triage required and triager able to answer question    Protocols used: INFORMATION ONLY CALL - NO [facility]

## 2022-05-11 ENCOUNTER — PATIENT MESSAGE (OUTPATIENT)
Dept: ADMINISTRATIVE | Facility: CLINIC | Age: 52
End: 2022-05-11
Payer: MEDICAID

## 2022-05-11 ENCOUNTER — NURSE TRIAGE (OUTPATIENT)
Dept: ADMINISTRATIVE | Facility: CLINIC | Age: 52
End: 2022-05-11
Payer: MEDICAID

## 2022-05-11 NOTE — TELEPHONE ENCOUNTER
Pt escalated due to no response pt called with no contact made. Mychart messages ent. VM left    Reason for Disposition   Message left on unidentified voice mail. Phone number verified.    Protocols used: NO CONTACT OR DUPLICATE CONTACT CALL-A-OH

## 2022-05-11 NOTE — TELEPHONE ENCOUNTER
Pt escalated for no response, NT unable to make phone contact. As per program protocol all contact numbers called once and my chart message sent.    Reason for Disposition   Message left on unidentified voice mail.  Phone number verified.    Protocols used: NO CONTACT OR DUPLICATE CONTACT CALL-A-AH

## 2022-05-12 ENCOUNTER — PATIENT MESSAGE (OUTPATIENT)
Dept: ADMINISTRATIVE | Facility: CLINIC | Age: 52
End: 2022-05-12
Payer: MEDICAID

## 2022-05-12 ENCOUNTER — PATIENT MESSAGE (OUTPATIENT)
Dept: ADMINISTRATIVE | Facility: OTHER | Age: 52
End: 2022-05-12
Payer: MEDICAID

## 2022-05-12 ENCOUNTER — NURSE TRIAGE (OUTPATIENT)
Dept: ADMINISTRATIVE | Facility: CLINIC | Age: 52
End: 2022-05-12
Payer: MEDICAID

## 2022-05-12 NOTE — TELEPHONE ENCOUNTER
Reason for Disposition   Information only question and nurse able to answer    Protocols used: INFORMATION ONLY CALL - NO TRIAGE-A-OH  Patient initially escalated due to no response, however patient entered vitals prior to phone call. Vital signs and symptoms did not trigger a second escalation; therefore, no follow up call is needed at this time.

## 2022-05-13 ENCOUNTER — PATIENT MESSAGE (OUTPATIENT)
Dept: ADMINISTRATIVE | Facility: OTHER | Age: 52
End: 2022-05-13
Payer: MEDICAID

## 2022-05-13 ENCOUNTER — OFFICE VISIT (OUTPATIENT)
Dept: TRANSPLANT | Facility: CLINIC | Age: 52
End: 2022-05-13
Payer: MEDICAID

## 2022-05-13 ENCOUNTER — NURSE TRIAGE (OUTPATIENT)
Dept: ADMINISTRATIVE | Facility: CLINIC | Age: 52
End: 2022-05-13
Payer: MEDICAID

## 2022-05-13 ENCOUNTER — PATIENT MESSAGE (OUTPATIENT)
Dept: ADMINISTRATIVE | Facility: CLINIC | Age: 52
End: 2022-05-13
Payer: MEDICAID

## 2022-05-13 DIAGNOSIS — I27.24 CTEPH (CHRONIC THROMBOEMBOLIC PULMONARY HYPERTENSION): ICD-10-CM

## 2022-05-13 DIAGNOSIS — G47.33 OSA (OBSTRUCTIVE SLEEP APNEA): ICD-10-CM

## 2022-05-13 DIAGNOSIS — F31.9 BIPOLAR 1 DISORDER: ICD-10-CM

## 2022-05-13 DIAGNOSIS — I27.21 PULMONARY ARTERY HYPERTENSION: Primary | ICD-10-CM

## 2022-05-13 DIAGNOSIS — I50.9 ACUTE DECOMPENSATED HEART FAILURE: ICD-10-CM

## 2022-05-13 PROCEDURE — 99215 OFFICE O/P EST HI 40 MIN: CPT | Mod: 95,,,

## 2022-05-13 PROCEDURE — 1159F PR MEDICATION LIST DOCUMENTED IN MEDICAL RECORD: ICD-10-PCS | Mod: CPTII,95,,

## 2022-05-13 PROCEDURE — 1159F MED LIST DOCD IN RCRD: CPT | Mod: CPTII,95,,

## 2022-05-13 PROCEDURE — 99215 PR OFFICE/OUTPT VISIT, EST, LEVL V, 40-54 MIN: ICD-10-PCS | Mod: 95,,,

## 2022-05-13 PROCEDURE — 1160F PR REVIEW ALL MEDS BY PRESCRIBER/CLIN PHARMACIST DOCUMENTED: ICD-10-PCS | Mod: CPTII,95,,

## 2022-05-13 PROCEDURE — 1160F RVW MEDS BY RX/DR IN RCRD: CPT | Mod: CPTII,95,,

## 2022-05-13 NOTE — TELEPHONE ENCOUNTER
Pt called for escalation of vs. Hr 103 reported in questionnaire. Alert and oriented. Pt said he is in a restaurant and cannot recheck his vs. Pt said he is feeling great and without symptoms and it has been 10 days since he was diagnosed. Pt removed from cc program. Pt has met criteria for removal from program. Pcp not on ohn. Unable to route encounter.

## 2022-05-16 LAB
BNP SERPL-MCNC: 46.3 PG/ML
CO2 SERPL-SCNC: 19 MMOL/L
EXT BUN: 12
EXT CALCIUM: 9
EXT CHLORIDE: 102
EXT CREATININE: 0.96 MG/DL
EXT GLUCOSE: 109
EXT HEMATOCRIT: 37.9
EXT HEMOGLOBIN: 10.9
EXT PLATELETS: 462
EXT POTASSIUM: 4.4
EXT SODIUM: 139 MMOL/L
EXT WBC: 13.6

## 2022-05-16 NOTE — PROGRESS NOTES
Subjective:    Patient ID:  Jaquan Hines Jr. is a 51 y.o. male who presents for follow-up of Pulmonary Hypertension.    Audio Only Telehealth Visit     The patient location is: home  The chief complaint leading to consultation is: Pulmonary Hypertension  Visit type: Virtual visit with audio only (telephone)  Total time spent with patient: 45 minutes     The reason for the audio only service rather than synchronous audio and video virtual visit was related to technical difficulties or patient preference/necessity.     Each patient to whom I provide medical services by telemedicine is:  (1) informed of the relationship between the physician and patient and the respective role of any other health care provider with respect to management of the patient; and (2) notified that they may decline to receive medical services by telemedicine and may withdraw from such care at any time. Patient verbally consented to receive this service via voice-only telephone call.    This service was not originating from a related E/M service provided within the previous 7 days nor will  to an E/M service or procedure within the next 24 hours or my soonest available appointment.  Prevailing standard of care was able to be met in this audio-only visit.      HPI   Mr. 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 Jennings on 3/13/18. He also has a history of Bipolar disorder, HTN, MI w/PCI x 2 stents, obesity, CLAUDIA with intermittent CPAP use, home O2 uses PRN at 2L.  Mr. Hines has been on Uptravi 1600 mcg, BID; Adempas 2.5 mg, TID, and Opsumit 10 mg daily for PAH, since 2018, but has recently stopped taking these medications.    This visit is for post hospital follow-up. Mr. Hines was admitted on 5/4/2022 to hospital medicine for SOB, weight gain, and COVID incidentally. He recounts that he has been off his Bipolar medication and had been feeling good, so good  "that he stopped his PH meds as well. He has been steadily gaining weight but did not let our office know. He attended a music festival and admits that he wasn't watching his fluid intake or diet.     During this visit, he states that he has restarted his seroquel but is not taking it as prescribed. He's taking it every other day because otherwise it makes him too sleepy and foggy. He endorses taking his eliquis, torsemide, and potassium. He thinks he is taking them in the correct amounts. He mentions that his sister is "angry with him" after these last events. She is an NP and usually helps him a lot with his medications and appointments, but has not been as willing to do so now. Mr. Hines is very talkative. He reports that his body takes over when he is in this phase and keeps him going. He says he feels good. Did not feel any effects of COVID. Only uses O2 every now and then. He is not sure he needs to take the PH meds as he believes exercising and other lifestyle modifications should be enough. He wants to be in a monitored exercise program like pulmonary rehab. Has an appt with his cardiologist on Monday.     Discussed the progression of PH and the role his medication plays in slowing that progression and improving his functional status. We have tried pulm rehab in the past but his insurance did not cover it. Also, will not consider pulm rehab if he is not willing to take his PH medications as that may present an unsafe activity. Mr. Hines is agreeable to having a RHC done and then discussing medication options.   Will schedule after he meets with his cardiologist. Will also have labs drawn at that appointment.      6MWT:  6MW 5/4/2021   6MWT Status completed without stopping   Patient Reported Dyspnea;Lightheadedness;Chest pain   Was O2 used? No   Delivery Method    6MW Distance walked (feet) 600   Distance walked (meters) 182.88   Did patient stop? Yes   Oxygen Saturation 96   Supplemental Oxygen Room Air "   Heart Rate 92   Blood Pressure 120/56   Ammon Dyspnea Rating  light   Oxygen Saturation 88   Supplemental Oxygen Room Air   Heart Rate 124   Blood Pressure 143/66   Ammon Dyspnea Rating  very,very heavy   Recovery Time (seconds) 125   Oxygen Saturation 97   Supplemental Oxygen Room Air   Heart Rate 109     ECHO: 5/5/2021  · The estimated ejection fraction is 60%.  · The left ventricle is normal in size with normal systolic function.  · Indeterminate left ventricular diastolic function.  · Mild left atrial enlargement.  · Mild tricuspid regurgitation.  · Normal right ventricular size with normal right ventricular systolic function.  · There is abnormal septal wall motion consistent with right ventricular pacemaker.  · The estimated PA systolic pressure is 48 mmHg.  · Intermediate central venous pressure (8 mmHg).    RHC: 5/12/2021  RA: 24/ 24/ 17 RV: 53/ 16 PA: 50/ 24/ 33 PWP: 30/ 43/ 30 .   Cardiac output was 6.65  by Neel. Cardiac index is 2.56 L/min/m2.   O2 Sat: PA 96%.   Pulmonary vascular resistance: 36. Systemic vascular resistance: 770.     /64 (81); sat 96%  Wedge proven with fluoroscopy    R/C  12 /16    Pressures in mmHg, right heart pressures, pulmonary capillary wedge pressure mean of 28, pulmonary artery 70-80/29-35, mean of 45-50, RV   average of 75/25.  RA mean of 18.  Average cardiac output was 4.3 liters per minute.  Left heart pressures,   central aortic pressure 90/60, left ventricle 97/15-20.  No gradient upon pullback.    Left Coronary Artery:  Left main coronary artery was of medium caliber, free of disease.  It bifurcated into left anterior descending and   circumflex arteries.  In the proximal portion of the left anterior descending artery, there was a stent that had a   20% concentric narrowing with no what appear to be flow limiting abnormalities.  The continuation of the vessel was normal and free of   disease.  There was a large diagonal branch that arose from the proximal portion  of the left anterior descending artery   that was normal and free of disease.    Circumflex System:  The circumflex system was of medium caliber with high obtuse marginal branch arising from it and a second longer obtuse marginal   branch, both of which were normal and free of disease.    Right Coronary Artery:  The right coronary artery had some mild diffuse plaquing throughout its course with no evidence of significant obstructive   disease.  It did give rise to posterior descending and posterolateral branch.    Left Ventriculogram:  The left ventricle demonstrated normal left ventricular function with EF estimated at approximately 60%; however,   there was much ventricular ectopy during the examination and I could not tell if there was any significant mitral regurgitation.    PFTs: 3/7/2018  Normal spirometry. MVV is mildly decreased. Arterial blood gases are consistent  with a moderate mixed acid-base disorder consisting of metabolic alkalosis and respiratory acidosis. Severe  hypoxemia is present on supplemental oxygen.    V/Q: 11/2/2017  Narrative & Impression  Technique: Paired ventilation and perfusion gamma camera images were obtained in multiple projections following the administration of 43 mCi technetium 99m DTPA in inhaled aerosol for the ventilation portion of the exam and the administration of 5.5 mCi technetium 99m MAA intravenously for the perfusion portion of the study.     Results: Two large mismatched perfusion defects in the left and right lower lobes of the lung respectively, and one moderate mismatch perfusion defects in the left upper lobe.  IMPRESSION:       High probability VQ scan for pulmonary embolism.      Review of Systems   Constitutional: Negative.   HENT: Negative.    Eyes: Negative.    Cardiovascular: Negative.    Respiratory: Negative.    Endocrine: Negative.    Hematologic/Lymphatic: Negative.    Skin: Negative.    Musculoskeletal: Negative.    Gastrointestinal: Negative.     Genitourinary: Negative.    Neurological: Negative.    Psychiatric/Behavioral: Positive for altered mental status (manic, per patient).            Lab Results   Component Value Date     (H) 05/04/2022     (L) 05/07/2022     02/15/2017    K 3.6 05/07/2022    MG 1.9 05/07/2022    CL 94 (L) 05/07/2022     02/15/2017    CO2 28 05/07/2022    BUN 13 05/07/2022    CREATININE 1.0 05/07/2022    CREATININE 1.11 02/15/2017     (H) 05/07/2022     (H) 02/15/2017    HGBA1C 6.2 (H) 05/05/2021    AST 36 05/04/2022    ALT 85 (H) 05/04/2022    ALBUMIN 2.9 (L) 05/04/2022    ALBUMIN 4.1 02/15/2017    PROT 6.4 05/04/2022    BILITOT 0.8 05/04/2022    CHOL 120 05/05/2021    HDL 36 (L) 05/05/2021    LDLCALC 55.2 (L) 05/05/2021    TRIG 144 05/05/2021       Magnesium   Date Value Ref Range Status   05/07/2022 1.9 1.6 - 2.6 mg/dL Final       Lab Results   Component Value Date    WBC 10.49 05/07/2022    HGB 10.3 (L) 05/07/2022    HCT 35.5 (L) 05/07/2022    MCV 72 (L) 05/07/2022     05/07/2022       Lab Results   Component Value Date    INR 1.2 05/12/2021    INR 1.1 05/31/2018    INR 2.7 (H) 03/19/2018       BNP   Date Value Ref Range Status   05/04/2022 259 (H) 0 - 99 pg/mL Final     Comment:     Values of less than 100 pg/ml are consistent with non-CHF populations.   05/14/2021 102 (H) 0 - 99 pg/mL Final     Comment:     Values of less than 100 pg/ml are consistent with non-CHF populations.   05/12/2021 164 (H) 0 - 99 pg/mL Final     Comment:     Values of less than 100 pg/ml are consistent with non-CHF populations.       No results found for: LDH    WHO Group: 1   Functional Class: III  REVEAL Score: 10 (High Risk)    Assessment:       1. Pulmonary artery hypertension    2. CTEPH (chronic thromboembolic pulmonary hypertension)    3. Acute decompensated heart failure    4. Bipolar 1 disorder    5. CLAUDIA (obstructive sleep apnea)         Plan:       Mr. Hines is not willing to restart PH meds at  this time. Believe his mental state may be affecting his judgement but he is clear, answers all questions appropriately, and agreeable to further diagnostic testing and follow-up.    [facility].     Schedule RHC, CBC, CMP (COVID + 5/4)    Recommend 2 gram sodium restriction and 1500cc fluid restriction.  Encourage physical activity with graded exercise program.  Requested patient to weigh themselves daily, and to notify us if their weight increases by more than 3 lbs in 1 day or 5 lbs in 1 week.

## 2022-05-19 ENCOUNTER — DOCUMENTATION ONLY (OUTPATIENT)
Dept: TRANSPLANT | Facility: CLINIC | Age: 52
End: 2022-05-19
Payer: MEDICAID

## 2022-05-19 NOTE — PROGRESS NOTES
External labs entered  Labs completed on 5/16/22  Labs have been routed to PH coordinator review

## 2022-06-01 DIAGNOSIS — I27.21 PULMONARY ARTERY HYPERTENSION: Primary | ICD-10-CM

## 2022-06-01 DIAGNOSIS — R06.02 SHORTNESS OF BREATH: ICD-10-CM

## 2022-06-02 ENCOUNTER — TELEPHONE (OUTPATIENT)
Dept: TRANSPLANT | Facility: CLINIC | Age: 52
End: 2022-06-02
Payer: MEDICAID

## 2022-06-08 ENCOUNTER — TELEPHONE (OUTPATIENT)
Dept: CARDIOLOGY | Facility: HOSPITAL | Age: 52
End: 2022-06-08

## 2022-06-10 ENCOUNTER — HOSPITAL ENCOUNTER (EMERGENCY)
Facility: HOSPITAL | Age: 52
Discharge: PSYCHIATRIC OR SUBSTANCE USE FACILITY | End: 2022-06-10
Attending: EMERGENCY MEDICINE
Payer: MEDICAID

## 2022-06-10 VITALS
WEIGHT: 315 LBS | BODY MASS INDEX: 47.74 KG/M2 | RESPIRATION RATE: 18 BRPM | HEART RATE: 91 BPM | TEMPERATURE: 98 F | SYSTOLIC BLOOD PRESSURE: 124 MMHG | OXYGEN SATURATION: 95 % | HEIGHT: 68 IN | DIASTOLIC BLOOD PRESSURE: 61 MMHG

## 2022-06-10 DIAGNOSIS — Z00.8 MEDICAL CLEARANCE FOR PSYCHIATRIC ADMISSION: ICD-10-CM

## 2022-06-10 DIAGNOSIS — R45.850 HOMICIDAL IDEATION: Primary | ICD-10-CM

## 2022-06-10 DIAGNOSIS — F30.9 MANIA: ICD-10-CM

## 2022-06-10 LAB
ALBUMIN SERPL BCP-MCNC: 3.7 G/DL (ref 3.5–5.2)
ALP SERPL-CCNC: 133 U/L (ref 55–135)
ALT SERPL W/O P-5'-P-CCNC: 36 U/L (ref 10–44)
AMPHET+METHAMPHET UR QL: NEGATIVE
ANION GAP SERPL CALC-SCNC: 12 MMOL/L (ref 8–16)
APAP SERPL-MCNC: <3 UG/ML (ref 10–20)
AST SERPL-CCNC: 28 U/L (ref 10–40)
BACTERIA #/AREA URNS AUTO: NORMAL /HPF
BARBITURATES UR QL SCN>200 NG/ML: NEGATIVE
BASOPHILS # BLD AUTO: 0.09 K/UL (ref 0–0.2)
BASOPHILS NFR BLD: 0.8 % (ref 0–1.9)
BENZODIAZ UR QL SCN>200 NG/ML: NEGATIVE
BILIRUB SERPL-MCNC: 0.5 MG/DL (ref 0.1–1)
BILIRUB UR QL STRIP: NEGATIVE
BUN SERPL-MCNC: 9 MG/DL (ref 6–20)
BZE UR QL SCN: NEGATIVE
CALCIUM SERPL-MCNC: 9.1 MG/DL (ref 8.7–10.5)
CANNABINOIDS UR QL SCN: NEGATIVE
CHLORIDE SERPL-SCNC: 98 MMOL/L (ref 95–110)
CLARITY UR REFRACT.AUTO: CLEAR
CO2 SERPL-SCNC: 25 MMOL/L (ref 23–29)
COLOR UR AUTO: YELLOW
CREAT SERPL-MCNC: 1.1 MG/DL (ref 0.5–1.4)
CREAT UR-MCNC: 187 MG/DL (ref 23–375)
CTP QC/QA: YES
DIFFERENTIAL METHOD: ABNORMAL
EOSINOPHIL # BLD AUTO: 0.3 K/UL (ref 0–0.5)
EOSINOPHIL NFR BLD: 2.4 % (ref 0–8)
ERYTHROCYTE [DISTWIDTH] IN BLOOD BY AUTOMATED COUNT: 18.5 % (ref 11.5–14.5)
EST. GFR  (AFRICAN AMERICAN): >60 ML/MIN/1.73 M^2
EST. GFR  (NON AFRICAN AMERICAN): >60 ML/MIN/1.73 M^2
ETHANOL SERPL-MCNC: <10 MG/DL
GLUCOSE SERPL-MCNC: 105 MG/DL (ref 70–110)
GLUCOSE UR QL STRIP: NEGATIVE
HCT VFR BLD AUTO: 42.1 % (ref 40–54)
HGB BLD-MCNC: 12.4 G/DL (ref 14–18)
HGB UR QL STRIP: NEGATIVE
IMM GRANULOCYTES # BLD AUTO: 0.04 K/UL (ref 0–0.04)
IMM GRANULOCYTES NFR BLD AUTO: 0.4 % (ref 0–0.5)
KETONES UR QL STRIP: NEGATIVE
LEUKOCYTE ESTERASE UR QL STRIP: NEGATIVE
LYMPHOCYTES # BLD AUTO: 1.8 K/UL (ref 1–4.8)
LYMPHOCYTES NFR BLD: 17.2 % (ref 18–48)
MCH RBC QN AUTO: 21.2 PG (ref 27–31)
MCHC RBC AUTO-ENTMCNC: 29.5 G/DL (ref 32–36)
MCV RBC AUTO: 72 FL (ref 82–98)
METHADONE UR QL SCN>300 NG/ML: NEGATIVE
MICROSCOPIC COMMENT: NORMAL
MONOCYTES # BLD AUTO: 0.6 K/UL (ref 0.3–1)
MONOCYTES NFR BLD: 5.8 % (ref 4–15)
NEUTROPHILS # BLD AUTO: 7.8 K/UL (ref 1.8–7.7)
NEUTROPHILS NFR BLD: 73.4 % (ref 38–73)
NITRITE UR QL STRIP: NEGATIVE
NRBC BLD-RTO: 0 /100 WBC
OPIATES UR QL SCN: NEGATIVE
PCP UR QL SCN>25 NG/ML: NEGATIVE
PH UR STRIP: 7 [PH] (ref 5–8)
PLATELET # BLD AUTO: 376 K/UL (ref 150–450)
PMV BLD AUTO: 12 FL (ref 9.2–12.9)
POTASSIUM SERPL-SCNC: 3.6 MMOL/L (ref 3.5–5.1)
PROT SERPL-MCNC: 8.1 G/DL (ref 6–8.4)
PROT UR QL STRIP: NEGATIVE
RBC # BLD AUTO: 5.84 M/UL (ref 4.6–6.2)
RBC #/AREA URNS AUTO: 2 /HPF (ref 0–4)
SARS-COV-2 RDRP RESP QL NAA+PROBE: NEGATIVE
SODIUM SERPL-SCNC: 135 MMOL/L (ref 136–145)
SP GR UR STRIP: 1.02 (ref 1–1.03)
SQUAMOUS #/AREA URNS AUTO: 3 /HPF
TOXICOLOGY INFORMATION: NORMAL
TSH SERPL DL<=0.005 MIU/L-ACNC: 2.39 UIU/ML (ref 0.4–4)
URN SPEC COLLECT METH UR: NORMAL
WBC # BLD AUTO: 10.69 K/UL (ref 3.9–12.7)
WBC #/AREA URNS AUTO: 1 /HPF (ref 0–5)

## 2022-06-10 PROCEDURE — 81001 URINALYSIS AUTO W/SCOPE: CPT | Performed by: EMERGENCY MEDICINE

## 2022-06-10 PROCEDURE — 85025 COMPLETE CBC W/AUTO DIFF WBC: CPT | Performed by: EMERGENCY MEDICINE

## 2022-06-10 PROCEDURE — 82077 ASSAY SPEC XCP UR&BREATH IA: CPT | Performed by: EMERGENCY MEDICINE

## 2022-06-10 PROCEDURE — 84443 ASSAY THYROID STIM HORMONE: CPT | Performed by: EMERGENCY MEDICINE

## 2022-06-10 PROCEDURE — 80053 COMPREHEN METABOLIC PANEL: CPT | Performed by: EMERGENCY MEDICINE

## 2022-06-10 PROCEDURE — 93010 ELECTROCARDIOGRAM REPORT: CPT | Mod: ,,, | Performed by: INTERNAL MEDICINE

## 2022-06-10 PROCEDURE — 80143 DRUG ASSAY ACETAMINOPHEN: CPT | Performed by: EMERGENCY MEDICINE

## 2022-06-10 PROCEDURE — U0002 COVID-19 LAB TEST NON-CDC: HCPCS | Performed by: EMERGENCY MEDICINE

## 2022-06-10 PROCEDURE — 93005 ELECTROCARDIOGRAM TRACING: CPT

## 2022-06-10 PROCEDURE — 99285 EMERGENCY DEPT VISIT HI MDM: CPT | Mod: 25

## 2022-06-10 PROCEDURE — 80307 DRUG TEST PRSMV CHEM ANLYZR: CPT | Performed by: EMERGENCY MEDICINE

## 2022-06-10 PROCEDURE — 93010 EKG 12-LEAD: ICD-10-PCS | Mod: ,,, | Performed by: INTERNAL MEDICINE

## 2022-06-10 PROCEDURE — 99285 PR EMERGENCY DEPT VISIT,LEVEL V: ICD-10-PCS | Mod: CS,,, | Performed by: EMERGENCY MEDICINE

## 2022-06-10 PROCEDURE — 99285 EMERGENCY DEPT VISIT HI MDM: CPT | Mod: CS,,, | Performed by: EMERGENCY MEDICINE

## 2022-06-10 RX ORDER — FUROSEMIDE 80 MG/1
80 TABLET ORAL 2 TIMES DAILY
COMMUNITY

## 2022-06-10 NOTE — HPI
"Emergency Psychiatry Consult Note    6/10/2022 10:01 AM  Jaquan Hines Jr.  MRN: [id number]    Chief Complaint / Reason for Consult: Katty with HI     SUBJECTIVE     History of Present Illness:   Jaquan Hines Jr. is a 51 y.o. male with a past psychiatric history of Bipolar I Disorder, currently presenting with manic episode.  Emergency Psychiatry was originally consulted to address the patient's symptoms of PEC.    Per ED RN(s):  Coming in for a heart catheterization, mentioned he needed a mental health consult--pt states he has homicidal ideations "to anyone who pisses me off"; pt noncompliant with meds except Seroquel to help him sleep; pt reports he has had several psychiatric admissions and doesn't receive the help he needs    Per ED MD:  This is a 51-year-old male who presents to the emergency department with a known history of bipolar 1 who was sent down per report from the cath lab due to concerns for acute psychiatric decompensation.  I have cleaned the history from both the patient and his sister Quyen Hines who was also contacted at [phone].  Quyen tells me that the patient has a manic episode approximately 1 time per year.  This time he has currently been with manic symptoms for about 6 weeks.  He has been making poor choices including activities such as giving a car away to strangers and stopping his medications.  The patient himself has a very expansive mood and slightly pressured speech.  He states that people may be misinterpreting his behaviors and that this car was stolen.  He states he stopped taking the medications due to GI side effects.  He does have an odd affect at this time however.  He also still states that he is homicidal towards who have pissed me off.  He denies any suicidal ideation at this time.  In regards to other symptomatology, he has not had any recent fevers or chills, chest pain, shortness of breath, abdominal pain, or vomiting.      Per ED Psych MD:  Patient seen " "in room today. He has expansive affect on entrance to room, obese patient with painted nails. Patient reports that he is in the hospital because he needs a mental health consult. Patien reports that he stopped taking his medication because " he was shittin and pissing himself". Patient declines to elaborate. Patient reports that he only takes Seroquel. If he takes the medication, he will get sleep for 12-24 hours. Patient reports that he stay up for 48-72 hours often.      Of note, patient has been driving a car that his sister bought for him. Per sister, patient gave his car away. Patient reports that the car was taken from him by someone that he should not have trusted but endorses that the sister and  do not agree  Patient is verbally sophisticvated, reports having a BS in Mathematics, previously a .     Patient would like to be admitted to hospital today and he endorses that his psychiatric needs are beyond outpatient care and that his sister will not allow him back home if he does not agree with admission/.     Patient previously a client of [facility].    Patient endoses that he does not have a plan to harm anyone but did endorse vague HI.      Psychiatric Review of Systems:  sleep: yes, See HPI  S.I.B.s/risky behavior: yes, recently sold car  SI/SA:  no      Irritability: no  Racing thoughts: yes  Impulsive behaviors: yes  Pressured speech:  yes    Paranoia:no  Delusions: no  AVH:no    Medical Review Of Systems:  Pertinent items noted in HPI    Psychiatric History:  Diagnose(s): Yes - Bipolar I Disorder   Previous Medication Trials: Yes - Seroquel 400 mg qhs, reports that he has been on   Previous Psychiatric Hospitalizations: Yes - Many, last in 2014  Outpatient Psychiatrist: Yes - previously seen at Outpatient care in [place]    Suicide/Violence Risk Assessment:  Current/active suicidal ideation/plan/intent: No  Previous suicide attempts: No  Current/active homicidal " "ideation/plan/intent: Yes - Passive, no intent to harm  History of threats/arrests associated with violent conduct - No      Social History:  Marital Status: single  Children: 1 daughter, "her mother was convinced that I was a demon. She murdered her son in her womb"  Employment Status: on disability  Education: college graduate  Special Ed: no  Housing Status: Yes - "sister's home:"   Developmental History: No  History of Abuse: No    Substance Abuse History:  Recreational Drugs:  denies   Use of Alcohol:  When I was a kid, I used to drink to pass out  Rehab History: No  Tobacco Use: No  Use of Caffeine: Yes - carbonated drinks  Use of OTC: No  Is the patient aware of the biomedical complications associated with substance abuse and mental illness? Yes   Legal consequences of chemical use: No    Legal History:  Past Charges/Incarcerations: Yes - shoplifting from [employer], 2nd time he went to the bank to fix his bank account, were called  Pending Charges: No    Psychosocial Factors:  Stressors: health.   Functioning Relationships: good support system, sister is supportive   Maladaptive or problem behaviors: Yes - medication non-compliance  Peer group, social, ethic, cultural, emotional, and health factors: No  Living situation, family constellation, family circumstances/home: Yes - lives with sister  Recovery environment: No  Community resources used by patient: No  Treatment acceptance/motivation for change: No    Collateral:   Yes - Lives with sister  - reports that patient typically cycles through malissa approx. 1x /year  - this is the worst episode that patient has had in a while  - Patient is giving away money to anyone  - Patient met a stranger in the gas station and let the person drive the car away  - Patient has been yelling and screaming at sister  - Normal medication regimen is typically only on Tegretol 200 mg qhs and    Scheduled Meds:    Psychotherapeutics (From admission, onward)                None "          PRN Meds:    Home Meds:  Prior to Admission medications    Medication Sig Start Date End Date Taking? Authorizing Provider   furosemide (LASIX) 80 MG tablet Take 160 mg by mouth 2 (two) times daily.   Yes Historical Provider   potassium chloride SA (K-DUR,KLOR-CON) 20 MEQ tablet Take 2 tablets (40 mEq total) by mouth 3 (three) times daily. 5/7/22  Yes Dona Murillo MD   apixaban 5 mg Tab Take 1 tablet (5 mg total) by mouth 2 (two) times daily. 4/11/18   Maria Guadalupe Duncan NP   aspirin (ECOTRIN) 81 MG EC tablet Take 1 tablet (81 mg total) by mouth once daily. 3/20/18 5/4/21  Maria Guadalupe Duncan NP   atorvastatin (LIPITOR) 40 MG tablet Take 1 tablet (40 mg total) by mouth once daily. 1/28/21   Roseanna Mccain MD   cetirizine (ZYRTEC) 10 MG tablet Take 10 mg by mouth once daily.     Historical Provider   fluticasone (FLONASE) 50 mcg/actuation nasal spray 1 spray by Each Nare route daily as needed for Rhinitis.    Historical Provider   omeprazole (PRILOSEC) 20 MG capsule Take 20 mg by mouth 2 (two) times daily.     Historical Provider   pulse oximeter (PULSE OXIMETER) device by Apply Externally route 2 (two) times a day. Use twice daily at 8 AM and 3 PM and record the value in [employer] as directed. 5/7/22   Dona Murillo MD   QUEtiapine (SEROQUEL) 200 MG Tab Take 1 tablet (200 mg total) by mouth every evening. 5/7/22 5/7/23  Dona Murillo MD   spironolactone (ALDACTONE) 50 MG tablet Take 1 tablet (50 mg total) by mouth once daily. 5/14/21 5/14/22  TONY Miller   torsemide (DEMADEX) 20 MG Tab Take 4 tablets (80 mg total) by mouth 2 (two) times a day. 5/7/22 5/7/23  Dona Murillo MD   VENTOLIN HFA 90 mcg/actuation inhaler TAKE 1 PUFF EVERY 6 HOURS AS NEEDED FOR WHEEZING/SOB 6/5/17   Historical Provider   selexipag 1,600 mcg Tab Take 1,600 mcg by mouth 2 (two) times daily. 1/26/21 5/7/22  Roseanna Mccain MD     Psychotherapeutics (From admission, onward)                None       "    Allergies:  Clopidogrel  Past Medical/Surgical History:  Past Medical History:   Diagnosis Date    Acute pulmonary embolism     Anticoagulant long-term use     Bipolar 1 disorder     diagnosed at 16    Chronic respiratory failure with hypoxia     Essential hypertension, malignant     Heart attack     Pulmonary artery hypertension      Past Surgical History:   Procedure Laterality Date    CARDIAC CATHETERIZATION      CATARACT EXTRACTION      MASS EXCISION      inner thigh, lipoma    RIGHT HEART CATHETERIZATION Right 5/12/2021    Procedure: INSERTION, CATHETER, RIGHT HEART;  Surgeon: Carl Schneider Jr., MD;  Location[facility] C[facility] LAB;  Service: Cardiology;  Laterality: Right;    TONSILLECTOMY, ADENOIDECTOMY       OBJECTIVE     Vital Signs:  Temp:  [98.7 °F (37.1 °C)-99.1 °F (37.3 °C)]   Pulse:  [86-92]   Resp:  [18]   BP: (117-149)/(56-70)   SpO2:  [96 %-100 %]     Mental Status Exam:  Appearance: unremarkable, age appropriate, obese, painted nails  Level of Consciousness: AAOx4  Behavior/Cooperation: normal, cooperative  Psychomotor: unremarkable   Speech: pressured, spontaneous, rapid  Language: english, fluid  Orientation: grossly intact  Attention Span/Concentration: intact  Memory: Grossly intact  Mood: "great"  Affect: normal, expansive, and extremely happy  Thought Process: flight of ideas, racing  Associations: flight of ideas, goal-directed  Thought Content: homicidal thoughts: (passive-yes)  Fund of Knowledge: Aware of current events, Vocabulary appropriate , and Above average intelligence  Abstraction: proverbs were abstract, similarities were abstract  Insight: fair  Judgment: fair    Laboratory Data:  Recent Results (from the past 48 hour(s))   CBC auto differential    Collection Time: 06/10/22  8:18 AM   Result Value Ref Range    WBC 10.69 3.90 - 12.70 K/uL    RBC 5.84 4.60 - 6.20 M/uL    Hemoglobin 12.4 (L) 14.0 - 18.0 g/dL    Hematocrit 42.1 40.0 - 54.0 %    MCV 72 (L) 82 - 98 fL    MCH 21.2 (L) " 27.0 - 31.0 pg    MCHC 29.5 (L) 32.0 - 36.0 g/dL    RDW 18.5 (H) 11.5 - 14.5 %    Platelets 376 150 - 450 K/uL    MPV 12.0 9.2 - 12.9 fL    Immature Granulocytes 0.4 0.0 - 0.5 %    Gran # (ANC) 7.8 (H) 1.8 - 7.7 K/uL    Immature Grans (Abs) 0.04 0.00 - 0.04 K/uL    Lymph # 1.8 1.0 - 4.8 K/uL    Mono # 0.6 0.3 - 1.0 K/uL    Eos # 0.3 0.0 - 0.5 K/uL    Baso # 0.09 0.00 - 0.20 K/uL    nRBC 0 0 /100 WBC    Gran % 73.4 (H) 38.0 - 73.0 %    Lymph % 17.2 (L) 18.0 - 48.0 %    Mono % 5.8 4.0 - 15.0 %    Eosinophil % 2.4 0.0 - 8.0 %    Basophil % 0.8 0.0 - 1.9 %    Differential Method Automated    Comprehensive metabolic panel    Collection Time: 06/10/22  8:18 AM   Result Value Ref Range    Sodium 135 (L) 136 - 145 mmol/L    Potassium 3.6 3.5 - 5.1 mmol/L    Chloride 98 95 - 110 mmol/L    CO2 25 23 - 29 mmol/L    Glucose 105 70 - 110 mg/dL    BUN 9 6 - 20 mg/dL    Creatinine 1.1 0.5 - 1.4 mg/dL    Calcium 9.1 8.7 - 10.5 mg/dL    Total Protein 8.1 6.0 - 8.4 g/dL    Albumin 3.7 3.5 - 5.2 g/dL    Total Bilirubin 0.5 0.1 - 1.0 mg/dL    Alkaline Phosphatase 133 55 - 135 U/L    AST 28 10 - 40 U/L    ALT 36 10 - 44 U/L    Anion Gap 12 8 - 16 mmol/L    eGFR if African American >60.0 >60 mL/min/1.73 m^2    eGFR if non African American >60.0 >60 mL/min/1.73 m^2   TSH    Collection Time: 06/10/22  8:18 AM   Result Value Ref Range    TSH 2.386 0.400 - 4.000 uIU/mL   Ethanol    Collection Time: 06/10/22  8:18 AM   Result Value Ref Range    Alcohol, Serum <10 <10 mg/dL   Acetaminophen level    Collection Time: 06/10/22  8:18 AM   Result Value Ref Range    Acetaminophen (Tylenol), Serum <3.0 (L) 10.0 - 20.0 ug/mL   POCT COVID-19 Rapid Screening    Collection Time: 06/10/22  8:26 AM   Result Value Ref Range    POC Rapid COVID Negative Negative     Acceptable Yes       No results found for: PHENYTOIN, PHENOBARB, VALPROATE, CBMZ  Imaging:  Imaging Results    None        ASSESSMENT     Jaquan RIOS Donny Cao is a 51 y.o. male with  a past psychiatric history of Bipolar I Disorder, currently presenting with manic episode. Emergency Psychiatry was originally consulted to address the patient's symptoms of homicidal ideation and malissa. Patient with acute decompensation, supported by hx from sister. Patient also agreeable to inpatient hospitalization.    IMPRESSION  Bipolar I Disorder, current episode manic    RECOMMENDATION(S)      1. Scheduled Medication(s):   Home medications include Tegretol 200 mg qhs. Family has not seen a psychiatrist in years. Will hold medication until inpatient admission    2. PRN Medication(s):  Haldol 5 mg q4hr PRN for non-redirectable agitation 2/2 acute malissa    3. Legal Status/Precaution(s):  Continue PEC with transfer to inpatient facility pending medical clearance    In cases of emergency, daily coverage provided by Acute/ED Psych MD, NP, or SW, with associated contact numbers listed in the [facility] On Call Schedule.    Case discussed with emergency psychiatry staff: Dr. Atkins

## 2022-06-10 NOTE — ED NOTES
Patient signed transfer form and consented to transfer.  He is aware that the transport service is supposed to be here after 2000.  Denied any needs.  Will continue to monitor.

## 2022-06-10 NOTE — ED TRIAGE NOTES
"LOC: The patient is awake, alert, and oriented to self, place, time, and situation. Pt is "euphoric"and cooperative. Affect is appropriate.  Speech is appropriate and clear.     APPEARANCE: Patient resting comfortably in no acute distress.  Patient is clean and well groomed.    SKIN: The skin is warm and dry; color consistent with ethnicity.  Patient has normal skin turgor and moist mucus membranes.  Skin intact; no breakdown or bruising noted.     MUSCULOSKELETAL: Patient moving upper and lower extremities without difficulty; denies pain in the extremities or back.  Denies weakness.     RESPIRATORY: Airway is open and patent. Respirations spontaneous, even, easy, and non-labored.  Patient has a normal effort and rate.  No accessory muscle use noted. Denies cough.     CARDIAC:   No peripheral edema noted. No complaints of chest pain.      ABDOMEN: Soft and non tender to palpation.  No distention noted. Pt denies abdominal pain; denies nausea, vomiting, diarrhea, or constipation.    NEUROLOGIC: Eyes open spontaneously.  Behavior appropriate to situation.  Follows commands; facial expression symmetrical.  Purposeful motor response noted; normal sensation in all extremities. Pt denies headache; denies lightheadedness or dizziness; denies visual disturbances; denies loss of balance; denies unilateral weakness.        "

## 2022-06-10 NOTE — ED NOTES
Transfer center called and stated that the patient's acceptance was rescinded by the facility and that she was going to have to push out the requests for placement again.

## 2022-06-10 NOTE — ED NOTES
Repeat vs obtained at this time.  Patient denies any needs at this time.  Sitter continues to be maintaining visual.   Will continue to monitor.
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
## 2022-06-10 NOTE — ED NOTES
Transfer center had called and stated that the patient was being accepted by [facility].  Patient was sleeping when I had gone to notify him.  I will attempt again to notify him as well.

## 2022-06-10 NOTE — ED NOTES
Patient provided with juice, cereal and milk at this time.  Sitter continues to be just outside the room.  Patient denies any additional needs at this time.  Awaiting urine sample to be able to request placement.

## 2022-06-10 NOTE — ED NOTES
Report received from Jimmy ESTRADA at this time.  Patient had reported that he previously requested more juice, milk and cereal.  He is aware that a urine sample is still needed.  Reports that he needs the juice first.

## 2022-06-10 NOTE — ED PROVIDER NOTES
"Encounter Date: 6/10/2022       History     Chief Complaint   Patient presents with    Homicidal     Coming in for a heart catheterization, mentioned he needed a mental health consult--pt states he has homicidal ideations "to anyone who pisses me off"; pt noncompliant with meds except Seroquel to help him sleep; pt reports he has had several psychiatric admissions and doesn't receive the help he needs     This is a 51-year-old male who presents to the emergency department with a known history of bipolar 1 who was sent down per report from the cath lab due to concerns for acute psychiatric decompensation.  I have cleaned the history from both the patient and his sister Quyen Hines who was also contacted at [phone].  Quyen tells me that the patient has a manic episode approximately 1 time per year.  This time he has currently been with manic symptoms for about 6 weeks.  He has been making poor choices including activities such as giving a car away to strangers and stopping his medications.  The patient himself has a very expansive mood and slightly pressured speech.  He states that people may be misinterpreting his behaviors and that this car was stolen.  He states he stopped taking the medications due to GI side effects.  He does have an odd affect at this time however.  He also still states that he is homicidal towards who have pissed me off.  He denies any suicidal ideation at this time.  In regards to other symptomatology, he has not had any recent fevers or chills, chest pain, shortness of breath, abdominal pain, or vomiting.        Review of patient's allergies indicates:   Allergen Reactions    Clopidogrel      Other reaction(s): Other (See Comments)  Pt reports it does not work      Past Medical History:   Diagnosis Date    Acute pulmonary embolism     Anticoagulant long-term use     Bipolar 1 disorder     diagnosed at 16    Chronic respiratory failure with hypoxia     Essential hypertension, " malignant     Heart attack     Pulmonary artery hypertension      Past Surgical History:   Procedure Laterality Date    CARDIAC CATHETERIZATION      CATARACT EXTRACTION      MASS EXCISION      inner thigh, lipoma    RIGHT HEART CATHETERIZATION Right 5/12/2021    Procedure: INSERTION, CATHETER, RIGHT HEART;  Surgeon: Carl Schneider Jr., MD;  Location: [facility] CATH LAB;  Service: Cardiology;  Laterality: Right;    TONSILLECTOMY, ADENOIDECTOMY       History reviewed. No pertinent family history.  Social History     Tobacco Use    Smoking status: Never Smoker    Smokeless tobacco: Never Used   Substance Use Topics    Alcohol use: No    Drug use: No     Review of Systems   Constitutional: Negative for chills and fever.   HENT: Negative for congestion, rhinorrhea and sore throat.    Eyes: Negative for visual disturbance.   Respiratory: Negative for shortness of breath.    Cardiovascular: Negative for chest pain.   Gastrointestinal: Negative for diarrhea, nausea and vomiting.   Endocrine: Negative for polyuria.   Genitourinary: Negative for dysuria.   Musculoskeletal: Negative for back pain.   Skin: Negative for rash.   Neurological: Negative for weakness and numbness.   Hematological: Does not bruise/bleed easily.   Psychiatric/Behavioral: Positive for behavioral problems. Negative for confusion.       Physical Exam     Initial Vitals [06/10/22 0734]   BP Pulse Resp Temp SpO2   (!) 117/56 86 18 98.7 °F (37.1 °C) 100 %      MAP       --         Physical Exam    Nursing note and vitals reviewed.  Constitutional: He appears well-developed and well-nourished.   Expansive mood. A bit pressured speech.    HENT:   Head: Normocephalic and atraumatic.   Mouth/Throat: No oropharyngeal exudate.   Eyes: EOM are normal. Pupils are equal, round, and reactive to light.   Mild bilateral conjunctival injection.    Neck: Neck supple.   Normal range of motion.  Cardiovascular: Normal rate, regular rhythm, normal heart sounds  "and intact distal pulses. Exam reveals no gallop and no friction rub.    No murmur heard.  Well healed midline sternotomy scar.    Pulmonary/Chest: Breath sounds normal. No respiratory distress. He has no wheezes. He has no rhonchi. He has no rales.   Abdominal: Abdomen is soft. Bowel sounds are normal. He exhibits no distension. There is no abdominal tenderness. There is no rebound and no guarding.   Musculoskeletal:         General: No edema. Normal range of motion.      Cervical back: Normal range of motion and neck supple.     Neurological: He is alert and oriented to person, place, and time. He has normal strength. No cranial nerve deficit or sensory deficit. GCS score is 15. GCS eye subscore is 4. GCS verbal subscore is 5. GCS motor subscore is 6.   Skin: Skin is warm and dry. Capillary refill takes less than 2 seconds.   Psychiatric:   Expansive mood. Pressured speech. Homicidal thoughts. No suicidal thoughts currently per patient although behaviors are concerning - per sister giving away car / not taking meds. Is formulating thoughts/sentences normally - makes sense when answering questions. Not agitated upon initial exam but is energetic with responses. When I ask him how he feels, he responds "elated."         ED Course   Procedures  Labs Reviewed   CBC W/ AUTO DIFFERENTIAL - Abnormal; Notable for the following components:       Result Value    Hemoglobin 12.4 (*)     MCV 72 (*)     MCH 21.2 (*)     MCHC 29.5 (*)     RDW 18.5 (*)     Gran # (ANC) 7.8 (*)     Gran % 73.4 (*)     Lymph % 17.2 (*)     All other components within normal limits   COMPREHENSIVE METABOLIC PANEL - Abnormal; Notable for the following components:    Sodium 135 (*)     All other components within normal limits   ACETAMINOPHEN LEVEL - Abnormal; Notable for the following components:    Acetaminophen (Tylenol), Serum <3.0 (*)     All other components within normal limits   SARS-COV-2 RDRP GENE - Normal    Narrative:     This test " utilizes isothermal nucleic acid amplification   technology to detect the SARS-CoV-2 RdRp nucleic acid segment.   The analytical sensitivity (limit of detection) is 125 genome   equivalents/mL.   A POSITIVE result implies infection with the SARS-CoV-2 virus;   the patient is presumed to be contagious.     A NEGATIVE result means that SARS-CoV-2 nucleic acids are not   present above the limit of detection. A NEGATIVE result should be   treated as presumptive. It does not rule out the possibility of   COVID-19 and should not be the sole basis for treatment decisions.   If COVID-19 is strongly suspected based on clinical and exposure   history, re-testing using an alternate molecular assay should be   considered.   This test is only for use under the Food and Drug   Administration s Emergency Use Authorization (EUA).   Commercial kits are provided by [employer].   Performance characteristics of the EUA have been independently   verified by [facility] Department of   Pathology and Laboratory Medicine.   _________________________________________________________________   The authorized Fact Sheet for Healthcare Providers and the authorized Fact   Sheet for Patients of the ID NOW COVID-19 are available on the FDA   website:     https://www.fda.gov/media/283741/download  https://www.fda.gov/media/602205/download           TSH   URINALYSIS, REFLEX TO URINE CULTURE    Narrative:     Specimen Source->Urine   DRUG SCREEN PANEL, URINE EMERGENCY    Narrative:     Specimen Source->Urine   ALCOHOL,MEDICAL (ETHANOL)   URINALYSIS MICROSCOPIC    Narrative:     Specimen Source->Urine          Imaging Results    None          Medications - No data to display  Medical Decision Making:   History:   I obtained history from: someone other than patient.       <> Summary of History: Information also glean from patient's relative, Quyen (sister).  Old Medical Records: I decided to obtain old medical records.  Old Records  Summarized: records from clinic visits.       <> Summary of Records: I have also reviewed the patient's past medical history here in our epic charts.  I also see note of his pulmonary hypertension and has plans for this recent right heart catheterization.  Initial Assessment:   This is a 51-year-old male who presents to the emergency department with acute malissa and homicidal ideation.  Differential Diagnosis:   The patient has an expansive mood.  He has pressured speech.  He has been making recent choices that are of great concern.  We have confirmed much of the history with his sister.  I have informed the patient that we are concerned about his safety and safety of others and that I will be completing a pec.  He voiced understanding. We also involved psychiatry in formal consultation, and they concur with the plan.   We did need to medically clear the patient before going. Fortunately, he is not having any chest complaints or shortness of breath.   Clinical Tests:   Lab Tests: Reviewed and Ordered  ED Management:  This is a 52 yo who presents with manic behavior and homicidal ideation. PEC in place. Medically cleared. I have reviewed the labs, and they are reassuring. EKG, independently reviewed and interpreted by me, reveals sinus rhythm with nonspecific st / t wave changes that are not concerning for ischemia or infarction and compared to previous ECG does not appear to have significant change in st / t segments.  The patient will be proceeding to a psychiatric facility to continue his care. He is in stable condition at this time.   ED Diagnosis:  1. Acute malissa.   2. Acute homicidal ideation.   3. Above complicated by known bipolar disorder and pulmonary hypertension.   Other:   I have discussed this case with another health care provider.       <> Summary of the Discussion: Discussed with psychiatry in formal consultation.                       Clinical Impression:   Final diagnoses:  [R46.850] Homicidal  ideation (Primary)  [F30.9] Katty          ED Disposition Condition    Transfer to Psych Facility         ED Prescriptions     None        Follow-up Information    None          Nemo Loaiza MD  06/10/22 5836       Nemo Loaiza MD  06/10/22 2862

## 2022-06-11 NOTE — ED NOTES
Patient was notified in the delay of his transport and was provided with the new ETA at this time.     He stated that he was in no rush and that he was doing well.

## 2022-06-11 NOTE — ED NOTES
[facility] is here at this time to transport the patient.  He is pleasant and laughing at this time.  Assisted to EMS stretcher awaiting security to come to escort out of the department to the ambulance. [facility] was called and notified that the patient was just leaving our facility, however there was no answer.  I had left a message for the nursing staff report was previously called.

## 2022-06-11 NOTE — ED NOTES
Patient's sister was called per his request and notified of where he was going to.  She stated that she has all of his belongings that he had initially here with him with her.  That there was not anything left here other than what was on his person when he had initially came here.      Patient is aware.

## 2022-06-22 ENCOUNTER — TELEPHONE (OUTPATIENT)
Dept: TRANSPLANT | Facility: CLINIC | Age: 52
End: 2022-06-22
Payer: MEDICAID

## 2022-06-22 NOTE — TELEPHONE ENCOUNTER
Patient called in with  from [facility] to see what next steps are. He was discharged home from [facility] 2 days ago, he was sent home with seroquel and has been taking it.  states that patient has reported that he has gained 22lbs in fluid since being discharged, patient denies SOB, chest pain, n/V. RHC cannot be done while patient is fluid overloaded and patient verbalized understanding. Per patient-labs were drawn before discharge and understands that adjustments to diuretics cannot be made without current lab work but if patient is not feeling well he can go to ER to address the fluid overload. Patient states that he has been taking 80 mg of lasix once daily. Christiana.NP notified and labs requested from [facility]

## 2022-07-05 ENCOUNTER — PATIENT MESSAGE (OUTPATIENT)
Dept: TRANSPLANT | Facility: CLINIC | Age: 52
End: 2022-07-05
Payer: MEDICAID

## 2022-07-07 ENCOUNTER — TELEPHONE (OUTPATIENT)
Dept: TRANSPLANT | Facility: CLINIC | Age: 52
End: 2022-07-07
Payer: MEDICAID

## 2022-07-07 NOTE — TELEPHONE ENCOUNTER
Nurse navigator spoke with patient to confirm diuretics. Patient states that he is only taking lasix and takes 80 mg BID and he has been taking 60 mEq of potassium TID. Patient had labs drawn on 7/5. SANTI Villeda notified.

## 2022-07-11 ENCOUNTER — PATIENT MESSAGE (OUTPATIENT)
Dept: TRANSPLANT | Facility: CLINIC | Age: 52
End: 2022-07-11
Payer: MEDICAID

## 2022-07-15 ENCOUNTER — TELEPHONE (OUTPATIENT)
Dept: TRANSPLANT | Facility: CLINIC | Age: 52
End: 2022-07-15
Payer: MEDICAID

## 2022-08-04 ENCOUNTER — HOSPITAL ENCOUNTER (OUTPATIENT)
Dept: PULMONOLOGY | Facility: CLINIC | Age: 52
Discharge: HOME | End: 2022-08-04
Payer: MEDICAID

## 2022-08-04 ENCOUNTER — OFFICE VISIT (OUTPATIENT)
Dept: TRANSPLANT | Facility: CLINIC | Age: 52
End: 2022-08-04
Payer: MEDICAID

## 2022-08-04 ENCOUNTER — HOSPITAL ENCOUNTER (OUTPATIENT)
Facility: HOSPITAL | Age: 52
Discharge: HOME | End: 2022-08-04
Attending: INTERNAL MEDICINE | Admitting: INTERNAL MEDICINE
Payer: MEDICAID

## 2022-08-04 VITALS
SYSTOLIC BLOOD PRESSURE: 162 MMHG | BODY MASS INDEX: 49.44 KG/M2 | OXYGEN SATURATION: 98 % | HEIGHT: 67 IN | HEART RATE: 96 BPM | WEIGHT: 315 LBS | DIASTOLIC BLOOD PRESSURE: 79 MMHG

## 2022-08-04 VITALS
HEIGHT: 67 IN | RESPIRATION RATE: 18 BRPM | OXYGEN SATURATION: 96 % | DIASTOLIC BLOOD PRESSURE: 75 MMHG | SYSTOLIC BLOOD PRESSURE: 131 MMHG | WEIGHT: 315 LBS | BODY MASS INDEX: 49.44 KG/M2 | TEMPERATURE: 99 F | HEART RATE: 94 BPM

## 2022-08-04 VITALS — WEIGHT: 315 LBS | BODY MASS INDEX: 49.44 KG/M2 | HEIGHT: 67 IN

## 2022-08-04 DIAGNOSIS — I27.21 PULMONARY ARTERY HYPERTENSION: ICD-10-CM

## 2022-08-04 DIAGNOSIS — F31.9 BIPOLAR 1 DISORDER: ICD-10-CM

## 2022-08-04 DIAGNOSIS — I27.24 CTEPH (CHRONIC THROMBOEMBOLIC PULMONARY HYPERTENSION): ICD-10-CM

## 2022-08-04 DIAGNOSIS — I27.20 PULMONARY HYPERTENSION: Primary | ICD-10-CM

## 2022-08-04 DIAGNOSIS — I27.20 PULMONARY HYPERTENSION: ICD-10-CM

## 2022-08-04 DIAGNOSIS — I25.10 CORONARY ARTERY DISEASE INVOLVING NATIVE CORONARY ARTERY OF NATIVE HEART WITHOUT ANGINA PECTORIS: ICD-10-CM

## 2022-08-04 DIAGNOSIS — E66.01 OBESITY, MORBID, BMI 50 OR HIGHER: ICD-10-CM

## 2022-08-04 DIAGNOSIS — R06.02 SHORTNESS OF BREATH: Primary | ICD-10-CM

## 2022-08-04 PROCEDURE — 99214 OFFICE O/P EST MOD 30 MIN: CPT | Mod: PBBFAC

## 2022-08-04 PROCEDURE — 3078F PR MOST RECENT DIASTOLIC BLOOD PRESSURE < 80 MM HG: ICD-10-PCS | Mod: CPTII,,,

## 2022-08-04 PROCEDURE — 93451 PR RIGHT HEART CATH O2 SATURATION & CARDIAC OUTPUT: ICD-10-PCS | Mod: 26,,, | Performed by: INTERNAL MEDICINE

## 2022-08-04 PROCEDURE — 99999 PR PBB SHADOW E&M-EST. PATIENT-LVL IV: ICD-10-PCS | Mod: PBBFAC,,,

## 2022-08-04 PROCEDURE — 1159F PR MEDICATION LIST DOCUMENTED IN MEDICAL RECORD: ICD-10-PCS | Mod: CPTII,,,

## 2022-08-04 PROCEDURE — 93451 RIGHT HEART CATH: CPT | Performed by: INTERNAL MEDICINE

## 2022-08-04 PROCEDURE — 94618 PULMONARY STRESS TESTING: ICD-10-PCS | Mod: 26,S$PBB,, | Performed by: INTERNAL MEDICINE

## 2022-08-04 PROCEDURE — C1751 CATH, INF, PER/CENT/MIDLINE: HCPCS | Performed by: INTERNAL MEDICINE

## 2022-08-04 PROCEDURE — 94618 PULMONARY STRESS TESTING: CPT | Mod: 26,S$PBB,, | Performed by: INTERNAL MEDICINE

## 2022-08-04 PROCEDURE — 3008F BODY MASS INDEX DOCD: CPT | Mod: CPTII,,,

## 2022-08-04 PROCEDURE — 94618 PULMONARY STRESS TESTING: CPT | Mod: PBBFAC | Performed by: INTERNAL MEDICINE

## 2022-08-04 PROCEDURE — 93451 RIGHT HEART CATH: CPT | Mod: 26,,, | Performed by: INTERNAL MEDICINE

## 2022-08-04 PROCEDURE — 3078F DIAST BP <80 MM HG: CPT | Mod: CPTII,,,

## 2022-08-04 PROCEDURE — 3008F PR BODY MASS INDEX (BMI) DOCUMENTED: ICD-10-PCS | Mod: CPTII,,,

## 2022-08-04 PROCEDURE — 99999 PR PBB SHADOW E&M-EST. PATIENT-LVL IV: CPT | Mod: PBBFAC,,,

## 2022-08-04 PROCEDURE — 99214 PR OFFICE/OUTPT VISIT, EST, LEVL IV, 30-39 MIN: ICD-10-PCS | Mod: 25,S$PBB,,

## 2022-08-04 PROCEDURE — 3077F PR MOST RECENT SYSTOLIC BLOOD PRESSURE >= 140 MM HG: ICD-10-PCS | Mod: CPTII,,,

## 2022-08-04 PROCEDURE — 99214 OFFICE O/P EST MOD 30 MIN: CPT | Mod: 25,S$PBB,,

## 2022-08-04 PROCEDURE — 1159F MED LIST DOCD IN RCRD: CPT | Mod: CPTII,,,

## 2022-08-04 PROCEDURE — C1894 INTRO/SHEATH, NON-LASER: HCPCS | Performed by: INTERNAL MEDICINE

## 2022-08-04 PROCEDURE — 3077F SYST BP >= 140 MM HG: CPT | Mod: CPTII,,,

## 2022-08-04 PROCEDURE — 25000003 PHARM REV CODE 250: Performed by: INTERNAL MEDICINE

## 2022-08-04 RX ORDER — LORATADINE 10 MG/1
10 TABLET ORAL DAILY
COMMUNITY
Start: 2022-07-12

## 2022-08-04 RX ORDER — CITALOPRAM 40 MG/1
40 TABLET, FILM COATED ORAL DAILY
COMMUNITY
Start: 2022-07-09

## 2022-08-04 RX ORDER — LIDOCAINE HYDROCHLORIDE AND EPINEPHRINE 20; 10 MG/ML; UG/ML
INJECTION, SOLUTION INFILTRATION; PERINEURAL
Status: DISCONTINUED | OUTPATIENT
Start: 2022-08-04 | End: 2022-08-04 | Stop reason: HOSPADM

## 2022-08-04 RX ORDER — METHOCARBAMOL 500 MG/1
500 TABLET, FILM COATED ORAL 4 TIMES DAILY
COMMUNITY
Start: 2022-07-08

## 2022-08-04 RX ORDER — SODIUM CHLORIDE 0.9 G/100ML
IRRIGANT IRRIGATION
Status: DISCONTINUED | OUTPATIENT
Start: 2022-08-04 | End: 2022-08-04 | Stop reason: HOSPADM

## 2022-08-04 RX ORDER — SPIRONOLACTONE 25 MG/1
25 TABLET ORAL DAILY
Qty: 30 TABLET | Refills: 11 | Status: SHIPPED | OUTPATIENT
Start: 2022-08-04 | End: 2023-08-04

## 2022-08-04 NOTE — H&P
[facility] - Short Stay Cardiac Unit  Heart Transplant  H&P    Patient Name: Jaquan Hines Jr.  MRN: [id number]  Admission Date: 8/4/2022  Attending Physician: Mabel Stallings DO  Primary Care Provider: Allen Hubbard MD  Principal Problem:<principal problem not specified>    Subjective:     History of Present Illness:  Mr. 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 Pt has been off his Bipolar medication and had been feeling good, so good that he stopped his PH meds as well.     Pt here for RHC today to assess PH. He denies any issues at this time       5/5/21 TTE  · The estimated ejection fraction is 60%.  · The left ventricle is normal in size with normal systolic function.  · Indeterminate left ventricular diastolic function.  · Mild left atrial enlargement.  · Mild tricuspid regurgitation.  · Normal right ventricular size with normal right ventricular systolic function.  · There is abnormal septal wall motion consistent with right ventricular pacemaker.  · The estimated PA systolic pressure is 48 mmHg.  · Intermediate central venous pressure (8 mmHg).  ·   RHC: 5/12/2021  RA: 24/ 24/ 17 RV: 53/ 16 PA: 50/ 24/ 33 PWP: 30/ 43/ 30 .   Cardiac output was 6.65  by Neel. Cardiac index is 2.56 L/min/m2.   O2 Sat: PA 96%.   Pulmonary vascular resistance: 36. Systemic vascular resistance: 770      Past Medical History:   Diagnosis Date    Acute pulmonary embolism     Anticoagulant long-term use     Bipolar 1 disorder     diagnosed at 16    Chronic respiratory failure with hypoxia     Essential hypertension, malignant     Heart attack     Pulmonary artery hypertension        Past Surgical History:   Procedure Laterality Date    CARDIAC CATHETERIZATION      CATARACT EXTRACTION      MASS EXCISION      inner thigh, lipoma    RIGHT HEART CATHETERIZATION  Right 5/12/2021    Procedure: INSERTION, CATHETER, RIGHT HEART;  Surgeon: Carl Schneider Jr., MD;  Location: [facility] CATH LAB;  Service: Cardiology;  Laterality: Right;    TONSILLECTOMY, ADENOIDECTOMY         Review of patient's allergies indicates:   Allergen Reactions    Clopidogrel      Other reaction(s): Other (See Comments)  Pt reports it does not work        No current facility-administered medications for this encounter.     Family History    None       Tobacco Use    Smoking status: Never Smoker    Smokeless tobacco: Never Used   Substance and Sexual Activity    Alcohol use: No    Drug use: No    Sexual activity: Not Currently     Review of Systems   Constitutional:  Negative for activity change, appetite change and chills.   HENT:  Negative for congestion and dental problem.    Respiratory:  Negative for apnea, cough, choking, chest tightness and shortness of breath.    Cardiovascular:  Negative for chest pain and leg swelling.   Gastrointestinal:  Negative for abdominal distention, abdominal pain, anal bleeding and blood in stool.   Endocrine: Negative for cold intolerance, heat intolerance and polydipsia.   Genitourinary:  Negative for difficulty urinating, dysuria, enuresis and flank pain.   Musculoskeletal:  Negative for arthralgias, back pain, gait problem and joint swelling.   Skin:  Negative for color change, pallor and rash.   Neurological:  Negative for dizziness, facial asymmetry, light-headedness and headaches.   Hematological:  Negative for adenopathy. Does not bruise/bleed easily.   Objective:     Vital Signs (Most Recent):    Vital Signs (24h Range):        No data found.  There is no height or weight on file to calculate BMI.    No intake or output data in the 24 hours ending 08/04/22 1211    Physical Exam  Constitutional:       General: He is not in acute distress.     Appearance: Normal appearance. He is normal weight. He is not ill-appearing or toxic-appearing.   HENT:      Head:  Normocephalic and atraumatic.      Nose: Nose normal. No congestion.      Mouth/Throat:      Mouth: Mucous membranes are moist.      Pharynx: No oropharyngeal exudate.   Eyes:      General:         Right eye: No discharge.         Left eye: No discharge.      Extraocular Movements: Extraocular movements intact.      Pupils: Pupils are equal, round, and reactive to light.   Cardiovascular:      Rate and Rhythm: Normal rate and regular rhythm.      Heart sounds: No murmur heard.    No friction rub. No gallop.   Pulmonary:      Effort: Pulmonary effort is normal. No respiratory distress.      Breath sounds: Normal breath sounds. No wheezing, rhonchi or rales.   Abdominal:      General: Abdomen is flat. Bowel sounds are normal. There is no distension.      Palpations: Abdomen is soft.      Tenderness: There is no abdominal tenderness.   Musculoskeletal:         General: No swelling. Normal range of motion.      Cervical back: Normal range of motion. No rigidity.      Right lower leg: No edema.      Left lower leg: No edema.   Skin:     General: Skin is warm and dry.      Findings: No lesion or rash.   Neurological:      General: No focal deficit present.      Mental Status: He is alert and oriented to person, place, and time.      Cranial Nerves: No cranial nerve deficit.      Motor: No weakness.       Significant Labs:  CBC:  No results for input(s): WBC, RBC, HGB, HCT, PLT, MCV, MCH, MCHC in the last 168 hours.  BNP:  No results for input(s): BNP in the last 168 hours.    Invalid input(s): BNPTRIAGELBLO  CMP:  No results for input(s): GLU, CALCIUM, ALBUMIN, PROT, NA, K, CO2, CL, BUN, CREATININE, ALKPHOS, ALT, AST, BILITOT in the last 168 hours.   Coagulation:   No results for input(s): PT, INR, APTT in the last 168 hours.  LDH:  No results for input(s): LDH in the last 72 hours.  Microbiology:  Microbiology Results (last 7 days)       ** No results found for the last 168 hours. **            BMP: No results for  input(s): GLU, NA, K, CL, CO2, BUN, CREATININE, CALCIUM, MG in the last 72 hours.  I have reviewed all pertinent labs within the past 24 hours.    Diagnostic Results:  Reviewed     Assessment/Plan:     PAH  CTEPH (chronic thromboembolic pulmonary hypertension)  Patient seen and examined. No interval change since last H&P. Here for a RHC to assess her hemodynamics.   Access via the right IJ  7 Fr venous sheath  Risks and benefits of the procedure were explained to the patient. All questions were answered.  Consents were signed and in the chart          Garrison Polk MD  Heart Transplant  [facility] - Short Stay Cardiac Unit

## 2022-08-04 NOTE — ASSESSMENT & PLAN NOTE
Patient seen and examined. No interval change since last H&P. Here for a RHC to assess her hemodynamics.   Access via the right IJ  7 Fr venous sheath  Risks and benefits of the procedure were explained to the patient. All questions were answered.  Consents were signed and in the chart

## 2022-08-04 NOTE — DISCHARGE SUMMARY
[facility] - Cath Lab  Discharge Note  Short Stay    Procedure(s) (LRB):  INSERTION, CATHETER, RIGHT HEART (Right)    OUTCOME: Patient tolerated treatment/procedure well without complication and is now ready for discharge.    DISPOSITION: Home or Self Care    FINAL DIAGNOSIS:  PAH    FOLLOWUP: In clinic    DISCHARGE INSTRUCTIONS:  No discharge procedures on file.     TIME SPENT ON DISCHARGE: 30 minutes    Garrison Polk   Department of Cardiovascular Disease, PGY-5   [facility]

## 2022-08-04 NOTE — PATIENT INSTRUCTIONS
Add Spironolactone 25 mg, daily.    Repeat lab - BMP, locally.    Wear CPAP device consistently. Please make an appointment with Dr. Montaño.    Follow up with local cardiologist.    RTC 3 months with ECHO, labs, walk    Recommend 2 gram sodium restriction and 1500cc fluid restriction.  Encourage physical activity with graded exercise program.  Requested patient to weigh themselves daily, and to notify us if their weight increases by more than 3 lbs in 1 day or 5 lbs in 1 week.

## 2022-08-04 NOTE — PROCEDURES
Jaquan Hines Jr. is a 52 y.o.  male patient, who presents for a 6 minute walk test ordered by SANTI Trejo.  The diagnosis is Pulmonary Hypertension.  The patient's BMI is 53.1 kg/m2.  Predicted distance (lower limit of normal) is 314.35 meters.      Test Results:    The test was completed without stopping.  The total time walked was 360 seconds.  During walking, the patient reported:  Chest pain, Dyspnea.  The patient used no assistive devices during testing.     08/04/2022---------Distance: 365.76 meters (1200 feet)     O2 Sat % Supplemental Oxygen Heart Rate Blood Pressure Ammon Scale   Pre-exercise  (Resting) 96 % Room Air 95 bpm 140/68 mmHg 3   During Exercise 92 % Room Air 134 bpm 193/92 mmHg 7-8   Post-exercise  (Recovery) 97 % Room Air  102 bpm 155/77 mmHg      Recovery Time: 280 seconds    Performing nurse/tech: Bessy BLOOD      PREVIOUS STUDY:   05/04/2021---------Distance: 182.88 meters (600 feet)       O2 Sat % Supplemental Oxygen Heart Rate Blood Pressure Ammon Scale   Pre-exercise  (Resting) 96 % Room Air 92 bpm 120/56 2   During Exercise 88 % Room Air 124 bpm 143/66 9   Post-exercise    97 % Room Air  109 bpm         CLINICAL INTERPRETATION:  Six minute walk distance is 365.76 meters (1200 feet) with very heavy dyspnea.  During exercise, there was significant desaturation while breathing room air.  Both blood pressure and heart rate increased significantly with walking.  This may represent a hypertensive response to exercise.  The patient reported non-pulmonary symptoms during exercise.  Since the previous study in May 2021, exercise capacity is significantly improved.  Based upon age and body mass index, exercise capacity is normal.

## 2022-08-04 NOTE — Clinical Note
The PA catheter was repositioned to the main pulmonary artery. Hemodynamics were performed. Cardiac output was obtained at 7 L/min. O2 saturation was measured at 64%. CO: 6.54  CI: 2.6

## 2022-08-04 NOTE — HPI
Mr. 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 Pt has been off his Bipolar medication and had been feeling good, so good that he stopped his PH meds as well.     Pt here for RHC today to assess PH. He denies any issues at this time       5/5/21 TTE  The estimated ejection fraction is 60%.  The left ventricle is normal in size with normal systolic function.  Indeterminate left ventricular diastolic function.  Mild left atrial enlargement.  Mild tricuspid regurgitation.  Normal right ventricular size with normal right ventricular systolic function.  There is abnormal septal wall motion consistent with right ventricular pacemaker.  The estimated PA systolic pressure is 48 mmHg.  Intermediate central venous pressure (8 mmHg).    RHC: 5/12/2021  RA: 24/ 24/ 17 RV: 53/ 16 PA: 50/ 24/ 33 PWP: 30/ 43/ 30 .   Cardiac output was 6.65  by Neel. Cardiac index is 2.56 L/min/m2.   O2 Sat: PA 96%.   Pulmonary vascular resistance: 36. Systemic vascular resistance: 770

## 2022-08-04 NOTE — BRIEF OP NOTE
RHC done via Right IJ, patient tolerated the procedure well. Patient with prior pulmonary thrombectomy, CAD s/p PCI and HFpEF. 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 Please see full cath report for details     Brigida Ahn MD

## 2022-08-04 NOTE — SUBJECTIVE & OBJECTIVE
Past Medical History:   Diagnosis Date    Acute pulmonary embolism     Anticoagulant long-term use     Bipolar 1 disorder     diagnosed at 16    Chronic respiratory failure with hypoxia     Essential hypertension, malignant     Heart attack     Pulmonary artery hypertension        Past Surgical History:   Procedure Laterality Date    CARDIAC CATHETERIZATION      CATARACT EXTRACTION      MASS EXCISION      inner thigh, lipoma    RIGHT HEART CATHETERIZATION Right 5/12/2021    Procedure: INSERTION, CATHETER, RIGHT HEART;  Surgeon: Carl Schneider Jr., MD;  Location: [facility] CATH LAB;  Service: Cardiology;  Laterality: Right;    TONSILLECTOMY, ADENOIDECTOMY         Review of patient's allergies indicates:   Allergen Reactions    Clopidogrel      Other reaction(s): Other (See Comments)  Pt reports it does not work        No current facility-administered medications for this encounter.     Family History    None       Tobacco Use    Smoking status: Never Smoker    Smokeless tobacco: Never Used   Substance and Sexual Activity    Alcohol use: No    Drug use: No    Sexual activity: Not Currently     Review of Systems   Constitutional:  Negative for activity change, appetite change and chills.   HENT:  Negative for congestion and dental problem.    Respiratory:  Negative for apnea, cough, choking, chest tightness and shortness of breath.    Cardiovascular:  Negative for chest pain and leg swelling.   Gastrointestinal:  Negative for abdominal distention, abdominal pain, anal bleeding and blood in stool.   Endocrine: Negative for cold intolerance, heat intolerance and polydipsia.   Genitourinary:  Negative for difficulty urinating, dysuria, enuresis and flank pain.   Musculoskeletal:  Negative for arthralgias, back pain, gait problem and joint swelling.   Skin:  Negative for color change, pallor and rash.   Neurological:  Negative for dizziness, facial asymmetry, light-headedness and headaches.   Hematological:  Negative for  adenopathy. Does not bruise/bleed easily.   Objective:     Vital Signs (Most Recent):    Vital Signs (24h Range):        No data found.  There is no height or weight on file to calculate BMI.    No intake or output data in the 24 hours ending 08/04/22 1211    Physical Exam  Constitutional:       General: He is not in acute distress.     Appearance: Normal appearance. He is normal weight. He is not ill-appearing or toxic-appearing.   HENT:      Head: Normocephalic and atraumatic.      Nose: Nose normal. No congestion.      Mouth/Throat:      Mouth: Mucous membranes are moist.      Pharynx: No oropharyngeal exudate.   Eyes:      General:         Right eye: No discharge.         Left eye: No discharge.      Extraocular Movements: Extraocular movements intact.      Pupils: Pupils are equal, round, and reactive to light.   Cardiovascular:      Rate and Rhythm: Normal rate and regular rhythm.      Heart sounds: No murmur heard.    No friction rub. No gallop.   Pulmonary:      Effort: Pulmonary effort is normal. No respiratory distress.      Breath sounds: Normal breath sounds. No wheezing, rhonchi or rales.   Abdominal:      General: Abdomen is flat. Bowel sounds are normal. There is no distension.      Palpations: Abdomen is soft.      Tenderness: There is no abdominal tenderness.   Musculoskeletal:         General: No swelling. Normal range of motion.      Cervical back: Normal range of motion. No rigidity.      Right lower leg: No edema.      Left lower leg: No edema.   Skin:     General: Skin is warm and dry.      Findings: No lesion or rash.   Neurological:      General: No focal deficit present.      Mental Status: He is alert and oriented to person, place, and time.      Cranial Nerves: No cranial nerve deficit.      Motor: No weakness.       Significant Labs:  CBC:  No results for input(s): WBC, RBC, HGB, HCT, PLT, MCV, MCH, MCHC in the last 168 hours.  BNP:  No results for input(s): BNP in the last 168  hours.    Invalid input(s): BNPTRIAGELBLO  CMP:  No results for input(s): GLU, CALCIUM, ALBUMIN, PROT, NA, K, CO2, CL, BUN, CREATININE, ALKPHOS, ALT, AST, BILITOT in the last 168 hours.   Coagulation:   No results for input(s): PT, INR, APTT in the last 168 hours.  LDH:  No results for input(s): LDH in the last 72 hours.  Microbiology:  Microbiology Results (last 7 days)       ** No results found for the last 168 hours. **            BMP: No results for input(s): GLU, NA, K, CL, CO2, BUN, CREATININE, CALCIUM, MG in the last 72 hours.  I have reviewed all pertinent labs within the past 24 hours.    Diagnostic Results:  Reviewed

## 2022-08-04 NOTE — NURSING
Pt transferred from cath lab via stretcher.  Vss.  Post op orders and assessment initiated.  Pt aao, tolerating po.  Pt in no acute distress and verbalizes no complaints. Call bell within reach.  Will continue to monitor.

## 2022-08-04 NOTE — Clinical Note
The PA catheter was repositioned to the pulmonary wedge. Hemodynamics were performed. O2 saturation was measured at 94%.

## 2022-08-04 NOTE — PROGRESS NOTES
"Subjective:    Patient ID:  Jaquan Hines Jr. is a 52 y.o. male who presents for follow-up of Pulmonary Hypertension.    HPI   Mr. 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 Jennings on 3/13/18. He also has a history of Bipolar disorder, HTN, MI w/PCI x 2 stents, obesity, CLAUDIA with intermittent CPAP use, home O2 uses PRN at 2L.  Mr. Hines has been on Uptravi 1600 mcg, BID; Adempas 2.5 mg, TID, and Opsumit 10 mg daily for PAH, since 2018, but has recently stopped taking these medications.    This visit is for post hospital follow-up. Mr. Hines was admitted on 5/4/2022 to hospital medicine for SOB, weight gain, and COVID incidentally. He recounts that he has been off his Bipolar medication and had been feeling good, so good that he stopped his PH meds as well. He has been steadily gaining weight but did not let our office know. He attended a music festival and admits that he wasn't watching his fluid intake or diet.     During this visit, he states that he has restarted his seroquel but is not taking it as prescribed. He's taking it every other day because otherwise it makes him too sleepy and foggy. He endorses taking his eliquis, torsemide, and potassium. He thinks he is taking them in the correct amounts. He mentions that his sister is "angry with him" after these last events. She is an NP and usually helps him a lot with his medications and appointments, but has not been as willing to do so now. Mr. Hines is very talkative. He reports that his body takes over when he is in this phase and keeps him going. He says he feels good. Did not feel any effects of COVID. Only uses O2 every now and then. He is not sure he needs to take the PH meds as he believes exercising and other lifestyle modifications should be enough. He wants to be in a monitored exercise program like pulmonary rehab. Has an appt with his cardiologist on Monday. "     8/4/2022:  Mr. Hines is in clinic today, following his right heart cath. He sister is with him. He reports feeling very well, though he knows he has a fluid retention. Endorses BLE. His walk was much improved today and he did not desaturate. Wears his O2 as needed and CPAP intermittently. He denies chest pain, chest pressure, syncope, pre-syncope, lightheadedness, dizziness, PND, orthopnea, abdominal pain, abdominal pressure, or N/V/F/C  Mr. Hines reports that he is trying to be more compliant with his psychiatric medications. Follows with [facility] for that. He is actively trying to lose weight and get healthy. He has also had recent appointments with his cardiologist and his family medicine provider.     6MWT:  6MW 8/4/2022   6MWT Status completed without stopping   Patient Reported Chest pain;Dyspnea   Was O2 used? No   Delivery Method    6MW Distance walked (feet) 1200   Distance walked (meters) 365.76   Did patient stop? No   Oxygen Saturation 96   Supplemental Oxygen Room Air   Heart Rate 95   Blood Pressure 104/68   Ammon Dyspnea Rating  moderate   Oxygen Saturation 92   Supplemental Oxygen Room Air   Heart Rate 1   Blood Pressure    Ammon Dyspnea Rating  very heavy   Recovery Time (seconds) 280   Oxygen Saturation 97   Supplemental Oxygen Room Air   Heart Rate 102     6MW 5/4/2021   6MWT Status completed without stopping   Patient Reported Dyspnea;Lightheadedness;Chest pain   Was O2 used? No   Delivery Method    6MW Distance walked (feet) 600   Distance walked (meters) 182.88   Did patient stop? Yes   Oxygen Saturation 96   Supplemental Oxygen Room Air   Heart Rate 92   Blood Pressure 120/56   Ammon Dyspnea Rating  light   Oxygen Saturation 88   Supplemental Oxygen Room Air   Heart Rate 124   Blood Pressure 143/66   Ammon Dyspnea Rating  very,very heavy   Recovery Time (seconds) 125   Oxygen Saturation 97   Supplemental Oxygen Room Air   Heart Rate 109     ECHO: 5/5/2021  · The estimated ejection  fraction is 60%.  · The left ventricle is normal in size with normal systolic function.  · Indeterminate left ventricular diastolic function.  · Mild left atrial enlargement.  · Mild tricuspid regurgitation.  · Normal right ventricular size with normal right ventricular systolic function.  · There is abnormal septal wall motion consistent with right ventricular pacemaker.  · The estimated PA systolic pressure is 48 mmHg.  · Intermediate central venous pressure (8 mmHg).    RHC: 8/4/2022  RA: 20/ 20/ 18 RV: 43/ 15/ 20 PA: 44/ 25/ 31 PWP: 30/ 28/ 27 .   Cardiac output was 6.54  by Neel. Cardiac index is 2.59 L/min/m2.   O2 Sat: PA 64%.   Normal CO/CI off vasodilator therapy.  Significantly increased right and left sided filling pressures.  Moderate pulmonary hypertension in setting of mod-sev increased left sided filling pressures.  TPG  4   PVR 0.6    RHC: 5/12/2021  RA: 24/ 24/ 17 RV: 53/ 16 PA: 50/ 24/ 33 PWP: 30/ 43/ 30 .   Cardiac output was 6.65  by Neel. Cardiac index is 2.56 L/min/m2.   O2 Sat: PA 96%.   Pulmonary vascular resistance: 36. Systemic vascular resistance: 770.     /64 (81); sat 96%  Wedge proven with fluoroscopy    R/LHC  12 /16    Pressures in mmHg, right heart pressures, pulmonary capillary wedge pressure mean of 28, pulmonary artery 70-80/29-35, mean of 45-50, RV   average of 75/25.  RA mean of 18.  Average cardiac output was 4.3 liters per minute.  Left heart pressures,   central aortic pressure 90/60, left ventricle 97/15-20.  No gradient upon pullback.    Left Coronary Artery:  Left main coronary artery was of medium caliber, free of disease.  It bifurcated into left anterior descending and   circumflex arteries.  In the proximal portion of the left anterior descending artery, there was a stent that had a   20% concentric narrowing with no what appear to be flow limiting abnormalities.  The continuation of the vessel was normal and free of   disease.  There was a large diagonal branch  that arose from the proximal portion of the left anterior descending artery   that was normal and free of disease.    Circumflex System:  The circumflex system was of medium caliber with high obtuse marginal branch arising from it and a second longer obtuse marginal   branch, both of which were normal and free of disease.    Right Coronary Artery:  The right coronary artery had some mild diffuse plaquing throughout its course with no evidence of significant obstructive   disease.  It did give rise to posterior descending and posterolateral branch.    Left Ventriculogram:  The left ventricle demonstrated normal left ventricular function with EF estimated at approximately 60%; however,   there was much ventricular ectopy during the examination and I could not tell if there was any significant mitral regurgitation.    PFTs: 3/7/2018  Normal spirometry. MVV is mildly decreased. Arterial blood gases are consistent  with a moderate mixed acid-base disorder consisting of metabolic alkalosis and respiratory acidosis. Severe  hypoxemia is present on supplemental oxygen.    V/Q: 11/2/2017  Narrative & Impression  Technique: Paired ventilation and perfusion gamma camera images were obtained in multiple projections following the administration of 43 mCi technetium 99m DTPA in inhaled aerosol for the ventilation portion of the exam and the administration of 5.5 mCi technetium 99m MAA intravenously for the perfusion portion of the study.     Results: Two large mismatched perfusion defects in the left and right lower lobes of the lung respectively, and one moderate mismatch perfusion defects in the left upper lobe.  IMPRESSION:       High probability VQ scan for pulmonary embolism.      Review of Systems   Constitutional: Negative.   HENT: Negative.    Eyes: Negative.    Cardiovascular: Negative.    Respiratory: Negative.    Endocrine: Negative.    Hematologic/Lymphatic: Negative.    Skin: Negative.    Musculoskeletal: Negative.   "  Gastrointestinal: Negative.    Genitourinary: Negative.    Neurological: Negative.    Psychiatric/Behavioral: Positive for altered mental status (manic, per patient).          Objective: BP (!) 162/79   Pulse 96   Ht 5' 7" (1.702 m)   Wt (!) 158.4 kg (349 lb 3.3 oz)   SpO2 98%   BMI 54.69 kg/m²     Physical Exam  Constitutional:       General: He is not in acute distress.     Appearance: Normal appearance. He is obese. He is not ill-appearing.   HENT:      Head: Normocephalic.   Eyes:      Extraocular Movements: Extraocular movements intact.      Conjunctiva/sclera: Conjunctivae normal.   Cardiovascular:      Rate and Rhythm: Normal rate and regular rhythm.      Pulses: Normal pulses.      Heart sounds: Normal heart sounds.   Pulmonary:      Effort: Pulmonary effort is normal.      Breath sounds: Normal breath sounds.   Abdominal:      Palpations: Abdomen is soft.   Musculoskeletal:         General: Normal range of motion.      Cervical back: Normal range of motion.      Right lower leg: Edema (2+) present.      Left lower leg: Edema (2+) present.   Skin:     General: Skin is warm and dry.      Capillary Refill: Capillary refill takes less than 2 seconds.   Neurological:      Mental Status: He is oriented to person, place, and time.   Psychiatric:         Mood and Affect: Mood normal.         Behavior: Behavior normal.         Lab Results   Component Value Date    BNP 50 08/04/2022     08/11/2022     02/15/2017    K 3.9 08/11/2022    MG 1.9 05/07/2022     08/11/2022     02/15/2017    CO2 25 08/11/2022    BUN 12 08/11/2022    CREATININE 0.9 08/11/2022    CREATININE 1.11 02/15/2017     (H) 08/11/2022     (H) 02/15/2017    HGBA1C 6.1 (H) 07/05/2022    HGBA1C 6.2 (H) 05/05/2021    AST 15 08/11/2022    ALT 20 08/11/2022    ALBUMIN 3.4 (L) 08/11/2022    ALBUMIN 4.1 02/15/2017    PROT 6.5 08/11/2022    BILITOT 0.3 08/11/2022    CHOL 120 05/05/2021    HDL 36 (L) 05/05/2021    " LDLCALC 55.2 (L) 05/05/2021    TRIG 144 05/05/2021       Magnesium   Date Value Ref Range Status   05/07/2022 1.9 1.6 - 2.6 mg/dL Final       Lab Results   Component Value Date    WBC 6.93 08/04/2022    HGB 12.2 (L) 08/04/2022    HCT 39.2 (L) 08/04/2022    MCV 73 (L) 08/04/2022     08/04/2022       Lab Results   Component Value Date    INR 1.2 05/12/2021    INR 1.1 05/31/2018    INR 2.7 (H) 03/19/2018       BNP   Date Value Ref Range Status   08/04/2022 50 0 - 99 pg/mL Final     Comment:     Values of less than 100 pg/ml are consistent with non-CHF populations.   05/16/2022 46.3 pg/mL Final   05/04/2022 259 (H) 0 - 99 pg/mL Final     Comment:     Values of less than 100 pg/ml are consistent with non-CHF populations.       No results found for: LDH    WHO Group: 1,2  Functional Class: II  REVEAL Score: 10 (High Risk)    Assessment:       1. Shortness of breath    2. Pulmonary hypertension    3. CTEPH (chronic thromboembolic pulmonary hypertension)    4. Obesity, morbid, BMI 50 or higher    5. Bipolar 1 disorder    6. Coronary artery disease involving native coronary artery of native heart without angina pectoris         Plan:       Reviewed his labs and right heart cath with Mr. Hines. Noted that it showed moderate PH in the setting of severely increased filling pressures. Discussed the importance of fluid management, BP control, diet, exercise, and consistently wearing his CPAP. He has an appointment with his local cardiologist soon.    Will hold off on PH medications for now. Appears to be more HFpEF, diastolic dysfunction at this time. Restarted his spironolactone at 25 mg, once daily. Asked to repeat labs in 1 week.    Asked Mr. Hines to make an appointment with Dr. Durand, pulmonology to evaluate current CPAP settings.    Follow-up with cardiologist. Recommend continued efforts to control blood pressure.    RTC 3 months with ECHO, labs, walk    Recommend 2 gram sodium restriction and 1500cc fluid  restriction.  Encourage physical activity with graded exercise program.  Requested patient to weigh themselves daily, and to notify us if their weight increases by more than 3 lbs in 1 day or 5 lbs in 1 week.

## 2022-08-04 NOTE — Clinical Note
A pulse oximeter was placed on the patient's right index finger. Simple: Patient demonstrates quick and easy understanding

## 2022-08-11 ENCOUNTER — LAB VISIT (OUTPATIENT)
Dept: LAB | Facility: HOSPITAL | Age: 52
End: 2022-08-11
Payer: MEDICAID

## 2022-08-11 DIAGNOSIS — Z79.899 POLYPHARMACY: ICD-10-CM

## 2022-08-11 LAB
ALBUMIN SERPL BCP-MCNC: 3.4 G/DL (ref 3.5–5.2)
ALP SERPL-CCNC: 101 U/L (ref 55–135)
ALT SERPL W/O P-5'-P-CCNC: 20 U/L (ref 10–44)
ANION GAP SERPL CALC-SCNC: 8 MMOL/L (ref 8–16)
AST SERPL-CCNC: 15 U/L (ref 10–40)
BILIRUB SERPL-MCNC: 0.3 MG/DL (ref 0.1–1)
BUN SERPL-MCNC: 12 MG/DL (ref 6–20)
CALCIUM SERPL-MCNC: 9.1 MG/DL (ref 8.7–10.5)
CHLORIDE SERPL-SCNC: 104 MMOL/L (ref 95–110)
CO2 SERPL-SCNC: 25 MMOL/L (ref 23–29)
CREAT SERPL-MCNC: 0.9 MG/DL (ref 0.5–1.4)
EST. GFR  (NO RACE VARIABLE): >60 ML/MIN/1.73 M^2
GLUCOSE SERPL-MCNC: 126 MG/DL (ref 70–110)
POTASSIUM SERPL-SCNC: 3.9 MMOL/L (ref 3.5–5.1)
PROT SERPL-MCNC: 6.5 G/DL (ref 6–8.4)
SODIUM SERPL-SCNC: 137 MMOL/L (ref 136–145)

## 2022-08-11 PROCEDURE — 36415 COLL VENOUS BLD VENIPUNCTURE: CPT | Mod: PO

## 2022-08-11 PROCEDURE — 80053 COMPREHEN METABOLIC PANEL: CPT

## 2022-08-13 PROBLEM — I27.21 PULMONARY ARTERY HYPERTENSION: Status: RESOLVED | Noted: 2019-03-07 | Resolved: 2022-08-13

## 2023-09-11 NOTE — ASSESSMENT & PLAN NOTE
Contributing Nutrition Diagnosis  Inadequate energy intake     Related to (etiology):   Decreased ability to consume sufficient energy     Signs and Symptoms (as evidenced by):   NPO and no alternative means of nutrition at this time      Nutrition Diagnosis Status:   Resolved   Follow up with your primary care provider in 1-2 days.    Return to the emergency room for worsening symptoms.

## 2024-04-30 ENCOUNTER — POST MORTEM DOCUMENTATION (OUTPATIENT)
Dept: TRANSPLANT | Facility: CLINIC | Age: 54
End: 2024-04-30
Payer: MEDICAID

## (undated) DEVICE — CLIP SPRING 6MM

## (undated) DEVICE — PAD K-THERMIA 24IN X 60IN

## (undated) DEVICE — Device

## (undated) DEVICE — SHEATH INTRODUCER 7FR 11CM

## (undated) DEVICE — KIT MICROINTRODUCE MINI 5X10CM

## (undated) DEVICE — SUT 2/0 30IN ETHIBOND

## (undated) DEVICE — SUT 4-0 12-18IN SILK BLACK

## (undated) DEVICE — KIT PROBE COVER WITH GEL

## (undated) DEVICE — CATH SWAN GANZ STND 7FR

## (undated) DEVICE — SEE ITEM #153244

## (undated) DEVICE — TRAY HEART

## (undated) DEVICE — SEE MEDLINE ITEM 156894

## (undated) DEVICE — INSERTS STEALTH FIBRA SIZE 5

## (undated) DEVICE — CLOSURE SKIN STERI STRIP 1/2X4

## (undated) DEVICE — SYR 3CC LUER LOC

## (undated) DEVICE — DRESSING TELFA STRL 4X3 LF

## (undated) DEVICE — CANNULA VESSEL FREE FLOW

## (undated) DEVICE — BANDAGE ACE ELASTIC 6"

## (undated) DEVICE — SEE MEDLINE ITEM 146417

## (undated) DEVICE — SUT 6 18IN STEEL MONO CCS

## (undated) DEVICE — ADHESIVE DERMABOND ADVANCED

## (undated) DEVICE — SUT 6/0 4-24IN PROLENE

## (undated) DEVICE — SEE MEDLINE ITEM 157117

## (undated) DEVICE — TUBING HF INSUFFLATION W/ FLTR

## (undated) DEVICE — SEE MEDLINE ITEM 152515

## (undated) DEVICE — BINDER AB SIZE XL 12X72-96IN

## (undated) DEVICE — PUNCH AORTIC 4.0MM 6/CASE

## (undated) DEVICE — SUT VICRYL BR 1 GEN 27 CT-1

## (undated) DEVICE — SYS VIRTUOSAPH PLUS EVM

## (undated) DEVICE — GAUZE SPONGE 4'X4 12 PLY

## (undated) DEVICE — ELECTRODE REM PLYHSV RETURN 9

## (undated) DEVICE — DRAPE SPLIT STERILE

## (undated) DEVICE — WIRE INTRAMYOCARDIAL TEMP

## (undated) DEVICE — TAPE SURG MEDIPORE 6X72IN

## (undated) DEVICE — DRESSING ADH ISLAND 3.6 X 14

## (undated) DEVICE — GAUZE SPONGE 4X4 12PLY

## (undated) DEVICE — SUT PROLENE 4-0 SH BLU 36IN

## (undated) DEVICE — PLEDGET SUT SOFT 3/8X3/16X1/16

## (undated) DEVICE — NDL 18GA X1 1/2 REG BEVEL

## (undated) DEVICE — SUT VICRYL 3-0 27 SH

## (undated) DEVICE — DRESSING AQUACEL SACRAL 9 X 9

## (undated) DEVICE — SUT PROLENE 7-0 BV-1 30

## (undated) DEVICE — BLADE BEAVER 15 DEG 1.5MM

## (undated) DEVICE — SET DECANTER MEDICHOICE

## (undated) DEVICE — BLOWER MISTER

## (undated) DEVICE — BLADE ELECTRO EDGE INSULATED

## (undated) DEVICE — RETRACTOR OCTOBASE INSERT HOLD

## (undated) DEVICE — SUT PROLENE 4-0 RB-1 BL MO

## (undated) DEVICE — SUT SILK 2-0 SH 18IN BLACK

## (undated) DEVICE — SET MICROPUNCTURE 5FR 501NT

## (undated) DEVICE — DRESSING TRANS 4X4 TEGADERM

## (undated) DEVICE — SUT 2/0 36IN COATED VICRYL

## (undated) DEVICE — DRAIN CHEST DRY SUCTION

## (undated) DEVICE — BLADE 4IN EDGE INSULATED

## (undated) DEVICE — KIT SAHARA DRAPE DRAW/LIFT

## (undated) DEVICE — DRAPE SLUSH WARMER WITH DISC

## (undated) DEVICE — SUT SILK BLK BR. 2 2-60